# Patient Record
Sex: FEMALE | Race: WHITE | Employment: OTHER | URBAN - METROPOLITAN AREA
[De-identification: names, ages, dates, MRNs, and addresses within clinical notes are randomized per-mention and may not be internally consistent; named-entity substitution may affect disease eponyms.]

---

## 2017-04-11 ENCOUNTER — OFFICE VISIT (OUTPATIENT)
Dept: CARDIOLOGY CLINIC | Age: 62
End: 2017-04-11

## 2017-04-11 VITALS
SYSTOLIC BLOOD PRESSURE: 120 MMHG | BODY MASS INDEX: 32.93 KG/M2 | WEIGHT: 174.4 LBS | HEART RATE: 98 BPM | DIASTOLIC BLOOD PRESSURE: 70 MMHG | HEIGHT: 61 IN

## 2017-04-11 DIAGNOSIS — I48.0 PAROXYSMAL ATRIAL FIBRILLATION (HCC): Primary | ICD-10-CM

## 2017-04-11 PROCEDURE — G8427 DOCREV CUR MEDS BY ELIG CLIN: HCPCS | Performed by: INTERNAL MEDICINE

## 2017-04-11 PROCEDURE — G8419 CALC BMI OUT NRM PARAM NOF/U: HCPCS | Performed by: INTERNAL MEDICINE

## 2017-04-11 PROCEDURE — 3014F SCREEN MAMMO DOC REV: CPT | Performed by: INTERNAL MEDICINE

## 2017-04-11 PROCEDURE — 3017F COLORECTAL CA SCREEN DOC REV: CPT | Performed by: INTERNAL MEDICINE

## 2017-04-11 PROCEDURE — 99214 OFFICE O/P EST MOD 30 MIN: CPT | Performed by: INTERNAL MEDICINE

## 2017-04-11 PROCEDURE — G8598 ASA/ANTIPLAT THER USED: HCPCS | Performed by: INTERNAL MEDICINE

## 2017-04-11 PROCEDURE — 1036F TOBACCO NON-USER: CPT | Performed by: INTERNAL MEDICINE

## 2017-04-11 RX ORDER — ESCITALOPRAM OXALATE 20 MG/1
20 TABLET ORAL DAILY
COMMUNITY
End: 2018-08-08

## 2017-04-11 RX ORDER — HYDROXYZINE PAMOATE 25 MG/1
25 CAPSULE ORAL 2 TIMES DAILY
COMMUNITY
End: 2017-08-04

## 2017-05-04 ENCOUNTER — TELEPHONE (OUTPATIENT)
Dept: CARDIOLOGY CLINIC | Age: 62
End: 2017-05-04

## 2017-06-05 ENCOUNTER — TELEPHONE (OUTPATIENT)
Dept: CARDIOLOGY CLINIC | Age: 62
End: 2017-06-05

## 2017-08-08 ENCOUNTER — TELEPHONE (OUTPATIENT)
Dept: CARDIOLOGY CLINIC | Age: 62
End: 2017-08-08

## 2017-08-09 ENCOUNTER — TELEPHONE (OUTPATIENT)
Dept: CARDIOLOGY CLINIC | Age: 62
End: 2017-08-09

## 2017-08-11 PROBLEM — I51.3 ATRIAL THROMBUS WITHOUT ANTECEDENT MYOCARDIAL INFARCTION: Status: ACTIVE | Noted: 2017-08-11

## 2017-08-18 ENCOUNTER — OFFICE VISIT (OUTPATIENT)
Dept: CARDIOLOGY CLINIC | Age: 62
End: 2017-08-18

## 2017-08-18 VITALS
RESPIRATION RATE: 16 BRPM | WEIGHT: 181 LBS | BODY MASS INDEX: 34.17 KG/M2 | HEIGHT: 61 IN | SYSTOLIC BLOOD PRESSURE: 114 MMHG | HEART RATE: 81 BPM | DIASTOLIC BLOOD PRESSURE: 78 MMHG

## 2017-08-18 DIAGNOSIS — I48.19 PERSISTENT ATRIAL FIBRILLATION (HCC): Primary | ICD-10-CM

## 2017-08-18 PROCEDURE — 3017F COLORECTAL CA SCREEN DOC REV: CPT | Performed by: INTERNAL MEDICINE

## 2017-08-18 PROCEDURE — G8427 DOCREV CUR MEDS BY ELIG CLIN: HCPCS | Performed by: INTERNAL MEDICINE

## 2017-08-18 PROCEDURE — 99215 OFFICE O/P EST HI 40 MIN: CPT | Performed by: INTERNAL MEDICINE

## 2017-08-18 PROCEDURE — G8417 CALC BMI ABV UP PARAM F/U: HCPCS | Performed by: INTERNAL MEDICINE

## 2017-08-18 PROCEDURE — 3014F SCREEN MAMMO DOC REV: CPT | Performed by: INTERNAL MEDICINE

## 2017-08-18 PROCEDURE — G8598 ASA/ANTIPLAT THER USED: HCPCS | Performed by: INTERNAL MEDICINE

## 2017-08-18 PROCEDURE — 1111F DSCHRG MED/CURRENT MED MERGE: CPT | Performed by: INTERNAL MEDICINE

## 2017-08-18 PROCEDURE — 1036F TOBACCO NON-USER: CPT | Performed by: INTERNAL MEDICINE

## 2017-08-18 RX ORDER — CHOLECALCIFEROL (VITAMIN D3) 125 MCG
15 CAPSULE ORAL NIGHTLY
Status: ON HOLD | COMMUNITY
End: 2018-08-08

## 2017-08-25 ENCOUNTER — OFFICE VISIT (OUTPATIENT)
Dept: CARDIOLOGY CLINIC | Age: 62
End: 2017-08-25

## 2017-08-25 DIAGNOSIS — I48.0 PAROXYSMAL ATRIAL FIBRILLATION (HCC): Primary | ICD-10-CM

## 2017-08-25 PROCEDURE — 99999 PR OFFICE/OUTPT VISIT,PROCEDURE ONLY: CPT | Performed by: INTERNAL MEDICINE

## 2017-08-27 ASSESSMENT — ENCOUNTER SYMPTOMS
COUGH: 0
VOMITING: 0
CHEST TIGHTNESS: 0
CONSTIPATION: 0
BACK PAIN: 0
NAUSEA: 0
BLOOD IN STOOL: 0
SHORTNESS OF BREATH: 1
ABDOMINAL PAIN: 0
COLOR CHANGE: 0
DIARRHEA: 0
WHEEZING: 0
EYE PAIN: 0
PHOTOPHOBIA: 0

## 2017-09-01 ENCOUNTER — HOSPITAL ENCOUNTER (OUTPATIENT)
Dept: LAB | Age: 62
Discharge: HOME | End: 2017-09-01
Attending: INTERNAL MEDICINE | Admitting: INTERNAL MEDICINE

## 2017-09-01 DIAGNOSIS — Z01.811 PRE-OP CHEST EXAM: ICD-10-CM

## 2017-09-01 LAB
ANION GAP SERPL CALCULATED.3IONS-SCNC: 11 MMOL/L (ref 4–16)
APTT: 27.4 SECONDS (ref 21.2–33)
BUN BLDV-MCNC: 22 MG/DL (ref 6–23)
CALCIUM SERPL-MCNC: 9.3 MG/DL (ref 8.3–10.6)
CHLORIDE BLD-SCNC: 102 MMOL/L (ref 99–110)
CO2: 29 MMOL/L (ref 21–32)
CREAT SERPL-MCNC: 1.4 MG/DL (ref 0.6–1.1)
GFR AFRICAN AMERICAN: 46 ML/MIN/1.73M2
GFR NON-AFRICAN AMERICAN: 38 ML/MIN/1.73M2
GLUCOSE BLD-MCNC: 71 MG/DL (ref 70–140)
HCT VFR BLD CALC: 37.8 % (ref 37–47)
HEMOGLOBIN: 12.8 GM/DL (ref 12.5–16)
INR BLD: 1.2 INDEX
MAGNESIUM: 2.2 MG/DL (ref 1.8–2.4)
MCH RBC QN AUTO: 30.7 PG (ref 27–31)
MCHC RBC AUTO-ENTMCNC: 33.9 % (ref 32–36)
MCV RBC AUTO: 90.6 FL (ref 78–100)
PDW BLD-RTO: 13.1 % (ref 11.7–14.9)
PHOSPHORUS: 4.1 MG/DL (ref 2.5–4.9)
PLATELET # BLD: 259 K/CU MM (ref 140–440)
PMV BLD AUTO: 10.1 FL (ref 7.5–11.1)
POTASSIUM SERPL-SCNC: 4.7 MMOL/L (ref 3.5–5.1)
PROTHROMBIN TIME: 13.7 SECONDS (ref 9.12–12.5)
RBC # BLD: 4.17 M/CU MM (ref 4.2–5.4)
SODIUM BLD-SCNC: 142 MMOL/L (ref 135–145)
WBC # BLD: 9.1 K/CU MM (ref 4–10.5)

## 2017-09-06 ENCOUNTER — TELEPHONE (OUTPATIENT)
Dept: CARDIOLOGY CLINIC | Age: 62
End: 2017-09-06

## 2017-09-29 ENCOUNTER — OFFICE VISIT (OUTPATIENT)
Dept: CARDIOLOGY CLINIC | Age: 62
End: 2017-09-29

## 2017-09-29 VITALS
BODY MASS INDEX: 34.89 KG/M2 | HEART RATE: 96 BPM | DIASTOLIC BLOOD PRESSURE: 60 MMHG | WEIGHT: 184.8 LBS | OXYGEN SATURATION: 98 % | HEIGHT: 61 IN | SYSTOLIC BLOOD PRESSURE: 102 MMHG

## 2017-09-29 DIAGNOSIS — Z98.890 S/P ABLATION OF ATRIAL FIBRILLATION: Primary | ICD-10-CM

## 2017-09-29 DIAGNOSIS — Z86.79 S/P ABLATION OF ATRIAL FIBRILLATION: Primary | ICD-10-CM

## 2017-09-29 PROCEDURE — 3014F SCREEN MAMMO DOC REV: CPT | Performed by: INTERNAL MEDICINE

## 2017-09-29 PROCEDURE — G8427 DOCREV CUR MEDS BY ELIG CLIN: HCPCS | Performed by: INTERNAL MEDICINE

## 2017-09-29 PROCEDURE — 1036F TOBACCO NON-USER: CPT | Performed by: INTERNAL MEDICINE

## 2017-09-29 PROCEDURE — 3017F COLORECTAL CA SCREEN DOC REV: CPT | Performed by: INTERNAL MEDICINE

## 2017-09-29 PROCEDURE — G8417 CALC BMI ABV UP PARAM F/U: HCPCS | Performed by: INTERNAL MEDICINE

## 2017-09-29 PROCEDURE — 93000 ELECTROCARDIOGRAM COMPLETE: CPT | Performed by: INTERNAL MEDICINE

## 2017-09-29 PROCEDURE — 99213 OFFICE O/P EST LOW 20 MIN: CPT | Performed by: INTERNAL MEDICINE

## 2017-09-29 PROCEDURE — G8598 ASA/ANTIPLAT THER USED: HCPCS | Performed by: INTERNAL MEDICINE

## 2017-09-29 RX ORDER — METOPROLOL SUCCINATE 50 MG/1
TABLET, EXTENDED RELEASE ORAL
Qty: 45 TABLET | Refills: 3 | Status: SHIPPED | OUTPATIENT
Start: 2017-09-29 | End: 2020-07-27 | Stop reason: SDUPTHER

## 2017-09-29 RX ORDER — CLONIDINE HYDROCHLORIDE 0.3 MG/1
0.3 TABLET ORAL NIGHTLY
Status: ON HOLD | COMMUNITY
End: 2019-05-25 | Stop reason: HOSPADM

## 2017-09-29 RX ORDER — LISINOPRIL 10 MG/1
10 TABLET ORAL DAILY
Qty: 90 TABLET | Refills: 1
Start: 2017-09-29 | End: 2018-08-08 | Stop reason: DRUGHIGH

## 2017-09-29 NOTE — MR AVS SNAPSHOT
estimate of body fat, calculated from your height and weight. The higher your BMI, the greater your risk of heart disease, high blood pressure, type 2 diabetes, stroke, gallstones, arthritis, sleep apnea, and certain cancers. BMI is not perfect. It may overestimate body fat in athletes and people who are more muscular. Even a small weight loss (between 5 and 10 percent of your current weight) by decreasing your calorie intake and becoming more physically active will help lower your risk of developing or worsening diseases associated with obesity. Learn more at: [employer]             Today's Medication Changes          These changes are accurate as of: 9/29/17  2:02 PM.  If you have any questions, ask your nurse or doctor. CHANGE how you take these medications           lisinopril 10 MG tablet   Commonly known as:  PRINIVIL;ZESTRIL   Instructions: Take 1 tablet by mouth daily   Quantity:  90 tablet   Refills:  1   What changed:    - medication strength  - how much to take   Changed by:  Latoya Arzola MD       metoprolol succinate 50 MG extended release tablet   Commonly known as:  TOPROL XL   Instructions: Take one tablet in the morning and 1/2 tablet at night   Quantity:  45 tablet   Refills:  3   What changed:    - how much to take  - how to take this  - when to take this  - additional instructions   Changed by:  Latoya Arzola MD            Where to Get Your Medications      These medications were sent to [address] [place] [address], [address] [place] [address] [phone] - f [phone]  [address]     Phone:  [phone]     metoprolol succinate 50 MG extended release tablet         Information about where to get these medications is not yet available     !  Ask your nurse or doctor about these medications     lisinopril 10 MG tablet               Your Current Medications Are cloNIDine (CATAPRES) 0.3 MG tablet Take 0.3 mg by mouth nightly    lisinopril (PRINIVIL;ZESTRIL) 10 MG tablet Take 1 tablet by mouth daily    metoprolol succinate (TOPROL XL) 50 MG extended release tablet Take one tablet in the morning and 1/2 tablet at night    melatonin 5 MG TABS tablet Take 15 mg by mouth nightly    nitroGLYCERIN (NITROSTAT) 0.4 MG SL tablet Place 0.4 mg under the tongue every 5 minutes as needed for Chest pain up to max of 3 total doses. If no relief after 1 dose, call 911.    aspirin 81 MG EC tablet Take 81 mg by mouth daily    escitalopram (LEXAPRO) 20 MG tablet Take 20 mg by mouth daily    spironolactone (ALDACTONE) 50 MG tablet Take 50 mg by mouth daily    atorvastatin (LIPITOR) 40 MG tablet Take 40 mg by mouth daily    traZODone (DESYREL) 100 MG tablet Take 100 mg by mouth nightly. insulin lispro (HUMALOG) 100 UNIT/ML injection Inject into the skin See Admin Instructions Sliding scale twice daily before meals  149 or below zero units   150 to 200  Twenty units  201 to 250  Twenty two units  251 to 300  Twenty four units  301 to 350  Twenty six units  351 to 400  Twenty eight units  401 or greater  Thirty units and call MD    furosemide (LASIX) 20 MG tablet Take 20 mg by mouth daily.       apixaban (ELIQUIS) 5 MG TABS tablet Take 1 tablet by mouth 2 times daily    insulin glargine (LANTUS) 100 UNIT/ML injection vial Inject 75 Units into the skin nightly Only took 1/2 the dose last night      Allergies              Amiodarone Other (See Comments)    dizziness    Iv Dye [Iodides] Nausea And Vomiting    Vicodin [Hydrocodone-acetaminophen] Nausea And Vomiting      We Ordered/Performed the following           EKG 12 lead          Result Summary for EKG 12 lead      Result Information     Status          Final result (Resulted: 9/29/2017)           9/29/2017  1:48 PM      Scans on Order [id number]            ECG on 9/29/2017  1:48 PM : ECG Report                     Additional Information Basic Information     Date Of Birth Sex Race Ethnicity Preferred Language    1955 Female White Non-/Non  English      Problem List as of 9/29/2017  Date Reviewed: 8/5/2014                S/P ablation of atrial fibrillation    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    Atrial thrombus without antecedent myocardial infarction    Family history of coronary artery disease    H/O cardiovascular stress test    CHF (congestive heart failure) ([facility]    Light headed    History of MI (myocardial infarction)    Diabetes mellitus ([facility]    Hypertension    TIA (transient ischemic attack)    Hyperlipidemia    SOB (shortness of breath)    CAD (coronary artery disease)    Persistent atrial fibrillation ([facility]      Immunizations as of 9/29/2017     Name Date    Influenza Vaccine, unspecified formulation 4/20/2016    Influenza Virus Vaccine 12/10/2014    Pneumococcal Polysaccharide ([id number]) 10/20/2016      Preventive Care        Date Due    Diabetic Foot Exam 10/28/1966    Eye Exam By An Eye Doctor 10/28/1966    HIV screening is recommended for all people regardless of risk factors  aged 15-65 years at least once (lifetime) who have never been HIV tested. 10/28/1971    Urine Check For Kidney Problems 10/28/1974    Tetanus Combination Vaccine (1 - Tdap) 10/28/1975    Pap Smear 10/28/1977    Colonoscopy 10/28/2006    Cholesterol Screening 1/30/2013    Zoster Vaccine 10/28/2016    Yearly Flu Vaccine (1) 9/1/2017    Mammograms are recommended every 2 years for low/average risk patients aged 48 - 69, and every year for high risk patients per updated national guidelines. However these guidelines can be individualized by your provider. 4/22/2018    Hemoglobin A1C (Test For Long-Term Glucose Control) 9/5/2018            [employer] Signup           "[employer]hart allows you to send messages to your doctor, view your test results, renew your prescriptions, schedule appointments, view visit notes, and more. How Do I Sign Up?

## 2017-10-03 ENCOUNTER — HOSPITAL ENCOUNTER (OUTPATIENT)
Dept: LAB | Age: 62
Discharge: HOME | End: 2017-10-03
Attending: INTERNAL MEDICINE | Admitting: INTERNAL MEDICINE

## 2017-10-03 ENCOUNTER — TELEPHONE (OUTPATIENT)
Dept: CARDIOLOGY CLINIC | Age: 62
End: 2017-10-03

## 2017-10-03 LAB
ANION GAP SERPL CALCULATED.3IONS-SCNC: 12 MMOL/L (ref 4–16)
BUN BLDV-MCNC: 28 MG/DL (ref 6–23)
CALCIUM SERPL-MCNC: 9.5 MG/DL (ref 8.3–10.6)
CHLORIDE BLD-SCNC: 100 MMOL/L (ref 99–110)
CO2: 27 MMOL/L (ref 21–32)
CREAT SERPL-MCNC: 1.6 MG/DL (ref 0.6–1.1)
GFR AFRICAN AMERICAN: 40 ML/MIN/1.73M2
GFR NON-AFRICAN AMERICAN: 33 ML/MIN/1.73M2
GLUCOSE FASTING: 215 MG/DL (ref 70–99)
MAGNESIUM: 2 MG/DL (ref 1.8–2.4)
POTASSIUM SERPL-SCNC: 5.2 MMOL/L (ref 3.5–5.1)
SODIUM BLD-SCNC: 139 MMOL/L (ref 135–145)

## 2017-10-03 NOTE — TELEPHONE ENCOUNTER
Called patient advised her that per Dr Deo Kendall we received lab results and he wants her to STOP Spironolactone. Pt voiced understanding.

## 2017-10-16 ENCOUNTER — TELEPHONE (OUTPATIENT)
Dept: CARDIOLOGY CLINIC | Age: 62
End: 2017-10-16

## 2017-12-01 ENCOUNTER — TELEPHONE (OUTPATIENT)
Dept: CARDIOLOGY CLINIC | Age: 62
End: 2017-12-01

## 2017-12-01 NOTE — TELEPHONE ENCOUNTER
Pt called she is sick and unable to make todays appointment. Told her someone will call her to reschedule.

## 2017-12-04 ENCOUNTER — TELEPHONE (OUTPATIENT)
Dept: CARDIOLOGY CLINIC | Age: 62
End: 2017-12-04

## 2017-12-22 ENCOUNTER — OFFICE VISIT (OUTPATIENT)
Dept: CARDIOLOGY CLINIC | Age: 62
End: 2017-12-22

## 2017-12-22 VITALS
HEART RATE: 60 BPM | HEIGHT: 61 IN | BODY MASS INDEX: 36.4 KG/M2 | DIASTOLIC BLOOD PRESSURE: 78 MMHG | SYSTOLIC BLOOD PRESSURE: 116 MMHG | WEIGHT: 192.8 LBS

## 2017-12-22 DIAGNOSIS — Z86.79 S/P ABLATION OF ATRIAL FIBRILLATION: Primary | ICD-10-CM

## 2017-12-22 DIAGNOSIS — Z98.890 S/P ABLATION OF ATRIAL FIBRILLATION: Primary | ICD-10-CM

## 2017-12-22 PROCEDURE — G8417 CALC BMI ABV UP PARAM F/U: HCPCS | Performed by: INTERNAL MEDICINE

## 2017-12-22 PROCEDURE — 3017F COLORECTAL CA SCREEN DOC REV: CPT | Performed by: INTERNAL MEDICINE

## 2017-12-22 PROCEDURE — 1036F TOBACCO NON-USER: CPT | Performed by: INTERNAL MEDICINE

## 2017-12-22 PROCEDURE — 99213 OFFICE O/P EST LOW 20 MIN: CPT | Performed by: INTERNAL MEDICINE

## 2017-12-22 PROCEDURE — G8484 FLU IMMUNIZE NO ADMIN: HCPCS | Performed by: INTERNAL MEDICINE

## 2017-12-22 PROCEDURE — G8427 DOCREV CUR MEDS BY ELIG CLIN: HCPCS | Performed by: INTERNAL MEDICINE

## 2017-12-22 PROCEDURE — G8598 ASA/ANTIPLAT THER USED: HCPCS | Performed by: INTERNAL MEDICINE

## 2017-12-22 PROCEDURE — 93000 ELECTROCARDIOGRAM COMPLETE: CPT | Performed by: INTERNAL MEDICINE

## 2017-12-22 PROCEDURE — 3014F SCREEN MAMMO DOC REV: CPT | Performed by: INTERNAL MEDICINE

## 2017-12-22 NOTE — PATIENT INSTRUCTIONS
Please remember to bring all medication bottles or a medication list with you to your appointment. If you have any questions, please call our office at [phone].

## 2018-08-08 PROBLEM — K52.9 GASTROENTERITIS: Status: ACTIVE | Noted: 2018-08-08

## 2018-08-10 PROBLEM — N30.00 ACUTE CYSTITIS: Status: ACTIVE | Noted: 2018-08-10

## 2018-09-11 ENCOUNTER — TELEPHONE (OUTPATIENT)
Dept: CARDIOLOGY CLINIC | Age: 63
End: 2018-09-11

## 2018-09-11 NOTE — TELEPHONE ENCOUNTER
Faxed records release request to [facility] on  9/11/18 for  Disability Determ. Faxed to [phone]. Call [phone]  Option 2 to check on progress.

## 2019-04-07 ENCOUNTER — APPOINTMENT (OUTPATIENT)
Dept: CT IMAGING | Age: 64
End: 2019-04-07
Payer: COMMERCIAL

## 2019-04-07 ENCOUNTER — HOSPITAL ENCOUNTER (EMERGENCY)
Age: 64
Discharge: HOME | End: 2019-04-07
Attending: EMERGENCY MEDICINE
Payer: COMMERCIAL

## 2019-04-07 VITALS
BODY MASS INDEX: 33.99 KG/M2 | HEART RATE: 90 BPM | RESPIRATION RATE: 16 BRPM | TEMPERATURE: 98.4 F | DIASTOLIC BLOOD PRESSURE: 127 MMHG | SYSTOLIC BLOOD PRESSURE: 154 MMHG | OXYGEN SATURATION: 95 % | WEIGHT: 180 LBS | HEIGHT: 61 IN

## 2019-04-07 DIAGNOSIS — R73.9 HYPERGLYCEMIA: ICD-10-CM

## 2019-04-07 DIAGNOSIS — R19.7 NAUSEA VOMITING AND DIARRHEA: ICD-10-CM

## 2019-04-07 DIAGNOSIS — I10 ESSENTIAL HYPERTENSION: ICD-10-CM

## 2019-04-07 DIAGNOSIS — E86.0 DEHYDRATION: ICD-10-CM

## 2019-04-07 DIAGNOSIS — R10.84 GENERALIZED ABDOMINAL PAIN: Primary | ICD-10-CM

## 2019-04-07 DIAGNOSIS — R11.2 NAUSEA VOMITING AND DIARRHEA: ICD-10-CM

## 2019-04-07 LAB
ALBUMIN SERPL-MCNC: 3.8 GM/DL (ref 3.4–5)
ALP BLD-CCNC: 218 IU/L (ref 40–129)
ALT SERPL-CCNC: 30 U/L (ref 10–40)
ANION GAP SERPL CALCULATED.3IONS-SCNC: 11 MMOL/L (ref 4–16)
AST SERPL-CCNC: 21 IU/L (ref 15–37)
BACTERIA: ABNORMAL /HPF
BASOPHILS ABSOLUTE: 0.1 K/CU MM
BASOPHILS RELATIVE PERCENT: 0.4 % (ref 0–1)
BILIRUB SERPL-MCNC: 0.3 MG/DL (ref 0–1)
BILIRUBIN URINE: NEGATIVE MG/DL
BLOOD, URINE: NEGATIVE
BUN BLDV-MCNC: 14 MG/DL (ref 6–23)
CALCIUM SERPL-MCNC: 8.6 MG/DL (ref 8.3–10.6)
CHLORIDE BLD-SCNC: 97 MMOL/L (ref 99–110)
CHP ED QC CHECK: YES
CLARITY: CLEAR
CO2: 26 MMOL/L (ref 21–32)
COLOR: YELLOW
CREAT SERPL-MCNC: 1.3 MG/DL (ref 0.6–1.1)
DIFFERENTIAL TYPE: ABNORMAL
EOSINOPHILS ABSOLUTE: 0.2 K/CU MM
EOSINOPHILS RELATIVE PERCENT: 1.5 % (ref 0–3)
GFR AFRICAN AMERICAN: 50 ML/MIN/1.73M2
GFR NON-AFRICAN AMERICAN: 42 ML/MIN/1.73M2
GLUCOSE BLD-MCNC: 447 MG/DL (ref 70–99)
GLUCOSE BLD-MCNC: 79 MG/DL
GLUCOSE BLD-MCNC: 79 MG/DL (ref 70–99)
GLUCOSE, URINE: >500 MG/DL
HCT VFR BLD CALC: 41.3 % (ref 37–47)
HEMOGLOBIN: 13.4 GM/DL (ref 12.5–16)
HYALINE CASTS: 5 /LPF
IMMATURE NEUTROPHIL %: 0.4 % (ref 0–0.43)
KETONES, URINE: NEGATIVE MG/DL
LACTATE: 1.3 MMOL/L (ref 0.4–2)
LACTATE: ABNORMAL MMOL/L (ref 0.4–2)
LEUKOCYTE ESTERASE, URINE: ABNORMAL
LIPASE: 16 IU/L (ref 13–60)
LYMPHOCYTES ABSOLUTE: 2.1 K/CU MM
LYMPHOCYTES RELATIVE PERCENT: 15.4 % (ref 24–44)
MCH RBC QN AUTO: 31 PG (ref 27–31)
MCHC RBC AUTO-ENTMCNC: 32.4 % (ref 32–36)
MCV RBC AUTO: 95.6 FL (ref 78–100)
MONOCYTES ABSOLUTE: 0.5 K/CU MM
MONOCYTES RELATIVE PERCENT: 3.8 % (ref 0–4)
MUCUS: ABNORMAL HPF
NITRITE URINE, QUANTITATIVE: NEGATIVE
NUCLEATED RBC %: 0 %
PDW BLD-RTO: 12.4 % (ref 11.7–14.9)
PH, URINE: 5 (ref 5–8)
PLATELET # BLD: 340 K/CU MM (ref 140–440)
PMV BLD AUTO: 10.2 FL (ref 7.5–11.1)
POTASSIUM SERPL-SCNC: 3.6 MMOL/L (ref 3.5–5.1)
PROTEIN UA: 100 MG/DL
RBC # BLD: 4.32 M/CU MM (ref 4.2–5.4)
RBC URINE: 1 /HPF (ref 0–6)
SEGMENTED NEUTROPHILS ABSOLUTE COUNT: 10.4 K/CU MM
SEGMENTED NEUTROPHILS RELATIVE PERCENT: 78.5 % (ref 36–66)
SODIUM BLD-SCNC: 134 MMOL/L (ref 135–145)
SPECIFIC GRAVITY UA: 1.02 (ref 1–1.03)
SQUAMOUS EPITHELIAL: 1 /HPF
TOTAL IMMATURE NEUTOROPHIL: 0.05 K/CU MM
TOTAL NUCLEATED RBC: 0 K/CU MM
TOTAL PROTEIN: 7.1 GM/DL (ref 6.4–8.2)
TRICHOMONAS: ABNORMAL /HPF
UROBILINOGEN, URINE: NORMAL MG/DL (ref 0.2–1)
WBC # BLD: 13.3 K/CU MM (ref 4–10.5)
WBC UA: 3 /HPF (ref 0–5)

## 2019-04-07 PROCEDURE — 81001 URINALYSIS AUTO W/SCOPE: CPT

## 2019-04-07 PROCEDURE — 96361 HYDRATE IV INFUSION ADD-ON: CPT

## 2019-04-07 PROCEDURE — 96372 THER/PROPH/DIAG INJ SC/IM: CPT

## 2019-04-07 PROCEDURE — 36415 COLL VENOUS BLD VENIPUNCTURE: CPT

## 2019-04-07 PROCEDURE — 80053 COMPREHEN METABOLIC PANEL: CPT

## 2019-04-07 PROCEDURE — 6360000002 HC RX W HCPCS: Performed by: PHYSICIAN ASSISTANT

## 2019-04-07 PROCEDURE — 99284 EMERGENCY DEPT VISIT MOD MDM: CPT

## 2019-04-07 PROCEDURE — 83605 ASSAY OF LACTIC ACID: CPT

## 2019-04-07 PROCEDURE — 74176 CT ABD & PELVIS W/O CONTRAST: CPT

## 2019-04-07 PROCEDURE — 85025 COMPLETE CBC W/AUTO DIFF WBC: CPT

## 2019-04-07 PROCEDURE — 96374 THER/PROPH/DIAG INJ IV PUSH: CPT

## 2019-04-07 PROCEDURE — 83690 ASSAY OF LIPASE: CPT

## 2019-04-07 PROCEDURE — 2580000003 HC RX 258: Performed by: PHYSICIAN ASSISTANT

## 2019-04-07 PROCEDURE — 82962 GLUCOSE BLOOD TEST: CPT

## 2019-04-07 RX ORDER — 0.9 % SODIUM CHLORIDE 0.9 %
1000 INTRAVENOUS SOLUTION INTRAVENOUS ONCE
Status: COMPLETED | OUTPATIENT
Start: 2019-04-07 | End: 2019-04-07

## 2019-04-07 RX ORDER — DICYCLOMINE HYDROCHLORIDE 10 MG/ML
20 INJECTION INTRAMUSCULAR ONCE
Status: COMPLETED | OUTPATIENT
Start: 2019-04-07 | End: 2019-04-07

## 2019-04-07 RX ORDER — SODIUM CHLORIDE 9 MG/ML
INJECTION, SOLUTION INTRAVENOUS CONTINUOUS
Status: DISCONTINUED | OUTPATIENT
Start: 2019-04-07 | End: 2019-04-07 | Stop reason: HOSPADM

## 2019-04-07 RX ORDER — ONDANSETRON 2 MG/ML
4 INJECTION INTRAMUSCULAR; INTRAVENOUS EVERY 30 MIN PRN
Status: DISCONTINUED | OUTPATIENT
Start: 2019-04-07 | End: 2019-04-07 | Stop reason: HOSPADM

## 2019-04-07 RX ORDER — ONDANSETRON 4 MG/1
4 TABLET, ORALLY DISINTEGRATING ORAL EVERY 8 HOURS PRN
Qty: 15 TABLET | Refills: 0 | Status: ON HOLD | OUTPATIENT
Start: 2019-04-07 | End: 2020-01-01 | Stop reason: HOSPADM

## 2019-04-07 RX ORDER — DICYCLOMINE HYDROCHLORIDE 10 MG/1
10 CAPSULE ORAL
Qty: 20 CAPSULE | Refills: 0 | Status: ON HOLD | OUTPATIENT
Start: 2019-04-07 | End: 2020-12-01 | Stop reason: HOSPADM

## 2019-04-07 RX ADMIN — SODIUM CHLORIDE 1000 ML: 9 INJECTION, SOLUTION INTRAVENOUS at 15:33

## 2019-04-07 RX ADMIN — SODIUM CHLORIDE: 9 INJECTION, SOLUTION INTRAVENOUS at 17:21

## 2019-04-07 RX ADMIN — ONDANSETRON 4 MG: 2 INJECTION INTRAMUSCULAR; INTRAVENOUS at 15:33

## 2019-04-07 RX ADMIN — DICYCLOMINE HYDROCHLORIDE 20 MG: 10 INJECTION INTRAMUSCULAR at 15:37

## 2019-04-07 ASSESSMENT — PAIN DESCRIPTION - LOCATION: LOCATION: ABDOMEN

## 2019-04-07 ASSESSMENT — PAIN DESCRIPTION - PAIN TYPE: TYPE: ACUTE PAIN

## 2019-04-07 NOTE — ED PROVIDER NOTES
eMERGENCY dEPARTMENT eNCOUnter         [address] [facility]     PCP: Dayanna Quevedo PA-C    CHIEF COMPLAINT    Chief Complaint   Patient presents with    Abdominal Pain    Emesis    Diarrhea       HPI    Mac Greer is a 59 y.o. female who presents with abdominal pain, vomiting diarrhea. Onset prior to arrival, since last Monday. Context is patient denies any recent travel, sick contact, new foods/medications including antibiotics. Reports she began developing generalized fatigue with decreased appetite. Has had multiple episodes of nonbilious/nonbloody vomiting as well as loose watery stools. States that she will have cramping generalized abdominal pain during vomiting episodes only. No noted fever or chills. She is a type II diabetic, states that she is compliant with her insulin medications. No chest pain or shortness of breath. No dysuria hematuria. She states that last time she had similar abdominal pain, she had elevated liver enzymes but denies history of alcohol abuse. She has had previous cholecystectomy, other abdominal surgical history. No known history of GI disorders. REVIEW OF SYSTEMS    Constitutional:  Denies fever, chills, weight loss or weakness   HENT:  Denies sore throat or ear pain   Cardiovascular:  Denies chest pain, palpitations or swelling   Respiratory:  Denies cough or shortness of breath   GI:  See HPI above  : No hematuria or dysuria. No vaginal symptoms. Musculoskeletal:  Denies back pain or groin pain or masses. No pain or swelling of extremities.   Skin:  Denies rash  Neurologic:  Denies headache, focal weakness or sensory changes   Endocrine:  Denies polyuria or polydypsia   Lymphatic:  Denies swollen glands     All other review of systems are negative  See HPI and nursing notes for additional information     PAST MEDICAL & SURGICAL HISTORY    Past Medical History:   Diagnosis Date    Abnormal PFTs (pulmonary function tests) 4/14 4/14    Atrial fibrillation (HCC)     CAD (coronary artery disease)     CHF (congestive heart failure) (HCC)     Diabetes mellitus (HCC)     Dizziness - light-headed     Family history of coronary artery disease     H/O 24 hour EKG monitoring 02/09/2012,12/10/2010    02/09/2012 predominant rhythm is sinus with short runs of SVT no episode of atrial fib. pt has left bundle branch morphology, max heart is 132, min is 48 with an average of 74 ,infrequent PVCs are seen    H/O cardiovascular stress test 4/1/2013, 1/31/2012 4/1/2013-    H/O cardiovascular stress test 4/16/14,02/09/2012 4/16/14 EF 52% No ischemia, normal study. 02/09/2012 EF 58% tehnique-technetium LVEF is normal globally    H/O echocardiogram 03/27/13 4/14 EF 50-55% mild MR, TR 3/13EF 35-45% decreasedr L ventrical systolic function.  H/O echocardiogram 4/14 4/14-EF50-55% Mild MR/TR. 10/11 EF > 55% LVS function is normal, calcified mitral apparatus, impaired LV relaxation, no PE    Heart imaging 02/09/2012 02/09/2012 MUGA rest EF 58%LVEF is normal globally    Heartburn     History of cardiac cath 05/08/2009, 06/14/2006 05/08/2009,moderate stenosis  small diagonal    History of cardiac monitoring 2/9/15    Event - sinus rhythm    History of cardioversion 3/14/14    History of MI (myocardial infarction)     History of PTCA 10/06/2010    10/06/2010 stent 3.0x24 taxus stent left main 90% stenosis mid seg reduced to 0% second area of [id number] to 40% not flow limiting EF 40%    Hx of cardiovascular stress test 8/21/2015    lexiscan-normal,EF70%    Hx of transesophageal echocardiography (LUKE) for monitoring 08/07/2017    Noted to have MARY GRACE Blood Clot.  Hyperlipidemia     Hypertension     Insomnia     SOB (shortness of breath)     Tachycardia 1/30/2012    report in epic notes-Dr Nunn hosp. consult    TIA (transient ischemic attack)      Past Surgical History:   Procedure Laterality Date    ABLATION OF DYSRHYTHMIC FOCUS       SECTION      CHOLECYSTECTOMY      DIAGNOSTIC CARDIAC CATH LAB PROCEDURE  2006, 2009 moderate stenosis small diagonal    OTHER SURGICAL HISTORY  2017    afib ablation LUKE    PTCA  10/06/2010    10/06/2010 stent 3.0x24 taxus stent 90% stenosis mid segment reduced to 0% second area of stenosis 30-40% not flow limiting EF 40%    TUBAL LIGATION         CURRENT MEDICATIONS    Current Outpatient Rx   Medication Sig Dispense Refill    dicyclomine (BENTYL) 10 MG capsule Take 1 capsule by mouth 4 times daily (before meals and nightly) 20 capsule 0    ondansetron (ZOFRAN ODT) 4 MG disintegrating tablet Take 1 tablet by mouth every 8 hours as needed for Nausea 15 tablet 0    amLODIPine (NORVASC) 5 MG tablet Take 1 tablet by mouth daily 30 tablet 3    hydrOXYzine (VISTARIL) 50 MG capsule Take 50 mg by mouth daily      levothyroxine (SYNTHROID) 50 MCG tablet Take 50 mcg by mouth Daily      cloNIDine (CATAPRES) 0.3 MG tablet Take 0.3 mg by mouth nightly      metoprolol succinate (TOPROL XL) 50 MG extended release tablet Take one tablet in the morning and 1/2 tablet at night 45 tablet 3    apixaban (ELIQUIS) 5 MG TABS tablet Take 1 tablet by mouth 2 times daily 60 tablet 2    nitroGLYCERIN (NITROSTAT) 0.4 MG SL tablet Place 0.4 mg under the tongue every 5 minutes as needed for Chest pain up to max of 3 total doses. If no relief after 1 dose, call 911.  aspirin 81 MG EC tablet Take 81 mg by mouth daily      atorvastatin (LIPITOR) 40 MG tablet Take 40 mg by mouth daily      insulin glargine (LANTUS) 100 UNIT/ML injection vial Inject 75 Units into the skin nightly       traZODone (DESYREL) 100 MG tablet Take 100 mg by mouth nightly.       insulin lispro (HUMALOG) 100 UNIT/ML injection Inject into the skin See Admin Instructions Sliding scale twice daily before meals  149 or below zero units   150 to 200  Twenty units 201 to 250  Twenty two units  251 to 300  Twenty four units  301 to 350  Twenty six units  351 to 400  Twenty eight units  401 or greater  Thirty units and call MD         ALLERGIES    Allergies   Allergen Reactions    Amiodarone Other (See Comments)     dizziness    Iv Dye [Iodides] Nausea And Vomiting    Vicodin [Hydrocodone-Acetaminophen] Nausea And Vomiting       SOCIAL AND FAMILY HISTORY    Social History     Socioeconomic History    Marital status:      Spouse name: None    Number of children: None    Years of education: None    Highest education level: None   Occupational History    None   Social Needs    Financial resource strain: None    Food insecurity:     Worry: None     Inability: None    Transportation needs:     Medical: None     Non-medical: None   Tobacco Use    Smoking status: Former Smoker     Packs/day: 3.00     Years: 20.00     Pack years: 60.00     Types: Cigarettes     Last attempt to quit: 2007     Years since quittin.1    Smokeless tobacco: Never Used    Tobacco comment: reviewed 8/12/15   Substance and Sexual Activity    Alcohol use: No     Comment: rylee amos    Drug use: No    Sexual activity: None   Lifestyle    Physical activity:     Days per week: None     Minutes per session: None    Stress: None   Relationships    Social connections:     Talks on phone: None     Gets together: None     Attends [employer] service: None     Active member of club or organization: None     Attends meetings of clubs or organizations: None     Relationship status: None    Intimate partner violence:     Fear of current or ex partner: None     Emotionally abused: None     Physically abused: None     Forced sexual activity: None   Other Topics Concern    None   Social History Narrative    None     Family History   Problem Relation Age of Onset    Diabetes Father     Heart Disease Father        PHYSICAL EXAM    VITAL SIGNS: BP (!) 154/127   Pulse 90   Temp 98.4 °F (36.9 °C) (Oral)   Resp 16   Ht 5' 1\" (1.549 m)   Wt 180 lb (81.6 kg)   SpO2 95%   BMI 34.01 kg/m²   General:  Well developed, obese female, nontoxic but ill appearing Eyes:  Sclera nonicteric, Conjunctiva moist, No discharge  Head:  Normocephalic, Atramautic  Neck/Lymphatics: Supple, no JVD, no swollen nodes  Respiratory:  Clear to ausculation bilaterally, No retractions, Non labored breathing  Cardiovascular:  Tachycardic rate 116 bpm, regular rhythm. Normal S1/S2. GI:   No gross discoloration. Bowel sounds present in all quadrants, No audible bruits. Soft,  Nondistended. Generalized periumbilical abdominal tenderness without rebound tenderness or guarding, No palpable pulsatile masses or obvious hernias. No focal increased and is in the right lower quadrant over McBurney point. Negative Rovsing's. No pain with heel strike. Back:  No CVA tenderness to percussion. Musculoskeletal:  No edema, No deformity  Peripheral Vascular: Distal pulses 2+ equal bilaterally  Integument: No rash, Normal turgor.  Tacky mucus membranes  Neurologic:  Alert & oriented, Normal speech  Psychiatric: Cooperative, pleasant affect       I have reviewed and interpreted all of the currently available lab results from this visit (if applicable):  Results for orders placed or performed during the hospital encounter of 04/07/19   CBC auto diff   Result Value Ref Range    WBC 13.3 (H) 4.0 - 10.5 K/CU MM    RBC 4.32 4.2 - 5.4 M/CU MM    Hemoglobin 13.4 12.5 - 16.0 GM/DL    Hematocrit 41.3 37 - 47 %    MCV 95.6 78 - 100 FL    MCH 31.0 27 - 31 PG    MCHC 32.4 32.0 - 36.0 %    RDW 12.4 11.7 - 14.9 %    Platelets 138 406 - 963 K/CU MM    MPV 10.2 7.5 - 11.1 FL    Differential Type AUTOMATED DIFFERENTIAL     Segs Relative 78.5 (H) 36 - 66 %    Lymphocytes % 15.4 (L) 24 - 44 %    Monocytes % 3.8 0 - 4 %    Eosinophils % 1.5 0 - 3 %    Basophils % 0.4 0 - 1 %    Segs Absolute 10.4 K/CU MM    Lymphocytes # 2.1 K/CU MM    Monocytes # 0.5 K/CU MM    Eosinophils # 0.2 K/CU MM    Basophils # 0.1 K/CU MM    Nucleated RBC % 0.0 %    Total Nucleated RBC 0.0 K/CU MM    Total Immature Neutrophil 0.05 K/CU MM    Immature Neutrophil % 0.4 0 - 0.43 %   CMP   Result Value Ref Range    Sodium 134 (L) 135 - 145 MMOL/L    Potassium 3.6 3.5 - 5.1 MMOL/L    Chloride 97 (L) 99 - 110 mMol/L    CO2 26 21 - 32 MMOL/L    BUN 14 6 - 23 MG/DL    CREATININE 1.3 (H) 0.6 - 1.1 MG/DL    Glucose 447 (HH) 70 - 99 MG/DL    Calcium 8.6 8.3 - 10.6 MG/DL    Alb 3.8 3.4 - 5.0 GM/DL    Total Protein 7.1 6.4 - 8.2 GM/DL    Total Bilirubin 0.3 0.0 - 1.0 MG/DL    ALT 30 10 - 40 U/L    AST 21 15 - 37 IU/L    Alkaline Phosphatase 218 (H) 40 - 129 IU/L    GFR Non- 42 (L) >60 mL/min/1.73m2    GFR  50 (L) >60 mL/min/1.73m2    Anion Gap 11 4 - 16   Lipase   Result Value Ref Range    Lipase 16 13 - 60 IU/L   Urinalysis   Result Value Ref Range    Color, UA YELLOW YELLOW    Clarity, UA CLEAR CLEAR    Glucose, Urine >500 (A) NEGATIVE MG/DL    Bilirubin Urine NEGATIVE NEGATIVE MG/DL    Ketones, Urine NEGATIVE NEGATIVE MG/DL    Specific Gravity, UA 1.022 1.001 - 1.035    Blood, Urine NEGATIVE NEGATIVE    pH, Urine 5.0 5.0 - 8.0    Protein,  (A) NEGATIVE MG/DL    Urobilinogen, Urine NORMAL 0.2 - 1.0 MG/DL    Nitrite Urine, Quantitative NEGATIVE NEGATIVE    Leukocyte Esterase, Urine TRACE (A) NEGATIVE    RBC, UA 1 0 - 6 /HPF    WBC, UA 3 0 - 5 /HPF    Bacteria, UA RARE (A) NEGATIVE /HPF    Squam Epithel, UA 1 /HPF    Mucus, UA RARE (A) NEGATIVE HPF    Trichomonas, UA NONE SEEN NONE SEEN /HPF    Hyaline Casts, UA 5 /LPF   Lactic Acid, Plasma   Result Value Ref Range    Lactate (HH) 0.4 - 2.0 mMOL/L     3.4  LACT CALLED TO YASH CHEN [id number] BY PTTOSHIALE MT   RESULTS READ BACK     Lactic Acid, Plasma   Result Value Ref Range    Lactate 1.3 0.4 - 2.0 mMOL/L   POCT glucose   Result Value Ref Range    Glucose 79 mg/dL    QC OK?  yes    POCT Glucose   Result Value Ref Range    POC Glucose 79 70 - 99 MG/DL        RADIOLOGY/PROCEDURES       CT ABDOMEN PELVIS WO CONTRAST (Final result)   Result time 04/07/19 17:18:52   Final result by Mark Leger MD (04/07/19 17:18:52)                Impression:    1. No acute abnormality in the abdomen or pelvis. 2. Status post cholecystectomy. 3. 4.1 cm right adrenal mass with a small focus of internal macroscopic fat  compatible with a benign myelolipoma.  This is unchanged from 08/08/2018. Narrative:    EXAMINATION:  CT OF THE ABDOMEN AND PELVIS WITHOUT CONTRAST 4/7/2019 4:57 pm    TECHNIQUE:  CT of the abdomen and pelvis was performed without the administration of  intravenous contrast. Multiplanar reformatted images are provided for review. Dose modulation, iterative reconstruction, and/or weight based adjustment of  the mA/kV was utilized to reduce the radiation dose to as low as reasonably  achievable. COMPARISON:  CT abdomen and pelvis 08/08/2018. HISTORY:  ORDERING SYSTEM PROVIDED HISTORY: generalized abdominal pain NVD  TECHNOLOGIST PROVIDED HISTORY:  Ordering Physician Provided Reason for Exam: generalized abdominal pain NVD  Acuity: Acute  Type of Exam: Initial  Additional signs and symptoms: fatigue, decreased appetitie  Relevant Medical/Surgical History: Hx Megan, Tubal    FINDINGS:  Lower Chest: Coronary artery atherosclerotic and mitral annular  calcifications.  Limited images through the lung bases demonstrate no acute  process. Organs: Lack of intravenous contrast limits evaluation of the solid organs,  vascular structures, and bowel.  The liver is unremarkable.  Status post  cholecystectomy.  No biliary ductal dilatation.  The pancreas, spleen, and  left adrenal gland are unremarkable.  Small internal calcifications and focus  of macroscopic fat.  The bilateral kidneys and ureters are unremarkable.     GI/Bowel: Normal appendix.  The colon is unremarkable.  No bowel obstruction  or abnormal bowel wall thickening. Pelvis: The urinary bladder is normal in appearance.  The uterus and  bilateral adnexae are unremarkable.  No free fluid in the pelvis.  No pelvic  or inguinal lymphadenopathy. Peritoneum/Retroperitoneum: The abdominal aorta is normal in caliber with  severe calcific plaquing.  No retroperitoneal or mesenteric lymphadenopathy  is identified.  No free air or fluid is seen in the abdomen. Bones/Soft Tissues: No acute osseous abnormality.                        ED COURSE & MEDICAL DECISION MAKING      Vital signs and nursing notes reviewed during ED course. I have independently evaluated this patient . Supervising MD - Dr Ma Officer - present in the Emergency Department, available for consultation, throughout entirety of  patient care. All pertinent Lab data and radiographic results reviewed with patient at bedside. The patient and / or the family were informed of the results of any tests, a time was given to answer questions, a plan was proposed and they agreed with plan. Differential diagnosis: Abdominal Aortic Aneurysm, Ischemic Bowel, Bowel Obstruction, Acute Cholecystitis, Acute Appendicitis, other    Clinical  IMPRESSION    1. Generalized abdominal pain    2. Nausea vomiting and diarrhea    3. Hyperglycemia    4. Dehydration    5. Essential hypertension        Patient presents with 6 day history of generalized abdominal pain, decreased appetite with nausea vomiting diarrhea. On exam, ill-appearing but nontoxic 61-year-old female, noted tachycardic, other vitals are normal limits. She is afebrile. Tacky mucous membranes. Abdomen is nondistended with normoactive bowel sounds, generalized mild tenderness in the periumbilical region without increased focal tenderness in the right lower quadrant over McBurney's point. No other peritoneal signs. No CVA tenderness to percussion. Patient was started on IV fluids, Zofran, Bentyl.   Is a leukocytosis of 13.3, normal hemoglobin. The MP without significant derangement, previously elevated LFTs have corrected and are normal today. Alk phos is slightly persistently elevated 218, normal bilirubin. Glucose is elevated at 447 but with normal anion gap and CO2. Lactic acid is initially elevated at 3.4 but after one liter fluids, repeat lactic acid is normalized. Lipase is within normal limits. UA is unremarkable other than rare bacteria, 3 white blood cells but no nitrites or significant leukocytes. Patient is denying any urinary complaints. CT abdomen and pelvis foreign without contrast given her reported anaphylactic reaction to contrast IV dye.  CT shows no acute abdominal pelvic abnormality. There is a 4.1 cm right adrenal mass which is unchanged from previous imaging. On reassessment, patient is feeling improved. She is tolerating by mouth. This time, no evidence of acute surgical abdomen or other infectious process requiring antibiotics. Discussed likely viral etiology for gastritis versus gastroenteritis and educated on symptomatic management and close follow-up with PCP for serial abdominal exams as cannot completely rule out other etiology early next process. I do suspect there is a component of dehydration given her elevated glucose and lactic acid. She does not appear to be in DKA and repeat glucose after IV fluids only is normal.  As noted to become hypertensive but denied any chest pain or shortness of breath and states that she is due to take her evening antihypertensives medication. I estimate there is low risk for acute appendicitis, bowel obstruction, cholecystitis, ruptured diverticulitis, incarcerated hernia, hemorrhagic pancreatitis, or perforated bowel/ulcer, ectopic pregnancy, ovarian torsion or tubo-ovarian ovarian abscess thus I consider the discharge disposition reasonable. Is discharged stable condition with Bentyl and Zofran.   Encouraged to push clear fluids and rest.  Educated on a bland/BRAT diet with gradual advancement maternal mother as tolerated. Encouraged to have serial abdominal exam with PCP in the next 12-24 hours, sooner with worsening symptoms. I discussed the unclear etiology of patient's symptoms today and the need for return to emergency department in 8-12 hours for repeat evaluation, sooner if symptoms worsen or any new symptoms develop. Patient agrees to return emergency department if symptoms worsen or any new symptoms develop. I did discuss this patient's history, ED presentation/course with my attending physician - Dr. Chelsey Hood - who has also seen and evaluated this patient. He/she does agree that discharge is reasonable at this time with symptomatic management and outpatient PCP follow-up. Please see his/her note for additional details of their evaluation. Comment: Please note this report has been produced using speech recognition software and may contain errors related to that system including errors in grammar, punctuation, and spelling, as well as words and phrases that may be inappropriate. If there are any questions or concerns please feel free to contact the dictating provider for clarification.          Gunjan Griffin PA-C  04/07/19 2008

## 2019-04-07 NOTE — ED PROVIDER NOTES
Given return precautions and follow-up information. All diagnostic, treatment, and disposition decisions were made by myself in conjunction with the Advanced Practice Provider. For all further details of the patient's emergency department visit, please see the Advanced Practice Provider's documentation.           [employer], DO  04/07/19 Sofia Frankel

## 2019-05-23 ENCOUNTER — HOSPITAL ENCOUNTER (INPATIENT)
Age: 64
LOS: 2 days | Discharge: HOME | DRG: 861 | End: 2019-05-25
Attending: EMERGENCY MEDICINE | Admitting: GENERAL PRACTICE
Payer: COMMERCIAL

## 2019-05-23 ENCOUNTER — APPOINTMENT (OUTPATIENT)
Dept: ULTRASOUND IMAGING | Age: 64
DRG: 861 | End: 2019-05-23
Payer: COMMERCIAL

## 2019-05-23 ENCOUNTER — APPOINTMENT (OUTPATIENT)
Dept: CT IMAGING | Age: 64
DRG: 861 | End: 2019-05-23
Payer: COMMERCIAL

## 2019-05-23 DIAGNOSIS — D35.01 ADENOMA OF RIGHT ADRENAL GLAND: ICD-10-CM

## 2019-05-23 DIAGNOSIS — R10.10 PAIN OF UPPER ABDOMEN: Primary | ICD-10-CM

## 2019-05-23 DIAGNOSIS — R74.8 ELEVATED LIVER ENZYMES: ICD-10-CM

## 2019-05-23 DIAGNOSIS — M54.50 BILATERAL LOW BACK PAIN WITHOUT SCIATICA, UNSPECIFIED CHRONICITY: ICD-10-CM

## 2019-05-23 LAB
ALBUMIN SERPL-MCNC: 3.5 GM/DL (ref 3.4–5)
ALP BLD-CCNC: 667 IU/L (ref 40–129)
ALT SERPL-CCNC: 149 U/L (ref 10–40)
ANION GAP SERPL CALCULATED.3IONS-SCNC: 10 MMOL/L (ref 4–16)
AST SERPL-CCNC: 127 IU/L (ref 15–37)
BACTERIA: ABNORMAL /HPF
BASOPHILS ABSOLUTE: 0.1 K/CU MM
BASOPHILS RELATIVE PERCENT: 0.4 % (ref 0–1)
BILIRUB SERPL-MCNC: 4 MG/DL (ref 0–1)
BILIRUBIN URINE: ABNORMAL MG/DL
BLOOD, URINE: NEGATIVE
BUN BLDV-MCNC: 13 MG/DL (ref 6–23)
CALCIUM OXALATE CRYSTALS: ABNORMAL /HPF
CALCIUM SERPL-MCNC: 9 MG/DL (ref 8.3–10.6)
CELLULAR CASTS: 1 /LPF
CHLORIDE BLD-SCNC: 96 MMOL/L (ref 99–110)
CLARITY: CLEAR
CO2: 27 MMOL/L (ref 21–32)
COLOR: ABNORMAL
CREAT SERPL-MCNC: 1.5 MG/DL (ref 0.6–1.1)
DIFFERENTIAL TYPE: ABNORMAL
EOSINOPHILS ABSOLUTE: 0.3 K/CU MM
EOSINOPHILS RELATIVE PERCENT: 2.4 % (ref 0–3)
GFR AFRICAN AMERICAN: 43 ML/MIN/1.73M2
GFR NON-AFRICAN AMERICAN: 35 ML/MIN/1.73M2
GLUCOSE BLD-MCNC: 184 MG/DL (ref 70–99)
GLUCOSE, URINE: NEGATIVE MG/DL
HAV IGM SER IA-ACNC: NON REACTIVE
HCT VFR BLD CALC: 36 % (ref 37–47)
HEMOGLOBIN: 11.8 GM/DL (ref 12.5–16)
HEPATITIS B CORE IGM ANTIBODY: NON REACTIVE
HEPATITIS B SURFACE ANTIGEN: NON REACTIVE
HEPATITIS C ANTIBODY: NON REACTIVE
HYALINE CASTS: 10 /LPF
ICTOTEST: POSITIVE
IMMATURE NEUTROPHIL %: 0.8 % (ref 0–0.43)
KETONES, URINE: NEGATIVE MG/DL
LEUKOCYTE ESTERASE, URINE: ABNORMAL
LIPASE: 36 IU/L (ref 13–60)
LYMPHOCYTES ABSOLUTE: 2.4 K/CU MM
LYMPHOCYTES RELATIVE PERCENT: 18.1 % (ref 24–44)
MCH RBC QN AUTO: 30.6 PG (ref 27–31)
MCHC RBC AUTO-ENTMCNC: 32.8 % (ref 32–36)
MCV RBC AUTO: 93.3 FL (ref 78–100)
MONOCYTES ABSOLUTE: 0.7 K/CU MM
MONOCYTES RELATIVE PERCENT: 5.5 % (ref 0–4)
MUCUS: ABNORMAL HPF
NITRITE URINE, QUANTITATIVE: NEGATIVE
NUCLEATED RBC %: 0 %
PDW BLD-RTO: 13 % (ref 11.7–14.9)
PH, URINE: 5 (ref 5–8)
PLATELET # BLD: 347 K/CU MM (ref 140–440)
PMV BLD AUTO: 10 FL (ref 7.5–11.1)
POTASSIUM SERPL-SCNC: 3.4 MMOL/L (ref 3.5–5.1)
PROTEIN UA: 30 MG/DL
RBC # BLD: 3.86 M/CU MM (ref 4.2–5.4)
RBC URINE: ABNORMAL /HPF (ref 0–6)
SEGMENTED NEUTROPHILS ABSOLUTE COUNT: 9.7 K/CU MM
SEGMENTED NEUTROPHILS RELATIVE PERCENT: 72.8 % (ref 36–66)
SODIUM BLD-SCNC: 133 MMOL/L (ref 135–145)
SPECIFIC GRAVITY UA: 1.02 (ref 1–1.03)
SQUAMOUS EPITHELIAL: 3 /HPF
TOTAL IMMATURE NEUTOROPHIL: 0.11 K/CU MM
TOTAL NUCLEATED RBC: 0 K/CU MM
TOTAL PROTEIN: 7.4 GM/DL (ref 6.4–8.2)
TRANSITIONAL EPITHELIAL: 1 /HPF
TRICHOMONAS: ABNORMAL /HPF
UROBILINOGEN, URINE: 2 MG/DL (ref 0.2–1)
WBC # BLD: 13.4 K/CU MM (ref 4–10.5)
WBC UA: 93 /HPF (ref 0–5)

## 2019-05-23 PROCEDURE — 96361 HYDRATE IV INFUSION ADD-ON: CPT

## 2019-05-23 PROCEDURE — 87186 SC STD MICRODIL/AGAR DIL: CPT

## 2019-05-23 PROCEDURE — 81001 URINALYSIS AUTO W/SCOPE: CPT

## 2019-05-23 PROCEDURE — 80053 COMPREHEN METABOLIC PANEL: CPT

## 2019-05-23 PROCEDURE — 80074 ACUTE HEPATITIS PANEL: CPT

## 2019-05-23 PROCEDURE — 99285 EMERGENCY DEPT VISIT HI MDM: CPT

## 2019-05-23 PROCEDURE — 1200000000 HC SEMI PRIVATE

## 2019-05-23 PROCEDURE — 87077 CULTURE AEROBIC IDENTIFY: CPT

## 2019-05-23 PROCEDURE — 76705 ECHO EXAM OF ABDOMEN: CPT

## 2019-05-23 PROCEDURE — 93005 ELECTROCARDIOGRAM TRACING: CPT | Performed by: PHYSICIAN ASSISTANT

## 2019-05-23 PROCEDURE — 87086 URINE CULTURE/COLONY COUNT: CPT

## 2019-05-23 PROCEDURE — 6360000002 HC RX W HCPCS: Performed by: PHYSICIAN ASSISTANT

## 2019-05-23 PROCEDURE — 74176 CT ABD & PELVIS W/O CONTRAST: CPT

## 2019-05-23 PROCEDURE — 2580000003 HC RX 258: Performed by: PHYSICIAN ASSISTANT

## 2019-05-23 PROCEDURE — 83690 ASSAY OF LIPASE: CPT

## 2019-05-23 PROCEDURE — C9113 INJ PANTOPRAZOLE SODIUM, VIA: HCPCS | Performed by: PHYSICIAN ASSISTANT

## 2019-05-23 PROCEDURE — 85025 COMPLETE CBC W/AUTO DIFF WBC: CPT

## 2019-05-23 PROCEDURE — 96374 THER/PROPH/DIAG INJ IV PUSH: CPT

## 2019-05-23 PROCEDURE — 96375 TX/PRO/DX INJ NEW DRUG ADDON: CPT

## 2019-05-23 RX ORDER — PANTOPRAZOLE SODIUM 40 MG/10ML
40 INJECTION, POWDER, LYOPHILIZED, FOR SOLUTION INTRAVENOUS ONCE
Status: COMPLETED | OUTPATIENT
Start: 2019-05-23 | End: 2019-05-23

## 2019-05-23 RX ORDER — 0.9 % SODIUM CHLORIDE 0.9 %
1000 INTRAVENOUS SOLUTION INTRAVENOUS ONCE
Status: COMPLETED | OUTPATIENT
Start: 2019-05-23 | End: 2019-05-23

## 2019-05-23 RX ORDER — ONDANSETRON 2 MG/ML
4 INJECTION INTRAMUSCULAR; INTRAVENOUS ONCE
Status: COMPLETED | OUTPATIENT
Start: 2019-05-23 | End: 2019-05-23

## 2019-05-23 RX ADMIN — SODIUM CHLORIDE 1000 ML: 9 INJECTION, SOLUTION INTRAVENOUS at 20:49

## 2019-05-23 RX ADMIN — ONDANSETRON 4 MG: 2 INJECTION INTRAMUSCULAR; INTRAVENOUS at 20:49

## 2019-05-23 RX ADMIN — PANTOPRAZOLE SODIUM 40 MG: 40 INJECTION, POWDER, FOR SOLUTION INTRAVENOUS at 20:49

## 2019-05-23 ASSESSMENT — PAIN DESCRIPTION - LOCATION: LOCATION: ABDOMEN;BACK

## 2019-05-23 ASSESSMENT — PAIN SCALES - GENERAL: PAINLEVEL_OUTOF10: 9

## 2019-05-23 ASSESSMENT — PAIN DESCRIPTION - PAIN TYPE: TYPE: ACUTE PAIN

## 2019-05-24 ENCOUNTER — APPOINTMENT (OUTPATIENT)
Dept: MRI IMAGING | Age: 64
DRG: 861 | End: 2019-05-24
Payer: COMMERCIAL

## 2019-05-24 LAB
ALBUMIN SERPL-MCNC: 3.2 GM/DL (ref 3.4–5)
ALP BLD-CCNC: 613 IU/L (ref 40–128)
ALT SERPL-CCNC: 136 U/L (ref 10–40)
AMMONIA: 58 UMOL/L (ref 11–51)
ANION GAP SERPL CALCULATED.3IONS-SCNC: 14 MMOL/L (ref 4–16)
AST SERPL-CCNC: 117 IU/L (ref 15–37)
BASOPHILS ABSOLUTE: 0.1 K/CU MM
BASOPHILS RELATIVE PERCENT: 0.6 % (ref 0–1)
BILIRUB SERPL-MCNC: 3.6 MG/DL (ref 0–1)
BUN BLDV-MCNC: 12 MG/DL (ref 6–23)
CALCIUM SERPL-MCNC: 8.4 MG/DL (ref 8.3–10.6)
CEA: 3.5 NG/ML
CHLORIDE BLD-SCNC: 103 MMOL/L (ref 99–110)
CO2: 20 MMOL/L (ref 21–32)
CREAT SERPL-MCNC: 1.4 MG/DL (ref 0.6–1.1)
DIFFERENTIAL TYPE: ABNORMAL
EOSINOPHILS ABSOLUTE: 0.4 K/CU MM
EOSINOPHILS RELATIVE PERCENT: 3 % (ref 0–3)
ESTIMATED AVERAGE GLUCOSE: 169 MG/DL
GFR AFRICAN AMERICAN: 46 ML/MIN/1.73M2
GFR NON-AFRICAN AMERICAN: 38 ML/MIN/1.73M2
GLUCOSE BLD-MCNC: 139 MG/DL (ref 70–99)
GLUCOSE BLD-MCNC: 178 MG/DL (ref 70–99)
GLUCOSE BLD-MCNC: 230 MG/DL (ref 70–99)
GLUCOSE BLD-MCNC: 242 MG/DL (ref 70–99)
GLUCOSE BLD-MCNC: 247 MG/DL (ref 70–99)
GLUCOSE BLD-MCNC: 262 MG/DL (ref 70–99)
HBA1C MFR BLD: 7.5 % (ref 4.2–6.3)
HCT VFR BLD CALC: 35.4 % (ref 37–47)
HEMOGLOBIN: 11.1 GM/DL (ref 12.5–16)
IMMATURE NEUTROPHIL %: 0.8 % (ref 0–0.43)
IRON: 72 UG/DL (ref 37–145)
LYMPHOCYTES ABSOLUTE: 3.2 K/CU MM
LYMPHOCYTES RELATIVE PERCENT: 25.5 % (ref 24–44)
MCH RBC QN AUTO: 30.1 PG (ref 27–31)
MCHC RBC AUTO-ENTMCNC: 31.4 % (ref 32–36)
MCV RBC AUTO: 95.9 FL (ref 78–100)
MONOCYTES ABSOLUTE: 0.7 K/CU MM
MONOCYTES RELATIVE PERCENT: 5.7 % (ref 0–4)
NUCLEATED RBC %: 0 %
PCT TRANSFERRIN: 31 % (ref 10–44)
PDW BLD-RTO: 13.2 % (ref 11.7–14.9)
PLATELET # BLD: 318 K/CU MM (ref 140–440)
PMV BLD AUTO: 10.2 FL (ref 7.5–11.1)
POTASSIUM SERPL-SCNC: 3.7 MMOL/L (ref 3.5–5.1)
RBC # BLD: 3.69 M/CU MM (ref 4.2–5.4)
SEGMENTED NEUTROPHILS ABSOLUTE COUNT: 8 K/CU MM
SEGMENTED NEUTROPHILS RELATIVE PERCENT: 64.4 % (ref 36–66)
SODIUM BLD-SCNC: 137 MMOL/L (ref 135–145)
TOTAL IMMATURE NEUTOROPHIL: 0.1 K/CU MM
TOTAL IRON BINDING CAPACITY: 232 UG/DL (ref 250–450)
TOTAL NUCLEATED RBC: 0 K/CU MM
TOTAL PROTEIN: 5.6 GM/DL (ref 6.4–8.2)
TRANSFERRIN: 201.7 MG/DL (ref 200–360)
UNSATURATED IRON BINDING CAPACITY: 160 UG/DL (ref 110–370)
WBC # BLD: 12.4 K/CU MM (ref 4–10.5)

## 2019-05-24 PROCEDURE — 6370000000 HC RX 637 (ALT 250 FOR IP): Performed by: GENERAL PRACTICE

## 2019-05-24 PROCEDURE — 6360000002 HC RX W HCPCS: Performed by: GENERAL PRACTICE

## 2019-05-24 PROCEDURE — 83036 HEMOGLOBIN GLYCOSYLATED A1C: CPT

## 2019-05-24 PROCEDURE — 82140 ASSAY OF AMMONIA: CPT

## 2019-05-24 PROCEDURE — 84466 ASSAY OF TRANSFERRIN: CPT

## 2019-05-24 PROCEDURE — 2580000003 HC RX 258: Performed by: GENERAL PRACTICE

## 2019-05-24 PROCEDURE — 82962 GLUCOSE BLOOD TEST: CPT

## 2019-05-24 PROCEDURE — 1200000000 HC SEMI PRIVATE

## 2019-05-24 PROCEDURE — 83516 IMMUNOASSAY NONANTIBODY: CPT

## 2019-05-24 PROCEDURE — 82378 CARCINOEMBRYONIC ANTIGEN: CPT

## 2019-05-24 PROCEDURE — 93010 ELECTROCARDIOGRAM REPORT: CPT | Performed by: INTERNAL MEDICINE

## 2019-05-24 PROCEDURE — 82105 ALPHA-FETOPROTEIN SERUM: CPT

## 2019-05-24 PROCEDURE — 80053 COMPREHEN METABOLIC PANEL: CPT

## 2019-05-24 PROCEDURE — 85025 COMPLETE CBC W/AUTO DIFF WBC: CPT

## 2019-05-24 PROCEDURE — 86301 IMMUNOASSAY TUMOR CA 19-9: CPT

## 2019-05-24 PROCEDURE — 83540 ASSAY OF IRON: CPT

## 2019-05-24 PROCEDURE — 74181 MRI ABDOMEN W/O CONTRAST: CPT

## 2019-05-24 PROCEDURE — 36415 COLL VENOUS BLD VENIPUNCTURE: CPT

## 2019-05-24 PROCEDURE — 86038 ANTINUCLEAR ANTIBODIES: CPT

## 2019-05-24 PROCEDURE — 86256 FLUORESCENT ANTIBODY TITER: CPT

## 2019-05-24 PROCEDURE — 94761 N-INVAS EAR/PLS OXIMETRY MLT: CPT

## 2019-05-24 PROCEDURE — 6370000000 HC RX 637 (ALT 250 FOR IP): Performed by: INTERNAL MEDICINE

## 2019-05-24 RX ORDER — TRAMADOL HYDROCHLORIDE 50 MG/1
50 TABLET ORAL EVERY 6 HOURS PRN
Status: DISCONTINUED | OUTPATIENT
Start: 2019-05-24 | End: 2019-05-25 | Stop reason: HOSPADM

## 2019-05-24 RX ORDER — ONDANSETRON 4 MG/1
4 TABLET, ORALLY DISINTEGRATING ORAL EVERY 8 HOURS PRN
Status: DISCONTINUED | OUTPATIENT
Start: 2019-05-24 | End: 2019-05-25 | Stop reason: HOSPADM

## 2019-05-24 RX ORDER — NICOTINE POLACRILEX 4 MG
15 LOZENGE BUCCAL PRN
Status: DISCONTINUED | OUTPATIENT
Start: 2019-05-24 | End: 2019-05-25 | Stop reason: HOSPADM

## 2019-05-24 RX ORDER — DEXTROSE MONOHYDRATE 50 MG/ML
100 INJECTION, SOLUTION INTRAVENOUS PRN
Status: DISCONTINUED | OUTPATIENT
Start: 2019-05-24 | End: 2019-05-25 | Stop reason: HOSPADM

## 2019-05-24 RX ORDER — AMLODIPINE BESYLATE 5 MG/1
5 TABLET ORAL DAILY
Status: DISCONTINUED | OUTPATIENT
Start: 2019-05-24 | End: 2019-05-25 | Stop reason: HOSPADM

## 2019-05-24 RX ORDER — DICYCLOMINE HYDROCHLORIDE 10 MG/1
10 CAPSULE ORAL
Status: DISCONTINUED | OUTPATIENT
Start: 2019-05-24 | End: 2019-05-25 | Stop reason: HOSPADM

## 2019-05-24 RX ORDER — TRAZODONE HYDROCHLORIDE 50 MG/1
100 TABLET ORAL NIGHTLY
Status: DISCONTINUED | OUTPATIENT
Start: 2019-05-24 | End: 2019-05-25 | Stop reason: HOSPADM

## 2019-05-24 RX ORDER — NITROGLYCERIN 0.4 MG/1
0.4 TABLET SUBLINGUAL EVERY 5 MIN PRN
Status: DISCONTINUED | OUTPATIENT
Start: 2019-05-24 | End: 2019-05-25 | Stop reason: HOSPADM

## 2019-05-24 RX ORDER — ZOLPIDEM TARTRATE 5 MG/1
5 TABLET ORAL NIGHTLY PRN
Status: DISCONTINUED | OUTPATIENT
Start: 2019-05-24 | End: 2019-05-25 | Stop reason: HOSPADM

## 2019-05-24 RX ORDER — INSULIN GLARGINE 100 [IU]/ML
50 INJECTION, SOLUTION SUBCUTANEOUS NIGHTLY
Status: DISCONTINUED | OUTPATIENT
Start: 2019-05-24 | End: 2019-05-25 | Stop reason: HOSPADM

## 2019-05-24 RX ORDER — METOPROLOL SUCCINATE 50 MG/1
50 TABLET, EXTENDED RELEASE ORAL DAILY
Status: DISCONTINUED | OUTPATIENT
Start: 2019-05-24 | End: 2019-05-25 | Stop reason: HOSPADM

## 2019-05-24 RX ORDER — HYDROXYZINE PAMOATE 25 MG/1
50 CAPSULE ORAL NIGHTLY
Status: DISCONTINUED | OUTPATIENT
Start: 2019-05-24 | End: 2019-05-25 | Stop reason: HOSPADM

## 2019-05-24 RX ORDER — SODIUM CHLORIDE 0.9 % (FLUSH) 0.9 %
10 SYRINGE (ML) INJECTION PRN
Status: DISCONTINUED | OUTPATIENT
Start: 2019-05-24 | End: 2019-05-25 | Stop reason: HOSPADM

## 2019-05-24 RX ORDER — SODIUM CHLORIDE 0.9 % (FLUSH) 0.9 %
10 SYRINGE (ML) INJECTION EVERY 12 HOURS SCHEDULED
Status: DISCONTINUED | OUTPATIENT
Start: 2019-05-24 | End: 2019-05-25 | Stop reason: HOSPADM

## 2019-05-24 RX ORDER — DEXTROSE MONOHYDRATE 25 G/50ML
12.5 INJECTION, SOLUTION INTRAVENOUS PRN
Status: DISCONTINUED | OUTPATIENT
Start: 2019-05-24 | End: 2019-05-25 | Stop reason: HOSPADM

## 2019-05-24 RX ORDER — ATORVASTATIN CALCIUM 40 MG/1
40 TABLET, FILM COATED ORAL DAILY
Status: DISCONTINUED | OUTPATIENT
Start: 2019-05-24 | End: 2019-05-25 | Stop reason: HOSPADM

## 2019-05-24 RX ORDER — ONDANSETRON 2 MG/ML
4 INJECTION INTRAMUSCULAR; INTRAVENOUS EVERY 6 HOURS PRN
Status: DISCONTINUED | OUTPATIENT
Start: 2019-05-24 | End: 2019-05-25 | Stop reason: HOSPADM

## 2019-05-24 RX ORDER — LEVOTHYROXINE SODIUM 0.05 MG/1
50 TABLET ORAL DAILY
Status: DISCONTINUED | OUTPATIENT
Start: 2019-05-24 | End: 2019-05-25 | Stop reason: HOSPADM

## 2019-05-24 RX ADMIN — INSULIN LISPRO 6 UNITS: 100 INJECTION, SOLUTION INTRAVENOUS; SUBCUTANEOUS at 14:55

## 2019-05-24 RX ADMIN — CLONIDINE HYDROCHLORIDE 0.3 MG: 0.2 TABLET ORAL at 01:18

## 2019-05-24 RX ADMIN — DICYCLOMINE HYDROCHLORIDE 10 MG: 10 CAPSULE ORAL at 18:28

## 2019-05-24 RX ADMIN — HYDROXYZINE PAMOATE 50 MG: 25 CAPSULE ORAL at 20:37

## 2019-05-24 RX ADMIN — ENOXAPARIN SODIUM 40 MG: 40 INJECTION SUBCUTANEOUS at 11:04

## 2019-05-24 RX ADMIN — SODIUM CHLORIDE, PRESERVATIVE FREE 10 ML: 5 INJECTION INTRAVENOUS at 20:38

## 2019-05-24 RX ADMIN — CLONIDINE HYDROCHLORIDE 0.3 MG: 0.2 TABLET ORAL at 20:37

## 2019-05-24 RX ADMIN — INSULIN GLARGINE 50 UNITS: 100 INJECTION, SOLUTION SUBCUTANEOUS at 20:38

## 2019-05-24 RX ADMIN — SODIUM CHLORIDE, PRESERVATIVE FREE 10 ML: 5 INJECTION INTRAVENOUS at 11:04

## 2019-05-24 RX ADMIN — DICYCLOMINE HYDROCHLORIDE 10 MG: 10 CAPSULE ORAL at 05:34

## 2019-05-24 RX ADMIN — AMLODIPINE BESYLATE 5 MG: 5 TABLET ORAL at 11:03

## 2019-05-24 RX ADMIN — DICYCLOMINE HYDROCHLORIDE 10 MG: 10 CAPSULE ORAL at 20:37

## 2019-05-24 RX ADMIN — INSULIN LISPRO 2 UNITS: 100 INJECTION, SOLUTION INTRAVENOUS; SUBCUTANEOUS at 20:38

## 2019-05-24 RX ADMIN — DICYCLOMINE HYDROCHLORIDE 10 MG: 10 CAPSULE ORAL at 01:18

## 2019-05-24 RX ADMIN — METOPROLOL SUCCINATE 50 MG: 50 TABLET, EXTENDED RELEASE ORAL at 11:04

## 2019-05-24 RX ADMIN — TRAMADOL HYDROCHLORIDE 50 MG: 50 TABLET, FILM COATED ORAL at 15:03

## 2019-05-24 RX ADMIN — INSULIN LISPRO 4 UNITS: 100 INJECTION, SOLUTION INTRAVENOUS; SUBCUTANEOUS at 18:28

## 2019-05-24 RX ADMIN — DICYCLOMINE HYDROCHLORIDE 10 MG: 10 CAPSULE ORAL at 11:07

## 2019-05-24 RX ADMIN — TRAZODONE HYDROCHLORIDE 100 MG: 50 TABLET ORAL at 01:18

## 2019-05-24 RX ADMIN — LEVOTHYROXINE SODIUM 50 MCG: 50 TABLET ORAL at 05:34

## 2019-05-24 RX ADMIN — ZOLPIDEM TARTRATE 5 MG: 5 TABLET ORAL at 21:49

## 2019-05-24 RX ADMIN — HYDROXYZINE PAMOATE 50 MG: 25 CAPSULE ORAL at 01:21

## 2019-05-24 ASSESSMENT — PAIN DESCRIPTION - DESCRIPTORS
DESCRIPTORS: ACHING
DESCRIPTORS: ACHING

## 2019-05-24 ASSESSMENT — PAIN DESCRIPTION - LOCATION
LOCATION: BACK
LOCATION: BACK

## 2019-05-24 ASSESSMENT — PAIN DESCRIPTION - PROGRESSION: CLINICAL_PROGRESSION: GRADUALLY WORSENING

## 2019-05-24 ASSESSMENT — PAIN SCALES - GENERAL
PAINLEVEL_OUTOF10: 7
PAINLEVEL_OUTOF10: 8

## 2019-05-24 ASSESSMENT — PAIN DESCRIPTION - ONSET: ONSET: ON-GOING

## 2019-05-24 ASSESSMENT — PAIN DESCRIPTION - FREQUENCY: FREQUENCY: CONTINUOUS

## 2019-05-24 ASSESSMENT — PAIN DESCRIPTION - PAIN TYPE
TYPE: ACUTE PAIN
TYPE: ACUTE PAIN

## 2019-05-24 NOTE — PROGRESS NOTES
Pt admitted to  1125, oriented to staff and call system, assessment per flow sheet, no skin issues noted except scarring  On bi-lat LE.

## 2019-05-24 NOTE — CONSULTS
[address] [place] [address], [facility]                                  CONSULTATION    PATIENT NAME: Genia Morales                 :        1955  MED REC NO:   [id number]                          ROOM:       [id number]  ACCOUNT NO:   [de-identified]                           ADMIT DATE: 2019  PROVIDER:     Beatris Garcia MD    CONSULT DATE:  2019    PRIMARY CARE PROVIDER:  Naveen Goodson PA-C    CHIEF COMPLAINT:  Abdominal pain with abnormal LFTs, etiology to be  determinant. HISTORY OF PRESENT ILLNESS:  The patient is a 61-year-old white female  with past medical history significant for hypertension, coronary artery  disease, status post PTCA with coronary stents in place, atrial  fibrillation, status post electrophysiological studies with ablation,  hyperlipidemia, TIAs, and diabetes mellitus, who presented to the  emergency room last night with cramping mid abdominal pain radiating to  the back along with nausea and vomiting. The patient's CBC upon  admission showed a WBC count of 13.4, hemoglobin 11.8, and platelet  count of 673,100. LFTs were abnormal with an alkaline phosphatase 667,  ALT of 149, AST of 127, of total bilirubin of 4. Today, the patient's  LFTs have improved slightly. Of note, the patient's LFTs were normal on  2017, but they were abnormal on 2018. They have been  fluctuating since. The patient did have a CAT scan done of the abdomen  and pelvis x3 since 2018 and the last CAT scan on 2019 was  unremarkable with no focal lesions noted in the liver and also there was  no evidence of biliary duct dilatation or common bile duct stone. The  pancreas was unremarkable as well. The patient had an ultrasound done  of the right upper quadrant last night also and there was no sonographic  evidence noted in the common bile duct. The common bile duct was within  normal limits at 4 mm. female who is of average build and  nutritional status, who is lying comfortably flat in bed, in no acute  distress. She is awake, alert, and oriented, and pleasant to talk with. VITAL SIGNS:  Stable. HEENT:  Shows skull to be atraumatic. NECK:  Supple. CHEST:  Clear. HEART:  S1 and S2 are normal.  ABDOMEN:  Soft, nondistended, nontender. Liver and spleen are not  palpable. Bowel sounds are present. RECTAL:  Deferred. CNS:  Show the patient to be awake, alert, and oriented. There are no  focal sensorimotor signs. MUSCULOSKELETAL:  Unremarkable. LABORATORY DATA:  As above mentioned. IMPRESSION:  A 31-year-old white female who presents with intermittent  abdominal pain along with nausea, vomiting, and abnormal LFTs. Etiology  to be determined; however ultrasound, CAT scan, and MRI are negative for  extrahepatic obstruction/CBD stone. Rule out intrahepatic etiology for  the patient's abnormal LFTs. RECOMMENDATIONS:  1. Agree with present management. 2.  We will also check antinuclear antibody, anti-smooth muscle  antibody, and antimitochondrial antibody. 3.  We will check iron studies. 4.  Monitor the patient's CBC, chem profile, amylase, lipase level,  PT/PTT, INR in a.m.  5.  We will also check blood ammonia level. 6.  We will check tumor markers, CEA, , and alpha fetoprotein  level. 7.  If the patient's LFTs do not improve, the patient will need a liver  biopsy as a part of further workup of her abnormal LFTs since there is  no evidence of extrahepatic obstruction on imaging studies. 8.  The patient also has been instructed to have an outpatient screening  colonoscopy. 9.  The case and plan have been discussed in detail with the patient and  her daughter.         Harriet Guerrero MD    D: 05/24/2019 12:04:35       T: 05/24/2019 14:08:56     [id number]  Job#: [id number]     Doc#: [id number]    CC:  Abbie Middleton

## 2019-05-24 NOTE — ED NOTES
Report called to nurse [facility] questions answered.      Kostas Robin RN  05/24/19 [id number]

## 2019-05-24 NOTE — ED PROVIDER NOTES
eMERGENCY dEPARTMENT eNCOUnter      PCP: Neal Dyson PA-C    CHIEF COMPLAINT    Chief Complaint   Patient presents with    Abdominal Pain    Emesis       HPI    Lamonte Otto is a 59 y.o. female who presents with abdominal pain and back pain. Symptoms ongoing greater than 3 months. Patient seen in the ED 4/7/2019 for the same. She had normal CT at that time. Patient reports that she's had continued ongoing symptoms. She is followed up with her family doctor and been on a couple rounds of antibiotics for UTI but she denies any urinary symptoms. She reports that she has constant pain across both sides of her low back that at times exacerbates and sharp pain shoots into her abdomen. Across both sides of her abdomen. She also has had some constant epigastric abdominal discomfort. Has had intermittent nausea and vomiting for several months. No stool changes. His history of cholecystectomy and C-sections but denies any other abdominal surgeries. No history of gastritis or reflux. No history of EGD or colonoscopy. NO cp, fevers or shortness of breath. REVIEW OF SYSTEMS    Constitutional:  Denies fever, chills, weight loss or weakness   HENT:  Denies sore throat or ear pain   Cardiovascular:  Denies chest pain, palpitations or swelling   Respiratory:  Denies cough or shortness of breath   GI:  See HPI above  : No hematuria or dysuria. No vaginal symptoms. Musculoskeletal:  Denies groin pain or masses. No pain or swelling of extremities.   Skin:  Denies rash  Neurologic:  Denies headache, focal weakness or sensory changes   Endocrine:  Denies polyuria or polydypsia   Lymphatic:  Denies swollen glands     All other review of systems are negative  See HPI and nursing notes for additional information     PAST MEDICAL & SURGICAL HISTORY    Past Medical History:   Diagnosis Date    Abnormal PFTs (pulmonary function tests) 4/14 4/14    Atrial fibrillation ([facility] [address]     CAD (coronary artery disease)     CHF (congestive heart failure) (HCC)     Diabetes mellitus (HCC)     Dizziness - light-headed     Family history of coronary artery disease     H/O 24 hour EKG monitoring 2012,12/10/2010    2012 predominant rhythm is sinus with short runs of SVT no episode of atrial fib. pt has left bundle branch morphology, max heart is 132, min is 48 with an average of 74 ,infrequent PVCs are seen    H/O cardiovascular stress test 2013, 2012-    H/O cardiovascular stress test 14,2012 EF 52% No ischemia, normal study. 2012 EF 58% tehnique-[employer]et[employer] LVEF is normal globally    H/O echocardiogram 13 EF 50-55% mild MR, TR 3/13EF 35-45% decreasedr L ventrical systolic function.  H/O echocardiogram -EF50-55% Mild MR/TR. 10/11 EF > 55% LVS function is normal, calcified mitral apparatus, impaired LV relaxation, no PE    Heart imaging 2012 MUGA rest EF 58%LVEF is normal globally    Heartburn     History of cardiac cath 2009, 2006,moderate stenosis  small diagonal    History of cardiac monitoring 2/9/15    Event - sinus rhythm    History of cardioversion 3/14/14    History of MI (myocardial infarction)     History of PTCA 10/06/2010    10/06/2010 stent 3.0x24 taxus stent left main 90% stenosis mid seg reduced to 0% second area of iuorwipl00 to 40% not flow limiting EF 40%    Hx of cardiovascular stress test 2015    lexiscan-normal,EF70%    Hx of transesophageal echocardiography ([facility]) for monitoring 2017    Noted to have MARY GRACE Blood Clot.  Hyperlipidemia     Hypertension     Insomnia     SOB (shortness of breath)     Tachycardia 2012    [facility]Dr Nunn hosp. consult    TIA (transient ischemic attack)      Past Surgical History:   Procedure Laterality Date    ABLATION OF DYSRHYTHMIC FOCUS       SECTION      CHOLECYSTECTOMY      DIAGNOSTIC CARDIAC CATH LAB PROCEDURE  06/14/2006, 05/08/2009 05/08/2009 moderate stenosis small diagonal    OTHER SURGICAL HISTORY  09/05/2017    afib ablation [facility]    PTCA  10/06/2010    10/06/2010 stent 3.0x24 taxus stent 90% stenosis mid segment reduced to 0% second area of stenosis 30-40% not flow limiting EF 40%    TUBAL LIGATION         CURRENT MEDICATIONS    Current Outpatient Rx   Medication Sig Dispense Refill    dicyclomine (BENTYL) 10 MG capsule Take 1 capsule by mouth 4 times daily (before meals and nightly) 20 capsule 0    ondansetron (ZOFRAN ODT) 4 MG disintegrating tablet Take 1 tablet by mouth every 8 hours as needed for Nausea 15 tablet 0    amLODIPine (NORVASC) 5 MG tablet Take 1 tablet by mouth daily 30 tablet 3    hydrOXYzine (VISTARIL) 50 MG capsule Take 50 mg by mouth daily      levothyroxine (SYNTHROID) 50 MCG tablet Take 50 mcg by mouth Daily      cloNIDine (CATAPRES) 0.3 MG tablet Take 0.3 mg by mouth nightly      metoprolol succinate (TOPROL XL) 50 MG extended release tablet Take one tablet in the morning and 1/2 tablet at night 45 tablet 3    apixaban (ELIQUIS) 5 MG TABS tablet Take 1 tablet by mouth 2 times daily 60 tablet 2    nitroGLYCERIN (NITROSTAT) 0.4 MG SL tablet Place 0.4 mg under the tongue every 5 minutes as needed for Chest pain up to max of 3 total doses. If no relief after 1 dose, call 911.  aspirin 81 MG EC tablet Take 81 mg by mouth daily      atorvastatin (LIPITOR) 40 MG tablet Take 40 mg by mouth daily      insulin glargine (LANTUS) 100 UNIT/ML injection vial Inject 75 Units into the skin nightly       traZODone (DESYREL) 100 MG tablet Take 100 mg by mouth nightly.       insulin lispro (HUMALOG) 100 UNIT/ML injection Inject into the skin See Admin Instructions Sliding scale twice daily before meals  149 or below zero units   150 to 200  Twenty units  201 to 250  Twenty two units  251 to 300  Twenty four units  301 to 350  Twenty six units  351 to 400  Twenty eight units  401 or greater  Thirty units and call MD         ALLERGIES    Allergies   Allergen Reactions    Amiodarone Other (See Comments)     dizziness    Iv Dye [Iodides] Nausea And Vomiting    Vicodin [Hydrocodone-Acetaminophen] Nausea And Vomiting       SOCIAL AND FAMILY HISTORY    Social History     Socioeconomic History    Marital status:      Spouse name: None    Number of children: None    Years of education: None    Highest education level: None   Occupational History    None   Social Needs    Financial resource strain: None    Food insecurity:     Worry: None     Inability: None    Transportation needs:     Medical: None     Non-medical: None   Tobacco Use    Smoking status: Former Smoker     Packs/day: 3.00     Years: 20.00     Pack years: 60.00     Types: Cigarettes     Last attempt to quit: 2007     Years since quittin.2    Smokeless tobacco: Never Used    Tobacco comment: reviewed 8/12/15   Substance and Sexual Activity    Alcohol use: No     Comment: rylee amos    Drug use: No    Sexual activity: None   Lifestyle    Physical activity:     Days per week: None     Minutes per session: None    Stress: None   Relationships    Social connections:     Talks on phone: None     Gets together: None     Attends Druze service: None     Active member of club or organization: None     Attends meetings of clubs or organizations: None     Relationship status: None    Intimate partner violence:     Fear of current or ex partner: None     Emotionally abused: None     Physically abused: None     Forced sexual activity: None   Other Topics Concern    None   Social History Narrative    None     Family History   Problem Relation Age of Onset    Diabetes Father     Heart Disease Father        PHYSICAL EXAM    VITAL SIGNS: /76   Pulse 99   Temp 98.2 °F (36.8 °C) (Oral)   Resp 16   Ht 5' 1\" (1.549 m)   Wt 180 lb (81.6 kg)   SpO2 97%   BMI 34.01 kg/m²   Constitutional:  Well developed, well nourished. No distress  Eyes:  Sclera nonicteric, conjunctiva moist  HENT:  Atraumatic. PERRL. EOMI.  moist mucus membranes. Neck/Lymphatics: supple, no JVD, no swollen nodes  Respiratory:  No retractions, no accessory muscle use, normal breath sounds   Cardiovascular:  normal rate, normal rhythm, no murmurs    GI:     No gross discoloration.       -no Cal's sign (periumbilical ecchymosis)       -no Grey-Moran's sign (flank ecchymosis)  (necrosis/hemmorrhage may cause subcutaneous blood leakage)    Bowel sounds present, no audible bruits. Soft,  No distention, no guarding, no rigidity,   + mild epigastric abdominal tenderness, no rebound, no palpable pulsatile masses,   No McBurney's point tenderness   Negative Rovsing sign    Negative Bush's sign. No peritoneal findings  Back:   - No gross deformity, swelling, or discoloration.    -  + generalized lumbar and Paralumbar tenderness without focus. No masses, fluctuance, warmth, or skin changes.  - No localized midline bony tenderness.   - No change in pain with forward flexion  - SLR test negative     No CVA tenderness to percussion      Neurologic:    No obvious neurological deficits. Patient moves without obvious difficulty or weakness.      HIGH sensitivity Neuro Exam for Lumbar spine  -(L1-L2)  inner thigh sensation intact  -(S3,4,5) Groin sensation intact     -Lower extremity ROM intact including:       -(L2) cross legs (adduct thighs)        -(L3) extend knees & flex knees (S1)       -(L4) dorsiflex ankles       -(L5) point great toes upward & plantar flex toes (S2)        -(L2,3,4) Knee reflexes intact bilaterally      Vascular:    Femoral & Distal pulses, & capillary refill intact bilateral lower extremities  Musculoskeletal:  No edema, no deformity  Vascular: DP pulses 2+ equal bilaterally  Integument: No rash, dry skin  Neurologic:  Alert & oriented, normal speech  Psychiatric: Cooperative, pleasant affect       LABS:  Results for orders placed or performed during the hospital encounter of 05/23/19   CBC auto diff   Result Value Ref Range    WBC 13.4 (H) 4.0 - 10.5 K/CU MM    RBC 3.86 (L) 4.2 - 5.4 M/CU MM    Hemoglobin 11.8 (L) 12.5 - 16.0 GM/DL    Hematocrit 36.0 (L) 37 - 47 %    MCV 93.3 78 - 100 FL    MCH 30.6 27 - 31 PG    MCHC 32.8 32.0 - 36.0 %    RDW 13.0 11.7 - 14.9 %    Platelets 233 880 - 795 K/CU MM    MPV 10.0 7.5 - 11.1 FL    Differential Type AUTOMATED DIFFERENTIAL     Segs Relative 72.8 (H) 36 - 66 %    Lymphocytes % 18.1 (L) 24 - 44 %    Monocytes % 5.5 (H) 0 - 4 %    Eosinophils % 2.4 0 - 3 %    Basophils % 0.4 0 - 1 %    Segs Absolute 9.7 K/CU MM    Lymphocytes # 2.4 K/CU MM    Monocytes # 0.7 K/CU MM    Eosinophils # 0.3 K/CU MM    Basophils # 0.1 K/CU MM    Nucleated RBC % 0.0 %    Total Nucleated RBC 0.0 K/CU MM    Total Immature Neutrophil 0.11 K/CU MM    Immature Neutrophil % 0.8 (H) 0 - 0.43 %   CMP   Result Value Ref Range    Sodium 133 (L) 135 - 145 MMOL/L    Potassium 3.4 (L) 3.5 - 5.1 MMOL/L    Chloride 96 (L) 99 - 110 mMol/L    CO2 27 21 - 32 MMOL/L    BUN 13 6 - 23 MG/DL    CREATININE 1.5 (H) 0.6 - 1.1 MG/DL    Glucose 184 (H) 70 - 99 MG/DL    Calcium 9.0 8.3 - 10.6 MG/DL    Alb 3.5 3.4 - 5.0 GM/DL    Total Protein 7.4 6.4 - 8.2 GM/DL    Total Bilirubin 4.0 (H) 0.0 - 1.0 MG/DL     (H) 10 - 40 U/L     (H) 15 - 37 IU/L    Alkaline Phosphatase 667 (H) 40 - 129 IU/L    GFR Non- 35 (L) >60 mL/min/1.73m2    GFR  43 (L) >60 mL/min/1.73m2    Anion Gap 10 4 - 16   Lipase   Result Value Ref Range    Lipase 36 13 - 60 IU/L   Urinalysis   Result Value Ref Range    Color, UA CASTILLO (A) YELLOW    Clarity, UA CLEAR CLEAR    Glucose, Urine NEGATIVE NEGATIVE MG/DL    Bilirubin Urine MODERATE (A) NEGATIVE MG/DL    Ketones, Urine NEGATIVE NEGATIVE MG/DL    Specific Gravity, UA 1.019 1.001 - 1.035    Blood, Urine NEGATIVE NEGATIVE    pH, Urine 5.0 5.0 - 8.0    Protein, UA 30 (A) NEGATIVE MG/DL    Urobilinogen, Urine 2.0 (H) 0.2 - 1.0 MG/DL    Nitrite Urine, Quantitative NEGATIVE NEGATIVE    Leukocyte Esterase, Urine MODERATE (A) NEGATIVE    RBC, UA NONE SEEN 0 - 6 /HPF    WBC, UA 93 (H) 0 - 5 /HPF    Bacteria, UA OCCASIONAL (A) NEGATIVE /HPF    Squam Epithel, UA 3 /HPF    Trans Epithel, UA 1 /HPF    Mucus, UA RARE (A) NEGATIVE HPF    Trichomonas, UA NONE SEEN NONE SEEN /HPF    Hyaline Casts, UA 10 /LPF    Cellular Cast, UA 1 /LPF    Ca Oxalate Becky, UA RARE /HPF   Hepatitis Panel, Acute   Result Value Ref Range    Hepatitis B Surface Ag NON REACTIVE NON REACTIVE    Hep A IgM NON REACTIVE NON REACTIVE    Hep B Core Ab, IgM NON REACTIVE NON REACTIVE    Hepatitis C Ab NON REACTIVE NON REACTIVE   ICTOTEST, URINE   Result Value Ref Range    Ictotest POSITIVE    CBC auto differential   Result Value Ref Range    WBC 12.4 (H) 4.0 - 10.5 K/CU MM    RBC 3.69 (L) 4.2 - 5.4 M/CU MM    Hemoglobin 11.1 (L) 12.5 - 16.0 GM/DL    Hematocrit 35.4 (L) 37 - 47 %    MCV 95.9 78 - 100 FL    MCH 30.1 27 - 31 PG    MCHC 31.4 (L) 32.0 - 36.0 %    RDW 13.2 11.7 - 14.9 %    Platelets 436 656 - 595 K/CU MM    MPV 10.2 7.5 - 11.1 FL    Differential Type AUTOMATED DIFFERENTIAL     Segs Relative 64.4 36 - 66 %    Lymphocytes % 25.5 24 - 44 %    Monocytes % 5.7 (H) 0 - 4 %    Eosinophils % 3.0 0 - 3 %    Basophils % 0.6 0 - 1 %    Segs Absolute 8.0 K/CU MM    Lymphocytes # 3.2 K/CU MM    Monocytes # 0.7 K/CU MM    Eosinophils # 0.4 K/CU MM    Basophils # 0.1 K/CU MM    Nucleated RBC % 0.0 %    Total Nucleated RBC 0.0 K/CU MM    Total Immature Neutrophil 0.10 K/CU MM    Immature Neutrophil % 0.8 (H) 0 - 0.43 %   POCT Glucose   Result Value Ref Range    POC Glucose 139 (H) 70 - 99 MG/DL   EKG 12 Lead   Result Value Ref Range    Ventricular Rate 87 BPM    Atrial Rate 87 BPM    P-R Interval 222 ms    QRS Duration 138 ms    Q-T Interval 442 ms    QTc Calculation (Bazett) 531 ms    P Axis 43 degrees R Axis -14 degrees    T Axis 123 degrees    Diagnosis       Sinus rhythm with 1st degree AV block with premature atrial complexes with aberrant conduction  Left bundle branch block  Abnormal ECG  When compared with ECG of 09-AUG-2018 07:07,  AK interval has increased             RADIOLOGY/PROCEDURES         CT ABDOMEN PELVIS WO CONTRAST Additional Contrast? None (Preliminary result)   Result time 05/23/19 22:48:23   Preliminary result by Moiz Pop MD (05/23/19 22:48:23)                Impression:    No acute abnormality in the abdomen or pelvis. Stable right adrenal adenoma measuring 4.3 x 3.5 cm. Status post cholecystectomy. No urinary tract stones or hydronephrosis. Narrative:    EXAMINATION:  CT OF THE ABDOMEN AND PELVIS WITHOUT CONTRAST 5/23/2019 10:04 pm    TECHNIQUE:  CT of the abdomen and pelvis was performed without the administration of  intravenous contrast. Multiplanar reformatted images are provided for review. Dose modulation, iterative reconstruction, and/or weight based adjustment of  the mA/kV was utilized to reduce the radiation dose to as low as reasonably  achievable. COMPARISON:  08/08/2018    HISTORY:  ORDERING SYSTEM PROVIDED HISTORY: abd pain  TECHNOLOGIST PROVIDED HISTORY:  Additional Contrast?->None  Ordering Physician Provided Reason for Exam: abd pain  Acuity: Acute  Type of Exam: Initial  Additional signs and symptoms: nausea pain that radiates to back for 3 months  Relevant Medical/Surgical History: prior usha    Initial encounter.  Acute abdominal pain. FINDINGS:  Lower Chest:  Visualized portion of the lower chest demonstrates no acute  abnormality. Organs:  Lack of contrast limits evaluation of the solid organs and bowel. The visualized liver, spleen, pancreas and left adrenal gland are  unremarkable. Oumou Huffman is a right adrenal mass measuring approximately 4.3 x  3.5 cm in size.  Stable small focus of macroscopic fat and calcification.   This demonstrates Hounsfield unit attenuation of 0 suggesting an adrenal  adenoma.  This remains stable compared to prior CT examination.  Status post  cholecystectomy. No evidence of stones in the kidneys, ureters or bladder.  No evidence of  hydronephrosis.  No perinephric or periureteral fat stranding. GI/Bowel: Stomach and duodenal sweep demonstrate no acute abnormality.  No  evidence of a small bowel obstruction. The appendix is visualized and is  unremarkable.  No evidence of acute appendicitis.  No evidence of abnormal  bowel wall thickening or distension. Pelvis: Bladder is unremarkable.  Uterus and adnexa unremarkable. Peritoneum/Retroperitoneum: No ascites or free air.  No lymphadenopathy. There are moderate to severe atherosclerotic calcifications of the aorta and  branch vessels.  No evidence of aneurysm. Bones/Soft Tissues:  No acute abnormality of the visualized osseous  structures.                Preliminary result by Viviane Corea MD (05/23/19 22:37:53)                Impression:    No acute abnormality in the abdomen or pelvis. Stable right adrenal adenoma measuring 4.3 x 3.5 cm. Status post cholecystectomy. No urinary tract stones or hydronephrosis.           US ABDOMEN LIMITED (Preliminary result)   Result time 05/23/19 22:22:52   Preliminary result by Kelsea Kaplan MD (05/23/19 22:22:52)                Impression:    1. Limited evaluation.  No sonographic evidence of acute right upper quadrant  abnormality. 2. Right renal atrophy.             Narrative:    EXAMINATION:  RIGHT UPPER QUADRANT ULTRASOUND    5/23/2019 9:33 pm    COMPARISON:  CT of the abdomen and pelvis, 04/07/2019    HISTORY:  ORDERING SYSTEM PROVIDED HISTORY: Right upper quadrant ultrasound  TECHNOLOGIST PROVIDED HISTORY:  Reason for exam:->Right upper quadrant ultrasound  Ordering Physician Provided Reason for Exam: ruq pain, epigastric pain  Additional signs and symptoms: cholecystectomy    FINDINGS:  LIVER:  Suboptimal evaluation of the hepatic parenchyma secondary to rib  shadowing and overlying bowel gas.  No gross evidence of intrahepatic biliary  dilatation. BILIARY SYSTEM:  The gallbladder is surgically absent. Common bile duct is  within normal limits measuring 4 mm. RIGHT KIDNEY: No evidence of hydronephrosis.  The right kidney is atrophic  measuring 8.4 cm in length.  The cortex measures 9 mm. PANCREAS:  The pancreas is not well seen. OTHER: No evidence of right upper quadrant ascites.                Preliminary result by Larry Davidson MD (05/23/19 22:14:13)                Impression:    1. Limited evaluation.  No sonographic evidence of acute right upper quadrant  abnormality. 2. Right renal atrophy. ED COURSE & MEDICAL DECISION MAKING       Vital signs and nursing notes reviewed during ED course. Patient care and presentation staffed with supervising MD.   Patient seen by supervising MD today- see his/her note for details of the encounter. All pertinent Lab data and radiographic results reviewed with patient at bedside. The patient and/or the family were informed of the results of any tests/labs/imaging, the treatment plan, and time was allotted to answer questions. Differential diagnosis: Abdominal Aortic Aneurysm, Ischemic Bowel, Bowel Obstruction, Acute Cholecystitis, Acute Appendicitis, other    Clinical  IMPRESSION    1. Pain of upper abdomen    2. Bilateral low back pain without sciatica, unspecified chronicity    3. Elevated liver enzymes    4. Adenoma of right adrenal gland      Patient presents as above. Has been having ongoing issues for several months. She has pain to her upper abdomen as well as pain in her low back. Do suspect that these are separate entities. Back pain appears musculoskeletal on exam.  Is reproducible across the lumbar and paralumbar regions. Worse with movements.   She has intact neurologic exam distally and no red flags. Workup was as above labs and imaging for the abdominal pain. sHe did have an ultrasound after seeing elevated liver enzymes. Patient is currently on an antibiotic for her urine and has been on 2 other antibiotics therefore I will send this to culture. After all results and to discuss patient's case with on-call GI, Dr. Guzman Sat, we did discuss patient's elevation in liver enzymes from April to today's visit. He does believe that she should be admitted for repeat labs in the morning and MRCP. This was discussed with patient and she was in agreement. I did speak to hospitalist service who graciously agreed to admit. Comment: Please note this report has been produced using speech recognition software and may contain errors related to that system including errors in grammar, punctuation, and spelling, as well as words and phrases that may be inappropriate. If there are any questions or concerns please feel free to contact the dictating provider for clarification.               Feliciano Rodriguez PA-C  05/24/19 [id number]

## 2019-05-24 NOTE — ED NOTES
Pt in CT via tanja Robles, RN  05/23/19 [id number] [facility],[address], RN  05/23/19 [id number]

## 2019-05-24 NOTE — ED PROVIDER NOTES
Did not see patient, interpreting EKG only.     The Ekg interpreted by me shows  normal sinus rhythm with a rate of 87 with first degree AV block with PACs  Axis is   Left axis deviation  QTc is  normal  LBBB  ST Segments: no acute change  No significant change from prior EKG dated 8-8-2018            Emily Pride MD  05/23/19 [id number]

## 2019-05-24 NOTE — ED PROVIDER NOTES
I independently examined and evaluated Denise Nunez. In brief their history revealed a 59 y.o. female who presents with abdominal pain and back pain. Symptoms ongoing greater than 3 months. Patient seen in the ED 4/7/2019 for the same. She had normal CT at that time. Patient reports that she's had continued ongoing symptoms. She is followed up with her family doctor and been on a couple rounds of antibiotics for UTI but she denies any urinary symptoms. She reports that she has constant pain across both sides of her low back that at times exacerbates and sharp pain shoots into her abdomen. Across both sides of her abdomen. She also has had some constant epigastric abdominal discomfort. Has had intermittent nausea and vomiting for several months. No stool changes. His history of cholecystectomy and C-sections but denies any other abdominal surgeries. No history of gastritis or reflux. No history of EGD or colonoscopy. NO cp, fevers or shortness of breath. Their focused exam revealed alert oriented female resting in bed in no distress normocephalic atraumatic sclera clear, correction slightly jaundice throughout, icterus pupils equal reactive cranial nerves intact airway normal lungs clear heart regular rate rhythm 2+ pulses throughout abdomen soft obese tender palpation of her abdomen no rebound or guarding or rigidity. No Bush sign. Bowel sounds present normal.  5 out of 5 strength throughout skin has no swelling    ED course: Patient seen with PA please see his note. Patient without pain for months, had elevated bilirubin elevated LFTs hepatitis panel negative CT scan negative did talk to GI recommended admission consult to medicine otherwise patient stable. All diagnostic, treatment, and disposition decisions were made by myself in conjunction with the Advanced Practice Provider.     For all further details of the patient's emergency department visit, please see the Advanced Practice Provider's documentation.         Frankie Clemens,   05/24/19 0003

## 2019-05-25 VITALS
TEMPERATURE: 98.3 F | HEIGHT: 61 IN | BODY MASS INDEX: 35.91 KG/M2 | OXYGEN SATURATION: 96 % | SYSTOLIC BLOOD PRESSURE: 166 MMHG | RESPIRATION RATE: 16 BRPM | WEIGHT: 190.2 LBS | DIASTOLIC BLOOD PRESSURE: 74 MMHG | HEART RATE: 68 BPM

## 2019-05-25 LAB
ALBUMIN SERPL-MCNC: 3.3 GM/DL (ref 3.4–5)
ALP BLD-CCNC: 726 IU/L (ref 40–128)
ALT SERPL-CCNC: 135 U/L (ref 10–40)
AMYLASE: 27 U/L (ref 25–115)
ANION GAP SERPL CALCULATED.3IONS-SCNC: 11 MMOL/L (ref 4–16)
APTT: 27.2 SECONDS (ref 21.2–33)
AST SERPL-CCNC: 124 IU/L (ref 15–37)
BASOPHILS ABSOLUTE: 0.1 K/CU MM
BASOPHILS RELATIVE PERCENT: 0.7 % (ref 0–1)
BILIRUB SERPL-MCNC: 3 MG/DL (ref 0–1)
BUN BLDV-MCNC: 14 MG/DL (ref 6–23)
CALCIUM SERPL-MCNC: 8.7 MG/DL (ref 8.3–10.6)
CHLORIDE BLD-SCNC: 102 MMOL/L (ref 99–110)
CO2: 26 MMOL/L (ref 21–32)
CREAT SERPL-MCNC: 1.3 MG/DL (ref 0.6–1.1)
DIFFERENTIAL TYPE: ABNORMAL
EOSINOPHILS ABSOLUTE: 0.6 K/CU MM
EOSINOPHILS RELATIVE PERCENT: 4.9 % (ref 0–3)
GFR AFRICAN AMERICAN: 50 ML/MIN/1.73M2
GFR NON-AFRICAN AMERICAN: 42 ML/MIN/1.73M2
GLUCOSE BLD-MCNC: 297 MG/DL (ref 70–99)
GLUCOSE BLD-MCNC: 337 MG/DL (ref 70–99)
HCT VFR BLD CALC: 36.3 % (ref 37–47)
HEMOGLOBIN: 11.5 GM/DL (ref 12.5–16)
IMMATURE NEUTROPHIL %: 1.4 % (ref 0–0.43)
INR BLD: 0.96 INDEX
LIPASE: 131 IU/L (ref 13–60)
LYMPHOCYTES ABSOLUTE: 2.6 K/CU MM
LYMPHOCYTES RELATIVE PERCENT: 21.9 % (ref 24–44)
MCH RBC QN AUTO: 30.7 PG (ref 27–31)
MCHC RBC AUTO-ENTMCNC: 31.7 % (ref 32–36)
MCV RBC AUTO: 96.8 FL (ref 78–100)
MONOCYTES ABSOLUTE: 0.8 K/CU MM
MONOCYTES RELATIVE PERCENT: 6.7 % (ref 0–4)
NUCLEATED RBC %: 0 %
PDW BLD-RTO: 13.2 % (ref 11.7–14.9)
PLATELET # BLD: 318 K/CU MM (ref 140–440)
PMV BLD AUTO: 10.2 FL (ref 7.5–11.1)
POTASSIUM SERPL-SCNC: 4.3 MMOL/L (ref 3.5–5.1)
PROTHROMBIN TIME: 11.1 SECONDS (ref 9.12–12.5)
RBC # BLD: 3.75 M/CU MM (ref 4.2–5.4)
SEGMENTED NEUTROPHILS ABSOLUTE COUNT: 7.6 K/CU MM
SEGMENTED NEUTROPHILS RELATIVE PERCENT: 64.4 % (ref 36–66)
SODIUM BLD-SCNC: 139 MMOL/L (ref 135–145)
TOTAL IMMATURE NEUTOROPHIL: 0.16 K/CU MM
TOTAL NUCLEATED RBC: 0 K/CU MM
TOTAL PROTEIN: 5.7 GM/DL (ref 6.4–8.2)
WBC # BLD: 11.7 K/CU MM (ref 4–10.5)

## 2019-05-25 PROCEDURE — 6360000002 HC RX W HCPCS: Performed by: GENERAL PRACTICE

## 2019-05-25 PROCEDURE — 85610 PROTHROMBIN TIME: CPT

## 2019-05-25 PROCEDURE — 6370000000 HC RX 637 (ALT 250 FOR IP): Performed by: GENERAL PRACTICE

## 2019-05-25 PROCEDURE — 82150 ASSAY OF AMYLASE: CPT

## 2019-05-25 PROCEDURE — 85730 THROMBOPLASTIN TIME PARTIAL: CPT

## 2019-05-25 PROCEDURE — 83690 ASSAY OF LIPASE: CPT

## 2019-05-25 PROCEDURE — 82962 GLUCOSE BLOOD TEST: CPT

## 2019-05-25 PROCEDURE — 80053 COMPREHEN METABOLIC PANEL: CPT

## 2019-05-25 PROCEDURE — 85025 COMPLETE CBC W/AUTO DIFF WBC: CPT

## 2019-05-25 RX ORDER — ZOLPIDEM TARTRATE 10 MG/1
10 TABLET ORAL NIGHTLY PRN
COMMUNITY
End: 2020-01-01

## 2019-05-25 RX ADMIN — DICYCLOMINE HYDROCHLORIDE 10 MG: 10 CAPSULE ORAL at 05:20

## 2019-05-25 RX ADMIN — ATORVASTATIN CALCIUM 40 MG: 40 TABLET, FILM COATED ORAL at 09:04

## 2019-05-25 RX ADMIN — METOPROLOL SUCCINATE 50 MG: 50 TABLET, EXTENDED RELEASE ORAL at 09:04

## 2019-05-25 RX ADMIN — AMLODIPINE BESYLATE 5 MG: 5 TABLET ORAL at 09:04

## 2019-05-25 RX ADMIN — INSULIN LISPRO 6 UNITS: 100 INJECTION, SOLUTION INTRAVENOUS; SUBCUTANEOUS at 09:04

## 2019-05-25 RX ADMIN — ENOXAPARIN SODIUM 40 MG: 40 INJECTION SUBCUTANEOUS at 09:04

## 2019-05-25 RX ADMIN — LEVOTHYROXINE SODIUM 50 MCG: 50 TABLET ORAL at 05:20

## 2019-05-25 NOTE — DISCHARGE SUMMARY
José Manuel Perdomo  Discharge Summary     Patient ID  Denise Nunez   1955  [id number]          Admit date: 5/23/2019   Discharge date: 5/25/2019      Admitting Physician: Josie Adkins MD   Discharge Physician: Ray Petersen MD    Discharge Diagnoses:     1. Abnormal LFT. Initial work up so far is normal.   CA 19-9 is elevated to 120. Pt to follow up with Dr Perry Timmons. 2. HTN.    3  DM. 4. Hyperlipidemia. 5. Hypothyroidism. 6. Obesity. Discharged Condition: good    Hospital Course: The patient is a 59-year-old white female  with past medical history significant for hypertension, coronary artery  disease, status post PTCA with coronary stents in place, atrial  fibrillation, status post electrophysiological studies with ablation,  hyperlipidemia, TIAs, and diabetes mellitus, who presented to the  emergency room last night with cramping mid abdominal pain radiating to  the back along with nausea and vomiting. The patient's CBC upon  admission showed a WBC count of 13.4, hemoglobin 11.8, and platelet  count of 452,503. LFTs were abnormal with an alkaline phosphatase 667,  ALT of 149, AST of 127, of total bilirubin of 4. Today, the patient's  LFTs have improved slightly. Of note, the patient's LFTs were normal on  08/05/2017, but they were abnormal on 08/08/2018. They have been  fluctuating since. The patient did have a CAT scan done of the abdomen  and pelvis x3 since 08/08/2018 and the last CAT scan on 05/23/2019 was  unremarkable with no focal lesions noted in the liver and also there was  no evidence of biliary duct dilatation or common bile duct stone. The  pancreas was unremarkable as well. The patient had an ultrasound done  of the right upper quadrant last night also and there was no sonographic  evidence noted in the common bile duct. The common bile duct was within  normal limits at 4 mm.   The patient had an MRCP done today to rule out  common bile duct stone/obstruction, but PROVIDED HISTORY: Ordering Physician Provided Reason for Exam: common bile duct FINDINGS: Lower chest: Clear lungs. Organs: Liver, left adrenal and spleen are normal.  No hydronephrosis. 3.6 cm right adrenal adenoma. GI: No evidence of bowel obstruction. Retroperitoneum/peritoneum: No evidence of AAA or lymphadenopathy. Bones/soft tissues: No suspicious osseous lesions. MRCP: Gallbladder: Prior cholecystectomy. Bile Ducts: No intra or extrahepatic biliary ductal dilation. No evidence of choledocholithiasis. Pancreatic Duct: Normal caliber pancreatic duct and normal pancreatic parenchyma. No biliary ductal dilation or evidence of choledocholithiasis. 3.6 cm right adrenal adenoma. Us Abdomen Limited    Result Date: 5/24/2019  EXAMINATION: RIGHT UPPER QUADRANT ULTRASOUND 5/23/2019 9:33 pm COMPARISON: CT of the abdomen and pelvis, 04/07/2019 HISTORY: ORDERING SYSTEM PROVIDED HISTORY: Right upper quadrant ultrasound TECHNOLOGIST PROVIDED HISTORY: Reason for exam:->Right upper quadrant ultrasound Ordering Physician Provided Reason for Exam: ruq pain, epigastric pain Additional signs and symptoms: cholecystectomy FINDINGS: LIVER:  Suboptimal evaluation of the hepatic parenchyma secondary to rib shadowing and overlying bowel gas. No gross evidence of intrahepatic biliary dilatation. BILIARY SYSTEM:  The gallbladder is surgically absent. Common bile duct is within normal limits measuring 4 mm. RIGHT KIDNEY: No evidence of hydronephrosis. The right kidney is atrophic measuring 8.4 cm in length. The cortex measures 9 mm. PANCREAS:  The pancreas is not well seen. OTHER: No evidence of right upper quadrant ascites. 1. Limited evaluation. No sonographic evidence of acute right upper quadrant abnormality.  2. Right renal atrophy.   -    Recent Results (from the past 24 hour(s))   POCT Glucose    Collection Time: 05/24/19  9:13 AM   Result Value Ref Range    POC Glucose 178 (H) 70 - 99 MG/DL   POCT Glucose Collection Time: 05/24/19  1:33 PM   Result Value Ref Range    POC Glucose 262 (H) 70 - 99 MG/DL   Ammonia    Collection Time: 05/24/19  2:33 PM   Result Value Ref Range    Ammonia 58 (H) 11 - 51 UMOL/L   Iron and TIBC    Collection Time: 05/24/19  2:33 PM   Result Value Ref Range    Iron 72 37 - 145 ug/dL    UIBC 160 110 - 370 ug/dL    TIBC 232 (L) 250 - 450 ug/dL    Transferrin % 31 10 - 44 %   Transferrin    Collection Time: 05/24/19  2:33 PM   Result Value Ref Range    Transferrin 201.7 200 - 360 mg/dL   CEA    Collection Time: 05/24/19  2:33 PM   Result Value Ref Range    CEA 3.5 NG/ML   POCT Glucose    Collection Time: 05/24/19  6:13 PM   Result Value Ref Range    POC Glucose 242 (H) 70 - 99 MG/DL   POCT Glucose    Collection Time: 05/24/19  8:37 PM   Result Value Ref Range    POC Glucose 230 (H) 70 - 99 MG/DL   Amylase    Collection Time: 05/25/19  5:20 AM   Result Value Ref Range    Amylase 27 25 - 115 U/L   CBC Auto Differential    Collection Time: 05/25/19  5:20 AM   Result Value Ref Range    WBC 11.7 (H) 4.0 - 10.5 K/CU MM    RBC 3.75 (L) 4.2 - 5.4 M/CU MM    Hemoglobin 11.5 (L) 12.5 - 16.0 GM/DL    Hematocrit 36.3 (L) 37 - 47 %    MCV 96.8 78 - 100 FL    MCH 30.7 27 - 31 PG    MCHC 31.7 (L) 32.0 - 36.0 %    RDW 13.2 11.7 - 14.9 %    Platelets 650 814 - 811 K/CU MM    MPV 10.2 7.5 - 11.1 FL    Differential Type AUTOMATED DIFFERENTIAL     Segs Relative 64.4 36 - 66 %    Lymphocytes % 21.9 (L) 24 - 44 %    Monocytes % 6.7 (H) 0 - 4 %    Eosinophils % 4.9 (H) 0 - 3 %    Basophils % 0.7 0 - 1 %    Segs Absolute 7.6 K/CU MM    Lymphocytes # 2.6 K/CU MM    Monocytes # 0.8 K/CU MM    Eosinophils # 0.6 K/CU MM    Basophils # 0.1 K/CU MM    Nucleated RBC % 0.0 %    Total Nucleated RBC 0.0 K/CU MM    Total Immature Neutrophil 0.16 K/CU MM    Immature Neutrophil % 1.4 (H) 0 - 0.43 %   Comprehensive Metabolic Panel    Collection Time: 05/25/19  5:20 AM   Result Value Ref Range    Sodium 139 135 - 145 MMOL/L eight units  401 or greater  Thirty units and call MD             levothyroxine (SYNTHROID) 50 MCG tablet  Take 50 mcg by mouth Daily             metoprolol succinate (TOPROL XL) 50 MG extended release tablet  Take one tablet in the morning and 1/2 tablet at night             nitroGLYCERIN (NITROSTAT) 0.4 MG SL tablet  Place 0.4 mg under the tongue every 5 minutes as needed for Chest pain up to max of 3 total doses. If no relief after 1 dose, call 911. ondansetron (ZOFRAN ODT) 4 MG disintegrating tablet  Take 1 tablet by mouth every 8 hours as needed for Nausea             traZODone (DESYREL) 100 MG tablet  Take 100 mg by mouth nightly. zolpidem (AMBIEN) 5 MG tablet  Take 5 mg by mouth nightly as needed for Sleep. Diet: DIET LOW FAT;     Follow-up with Dr Wang Stovall and Dr Romayne Ip in 3 days    Signed: Reyna Riley    Time spent on discharge 35 minutes

## 2019-05-25 NOTE — PROGRESS NOTES
Dc instructions reviewed with pt. All questions answered. IV removed. Belongings collected. Pts  will transport home. Pt will make f/u apps on Tuesday. Unable to make apps for pt due to it being the wknd. Pt states understanding.

## 2019-05-25 NOTE — PLAN OF CARE
Problem: Falls - Risk of:  Goal: Will remain free from falls  Description  Will remain free from falls  5/24/2019 2301 by Lan Olivo RN  Outcome: Ongoing     Problem: Falls - Risk of:  Goal: Absence of physical injury  Description  Absence of physical injury  5/24/2019 2301 by Lan Olivo RN  Outcome: Ongoing     Problem: Pain:  Description  Pain management should include both nonpharmacologic and pharmacologic interventions. Goal: Pain level will decrease  Description  Pain level will decrease  5/24/2019 2301 by Lan Olivo RN  Outcome: Ongoing     Problem: Pain:  Description  Pain management should include both nonpharmacologic and pharmacologic interventions. Goal: Control of acute pain  Description  Control of acute pain  5/24/2019 2301 by Lan Olivo RN  Outcome: Ongoing     Problem: Pain:  Description  Pain management should include both nonpharmacologic and pharmacologic interventions.   Goal: Control of chronic pain  Description  Control of chronic pain  5/24/2019 2301 by Lan Olivo RN  Outcome: Ongoing

## 2019-05-25 NOTE — PROGRESS NOTES
INTERNAL MEDICINE PROGRESS NOTE        [facility]   1955   Primary Care Physician:  Ashley Moreira PA-C  Admit Date: 5/23/2019     Subjective:   Pt is doing better today. Denies chest pain, SOB, nausea, vomiting, abdominal pain. Remainder of ROS is unremarkable. Meds, labs and other notes reviewed. Objective:   BP (!) 156/71   Pulse 67   Temp 98.1 °F (36.7 °C) (Oral)   Resp 16   Ht 5' 1\" (1.549 m)   Wt 190 lb 3.2 oz (86.3 kg)   SpO2 98%   BMI 35.94 kg/m²    Recent Labs     05/24/19  0913 05/24/19  1333 05/24/19  1813 05/24/19 2037   POCGLU 178* 262* 242* 230*       I/O last 3 completed shifts: In: 56 [P.O.:480; I.V.:10]  Out: -   No intake/output data recorded.     Neck: no adenopathy and supple, symmetrical, trachea midline  Lungs: clear to auscultation bilaterally  Heart: regular rate and rhythm and S1, S2 normal  Abdomen: obese, soft, non-tender; bowel sounds normal; no masses,  no organomegaly  Extremities: extremities normal, atraumatic, no cyanosis or edema  Neurologic: Grossly normal    Data Review  CBC with Differential:    Recent Labs     05/23/19 2042 05/24/19  0318 05/25/19  0520   WBC 13.4* 12.4* 11.7*   RBC 3.86* 3.69* 3.75*   HGB 11.8* 11.1* 11.5*   HCT 36.0* 35.4* 36.3*    318 318   MCV 93.3 95.9 96.8   MCH 30.6 30.1 30.7   MCHC 32.8 31.4* 31.7*   RDW 13.0 13.2 13.2   SEGSPCT 72.8* 64.4 64.4   LYMPHOPCT 18.1* 25.5 21.9*   MONOPCT 5.5* 5.7* 6.7*   BASOPCT 0.4 0.6 0.7   MONOSABS 0.7 0.7 0.8   LYMPHSABS 2.4 3.2 2.6   EOSABS 0.3 0.4 0.6   BASOSABS 0.1 0.1 0.1   DIFFTYPE AUTOMATED DIFFERENTIAL AUTOMATED DIFFERENTIAL AUTOMATED DIFFERENTIAL     CMP:    Recent Labs     05/23/19 2042 05/24/19 0318 05/25/19  0520   * 137 139   K 3.4* 3.7 4.3   CL 96* 103 102   CO2 27 20* 26   BUN 13 12 14   CREATININE 1.5* 1.4* 1.3*   GFRAA 43* 46* 50*   LABGLOM 35* 38* 42*   GLUCOSE 184* 247* 337*   PROT 7.4 5.6* 5.7*   LABALBU 3.5 3.2* 3.3*   CALCIUM 9.0 8.4 8.7   BILITOT 4.0* 3.6* 3.0*   ALKPHOS 667* 613* 726*   * 117* 124*   * 136* 135*     PT/INR:    Recent Labs     05/25/19  0520   PROTIME 11.1   INR 0.96     Meds:    amLODIPine  5 mg Oral Daily    atorvastatin  40 mg Oral Daily    cloNIDine  0.3 mg Oral Nightly    dicyclomine  10 mg Oral 4x Daily AC & HS    hydrOXYzine  50 mg Oral Nightly    insulin glargine  50 Units Subcutaneous Nightly    levothyroxine  50 mcg Oral Daily    metoprolol succinate  50 mg Oral Daily    traZODone  100 mg Oral Nightly    sodium chloride flush  10 mL Intravenous 2 times per day    enoxaparin  40 mg Subcutaneous Daily    insulin lispro  0-12 Units Subcutaneous TID WC    insulin lispro  0-6 Units Subcutaneous Nightly     PRN Meds: nitroGLYCERIN, ondansetron, sodium chloride flush, magnesium hydroxide, ondansetron, glucose, dextrose, glucagon (rDNA), dextrose, HYDROmorphone, traMADol, zolpidem    Assessment/Plan:   1. Abnormal LFT. Work up so far is normal.  Pending serology testing (antinuclear antibody, anti-smooth muscle antibody, and antimitochondrial antibody. Await further rec from Dr Michael Higgins. 2. HTN. BP stable. 3  DM. CPM.  4. Hyperlipidemia. 5. Hypothyroidism. 6. Obesity.           Ernie Ward MD  5/25/2019 7:33 AM

## 2019-05-26 LAB
CA 19-9: 120 U/ML (ref 0–37)
CA 19-9: ABNORMAL U/ML (ref 0–37)
CULTURE: ABNORMAL
CULTURE: ABNORMAL
Lab: ABNORMAL
MS ALPHA-FETOPROTEIN: 3 NG/ML (ref 0–9)
MS ALPHA-FETOPROTEIN: NORMAL NG/ML (ref 0–9)
SPECIMEN: ABNORMAL
TOTAL COLONY COUNT: ABNORMAL

## 2019-05-27 LAB
ANTI-MITOCHON TITER: 2.4 UNITS (ref 0–20)
ANTI-MITOCHON TITER: NORMAL UNITS (ref 0–20)
ANTI-NUCLEAR ANTIBODY (ANA): NORMAL
ANTI-NUCLEAR ANTIBODY (ANA): NORMAL
F-ACTIN AB, IGG: 11 UNITS (ref 0–19)
F-ACTIN AB, IGG: NORMAL UNITS (ref 0–19)

## 2019-05-29 NOTE — H&P
[address], [address] [place] [address]                              HISTORY AND PHYSICAL    PATIENT NAME: Pierre Osorio                 :        1955  MED REC NO:   [id number]                          ROOM:       2923  ACCOUNT NO:   [de-identified]                           ADMIT DATE: 2019  PROVIDER:     José Miguel Jennings    REASON FOR ADMISSION:  Abdominal pain and elevated liver functions. HISTORY OF PRESENT ILLNESS:  The patient is a 42-year-old female who has  a history of hypertension, coronary artery disease, status post PTCA,  also has atrial fibrillation, hyperlipidemia, also TIAs, and diabetes,  came in the hospital with complaints of abdominal pain, mainly in upper  abdomen, more on right side, mostly after eating. The patient's  symptoms initially started about three months or so ago; however, the  patient said that for the last few days, it has gotten significantly  worse. The patient was in the emergency room about a month or so ago. At that time, the patient had a CT scan of the abdomen that was normal.   The patient's alkaline phosphatase at that time was 212; however, when  the patient got admitted, the patient's alkaline phosphatase was more  than 600. At this time, the patient says that pain has gotten a little  better. She does not have any history of vomiting or diarrhea. Denies  any history of fever or chills. No sore throat. No headache. Denies  any urinary complaints. No swelling in the leg. REVIEW OF SYSTEMS:  CONSTITUTIONAL:  Negative for fever, chills, or weakness. The patient  has lost about 8 pounds of weight. HEENT:  Denies any history of sore throat or earache. CARDIOVASCULAR:  No chest pain or palpitation. No swelling in the leg. RS:  Denies any history of cough, wheezing, or shortness of breath. GI:  As per HPI. :  Denies any blood in the urine. No urinary complaints.   No vaginal  symptoms. MUSCULOSKELETAL:  Denies any joint issues at this time. The patient  does have some radiation of pain in the back. SKIN:  Negative for any rash. NEUROLOGIC:  Denies any history of headache, focal weakness, or sensory  changes. ENDOCRINE:  No polyuria or polydipsia. The patient has diabetes. Blood  sugars were not in control before, but they are getting better. LYMPHATIC:  Negative. PAST MEDICAL HISTORY:  Significant for coronary artery disease, status  post coronary artery stent placement, also has a history of congestive  heart failure, diabetes, history of MI, hypertension, hyperlipidemia,  insomnia, tachycardia, and TIA. PAST SURGICAL HISTORY:  Significant for ablation for AFib,   section, cholecystectomy, diagnostic cardiac cath, also PTCA in ,  and tubal ligation. HOME MEDICATIONS:  Include Bentyl 10 mg four times a day as needed, also  Zofran one tablet every eight hours as needed, amlodipine 5 mg daily,  hydroxyzine 50 mg daily, Synthroid 50 mcg daily, clonidine 0.3 mg by  mouth at night, metoprolol 50 mg extended tablet daily, Eliquis one  tablet two times a day, nitro p.r.n., aspirin 81 mg daily, Lipitor 40 mg  daily, Lantus 100 units/mL, 75 units at night, trazodone 100 mg at  night, and Humalog per sliding scale. ALLERGIES:  The patient is allergic to AMIODARONE, IV DYE, and VICODIN. SOCIAL HISTORY:  The patient is . She is a former smoker, quit  smoking in 2017. She does not have any history of alcohol or drug use. FAMILY HISTORY:  Significant for diabetes and heart disease. PHYSICAL EXAMINATION:  GENERAL:  Shows that she is not in distress at this time. VITAL SIGNS:  Show temperature 97.9, pulse 73, respiratory rate is 16,  blood pressure at this time is 197/83, when she came in was 129/76. HEENT:  The patient has normocephalic head. No sinus tenderness. NECK:  Without any stiffness or thyromegaly.   RS:  The patient has a fair air entry.  No wheezing or rhonchi. CARDIOVASCULAR SYSTEM:  S1 and S2 present. Rate and rhythm are regular. ABDOMEN:  Soft. The patient does have a suprapubic tenderness, but no  guarding or rigidity. Bowel sounds are present and they are normal.  EXTREMITIES:  Without any clubbing, cyanosis, or edema. CNS:  Nonfocal.    INVESTIGATIONS:  The patient's liver enzymes at the time of admission  showed sodium of 133, potassium 3.4, chloride is 96, CO2 is 27, BUN is  13, creatinine 1.5, glucose was 184, calcium was 9.0, albumin is 3.5,  total protein 7.4, total bilirubin was 4.0, ALT was 149, AST was 127,  alkaline phosphatase 667. The patient's lipase was 36. The patient's  urine was positive for 93 wbc's, occasional bacteria, and moderate  leukocytes. The patient had a rare mucus. The patient's hepatitis A  antigen IgM, hepatitis C antibody, they all are nonreactive. The  patient's bilirubin has come down this morning at 3.6 and alkaline  phosphatase is 613. A1c this morning is 7.5. The patient's ammonia  level is 58. Iron level is within normal limits. Transferrin is 201. ASSESSMENT:  1. Abdominal pain with elevated liver enzymes and bilirubin. 2.  Uncontrolled hypertension. 3.  Diabetes. 4.  Atrial fibrillation. 5.  Mild renal insufficiency. PLAN:  At this time, the patient is admitted to hospital.  The patient  already had MRCP of the abdomen, which did not show any obstruction of  the bile duct by stone. The patient's home medications are restarted. The patient is placed on a sliding scale insulin. Insulin dose was  adjusted. The patient is also started on IV pain medications. The  patient's condition will be monitored closely. Also, I have asked GI  physician, Dr. Libra Noel, to see the patient, who has already seen the  patient and evaluated.     KAMERON ARIAS    D: 05/24/2019 21:06:38       T: 05/25/2019 1:16:19     [email or web address]  Job#: [id number]     Doc#: [id number]    CC:

## 2019-05-30 LAB
EKG ATRIAL RATE: 87 BPM
EKG DIAGNOSIS: NORMAL
EKG P AXIS: 43 DEGREES
EKG P-R INTERVAL: 222 MS
EKG Q-T INTERVAL: 442 MS
EKG QRS DURATION: 138 MS
EKG QTC CALCULATION (BAZETT): 531 MS
EKG R AXIS: -14 DEGREES
EKG T AXIS: 123 DEGREES
EKG VENTRICULAR RATE: 87 BPM

## 2019-07-30 ENCOUNTER — HOSPITAL ENCOUNTER (OUTPATIENT)
Dept: GENERAL RADIOLOGY | Age: 64
Discharge: HOME | End: 2019-07-30
Payer: COMMERCIAL

## 2019-07-30 DIAGNOSIS — R10.12 ABDOMINAL PAIN, LEFT UPPER QUADRANT: ICD-10-CM

## 2019-07-30 DIAGNOSIS — R10.10 INTERMITTENT UPPER ABDOMINAL PAIN: ICD-10-CM

## 2019-07-30 PROCEDURE — 74250 X-RAY XM SM INT 1CNTRST STD: CPT

## 2019-09-12 ENCOUNTER — APPOINTMENT (OUTPATIENT)
Dept: GENERAL RADIOLOGY | Age: 64
End: 2019-09-12
Payer: COMMERCIAL

## 2019-09-12 ENCOUNTER — APPOINTMENT (OUTPATIENT)
Dept: CT IMAGING | Age: 64
End: 2019-09-12
Payer: COMMERCIAL

## 2019-09-12 ENCOUNTER — HOSPITAL ENCOUNTER (EMERGENCY)
Age: 64
Discharge: HOME | End: 2019-09-12
Attending: EMERGENCY MEDICINE
Payer: COMMERCIAL

## 2019-09-12 VITALS
SYSTOLIC BLOOD PRESSURE: 146 MMHG | RESPIRATION RATE: 20 BRPM | HEIGHT: 61 IN | TEMPERATURE: 98.1 F | WEIGHT: 190.2 LBS | BODY MASS INDEX: 35.91 KG/M2 | OXYGEN SATURATION: 94 % | DIASTOLIC BLOOD PRESSURE: 110 MMHG | HEART RATE: 69 BPM

## 2019-09-12 DIAGNOSIS — R20.2 PARESTHESIAS: Primary | ICD-10-CM

## 2019-09-12 LAB
ALBUMIN SERPL-MCNC: 4.2 GM/DL (ref 3.4–5)
ALP BLD-CCNC: 190 IU/L (ref 40–129)
ALT SERPL-CCNC: 21 U/L (ref 10–40)
ANION GAP SERPL CALCULATED.3IONS-SCNC: 13 MMOL/L (ref 4–16)
AST SERPL-CCNC: 24 IU/L (ref 15–37)
BACTERIA: NEGATIVE /HPF
BASOPHILS ABSOLUTE: 0.1 K/CU MM
BASOPHILS RELATIVE PERCENT: 0.6 % (ref 0–1)
BILIRUB SERPL-MCNC: 0.5 MG/DL (ref 0–1)
BILIRUBIN URINE: NEGATIVE MG/DL
BLOOD, URINE: NEGATIVE
BUN BLDV-MCNC: 17 MG/DL (ref 6–23)
CALCIUM SERPL-MCNC: 9.5 MG/DL (ref 8.3–10.6)
CHLORIDE BLD-SCNC: 98 MMOL/L (ref 99–110)
CHP ED QC CHECK: YES
CLARITY: CLEAR
CO2: 28 MMOL/L (ref 21–32)
COLOR: YELLOW
CREAT SERPL-MCNC: 1.3 MG/DL (ref 0.6–1.1)
DIFFERENTIAL TYPE: ABNORMAL
EOSINOPHILS ABSOLUTE: 0.3 K/CU MM
EOSINOPHILS RELATIVE PERCENT: 2.4 % (ref 0–3)
GFR AFRICAN AMERICAN: 50 ML/MIN/1.73M2
GFR NON-AFRICAN AMERICAN: 42 ML/MIN/1.73M2
GLUCOSE BLD-MCNC: 127 MG/DL (ref 70–99)
GLUCOSE BLD-MCNC: 133 MG/DL
GLUCOSE BLD-MCNC: 133 MG/DL (ref 70–99)
GLUCOSE, URINE: NEGATIVE MG/DL
HCT VFR BLD CALC: 40 % (ref 37–47)
HEMOGLOBIN: 13.3 GM/DL (ref 12.5–16)
IMMATURE NEUTROPHIL %: 0.4 % (ref 0–0.43)
INR BLD: 0.98 INDEX
KETONES, URINE: NEGATIVE MG/DL
LEUKOCYTE ESTERASE, URINE: ABNORMAL
LYMPHOCYTES ABSOLUTE: 2.8 K/CU MM
LYMPHOCYTES RELATIVE PERCENT: 20.9 % (ref 24–44)
MCH RBC QN AUTO: 32 PG (ref 27–31)
MCHC RBC AUTO-ENTMCNC: 33.3 % (ref 32–36)
MCV RBC AUTO: 96.4 FL (ref 78–100)
MONOCYTES ABSOLUTE: 0.7 K/CU MM
MONOCYTES RELATIVE PERCENT: 5.4 % (ref 0–4)
MUCUS: ABNORMAL HPF
NITRITE URINE, QUANTITATIVE: NEGATIVE
NUCLEATED RBC %: 0 %
PDW BLD-RTO: 12.9 % (ref 11.7–14.9)
PH, URINE: 7 (ref 5–8)
PLATELET # BLD: 333 K/CU MM (ref 140–440)
PMV BLD AUTO: 10.3 FL (ref 7.5–11.1)
POTASSIUM SERPL-SCNC: 4.3 MMOL/L (ref 3.5–5.1)
PROTEIN UA: >500 MG/DL
PROTHROMBIN TIME: 11.4 SECONDS (ref 9.12–12.5)
RBC # BLD: 4.15 M/CU MM (ref 4.2–5.4)
RBC URINE: ABNORMAL /HPF (ref 0–6)
SEGMENTED NEUTROPHILS ABSOLUTE COUNT: 9.4 K/CU MM
SEGMENTED NEUTROPHILS RELATIVE PERCENT: 70.3 % (ref 36–66)
SODIUM BLD-SCNC: 139 MMOL/L (ref 135–145)
SPECIFIC GRAVITY UA: 1.02 (ref 1–1.03)
SQUAMOUS EPITHELIAL: 3 /HPF
TOTAL IMMATURE NEUTOROPHIL: 0.05 K/CU MM
TOTAL NUCLEATED RBC: 0 K/CU MM
TOTAL PROTEIN: 7 GM/DL (ref 6.4–8.2)
TRANSITIONAL EPITHELIAL: <1 /HPF
TRICHOMONAS: ABNORMAL /HPF
TROPONIN T: <0.01 NG/ML
UROBILINOGEN, URINE: 2 MG/DL (ref 0.2–1)
WBC # BLD: 13.3 K/CU MM (ref 4–10.5)
WBC UA: 4 /HPF (ref 0–5)

## 2019-09-12 PROCEDURE — 85610 PROTHROMBIN TIME: CPT

## 2019-09-12 PROCEDURE — 70450 CT HEAD/BRAIN W/O DYE: CPT

## 2019-09-12 PROCEDURE — 93010 ELECTROCARDIOGRAM REPORT: CPT | Performed by: INTERNAL MEDICINE

## 2019-09-12 PROCEDURE — 93005 ELECTROCARDIOGRAM TRACING: CPT | Performed by: EMERGENCY MEDICINE

## 2019-09-12 PROCEDURE — 80053 COMPREHEN METABOLIC PANEL: CPT

## 2019-09-12 PROCEDURE — 6360000002 HC RX W HCPCS: Performed by: EMERGENCY MEDICINE

## 2019-09-12 PROCEDURE — 99284 EMERGENCY DEPT VISIT MOD MDM: CPT

## 2019-09-12 PROCEDURE — 82962 GLUCOSE BLOOD TEST: CPT

## 2019-09-12 PROCEDURE — 71045 X-RAY EXAM CHEST 1 VIEW: CPT

## 2019-09-12 PROCEDURE — 96375 TX/PRO/DX INJ NEW DRUG ADDON: CPT

## 2019-09-12 PROCEDURE — 85025 COMPLETE CBC W/AUTO DIFF WBC: CPT

## 2019-09-12 PROCEDURE — 84484 ASSAY OF TROPONIN QUANT: CPT

## 2019-09-12 PROCEDURE — 36415 COLL VENOUS BLD VENIPUNCTURE: CPT

## 2019-09-12 PROCEDURE — 96374 THER/PROPH/DIAG INJ IV PUSH: CPT

## 2019-09-12 PROCEDURE — 81001 URINALYSIS AUTO W/SCOPE: CPT

## 2019-09-12 RX ORDER — ESCITALOPRAM OXALATE 20 MG/1
20 TABLET ORAL DAILY
COMMUNITY
Start: 2019-08-14 | End: 2020-01-01

## 2019-09-12 RX ORDER — INSULIN GLARGINE 100 [IU]/ML
65-70 INJECTION, SOLUTION SUBCUTANEOUS NIGHTLY
Status: ON HOLD | COMMUNITY
Start: 2019-09-03 | End: 2020-01-01 | Stop reason: HOSPADM

## 2019-09-12 RX ORDER — PANTOPRAZOLE SODIUM 40 MG/1
40 TABLET, DELAYED RELEASE ORAL DAILY
COMMUNITY
Start: 2019-09-06

## 2019-09-12 RX ORDER — PAROXETINE HYDROCHLORIDE 20 MG/1
20 TABLET, FILM COATED ORAL DAILY
COMMUNITY
Start: 2019-09-05 | End: 2020-06-11

## 2019-09-12 RX ORDER — METOCLOPRAMIDE HYDROCHLORIDE 5 MG/ML
10 INJECTION INTRAMUSCULAR; INTRAVENOUS ONCE
Status: COMPLETED | OUTPATIENT
Start: 2019-09-12 | End: 2019-09-12

## 2019-09-12 RX ORDER — DIPHENHYDRAMINE HYDROCHLORIDE 50 MG/ML
12.5 INJECTION INTRAMUSCULAR; INTRAVENOUS ONCE
Status: COMPLETED | OUTPATIENT
Start: 2019-09-12 | End: 2019-09-12

## 2019-09-12 RX ORDER — FUROSEMIDE 20 MG/1
20 TABLET ORAL DAILY
Status: ON HOLD | COMMUNITY
Start: 2019-07-27 | End: 2020-06-13 | Stop reason: HOSPADM

## 2019-09-12 RX ORDER — ONDANSETRON 2 MG/ML
4 INJECTION INTRAMUSCULAR; INTRAVENOUS EVERY 30 MIN PRN
Status: DISCONTINUED | OUTPATIENT
Start: 2019-09-12 | End: 2019-09-12 | Stop reason: HOSPADM

## 2019-09-12 RX ORDER — INSULIN LISPRO 100 U/ML
INJECTION, SOLUTION SUBCUTANEOUS SEE ADMIN INSTRUCTIONS
Status: ON HOLD | COMMUNITY
Start: 2019-07-02 | End: 2021-01-01

## 2019-09-12 RX ORDER — ATORVASTATIN CALCIUM 40 MG/1
40 TABLET, FILM COATED ORAL NIGHTLY
COMMUNITY
Start: 2019-08-27

## 2019-09-12 RX ADMIN — DIPHENHYDRAMINE HYDROCHLORIDE 12.5 MG: 50 INJECTION, SOLUTION INTRAMUSCULAR; INTRAVENOUS at 17:18

## 2019-09-12 RX ADMIN — ONDANSETRON 4 MG: 2 INJECTION INTRAMUSCULAR; INTRAVENOUS at 14:24

## 2019-09-12 RX ADMIN — METOCLOPRAMIDE 10 MG: 5 INJECTION, SOLUTION INTRAMUSCULAR; INTRAVENOUS at 17:19

## 2019-09-12 NOTE — ED PROVIDER NOTES
Disease Father      Social History     Socioeconomic History    Marital status:      Spouse name: Not on file    Number of children: Not on file    Years of education: Not on file    Highest education level: Not on file   Occupational History    Not on file   Social Needs    Financial resource strain: Not on file    Food insecurity:     Worry: Not on file     Inability: Not on file    Transportation needs:     Medical: Not on file     Non-medical: Not on file   Tobacco Use    Smoking status: Former Smoker     Packs/day: 3.00     Years: 20.00     Pack years: 60.00     Types: Cigarettes     Last attempt to quit: 2007     Years since quittin.5    Smokeless tobacco: Never Used    Tobacco comment: reviewed 8/12/15   Substance and Sexual Activity    Alcohol use: No     Comment: rylee amos    Drug use: No    Sexual activity: Not on file   Lifestyle    Physical activity:     Days per week: Not on file     Minutes per session: Not on file    Stress: Not on file   Relationships    Social connections:     Talks on phone: Not on file     Gets together: Not on file     Attends Hinduism service: Not on file     Active member of club or organization: Not on file     Attends meetings of clubs or organizations: Not on file     Relationship status: Not on file    Intimate partner violence:     Fear of current or ex partner: Not on file     Emotionally abused: Not on file     Physically abused: Not on file     Forced sexual activity: Not on file   Other Topics Concern    Not on file   Social History Narrative    Not on file     Current Facility-Administered Medications   Medication Dose Route Frequency Provider Last Rate Last Dose    ondansetron (ZOFRAN) injection 4 mg  4 mg Intravenous Q30 Min PRN Jailny Cr MD   4 mg at 19 1424     Current Outpatient Medications   Medication Sig Dispense Refill    BASAGLAR KWIKPEN 100 UNIT/ML injection pen Inject 80 Units into the skin nightly (36.7 °C)      Temp Source Oral      SpO2 93 %      Weight 190 lb 3.2 oz (86.3 kg)      Height 5' 1\" (1.549 m)      Head Circumference       Peak Flow       Pain Score       Pain Loc       Pain Edu? Excl. in [address]? My pulse ox interpretation is - normal    General appearance:  No acute distress. Skin:  Warm. Dry. Eye:  Extraocular movements intact. Ears, nose, mouth and throat:  Oral mucosa moist   Neck:  Trachea midline. Extremity:  No swelling. Normal ROM     Heart:  Regular rate and rhythm, normal S1 & S2, no extra heart sounds. Perfusion:  intact  Respiratory:  Lungs clear to auscultation bilaterally. Respirations nonlabored. Abdominal:  Normal bowel sounds. Soft. Nontender. Non distended. Back:  No CVA tenderness to palpation     Mental Status Exam:   Alert and oriented times three, follows commands, speech and language intact, gait normal    Cranial Apzoxm-YE-[id number] Intact.     Cranial nerve II  Visual acuity: normal  Cranial nerve III  Pupils: equal, round, reactive to light  Cranial nerves III, IV, VI  Extraocular Movements: intact  Cranial nerve V  Facial sensation: intact  Cranial nerve VII  Facial strength: intact  Cranial nerve VIII  Hearing: intact  Cranial nerve IX  Palate: intact  Cranial nerve XI  Shoulder shrug: intact  Cranial nerve XII  Tongue movement: normal    Motor:   Drift: absent  Motor exam is symmetrical 5 out of 5 all extremities bilaterally  Tone: normal  Abnormal Movements: Absent    Sensory:  Light Touch  Right Upper Extremity: normal  Left Upper Extremity: normal  Right Lower Extremity: normal  Left Lower Extremity: normal        I have reviewed and interpreted all of the currently available lab results from this visit (if applicable):  Results for orders placed or performed during the hospital encounter of 09/12/19   CBC Auto Differential   Result Value Ref Range    WBC 13.3 (H) 4.0 - 10.5 K/CU MM    RBC 4.15 (L) 4.2 - 5.4 M/CU MM    Hemoglobin 13.3 12.5

## 2019-09-18 LAB
EKG ATRIAL RATE: 108 BPM
EKG DIAGNOSIS: NORMAL
EKG P AXIS: 61 DEGREES
EKG P-R INTERVAL: 176 MS
EKG Q-T INTERVAL: 388 MS
EKG QRS DURATION: 128 MS
EKG QTC CALCULATION (BAZETT): 519 MS
EKG R AXIS: -13 DEGREES
EKG T AXIS: 107 DEGREES
EKG VENTRICULAR RATE: 108 BPM

## 2020-01-01 ENCOUNTER — APPOINTMENT (OUTPATIENT)
Dept: CT IMAGING | Age: 65
DRG: 201 | End: 2020-01-01
Payer: COMMERCIAL

## 2020-01-01 ENCOUNTER — APPOINTMENT (OUTPATIENT)
Dept: GENERAL RADIOLOGY | Age: 65
DRG: 201 | End: 2020-01-01
Payer: COMMERCIAL

## 2020-01-01 ENCOUNTER — HOSPITAL ENCOUNTER (INPATIENT)
Age: 65
LOS: 2 days | Discharge: HOME | DRG: 201 | End: 2020-12-30
Attending: EMERGENCY MEDICINE | Admitting: GENERAL PRACTICE
Payer: COMMERCIAL

## 2020-01-01 ENCOUNTER — HOSPITAL ENCOUNTER (INPATIENT)
Age: 65
LOS: 2 days | Discharge: HOME | DRG: 201 | End: 2020-12-13
Attending: EMERGENCY MEDICINE | Admitting: GENERAL PRACTICE
Payer: COMMERCIAL

## 2020-01-01 VITALS
BODY MASS INDEX: 38.95 KG/M2 | SYSTOLIC BLOOD PRESSURE: 186 MMHG | OXYGEN SATURATION: 97 % | HEART RATE: 82 BPM | DIASTOLIC BLOOD PRESSURE: 86 MMHG | HEIGHT: 61 IN | RESPIRATION RATE: 20 BRPM | TEMPERATURE: 98.6 F | WEIGHT: 206.3 LBS

## 2020-01-01 VITALS
DIASTOLIC BLOOD PRESSURE: 69 MMHG | SYSTOLIC BLOOD PRESSURE: 170 MMHG | HEIGHT: 61 IN | BODY MASS INDEX: 35.91 KG/M2 | HEART RATE: 82 BPM | RESPIRATION RATE: 19 BRPM | TEMPERATURE: 98.3 F | OXYGEN SATURATION: 91 % | WEIGHT: 190.2 LBS

## 2020-01-01 LAB
ALBUMIN SERPL-MCNC: 3 GM/DL (ref 3.4–5)
ALBUMIN SERPL-MCNC: 3.3 GM/DL (ref 3.4–5)
ALBUMIN SERPL-MCNC: 3.4 GM/DL (ref 3.4–5)
ALBUMIN SERPL-MCNC: 3.6 GM/DL (ref 3.4–5)
ALP BLD-CCNC: 124 IU/L (ref 40–129)
ALP BLD-CCNC: 196 IU/L (ref 40–129)
ALP BLD-CCNC: 198 IU/L (ref 40–128)
ALP BLD-CCNC: 201 IU/L (ref 40–129)
ALT SERPL-CCNC: 10 U/L (ref 10–40)
ALT SERPL-CCNC: 12 U/L (ref 10–40)
ALT SERPL-CCNC: 19 U/L (ref 10–40)
ALT SERPL-CCNC: 21 U/L (ref 10–40)
ANION GAP SERPL CALCULATED.3IONS-SCNC: 10 MMOL/L (ref 4–16)
ANION GAP SERPL CALCULATED.3IONS-SCNC: 10 MMOL/L (ref 4–16)
ANION GAP SERPL CALCULATED.3IONS-SCNC: 11 MMOL/L (ref 4–16)
ANION GAP SERPL CALCULATED.3IONS-SCNC: 12 MMOL/L (ref 4–16)
ANION GAP SERPL CALCULATED.3IONS-SCNC: 15 MMOL/L (ref 4–16)
ANION GAP SERPL CALCULATED.3IONS-SCNC: 15 MMOL/L (ref 4–16)
ANION GAP SERPL CALCULATED.3IONS-SCNC: 9 MMOL/L (ref 4–16)
APTT: 26.3 SECONDS (ref 25.1–37.1)
AST SERPL-CCNC: 14 IU/L (ref 15–37)
AST SERPL-CCNC: 22 IU/L (ref 15–37)
AST SERPL-CCNC: 22 IU/L (ref 15–37)
AST SERPL-CCNC: 41 IU/L (ref 15–37)
BACTERIA: NEGATIVE /HPF
BACTERIA: NEGATIVE /HPF
BASOPHILS ABSOLUTE: 0 K/CU MM
BASOPHILS ABSOLUTE: 0.1 K/CU MM
BASOPHILS RELATIVE PERCENT: 0.3 % (ref 0–1)
BASOPHILS RELATIVE PERCENT: 0.6 % (ref 0–1)
BASOPHILS RELATIVE PERCENT: 0.6 % (ref 0–1)
BASOPHILS RELATIVE PERCENT: 0.7 % (ref 0–1)
BILIRUB SERPL-MCNC: 0.3 MG/DL (ref 0–1)
BILIRUB SERPL-MCNC: 0.4 MG/DL (ref 0–1)
BILIRUB SERPL-MCNC: 0.5 MG/DL (ref 0–1)
BILIRUB SERPL-MCNC: 1.1 MG/DL (ref 0–1)
BILIRUBIN URINE: NEGATIVE MG/DL
BILIRUBIN URINE: NEGATIVE MG/DL
BLOOD, URINE: ABNORMAL
BLOOD, URINE: NEGATIVE
BUN BLDV-MCNC: 11 MG/DL (ref 6–23)
BUN BLDV-MCNC: 13 MG/DL (ref 6–23)
BUN BLDV-MCNC: 15 MG/DL (ref 6–23)
BUN BLDV-MCNC: 15 MG/DL (ref 6–23)
BUN BLDV-MCNC: 18 MG/DL (ref 6–23)
BUN BLDV-MCNC: 19 MG/DL (ref 6–23)
BUN BLDV-MCNC: 21 MG/DL (ref 6–23)
CALCIUM SERPL-MCNC: 8 MG/DL (ref 8.3–10.6)
CALCIUM SERPL-MCNC: 8.1 MG/DL (ref 8.3–10.6)
CALCIUM SERPL-MCNC: 8.1 MG/DL (ref 8.3–10.6)
CALCIUM SERPL-MCNC: 8.2 MG/DL (ref 8.3–10.6)
CALCIUM SERPL-MCNC: 8.4 MG/DL (ref 8.3–10.6)
CALCIUM SERPL-MCNC: 8.4 MG/DL (ref 8.3–10.6)
CALCIUM SERPL-MCNC: 9.8 MG/DL (ref 8.3–10.6)
CHLORIDE BLD-SCNC: 103 MMOL/L (ref 99–110)
CHLORIDE BLD-SCNC: 104 MMOL/L (ref 99–110)
CHLORIDE BLD-SCNC: 107 MMOL/L (ref 99–110)
CHLORIDE BLD-SCNC: 109 MMOL/L (ref 99–110)
CHLORIDE BLD-SCNC: 94 MMOL/L (ref 99–110)
CHLORIDE BLD-SCNC: 99 MMOL/L (ref 99–110)
CHLORIDE BLD-SCNC: 99 MMOL/L (ref 99–110)
CLARITY: CLEAR
CLARITY: CLEAR
CO2: 18 MMOL/L (ref 21–32)
CO2: 20 MMOL/L (ref 21–32)
CO2: 24 MMOL/L (ref 21–32)
CO2: 24 MMOL/L (ref 21–32)
CO2: 25 MMOL/L (ref 21–32)
COLOR: YELLOW
COLOR: YELLOW
CREAT SERPL-MCNC: 0.9 MG/DL (ref 0.6–1.1)
CREAT SERPL-MCNC: 1.1 MG/DL (ref 0.6–1.1)
CREAT SERPL-MCNC: 1.2 MG/DL (ref 0.6–1.1)
CREAT SERPL-MCNC: 1.3 MG/DL (ref 0.6–1.1)
CREAT SERPL-MCNC: 1.4 MG/DL (ref 0.6–1.1)
DIFFERENTIAL TYPE: ABNORMAL
EKG ATRIAL RATE: 73 BPM
EKG ATRIAL RATE: 78 BPM
EKG DIAGNOSIS: NORMAL
EKG DIAGNOSIS: NORMAL
EKG P AXIS: 34 DEGREES
EKG P AXIS: 40 DEGREES
EKG P-R INTERVAL: 182 MS
EKG P-R INTERVAL: 194 MS
EKG Q-T INTERVAL: 426 MS
EKG Q-T INTERVAL: 440 MS
EKG QRS DURATION: 138 MS
EKG QRS DURATION: 140 MS
EKG QTC CALCULATION (BAZETT): 484 MS
EKG QTC CALCULATION (BAZETT): 485 MS
EKG R AXIS: -11 DEGREES
EKG R AXIS: -12 DEGREES
EKG T AXIS: 137 DEGREES
EKG T AXIS: 138 DEGREES
EKG VENTRICULAR RATE: 73 BPM
EKG VENTRICULAR RATE: 78 BPM
EOSINOPHILS ABSOLUTE: 0 K/CU MM
EOSINOPHILS ABSOLUTE: 0.1 K/CU MM
EOSINOPHILS ABSOLUTE: 0.2 K/CU MM
EOSINOPHILS ABSOLUTE: 0.2 K/CU MM
EOSINOPHILS ABSOLUTE: 0.3 K/CU MM
EOSINOPHILS ABSOLUTE: 0.8 K/CU MM
EOSINOPHILS RELATIVE PERCENT: 0.1 % (ref 0–3)
EOSINOPHILS RELATIVE PERCENT: 0.7 % (ref 0–3)
EOSINOPHILS RELATIVE PERCENT: 1.1 % (ref 0–3)
EOSINOPHILS RELATIVE PERCENT: 1.8 % (ref 0–3)
EOSINOPHILS RELATIVE PERCENT: 2.8 % (ref 0–3)
EOSINOPHILS RELATIVE PERCENT: 7.7 % (ref 0–3)
ESTIMATED AVERAGE GLUCOSE: 151 MG/DL
ESTIMATED AVERAGE GLUCOSE: 166 MG/DL
GFR AFRICAN AMERICAN: 46 ML/MIN/1.73M2
GFR AFRICAN AMERICAN: 50 ML/MIN/1.73M2
GFR AFRICAN AMERICAN: 55 ML/MIN/1.73M2
GFR AFRICAN AMERICAN: >60 ML/MIN/1.73M2
GFR NON-AFRICAN AMERICAN: 38 ML/MIN/1.73M2
GFR NON-AFRICAN AMERICAN: 41 ML/MIN/1.73M2
GFR NON-AFRICAN AMERICAN: 45 ML/MIN/1.73M2
GFR NON-AFRICAN AMERICAN: 50 ML/MIN/1.73M2
GFR NON-AFRICAN AMERICAN: >60 ML/MIN/1.73M2
GLUCOSE BLD-MCNC: 108 MG/DL (ref 70–99)
GLUCOSE BLD-MCNC: 112 MG/DL (ref 70–99)
GLUCOSE BLD-MCNC: 113 MG/DL (ref 70–99)
GLUCOSE BLD-MCNC: 115 MG/DL (ref 70–99)
GLUCOSE BLD-MCNC: 118 MG/DL (ref 70–99)
GLUCOSE BLD-MCNC: 120 MG/DL (ref 70–99)
GLUCOSE BLD-MCNC: 121 MG/DL (ref 70–99)
GLUCOSE BLD-MCNC: 125 MG/DL (ref 70–99)
GLUCOSE BLD-MCNC: 129 MG/DL (ref 70–99)
GLUCOSE BLD-MCNC: 138 MG/DL (ref 70–99)
GLUCOSE BLD-MCNC: 140 MG/DL (ref 70–99)
GLUCOSE BLD-MCNC: 142 MG/DL (ref 70–99)
GLUCOSE BLD-MCNC: 143 MG/DL (ref 70–99)
GLUCOSE BLD-MCNC: 175 MG/DL (ref 70–99)
GLUCOSE BLD-MCNC: 186 MG/DL (ref 70–99)
GLUCOSE BLD-MCNC: 196 MG/DL (ref 70–99)
GLUCOSE BLD-MCNC: 221 MG/DL (ref 70–99)
GLUCOSE BLD-MCNC: 229 MG/DL (ref 70–99)
GLUCOSE BLD-MCNC: 279 MG/DL (ref 70–99)
GLUCOSE BLD-MCNC: 297 MG/DL (ref 70–99)
GLUCOSE BLD-MCNC: 33 MG/DL (ref 70–99)
GLUCOSE BLD-MCNC: 39 MG/DL
GLUCOSE BLD-MCNC: 39 MG/DL (ref 70–99)
GLUCOSE BLD-MCNC: 45 MG/DL (ref 70–99)
GLUCOSE BLD-MCNC: 50 MG/DL (ref 70–99)
GLUCOSE BLD-MCNC: 50 MG/DL (ref 70–99)
GLUCOSE BLD-MCNC: 58 MG/DL (ref 70–99)
GLUCOSE BLD-MCNC: 62 MG/DL (ref 70–99)
GLUCOSE BLD-MCNC: 73 MG/DL (ref 70–99)
GLUCOSE BLD-MCNC: 76 MG/DL (ref 70–99)
GLUCOSE BLD-MCNC: 84 MG/DL (ref 70–99)
GLUCOSE BLD-MCNC: 86 MG/DL (ref 70–99)
GLUCOSE BLD-MCNC: 96 MG/DL (ref 70–99)
GLUCOSE, URINE: NEGATIVE MG/DL
GLUCOSE, URINE: NEGATIVE MG/DL
GRANULAR CASTS: 1 /LPF
HBA1C MFR BLD: 6.9 % (ref 4.2–6.3)
HBA1C MFR BLD: 7.4 % (ref 4.2–6.3)
HCT VFR BLD CALC: 30.4 % (ref 37–47)
HCT VFR BLD CALC: 31.6 % (ref 37–47)
HCT VFR BLD CALC: 31.7 % (ref 37–47)
HCT VFR BLD CALC: 32.3 % (ref 37–47)
HCT VFR BLD CALC: 32.9 % (ref 37–47)
HCT VFR BLD CALC: 42.1 % (ref 37–47)
HEMOGLOBIN: 10 GM/DL (ref 12.5–16)
HEMOGLOBIN: 10.1 GM/DL (ref 12.5–16)
HEMOGLOBIN: 10.2 GM/DL (ref 12.5–16)
HEMOGLOBIN: 10.3 GM/DL (ref 12.5–16)
HEMOGLOBIN: 12.4 GM/DL (ref 12.5–16)
HEMOGLOBIN: 9.8 GM/DL (ref 12.5–16)
IMMATURE NEUTROPHIL %: 0.2 % (ref 0–0.43)
IMMATURE NEUTROPHIL %: 0.4 % (ref 0–0.43)
IMMATURE NEUTROPHIL %: 0.4 % (ref 0–0.43)
IMMATURE NEUTROPHIL %: 0.5 % (ref 0–0.43)
IMMATURE NEUTROPHIL %: 0.8 % (ref 0–0.43)
IMMATURE NEUTROPHIL %: 0.9 % (ref 0–0.43)
INR BLD: 0.89 INDEX
INR BLD: 1.2 INDEX
KETONES, URINE: NEGATIVE MG/DL
KETONES, URINE: NEGATIVE MG/DL
LACTATE: 1.1 MMOL/L (ref 0.4–2)
LACTATE: 1.2 MMOL/L (ref 0.4–2)
LACTATE: 4.7 MMOL/L (ref 0.4–2)
LEUKOCYTE ESTERASE, URINE: ABNORMAL
LEUKOCYTE ESTERASE, URINE: NEGATIVE
LIPASE: 10 IU/L (ref 13–60)
LYMPHOCYTES ABSOLUTE: 1.4 K/CU MM
LYMPHOCYTES ABSOLUTE: 1.5 K/CU MM
LYMPHOCYTES ABSOLUTE: 1.8 K/CU MM
LYMPHOCYTES ABSOLUTE: 1.9 K/CU MM
LYMPHOCYTES ABSOLUTE: 2.1 K/CU MM
LYMPHOCYTES ABSOLUTE: 2.2 K/CU MM
LYMPHOCYTES RELATIVE PERCENT: 13.4 % (ref 24–44)
LYMPHOCYTES RELATIVE PERCENT: 14.5 % (ref 24–44)
LYMPHOCYTES RELATIVE PERCENT: 19 % (ref 24–44)
LYMPHOCYTES RELATIVE PERCENT: 24.5 % (ref 24–44)
LYMPHOCYTES RELATIVE PERCENT: 9 % (ref 24–44)
LYMPHOCYTES RELATIVE PERCENT: 9.2 % (ref 24–44)
MAGNESIUM: 1.6 MG/DL (ref 1.8–2.4)
MAGNESIUM: 2.1 MG/DL (ref 1.8–2.4)
MCH RBC QN AUTO: 30.7 PG (ref 27–31)
MCH RBC QN AUTO: 31 PG (ref 27–31)
MCH RBC QN AUTO: 31.1 PG (ref 27–31)
MCH RBC QN AUTO: 31.2 PG (ref 27–31)
MCH RBC QN AUTO: 31.2 PG (ref 27–31)
MCH RBC QN AUTO: 31.4 PG (ref 27–31)
MCHC RBC AUTO-ENTMCNC: 29.5 % (ref 32–36)
MCHC RBC AUTO-ENTMCNC: 31.3 % (ref 32–36)
MCHC RBC AUTO-ENTMCNC: 31.5 % (ref 32–36)
MCHC RBC AUTO-ENTMCNC: 31.6 % (ref 32–36)
MCHC RBC AUTO-ENTMCNC: 32 % (ref 32–36)
MCHC RBC AUTO-ENTMCNC: 32.2 % (ref 32–36)
MCV RBC AUTO: 105.3 FL (ref 78–100)
MCV RBC AUTO: 96.8 FL (ref 78–100)
MCV RBC AUTO: 97.9 FL (ref 78–100)
MCV RBC AUTO: 98.1 FL (ref 78–100)
MCV RBC AUTO: 98.5 FL (ref 78–100)
MCV RBC AUTO: 98.8 FL (ref 78–100)
MONOCYTES ABSOLUTE: 0.6 K/CU MM
MONOCYTES ABSOLUTE: 0.6 K/CU MM
MONOCYTES ABSOLUTE: 0.7 K/CU MM
MONOCYTES ABSOLUTE: 1 K/CU MM
MONOCYTES RELATIVE PERCENT: 5.3 % (ref 0–4)
MONOCYTES RELATIVE PERCENT: 6.1 % (ref 0–4)
MONOCYTES RELATIVE PERCENT: 6.2 % (ref 0–4)
MONOCYTES RELATIVE PERCENT: 6.2 % (ref 0–4)
MONOCYTES RELATIVE PERCENT: 6.6 % (ref 0–4)
MONOCYTES RELATIVE PERCENT: 6.7 % (ref 0–4)
MUCUS: ABNORMAL HPF
MUCUS: ABNORMAL HPF
NITRITE URINE, QUANTITATIVE: NEGATIVE
NITRITE URINE, QUANTITATIVE: NEGATIVE
NUCLEATED RBC %: 0 %
PDW BLD-RTO: 12.7 % (ref 11.7–14.9)
PDW BLD-RTO: 13 % (ref 11.7–14.9)
PDW BLD-RTO: 13 % (ref 11.7–14.9)
PDW BLD-RTO: 14.8 % (ref 11.7–14.9)
PDW BLD-RTO: 15.3 % (ref 11.7–14.9)
PDW BLD-RTO: 15.4 % (ref 11.7–14.9)
PH, URINE: 5 (ref 5–8)
PH, URINE: 5 (ref 5–8)
PLATELET # BLD: 269 K/CU MM (ref 140–440)
PLATELET # BLD: 309 K/CU MM (ref 140–440)
PLATELET # BLD: 330 K/CU MM (ref 140–440)
PLATELET # BLD: 356 K/CU MM (ref 140–440)
PLATELET # BLD: 358 K/CU MM (ref 140–440)
PLATELET # BLD: 516 K/CU MM (ref 140–440)
PMV BLD AUTO: 9 FL (ref 7.5–11.1)
PMV BLD AUTO: 9.2 FL (ref 7.5–11.1)
PMV BLD AUTO: 9.4 FL (ref 7.5–11.1)
PMV BLD AUTO: 9.4 FL (ref 7.5–11.1)
PMV BLD AUTO: 9.7 FL (ref 7.5–11.1)
PMV BLD AUTO: 9.8 FL (ref 7.5–11.1)
POTASSIUM SERPL-SCNC: 3.9 MMOL/L (ref 3.5–5.1)
POTASSIUM SERPL-SCNC: 4 MMOL/L (ref 3.5–5.1)
POTASSIUM SERPL-SCNC: 4 MMOL/L (ref 3.5–5.1)
POTASSIUM SERPL-SCNC: 4.1 MMOL/L (ref 3.5–5.1)
POTASSIUM SERPL-SCNC: 4.2 MMOL/L (ref 3.5–5.1)
POTASSIUM SERPL-SCNC: 4.7 MMOL/L (ref 3.5–5.1)
POTASSIUM SERPL-SCNC: 5.4 MMOL/L (ref 3.5–5.1)
PRO-BNP: 163.8 PG/ML
PRO-BNP: 2655 PG/ML
PROCALCITONIN: 0.08
PROTEIN UA: 100 MG/DL
PROTEIN UA: 100 MG/DL
PROTHROMBIN TIME: 10.7 SECONDS (ref 11.7–14.5)
PROTHROMBIN TIME: 14.5 SECONDS (ref 11.7–14.5)
RBC # BLD: 3.14 M/CU MM (ref 4.2–5.4)
RBC # BLD: 3.21 M/CU MM (ref 4.2–5.4)
RBC # BLD: 3.22 M/CU MM (ref 4.2–5.4)
RBC # BLD: 3.28 M/CU MM (ref 4.2–5.4)
RBC # BLD: 3.36 M/CU MM (ref 4.2–5.4)
RBC # BLD: 4 M/CU MM (ref 4.2–5.4)
RBC URINE: 2 /HPF (ref 0–6)
RBC URINE: 2 /HPF (ref 0–6)
SARS-COV-2, NAAT: NOT DETECTED
SEGMENTED NEUTROPHILS ABSOLUTE COUNT: 12.1 K/CU MM
SEGMENTED NEUTROPHILS ABSOLUTE COUNT: 13 K/CU MM
SEGMENTED NEUTROPHILS ABSOLUTE COUNT: 13.7 K/CU MM
SEGMENTED NEUTROPHILS ABSOLUTE COUNT: 5.6 K/CU MM
SEGMENTED NEUTROPHILS ABSOLUTE COUNT: 6.4 K/CU MM
SEGMENTED NEUTROPHILS ABSOLUTE COUNT: 9.6 K/CU MM
SEGMENTED NEUTROPHILS RELATIVE PERCENT: 65.2 % (ref 36–66)
SEGMENTED NEUTROPHILS RELATIVE PERCENT: 66 % (ref 36–66)
SEGMENTED NEUTROPHILS RELATIVE PERCENT: 77.9 % (ref 36–66)
SEGMENTED NEUTROPHILS RELATIVE PERCENT: 78.5 % (ref 36–66)
SEGMENTED NEUTROPHILS RELATIVE PERCENT: 82 % (ref 36–66)
SEGMENTED NEUTROPHILS RELATIVE PERCENT: 83.5 % (ref 36–66)
SODIUM BLD-SCNC: 130 MMOL/L (ref 135–145)
SODIUM BLD-SCNC: 134 MMOL/L (ref 135–145)
SODIUM BLD-SCNC: 134 MMOL/L (ref 135–145)
SODIUM BLD-SCNC: 137 MMOL/L (ref 135–145)
SODIUM BLD-SCNC: 138 MMOL/L (ref 135–145)
SODIUM BLD-SCNC: 142 MMOL/L (ref 135–145)
SODIUM BLD-SCNC: 143 MMOL/L (ref 135–145)
SOURCE: NORMAL
SPECIFIC GRAVITY UA: 1.02 (ref 1–1.03)
SPECIFIC GRAVITY UA: 1.05 (ref 1–1.03)
SQUAMOUS EPITHELIAL: 1 /HPF
TOTAL CK: 25 IU/L (ref 26–140)
TOTAL IMMATURE NEUTOROPHIL: 0.02 K/CU MM
TOTAL IMMATURE NEUTOROPHIL: 0.04 K/CU MM
TOTAL IMMATURE NEUTOROPHIL: 0.05 K/CU MM
TOTAL IMMATURE NEUTOROPHIL: 0.08 K/CU MM
TOTAL IMMATURE NEUTOROPHIL: 0.12 K/CU MM
TOTAL IMMATURE NEUTOROPHIL: 0.14 K/CU MM
TOTAL NUCLEATED RBC: 0 K/CU MM
TOTAL PROTEIN: 5.5 GM/DL (ref 6.4–8.2)
TOTAL PROTEIN: 5.6 GM/DL (ref 6.4–8.2)
TOTAL PROTEIN: 5.8 GM/DL (ref 6.4–8.2)
TOTAL PROTEIN: 7.8 GM/DL (ref 6.4–8.2)
TRANSITIONAL EPITHELIAL: <1 /HPF
TRICHOMONAS: ABNORMAL /HPF
TRICHOMONAS: ABNORMAL /HPF
TROPONIN T: <0.01 NG/ML
TROPONIN T: <0.01 NG/ML
TSH HIGH SENSITIVITY: 1.31 UIU/ML (ref 0.27–4.2)
UROBILINOGEN, URINE: NORMAL MG/DL (ref 0.2–1)
UROBILINOGEN, URINE: NORMAL MG/DL (ref 0.2–1)
VITAMIN D 25-HYDROXY: 7.69 NG/ML
WBC # BLD: 12.2 K/CU MM (ref 4–10.5)
WBC # BLD: 14.7 K/CU MM (ref 4–10.5)
WBC # BLD: 16.4 K/CU MM (ref 4–10.5)
WBC # BLD: 16.7 K/CU MM (ref 4–10.5)
WBC # BLD: 8.5 K/CU MM (ref 4–10.5)
WBC # BLD: 9.7 K/CU MM (ref 4–10.5)
WBC UA: 4 /HPF (ref 0–5)
WBC UA: ABNORMAL /HPF (ref 0–5)

## 2020-01-01 PROCEDURE — 80053 COMPREHEN METABOLIC PANEL: CPT

## 2020-01-01 PROCEDURE — 82306 VITAMIN D 25 HYDROXY: CPT

## 2020-01-01 PROCEDURE — 6370000000 HC RX 637 (ALT 250 FOR IP): Performed by: PHYSICIAN ASSISTANT

## 2020-01-01 PROCEDURE — 2580000003 HC RX 258: Performed by: GENERAL PRACTICE

## 2020-01-01 PROCEDURE — 73501 X-RAY EXAM HIP UNI 1 VIEW: CPT

## 2020-01-01 PROCEDURE — 2580000003 HC RX 258: Performed by: EMERGENCY MEDICINE

## 2020-01-01 PROCEDURE — 99285 EMERGENCY DEPT VISIT HI MDM: CPT

## 2020-01-01 PROCEDURE — 93312 ECHO TRANSESOPHAGEAL: CPT

## 2020-01-01 PROCEDURE — 2580000003 HC RX 258: Performed by: PHYSICIAN ASSISTANT

## 2020-01-01 PROCEDURE — 84484 ASSAY OF TROPONIN QUANT: CPT

## 2020-01-01 PROCEDURE — 82550 ASSAY OF CK (CPK): CPT

## 2020-01-01 PROCEDURE — 84443 ASSAY THYROID STIM HORMONE: CPT

## 2020-01-01 PROCEDURE — 93312 ECHO TRANSESOPHAGEAL: CPT | Performed by: INTERNAL MEDICINE

## 2020-01-01 PROCEDURE — 2580000003 HC RX 258: Performed by: INTERNAL MEDICINE

## 2020-01-01 PROCEDURE — 93010 ELECTROCARDIOGRAM REPORT: CPT | Performed by: INTERNAL MEDICINE

## 2020-01-01 PROCEDURE — 82962 GLUCOSE BLOOD TEST: CPT

## 2020-01-01 PROCEDURE — 99232 SBSQ HOSP IP/OBS MODERATE 35: CPT | Performed by: INTERNAL MEDICINE

## 2020-01-01 PROCEDURE — 96375 TX/PRO/DX INJ NEW DRUG ADDON: CPT

## 2020-01-01 PROCEDURE — 83036 HEMOGLOBIN GLYCOSYLATED A1C: CPT

## 2020-01-01 PROCEDURE — 93005 ELECTROCARDIOGRAM TRACING: CPT | Performed by: EMERGENCY MEDICINE

## 2020-01-01 PROCEDURE — U0002 COVID-19 LAB TEST NON-CDC: HCPCS

## 2020-01-01 PROCEDURE — 84145 PROCALCITONIN (PCT): CPT

## 2020-01-01 PROCEDURE — 36415 COLL VENOUS BLD VENIPUNCTURE: CPT

## 2020-01-01 PROCEDURE — 81001 URINALYSIS AUTO W/SCOPE: CPT

## 2020-01-01 PROCEDURE — 70450 CT HEAD/BRAIN W/O DYE: CPT

## 2020-01-01 PROCEDURE — 2500000003 HC RX 250 WO HCPCS: Performed by: EMERGENCY MEDICINE

## 2020-01-01 PROCEDURE — 96374 THER/PROPH/DIAG INJ IV PUSH: CPT

## 2020-01-01 PROCEDURE — 73030 X-RAY EXAM OF SHOULDER: CPT

## 2020-01-01 PROCEDURE — 85025 COMPLETE CBC W/AUTO DIFF WBC: CPT

## 2020-01-01 PROCEDURE — 1200000000 HC SEMI PRIVATE

## 2020-01-01 PROCEDURE — 73564 X-RAY EXAM KNEE 4 OR MORE: CPT

## 2020-01-01 PROCEDURE — 6360000002 HC RX W HCPCS: Performed by: INTERNAL MEDICINE

## 2020-01-01 PROCEDURE — 99254 IP/OBS CNSLTJ NEW/EST MOD 60: CPT | Performed by: INTERNAL MEDICINE

## 2020-01-01 PROCEDURE — 6370000000 HC RX 637 (ALT 250 FOR IP): Performed by: GENERAL PRACTICE

## 2020-01-01 PROCEDURE — 83735 ASSAY OF MAGNESIUM: CPT

## 2020-01-01 PROCEDURE — 85610 PROTHROMBIN TIME: CPT

## 2020-01-01 PROCEDURE — 93325 DOPPLER ECHO COLOR FLOW MAPG: CPT | Performed by: INTERNAL MEDICINE

## 2020-01-01 PROCEDURE — 73060 X-RAY EXAM OF HUMERUS: CPT

## 2020-01-01 PROCEDURE — 96372 THER/PROPH/DIAG INJ SC/IM: CPT

## 2020-01-01 PROCEDURE — 93005 ELECTROCARDIOGRAM TRACING: CPT | Performed by: PHYSICIAN ASSISTANT

## 2020-01-01 PROCEDURE — 6360000002 HC RX W HCPCS: Performed by: PHYSICIAN ASSISTANT

## 2020-01-01 PROCEDURE — 93320 DOPPLER ECHO COMPLETE: CPT | Performed by: INTERNAL MEDICINE

## 2020-01-01 PROCEDURE — 6360000004 HC RX CONTRAST MEDICATION: Performed by: EMERGENCY MEDICINE

## 2020-01-01 PROCEDURE — APPNB60 APP NON BILLABLE TIME 46-60 MINS: Performed by: NURSE PRACTITIONER

## 2020-01-01 PROCEDURE — 73090 X-RAY EXAM OF FOREARM: CPT

## 2020-01-01 PROCEDURE — 71045 X-RAY EXAM CHEST 1 VIEW: CPT

## 2020-01-01 PROCEDURE — 6370000000 HC RX 637 (ALT 250 FOR IP): Performed by: EMERGENCY MEDICINE

## 2020-01-01 PROCEDURE — APPSS60 APP SPLIT SHARED TIME 46-60 MINUTES: Performed by: NURSE PRACTITIONER

## 2020-01-01 PROCEDURE — 85730 THROMBOPLASTIN TIME PARTIAL: CPT

## 2020-01-01 PROCEDURE — 94761 N-INVAS EAR/PLS OXIMETRY MLT: CPT

## 2020-01-01 PROCEDURE — 83605 ASSAY OF LACTIC ACID: CPT

## 2020-01-01 PROCEDURE — 80048 BASIC METABOLIC PNL TOTAL CA: CPT

## 2020-01-01 PROCEDURE — 72125 CT NECK SPINE W/O DYE: CPT

## 2020-01-01 PROCEDURE — 7100000001 HC PACU RECOVERY - ADDTL 15 MIN

## 2020-01-01 PROCEDURE — B24BZZ4 ULTRASONOGRAPHY OF HEART WITH AORTA, TRANSESOPHAGEAL: ICD-10-PCS | Performed by: INTERNAL MEDICINE

## 2020-01-01 PROCEDURE — 6360000002 HC RX W HCPCS: Performed by: EMERGENCY MEDICINE

## 2020-01-01 PROCEDURE — 83880 ASSAY OF NATRIURETIC PEPTIDE: CPT

## 2020-01-01 PROCEDURE — 96361 HYDRATE IV INFUSION ADD-ON: CPT

## 2020-01-01 PROCEDURE — 70498 CT ANGIOGRAPHY NECK: CPT

## 2020-01-01 PROCEDURE — 73080 X-RAY EXAM OF ELBOW: CPT

## 2020-01-01 PROCEDURE — 96365 THER/PROPH/DIAG IV INF INIT: CPT

## 2020-01-01 PROCEDURE — 29125 APPL SHORT ARM SPLINT STATIC: CPT

## 2020-01-01 PROCEDURE — 83690 ASSAY OF LIPASE: CPT

## 2020-01-01 PROCEDURE — APPSS15 APP SPLIT SHARED TIME 0-15 MINUTES: Performed by: NURSE PRACTITIONER

## 2020-01-01 PROCEDURE — 7100000000 HC PACU RECOVERY - FIRST 15 MIN

## 2020-01-01 RX ORDER — IPRATROPIUM BROMIDE AND ALBUTEROL SULFATE 2.5; .5 MG/3ML; MG/3ML
1 SOLUTION RESPIRATORY (INHALATION) EVERY 4 HOURS PRN
Status: DISCONTINUED | OUTPATIENT
Start: 2020-01-01 | End: 2020-01-01 | Stop reason: HOSPADM

## 2020-01-01 RX ORDER — DEXTROSE MONOHYDRATE 25 G/50ML
12.5 INJECTION, SOLUTION INTRAVENOUS PRN
Status: DISCONTINUED | OUTPATIENT
Start: 2020-01-01 | End: 2020-01-01 | Stop reason: HOSPADM

## 2020-01-01 RX ORDER — ONDANSETRON 2 MG/ML
4 INJECTION INTRAMUSCULAR; INTRAVENOUS ONCE
Status: COMPLETED | OUTPATIENT
Start: 2020-01-01 | End: 2020-01-01

## 2020-01-01 RX ORDER — DOXYCYCLINE HYCLATE 100 MG
100 TABLET ORAL 2 TIMES DAILY
Qty: 28 TABLET | Refills: 0 | Status: SHIPPED | OUTPATIENT
Start: 2020-01-01 | End: 2020-01-01

## 2020-01-01 RX ORDER — TRAMADOL HYDROCHLORIDE 50 MG/1
50 TABLET ORAL EVERY 6 HOURS PRN
Qty: 12 TABLET | Refills: 0 | Status: SHIPPED | OUTPATIENT
Start: 2020-01-01 | End: 2021-01-01

## 2020-01-01 RX ORDER — SODIUM CHLORIDE 0.9 % (FLUSH) 0.9 %
10 SYRINGE (ML) INJECTION EVERY 12 HOURS SCHEDULED
Status: DISCONTINUED | OUTPATIENT
Start: 2020-01-01 | End: 2020-01-01 | Stop reason: HOSPADM

## 2020-01-01 RX ORDER — CEFDINIR 300 MG/1
300 CAPSULE ORAL 2 TIMES DAILY
Qty: 10 CAPSULE | Refills: 0 | Status: SHIPPED | OUTPATIENT
Start: 2020-01-01 | End: 2020-01-01

## 2020-01-01 RX ORDER — NICOTINE POLACRILEX 4 MG
15 LOZENGE BUCCAL PRN
Status: DISCONTINUED | OUTPATIENT
Start: 2020-01-01 | End: 2020-01-01 | Stop reason: HOSPADM

## 2020-01-01 RX ORDER — METOPROLOL SUCCINATE 50 MG/1
100 TABLET, EXTENDED RELEASE ORAL DAILY
Status: DISCONTINUED | OUTPATIENT
Start: 2020-01-01 | End: 2020-01-01 | Stop reason: HOSPADM

## 2020-01-01 RX ORDER — LISINOPRIL 10 MG/1
5 TABLET ORAL DAILY
Status: DISCONTINUED | OUTPATIENT
Start: 2020-01-01 | End: 2020-01-01 | Stop reason: HOSPADM

## 2020-01-01 RX ORDER — POLYETHYLENE GLYCOL 3350 17 G/17G
17 POWDER, FOR SOLUTION ORAL DAILY PRN
Status: DISCONTINUED | OUTPATIENT
Start: 2020-01-01 | End: 2020-01-01 | Stop reason: HOSPADM

## 2020-01-01 RX ORDER — DILTIAZEM HYDROCHLORIDE 5 MG/ML
10 INJECTION INTRAVENOUS ONCE
Status: COMPLETED | OUTPATIENT
Start: 2020-01-01 | End: 2020-01-01

## 2020-01-01 RX ORDER — DEXTROSE MONOHYDRATE 50 MG/ML
100 INJECTION, SOLUTION INTRAVENOUS PRN
Status: DISCONTINUED | OUTPATIENT
Start: 2020-01-01 | End: 2020-01-01 | Stop reason: HOSPADM

## 2020-01-01 RX ORDER — DEXTROSE MONOHYDRATE 25 G/50ML
25 INJECTION, SOLUTION INTRAVENOUS ONCE
Status: COMPLETED | OUTPATIENT
Start: 2020-01-01 | End: 2020-01-01

## 2020-01-01 RX ORDER — CLONIDINE HYDROCHLORIDE 0.1 MG/1
0.3 TABLET ORAL NIGHTLY
Status: DISCONTINUED | OUTPATIENT
Start: 2020-01-01 | End: 2020-01-01 | Stop reason: HOSPADM

## 2020-01-01 RX ORDER — LISINOPRIL 5 MG/1
5 TABLET ORAL DAILY
Status: DISCONTINUED | OUTPATIENT
Start: 2020-01-01 | End: 2020-01-01

## 2020-01-01 RX ORDER — AMIODARONE HYDROCHLORIDE 200 MG/1
200 TABLET ORAL 2 TIMES DAILY
Status: ON HOLD | COMMUNITY
End: 2020-01-01 | Stop reason: HOSPADM

## 2020-01-01 RX ORDER — HYDROCODONE BITARTRATE AND ACETAMINOPHEN 5; 325 MG/1; MG/1
1 TABLET ORAL ONCE
Status: COMPLETED | OUTPATIENT
Start: 2020-01-01 | End: 2020-01-01

## 2020-01-01 RX ORDER — ACETAMINOPHEN 500 MG
1000 TABLET ORAL ONCE
Status: COMPLETED | OUTPATIENT
Start: 2020-01-01 | End: 2020-01-01

## 2020-01-01 RX ORDER — ONDANSETRON 2 MG/ML
4 INJECTION INTRAMUSCULAR; INTRAVENOUS EVERY 30 MIN PRN
Status: DISCONTINUED | OUTPATIENT
Start: 2020-01-01 | End: 2020-01-01

## 2020-01-01 RX ORDER — MORPHINE SULFATE 4 MG/ML
4 INJECTION, SOLUTION INTRAMUSCULAR; INTRAVENOUS ONCE
Status: COMPLETED | OUTPATIENT
Start: 2020-01-01 | End: 2020-01-01

## 2020-01-01 RX ORDER — SODIUM CHLORIDE 0.9 % (FLUSH) 0.9 %
10 SYRINGE (ML) INJECTION PRN
Status: DISCONTINUED | OUTPATIENT
Start: 2020-01-01 | End: 2020-01-01 | Stop reason: HOSPADM

## 2020-01-01 RX ORDER — ACETAMINOPHEN 650 MG/1
650 SUPPOSITORY RECTAL EVERY 6 HOURS PRN
Status: DISCONTINUED | OUTPATIENT
Start: 2020-01-01 | End: 2020-01-01 | Stop reason: HOSPADM

## 2020-01-01 RX ORDER — MAGNESIUM SULFATE 1 G/100ML
1 INJECTION INTRAVENOUS ONCE
Status: COMPLETED | OUTPATIENT
Start: 2020-01-01 | End: 2020-01-01

## 2020-01-01 RX ORDER — FUROSEMIDE 40 MG/1
40 TABLET ORAL 2 TIMES DAILY
Status: DISCONTINUED | OUTPATIENT
Start: 2020-01-01 | End: 2020-01-01 | Stop reason: HOSPADM

## 2020-01-01 RX ORDER — DEXTROSE MONOHYDRATE 100 MG/ML
INJECTION, SOLUTION INTRAVENOUS CONTINUOUS
Status: DISCONTINUED | OUTPATIENT
Start: 2020-01-01 | End: 2020-01-01 | Stop reason: HOSPADM

## 2020-01-01 RX ORDER — KETOROLAC TROMETHAMINE 30 MG/ML
15 INJECTION, SOLUTION INTRAMUSCULAR; INTRAVENOUS ONCE
Status: COMPLETED | OUTPATIENT
Start: 2020-01-01 | End: 2020-01-01

## 2020-01-01 RX ORDER — AMIODARONE HYDROCHLORIDE 200 MG/1
200 TABLET ORAL 2 TIMES DAILY
Status: DISCONTINUED | OUTPATIENT
Start: 2020-01-01 | End: 2020-01-01 | Stop reason: HOSPADM

## 2020-01-01 RX ORDER — PANTOPRAZOLE SODIUM 40 MG/1
40 TABLET, DELAYED RELEASE ORAL DAILY
Status: DISCONTINUED | OUTPATIENT
Start: 2020-01-01 | End: 2020-01-01 | Stop reason: HOSPADM

## 2020-01-01 RX ORDER — ATORVASTATIN CALCIUM 40 MG/1
40 TABLET, FILM COATED ORAL NIGHTLY
Status: DISCONTINUED | OUTPATIENT
Start: 2020-01-01 | End: 2020-01-01 | Stop reason: HOSPADM

## 2020-01-01 RX ORDER — HYDROCODONE BITARTRATE AND ACETAMINOPHEN 5; 325 MG/1; MG/1
1 TABLET ORAL EVERY 4 HOURS PRN
Qty: 10 TABLET | Refills: 0 | Status: SHIPPED | OUTPATIENT
Start: 2020-01-01 | End: 2020-01-01

## 2020-01-01 RX ORDER — TRAMADOL HYDROCHLORIDE 50 MG/1
50 TABLET ORAL EVERY 6 HOURS PRN
Status: DISCONTINUED | OUTPATIENT
Start: 2020-01-01 | End: 2020-01-01 | Stop reason: HOSPADM

## 2020-01-01 RX ORDER — PROMETHAZINE HYDROCHLORIDE 12.5 MG/1
12.5 TABLET ORAL EVERY 6 HOURS PRN
Status: DISCONTINUED | OUTPATIENT
Start: 2020-01-01 | End: 2020-01-01 | Stop reason: HOSPADM

## 2020-01-01 RX ORDER — LEVOTHYROXINE SODIUM 0.05 MG/1
50 TABLET ORAL DAILY
Status: DISCONTINUED | OUTPATIENT
Start: 2020-01-01 | End: 2020-01-01 | Stop reason: HOSPADM

## 2020-01-01 RX ORDER — ONDANSETRON 2 MG/ML
4 INJECTION INTRAMUSCULAR; INTRAVENOUS EVERY 6 HOURS PRN
Status: DISCONTINUED | OUTPATIENT
Start: 2020-01-01 | End: 2020-01-01 | Stop reason: HOSPADM

## 2020-01-01 RX ORDER — AMIODARONE HYDROCHLORIDE 100 MG/1
100 TABLET ORAL 2 TIMES DAILY
Qty: 67 TABLET | Refills: 0 | Status: ON HOLD | OUTPATIENT
Start: 2020-01-01 | End: 2021-01-01 | Stop reason: HOSPADM

## 2020-01-01 RX ORDER — LISINOPRIL 10 MG/1
10 TABLET ORAL DAILY
Status: DISCONTINUED | OUTPATIENT
Start: 2020-01-01 | End: 2020-01-01 | Stop reason: HOSPADM

## 2020-01-01 RX ORDER — ONDANSETRON 2 MG/ML
4 INJECTION INTRAMUSCULAR; INTRAVENOUS EVERY 6 HOURS PRN
Status: DISCONTINUED | OUTPATIENT
Start: 2020-01-01 | End: 2020-01-01

## 2020-01-01 RX ORDER — ASPIRIN 81 MG/1
81 TABLET ORAL DAILY
Status: DISCONTINUED | OUTPATIENT
Start: 2020-01-01 | End: 2020-01-01 | Stop reason: HOSPADM

## 2020-01-01 RX ORDER — ACETAMINOPHEN 325 MG/1
650 TABLET ORAL EVERY 6 HOURS PRN
Status: DISCONTINUED | OUTPATIENT
Start: 2020-01-01 | End: 2020-01-01 | Stop reason: HOSPADM

## 2020-01-01 RX ORDER — CLONIDINE HYDROCHLORIDE 0.1 MG/1
0.1 TABLET ORAL 3 TIMES DAILY
Status: DISCONTINUED | OUTPATIENT
Start: 2020-01-01 | End: 2020-01-01 | Stop reason: HOSPADM

## 2020-01-01 RX ORDER — LORAZEPAM 2 MG/ML
1 INJECTION INTRAMUSCULAR ONCE
Status: COMPLETED | OUTPATIENT
Start: 2020-01-01 | End: 2020-01-01

## 2020-01-01 RX ORDER — 0.9 % SODIUM CHLORIDE 0.9 %
1000 INTRAVENOUS SOLUTION INTRAVENOUS ONCE
Status: COMPLETED | OUTPATIENT
Start: 2020-01-01 | End: 2020-01-01

## 2020-01-01 RX ORDER — INSULIN GLARGINE 100 [IU]/ML
70 INJECTION, SOLUTION SUBCUTANEOUS NIGHTLY
Status: DISCONTINUED | OUTPATIENT
Start: 2020-01-01 | End: 2020-01-01

## 2020-01-01 RX ORDER — PROMETHAZINE HYDROCHLORIDE 25 MG/1
12.5 TABLET ORAL EVERY 6 HOURS PRN
Status: DISCONTINUED | OUTPATIENT
Start: 2020-01-01 | End: 2020-01-01 | Stop reason: HOSPADM

## 2020-01-01 RX ORDER — METOPROLOL SUCCINATE 100 MG/1
100 TABLET, EXTENDED RELEASE ORAL DAILY
Status: DISCONTINUED | OUTPATIENT
Start: 2020-01-01 | End: 2020-01-01 | Stop reason: HOSPADM

## 2020-01-01 RX ORDER — HYDROCODONE BITARTRATE AND ACETAMINOPHEN 5; 325 MG/1; MG/1
1 TABLET ORAL EVERY 4 HOURS PRN
Status: DISCONTINUED | OUTPATIENT
Start: 2020-01-01 | End: 2020-01-01 | Stop reason: HOSPADM

## 2020-01-01 RX ADMIN — LISINOPRIL 5 MG: 5 TABLET ORAL at 09:24

## 2020-01-01 RX ADMIN — LEVOTHYROXINE SODIUM 50 MCG: 50 TABLET ORAL at 09:24

## 2020-01-01 RX ADMIN — TRAMADOL HYDROCHLORIDE 50 MG: 50 TABLET, FILM COATED ORAL at 09:12

## 2020-01-01 RX ADMIN — LISINOPRIL 5 MG: 10 TABLET ORAL at 07:58

## 2020-01-01 RX ADMIN — LEVOTHYROXINE SODIUM 50 MCG: 50 TABLET ORAL at 09:07

## 2020-01-01 RX ADMIN — ATORVASTATIN CALCIUM 40 MG: 40 TABLET, FILM COATED ORAL at 20:44

## 2020-01-01 RX ADMIN — INSULIN LISPRO 2 UNITS: 100 INJECTION, SOLUTION INTRAVENOUS; SUBCUTANEOUS at 17:28

## 2020-01-01 RX ADMIN — PANTOPRAZOLE SODIUM 40 MG: 40 TABLET, DELAYED RELEASE ORAL at 09:24

## 2020-01-01 RX ADMIN — HYDROCODONE BITARTRATE AND ACETAMINOPHEN 1 TABLET: 5; 325 TABLET ORAL at 04:00

## 2020-01-01 RX ADMIN — APIXABAN 5 MG: 5 TABLET, FILM COATED ORAL at 07:57

## 2020-01-01 RX ADMIN — MAGNESIUM SULFATE HEPTAHYDRATE 1 G: 1 INJECTION, SOLUTION INTRAVENOUS at 12:37

## 2020-01-01 RX ADMIN — HYDROCODONE BITARTRATE AND ACETAMINOPHEN 1 TABLET: 5; 325 TABLET ORAL at 21:42

## 2020-01-01 RX ADMIN — PANTOPRAZOLE SODIUM 40 MG: 40 TABLET, DELAYED RELEASE ORAL at 06:58

## 2020-01-01 RX ADMIN — POLYETHYLENE GLYCOL (3350) 17 G: 17 POWDER, FOR SOLUTION ORAL at 21:54

## 2020-01-01 RX ADMIN — APIXABAN 5 MG: 5 TABLET, FILM COATED ORAL at 09:12

## 2020-01-01 RX ADMIN — FUROSEMIDE 40 MG: 40 TABLET ORAL at 09:24

## 2020-01-01 RX ADMIN — LISINOPRIL 5 MG: 5 TABLET ORAL at 21:18

## 2020-01-01 RX ADMIN — SODIUM CHLORIDE 1000 ML: 9 INJECTION, SOLUTION INTRAVENOUS at 08:44

## 2020-01-01 RX ADMIN — ACETAMINOPHEN 650 MG: 325 TABLET ORAL at 07:57

## 2020-01-01 RX ADMIN — SODIUM CHLORIDE, PRESERVATIVE FREE 10 ML: 5 INJECTION INTRAVENOUS at 08:06

## 2020-01-01 RX ADMIN — APIXABAN 5 MG: 5 TABLET, FILM COATED ORAL at 09:24

## 2020-01-01 RX ADMIN — FUROSEMIDE 40 MG: 40 TABLET ORAL at 07:57

## 2020-01-01 RX ADMIN — METOPROLOL SUCCINATE 100 MG: 50 TABLET, EXTENDED RELEASE ORAL at 09:07

## 2020-01-01 RX ADMIN — ATORVASTATIN CALCIUM 40 MG: 40 TABLET, FILM COATED ORAL at 20:40

## 2020-01-01 RX ADMIN — MORPHINE SULFATE 4 MG: 4 INJECTION, SOLUTION INTRAMUSCULAR; INTRAVENOUS at 14:38

## 2020-01-01 RX ADMIN — TRAMADOL HYDROCHLORIDE 50 MG: 50 TABLET, FILM COATED ORAL at 17:17

## 2020-01-01 RX ADMIN — ONDANSETRON 4 MG: 2 INJECTION INTRAMUSCULAR; INTRAVENOUS at 09:28

## 2020-01-01 RX ADMIN — SODIUM CHLORIDE, PRESERVATIVE FREE 10 ML: 5 INJECTION INTRAVENOUS at 09:25

## 2020-01-01 RX ADMIN — METOPROLOL SUCCINATE 100 MG: 100 TABLET, EXTENDED RELEASE ORAL at 07:58

## 2020-01-01 RX ADMIN — APIXABAN 5 MG: 5 TABLET, FILM COATED ORAL at 09:07

## 2020-01-01 RX ADMIN — CEFTRIAXONE 1 G: 1 INJECTION, POWDER, FOR SOLUTION INTRAMUSCULAR; INTRAVENOUS at 12:40

## 2020-01-01 RX ADMIN — CEFTRIAXONE 1 G: 1 INJECTION, POWDER, FOR SOLUTION INTRAMUSCULAR; INTRAVENOUS at 14:19

## 2020-01-01 RX ADMIN — APIXABAN 5 MG: 5 TABLET, FILM COATED ORAL at 21:16

## 2020-01-01 RX ADMIN — INSULIN LISPRO 6 UNITS: 100 INJECTION, SOLUTION INTRAVENOUS; SUBCUTANEOUS at 12:40

## 2020-01-01 RX ADMIN — APIXABAN 5 MG: 5 TABLET, FILM COATED ORAL at 21:43

## 2020-01-01 RX ADMIN — HYDROCODONE BITARTRATE AND ACETAMINOPHEN 1 TABLET: 5; 325 TABLET ORAL at 12:08

## 2020-01-01 RX ADMIN — HYDROCODONE BITARTRATE AND ACETAMINOPHEN 1 TABLET: 5; 325 TABLET ORAL at 15:32

## 2020-01-01 RX ADMIN — AMIODARONE HYDROCHLORIDE 0.5 MG/MIN: 50 INJECTION, SOLUTION INTRAVENOUS at 23:21

## 2020-01-01 RX ADMIN — ASPIRIN 81 MG: 81 TABLET, COATED ORAL at 09:07

## 2020-01-01 RX ADMIN — ACETAMINOPHEN 1000 MG: 500 TABLET ORAL at 13:40

## 2020-01-01 RX ADMIN — LISINOPRIL 5 MG: 5 TABLET ORAL at 09:08

## 2020-01-01 RX ADMIN — CLONIDINE HYDROCHLORIDE 0.3 MG: 0.1 TABLET ORAL at 20:39

## 2020-01-01 RX ADMIN — METOPROLOL SUCCINATE 100 MG: 50 TABLET, EXTENDED RELEASE ORAL at 21:19

## 2020-01-01 RX ADMIN — AMIODARONE HYDROCHLORIDE 0.5 MG/MIN: 50 INJECTION, SOLUTION INTRAVENOUS at 18:12

## 2020-01-01 RX ADMIN — CLONIDINE HYDROCHLORIDE 0.1 MG: 0.1 TABLET ORAL at 14:15

## 2020-01-01 RX ADMIN — VANCOMYCIN HYDROCHLORIDE 1500 MG: 5 INJECTION, POWDER, LYOPHILIZED, FOR SOLUTION INTRAVENOUS at 14:44

## 2020-01-01 RX ADMIN — HYDROCODONE BITARTRATE AND ACETAMINOPHEN 1 TABLET: 5; 325 TABLET ORAL at 21:16

## 2020-01-01 RX ADMIN — DILTIAZEM HYDROCHLORIDE 10 MG: 5 INJECTION INTRAVENOUS at 08:35

## 2020-01-01 RX ADMIN — INSULIN LISPRO 2 UNITS: 100 INJECTION, SOLUTION INTRAVENOUS; SUBCUTANEOUS at 14:12

## 2020-01-01 RX ADMIN — KETOROLAC TROMETHAMINE 15 MG: 30 INJECTION, SOLUTION INTRAMUSCULAR; INTRAVENOUS at 12:22

## 2020-01-01 RX ADMIN — PROMETHAZINE HYDROCHLORIDE 12.5 MG: 25 TABLET ORAL at 20:39

## 2020-01-01 RX ADMIN — ONDANSETRON 4 MG: 2 INJECTION INTRAMUSCULAR; INTRAVENOUS at 14:37

## 2020-01-01 RX ADMIN — HYDROCODONE BITARTRATE AND ACETAMINOPHEN 1 TABLET: 5; 325 TABLET ORAL at 15:25

## 2020-01-01 RX ADMIN — DEXTROSE MONOHYDRATE 12.5 G: 25 INJECTION, SOLUTION INTRAVENOUS at 05:38

## 2020-01-01 RX ADMIN — APIXABAN 5 MG: 5 TABLET, FILM COATED ORAL at 20:40

## 2020-01-01 RX ADMIN — DEXTROSE MONOHYDRATE 150 MG: 50 INJECTION, SOLUTION INTRAVENOUS at 12:20

## 2020-01-01 RX ADMIN — IOPAMIDOL 80 ML: 755 INJECTION, SOLUTION INTRAVENOUS at 10:56

## 2020-01-01 RX ADMIN — HYDROCODONE BITARTRATE AND ACETAMINOPHEN 1 TABLET: 5; 325 TABLET ORAL at 09:08

## 2020-01-01 RX ADMIN — LISINOPRIL 5 MG: 10 TABLET ORAL at 09:12

## 2020-01-01 RX ADMIN — ATORVASTATIN CALCIUM 40 MG: 40 TABLET, FILM COATED ORAL at 21:16

## 2020-01-01 RX ADMIN — PANTOPRAZOLE SODIUM 40 MG: 40 TABLET, DELAYED RELEASE ORAL at 05:38

## 2020-01-01 RX ADMIN — TRAMADOL HYDROCHLORIDE 50 MG: 50 TABLET, FILM COATED ORAL at 20:40

## 2020-01-01 RX ADMIN — PANTOPRAZOLE SODIUM 40 MG: 40 TABLET, DELAYED RELEASE ORAL at 09:07

## 2020-01-01 RX ADMIN — ASPIRIN 81 MG: 81 TABLET, COATED ORAL at 07:57

## 2020-01-01 RX ADMIN — DEXTROSE MONOHYDRATE: 100 INJECTION, SOLUTION INTRAVENOUS at 07:45

## 2020-01-01 RX ADMIN — ATORVASTATIN CALCIUM 40 MG: 40 TABLET, FILM COATED ORAL at 21:43

## 2020-01-01 RX ADMIN — FUROSEMIDE 40 MG: 40 TABLET ORAL at 09:13

## 2020-01-01 RX ADMIN — CLONIDINE HYDROCHLORIDE 0.1 MG: 0.1 TABLET ORAL at 09:07

## 2020-01-01 RX ADMIN — DEXTROSE MONOHYDRATE 5 MG/HR: 50 INJECTION, SOLUTION INTRAVENOUS at 08:44

## 2020-01-01 RX ADMIN — CLONIDINE HYDROCHLORIDE 0.3 MG: 0.1 TABLET ORAL at 20:43

## 2020-01-01 RX ADMIN — ASPIRIN 81 MG: 81 TABLET, COATED ORAL at 09:24

## 2020-01-01 RX ADMIN — CLONIDINE HYDROCHLORIDE 0.1 MG: 0.1 TABLET ORAL at 12:39

## 2020-01-01 RX ADMIN — ASPIRIN 81 MG: 81 TABLET, COATED ORAL at 09:12

## 2020-01-01 RX ADMIN — LEVOTHYROXINE SODIUM 50 MCG: 50 TABLET ORAL at 05:38

## 2020-01-01 RX ADMIN — INSULIN LISPRO 6 UNITS: 100 INJECTION, SOLUTION INTRAVENOUS; SUBCUTANEOUS at 17:17

## 2020-01-01 RX ADMIN — METOPROLOL SUCCINATE 100 MG: 50 TABLET, EXTENDED RELEASE ORAL at 09:23

## 2020-01-01 RX ADMIN — FUROSEMIDE 40 MG: 40 TABLET ORAL at 17:28

## 2020-01-01 RX ADMIN — FUROSEMIDE 40 MG: 40 TABLET ORAL at 17:17

## 2020-01-01 RX ADMIN — METOPROLOL SUCCINATE 100 MG: 100 TABLET, EXTENDED RELEASE ORAL at 09:12

## 2020-01-01 RX ADMIN — ASPIRIN 81 MG: 81 TABLET, COATED ORAL at 20:39

## 2020-01-01 RX ADMIN — AMIODARONE HYDROCHLORIDE 1 MG/MIN: 50 INJECTION, SOLUTION INTRAVENOUS at 12:35

## 2020-01-01 RX ADMIN — LORAZEPAM 1 MG: 2 INJECTION INTRAMUSCULAR; INTRAVENOUS at 13:40

## 2020-01-01 RX ADMIN — SODIUM CHLORIDE, PRESERVATIVE FREE 10 ML: 5 INJECTION INTRAVENOUS at 09:08

## 2020-01-01 RX ADMIN — HYDROCODONE BITARTRATE AND ACETAMINOPHEN 1 TABLET: 5; 325 TABLET ORAL at 03:56

## 2020-01-01 RX ADMIN — FUROSEMIDE 40 MG: 40 TABLET ORAL at 09:07

## 2020-01-01 RX ADMIN — INSULIN LISPRO 4 UNITS: 100 INJECTION, SOLUTION INTRAVENOUS; SUBCUTANEOUS at 09:10

## 2020-01-01 RX ADMIN — DEXTROSE MONOHYDRATE 25 G: 25 INJECTION, SOLUTION INTRAVENOUS at 17:32

## 2020-01-01 RX ADMIN — APIXABAN 5 MG: 5 TABLET, FILM COATED ORAL at 20:44

## 2020-01-01 RX ADMIN — LEVOTHYROXINE SODIUM 50 MCG: 50 TABLET ORAL at 06:58

## 2020-01-01 RX ADMIN — CLONIDINE HYDROCHLORIDE 0.1 MG: 0.1 TABLET ORAL at 21:43

## 2020-01-01 RX ADMIN — ACETAMINOPHEN 650 MG: 325 TABLET ORAL at 15:33

## 2020-01-01 ASSESSMENT — PAIN SCALES - GENERAL
PAINLEVEL_OUTOF10: 8
PAINLEVEL_OUTOF10: 7
PAINLEVEL_OUTOF10: 3
PAINLEVEL_OUTOF10: 5
PAINLEVEL_OUTOF10: 8
PAINLEVEL_OUTOF10: 9
PAINLEVEL_OUTOF10: 7
PAINLEVEL_OUTOF10: 7
PAINLEVEL_OUTOF10: 8
PAINLEVEL_OUTOF10: 5
PAINLEVEL_OUTOF10: 8

## 2020-01-01 ASSESSMENT — ENCOUNTER SYMPTOMS
EYES NEGATIVE: 1
GASTROINTESTINAL NEGATIVE: 1
RESPIRATORY NEGATIVE: 1
ALLERGIC/IMMUNOLOGIC NEGATIVE: 1

## 2020-01-01 ASSESSMENT — PAIN - FUNCTIONAL ASSESSMENT
PAIN_FUNCTIONAL_ASSESSMENT: PREVENTS OR INTERFERES SOME ACTIVE ACTIVITIES AND ADLS
PAIN_FUNCTIONAL_ASSESSMENT: PREVENTS OR INTERFERES SOME ACTIVE ACTIVITIES AND ADLS

## 2020-01-01 ASSESSMENT — PAIN DESCRIPTION - PROGRESSION
CLINICAL_PROGRESSION: GRADUALLY IMPROVING
CLINICAL_PROGRESSION: NOT CHANGED
CLINICAL_PROGRESSION: GRADUALLY IMPROVING

## 2020-01-01 ASSESSMENT — PAIN DESCRIPTION - FREQUENCY
FREQUENCY: INTERMITTENT
FREQUENCY: CONTINUOUS
FREQUENCY: INTERMITTENT

## 2020-01-01 ASSESSMENT — PAIN DESCRIPTION - LOCATION
LOCATION: SHOULDER
LOCATION: ARM
LOCATION: ARM;ELBOW

## 2020-01-01 ASSESSMENT — PAIN DESCRIPTION - ORIENTATION
ORIENTATION: RIGHT

## 2020-01-01 ASSESSMENT — PAIN DESCRIPTION - ONSET
ONSET: ON-GOING
ONSET: ON-GOING
ONSET: GRADUAL

## 2020-01-01 ASSESSMENT — PAIN DESCRIPTION - PAIN TYPE
TYPE: ACUTE PAIN

## 2020-01-01 ASSESSMENT — PAIN DESCRIPTION - DESCRIPTORS
DESCRIPTORS: ACHING
DESCRIPTORS: ACHING

## 2020-05-14 ENCOUNTER — HOSPITAL ENCOUNTER (OUTPATIENT)
Dept: GENERAL RADIOLOGY | Age: 65
Discharge: HOME | End: 2020-05-14
Payer: COMMERCIAL

## 2020-05-14 ENCOUNTER — HOSPITAL ENCOUNTER (OUTPATIENT)
Age: 65
Discharge: HOME | End: 2020-05-14
Payer: COMMERCIAL

## 2020-05-14 PROCEDURE — 71046 X-RAY EXAM CHEST 2 VIEWS: CPT

## 2020-05-29 ENCOUNTER — HOSPITAL ENCOUNTER (OUTPATIENT)
Dept: GENERAL RADIOLOGY | Age: 65
Discharge: HOME | End: 2020-05-29
Payer: COMMERCIAL

## 2020-05-29 ENCOUNTER — HOSPITAL ENCOUNTER (OUTPATIENT)
Age: 65
Discharge: HOME | End: 2020-05-29
Payer: COMMERCIAL

## 2020-05-29 PROCEDURE — 71046 X-RAY EXAM CHEST 2 VIEWS: CPT

## 2020-06-11 ENCOUNTER — HOSPITAL ENCOUNTER (INPATIENT)
Age: 65
LOS: 2 days | Discharge: HOME | DRG: 194 | End: 2020-06-13
Attending: GENERAL PRACTICE | Admitting: GENERAL PRACTICE
Payer: COMMERCIAL

## 2020-06-11 ENCOUNTER — APPOINTMENT (OUTPATIENT)
Dept: GENERAL RADIOLOGY | Age: 65
DRG: 194 | End: 2020-06-11
Payer: COMMERCIAL

## 2020-06-11 PROBLEM — I50.43 CHF (CONGESTIVE HEART FAILURE), NYHA CLASS I, ACUTE ON CHRONIC, COMBINED (HCC): Status: ACTIVE | Noted: 2020-06-11

## 2020-06-11 LAB
ALBUMIN SERPL-MCNC: 3.7 GM/DL (ref 3.4–5)
ALP BLD-CCNC: 184 IU/L (ref 40–128)
ALT SERPL-CCNC: 26 U/L (ref 10–40)
ANION GAP SERPL CALCULATED.3IONS-SCNC: 8 MMOL/L (ref 4–16)
AST SERPL-CCNC: 26 IU/L (ref 15–37)
BASE EXCESS MIXED: 7.5 (ref 0–2.3)
BASOPHILS ABSOLUTE: 0 K/CU MM
BASOPHILS RELATIVE PERCENT: 0.3 % (ref 0–1)
BILIRUB SERPL-MCNC: 0.6 MG/DL (ref 0–1)
BUN BLDV-MCNC: 13 MG/DL (ref 6–23)
CALCIUM SERPL-MCNC: 9.1 MG/DL (ref 8.3–10.6)
CHLORIDE BLD-SCNC: 103 MMOL/L (ref 99–110)
CO2: 28 MMOL/L (ref 21–32)
COMMENT: ABNORMAL
CREAT SERPL-MCNC: 1 MG/DL (ref 0.6–1.1)
DIFFERENTIAL TYPE: ABNORMAL
EOSINOPHILS ABSOLUTE: 0.2 K/CU MM
EOSINOPHILS RELATIVE PERCENT: 1.7 % (ref 0–3)
GFR AFRICAN AMERICAN: >60 ML/MIN/1.73M2
GFR NON-AFRICAN AMERICAN: 56 ML/MIN/1.73M2
GLUCOSE BLD-MCNC: 141 MG/DL (ref 70–99)
GLUCOSE BLD-MCNC: 152 MG/DL (ref 70–99)
GLUCOSE BLD-MCNC: 249 MG/DL (ref 70–99)
GLUCOSE BLD-MCNC: 46 MG/DL (ref 70–99)
GLUCOSE BLD-MCNC: 54 MG/DL (ref 70–99)
GLUCOSE BLD-MCNC: 57 MG/DL (ref 70–99)
GLUCOSE BLD-MCNC: 74 MG/DL (ref 70–99)
HCO3 VENOUS: 32.7 MMOL/L (ref 19–25)
HCT VFR BLD CALC: 35.9 % (ref 37–47)
HEMOGLOBIN: 11.8 GM/DL (ref 12.5–16)
IMMATURE NEUTROPHIL %: 0.5 % (ref 0–0.43)
LACTATE: 1 MMOL/L (ref 0.4–2)
LYMPHOCYTES ABSOLUTE: 1.9 K/CU MM
LYMPHOCYTES RELATIVE PERCENT: 13.2 % (ref 24–44)
MCH RBC QN AUTO: 31.5 PG (ref 27–31)
MCHC RBC AUTO-ENTMCNC: 32.9 % (ref 32–36)
MCV RBC AUTO: 95.7 FL (ref 78–100)
MONOCYTES ABSOLUTE: 0.9 K/CU MM
MONOCYTES RELATIVE PERCENT: 6.4 % (ref 0–4)
NUCLEATED RBC %: 0 %
O2 SAT, VEN: 92.1 % (ref 50–70)
PCO2, VEN: 47 MMHG (ref 38–52)
PDW BLD-RTO: 13.2 % (ref 11.7–14.9)
PH VENOUS: 7.45 (ref 7.32–7.42)
PLATELET # BLD: 291 K/CU MM (ref 140–440)
PMV BLD AUTO: 11.1 FL (ref 7.5–11.1)
PO2, VEN: 78 MMHG (ref 28–48)
POTASSIUM SERPL-SCNC: 3.2 MMOL/L (ref 3.5–5.1)
PRO-BNP: 4995 PG/ML
RBC # BLD: 3.75 M/CU MM (ref 4.2–5.4)
SEGMENTED NEUTROPHILS ABSOLUTE COUNT: 10.9 K/CU MM
SEGMENTED NEUTROPHILS RELATIVE PERCENT: 77.9 % (ref 36–66)
SODIUM BLD-SCNC: 139 MMOL/L (ref 135–145)
TOTAL IMMATURE NEUTOROPHIL: 0.07 K/CU MM
TOTAL NUCLEATED RBC: 0 K/CU MM
TOTAL PROTEIN: 6.8 GM/DL (ref 6.4–8.2)
TROPONIN T: <0.01 NG/ML
WBC # BLD: 14 K/CU MM (ref 4–10.5)

## 2020-06-11 PROCEDURE — 6360000002 HC RX W HCPCS: Performed by: PHYSICIAN ASSISTANT

## 2020-06-11 PROCEDURE — 94640 AIRWAY INHALATION TREATMENT: CPT

## 2020-06-11 PROCEDURE — 93005 ELECTROCARDIOGRAM TRACING: CPT | Performed by: PHYSICIAN ASSISTANT

## 2020-06-11 PROCEDURE — 71045 X-RAY EXAM CHEST 1 VIEW: CPT

## 2020-06-11 PROCEDURE — 82805 BLOOD GASES W/O2 SATURATION: CPT

## 2020-06-11 PROCEDURE — 6370000000 HC RX 637 (ALT 250 FOR IP): Performed by: PHYSICIAN ASSISTANT

## 2020-06-11 PROCEDURE — G0378 HOSPITAL OBSERVATION PER HR: HCPCS

## 2020-06-11 PROCEDURE — 87040 BLOOD CULTURE FOR BACTERIA: CPT

## 2020-06-11 PROCEDURE — 96375 TX/PRO/DX INJ NEW DRUG ADDON: CPT

## 2020-06-11 PROCEDURE — 6360000002 HC RX W HCPCS: Performed by: GENERAL PRACTICE

## 2020-06-11 PROCEDURE — 96365 THER/PROPH/DIAG IV INF INIT: CPT

## 2020-06-11 PROCEDURE — 83036 HEMOGLOBIN GLYCOSYLATED A1C: CPT

## 2020-06-11 PROCEDURE — 2580000003 HC RX 258: Performed by: PHYSICIAN ASSISTANT

## 2020-06-11 PROCEDURE — 6370000000 HC RX 637 (ALT 250 FOR IP): Performed by: GENERAL PRACTICE

## 2020-06-11 PROCEDURE — 99285 EMERGENCY DEPT VISIT HI MDM: CPT

## 2020-06-11 PROCEDURE — 82962 GLUCOSE BLOOD TEST: CPT

## 2020-06-11 PROCEDURE — 2580000003 HC RX 258: Performed by: GENERAL PRACTICE

## 2020-06-11 PROCEDURE — 80053 COMPREHEN METABOLIC PANEL: CPT

## 2020-06-11 PROCEDURE — 83605 ASSAY OF LACTIC ACID: CPT

## 2020-06-11 PROCEDURE — 84484 ASSAY OF TROPONIN QUANT: CPT

## 2020-06-11 PROCEDURE — 1200000000 HC SEMI PRIVATE

## 2020-06-11 PROCEDURE — 99254 IP/OBS CNSLTJ NEW/EST MOD 60: CPT | Performed by: INTERNAL MEDICINE

## 2020-06-11 PROCEDURE — 94761 N-INVAS EAR/PLS OXIMETRY MLT: CPT

## 2020-06-11 PROCEDURE — 83880 ASSAY OF NATRIURETIC PEPTIDE: CPT

## 2020-06-11 PROCEDURE — 96361 HYDRATE IV INFUSION ADD-ON: CPT

## 2020-06-11 PROCEDURE — 85025 COMPLETE CBC W/AUTO DIFF WBC: CPT

## 2020-06-11 RX ORDER — DICYCLOMINE HYDROCHLORIDE 10 MG/1
10 CAPSULE ORAL
Status: DISCONTINUED | OUTPATIENT
Start: 2020-06-11 | End: 2020-06-13 | Stop reason: HOSPADM

## 2020-06-11 RX ORDER — 0.9 % SODIUM CHLORIDE 0.9 %
500 INTRAVENOUS SOLUTION INTRAVENOUS ONCE
Status: COMPLETED | OUTPATIENT
Start: 2020-06-11 | End: 2020-06-11

## 2020-06-11 RX ORDER — ONDANSETRON 2 MG/ML
4 INJECTION INTRAMUSCULAR; INTRAVENOUS EVERY 6 HOURS PRN
Status: DISCONTINUED | OUTPATIENT
Start: 2020-06-11 | End: 2020-06-13 | Stop reason: HOSPADM

## 2020-06-11 RX ORDER — INSULIN GLARGINE 100 [IU]/ML
50 INJECTION, SOLUTION SUBCUTANEOUS NIGHTLY
Status: DISCONTINUED | OUTPATIENT
Start: 2020-06-11 | End: 2020-06-13 | Stop reason: HOSPADM

## 2020-06-11 RX ORDER — AMLODIPINE BESYLATE 5 MG/1
5 TABLET ORAL DAILY
Status: DISCONTINUED | OUTPATIENT
Start: 2020-06-11 | End: 2020-06-13 | Stop reason: HOSPADM

## 2020-06-11 RX ORDER — ACETAMINOPHEN 325 MG/1
650 TABLET ORAL EVERY 6 HOURS PRN
Status: DISCONTINUED | OUTPATIENT
Start: 2020-06-11 | End: 2020-06-13 | Stop reason: HOSPADM

## 2020-06-11 RX ORDER — MAGNESIUM SULFATE IN WATER 40 MG/ML
2 INJECTION, SOLUTION INTRAVENOUS ONCE
Status: COMPLETED | OUTPATIENT
Start: 2020-06-11 | End: 2020-06-11

## 2020-06-11 RX ORDER — ZOLPIDEM TARTRATE 5 MG/1
10 TABLET ORAL NIGHTLY PRN
Status: DISCONTINUED | OUTPATIENT
Start: 2020-06-11 | End: 2020-06-13 | Stop reason: HOSPADM

## 2020-06-11 RX ORDER — DEXTROSE MONOHYDRATE 25 G/50ML
50 INJECTION, SOLUTION INTRAVENOUS ONCE
Status: DISCONTINUED | OUTPATIENT
Start: 2020-06-11 | End: 2020-06-13 | Stop reason: HOSPADM

## 2020-06-11 RX ORDER — PANTOPRAZOLE SODIUM 40 MG/1
40 TABLET, DELAYED RELEASE ORAL DAILY
Status: DISCONTINUED | OUTPATIENT
Start: 2020-06-11 | End: 2020-06-13 | Stop reason: HOSPADM

## 2020-06-11 RX ORDER — ATORVASTATIN CALCIUM 40 MG/1
40 TABLET, FILM COATED ORAL NIGHTLY
Status: DISCONTINUED | OUTPATIENT
Start: 2020-06-11 | End: 2020-06-13 | Stop reason: HOSPADM

## 2020-06-11 RX ORDER — POTASSIUM CHLORIDE 20 MEQ/1
40 TABLET, EXTENDED RELEASE ORAL ONCE
Status: COMPLETED | OUTPATIENT
Start: 2020-06-11 | End: 2020-06-11

## 2020-06-11 RX ORDER — METOPROLOL SUCCINATE 25 MG/1
25 TABLET, EXTENDED RELEASE ORAL DAILY
Status: DISCONTINUED | OUTPATIENT
Start: 2020-06-11 | End: 2020-06-13 | Stop reason: HOSPADM

## 2020-06-11 RX ORDER — ESCITALOPRAM OXALATE 10 MG/1
20 TABLET ORAL DAILY
Status: DISCONTINUED | OUTPATIENT
Start: 2020-06-12 | End: 2020-06-13 | Stop reason: HOSPADM

## 2020-06-11 RX ORDER — METHYLPREDNISOLONE SODIUM SUCCINATE 125 MG/2ML
60 INJECTION, POWDER, LYOPHILIZED, FOR SOLUTION INTRAMUSCULAR; INTRAVENOUS ONCE
Status: COMPLETED | OUTPATIENT
Start: 2020-06-11 | End: 2020-06-11

## 2020-06-11 RX ORDER — TRAZODONE HYDROCHLORIDE 50 MG/1
100 TABLET ORAL NIGHTLY
Status: DISCONTINUED | OUTPATIENT
Start: 2020-06-11 | End: 2020-06-13 | Stop reason: HOSPADM

## 2020-06-11 RX ORDER — ASPIRIN 81 MG/1
81 TABLET ORAL DAILY
Status: DISCONTINUED | OUTPATIENT
Start: 2020-06-11 | End: 2020-06-13 | Stop reason: HOSPADM

## 2020-06-11 RX ORDER — NITROGLYCERIN 0.4 MG/1
0.4 TABLET SUBLINGUAL EVERY 5 MIN PRN
Status: DISCONTINUED | OUTPATIENT
Start: 2020-06-11 | End: 2020-06-13 | Stop reason: HOSPADM

## 2020-06-11 RX ORDER — LEVOTHYROXINE SODIUM 0.05 MG/1
50 TABLET ORAL DAILY
Status: DISCONTINUED | OUTPATIENT
Start: 2020-06-12 | End: 2020-06-13 | Stop reason: HOSPADM

## 2020-06-11 RX ORDER — SODIUM CHLORIDE 0.9 % (FLUSH) 0.9 %
10 SYRINGE (ML) INJECTION EVERY 12 HOURS SCHEDULED
Status: DISCONTINUED | OUTPATIENT
Start: 2020-06-11 | End: 2020-06-13 | Stop reason: HOSPADM

## 2020-06-11 RX ORDER — ACETAMINOPHEN 650 MG/1
650 SUPPOSITORY RECTAL EVERY 6 HOURS PRN
Status: DISCONTINUED | OUTPATIENT
Start: 2020-06-11 | End: 2020-06-13 | Stop reason: HOSPADM

## 2020-06-11 RX ORDER — DEXTROSE MONOHYDRATE 25 G/50ML
12.5 INJECTION, SOLUTION INTRAVENOUS PRN
Status: DISCONTINUED | OUTPATIENT
Start: 2020-06-11 | End: 2020-06-13 | Stop reason: HOSPADM

## 2020-06-11 RX ORDER — PROMETHAZINE HYDROCHLORIDE 25 MG/1
12.5 TABLET ORAL EVERY 6 HOURS PRN
Status: DISCONTINUED | OUTPATIENT
Start: 2020-06-11 | End: 2020-06-13 | Stop reason: HOSPADM

## 2020-06-11 RX ORDER — FUROSEMIDE 10 MG/ML
40 INJECTION INTRAMUSCULAR; INTRAVENOUS 2 TIMES DAILY
Status: DISCONTINUED | OUTPATIENT
Start: 2020-06-11 | End: 2020-06-13

## 2020-06-11 RX ORDER — CLONIDINE HYDROCHLORIDE 0.3 MG/1
0.3 TABLET ORAL NIGHTLY
Status: ON HOLD | COMMUNITY
End: 2021-01-01 | Stop reason: HOSPADM

## 2020-06-11 RX ORDER — DEXTROSE MONOHYDRATE 50 MG/ML
100 INJECTION, SOLUTION INTRAVENOUS PRN
Status: DISCONTINUED | OUTPATIENT
Start: 2020-06-11 | End: 2020-06-13 | Stop reason: HOSPADM

## 2020-06-11 RX ORDER — HYDROXYZINE PAMOATE 25 MG/1
50 CAPSULE ORAL DAILY
Status: DISCONTINUED | OUTPATIENT
Start: 2020-06-12 | End: 2020-06-13 | Stop reason: HOSPADM

## 2020-06-11 RX ORDER — NICOTINE POLACRILEX 4 MG
15 LOZENGE BUCCAL PRN
Status: DISCONTINUED | OUTPATIENT
Start: 2020-06-11 | End: 2020-06-13 | Stop reason: HOSPADM

## 2020-06-11 RX ORDER — ALBUTEROL SULFATE 90 UG/1
2 AEROSOL, METERED RESPIRATORY (INHALATION) ONCE
Status: COMPLETED | OUTPATIENT
Start: 2020-06-11 | End: 2020-06-11

## 2020-06-11 RX ORDER — SODIUM CHLORIDE 0.9 % (FLUSH) 0.9 %
10 SYRINGE (ML) INJECTION PRN
Status: DISCONTINUED | OUTPATIENT
Start: 2020-06-11 | End: 2020-06-13 | Stop reason: HOSPADM

## 2020-06-11 RX ORDER — POLYETHYLENE GLYCOL 3350 17 G/17G
17 POWDER, FOR SOLUTION ORAL DAILY PRN
Status: DISCONTINUED | OUTPATIENT
Start: 2020-06-11 | End: 2020-06-13 | Stop reason: HOSPADM

## 2020-06-11 RX ADMIN — TRAZODONE HYDROCHLORIDE 100 MG: 50 TABLET ORAL at 20:55

## 2020-06-11 RX ADMIN — AMLODIPINE BESYLATE 5 MG: 5 TABLET ORAL at 20:56

## 2020-06-11 RX ADMIN — SODIUM CHLORIDE, PRESERVATIVE FREE 10 ML: 5 INJECTION INTRAVENOUS at 21:01

## 2020-06-11 RX ADMIN — MAGNESIUM SULFATE IN WATER 2 G: 40 INJECTION, SOLUTION INTRAVENOUS at 15:20

## 2020-06-11 RX ADMIN — POTASSIUM CHLORIDE 40 MEQ: 1500 TABLET, EXTENDED RELEASE ORAL at 17:35

## 2020-06-11 RX ADMIN — ASPIRIN 81 MG: 81 TABLET, COATED ORAL at 20:55

## 2020-06-11 RX ADMIN — FUROSEMIDE 40 MG: 10 INJECTION, SOLUTION INTRAMUSCULAR; INTRAVENOUS at 20:55

## 2020-06-11 RX ADMIN — ALBUTEROL SULFATE 2 PUFF: 90 AEROSOL, METERED RESPIRATORY (INHALATION) at 15:43

## 2020-06-11 RX ADMIN — SODIUM CHLORIDE 500 ML: 9 INJECTION, SOLUTION INTRAVENOUS at 15:08

## 2020-06-11 RX ADMIN — DICYCLOMINE HYDROCHLORIDE 10 MG: 10 CAPSULE ORAL at 20:56

## 2020-06-11 RX ADMIN — CLONIDINE HYDROCHLORIDE 0.3 MG: 0.1 TABLET ORAL at 21:25

## 2020-06-11 RX ADMIN — APIXABAN 5 MG: 5 TABLET, FILM COATED ORAL at 20:56

## 2020-06-11 RX ADMIN — INSULIN GLARGINE 50 UNITS: 100 INJECTION, SOLUTION SUBCUTANEOUS at 20:56

## 2020-06-11 RX ADMIN — ATORVASTATIN CALCIUM 40 MG: 40 TABLET, FILM COATED ORAL at 20:55

## 2020-06-11 RX ADMIN — METHYLPREDNISOLONE SODIUM SUCCINATE 60 MG: 125 INJECTION, POWDER, FOR SOLUTION INTRAMUSCULAR; INTRAVENOUS at 15:36

## 2020-06-11 RX ADMIN — METOPROLOL SUCCINATE 25 MG: 25 TABLET, EXTENDED RELEASE ORAL at 20:55

## 2020-06-11 RX ADMIN — ZOLPIDEM TARTRATE 10 MG: 5 TABLET ORAL at 21:25

## 2020-06-11 ASSESSMENT — PAIN SCALES - GENERAL: PAINLEVEL_OUTOF10: 0

## 2020-06-11 NOTE — ED PROVIDER NOTES
EMERGENCY DEPARTMENT ENCOUNTER      PCP: Xin Osman PA-C    CHIEF COMPLAINT    Chief Complaint   Patient presents with    Respiratory Distress      For two weeks     Of note this patient was not evaluated by supervising physician. HPI    Nilda Choudhary is a 62 y.o. female who presents to the emergency department today with dyspnea on exertion. States is been intermittent but worsening over the last several weeks. Patient seen on the outpatient, had x-rays and was diagnosed with bronchitis, was placed on antibiotics, steroid, her symptoms have not improved. She states that her shortness of breath seems to worsen especially doing minimal exertion such as walking up the stairs. Patient has a history of A. fib, CHF. States that she was supposed to increase her diuretic on the outpatient but was unable. She is denying fevers, chills, sore throat, upper respiratory-like symptoms. No active cough. No recent exposures. Patient eating drinking, using the restroom adequately no change in bowel or bowel habits. REVIEW OF SYSTEMS    Constitutional:  Denies fever, chills, weight loss or weakness   HENT:  Denies sore throat or ear pain   Cardiovascular:  No chest pain. No palpitations, No syncope  Respiratory:  See HPI. GI:  Denies abdominal pain, nausea, vomiting, or diarrhea  :  Denies any urinary symptoms or vaginal symptoms.    Musculoskeletal:  Denies back pain,   Skin:  Denies rash  Neurologic:  Denies headache, focal weakness or sensory changes   Endocrine:  Denies polyuria or polydypsia   Lymphatic:  Denies swollen glands   All other review of systems are negative  See HPI and nursing notes for additional information     PAST MEDICAL & SURGICAL HISTORY    Past Medical History:   Diagnosis Date    Abnormal PFTs (pulmonary function tests) 4/14 4/14    [facility]     CAD (coronary artery disease)     CHF (congestive heart failure) ([facility]     Diabetes mellitus ([facility]     Dizziness - light-headed     Family history of coronary artery disease     H/O 24 hour EKG monitoring 2012,12/10/2010    2012 predominant rhythm is sinus with short runs of SVT no episode of atrial fib. pt has left bundle branch morphology, max heart is 132, min is 48 with an average of 74 ,infrequent PVCs are seen    H/O cardiovascular stress test 2013, 2012-    H/O cardiovascular stress test 14,2012 EF 52% No ischemia, normal study. 2012 EF 58% tehnique-[employer]et[employer] LVEF is normal globally    H/O echocardiogram 13 EF 50-55% mild MR, TR 3/13EF 35-45% decreasedr L ventrical systolic function.  H/O echocardiogram -EF50-55% Mild MR/TR. 10/11 EF > 55% LVS function is normal, calcified mitral apparatus, impaired LV relaxation, no PE    Heart imaging 2012 MUGA rest EF 58%LVEF is normal globally    Heartburn     History of cardiac cath 2009, 2006,moderate stenosis  small diagonal    History of cardiac monitoring 2/9/15    Event - sinus rhythm    History of cardioversion 3/14/14    History of MI (myocardial infarction)     History of PTCA 10/06/2010    10/06/2010 stent 3.0x24 taxus stent left main 90% stenosis mid seg reduced to 0% second area of [id number] to 40% not flow limiting EF 40%    Hx of cardiovascular stress test 2015    lexiscan-normal,EF70%    Hx of transesophageal echocardiography (LUKE) for monitoring 2017    Noted to have MARY GRACE Blood Clot.  Hyperlipidemia     Hypertension     Insomnia     SOB (shortness of breath)     Tachycardia 2012    report in [facility] consult    TIA (transient ischemic attack)      Past Surgical History:   Procedure Laterality Date    ABLATION OF DYSRHYTHMIC FOCUS       SECTION      CHOLECYSTECTOMY      DIAGNOSTIC CARDIAC CATH LAB PROCEDURE  2006, 2009 moderate stenosis small diagonal    OTHER SURGICAL HISTORY  09/05/2017    afib ablation LUKE    PTCA  10/06/2010    10/06/2010 stent 3.0x24 taxus stent 90% stenosis mid segment reduced to 0% second area of stenosis 30-40% not flow limiting EF 40%    TUBAL LIGATION         CURRENT MEDICATIONS    Current Outpatient Rx   Medication Sig Dispense Refill    BASAGLAR KWIKPEN 100 UNIT/ML injection pen Inject 80 Units into the skin nightly      ADMELOG SOLOSTAR 100 UNIT/ML pen Inject into the skin See Admin Instructions      atorvastatin (LIPITOR) 40 MG tablet Take 40 mg by mouth nightly      escitalopram (LEXAPRO) 20 MG tablet Take 20 mg by mouth daily      furosemide (LASIX) 20 MG tablet Take 20 mg by mouth daily      metFORMIN (GLUCOPHAGE) 500 MG tablet Take 500 mg by mouth 2 times daily      pantoprazole (PROTONIX) 40 MG tablet Take 40 mg by mouth daily      zolpidem (AMBIEN) 10 MG tablet Take 10 mg by mouth nightly as needed for Sleep.  dicyclomine (BENTYL) 10 MG capsule Take 1 capsule by mouth 4 times daily (before meals and nightly) 20 capsule 0    ondansetron (ZOFRAN ODT) 4 MG disintegrating tablet Take 1 tablet by mouth every 8 hours as needed for Nausea 15 tablet 0    amLODIPine (NORVASC) 5 MG tablet Take 1 tablet by mouth daily 30 tablet 3    hydrOXYzine (VISTARIL) 50 MG capsule Take 50 mg by mouth daily      levothyroxine (SYNTHROID) 50 MCG tablet Take 50 mcg by mouth Daily      metoprolol succinate (TOPROL XL) 50 MG extended release tablet Take one tablet in the morning and 1/2 tablet at night 45 tablet 3    apixaban (ELIQUIS) 5 MG TABS tablet Take 1 tablet by mouth 2 times daily 60 tablet 2    nitroGLYCERIN (NITROSTAT) 0.4 MG SL tablet Place 0.4 mg under the tongue every 5 minutes as needed for Chest pain up to max of 3 total doses. If no relief after 1 dose, call 911.  aspirin 81 MG EC tablet Take 81 mg by mouth daily      traZODone (DESYREL) 100 MG tablet Take 100 mg by mouth nightly. ALLERGIES    Allergies   Allergen Reactions    Amiodarone Other (See Comments)     dizziness    Iv Dye [Iodides] Nausea And Vomiting    Vicodin [Hydrocodone-Acetaminophen] Nausea And Vomiting       SOCIAL & FAMILY HISTORY    Social History     Socioeconomic History    Marital status:      Spouse name: Not on file    Number of children: Not on file    Years of education: Not on file    Highest education level: Not on file   Occupational History    Not on file   Social Needs    Financial resource strain: Not on file    Food insecurity     Worry: Not on file     Inability: Not on file    Transportation needs     Medical: Not on file     Non-medical: Not on file   Tobacco Use    Smoking status: Former Smoker     Packs/day: 3.00     Years: 20.00     Pack years: 60.00     Types: Cigarettes     Last attempt to quit: 2007     Years since quittin.3    Smokeless tobacco: Never Used    Tobacco comment: reviewed 8/12/15   Substance and Sexual Activity    Alcohol use: No     Comment: rylee amos    Drug use: No    Sexual activity: Not Currently   Lifestyle    Physical activity     Days per week: Not on file     Minutes per session: Not on file    Stress: Not on file   Relationships    Social connections     Talks on phone: Not on file     Gets together: Not on file     Attends Mandaeism service: Not on file     Active member of club or organization: Not on file     Attends meetings of clubs or organizations: Not on file     Relationship status: Not on file    Intimate partner violence     Fear of current or ex partner: Not on file     Emotionally abused: Not on file     Physically abused: Not on file     Forced sexual activity: Not on file   Other Topics Concern    Not on file   Social History Narrative    Not on file     Family History   Problem Relation Age of Onset    Diabetes Father     Heart Disease Father        PHYSICAL EXAM    VITAL SIGNS: BP (!) 164/101   Pulse 103 Result Value Ref Range    POC Glucose 152 (H) 70 - 99 MG/DL   EKG 12 Lead   Result Value Ref Range    Ventricular Rate 115 BPM    Atrial Rate 115 BPM    P-R Interval 208 ms    QRS Duration 138 ms    Q-T Interval 392 ms    QTc Calculation (Bazett) 542 ms    R Axis -10 degrees    T Axis 138 degrees    Diagnosis       Sinus tachycardia with premature supraventricular complexes  Nonspecific intraventricular block  T wave abnormality, consider lateral ischemia  Abnormal ECG  When compared with ECG of 12-SEP-2019 13:05,  premature ventricular complexes are no longer present  premature supraventricular complexes are now present         RADIOLOGY/PROCEDURES    Xr Chest Standard (2 Vw)    Result Date: 5/29/2020  EXAMINATION: TWO XRAY VIEWS OF THE CHEST 5/29/2020 11:18 am COMPARISON: 05/14/2020 and September 1, 2017 HISTORY: ORDERING SYSTEM PROVIDED HISTORY: Acute bronchitis, unspecified organism TECHNOLOGIST PROVIDED HISTORY: Reason for Exam: sob, bronchitis Acuity: Chronic Type of Exam: Ongoing Additional signs and symptoms: sob, bronchitis Relevant Medical/Surgical History: sob, bronchitis FINDINGS: Bilateral diffuse peribronchial thickening appears present as well as reticular opacity of both lungs. No focal consolidation. No pleural effusion. Normal lung volumes. No pneumothorax. Bilateral diffuse pulmonary disease favored to be due to bronchitis. Mild interstitial edema could give a similar appearance. No consolidation. Xr Chest Standard (2 Vw)    Result Date: 5/14/2020  EXAMINATION: TWO XRAY VIEWS OF THE CHEST 5/14/2020 2:33 pm COMPARISON: 09/12/2019 HISTORY: ORDERING SYSTEM PROVIDED HISTORY: Acute bronchitis, unspecified organism TECHNOLOGIST PROVIDED HISTORY: Reason for Exam: patient reoprts sob since sunday FINDINGS: Frontal and lateral views of the chest are submitted for review. The cardiac silhouette is normal in size. Atherosclerotic calcifications of the aortic arch.   Lung parenchyma is clear baseline fluids, breathing treatments, magnesium, steroid with minimal improvement. After getting some of her results we discontinued her fluids. We did do an ambulating pulse ox on her and she became hypoxic, tachycardic. At this point we will look to admit. I believe her symptoms are most likely secondary to heart failure and fluid retention, low suspicion for infectious cause. Will admit to primary care, Dr. Jazz Schmidt. She remained stable while in the ED. In consideration of current COVID19 pandemic, with effort to minimize unnecessary provider exposure, this patient was seen at bedside by me independently. However, in compliance with current hospital ALEXANDRA/ED protocol, prior to admission I did discuss this patient case with emergency department physician, Dr. Kylie Rosales  Of note, this Pt was NOT admitted to the ICU. - I discussed Pt case with Dr. Jazz Schmidt, who agrees to admit Pt. Vital signs and nursing notes reviewed during ED course. All pertinent Lab data and radiographic results reviewed with patient at bedside. The patient and/or the family were informed of the results of any tests/labs/imaging, the treatment plan, and time was allotted to answer questions. Clinical  IMPRESSION    1. Dyspnea and respiratory abnormalities    2. Congestive heart failure, unspecified HF chronicity, unspecified heart failure type ([facility]      Comment: Please note this report has been produced using speech recognition software and may contain errors related to that system including errors in grammar, punctuation, and spelling, as well as words and phrases that may be inappropriate. If there are any questions or concerns please feel free to contact the dictating provider for clarification.                         Estrada Harper [address], PA  06/11/20 8042

## 2020-06-11 NOTE — ED NOTES
Patient blood sugar up to 154 mg/dl after a sandwich and juice. Patient is alert and oriented.       Isaias Gonzalez RN  06/11/20 [id number]

## 2020-06-11 NOTE — ACP (ADVANCE CARE PLANNING)
Advance Care Planning     Advance Care Planning Activator (Inpatient)  Conversation Note      Date of ACP Conversation: 6/11/2020    Conversation Conducted with: Patient with Decision Making Capacity    ACP Activator: Sarah Monahan    *When Decision Maker makes decisions on behalf of the incapacitated patient: Decision Maker is asked to consider and make decisions based on patient values, known preferences, or best interests. Health Care Decision Maker:     Current Designated Health Care Decision Maker:   Primary Decision Maker: Andrea Yoon Spouse - [phone]  (If there is a valid Health Care Decision Maker named in the [facility] Makers\" box in the ACP activity, but it is not visible above, be sure to open that field and then select the health care decision maker relationship (ie \"primary\") in the blank space to the right of the name.) Validate  this information as still accurate & up-to-date; edit [address] field as needed.)    Note: Assess and validate information in current ACP documents, as indicated. If no Decision Maker listed above or available through scanned documents, then:    If no Authorized Decision Maker has previously been identified, then patient chooses [address]:  \"Who would you like to name as your primary health care decision-maker? \"               Name:  Dee Buckleyycraft  Relationship: spouse      Phone number: [phone]  Aryamartha La this person be reached easily? \" Yes      Note: If the relationship of these Decision-Makers to the patient does NOT follow your state's Next of Kin hierarchy, recommend that patient complete ACP document that meets state-specific requirements to allow them to act on the patient's behalf when appropriate. Care Preferences    Ventilation:   \"If you were in your present state of health and suddenly became very ill and were unable to breathe on your own, what would your preference be about the use conversation to continue planning  [x] Referred individual to Provider for additional questions/concerns   [x] Advised patient/agent/surrogate to review completed ACP document and update if needed with changes in condition, patient preferences or care setting    [x] This note routed to one or more involved healthcare providers    Patient reports she does not currently have any advanced directives in place. Patient requests DNR-CCA order. Patient provided with DNR documentation to complete with admitting physician. Patient also provided with POA and living will documentation. Patient states she would like to discuss with her son and daughter prior to completing.

## 2020-06-12 ENCOUNTER — APPOINTMENT (OUTPATIENT)
Dept: NUCLEAR MEDICINE | Age: 65
DRG: 194 | End: 2020-06-12
Payer: COMMERCIAL

## 2020-06-12 LAB
ALBUMIN SERPL-MCNC: 3.5 GM/DL (ref 3.4–5)
ALP BLD-CCNC: 192 IU/L (ref 40–129)
ALT SERPL-CCNC: 26 U/L (ref 10–40)
ANION GAP SERPL CALCULATED.3IONS-SCNC: 8 MMOL/L (ref 4–16)
AST SERPL-CCNC: 21 IU/L (ref 15–37)
BASOPHILS ABSOLUTE: 0 K/CU MM
BASOPHILS RELATIVE PERCENT: 0.1 % (ref 0–1)
BILIRUB SERPL-MCNC: 0.7 MG/DL (ref 0–1)
BUN BLDV-MCNC: 15 MG/DL (ref 6–23)
CALCIUM SERPL-MCNC: 8.3 MG/DL (ref 8.3–10.6)
CHLORIDE BLD-SCNC: 99 MMOL/L (ref 99–110)
CO2: 28 MMOL/L (ref 21–32)
CREAT SERPL-MCNC: 1.1 MG/DL (ref 0.6–1.1)
DIFFERENTIAL TYPE: ABNORMAL
EOSINOPHILS ABSOLUTE: 0 K/CU MM
EOSINOPHILS RELATIVE PERCENT: 0 % (ref 0–3)
ESTIMATED AVERAGE GLUCOSE: 200 MG/DL
GFR AFRICAN AMERICAN: >60 ML/MIN/1.73M2
GFR NON-AFRICAN AMERICAN: 50 ML/MIN/1.73M2
GLUCOSE BLD-MCNC: 173 MG/DL (ref 70–99)
GLUCOSE BLD-MCNC: 224 MG/DL (ref 70–99)
GLUCOSE BLD-MCNC: 233 MG/DL (ref 70–99)
GLUCOSE BLD-MCNC: 250 MG/DL (ref 70–99)
GLUCOSE BLD-MCNC: 255 MG/DL (ref 70–99)
HBA1C MFR BLD: 8.6 % (ref 4.2–6.3)
HCT VFR BLD CALC: 34.4 % (ref 37–47)
HEMOGLOBIN: 11.1 GM/DL (ref 12.5–16)
IMMATURE NEUTROPHIL %: 0.8 % (ref 0–0.43)
LV EF: 30 %
LV EF: 38 %
LVEF MODALITY: NORMAL
LVEF MODALITY: NORMAL
LYMPHOCYTES ABSOLUTE: 0.5 K/CU MM
LYMPHOCYTES RELATIVE PERCENT: 4 % (ref 24–44)
MCH RBC QN AUTO: 31 PG (ref 27–31)
MCHC RBC AUTO-ENTMCNC: 32.3 % (ref 32–36)
MCV RBC AUTO: 96.1 FL (ref 78–100)
MONOCYTES ABSOLUTE: 0.2 K/CU MM
MONOCYTES RELATIVE PERCENT: 1.1 % (ref 0–4)
NUCLEATED RBC %: 0 %
PDW BLD-RTO: 13.1 % (ref 11.7–14.9)
PLATELET # BLD: 282 K/CU MM (ref 140–440)
PMV BLD AUTO: 9.9 FL (ref 7.5–11.1)
POTASSIUM SERPL-SCNC: 4.4 MMOL/L (ref 3.5–5.1)
PRO-BNP: 7329 PG/ML
PROCALCITONIN: 0.08
RBC # BLD: 3.58 M/CU MM (ref 4.2–5.4)
SEGMENTED NEUTROPHILS ABSOLUTE COUNT: 12.5 K/CU MM
SEGMENTED NEUTROPHILS RELATIVE PERCENT: 94 % (ref 36–66)
SODIUM BLD-SCNC: 135 MMOL/L (ref 135–145)
TOTAL IMMATURE NEUTOROPHIL: 0.11 K/CU MM
TOTAL NUCLEATED RBC: 0 K/CU MM
TOTAL PROTEIN: 5.6 GM/DL (ref 6.4–8.2)
WBC # BLD: 13.3 K/CU MM (ref 4–10.5)

## 2020-06-12 PROCEDURE — 6370000000 HC RX 637 (ALT 250 FOR IP): Performed by: GENERAL PRACTICE

## 2020-06-12 PROCEDURE — 1200000000 HC SEMI PRIVATE

## 2020-06-12 PROCEDURE — 93306 TTE W/DOPPLER COMPLETE: CPT

## 2020-06-12 PROCEDURE — 6360000002 HC RX W HCPCS: Performed by: INTERNAL MEDICINE

## 2020-06-12 PROCEDURE — 94761 N-INVAS EAR/PLS OXIMETRY MLT: CPT

## 2020-06-12 PROCEDURE — 78452 HT MUSCLE IMAGE SPECT MULT: CPT

## 2020-06-12 PROCEDURE — 82962 GLUCOSE BLOOD TEST: CPT

## 2020-06-12 PROCEDURE — 36415 COLL VENOUS BLD VENIPUNCTURE: CPT

## 2020-06-12 PROCEDURE — 3430000000 HC RX DIAGNOSTIC RADIOPHARMACEUTICAL: Performed by: INTERNAL MEDICINE

## 2020-06-12 PROCEDURE — 6370000000 HC RX 637 (ALT 250 FOR IP): Performed by: INTERNAL MEDICINE

## 2020-06-12 PROCEDURE — 96376 TX/PRO/DX INJ SAME DRUG ADON: CPT

## 2020-06-12 PROCEDURE — 80053 COMPREHEN METABOLIC PANEL: CPT

## 2020-06-12 PROCEDURE — 93017 CV STRESS TEST TRACING ONLY: CPT

## 2020-06-12 PROCEDURE — 93010 ELECTROCARDIOGRAM REPORT: CPT | Performed by: INTERNAL MEDICINE

## 2020-06-12 PROCEDURE — G0378 HOSPITAL OBSERVATION PER HR: HCPCS

## 2020-06-12 PROCEDURE — A9500 TC99M SESTAMIBI: HCPCS | Performed by: INTERNAL MEDICINE

## 2020-06-12 PROCEDURE — 84145 PROCALCITONIN (PCT): CPT

## 2020-06-12 PROCEDURE — 85025 COMPLETE CBC W/AUTO DIFF WBC: CPT

## 2020-06-12 PROCEDURE — 6360000002 HC RX W HCPCS: Performed by: GENERAL PRACTICE

## 2020-06-12 PROCEDURE — 83880 ASSAY OF NATRIURETIC PEPTIDE: CPT

## 2020-06-12 PROCEDURE — 99232 SBSQ HOSP IP/OBS MODERATE 35: CPT | Performed by: INTERNAL MEDICINE

## 2020-06-12 PROCEDURE — 2580000003 HC RX 258: Performed by: GENERAL PRACTICE

## 2020-06-12 RX ORDER — POTASSIUM CHLORIDE 20 MEQ/1
10 TABLET, EXTENDED RELEASE ORAL 2 TIMES DAILY WITH MEALS
Status: DISCONTINUED | OUTPATIENT
Start: 2020-06-12 | End: 2020-06-13 | Stop reason: HOSPADM

## 2020-06-12 RX ORDER — FUROSEMIDE 40 MG/1
40 TABLET ORAL 2 TIMES DAILY
Status: DISCONTINUED | OUTPATIENT
Start: 2020-06-12 | End: 2020-06-13 | Stop reason: HOSPADM

## 2020-06-12 RX ADMIN — ESCITALOPRAM OXALATE 20 MG: 10 TABLET ORAL at 11:45

## 2020-06-12 RX ADMIN — SODIUM CHLORIDE, PRESERVATIVE FREE 10 ML: 5 INJECTION INTRAVENOUS at 20:34

## 2020-06-12 RX ADMIN — DICYCLOMINE HYDROCHLORIDE 10 MG: 10 CAPSULE ORAL at 17:25

## 2020-06-12 RX ADMIN — DICYCLOMINE HYDROCHLORIDE 10 MG: 10 CAPSULE ORAL at 05:10

## 2020-06-12 RX ADMIN — TRAZODONE HYDROCHLORIDE 100 MG: 50 TABLET ORAL at 20:34

## 2020-06-12 RX ADMIN — LEVOTHYROXINE SODIUM 50 MCG: 50 TABLET ORAL at 05:10

## 2020-06-12 RX ADMIN — INSULIN LISPRO 6 UNITS: 100 INJECTION, SOLUTION INTRAVENOUS; SUBCUTANEOUS at 11:47

## 2020-06-12 RX ADMIN — DICYCLOMINE HYDROCHLORIDE 10 MG: 10 CAPSULE ORAL at 20:34

## 2020-06-12 RX ADMIN — HYDROXYZINE PAMOATE 50 MG: 25 CAPSULE ORAL at 11:46

## 2020-06-12 RX ADMIN — METOPROLOL SUCCINATE 25 MG: 25 TABLET, EXTENDED RELEASE ORAL at 11:45

## 2020-06-12 RX ADMIN — Medication 30 MILLICURIE: at 10:09

## 2020-06-12 RX ADMIN — FUROSEMIDE 40 MG: 10 INJECTION, SOLUTION INTRAMUSCULAR; INTRAVENOUS at 18:35

## 2020-06-12 RX ADMIN — Medication 10 MILLICURIE: at 10:09

## 2020-06-12 RX ADMIN — APIXABAN 5 MG: 5 TABLET, FILM COATED ORAL at 11:45

## 2020-06-12 RX ADMIN — SODIUM CHLORIDE, PRESERVATIVE FREE 10 ML: 5 INJECTION INTRAVENOUS at 08:16

## 2020-06-12 RX ADMIN — CLONIDINE HYDROCHLORIDE 0.3 MG: 0.1 TABLET ORAL at 20:34

## 2020-06-12 RX ADMIN — FUROSEMIDE 40 MG: 40 TABLET ORAL at 17:25

## 2020-06-12 RX ADMIN — DICYCLOMINE HYDROCHLORIDE 10 MG: 10 CAPSULE ORAL at 11:46

## 2020-06-12 RX ADMIN — APIXABAN 5 MG: 5 TABLET, FILM COATED ORAL at 20:34

## 2020-06-12 RX ADMIN — FUROSEMIDE 40 MG: 10 INJECTION, SOLUTION INTRAMUSCULAR; INTRAVENOUS at 11:44

## 2020-06-12 RX ADMIN — REGADENOSON 0.4 MG: 0.08 INJECTION, SOLUTION INTRAVENOUS at 08:53

## 2020-06-12 RX ADMIN — PANTOPRAZOLE SODIUM 40 MG: 40 TABLET, DELAYED RELEASE ORAL at 05:11

## 2020-06-12 RX ADMIN — ASPIRIN 81 MG: 81 TABLET, COATED ORAL at 11:46

## 2020-06-12 RX ADMIN — INSULIN LISPRO 2 UNITS: 100 INJECTION, SOLUTION INTRAVENOUS; SUBCUTANEOUS at 17:27

## 2020-06-12 RX ADMIN — POTASSIUM CHLORIDE 10 MEQ: 1500 TABLET, EXTENDED RELEASE ORAL at 17:26

## 2020-06-12 RX ADMIN — AMLODIPINE BESYLATE 5 MG: 5 TABLET ORAL at 11:45

## 2020-06-12 RX ADMIN — ATORVASTATIN CALCIUM 40 MG: 40 TABLET, FILM COATED ORAL at 20:34

## 2020-06-12 RX ADMIN — INSULIN GLARGINE 50 UNITS: 100 INJECTION, SOLUTION SUBCUTANEOUS at 20:35

## 2020-06-12 ASSESSMENT — PAIN SCALES - GENERAL
PAINLEVEL_OUTOF10: 0

## 2020-06-12 NOTE — H&P
EF 50-55% mild MR, TR 3/13EF 35-45% decreasedr L ventrical systolic function.  H/O echocardiogram -EF50-55% Mild MR/TR. 10/11 EF > 55% LVS function is normal, calcified mitral apparatus, impaired LV relaxation, no PE    Heart imaging 2012 MUGA rest EF 58%LVEF is normal globally    Heartburn     History of cardiac cath 2009, 2006,moderate stenosis  small diagonal    History of cardiac monitoring 2/9/15    Event - sinus rhythm    History of cardioversion 3/14/14    History of MI (myocardial infarction)     History of PTCA 10/06/2010    10/06/2010 stent 3.0x24 taxus stent left main 90% stenosis mid seg reduced to 0% second area of [id number] to 40% not flow limiting EF 40%    Hx of cardiovascular stress test 2015    lexiscan-normal,EF70%    Hx of transesophageal echocardiography ([facility]) for monitoring 2017    Noted to have MARY GRACE Blood Clot.  Hyperlipidemia     Hypertension     Insomnia     SOB (shortness of breath)     Tachycardia 2012    report in [facility]unn hosp[facility] consult    TIA (transient ischemic attack)        Past Surgical History:        Procedure Laterality Date    ABLATION OF DYSRHYTHMIC FOCUS       SECTION      CHOLECYSTECTOMY      DIAGNOSTIC CARDIAC CATH LAB PROCEDURE  2006, 2009 moderate stenosis small diagonal    OTHER SURGICAL HISTORY  2017    afib ablation LUKE    PTCA  10/06/2010    10/06/2010 stent 3.0x24 taxus stent 90% stenosis mid segment reduced to 0% second area of stenosis 30-40% not flow limiting EF 40%    TUBAL LIGATION         Medications Prior to Admission:    Medications Prior to Admission: zolpidem (AMBIEN) 10 MG tablet, Take 10 mg by mouth nightly as needed for Sleep.   apixaban (ELIQUIS) 5 MG TABS tablet, Take 1 tablet by mouth 2 times daily  aspirin 81 MG EC tablet, Take 81 mg by mouth daily  cloNIDine (CATAPRES) 0.3 MG tablet, Take 0.3 mg

## 2020-06-12 NOTE — PLAN OF CARE
Problem: Falls - Risk of:  Goal: Will remain free from falls  Description: Will remain free from falls  6/12/2020 0825 by Sylwia Wagner RN  Outcome: Ongoing  6/12/2020 0244 by Pravin Garza RN  Outcome: Ongoing  Goal: Absence of physical injury  Description: Absence of physical injury  6/12/2020 0825 by Sylwia Wagner RN  Outcome: Ongoing  6/12/2020 0244 by Pravin Garza RN  Outcome: Ongoing     Problem: Fluid Volume:  Goal: Hemodynamic stability will improve  Description: Hemodynamic stability will improve  6/12/2020 0825 by Sylwia Wagner RN  Outcome: Ongoing  6/12/2020 0244 by Pravin Garza RN  Outcome: Ongoing  Goal: Ability to maintain a balanced intake and output will improve  Description: Ability to maintain a balanced intake and output will improve  6/12/2020 0825 by Sylwia Wagner RN  Outcome: Ongoing  6/12/2020 0244 by Pravin Garza RN  Outcome: Ongoing     Problem: Health Behavior:  Goal: Identification of resources available to assist in meeting health care needs will improve  Description: Identification of resources available to assist in meeting health care needs will improve  6/12/2020 0825 by Sylwia Wagner RN  Outcome: Ongoing  6/12/2020 0244 by Pravin Garza RN  Outcome: Ongoing     Problem: Respiratory:  Goal: Respiratory status will improve  Description: Respiratory status will improve  6/12/2020 0825 by Sylwia Wagner RN  Outcome: Ongoing  6/12/2020 0244 by Pravin Garza RN  Outcome: Ongoing

## 2020-06-12 NOTE — PROGRESS NOTES
NO ISCHEMIA ON CARDIOLITE  LOW EF ( CHRONIC)  MED THERAPY  WILL FU AS OP                                                                 CARDIOLOGY  NOTE        Name:  Emi Davidson /Age/Sex: 1955  (62 y.o. female)   MRN & CSN:  [id number] & [id number] Admission Date/Time: 2020  1:05 PM   [facility]  [facility] PCP: Aure Cobian, [address] Day: 2        PLAN FROM CARDIOLOGY FOR TODAY:   cardiolite      - cardiology consult is for:  CHF    -  Interval history:  Less SOB    · ASSESSMENT/ PLAN:  1.     HFrEF: diurese  2. CAD med therapy  3. Risk factor modification  4. PAF on anticoag          Subjective: Todays complain: Patient  Less SOB    HPI:  Marlo Giles is a 62 y. o.year old who and presents with had concerns including Respiratory Distress ( For two weeks). Chief Complaint   Patient presents with    Respiratory Distress      For two weeks           Objective: Temperature:  Current - Temp: 97.7 °F (36.5 °C);  Max - Temp  Av.9 °F (36.6 °C)  Min: 97.7 °F (36.5 °C)  Max: 98 °F (36.7 °C)    Respiratory Rate : Resp  Av.1  Min: 12  Max: 30    Pulse Range: Pulse  Av.4  Min: 77  Max: 108    Blood Presuure Range:  Systolic (30QHJ), KLW:664 , Min:141 , JOSUE:325   ; Diastolic (98VIX), HCW:41, Min:78, Max:108      Pulse ox Range: SpO2  Av.1 %  Min: 92 %  Max: 99 %    24hr I & O:      Intake/Output Summary (Last 24 hours) at 2020 1503  Last data filed at 2020 0238  Gross per 24 hour   Intake 550 ml   Output 800 ml   Net -250 ml         BP (!) 141/78   Pulse 77   Temp 97.7 °F (36.5 °C) (Oral)   Resp 18   Ht 5' 1\" (1.549 m)   Wt 195 lb 6 oz (88.6 kg)   SpO2 93%   BMI 36.92 kg/m²           Review of Systems:  All 14 systems reviewed, all negative       TELEMETRY: Sinus    has a past medical history of Abnormal PFTs (pulmonary function tests), Atrial fibrillation (HCC), CAD (coronary artery disease), [facility]est[facility] Diabetes mellitus ([facility]

## 2020-06-13 VITALS
BODY MASS INDEX: 36.89 KG/M2 | DIASTOLIC BLOOD PRESSURE: 80 MMHG | OXYGEN SATURATION: 98 % | TEMPERATURE: 97.2 F | HEIGHT: 61 IN | WEIGHT: 195.38 LBS | RESPIRATION RATE: 16 BRPM | SYSTOLIC BLOOD PRESSURE: 152 MMHG | HEART RATE: 75 BPM

## 2020-06-13 LAB
ANION GAP SERPL CALCULATED.3IONS-SCNC: 12 MMOL/L (ref 4–16)
BUN BLDV-MCNC: 23 MG/DL (ref 6–23)
CALCIUM SERPL-MCNC: 8.4 MG/DL (ref 8.3–10.6)
CHLORIDE BLD-SCNC: 104 MMOL/L (ref 99–110)
CO2: 27 MMOL/L (ref 21–32)
CREAT SERPL-MCNC: 1.3 MG/DL (ref 0.6–1.1)
GFR AFRICAN AMERICAN: 50 ML/MIN/1.73M2
GFR NON-AFRICAN AMERICAN: 41 ML/MIN/1.73M2
GLUCOSE BLD-MCNC: 150 MG/DL (ref 70–99)
GLUCOSE BLD-MCNC: 25 MG/DL (ref 70–99)
GLUCOSE BLD-MCNC: 38 MG/DL (ref 70–99)
GLUCOSE BLD-MCNC: 64 MG/DL (ref 70–99)
GLUCOSE BLD-MCNC: 72 MG/DL (ref 70–99)
HCT VFR BLD CALC: 34.1 % (ref 37–47)
HEMOGLOBIN: 10.9 GM/DL (ref 12.5–16)
MAGNESIUM: 2.2 MG/DL (ref 1.8–2.4)
MCH RBC QN AUTO: 31.3 PG (ref 27–31)
MCHC RBC AUTO-ENTMCNC: 32 % (ref 32–36)
MCV RBC AUTO: 98 FL (ref 78–100)
PDW BLD-RTO: 13.3 % (ref 11.7–14.9)
PLATELET # BLD: 366 K/CU MM (ref 140–440)
PMV BLD AUTO: 10 FL (ref 7.5–11.1)
POTASSIUM SERPL-SCNC: 3.4 MMOL/L (ref 3.5–5.1)
PRO-BNP: 4366 PG/ML
RBC # BLD: 3.48 M/CU MM (ref 4.2–5.4)
SODIUM BLD-SCNC: 143 MMOL/L (ref 135–145)
WBC # BLD: 13.6 K/CU MM (ref 4–10.5)

## 2020-06-13 PROCEDURE — G0378 HOSPITAL OBSERVATION PER HR: HCPCS

## 2020-06-13 PROCEDURE — 6360000002 HC RX W HCPCS: Performed by: GENERAL PRACTICE

## 2020-06-13 PROCEDURE — 6370000000 HC RX 637 (ALT 250 FOR IP): Performed by: GENERAL PRACTICE

## 2020-06-13 PROCEDURE — 85027 COMPLETE CBC AUTOMATED: CPT

## 2020-06-13 PROCEDURE — 83735 ASSAY OF MAGNESIUM: CPT

## 2020-06-13 PROCEDURE — 2580000003 HC RX 258: Performed by: GENERAL PRACTICE

## 2020-06-13 PROCEDURE — 96376 TX/PRO/DX INJ SAME DRUG ADON: CPT

## 2020-06-13 PROCEDURE — 6370000000 HC RX 637 (ALT 250 FOR IP): Performed by: INTERNAL MEDICINE

## 2020-06-13 PROCEDURE — 83880 ASSAY OF NATRIURETIC PEPTIDE: CPT

## 2020-06-13 PROCEDURE — 36415 COLL VENOUS BLD VENIPUNCTURE: CPT

## 2020-06-13 PROCEDURE — 80048 BASIC METABOLIC PNL TOTAL CA: CPT

## 2020-06-13 PROCEDURE — 82962 GLUCOSE BLOOD TEST: CPT

## 2020-06-13 PROCEDURE — 94761 N-INVAS EAR/PLS OXIMETRY MLT: CPT

## 2020-06-13 RX ORDER — ALBUTEROL SULFATE 90 UG/1
2 AEROSOL, METERED RESPIRATORY (INHALATION) 4 TIMES DAILY PRN
Qty: 1 INHALER | Refills: 5 | Status: ON HOLD | OUTPATIENT
Start: 2020-06-13 | End: 2021-01-01 | Stop reason: HOSPADM

## 2020-06-13 RX ORDER — POTASSIUM CHLORIDE 750 MG/1
10 TABLET, EXTENDED RELEASE ORAL 2 TIMES DAILY WITH MEALS
Qty: 60 TABLET | Refills: 3 | Status: ON HOLD | OUTPATIENT
Start: 2020-06-13 | End: 2020-12-01 | Stop reason: HOSPADM

## 2020-06-13 RX ORDER — FUROSEMIDE 40 MG/1
40 TABLET ORAL 2 TIMES DAILY
Qty: 60 TABLET | Refills: 3 | Status: ON HOLD | OUTPATIENT
Start: 2020-06-13 | End: 2021-01-01 | Stop reason: HOSPADM

## 2020-06-13 RX ADMIN — POTASSIUM CHLORIDE 10 MEQ: 1500 TABLET, EXTENDED RELEASE ORAL at 08:50

## 2020-06-13 RX ADMIN — SODIUM CHLORIDE, PRESERVATIVE FREE 10 ML: 5 INJECTION INTRAVENOUS at 08:50

## 2020-06-13 RX ADMIN — PANTOPRAZOLE SODIUM 40 MG: 40 TABLET, DELAYED RELEASE ORAL at 06:33

## 2020-06-13 RX ADMIN — ESCITALOPRAM OXALATE 20 MG: 10 TABLET ORAL at 08:49

## 2020-06-13 RX ADMIN — METOPROLOL SUCCINATE 25 MG: 25 TABLET, EXTENDED RELEASE ORAL at 08:50

## 2020-06-13 RX ADMIN — HYDROXYZINE PAMOATE 50 MG: 25 CAPSULE ORAL at 08:49

## 2020-06-13 RX ADMIN — DEXTROSE 15 G: 15 GEL ORAL at 06:56

## 2020-06-13 RX ADMIN — DICYCLOMINE HYDROCHLORIDE 10 MG: 10 CAPSULE ORAL at 11:58

## 2020-06-13 RX ADMIN — LEVOTHYROXINE SODIUM 50 MCG: 50 TABLET ORAL at 06:33

## 2020-06-13 RX ADMIN — APIXABAN 5 MG: 5 TABLET, FILM COATED ORAL at 08:49

## 2020-06-13 RX ADMIN — DEXTROSE 15 G: 15 GEL ORAL at 06:57

## 2020-06-13 RX ADMIN — ASPIRIN 81 MG: 81 TABLET, COATED ORAL at 08:50

## 2020-06-13 RX ADMIN — INSULIN LISPRO 2 UNITS: 100 INJECTION, SOLUTION INTRAVENOUS; SUBCUTANEOUS at 11:58

## 2020-06-13 RX ADMIN — AMLODIPINE BESYLATE 5 MG: 5 TABLET ORAL at 08:50

## 2020-06-13 RX ADMIN — DICYCLOMINE HYDROCHLORIDE 10 MG: 10 CAPSULE ORAL at 06:33

## 2020-06-13 RX ADMIN — FUROSEMIDE 40 MG: 10 INJECTION, SOLUTION INTRAMUSCULAR; INTRAVENOUS at 08:49

## 2020-06-13 RX ADMIN — FUROSEMIDE 40 MG: 40 TABLET ORAL at 08:49

## 2020-06-13 ASSESSMENT — PAIN SCALES - GENERAL: PAINLEVEL_OUTOF10: 0

## 2020-06-13 NOTE — DISCHARGE SUMMARY
Instructions             albuterol sulfate HFA (VENTOLIN HFA) 108 (90 Base) MCG/ACT inhaler  Inhale 2 puffs into the lungs 4 times daily as needed for Wheezing             amLODIPine (NORVASC) 5 MG tablet  Take 1 tablet by mouth daily             apixaban (ELIQUIS) 5 MG TABS tablet  Take 1 tablet by mouth 2 times daily             aspirin 81 MG EC tablet  Take 81 mg by mouth daily             atorvastatin (LIPITOR) 40 MG tablet  Take 40 mg by mouth nightly             BASAGLAR KWIKPEN 100 UNIT/ML injection pen  Inject 80 Units into the skin nightly             cloNIDine (CATAPRES) 0.3 MG tablet  Take 0.3 mg by mouth nightly             dicyclomine (BENTYL) 10 MG capsule  Take 1 capsule by mouth 4 times daily (before meals and nightly)             escitalopram (LEXAPRO) 20 MG tablet  Take 20 mg by mouth daily             furosemide (LASIX) 40 MG tablet  Take 1 tablet by mouth 2 times daily             hydrOXYzine (VISTARIL) 50 MG capsule  Take 50 mg by mouth daily             levothyroxine (SYNTHROID) 50 MCG tablet  Take 50 mcg by mouth Daily             metFORMIN (GLUCOPHAGE) 500 MG tablet  Take 500 mg by mouth 2 times daily             metoprolol succinate (TOPROL XL) 50 MG extended release tablet  Take one tablet in the morning and 1/2 tablet at night             nitroGLYCERIN (NITROSTAT) 0.4 MG SL tablet  Place 0.4 mg under the tongue every 5 minutes as needed for Chest pain up to max of 3 total doses. If no relief after 1 dose, call 911. ondansetron (ZOFRAN ODT) 4 MG disintegrating tablet  Take 1 tablet by mouth every 8 hours as needed for Nausea             pantoprazole (PROTONIX) 40 MG tablet  Take 40 mg by mouth daily             potassium chloride (KLOR-CON M) 10 MEQ extended release tablet  Take 1 tablet by mouth 2 times daily (with meals)             traZODone (DESYREL) 100 MG tablet  Take 100 mg by mouth nightly.              zolpidem (AMBIEN) 10 MG tablet  Take 10 mg by mouth nightly as needed

## 2020-06-15 ENCOUNTER — CARE COORDINATION (OUTPATIENT)
Dept: CASE MANAGEMENT | Age: 65
End: 2020-06-15

## 2020-06-15 NOTE — CARE COORDINATION
abdomen  *low salt diet, fluid restriction  *notify md of weight gain/loss 3# or more in 1-7days  *notify of increased sob, edema, feeling of uneasiness, chest pain, increased cough, confusion, wheezing or chest tightness    Patient has scale, needs to replace batteries; agreeable to daily weights/log. Informed of hosp weigh 196# on 6/12/2020. Confirmed Patient obtained and taking new rx as directed. Discussed importance of taking Lasix and routine meds as directed Advised obtaining a 90-day supply of all daily and as-needed medications. Denies resource needs including hhc, dme. Patient to schedule 7 day hosp f/u appt, has transportation. Education provided regarding infection prevention, and signs and symptoms of COVID-19 and when to seek medical attention with Patient who verbalized understanding. Patient denies any COVID19 sx. Discussed exposure protocols and quarantine from [address] you at higher risk for severe illness 2019 and given an opportunity for questions and concerns. Patient agrees to contact the COVID-19 hotline [phone] or PCP office for questions related to their healthcare. CTN/ACM provided contact information for future reference. From CDC: Are you at higher risk for severe illness?  Wash your hands often.  Avoid close contact (6 feet, which is about two arm lengths) with people who are sick.  Put distance between yourself and other people if COVID-19 is spreading in your community.  Clean and disinfect frequently touched surfaces.  Avoid all cruise travel and non-essential air travel.  Call your healthcare professional if you have concerns about COVID-19 and your underlying condition or if you are sick. For more information on steps you can take to protect yourself, see CDC's How to Protect Yourself    No email access for [facility]. Plan for follow up call in 5-7 days based on sx and risk factors. Follow Up  No future appointments.     Sybil TREVIÑO

## 2020-06-16 LAB
CULTURE: NORMAL
CULTURE: NORMAL
Lab: NORMAL
Lab: NORMAL
SPECIMEN: NORMAL
SPECIMEN: NORMAL

## 2020-06-17 LAB
EKG ATRIAL RATE: 115 BPM
EKG DIAGNOSIS: NORMAL
EKG P-R INTERVAL: 208 MS
EKG Q-T INTERVAL: 392 MS
EKG QRS DURATION: 138 MS
EKG QTC CALCULATION (BAZETT): 542 MS
EKG R AXIS: -10 DEGREES
EKG T AXIS: 138 DEGREES
EKG VENTRICULAR RATE: 115 BPM

## 2020-06-22 ENCOUNTER — CARE COORDINATION (OUTPATIENT)
Dept: CASE MANAGEMENT | Age: 65
End: 2020-06-22

## 2020-06-23 ENCOUNTER — OFFICE VISIT (OUTPATIENT)
Dept: CARDIOLOGY CLINIC | Age: 65
End: 2020-06-23
Payer: COMMERCIAL

## 2020-06-23 VITALS
BODY MASS INDEX: 35.91 KG/M2 | WEIGHT: 190.2 LBS | DIASTOLIC BLOOD PRESSURE: 90 MMHG | HEART RATE: 84 BPM | SYSTOLIC BLOOD PRESSURE: 154 MMHG | HEIGHT: 61 IN

## 2020-06-23 PROCEDURE — G8427 DOCREV CUR MEDS BY ELIG CLIN: HCPCS | Performed by: INTERNAL MEDICINE

## 2020-06-23 PROCEDURE — G8419 CALC BMI OUT NRM PARAM NOF/U: HCPCS | Performed by: INTERNAL MEDICINE

## 2020-06-23 PROCEDURE — 1036F TOBACCO NON-USER: CPT | Performed by: INTERNAL MEDICINE

## 2020-06-23 PROCEDURE — 99214 OFFICE O/P EST MOD 30 MIN: CPT | Performed by: INTERNAL MEDICINE

## 2020-06-23 PROCEDURE — 3017F COLORECTAL CA SCREEN DOC REV: CPT | Performed by: INTERNAL MEDICINE

## 2020-06-23 PROCEDURE — 1111F DSCHRG MED/CURRENT MED MERGE: CPT | Performed by: INTERNAL MEDICINE

## 2020-06-23 NOTE — PROGRESS NOTES
 Morbid obesity ([facility] 01/30/2012     Priority: Low    DM (diabetes mellitus), type 2, uncontrolled ([facility] 01/30/2012     Priority: Low    CHF (congestive heart failure), NYHA class I, acute on chronic, combined ([facility] 06/11/2020    Elevated liver enzymes 05/23/2019    Acute cystitis 08/10/2018     Current Outpatient Medications   Medication Sig Dispense Refill    furosemide (LASIX) 40 MG tablet Take 1 tablet by mouth 2 times daily 60 tablet 3    potassium chloride (KLOR-CON M) 10 MEQ extended release tablet Take 1 tablet by mouth 2 times daily (with meals) 60 tablet 3    albuterol sulfate HFA (VENTOLIN HFA) 108 (90 Base) MCG/ACT inhaler Inhale 2 puffs into the lungs 4 times daily as needed for Wheezing 1 Inhaler 5    cloNIDine (CATAPRES) 0.3 MG tablet Take 0.3 mg by mouth nightly      BASAGLAR KWIKPEN 100 UNIT/ML injection pen Inject 80 Units into the skin nightly      ADMELOG SOLOSTAR 100 UNIT/ML pen Inject into the skin See Admin Instructions      atorvastatin (LIPITOR) 40 MG tablet Take 40 mg by mouth nightly      escitalopram (LEXAPRO) 20 MG tablet Take 20 mg by mouth daily      metFORMIN (GLUCOPHAGE) 500 MG tablet Take 500 mg by mouth 2 times daily      pantoprazole (PROTONIX) 40 MG tablet Take 40 mg by mouth daily      zolpidem (AMBIEN) 10 MG tablet Take 10 mg by mouth nightly as needed for Sleep.        dicyclomine (BENTYL) 10 MG capsule Take 1 capsule by mouth 4 times daily (before meals and nightly) 20 capsule 0    ondansetron (ZOFRAN ODT) 4 MG disintegrating tablet Take 1 tablet by mouth every 8 hours as needed for Nausea 15 tablet 0    amLODIPine (NORVASC) 5 MG tablet Take 1 tablet by mouth daily 30 tablet 3    hydrOXYzine (VISTARIL) 50 MG capsule Take 50 mg by mouth daily      levothyroxine (SYNTHROID) 50 MCG tablet Take 50 mcg by mouth Daily      metoprolol succinate (TOPROL XL) 50 MG extended release tablet Take one tablet in the morning and 1/2 tablet at night 45 tablet 3    heartbeat, murmur, palpitations or shortness of breath  Gastrointestinal: Negative for abdominal pain or heartburn  Genito-Urinary: Negative for urinary frequency/urgency  Musculoskeletal: Negative for muscle pain, muscular weakness, negative for pain in arm and leg or swelling in foot and leg  Neurological: Negative for dizziness, headaches, memory loss, numbness/tingling, visual changes, syncope  Dermatological: Negative for rash    Objective:    Vitals:    06/23/20 1350 06/23/20 1355   BP: (!) 152/90 (!) 154/90   Pulse: 84 84   Weight: 190 lb 3.2 oz (86.3 kg)    Height: 5' 1\" (1.549 m)      BP (!) 154/90   Pulse 84   Ht 5' 1\" (1.549 m)   Wt 190 lb 3.2 oz (86.3 kg)   BMI 35.94 kg/m²     No flowsheet data found. Wt Readings from Last 3 Encounters:   06/23/20 190 lb 3.2 oz (86.3 kg)   06/12/20 195 lb 6 oz (88.6 kg)   09/12/19 190 lb 3.2 oz (86.3 kg)     Body mass index is 35.94 kg/m². GENERAL - Alert, oriented, pleasant, in no apparent distress. EYES: No jaundice, no conjunctival pallor. SKIN: It is warm & dry. No rashes. No Echhymosis    HEENT - No clinically significant abnormalities seen. Neck - Supple. No jugular venous distention noted. No carotid bruits. Cardiovascular - Normal S1 and S2 without obvious murmur or gallop. Extremities - No cyanosis, clubbing, or significant edema. Pulmonary - No respiratory distress. No wheezes or rales. Abdomen - No masses, tenderness, or organomegaly. Musculoskeletal - No significant edema. No joint deformities. No muscle wasting. Neurologic - Cranial nerves II through XII are grossly intact. There were no gross focal neurologic abnormalities.     Lab Review   Lab Results   Component Value Date    CKTOTAL 40 05/07/2012    TROPONINT <0.010 06/11/2020     BNP:    Lab Results   Component Value Date     04/17/2013     PT/INR:    Lab Results   Component Value Date    INR 0.98 09/12/2019     Lab Results   Component Value Date    LABA1C 8.6 (H)

## 2020-06-24 RX ORDER — NITROGLYCERIN 0.4 MG/1
0.4 TABLET SUBLINGUAL EVERY 5 MIN PRN
Qty: 25 TABLET | Status: CANCELLED | OUTPATIENT
Start: 2020-06-24

## 2020-07-14 ENCOUNTER — PROCEDURE VISIT (OUTPATIENT)
Dept: CARDIOLOGY CLINIC | Age: 65
End: 2020-07-14
Payer: COMMERCIAL

## 2020-07-14 LAB
LV EF: 41 %
LVEF MODALITY: NORMAL

## 2020-07-14 PROCEDURE — 78472 GATED HEART PLANAR SINGLE: CPT | Performed by: INTERNAL MEDICINE

## 2020-07-14 PROCEDURE — A9560 TC99M LABELED RBC: HCPCS | Performed by: INTERNAL MEDICINE

## 2020-07-15 ENCOUNTER — TELEPHONE (OUTPATIENT)
Dept: CARDIOLOGY CLINIC | Age: 65
End: 2020-07-15

## 2020-07-15 NOTE — TELEPHONE ENCOUNTER
Notified pt of results and she will f/u 7-27-20.       Summary     Left ventricular ejection fraction of 41 %.     Broderick Chestnut Dr. Claude Dural .          Recommendation     MILDLY REDUCED LVEF     MEDICAL THERAPY

## 2020-07-27 ENCOUNTER — OFFICE VISIT (OUTPATIENT)
Dept: CARDIOLOGY CLINIC | Age: 65
End: 2020-07-27
Payer: COMMERCIAL

## 2020-07-27 VITALS
BODY MASS INDEX: 36.32 KG/M2 | DIASTOLIC BLOOD PRESSURE: 80 MMHG | HEART RATE: 80 BPM | WEIGHT: 192.4 LBS | HEIGHT: 61 IN | SYSTOLIC BLOOD PRESSURE: 130 MMHG

## 2020-07-27 PROCEDURE — G8417 CALC BMI ABV UP PARAM F/U: HCPCS | Performed by: NURSE PRACTITIONER

## 2020-07-27 PROCEDURE — 1036F TOBACCO NON-USER: CPT | Performed by: NURSE PRACTITIONER

## 2020-07-27 PROCEDURE — 3017F COLORECTAL CA SCREEN DOC REV: CPT | Performed by: NURSE PRACTITIONER

## 2020-07-27 PROCEDURE — 99213 OFFICE O/P EST LOW 20 MIN: CPT | Performed by: NURSE PRACTITIONER

## 2020-07-27 PROCEDURE — G8427 DOCREV CUR MEDS BY ELIG CLIN: HCPCS | Performed by: NURSE PRACTITIONER

## 2020-07-27 RX ORDER — METOPROLOL SUCCINATE 100 MG/1
TABLET, EXTENDED RELEASE ORAL
Qty: 30 TABLET | Refills: 2 | Status: SHIPPED | OUTPATIENT
Start: 2020-07-27 | End: 2020-11-04

## 2020-07-27 ASSESSMENT — ENCOUNTER SYMPTOMS
DIARRHEA: 0
COUGH: 0
BLOOD IN STOOL: 0
ABDOMINAL DISTENTION: 0
SORE THROAT: 0
CONSTIPATION: 0
COLOR CHANGE: 0
BACK PAIN: 0
EYE REDNESS: 0
SINUS PRESSURE: 0
NAUSEA: 0
VOMITING: 0
CHEST TIGHTNESS: 0
EYE ITCHING: 0
SINUS PAIN: 0
SHORTNESS OF BREATH: 0
ABDOMINAL PAIN: 0

## 2020-07-27 NOTE — PROGRESS NOTES
CARDIOLOGY  NOTE      7/27/2020    RE: Ethel Tang  (1955)                               TO:  Dr. Myrtis Schilder, PA-C  The primary cardiologist is Dr Nikhil Pierson    CC:  Here for follow-up on MUGA scan    HPI: Thank you for involving me in taking care of your patient Ethel Tang, who is a  62y.o. year old female with a history of CHF heart failure reduced EF, CAD, hypertension, hyperlipidemia, and atrial fibrillation. She is seen today for a follow up on CHF heart failure reduced EF, atrial fibrillation, hypertension. She during this visit  denies chest pain, palpitations, shortness of breath, edema, dizziness or syncope. She had a MUGA scan recently with EF of 41%. Patient denies recent weight gain of greater than 5 pounds in 1 week. She denies orthopnea. She denies lower leg swelling. She is taking Lasix 40 mg twice daily and states that she urinates frequently.       Vitals:    07/27/20 1420   BP: 130/80   Pulse: 80       Current Outpatient Medications   Medication Sig Dispense Refill    metoprolol succinate (TOPROL XL) 100 MG extended release tablet Take 1 tablet by mouth daily 30 tablet 2    furosemide (LASIX) 40 MG tablet Take 1 tablet by mouth 2 times daily 60 tablet 3    potassium chloride (KLOR-CON M) 10 MEQ extended release tablet Take 1 tablet by mouth 2 times daily (with meals) 60 tablet 3    albuterol sulfate HFA (VENTOLIN HFA) 108 (90 Base) MCG/ACT inhaler Inhale 2 puffs into the lungs 4 times daily as needed for Wheezing 1 Inhaler 5    cloNIDine (CATAPRES) 0.3 MG tablet Take 0.3 mg by mouth nightly      BASAGLAR KWIKPEN 100 UNIT/ML injection pen Inject 80 Units into the skin nightly      ADMELOG SOLOSTAR 100 UNIT/ML pen Inject into the skin See Admin Instructions      atorvastatin (LIPITOR) 40 MG tablet Take 40 mg by mouth nightly      escitalopram (LEXAPRO) 20 MG tablet Take 20 mg by mouth daily      metFORMIN (GLUCOPHAGE) 500 MG tablet Take 500 mg by mouth 2 times daily      pantoprazole (PROTONIX) 40 MG tablet Take 40 mg by mouth daily      zolpidem (AMBIEN) 10 MG tablet Take 10 mg by mouth nightly as needed for Sleep.  dicyclomine (BENTYL) 10 MG capsule Take 1 capsule by mouth 4 times daily (before meals and nightly) 20 capsule 0    ondansetron (ZOFRAN ODT) 4 MG disintegrating tablet Take 1 tablet by mouth every 8 hours as needed for Nausea 15 tablet 0    amLODIPine (NORVASC) 5 MG tablet Take 1 tablet by mouth daily 30 tablet 3    hydrOXYzine (VISTARIL) 50 MG capsule Take 50 mg by mouth daily      levothyroxine (SYNTHROID) 50 MCG tablet Take 50 mcg by mouth Daily      apixaban (ELIQUIS) 5 MG TABS tablet Take 1 tablet by mouth 2 times daily 60 tablet 2    nitroGLYCERIN (NITROSTAT) 0.4 MG SL tablet Place 0.4 mg under the tongue every 5 minutes as needed for Chest pain up to max of 3 total doses. If no relief after 1 dose, call 911.  aspirin 81 MG EC tablet Take 81 mg by mouth daily      traZODone (DESYREL) 100 MG tablet Take 100 mg by mouth nightly. No current facility-administered medications for this visit. Allergies: Amiodarone;  Iv dye [iodides]; and Vicodin [hydrocodone-acetaminophen]  Past Medical History:   Diagnosis Date    Abnormal PFTs (pulmonary function tests) 4/14 4/14    Atrial fibrillation (HCC)     CAD (coronary artery disease)     CHF (congestive heart failure) (HCC)     Diabetes mellitus (HCC)     Dizziness - light-headed     Family history of coronary artery disease     H/O 24 hour EKG monitoring 02/09/2012,12/10/2010    02/09/2012 predominant rhythm is sinus with short runs of SVT no episode of atrial fib. pt has left bundle branch morphology, max heart is 132, min is 48 with an average of 74 ,infrequent PVCs are seen    H/O cardiovascular stress test 4/1/2013, 1/31/2012 4/1/2013-    H/O cardiovascular stress test 4/16/14,02/09/2012 4/16/14 EF 52% No ischemia, normal study. 2012 EF 58% tehnique-[employer]et[employer] LVEF is normal globally    H/O echocardiogram 13 EF 50-55% mild MR, TR 3/13EF 35-45% decreasedr L ventrical systolic function.  H/O echocardiogram -EF50-55% Mild MR/TR. 10/11 EF > 55% LVS function is normal, calcified mitral apparatus, impaired LV relaxation, no PE    Heart imaging 2012 MUGA rest EF 58%LVEF is normal globally    Heartburn     History of cardiac cath 2009, 2006,moderate stenosis  small diagonal    History of cardiac monitoring 2/9/15    Event - sinus rhythm    History of cardioversion 3/14/14    History of MI (myocardial infarction)     History of nuclear Muga stress test 2020    EF 41%    History of PTCA 10/06/2010    10/06/2010 stent 3.0x24 taxus stent left main 90% stenosis mid seg reduced to 0% second area of [id number] to 40% not flow limiting EF 40%    Hx of cardiovascular stress test 2015    lexiscan-normal,EF70%    Hx of transesophageal echocardiography ([facility]) for monitoring 2017    Noted to have MARY GRACE Blood Clot.  Hyperlipidemia     Hypertension     Insomnia     SOB (shortness of breath)     Tachycardia 2012    report in [facility] notes-[facility] consult    TIA (transient ischemic attack)      Past Surgical History:   Procedure Laterality Date    ABLATION OF DYSRHYTHMIC FOCUS       SECTION      CHOLECYSTECTOMY      DIAGNOSTIC CARDIAC CATH LAB PROCEDURE  2006, 2009 moderate stenosis small diagonal    OTHER SURGICAL HISTORY  2017    afib ablation [facility]    PTCA  10/06/2010    10/06/2010 stent 3.0x24 taxus stent 90% stenosis mid segment reduced to 0% second area of stenosis 30-40% not flow limiting EF 40%    TUBAL LIGATION       Family History   Problem Relation Age of Onset    Diabetes Father     Heart Disease Father      Social History     Tobacco Use    Smoking status: Former Smoker     Packs/day: 3.00     Years: 20.00     Pack years: 60.00     Types: Cigarettes     Last attempt to quit: 2007     Years since quittin.4    Smokeless tobacco: Never Used    Tobacco comment: reviewed 8/12/15   Substance Use Topics    Alcohol use: No     Comment: occas. mtn. dew        Review of Systems   Constitutional: Negative for activity change, appetite change and fatigue. HENT: Negative for congestion, sinus pressure, sinus pain and sore throat. Eyes: Negative for redness and itching. Respiratory: Negative for cough, chest tightness and shortness of breath. Cardiovascular: Negative for chest pain, palpitations and leg swelling. Gastrointestinal: Negative for abdominal distention, abdominal pain, blood in stool, constipation, diarrhea, nausea and vomiting. Genitourinary: Negative for difficulty urinating and dysuria. Musculoskeletal: Negative for arthralgias, back pain and gait problem. Skin: Negative for color change, pallor, rash and wound. Neurological: Negative for dizziness, syncope and light-headedness. Psychiatric/Behavioral: Negative for confusion. The patient is not nervous/anxious. Objective:      Physical Exam:  /80   Pulse 80   Ht 5' 1\" (1.549 m)   Wt 192 lb 6.4 oz (87.3 kg)   BMI 36.35 kg/m²   Wt Readings from Last 3 Encounters:   20 192 lb 6.4 oz (87.3 kg)   20 190 lb 3.2 oz (86.3 kg)   20 195 lb 6 oz (88.6 kg)     Body mass index is 36.35 kg/m². Physical Exam  Constitutional:       Appearance: Normal appearance. She is not ill-appearing or diaphoretic. HENT:      Head: Normocephalic and atraumatic. Left Ear: External ear normal.      Nose: Nose normal. No congestion or rhinorrhea. Mouth/Throat:      Mouth: Mucous membranes are moist.      Pharynx: Oropharynx is clear. No oropharyngeal exudate or posterior oropharyngeal erythema. Eyes:      General:         Right eye: No discharge.          Left eye: No discharge. Conjunctiva/sclera: Conjunctivae normal.      Pupils: Pupils are equal, round, and reactive to light. Neck:      Musculoskeletal: Neck supple. No muscular tenderness. Vascular: No carotid bruit. Cardiovascular:      Rate and Rhythm: Normal rate and regular rhythm. Pulses: Normal pulses. Heart sounds: Normal heart sounds. No murmur. No friction rub. No gallop. Pulmonary:      Effort: Pulmonary effort is normal.      Breath sounds: Normal breath sounds. No wheezing or rhonchi. Abdominal:      General: Abdomen is flat. Bowel sounds are normal. There is no distension. Tenderness: There is no abdominal tenderness. Musculoskeletal:      Right lower leg: No edema. Left lower leg: No edema. Skin:     General: Skin is dry. Neurological:      Mental Status: She is alert and oriented to person, place, and time. Psychiatric:         Mood and Affect: Mood normal.         Behavior: Behavior normal.         Thought Content: Thought content normal.         Judgment: Judgment normal.         DATA:  Lab Results   Component Value Date    CKTOTAL 40 05/07/2012    TROPONINI 0.029 03/14/2014    TROPONINI 0.028 05/07/2012     BNP:    Lab Results   Component Value Date     04/17/2013     PT/INR:  No results found for: PTINR  Lab Results   Component Value Date    LABA1C 8.6 (H) 06/11/2020    LABA1C 7.5 (H) 05/24/2019     Lab Results   Component Value Date    CHOL 153 01/30/2012    TRIG 135 01/30/2012    HDL 59 (L) 01/30/2012    LDLDIRECT 88 01/30/2012     Lab Results   Component Value Date    ALT 26 06/12/2020    AST 21 06/12/2020     TSH:  No results found for: TSH      Assessment/ Plan:    Patient seen, interviewed and examined. Testing was reviewed.     Heart failure with reduced EF  Hypertension  Atrial fibrillation      On echo EF 30%  MUGA reveals EF of 41%   Vitals:    07/27/20 1420   BP: 130/80   Pulse: 80   Will plan for better blood pressure control    Will uptitrate Toprol- mg daily  Per guidelines would like systolic blood pressure to be less than 110 without symptoms  Heart rate today in the 80s, guidelines would prefer heart rate to be 70s or lower  Patient to return in 2 weeks for blood pressure check    Continue Lasix 40 mg daily and norvasc 5 mg daily    Regular rate and rhythm noted  Continue Eliquis          Patient is encouraged to exercise even a brisk walk for 30 minutes at least 3 to 4 times a week. Lifestyle and risk factor modificatons discussed. Various goals are discussed and questions answered. Continue current medications. Appropriate prescriptions are addressed. Questions answered and patient verbalizes understanding. Call for any problems, questions, or concerns.

## 2020-07-27 NOTE — LETTER
Diana Silverman M [address] [facility]  1955  [id number]    Have you had any Chest Pain - No      Have you had any Shortness of Breath - Yes  If Yes - When at rest and on exertion    Have you had any dizziness - No      Have you had any palpitations - Yes  If Yes DO EKG - Do you feel your heart fluttering  How long does it last - seconds     Is the patient on any of the following medications - NONE  If Yes DO EKG    Do you have any edema - NO    Do you have a surgery or procedure scheduled in the near future - No      Ask patient if [facility] if [facility] are not already [facility]    Check to see if we have an E-MAIL on file for the patient    Check medication list thoroughly!!!  BE SURE TO ASK PATIENT IF THEY NEED MEDICATION REFILLS

## 2020-09-01 ENCOUNTER — APPOINTMENT (OUTPATIENT)
Dept: GENERAL RADIOLOGY | Age: 65
End: 2020-09-01
Payer: COMMERCIAL

## 2020-09-01 ENCOUNTER — HOSPITAL ENCOUNTER (EMERGENCY)
Age: 65
Discharge: HOME | End: 2020-09-01
Attending: EMERGENCY MEDICINE
Payer: COMMERCIAL

## 2020-09-01 ENCOUNTER — APPOINTMENT (OUTPATIENT)
Dept: CT IMAGING | Age: 65
End: 2020-09-01
Payer: COMMERCIAL

## 2020-09-01 VITALS
HEART RATE: 80 BPM | DIASTOLIC BLOOD PRESSURE: 80 MMHG | WEIGHT: 187 LBS | BODY MASS INDEX: 35.3 KG/M2 | RESPIRATION RATE: 17 BRPM | TEMPERATURE: 98.1 F | OXYGEN SATURATION: 94 % | HEIGHT: 61 IN | SYSTOLIC BLOOD PRESSURE: 168 MMHG

## 2020-09-01 LAB
ALBUMIN SERPL-MCNC: 4.2 GM/DL (ref 3.4–5)
ALP BLD-CCNC: 178 IU/L (ref 40–129)
ALT SERPL-CCNC: 16 U/L (ref 10–40)
ANION GAP SERPL CALCULATED.3IONS-SCNC: 12 MMOL/L (ref 4–16)
AST SERPL-CCNC: 23 IU/L (ref 15–37)
BACTERIA: ABNORMAL /HPF
BASOPHILS ABSOLUTE: 0.1 K/CU MM
BASOPHILS RELATIVE PERCENT: 0.5 % (ref 0–1)
BILIRUB SERPL-MCNC: 0.6 MG/DL (ref 0–1)
BILIRUBIN URINE: NEGATIVE MG/DL
BLOOD, URINE: NEGATIVE
BUN BLDV-MCNC: 11 MG/DL (ref 6–23)
CALCIUM SERPL-MCNC: 9.2 MG/DL (ref 8.3–10.6)
CHLORIDE BLD-SCNC: 103 MMOL/L (ref 99–110)
CHP ED QC CHECK: YES
CLARITY: CLEAR
CO2: 26 MMOL/L (ref 21–32)
COLOR: YELLOW
CREAT SERPL-MCNC: 1.1 MG/DL (ref 0.6–1.1)
DIFFERENTIAL TYPE: ABNORMAL
EOSINOPHILS ABSOLUTE: 0 K/CU MM
EOSINOPHILS RELATIVE PERCENT: 0.2 % (ref 0–3)
GFR AFRICAN AMERICAN: >60 ML/MIN/1.73M2
GFR NON-AFRICAN AMERICAN: 50 ML/MIN/1.73M2
GLUCOSE BLD-MCNC: 55 MG/DL (ref 70–99)
GLUCOSE BLD-MCNC: 74 MG/DL (ref 70–99)
GLUCOSE BLD-MCNC: 79 MG/DL
GLUCOSE, URINE: NEGATIVE MG/DL
HCT VFR BLD CALC: 43 % (ref 37–47)
HEMOGLOBIN: 14.2 GM/DL (ref 12.5–16)
IMMATURE NEUTROPHIL %: 0.4 % (ref 0–0.43)
KETONES, URINE: NEGATIVE MG/DL
LEUKOCYTE ESTERASE, URINE: NEGATIVE
LYMPHOCYTES ABSOLUTE: 1 K/CU MM
LYMPHOCYTES RELATIVE PERCENT: 9.7 % (ref 24–44)
MCH RBC QN AUTO: 30.7 PG (ref 27–31)
MCHC RBC AUTO-ENTMCNC: 33 % (ref 32–36)
MCV RBC AUTO: 93.1 FL (ref 78–100)
MONOCYTES ABSOLUTE: 0.4 K/CU MM
MONOCYTES RELATIVE PERCENT: 4.2 % (ref 0–4)
NITRITE URINE, QUANTITATIVE: NEGATIVE
NUCLEATED RBC %: 0 %
PDW BLD-RTO: 13.2 % (ref 11.7–14.9)
PH, URINE: 7 (ref 5–8)
PLATELET # BLD: 321 K/CU MM (ref 140–440)
PMV BLD AUTO: 9.8 FL (ref 7.5–11.1)
POTASSIUM SERPL-SCNC: 3.9 MMOL/L (ref 3.5–5.1)
PRO-BNP: 2354 PG/ML
PROTEIN UA: 100 MG/DL
RBC # BLD: 4.62 M/CU MM (ref 4.2–5.4)
RBC URINE: 1 /HPF (ref 0–6)
SEGMENTED NEUTROPHILS ABSOLUTE COUNT: 9 K/CU MM
SEGMENTED NEUTROPHILS RELATIVE PERCENT: 85 % (ref 36–66)
SODIUM BLD-SCNC: 141 MMOL/L (ref 135–145)
SPECIFIC GRAVITY UA: 1 (ref 1–1.03)
SQUAMOUS EPITHELIAL: 1 /HPF
TOTAL IMMATURE NEUTOROPHIL: 0.04 K/CU MM
TOTAL NUCLEATED RBC: 0 K/CU MM
TOTAL PROTEIN: 6.6 GM/DL (ref 6.4–8.2)
TROPONIN T: <0.01 NG/ML
UROBILINOGEN, URINE: <2 MG/DL (ref 0.2–1)
WBC # BLD: 10.6 K/CU MM (ref 4–10.5)
WBC UA: <1 /HPF (ref 0–5)
YEAST: ABNORMAL /HPF

## 2020-09-01 PROCEDURE — 71046 X-RAY EXAM CHEST 2 VIEWS: CPT

## 2020-09-01 PROCEDURE — 82962 GLUCOSE BLOOD TEST: CPT

## 2020-09-01 PROCEDURE — 70450 CT HEAD/BRAIN W/O DYE: CPT

## 2020-09-01 PROCEDURE — 99285 EMERGENCY DEPT VISIT HI MDM: CPT

## 2020-09-01 PROCEDURE — 94761 N-INVAS EAR/PLS OXIMETRY MLT: CPT

## 2020-09-01 PROCEDURE — 36415 COLL VENOUS BLD VENIPUNCTURE: CPT

## 2020-09-01 PROCEDURE — 85025 COMPLETE CBC W/AUTO DIFF WBC: CPT

## 2020-09-01 PROCEDURE — 81001 URINALYSIS AUTO W/SCOPE: CPT

## 2020-09-01 PROCEDURE — 93005 ELECTROCARDIOGRAM TRACING: CPT | Performed by: PHYSICIAN ASSISTANT

## 2020-09-01 PROCEDURE — 72125 CT NECK SPINE W/O DYE: CPT

## 2020-09-01 PROCEDURE — 83880 ASSAY OF NATRIURETIC PEPTIDE: CPT

## 2020-09-01 PROCEDURE — 80053 COMPREHEN METABOLIC PANEL: CPT

## 2020-09-01 PROCEDURE — 93010 ELECTROCARDIOGRAM REPORT: CPT | Performed by: INTERNAL MEDICINE

## 2020-09-01 PROCEDURE — 84484 ASSAY OF TROPONIN QUANT: CPT

## 2020-09-01 PROCEDURE — 72170 X-RAY EXAM OF PELVIS: CPT

## 2020-09-01 PROCEDURE — 6370000000 HC RX 637 (ALT 250 FOR IP): Performed by: PHYSICIAN ASSISTANT

## 2020-09-01 RX ORDER — AMLODIPINE BESYLATE 5 MG/1
5 TABLET ORAL ONCE
Status: COMPLETED | OUTPATIENT
Start: 2020-09-01 | End: 2020-09-01

## 2020-09-01 RX ORDER — METOPROLOL SUCCINATE 100 MG/1
100 TABLET, EXTENDED RELEASE ORAL DAILY
Status: DISCONTINUED | OUTPATIENT
Start: 2020-09-01 | End: 2020-09-01 | Stop reason: HOSPADM

## 2020-09-01 RX ORDER — CLONIDINE HYDROCHLORIDE 0.1 MG/1
0.3 TABLET ORAL ONCE
Status: COMPLETED | OUTPATIENT
Start: 2020-09-01 | End: 2020-09-01

## 2020-09-01 RX ADMIN — AMLODIPINE BESYLATE 5 MG: 5 TABLET ORAL at 14:14

## 2020-09-01 RX ADMIN — CLONIDINE HYDROCHLORIDE 0.3 MG: 0.1 TABLET ORAL at 14:14

## 2020-09-01 RX ADMIN — METOPROLOL SUCCINATE 100 MG: 100 TABLET, EXTENDED RELEASE ORAL at 14:36

## 2020-09-01 ASSESSMENT — PAIN SCALES - GENERAL: PAINLEVEL_OUTOF10: 6

## 2020-09-01 NOTE — ED NOTES
Pt presents to the ED via EMS with c/o a fall and head injury. Pt currently c/o a headache. Per EMS pt's BG was 59 upon arrival. Pt stated she fell out of her hospital bed at home because the railings were not up. Pt reports taking ASA and Eliquis.       Janet Masters RN  09/01/20 [address] [place]  09/01/20 1228

## 2020-09-01 NOTE — ED PROVIDER NOTES
ATTENDING NOTE:    I discussed this patient's history and physical findings and reviewed the PA's findings with them, as well as performed an independent assessment and coordinated care with them. My additional findings are:      EMERGENCY DEPARTMENT ENCOUNTER    Patient: Raffaele Chavis  MRN: [id number]  : 1955  Date of Evaluation: 2020  ED Provider:  Mark Whitney    CHIEF COMPLAINT  Chief Complaint   Patient presents with    Fall    Head Injury    Headache       HPI  Raffaele Chavis is a 62 y.o. female who presents for evaluation after a head injury which occurred after falling out of bed this morning. After that she states she had difficulty walking which she attributes to generalized weakness. She denies any focal weakness. Denies dizziness. Denies any disequilibrium or loss of coordination. Denies loss of sensation. Denies visual changes. Has some pain at the top of her head where she hit her head earlier but denies pain anywhere else. Denies loss consciousness. Denies any other associated symptoms or complaints or concerns.       REVIEW OF SYSTEMS    Constitutional: negative for fever, chills  Neurological: negative for focal weakness, loss of sensation  Ophthalmic: negative for vision change  ENT: negative for congestion, rhinorrhea  Cardiovascular: negative for chest pain  Respiratory: negative for SOB, cough  GI: negative for abdominal pain, nausea, vomiting, diarrhea, constipation  : negative for dysuria, hematuria  Musculoskeletal: negative for myalgias, decreased ROM, joint swelling  Dermatological: negative for rash, wounds  Heme: Negative for bleeding, bruising      PAST MEDICAL HISTORY  Past Medical History:   Diagnosis Date    Abnormal PFTs (pulmonary function tests)     Atrial fibrillation ([address]     CAD (coronary artery disease)     CHF (congestive heart failure) ([address]     Diabetes mellitus (HCC)     Dizziness - light-headed     Family history of coronary artery disease     H/O 24 hour EKG monitoring 02/09/2012,12/10/2010    02/09/2012 predominant rhythm is sinus with short runs of SVT no episode of atrial fib. pt has left bundle branch morphology, max heart is 132, min is 48 with an average of 74 ,infrequent PVCs are seen    H/O cardiovascular stress test 4/1/2013, 1/31/2012 4/1/2013-    H/O cardiovascular stress test 4/16/14,02/09/2012 4/16/14 EF 52% No ischemia, normal study. 02/09/2012 EF 58% tehnique-technetium LVEF is normal globally    H/O echocardiogram 03/27/13 4/14 EF 50-55% mild MR, TR 3/13EF 35-45% decreasedr L ventrical systolic function.  H/O echocardiogram 4/14 4/14-EF50-55% Mild MR/TR. 10/11 EF > 55% LVS function is normal, calcified mitral apparatus, impaired LV relaxation, no PE    Heart imaging 02/09/2012 02/09/2012 MUGA rest EF 58%LVEF is normal globally    Heartburn     History of cardiac cath 05/08/2009, 06/14/2006 05/08/2009,moderate stenosis  small diagonal    History of cardiac monitoring 2/9/15    Event - sinus rhythm    History of cardioversion 3/14/14    History of MI (myocardial infarction)     History of nuclear Muga stress test 07/14/2020    EF 41%    History of PTCA 10/06/2010    10/06/2010 stent 3.0x24 taxus stent left main 90% stenosis mid seg reduced to 0% second area of [id number] to 40% not flow limiting EF 40%    Hx of cardiovascular stress test 8/21/2015    lexiscan-normal,EF70%    Hx of transesophageal echocardiography ([facility]) for monitoring 08/07/2017    Noted to have MARY GRACE Blood Clot.  Hyperlipidemia     Hypertension     Insomnia     SOB (shortness of breath)     Tachycardia 1/30/2012    report in epic notes-[facility] consult    TIA (transient ischemic attack)        CURRENT MEDICATIONS  [unfilled]    ALLERGIES  Allergies   Allergen Reactions    Amiodarone Other (See Comments)     dizziness    Iv Dye [Iodides] Nausea And Vomiting    Vicodin [Hydrocodone-Acetaminophen] Nausea And Vomiting       SURGICAL HISTORY  Past Surgical History:   Procedure Laterality Date    ABLATION OF DYSRHYTHMIC FOCUS       SECTION      CHOLECYSTECTOMY      DIAGNOSTIC CARDIAC CATH LAB PROCEDURE  2006, 2009 moderate stenosis small diagonal    OTHER SURGICAL HISTORY  2017    afib ablation LUKE    PTCA  10/06/2010    10/06/2010 stent 3.0x24 taxus stent 90% stenosis mid segment reduced to 0% second area of stenosis 30-40% not flow limiting EF 40%    TUBAL LIGATION         FAMILY HISTORY  Family History   Problem Relation Age of Onset    Diabetes Father     Heart Disease Father        SOCIAL HISTORY  Social History     Socioeconomic History    Marital status:      Spouse name: Not on file    Number of children: Not on file    Years of education: Not on file    Highest education level: Not on file   Occupational History    Not on file   Social Needs    Financial resource strain: Not on file    Food insecurity     Worry: Not on file     Inability: Not on file    Transportation needs     Medical: Not on file     Non-medical: Not on file   Tobacco Use    Smoking status: Former Smoker     Packs/day: 3.00     Years: 20.00     Pack years: 60.00     Types: Cigarettes     Last attempt to quit: 2007     Years since quittin.5    Smokeless tobacco: Never Used    Tobacco comment: reviewed 8/12/15   Substance and Sexual Activity    Alcohol use: No     Comment: occas. jeannette amos    Drug use: No    Sexual activity: Not Currently   Lifestyle    Physical activity     Days per week: Not on file     Minutes per session: Not on file    Stress: Not on file   Relationships    Social connections     Talks on phone: Not on file     Gets together: Not on file     Attends Synagogue service: Not on file     Active member of club or organization: Not on file     Attends meetings of clubs or organizations: Not on file Relationship status: Not on file    Intimate partner violence     Fear of current or ex partner: Not on file     Emotionally abused: Not on file     Physically abused: Not on file     Forced sexual activity: Not on file   Other Topics Concern    Not on file   Social History Narrative    Not on file         **Past medical, family and social histories, and nursing notes reviewed and verified by me**      PHYSICAL EXAM  VITAL SIGNS:   ED Triage Vitals   Enc Vitals Group      BP 09/01/20 1219 (!) 191/111      Pulse 09/01/20 1219 101      Resp 09/01/20 1219 17      Temp --       Temp src --       SpO2 09/01/20 1219 94 %      Weight 09/01/20 1219 187 lb (84.8 kg)      Height 09/01/20 1219 5' 1\" (1.549 m)      Head Circumference --       Peak Flow --       Pain Score --       Pain Loc --       Pain Edu? --       Excl. in [address] during ED course were reviewed and are as charted. Constitutional: Minimal distress, Non-toxic appearance  Eyes: Conjunctiva normal, No discharge, PERRLA, EOMI  HENT: Normocephalic, Atraumatic, bilateral external ears normal, bilateral TMs appear normal and without evidence of hemotympanum, posterior oropharynx is nonerythematous and without exudate, uvula is midline, oropharynx moist  Neck: Supple, no midline cervical spinal tenderness or palpable deformity, no stridor, no grossly visible or palpable masses  Cardiovascular: Regular rate and rhythm, No murmurs, No rubs, No gallops, no JVD, no carotid bruits  Pulmonary/Chest: Normal breath sounds, No respiratory distress or accessory muscle use, No wheezing, crackles or rhonchi. Abdomen: Soft, nondistended and nonrigid, No tenderness or peritoneal signs, No masses, normal bowel sounds  Back: No midline point tenderness, No paraspinous muscle tenderness.  No CVA tenderness  Extremities: No gross deformities, no edema, no tenderness  Skin: Warm, Dry, No erythema, No rash, No cyanosis, No mottling  Lymphatic: No lymphadenopathy in the following location(s): cervical  Psychiatric: Alert and oriented x3, Affect normal    Neurologic:   Mental Status Exam:   Alert and oriented times three, follows commands, speech and language intact, gait normal    Cranial Vxosio-LC-ICZ Intact as tested. Cranial nerve II  Visual acuity: normal  Cranial nerve III  Pupils: equal, round, reactive to light  Cranial nerves III, IV, VI  Extraocular Movements: intact  Cranial nerve V  Facial sensation: intact  Cranial nerve VII  Facial strength: intact  Cranial nerve VIII  Hearing: intact  Cranial nerve IX  Palate: intact  Cranial nerve XI  Shoulder shrug: intact  Cranial nerve XII  Tongue movement: normal    Motor:   Drift: absent  Motor exam is symmetrical 5 out of 5 all extremities bilaterally  Tone: normal  Abnormal Movements: Absent    DTRs- intact bilaterally and symmetrical.  Toes-downgoing bilaterally  Sensory:  Light Touch and Pinprick  Right Upper Extremity: normal  Left Upper Extremity: normal  Right Lower Extremity: normal  Left Lower Extremity: normal    Normal finger-to-nose, normal heel-to-shin, normal rapid alternating hand movements, no pronator drift. The HINTS exam was performed with the following findings noted:   Nystagmus: no nystagmus was noted.  Skew deviation: grossly Absent . HINTS Exam Findings consistent with peripheral vertigo Findings consistent with central vertigo. ..    Head Impulse Test present corrective saccade  absent corrective saccade   Nystagmus unidirectional, horizontal bidirectional or vertical   Test of Skew no skew deviation vertical skew         NIH Stroke Score:  1A: Level of Consciousness  Alert; keenly responsive 0  Arouses to minor stimulation +1  Requires repeated stimulation to arouse +2  Movements to Pain +2  Postures or Unresponsive +3     1B: Ask Month and Age  Both Questions Right 0  1 Question Right +1  0 Questions Right +2  Dysarthric/Intubated/ Trauma/Language Barrier +1  Aphasic +2     1C: 'Blink Eyes' & 'Squeeze Hands'(Pantomime Commands if Communication Barrier)  Performs Both Tasks0  Performs 1 Task+1  Performs 0 Tasks+2     2: Test Horizontal Extraocular Movements  Normal 0  Partial Gaze Palsy: Can Be Overcome +1  Partial Gaze Palsy: Corrects with Oculocephalic Reflex +1  Forced Gaze Palsy: Cannot Be Overcome +2     3: Test Visual Fields  No Visual Loss 0  Partial Hemianopia +1  Complete Hemianopia +2  Patient is Bilaterally Blind +3  Bilateral Hemianopia +3     4: Test Facial Palsy(Use Grimace if Obtunded)  Normal symmetry 0  Minor paralysis (flat nasolabial fold, smile asymmetry) +1  Partial paralysis (lower face) +2  Unilateral Complete paralysis (upper/lower face) +3  Bilateral Complete paralysis (upper/lower face) +3     5A: Test Left Arm Motor Drift  Amputation/Joint Fusion 0  No Drift for 10 Seconds 0  Drift, but doesn't hit bed +1  Drift, hits bed +2  Some Effort Against Gravity +2  No Effort Against Gravity +3  No Movement +4     5B:  Test Right Arm Motor Drift  Amputation/Joint Fusion 0  No Drift for 10 Seconds 0  Drift, but doesn't hit bed +1  Drift, hits bed +2  Some Effort Against Gravity +2  No Effort Against Gravity +3  No Movement +4     6A: Test Left Leg Motor Drift  Amputation/Joint Fusion 0  No Drift for 5 Seconds 0  Drift, but doesn't hit bed +1  Drift, hits bed +2  Some Effort Against Gravity +2  No Effort Against Gravity +3  No Movement +4     6B: Test Right Leg Motor Drift  Amputation/Joint Fusion 0  No Drift for 5 Seconds 0  Drift, but doesn't hit bed +1  Drift, hits bed +2  Some Effort Against Gravity +2  No Effort Against Gravity +3  No Movement +4     7: Test Limb Ataxia(FTN/Heel-Shin)  Amputation/Joint Fusion 0  Does Not Understand 0  Paralyzed 0  No Ataxia 0  Ataxia in 1 Limb +1  Ataxia in 2 Limbs +2     8: Test Sensation  Normal; No sensory loss 0  Mild-Moderate Loss: Less Sharp/More Dull +1  Mild-Moderate Loss: Can Sense Being Touched +1  Complete Loss: Cannot Sense Being Touched At All +2  No Response and Quadriplegic +2  Coma/Unresponsive +2     9: Test Language/Aphasia (Describe the scene; name the words; read the sentences)  Normal; No aphasia 0  Mild-Moderate Aphasia: Some Obvious Changes, Without Significant Limitation +1  Severe Aphasia: Fragmentary Expression, Inference Needed, Cannot Identify  Materials +2  Mute/Global Aphasia: No Usable Speech/Auditory Comprehension +3  Coma/Unresponsive +3     10: Test Dysarthria (Read the words)  Intubated/Unable to Test 0  Normal 0  Mild-Moderate Dysarthria: Slurring but can be understood +1  Severe Dysarthria: Unintelligble Slurring or Out of Proportion to Dysphasia +2  Mute/Anarthric +2     11: Test Extinction/Inattention  No abnormality 0  Visual/tactile/auditory/spatial/personal inattention +1  Extinction to bilateral simultaneous stimulation +1  Profound juan-inattention (ex: does not recognize own hand) +2  Extinction to >1 modality +2     NIH score is 0        EKG Interpretation    Interpreted by emergency department physician    Rhythm: normal sinus   Rate: normal  Axis: left  Ectopy: premature ventricular contractions (frequent)  Conduction: left bundle branch block (complete)  ST Segments: normal  T Waves: no acute change  Q Waves: none    Clinical Impression: Normal sinus rhythm with left bundle branch block and frequent PVCs        RADIOLOGY/PROCEDURES/LABS/MEDICATIONS ADMINISTERED:    I have reviewed and interpreted all of the currently available lab results from this visit (if applicable):  Results for orders placed or performed during the hospital encounter of 09/01/20   CBC Auto Differential   Result Value Ref Range    WBC 10.6 (H) 4.0 - 10.5 K/CU MM    RBC 4.62 4.2 - 5.4 M/CU MM    Hemoglobin 14.2 12.5 - 16.0 GM/DL    Hematocrit 43.0 37 - 47 %    MCV 93.1 78 - 100 FL    MCH 30.7 27 - 31 PG    MCHC 33.0 32.0 - 36.0 %    RDW 13.2 11.7 - 14.9 %    Platelets 019 500 - 211 K/CU MM    MPV 9.8 7.5 - 11.1 FL    Differential Type

## 2020-09-01 NOTE — ED PROVIDER NOTES
2012,12/10/2010    2012 predominant rhythm is sinus with short runs of SVT no episode of atrial fib. pt has left bundle branch morphology, max heart is 132, min is 48 with an average of 74 ,infrequent PVCs are seen    H/O cardiovascular stress test 2013, 2012-    H/O cardiovascular stress test 14,2012 EF 52% No ischemia, normal study. 2012 EF 58% tehnique-technetium LVEF is normal globally    H/O echocardiogram 13 EF 50-55% mild MR, TR 3/13EF 35-45% decreasedr L ventrical systolic function.  H/O echocardiogram -EF50-55% Mild MR/TR. 10/11 EF > 55% LVS function is normal, calcified mitral apparatus, impaired LV relaxation, no PE    Heart imaging 2012 MUGA rest EF 58%LVEF is normal globally    Heartburn     History of cardiac cath 2009, 2006,moderate stenosis  small diagonal    History of cardiac monitoring 2/9/15    Event - sinus rhythm    History of cardioversion 3/14/14    History of MI (myocardial infarction)     History of nuclear Muga stress test 2020    EF 41%    History of PTCA 10/06/2010    10/06/2010 stent 3.0x24 taxus stent left main 90% stenosis mid seg reduced to 0% second area of [id number] to 40% not flow limiting EF 40%    Hx of cardiovascular stress test 2015    lexiscan-normal,EF70%    Hx of transesophageal echocardiography ([facility]) for monitoring 2017    Noted to have MARY GRACE Blood Clot.  Hyperlipidemia     Hypertension     Insomnia     SOB (shortness of breath)     Tachycardia 2012    report in epic notes-[facility] consult    TIA (transient ischemic attack)      Past Surgical History:   Procedure Laterality Date    ABLATION OF DYSRHYTHMIC FOCUS       SECTION      CHOLECYSTECTOMY      DIAGNOSTIC CARDIAC CATH LAB PROCEDURE  2006, 2009 moderate stenosis small diagonal    OTHER SURGICAL HISTORY  09/05/2017    afib ablation LUKE    PTCA  10/06/2010    10/06/2010 stent 3.0x24 taxus stent 90% stenosis mid segment reduced to 0% second area of stenosis 30-40% not flow limiting EF 40%    TUBAL LIGATION         CURRENT MEDICATIONS    Current Outpatient Rx   Medication Sig Dispense Refill    metoprolol succinate (TOPROL XL) 100 MG extended release tablet Take 1 tablet by mouth daily 30 tablet 2    furosemide (LASIX) 40 MG tablet Take 1 tablet by mouth 2 times daily 60 tablet 3    potassium chloride (KLOR-CON M) 10 MEQ extended release tablet Take 1 tablet by mouth 2 times daily (with meals) 60 tablet 3    albuterol sulfate HFA (VENTOLIN HFA) 108 (90 Base) MCG/ACT inhaler Inhale 2 puffs into the lungs 4 times daily as needed for Wheezing 1 Inhaler 5    cloNIDine (CATAPRES) 0.3 MG tablet Take 0.3 mg by mouth nightly      BASAGLAR KWIKPEN 100 UNIT/ML injection pen Inject 80 Units into the skin nightly      ADMELOG SOLOSTAR 100 UNIT/ML pen Inject into the skin See Admin Instructions      atorvastatin (LIPITOR) 40 MG tablet Take 40 mg by mouth nightly      escitalopram (LEXAPRO) 20 MG tablet Take 20 mg by mouth daily      metFORMIN (GLUCOPHAGE) 500 MG tablet Take 500 mg by mouth 2 times daily      pantoprazole (PROTONIX) 40 MG tablet Take 40 mg by mouth daily      zolpidem (AMBIEN) 10 MG tablet Take 10 mg by mouth nightly as needed for Sleep.        dicyclomine (BENTYL) 10 MG capsule Take 1 capsule by mouth 4 times daily (before meals and nightly) 20 capsule 0    ondansetron (ZOFRAN ODT) 4 MG disintegrating tablet Take 1 tablet by mouth every 8 hours as needed for Nausea 15 tablet 0    amLODIPine (NORVASC) 5 MG tablet Take 1 tablet by mouth daily 30 tablet 3    hydrOXYzine (VISTARIL) 50 MG capsule Take 50 mg by mouth daily      levothyroxine (SYNTHROID) 50 MCG tablet Take 50 mcg by mouth Daily      apixaban (ELIQUIS) 5 MG TABS tablet Take 1 tablet by mouth 2 times daily 60 tablet 2    nitroGLYCERIN (NITROSTAT) 0.4 MG SL tablet Place 0.4 mg under the tongue every 5 minutes as needed for Chest pain up to max of 3 total doses. If no relief after 1 dose, call 911.  aspirin 81 MG EC tablet Take 81 mg by mouth daily      traZODone (DESYREL) 100 MG tablet Take 100 mg by mouth nightly. ALLERGIES    Allergies   Allergen Reactions    Amiodarone Other (See Comments)     dizziness    Iv Dye [Iodides] Nausea And Vomiting    Vicodin [Hydrocodone-Acetaminophen] Nausea And Vomiting       SOCIAL & FAMILY HISTORY    Social History     Socioeconomic History    Marital status:      Spouse name: Not on file    Number of children: Not on file    Years of education: Not on file    Highest education level: Not on file   Occupational History    Not on file   Social Needs    Financial resource strain: Not on file    Food insecurity     Worry: Not on file     Inability: Not on file    Transportation needs     Medical: Not on file     Non-medical: Not on file   Tobacco Use    Smoking status: Former Smoker     Packs/day: 3.00     Years: 20.00     Pack years: 60.00     Types: Cigarettes     Last attempt to quit: 2007     Years since quittin.5    Smokeless tobacco: Never Used    Tobacco comment: reviewed 8/12/15   Substance and Sexual Activity    Alcohol use: No     Comment: occas. mtn.  bette    Drug use: No    Sexual activity: Not Currently   Lifestyle    Physical activity     Days per week: Not on file     Minutes per session: Not on file    Stress: Not on file   Relationships    Social connections     Talks on phone: Not on file     Gets together: Not on file     Attends Restoration service: Not on file     Active member of club or organization: Not on file     Attends meetings of clubs or organizations: Not on file     Relationship status: Not on file    Intimate partner violence     Fear of current or ex partner: Not on file     Emotionally abused: Not on file     Physically Result Value Ref Range    WBC 10.6 (H) 4.0 - 10.5 K/CU MM    RBC 4.62 4.2 - 5.4 M/CU MM    Hemoglobin 14.2 12.5 - 16.0 GM/DL    Hematocrit 43.0 37 - 47 %    MCV 93.1 78 - 100 FL    MCH 30.7 27 - 31 PG    MCHC 33.0 32.0 - 36.0 %    RDW 13.2 11.7 - 14.9 %    Platelets 101 724 - 911 K/CU MM    MPV 9.8 7.5 - 11.1 FL    Differential Type AUTOMATED DIFFERENTIAL     Segs Relative 85.0 (H) 36 - 66 %    Lymphocytes % 9.7 (L) 24 - 44 %    Monocytes % 4.2 (H) 0 - 4 %    Eosinophils % 0.2 0 - 3 %    Basophils % 0.5 0 - 1 %    Segs Absolute 9.0 K/CU MM    Lymphocytes Absolute 1.0 K/CU MM    Monocytes Absolute 0.4 K/CU MM    Eosinophils Absolute 0.0 K/CU MM    Basophils Absolute 0.1 K/CU MM    Nucleated RBC % 0.0 %    Total Nucleated RBC 0.0 K/CU MM    Total Immature Neutrophil 0.04 K/CU MM    Immature Neutrophil % 0.4 0 - 0.43 %   Comprehensive Metabolic Panel   Result Value Ref Range    Sodium 141 135 - 145 MMOL/L    Potassium 3.9 3.5 - 5.1 MMOL/L    Chloride 103 99 - 110 mMol/L    CO2 26 21 - 32 MMOL/L    BUN 11 6 - 23 MG/DL    CREATININE 1.1 0.6 - 1.1 MG/DL    Glucose 55 (L) 70 - 99 MG/DL    Calcium 9.2 8.3 - 10.6 MG/DL    Alb 4.2 3.4 - 5.0 GM/DL    Total Protein 6.6 6.4 - 8.2 GM/DL    Total Bilirubin 0.6 0.0 - 1.0 MG/DL    ALT 16 10 - 40 U/L    AST 23 15 - 37 IU/L    Alkaline Phosphatase 178 (H) 40 - 129 IU/L    GFR Non- 50 (L) >60 mL/min/1.73m2    GFR African American >60 >60 mL/min/1.73m2    Anion Gap 12 4 - 16   Troponin   Result Value Ref Range    Troponin T <0.010 <0.01 NG/ML   Brain Natriuretic Peptide   Result Value Ref Range    Pro-BNP 2,354 (H) <300 PG/ML   Urinalysis   Result Value Ref Range    Color, UA YELLOW YELLOW    Clarity, UA CLEAR CLEAR    Glucose, Urine NEGATIVE NEGATIVE MG/DL    Bilirubin Urine NEGATIVE NEGATIVE MG/DL    Ketones, Urine NEGATIVE NEGATIVE MG/DL    Specific Gravity, UA 1.005 1.001 - 1.035    Blood, Urine NEGATIVE NEGATIVE    pH, Urine 7.0 5.0 - 8.0    Protein,  (A) NEGATIVE MG/DL    Urobilinogen, Urine <2.0 (H) 0.2 - 1.0 MG/DL    Nitrite Urine, Quantitative NEGATIVE NEGATIVE    Leukocyte Esterase, Urine NEGATIVE NEGATIVE    RBC, UA 1 0 - 6 /HPF    WBC, UA <1 0 - 5 /HPF    Bacteria, UA RARE (A) NEGATIVE /HPF    Yeast, UA RARE /HPF    Squam Epithel, UA 1 /HPF   POCT Glucose   Result Value Ref Range    Glucose 79 mg/dL    QC OK? yes    POCT Glucose   Result Value Ref Range    POC Glucose 74 70 - 99 MG/DL   EKG 12 Lead   Result Value Ref Range    Ventricular Rate 99 BPM    Atrial Rate 99 BPM    P-R Interval 176 ms    QRS Duration 130 ms    Q-T Interval 398 ms    QTc Calculation (Bazett) 510 ms    P Axis 61 degrees    R Axis -16 degrees    T Axis 113 degrees    Diagnosis       Sinus rhythm with frequent premature ventricular complexes  Left bundle branch block  Abnormal ECG  When compared with ECG of 11-JUN-2020 15:20,  premature ventricular complexes are now present    Confirmed by [facility] Yomaira TORRES ([id number]) on 9/1/2020 6:09:44 PM             EKG Interpretation  Please see ED physician's note for EKG interpretation         RADIOLOGY   XR CHEST (2 VW)   Final Result   No acute abnormality. XR PELVIS (1-2 VIEWS)   Final Result   No acute osseous fracture is identified. CT CERVICAL SPINE WO CONTRAST   Final Result   No acute abnormality of the cervical spine. CT HEAD WO CONTRAST   Final Result   No acute intracranial abnormality. Mild soft tissue swelling to the high right paramidline frontal scalp. No   acute bony abnormality. ED COURSE & MEDICAL DECISION MAKING        Patient presents as above with what appears to be mechanical fall out of bed. Possibly rolled out of bed. Patient states she struck her top of head on the side of the bed and thus CT head, cervical spine were ordered today. No acute abnormalities on these imaging modalities. Patient remains clinically neurologically intact throughout ED course.   Given uncertain cause

## 2020-09-09 LAB
EKG ATRIAL RATE: 99 BPM
EKG DIAGNOSIS: NORMAL
EKG P AXIS: 61 DEGREES
EKG P-R INTERVAL: 176 MS
EKG Q-T INTERVAL: 398 MS
EKG QRS DURATION: 130 MS
EKG QTC CALCULATION (BAZETT): 510 MS
EKG R AXIS: -16 DEGREES
EKG T AXIS: 113 DEGREES
EKG VENTRICULAR RATE: 99 BPM

## 2020-09-21 ENCOUNTER — HOSPITAL ENCOUNTER (OUTPATIENT)
Age: 65
Discharge: HOME | End: 2020-09-21
Payer: COMMERCIAL

## 2020-09-21 ENCOUNTER — HOSPITAL ENCOUNTER (OUTPATIENT)
Dept: GENERAL RADIOLOGY | Age: 65
Discharge: HOME | End: 2020-09-21
Payer: COMMERCIAL

## 2020-09-21 PROCEDURE — 73630 X-RAY EXAM OF FOOT: CPT

## 2020-09-26 ENCOUNTER — APPOINTMENT (OUTPATIENT)
Dept: GENERAL RADIOLOGY | Age: 65
End: 2020-09-26
Payer: COMMERCIAL

## 2020-09-26 ENCOUNTER — HOSPITAL ENCOUNTER (EMERGENCY)
Age: 65
Discharge: HOME | End: 2020-09-26
Attending: EMERGENCY MEDICINE
Payer: COMMERCIAL

## 2020-09-26 VITALS
SYSTOLIC BLOOD PRESSURE: 117 MMHG | TEMPERATURE: 97.9 F | DIASTOLIC BLOOD PRESSURE: 85 MMHG | HEART RATE: 89 BPM | OXYGEN SATURATION: 100 % | RESPIRATION RATE: 19 BRPM

## 2020-09-26 LAB
ALBUMIN SERPL-MCNC: 3.8 GM/DL (ref 3.4–5)
ALP BLD-CCNC: 146 IU/L (ref 40–129)
ALT SERPL-CCNC: 22 U/L (ref 10–40)
ANION GAP SERPL CALCULATED.3IONS-SCNC: 14 MMOL/L (ref 4–16)
AST SERPL-CCNC: 29 IU/L (ref 15–37)
BASOPHILS ABSOLUTE: 0.1 K/CU MM
BASOPHILS RELATIVE PERCENT: 0.6 % (ref 0–1)
BILIRUB SERPL-MCNC: 0.7 MG/DL (ref 0–1)
BUN BLDV-MCNC: 14 MG/DL (ref 6–23)
CALCIUM SERPL-MCNC: 9.1 MG/DL (ref 8.3–10.6)
CHLORIDE BLD-SCNC: 102 MMOL/L (ref 99–110)
CO2: 24 MMOL/L (ref 21–32)
CREAT SERPL-MCNC: 1.2 MG/DL (ref 0.6–1.1)
DIFFERENTIAL TYPE: ABNORMAL
EOSINOPHILS ABSOLUTE: 0.2 K/CU MM
EOSINOPHILS RELATIVE PERCENT: 1.4 % (ref 0–3)
GFR AFRICAN AMERICAN: 55 ML/MIN/1.73M2
GFR NON-AFRICAN AMERICAN: 45 ML/MIN/1.73M2
GLUCOSE BLD-MCNC: 172 MG/DL (ref 70–99)
GLUCOSE BLD-MCNC: 195 MG/DL (ref 70–99)
HCT VFR BLD CALC: 41 % (ref 37–47)
HEMOGLOBIN: 14.6 GM/DL (ref 12.5–16)
IMMATURE NEUTROPHIL %: 0.3 % (ref 0–0.43)
INR BLD: 0.96 INDEX
LYMPHOCYTES ABSOLUTE: 2.4 K/CU MM
LYMPHOCYTES RELATIVE PERCENT: 20.3 % (ref 24–44)
MCH RBC QN AUTO: 31.8 PG (ref 27–31)
MCHC RBC AUTO-ENTMCNC: 35.6 % (ref 32–36)
MCV RBC AUTO: 89.3 FL (ref 78–100)
MONOCYTES ABSOLUTE: 0.8 K/CU MM
MONOCYTES RELATIVE PERCENT: 6.9 % (ref 0–4)
NUCLEATED RBC %: 0 %
PDW BLD-RTO: 12.8 % (ref 11.7–14.9)
PLATELET # BLD: 296 K/CU MM (ref 140–440)
PMV BLD AUTO: 11.5 FL (ref 7.5–11.1)
POTASSIUM SERPL-SCNC: 3.3 MMOL/L (ref 3.5–5.1)
PRO-BNP: ABNORMAL PG/ML
PROTHROMBIN TIME: 11.6 SECONDS (ref 11.7–14.5)
RBC # BLD: 4.59 M/CU MM (ref 4.2–5.4)
REASON FOR REJECTION: NORMAL
REJECTED TEST: NORMAL
SEGMENTED NEUTROPHILS ABSOLUTE COUNT: 8.3 K/CU MM
SEGMENTED NEUTROPHILS RELATIVE PERCENT: 70.5 % (ref 36–66)
SODIUM BLD-SCNC: 140 MMOL/L (ref 135–145)
TOTAL IMMATURE NEUTOROPHIL: 0.04 K/CU MM
TOTAL NUCLEATED RBC: 0 K/CU MM
TOTAL PROTEIN: 6.6 GM/DL (ref 6.4–8.2)
TROPONIN T: 0.02 NG/ML
WBC # BLD: 11.8 K/CU MM (ref 4–10.5)

## 2020-09-26 PROCEDURE — 93005 ELECTROCARDIOGRAM TRACING: CPT | Performed by: EMERGENCY MEDICINE

## 2020-09-26 PROCEDURE — 82962 GLUCOSE BLOOD TEST: CPT

## 2020-09-26 PROCEDURE — 36415 COLL VENOUS BLD VENIPUNCTURE: CPT

## 2020-09-26 PROCEDURE — 2500000003 HC RX 250 WO HCPCS: Performed by: EMERGENCY MEDICINE

## 2020-09-26 PROCEDURE — 93010 ELECTROCARDIOGRAM REPORT: CPT | Performed by: INTERNAL MEDICINE

## 2020-09-26 PROCEDURE — 94761 N-INVAS EAR/PLS OXIMETRY MLT: CPT

## 2020-09-26 PROCEDURE — 85610 PROTHROMBIN TIME: CPT

## 2020-09-26 PROCEDURE — 85025 COMPLETE CBC W/AUTO DIFF WBC: CPT

## 2020-09-26 PROCEDURE — 83880 ASSAY OF NATRIURETIC PEPTIDE: CPT

## 2020-09-26 PROCEDURE — 99284 EMERGENCY DEPT VISIT MOD MDM: CPT

## 2020-09-26 PROCEDURE — 96376 TX/PRO/DX INJ SAME DRUG ADON: CPT

## 2020-09-26 PROCEDURE — 80053 COMPREHEN METABOLIC PANEL: CPT

## 2020-09-26 PROCEDURE — 71045 X-RAY EXAM CHEST 1 VIEW: CPT

## 2020-09-26 PROCEDURE — 96366 THER/PROPH/DIAG IV INF ADDON: CPT

## 2020-09-26 PROCEDURE — 84484 ASSAY OF TROPONIN QUANT: CPT

## 2020-09-26 PROCEDURE — 96365 THER/PROPH/DIAG IV INF INIT: CPT

## 2020-09-26 RX ORDER — DILTIAZEM HYDROCHLORIDE 5 MG/ML
10 INJECTION INTRAVENOUS ONCE
Status: COMPLETED | OUTPATIENT
Start: 2020-09-26 | End: 2020-09-26

## 2020-09-26 RX ADMIN — DEXTROSE MONOHYDRATE 5 MG/HR: 50 INJECTION, SOLUTION INTRAVENOUS at 07:56

## 2020-09-26 RX ADMIN — DILTIAZEM HYDROCHLORIDE 10 MG: 5 INJECTION INTRAVENOUS at 07:50

## 2020-09-26 NOTE — ED NOTES
Pt signed the AMA paper, pt is aware of risks of leaving AMA and even death. AMA Paper in chart and dc paper work provided with verbal instructions. Pt verbalized understanding and denies further questions. Pt called  for ride home.      Erich Coronado RN  09/26/20 [id number]

## 2020-09-26 NOTE — ED NOTES
Pt aware of need for urine sample, call light within reach. Will cont to monitor.      Rhonda Romeo RN  09/26/20 [id number]

## 2020-09-26 NOTE — ED PROVIDER NOTES
Triage Chief Complaint:   Generalized Body Aches and Shortness of Breath (afib on the monitor)    [place]:  Sabrina Colby is a 62 y.o. female that presents with generalized weakness. Reports symptoms present for 3 weeks. States feeling generally weak, fatigued. Reports some shortness of breath. Denies chest pain but states she has noticed palpitations. Reports that those have been going on for 3 weeks as well. Denies nausea, vomiting, diarrhea, abdominal pain, new cough, fever or chills. She denies any increase in lower extremity edema. She states she does not have history of A. fib that she knows of. Does appear per records that she does. She states she is on anticoagulant, cannot tell me what that is. ROS:  General:  No fevers, no chills, + weakness  Eyes:  no vision change. ENT:  No sore throat, no nasal congestion  Cardiovascular:  No chest pain, + palpitations  Respiratory:  + shortness of breath, no cough  Gastrointestinal:  No pain, no nausea, no vomiting, no diarrhea  Musculoskeletal:  No muscle pain, no joint pain  Skin:  No rash, no pruritis, no easy bruising  Neurologic:  No speech problems, no headache, no weakness, numbness or tingling.   Psychiatric:  No anxiety  Extremities:  no edema, no muscle pain    Past Medical History:   Diagnosis Date    Abnormal PFTs (pulmonary function tests) 4/14 4/14    Atrial fibrillation (HCC)     CAD (coronary artery disease)     CHF (congestive heart failure) (HCC)     Diabetes mellitus (HCC)     Dizziness - light-headed     Family history of coronary artery disease     H/O 24 hour EKG monitoring 02/09/2012,12/10/2010    02/09/2012 predominant rhythm is sinus with short runs of SVT no episode of atrial fib. pt has left bundle branch morphology, max heart is 132, min is 48 with an average of 74 ,infrequent PVCs are seen    H/O cardiovascular stress test 4/1/2013, 1/31/2012 4/1/2013-    H/O cardiovascular stress test 4/16/14,02/09/2012 14 EF 52% No ischemia, normal study. 2012 EF 58% tehnique-technetium LVEF is normal globally    H/O echocardiogram 13 EF 50-55% mild MR, TR 3/13EF 35-45% decreasedr L ventrical systolic function.  H/O echocardiogram -EF50-55% Mild MR/TR. 10/11 EF > 55% LVS function is normal, calcified mitral apparatus, impaired LV relaxation, no PE    Heart imaging 2012 MUGA rest EF 58%LVEF is normal globally    Heartburn     History of cardiac cath 2009, 2006,moderate stenosis  small diagonal    History of cardiac monitoring 2/9/15    Event - sinus rhythm    History of cardioversion 3/14/14    History of MI (myocardial infarction)     History of nuclear Muga stress test 2020    EF 41%    History of PTCA 10/06/2010    10/06/2010 stent 3.0x24 taxus stent left main 90% stenosis mid seg reduced to 0% second area of [id number] to 40% not flow limiting EF 40%    Hx of cardiovascular stress test 2015    lexiscan-normal,EF70%    Hx of transesophageal echocardiography ([facility]) for monitoring 2017    Noted to have MARY GRACE Blood Clot.  Hyperlipidemia     Hypertension     Insomnia     SOB (shortness of breath)     Tachycardia 2012    report in epic notes-Dr [facility] hosp. consult    TIA (transient ischemic attack)      Past Surgical History:   Procedure Laterality Date    ABLATION OF DYSRHYTHMIC FOCUS       SECTION      CHOLECYSTECTOMY      DIAGNOSTIC CARDIAC CATH LAB PROCEDURE  2006, 2009 moderate stenosis small diagonal    OTHER SURGICAL HISTORY  2017    afib ablation [facility]    PTCA  10/06/2010    10/06/2010 stent 3.0x24 taxus stent 90% stenosis mid segment reduced to 0% second area of stenosis 30-40% not flow limiting EF 40%    TUBAL LIGATION       Family History   Problem Relation Age of Onset    Diabetes Father     Heart Disease Father      Social History     Socioeconomic History    Marital status:      Spouse name: Not on file    Number of children: Not on file    Years of education: Not on file    Highest education level: Not on file   Occupational History    Not on file   Social Needs    Financial resource strain: Not on file    Food insecurity     Worry: Not on file     Inability: Not on file    Transportation needs     Medical: Not on file     Non-medical: Not on file   Tobacco Use    Smoking status: Former Smoker     Packs/day: 3.00     Years: 20.00     Pack years: 60.00     Types: Cigarettes     Last attempt to quit: 2007     Years since quittin.6    Smokeless tobacco: Never Used    Tobacco comment: reviewed 8/12/15   Substance and Sexual Activity    Alcohol use: No     Comment: rylee amos    Drug use: No    Sexual activity: Not Currently   Lifestyle    Physical activity     Days per week: Not on file     Minutes per session: Not on file    Stress: Not on file   Relationships    Social connections     Talks on phone: Not on file     Gets together: Not on file     Attends Baptism service: Not on file     Active member of club or organization: Not on file     Attends meetings of clubs or organizations: Not on file     Relationship status: Not on file    Intimate partner violence     Fear of current or ex partner: Not on file     Emotionally abused: Not on file     Physically abused: Not on file     Forced sexual activity: Not on file   Other Topics Concern    Not on file   Social History Narrative    Not on file     No current facility-administered medications for this encounter.       Current Outpatient Medications   Medication Sig Dispense Refill    metoprolol succinate (TOPROL XL) 100 MG extended release tablet Take 1 tablet by mouth daily 30 tablet 2    furosemide (LASIX) 40 MG tablet Take 1 tablet by mouth 2 times daily 60 tablet 3    potassium chloride (KLOR-CON M) 10 MEQ extended release tablet Take 1 tablet by mouth 2 times daily (with meals) 60 tablet 3    albuterol sulfate HFA (VENTOLIN HFA) 108 (90 Base) MCG/ACT inhaler Inhale 2 puffs into the lungs 4 times daily as needed for Wheezing 1 Inhaler 5    cloNIDine (CATAPRES) 0.3 MG tablet Take 0.3 mg by mouth nightly      BASAGLAR KWIKPEN 100 UNIT/ML injection pen Inject 80 Units into the skin nightly      ADMELOG SOLOSTAR 100 UNIT/ML pen Inject into the skin See Admin Instructions      atorvastatin (LIPITOR) 40 MG tablet Take 40 mg by mouth nightly      escitalopram (LEXAPRO) 20 MG tablet Take 20 mg by mouth daily      metFORMIN (GLUCOPHAGE) 500 MG tablet Take 500 mg by mouth 2 times daily      pantoprazole (PROTONIX) 40 MG tablet Take 40 mg by mouth daily      zolpidem (AMBIEN) 10 MG tablet Take 10 mg by mouth nightly as needed for Sleep.  dicyclomine (BENTYL) 10 MG capsule Take 1 capsule by mouth 4 times daily (before meals and nightly) 20 capsule 0    ondansetron (ZOFRAN ODT) 4 MG disintegrating tablet Take 1 tablet by mouth every 8 hours as needed for Nausea 15 tablet 0    amLODIPine (NORVASC) 5 MG tablet Take 1 tablet by mouth daily 30 tablet 3    hydrOXYzine (VISTARIL) 50 MG capsule Take 50 mg by mouth daily      levothyroxine (SYNTHROID) 50 MCG tablet Take 50 mcg by mouth Daily      apixaban (ELIQUIS) 5 MG TABS tablet Take 1 tablet by mouth 2 times daily 60 tablet 2    nitroGLYCERIN (NITROSTAT) 0.4 MG SL tablet Place 0.4 mg under the tongue every 5 minutes as needed for Chest pain up to max of 3 total doses. If no relief after 1 dose, call 911.  aspirin 81 MG EC tablet Take 81 mg by mouth daily      traZODone (DESYREL) 100 MG tablet Take 100 mg by mouth nightly.        Allergies   Allergen Reactions    Amiodarone Other (See Comments)     dizziness    Iv Dye [Iodides] Nausea And Vomiting    Vicodin [Hydrocodone-Acetaminophen] Nausea And Vomiting       Nursing Notes Reviewed    Physical Exam:  ED Triage Vitals   Enc Vitals Group      BP 09/26/20 0721 GFR  55 (*)     All other components within normal limits   BRAIN NATRIURETIC PEPTIDE - Abnormal; Notable for the following components:    Pro-BNP 12,673 (*)     All other components within normal limits   SPECIMEN REJECTION   URINALYSIS       EKG (if obtained): (All EKG's are interpreted by myself in the absence of a cardiologist) This EKG was interpreted by me. Rate is 147, rhythm is A. fib with RVR. QT is 336, QTc 525. There is no ST segment or T wave changes. Left bundle branch block present. This EKG was compared to previous EKG from date 9/1/2020. Chart review shows recent radiographs:  Xr Chest (2 Vw)    Result Date: 9/1/2020  EXAMINATION: TWO XRAY VIEWS OF THE CHEST 9/1/2020 12:30 pm COMPARISON: 06/11/2020. HISTORY: ORDERING SYSTEM PROVIDED HISTORY: fall TECHNOLOGIST PROVIDED HISTORY: Reason for exam:->fall Reason for Exam: fall FINDINGS: Overlying items external to the patient somewhat limit evaluation. Lungs are clear. Cardiac and mediastinal silhouettes are within normal limits. No pneumothoraces. Bony structures appear intact. No acute abnormality. Xr Pelvis (1-2 Views)    Result Date: 9/1/2020  EXAMINATION: ONE XRAY VIEW OF THE PELVIS 9/1/2020 12:30 pm COMPARISON: None. HISTORY: ORDERING SYSTEM PROVIDED HISTORY: fall TECHNOLOGIST PROVIDED HISTORY: Reason for exam:->fall Reason for Exam: fall FINDINGS: Proximal femurs appear well seated within the acetabula. Vascular calcifications are identified. No pelvic fracture is seen. No acute osseous abnormality. Surgical clips are seen overlying the right abdomen. No acute osseous fracture is identified.      Xr Foot Right (min 3 Views)    Result Date: 9/21/2020  EXAMINATION: THREE XRAY VIEWS OF THE RIGHT FOOT 9/21/2020 12:01 pm COMPARISON: 01/30/2012 HISTORY: ORDERING SYSTEM PROVIDED HISTORY: Bruised sole of foot, right, initial encounter TECHNOLOGIST PROVIDED HISTORY: Reason for Exam: fell 1 wk ago-lat pain not getting better Acuity: Acute Type of Exam: Initial FINDINGS: There is an oblique closed minimally displaced fracture of the distal shaft right 5th metatarsal.  No intra-articular extension is seen. Bones are osteopenic. Atherosclerotic vascular calcifications are noted. Plantar calcaneal spur is seen. Closed oblique minimally displaced fracture distal shaft right 5th metatarsal.     Ct Head Wo Contrast    Result Date: 9/1/2020  EXAMINATION: CT OF THE HEAD WITHOUT CONTRAST  9/1/2020 12:40 pm TECHNIQUE: CT of the head was performed without the administration of intravenous contrast. Dose modulation, iterative reconstruction, and/or weight based adjustment of the mA/kV was utilized to reduce the radiation dose to as low as reasonably achievable. COMPARISON: 09/12/2019. HISTORY: ORDERING SYSTEM PROVIDED HISTORY: fall TECHNOLOGIST PROVIDED HISTORY: Has a \"code stroke\" or \"stroke alert\" been called? ->No Reason for exam:->fall Reason for Exam: FALL TODY Acuity: Acute Type of Exam: Initial FINDINGS: BRAIN/VENTRICLES: There is no acute intracranial hemorrhage, mass effect or midline shift. No abnormal extra-axial fluid collection. The gray-white differentiation is maintained without evidence of an acute infarct. There is prominence of the ventricles and sulci due to global parenchymal volume loss. There are nonspecific areas of hypoattenuation within the periventricular and subcortical white matter, which likely represent chronic microvascular ischemic change. ORBITS: The visualized portion of the orbits demonstrate no acute abnormality. SINUSES: The visualized paranasal sinuses and mastoid air cells demonstrate no acute abnormality. SOFT TISSUES/SKULL: Mild soft tissue swelling high right paramidline frontal scalp. No acute bony abnormality. No acute intracranial abnormality. Mild soft tissue swelling to the high right paramidline frontal scalp. No acute bony abnormality.      Ct Cervical Spine Wo Contrast    Result Date: 9/1/2020  EXAMINATION: CT OF THE CERVICAL SPINE WITHOUT CONTRAST 9/1/2020 12:40 pm TECHNIQUE: CT of the cervical spine was performed without the administration of intravenous contrast. Multiplanar reformatted images are provided for review. Dose modulation, iterative reconstruction, and/or weight based adjustment of the mA/kV was utilized to reduce the radiation dose to as low as reasonably achievable. COMPARISON: 09/14/2014. HISTORY: ORDERING SYSTEM PROVIDED HISTORY: fall TECHNOLOGIST PROVIDED HISTORY: Reason for exam:->fall Reason for Exam: PT. FELL TODAY Acuity: Acute Type of Exam: Initial FINDINGS: BONES/ALIGNMENT: There is no acute fracture or traumatic malalignment. DEGENERATIVE CHANGES: Disc spaces appear to be maintained. There is multilevel facet osteoarthritis. SOFT TISSUES: There is no prevertebral soft tissue swelling. No acute abnormality of the cervical spine. MDM:  Patient presented with generalized weakness, shortness of breath. Was found to be in A. fib with RVR with rates in the 130s. Was given a bolus of Cardizem, followed by a drip. Heart rate came down to the low 100s. Blood pressure has been stable in the 343F systolic. Troponin is slightly detectable. BNP is elevated at 12,000. Given the A. fib with RVR, requiring Cardizem drip will plan for admission to the hospital for further evaluation care. Due to the immediate potential for life-threatening deterioration due to a fib with rvr, I spent 40 minutes providing critical care. This time is excluding time spent performing procedures. Clinical Impression:  A. fib with RVR      (Please note that portions of this note may have been completed with a voice recognition program. Efforts were made to edit the dictations but occasionally words are mis-transcribed.)      Jackquelyn Ahumada, DO  09/26/20 1007      Was waiting for callback for admission when the patient stated that she wanted to leave.   I went back in,

## 2020-09-26 NOTE — ED NOTES
Pt assisted to the restroom with wheelchair dt unsteady balance. Pt missed the hat for the urine sample. Call light within reach.       Kendal Carr RN  09/26/20 0973

## 2020-10-05 ENCOUNTER — HOSPITAL ENCOUNTER (EMERGENCY)
Age: 65
Discharge: HOME | End: 2020-10-05
Payer: COMMERCIAL

## 2020-10-05 ENCOUNTER — APPOINTMENT (OUTPATIENT)
Dept: GENERAL RADIOLOGY | Age: 65
End: 2020-10-05
Payer: COMMERCIAL

## 2020-10-05 VITALS
WEIGHT: 189 LBS | DIASTOLIC BLOOD PRESSURE: 92 MMHG | OXYGEN SATURATION: 93 % | RESPIRATION RATE: 16 BRPM | BODY MASS INDEX: 35.68 KG/M2 | SYSTOLIC BLOOD PRESSURE: 162 MMHG | HEIGHT: 61 IN | TEMPERATURE: 98.6 F | HEART RATE: 92 BPM

## 2020-10-05 PROCEDURE — 73110 X-RAY EXAM OF WRIST: CPT

## 2020-10-05 PROCEDURE — 73630 X-RAY EXAM OF FOOT: CPT

## 2020-10-05 PROCEDURE — 6370000000 HC RX 637 (ALT 250 FOR IP): Performed by: PHYSICIAN ASSISTANT

## 2020-10-05 PROCEDURE — 99283 EMERGENCY DEPT VISIT LOW MDM: CPT

## 2020-10-05 RX ORDER — ACETAMINOPHEN 500 MG
1000 TABLET ORAL ONCE
Status: COMPLETED | OUTPATIENT
Start: 2020-10-05 | End: 2020-10-05

## 2020-10-05 RX ORDER — DIAPER,BRIEF,INFANT-TODD,DISP
EACH MISCELLANEOUS ONCE
Status: COMPLETED | OUTPATIENT
Start: 2020-10-05 | End: 2020-10-05

## 2020-10-05 RX ORDER — TETANUS AND DIPHTHERIA TOXOIDS ADSORBED 2; 2 [LF]/.5ML; [LF]/.5ML
0.5 INJECTION INTRAMUSCULAR ONCE
Status: DISCONTINUED | OUTPATIENT
Start: 2020-10-05 | End: 2020-10-05

## 2020-10-05 RX ADMIN — ACETAMINOPHEN 1000 MG: 500 TABLET ORAL at 13:01

## 2020-10-05 RX ADMIN — BACITRACIN ZINC 1 G: 500 OINTMENT TOPICAL at 13:01

## 2020-10-05 ASSESSMENT — PAIN SCALES - GENERAL: PAINLEVEL_OUTOF10: 10

## 2020-10-05 NOTE — ED NOTES
Pt reports a fall today when getting some water. Denies hitting her head and denies LOC. Pt has a skin tear to left forearm. Pt states her right foot hurts. Pt is A&O.       Yessi Gauthier RN  10/05/20 8623

## 2020-10-05 NOTE — ED PROVIDER NOTES
EMERGENCY DEPARTMENT ENCOUNTER      PCP: Anders Fields PA-C    CHIEF COMPLAINT    Chief Complaint   Patient presents with    Fall       This patient was not evaluated by the attending physician. I have independently evaluated this patient. HPI    Imer Naidu is a 62 y.o. female who presents with bilateral wrist pain and right foot pain. Onset this morning. Patient states she tripped while trying to walk to the fridge to get a bottle of water. Patient denies head injury or loss consciousness. Patient has associated skin tear to left wrist.  Patient states she recently broke her right foot which she reinjured today. Patient does take blood thinners, denies headache or syncope. Patient denies chest pain, shortness breath, abdominal pain. No distal numbness, tingling, weakness, or functional deficit. No other pain. REVIEW OF SYSTEMS    General: Denies fever or chills  Cardiac: Denies chest pain  Pulmonary: Denies shortness of breath  GI: Denies abdominal pain, vomiting, or diarrhea  : No dysuria or hematuria. Musculoskeletal:  Denies any other musculoskeletal pain or injuries, including neck, chest wall and back. Lymphatics:  No swollen nodes or streaks.     See HPI and nursing notes for additional information     PAST MEDICAL & SURGICAL HISTORY    Past Medical History:   Diagnosis Date    Abnormal PFTs (pulmonary function tests) 4/14 4/14    Atrial fibrillation ([facility]     CAD (coronary artery disease)     CHF (congestive heart failure) (HCC)     Diabetes mellitus (HCC)     Dizziness - light-headed     Family history of coronary artery disease     H/O 24 hour EKG monitoring 02/09/2012,12/10/2010    02/09/2012 predominant rhythm is sinus with short runs of SVT no episode of atrial fib. pt has left bundle branch morphology, max heart is 132, min is 48 with an average of 74 ,infrequent PVCs are seen    H/O cardiovascular stress test 4/1/2013, 1/31/2012 4/1/2013-    H/O cardiovascular stress test 14,2012 EF 52% No ischemia, normal study. 2012 EF 58% [employer] LVEF is normal globally    H/O echocardiogram 13 EF 50-55% mild MR, TR 3/13EF 35-45% decreasedr L ventrical systolic function.  H/O echocardiogram -EF50-55% Mild MR/TR. 10/11 EF > 55% LVS function is normal, calcified mitral apparatus, impaired LV relaxation, no PE    Heart imaging 2012 MUGA rest EF 58%LVEF is normal globally    Heartburn     History of cardiac cath 2009, 2006,moderate stenosis  small diagonal    History of cardiac monitoring 2/9/15    Event - sinus rhythm    History of cardioversion 3/14/14    History of MI (myocardial infarction)     History of nuclear Muga stress test 2020    EF 41%    History of PTCA 10/06/2010    10/06/2010 stent 3.0x24 taxus stent left main 90% stenosis mid seg reduced to 0% second area of [id number] to 40% not flow limiting EF 40%    Hx of cardiovascular stress test 2015    lexiscan-normal,EF70%    Hx of transesophageal echocardiography ([facility]) for monitoring 2017    Noted to have MARY GRACE Blood Clot.  Hyperlipidemia     Hypertension     Insomnia     SOB (shortness of breath)     Tachycardia 2012    report in [facility] notes-[facility] consult    TIA (transient ischemic attack)      Past Surgical History:   Procedure Laterality Date    ABLATION OF DYSRHYTHMIC FOCUS       SECTION      CHOLECYSTECTOMY      DIAGNOSTIC CARDIAC CATH LAB PROCEDURE  2006, 2009 moderate stenosis small diagonal    OTHER SURGICAL HISTORY  2017    afib ablation [facility]    PTCA  10/06/2010    10/06/2010 stent 3.0x24 taxus stent 90% stenosis mid segment reduced to 0% second area of stenosis 30-40% not flow limiting EF 40%    TUBAL LIGATION         CURRENT MEDICATIONS    Current Outpatient Rx   Medication Sig Dispense Refill    metoprolol succinate (TOPROL XL) 100 MG extended release tablet Take 1 tablet by mouth daily 30 tablet 2    furosemide (LASIX) 40 MG tablet Take 1 tablet by mouth 2 times daily 60 tablet 3    potassium chloride (KLOR-CON M) 10 MEQ extended release tablet Take 1 tablet by mouth 2 times daily (with meals) 60 tablet 3    albuterol sulfate HFA (VENTOLIN HFA) 108 (90 Base) MCG/ACT inhaler Inhale 2 puffs into the lungs 4 times daily as needed for Wheezing 1 Inhaler 5    cloNIDine (CATAPRES) 0.3 MG tablet Take 0.3 mg by mouth nightly      BASAGLAR KWIKPEN 100 UNIT/ML injection pen Inject 80 Units into the skin nightly      ADMELOG SOLOSTAR 100 UNIT/ML pen Inject into the skin See Admin Instructions      atorvastatin (LIPITOR) 40 MG tablet Take 40 mg by mouth nightly      escitalopram (LEXAPRO) 20 MG tablet Take 20 mg by mouth daily      metFORMIN (GLUCOPHAGE) 500 MG tablet Take 500 mg by mouth 2 times daily      pantoprazole (PROTONIX) 40 MG tablet Take 40 mg by mouth daily      zolpidem (AMBIEN) 10 MG tablet Take 10 mg by mouth nightly as needed for Sleep.  dicyclomine (BENTYL) 10 MG capsule Take 1 capsule by mouth 4 times daily (before meals and nightly) 20 capsule 0    ondansetron (ZOFRAN ODT) 4 MG disintegrating tablet Take 1 tablet by mouth every 8 hours as needed for Nausea 15 tablet 0    amLODIPine (NORVASC) 5 MG tablet Take 1 tablet by mouth daily 30 tablet 3    hydrOXYzine (VISTARIL) 50 MG capsule Take 50 mg by mouth daily      levothyroxine (SYNTHROID) 50 MCG tablet Take 50 mcg by mouth Daily      apixaban (ELIQUIS) 5 MG TABS tablet Take 1 tablet by mouth 2 times daily 60 tablet 2    nitroGLYCERIN (NITROSTAT) 0.4 MG SL tablet Place 0.4 mg under the tongue every 5 minutes as needed for Chest pain up to max of 3 total doses. If no relief after 1 dose, call 911.  aspirin 81 MG EC tablet Take 81 mg by mouth daily      traZODone (DESYREL) 100 MG tablet Take 100 mg by mouth nightly. ALLERGIES    Allergies   Allergen Reactions    Amiodarone Other (See Comments)     dizziness    Iv Dye [Iodides] Nausea And Vomiting    Vicodin [Hydrocodone-Acetaminophen] Nausea And Vomiting       SOCIAL & FAMILY HISTORY    Social History     Socioeconomic History    Marital status:      Spouse name: None    Number of children: None    Years of education: None    Highest education level: None   Occupational History    None   Social Needs    Financial resource strain: None    Food insecurity     Worry: None     Inability: None    Transportation needs     Medical: None     Non-medical: None   Tobacco Use    Smoking status: Former Smoker     Packs/day: 3.00     Years: 20.00     Pack years: 60.00     Types: Cigarettes     Last attempt to quit: 2007     Years since quittin.6    Smokeless tobacco: Never Used    Tobacco comment: reviewed 8/12/15   Substance and Sexual Activity    Alcohol use: No     Comment: rylee amos    Drug use: No    Sexual activity: Not Currently   Lifestyle    Physical activity     Days per week: None     Minutes per session: None    Stress: None   Relationships    Social connections     Talks on phone: None     Gets together: None     Attends Voodoo service: None     Active member of club or organization: None     Attends meetings of clubs or organizations: None     Relationship status: None    Intimate partner violence     Fear of current or ex partner: None     Emotionally abused: None     Physically abused: None     Forced sexual activity: None   Other Topics Concern    None   Social History Narrative    None     Family History   Problem Relation Age of Onset    Diabetes Father     Heart Disease Father            PHYSICAL EXAM    VITAL SIGNS: BP (!) 162/92   Pulse 92   Temp 98.6 °F (37 °C) (Oral)   Resp 16   Ht 5' 1\" (1.549 m)   Wt 189 lb (85.7 kg)   SpO2 93%   BMI 35.71 kg/m²   Constitutional:  Well developed, well nourished, no acute distress, non-toxic appearance   HENT:  Atraumatic  Musculoskeletal:   Left wrist with skin tear measuring approximately 3 cm x 2 cm to dorsal distal ulnar aspect. This region is tender to palpation. Mild swelling and ecchymosis. Right wrist with no overlying erythema or ecchymosis. Mild distal wrist swelling and tenderness to palpation. No elbow or hand tenderness bilaterally. No snuffbox tenderness bilaterally. Range of motion limited due to pain. Distal sensation, capillary refill and pulses intact. Right foot with moderate tenderness over dorsum of foot. Mild swelling. Skin intact. No ankle tenderness. Distal sensation, pulses and capillary refill intact. Abdomen: Soft nontender. Integument:  Left wrist with skin tear measuring approximately 3 cm x 2 cm to dorsal distal ulnar aspect. Well hydrated, no rash. Vascular: affected extremity distally neurovascularly intact - pulses, sensation, and capillary refill intact. Neurologic:  Awake alert, normal flow of speech. Cranial nerves II through XII grossly intact. Psychiatric: Cooperative, pleasant affect    RADIOLOGY/PROCEDURES    XR FOOT RIGHT (MIN 3 VIEWS)   Final Result   1. Acute on acute/subacute distal 5th metatarsal fracture, with evidence of   some early/mild healing. 2. Acute, nondisplaced 4th metatarsal fracture. XR WRIST LEFT (MIN 3 VIEWS)   Final Result   No acute fracture. Soft tissue swelling. XR WRIST RIGHT (MIN 3 VIEWS)   Preliminary Result   Osteopenia. No evident fracture. Mild 1st carpometacarpal osteoarthritis. ED COURSE & MEDICAL DECISION MAKING      Patient presents as above. Patient is up-to-date on tetanus. Wound cleaned, bacitracin ointment applied and bandaged to left wrist.  No repairable laceration and this appears superficial.  Bilateral wrist x-ray shows no acute fracture. Right foot x-ray shows acute on subacute fracture to the 5th metatarsal with evidence of healing. There is a new acute nondisplaced 4th metacarpal fracture. Patient denies head injury, loss of consciousness or any headache. Patient is on blood thinners, discussed possibility of brain bleed even without hitting her head. Declines CT imaging of head at this time. Patient declines splint or post-op shoe in ED and states she will wear her boot provided by her orthopedist for her known 5th metatarsal fracture. Patient provided ace wrap and crutches. I recommend RICE. Patient states she has tramadol at home to take as needed for pain. I discussed signs of infection and to return immediately if they develop. I recommend OTC triple antibiotic. Wound care instructions provided. I recommend follow up with her orthopedist by calling today to schedule appointment. Clinical  IMPRESSION    1. Closed fracture of right foot, initial encounter    2. Skin tear of left forearm without complication, initial encounter    3. Pain in both wrists          Diagnosis and plan discussed in detail with patient who understands and agrees. Patient agrees to return emergency department if symptoms worsen or any new symptoms develop. Comment: Please note this report has been produced using speech recognition software and may contain errors related to that system including errors in grammar, punctuation, and spelling, as well as words and phrases that may be inappropriate. If there are any questions or concerns please feel free to contact the dictating provider for clarification.      Yassine Anaya PA-C  10/05/20 [id number]

## 2020-10-05 NOTE — ED PROVIDER NOTES
As tele physician-in-triage, I performed a medical screening history on this patient with the use of a live telehealth program. Nursing staff may have been used as a surrogate for any part of the physical exam which may be unable to be performed by myself. HISTORY OF PRESENT ILLNESS  Imer Naidu is a 62 y.o. female right foot pain, left wrist injury after a fall this morning. States she had already had a foot injury and then reinjured it today. Denies hitting head or LOC. PHYSICAL EXAM  BP (!) 162/92   Pulse 92   Temp 98.6 °F (37 °C) (Oral)   Resp 16   Ht 5' 1\" (1.549 m)   Wt 189 lb (85.7 kg)   SpO2 93%   BMI 35.71 kg/m²     On exam, the patient appears well-hydrated, well-nourished, and in no acute distress. Speech is clear. Breathing is unlabored. Mental status is normal. The patient moves all extremities, and is without facial droop. Comment: Please note this report has been produced using speech recognition software and may contain errors related to that system including errors in grammar, punctuation, and spelling, as well as words and phrases that may be inappropriate. If there are any questions or concerns please feel free to contact the dictating provider for clarification.       Harry Kapoor DO  10/05/20 [id number]

## 2020-10-15 ENCOUNTER — HOSPITAL ENCOUNTER (EMERGENCY)
Age: 65
Discharge: HOME | End: 2020-10-15
Payer: COMMERCIAL

## 2020-10-15 ENCOUNTER — APPOINTMENT (OUTPATIENT)
Dept: CT IMAGING | Age: 65
End: 2020-10-15
Payer: COMMERCIAL

## 2020-10-15 VITALS
RESPIRATION RATE: 18 BRPM | SYSTOLIC BLOOD PRESSURE: 177 MMHG | TEMPERATURE: 98.6 F | HEART RATE: 98 BPM | OXYGEN SATURATION: 95 % | DIASTOLIC BLOOD PRESSURE: 121 MMHG

## 2020-10-15 PROCEDURE — 72125 CT NECK SPINE W/O DYE: CPT

## 2020-10-15 PROCEDURE — 99284 EMERGENCY DEPT VISIT MOD MDM: CPT

## 2020-10-15 PROCEDURE — 70450 CT HEAD/BRAIN W/O DYE: CPT

## 2020-10-15 PROCEDURE — 70486 CT MAXILLOFACIAL W/O DYE: CPT

## 2020-10-15 ASSESSMENT — PAIN DESCRIPTION - LOCATION: LOCATION: JAW

## 2020-10-15 ASSESSMENT — PAIN SCALES - GENERAL: PAINLEVEL_OUTOF10: 8

## 2020-10-15 ASSESSMENT — PAIN DESCRIPTION - ORIENTATION: ORIENTATION: LEFT

## 2020-10-15 ASSESSMENT — PAIN DESCRIPTION - PAIN TYPE: TYPE: ACUTE PAIN

## 2020-10-15 ASSESSMENT — PAIN DESCRIPTION - DESCRIPTORS: DESCRIPTORS: SORE

## 2020-10-15 NOTE — ED PROVIDER NOTES
EMERGENCY DEPARTMENT ENCOUNTER      PCP: Anders Fields PA-C    CHIEF COMPLAINT    Chief Complaint   Patient presents with    Fall     from standing position, hit jaw on bedside stand, NO LOC, on blood thinners    Jaw Pain         This patient was not evaluated by the attending physician. I have independently evaluated this patient. HPI    Imer Naidu is a 62 y.o. female who presents with fall. Onset was 25 hours. Context is patient tripped over carpet at her home causing her to fall striking her left jaw on the bedside stand. She denies loss of consciousness. Denies any focal head injury other than striking her left jaw. Denies confusion, vomiting. Pain is achy, worse with direct palpation to left mandible. The fall was mechanical in nature without preceding symptoms. REVIEW OF SYSTEMS    General: No Fever  ENT: See HPI. No visual changes. No headache. Cardiac: No Chest Pain, No syncope  Respiratory: No cough or difficulty breathing  GI: No vomiting. No Bloody Stool or Diarrhea  : No Dysuria or Hematuria  MSKTL:  See HPI. No neck or back pain.   Skin:  Denies rash  Neurologic:  Denies headache, focal weakness or sensory changes   Endocrine:  Denies polyuria or polydypsia   Lymphatic:  Denies swollen glands   See HPI and nursing notes for additional information       PAST MEDICAL & SURGICAL HISTORY    Past Medical History:   Diagnosis Date    Abnormal PFTs (pulmonary function tests) 4/14 4/14    Atrial fibrillation ([address]     CAD (coronary artery disease)     CHF (congestive heart failure) (HCC)     Diabetes mellitus (HCC)     Dizziness - light-headed     Family history of coronary artery disease     H/O 24 hour EKG monitoring 02/09/2012,12/10/2010    02/09/2012 predominant rhythm is sinus with short runs of SVT no episode of atrial fib. pt has left bundle branch morphology, max heart is 132, min is 48 with an average of 74 ,infrequent PVCs are seen    H/O cardiovascular stress test 2013, 2012-    H/O cardiovascular stress test 14,2012 EF 52% No ischemia, normal study. 2012 EF 58% [employer] LVEF is normal globally    H/O echocardiogram 13 EF 50-55% mild MR, TR 3/13EF 35-45% decreasedr L ventrical systolic function.  H/O echocardiogram -EF50-55% Mild MR/TR. 10/11 EF > 55% LVS function is normal, calcified mitral apparatus, impaired LV relaxation, no PE    Heart imaging 2012 MUGA rest EF 58%LVEF is normal globally    Heartburn     History of cardiac cath 2009, 2006,moderate stenosis  small diagonal    History of cardiac monitoring 2/9/15    Event - sinus rhythm    History of cardioversion 3/14/14    History of MI (myocardial infarction)     History of nuclear [facility]uga stress test 2020    EF 41%    History of PTCA 10/06/2010    10/06/2010 stent 3.0x24 taxus stent left main 90% stenosis mid seg reduced to 0% second area of [id number] to 40% not flow limiting EF 40%    Hx of cardiovascular stress test 2015    lexiscan-normal,EF70%    Hx of transesophageal echocardiography ([facility]) for monitoring 2017    Noted to have MARY GRACE Blood Clot.  Hyperlipidemia     Hypertension     Insomnia     SOB (shortness of breath)     Tachycardia 2012    report [facility] Nunn hosp[facility] consult    TIA (transient ischemic attack)      Past Surgical History:   Procedure Laterality Date    ABLATION OF DYSRHYTHMIC FOCUS       SECTION      CHOLECYSTECTOMY      DIAGNOSTIC CARDIAC CATH LAB PROCEDURE  2006, 2009 moderate stenosis small diagonal    OTHER SURGICAL HISTORY  2017    afib ablation [facility]    PTCA  10/06/2010    10/06/2010 stent 3.0x24 taxus stent 90% stenosis mid segment reduced to 0% second area of stenosis 30-40% not flow limiting EF 40%    TUBAL LIGATION         CURRENT MEDICATIONS    Current Outpatient Rx   Medication Sig Dispense Refill    metoprolol succinate (TOPROL XL) 100 MG extended release tablet Take 1 tablet by mouth daily 30 tablet 2    furosemide (LASIX) 40 MG tablet Take 1 tablet by mouth 2 times daily 60 tablet 3    potassium chloride (KLOR-CON M) 10 MEQ extended release tablet Take 1 tablet by mouth 2 times daily (with meals) 60 tablet 3    albuterol sulfate HFA (VENTOLIN HFA) 108 (90 Base) MCG/ACT inhaler Inhale 2 puffs into the lungs 4 times daily as needed for Wheezing 1 Inhaler 5    cloNIDine (CATAPRES) 0.3 MG tablet Take 0.3 mg by mouth nightly      BASAGLAR KWIKPEN 100 UNIT/ML injection pen Inject 80 Units into the skin nightly      ADMELOG SOLOSTAR 100 UNIT/ML pen Inject into the skin See Admin Instructions      atorvastatin (LIPITOR) 40 MG tablet Take 40 mg by mouth nightly      escitalopram (LEXAPRO) 20 MG tablet Take 20 mg by mouth daily      metFORMIN (GLUCOPHAGE) 500 MG tablet Take 500 mg by mouth 2 times daily      pantoprazole (PROTONIX) 40 MG tablet Take 40 mg by mouth daily      zolpidem (AMBIEN) 10 MG tablet Take 10 mg by mouth nightly as needed for Sleep.  dicyclomine (BENTYL) 10 MG capsule Take 1 capsule by mouth 4 times daily (before meals and nightly) 20 capsule 0    ondansetron (ZOFRAN ODT) 4 MG disintegrating tablet Take 1 tablet by mouth every 8 hours as needed for Nausea 15 tablet 0    amLODIPine (NORVASC) 5 MG tablet Take 1 tablet by mouth daily 30 tablet 3    hydrOXYzine (VISTARIL) 50 MG capsule Take 50 mg by mouth daily      levothyroxine (SYNTHROID) 50 MCG tablet Take 50 mcg by mouth Daily      apixaban (ELIQUIS) 5 MG TABS tablet Take 1 tablet by mouth 2 times daily 60 tablet 2    nitroGLYCERIN (NITROSTAT) 0.4 MG SL tablet Place 0.4 mg under the tongue every 5 minutes as needed for Chest pain up to max of 3 total doses. If no relief after 1 dose, call 911.       aspirin 81 MG EC tablet Take 81 mg by mouth daily      traZODone (DESYREL) 100 MG tablet Take 100 mg by mouth nightly. ALLERGIES    Allergies   Allergen Reactions    Amiodarone Other (See Comments)     dizziness    Iv Dye [Iodides] Nausea And Vomiting    Vicodin [Hydrocodone-Acetaminophen] Nausea And Vomiting       SOCIAL & FAMILY HISTORY    Social History     Socioeconomic History    Marital status:      Spouse name: None    Number of children: None    Years of education: None    Highest education level: None   Occupational History    None   Social Needs    Financial resource strain: None    Food insecurity     Worry: None     Inability: None    Transportation needs     Medical: None     Non-medical: None   Tobacco Use    Smoking status: Former Smoker     Packs/day: 3.00     Years: 20.00     Pack years: 60.00     Types: Cigarettes     Last attempt to quit: 2007     Years since quittin.6    Smokeless tobacco: Never Used    Tobacco comment: reviewed 8/12/15   Substance and Sexual Activity    Alcohol use: No     Comment: rylee amos    Drug use: No    Sexual activity: Not Currently   Lifestyle    Physical activity     Days per week: None     Minutes per session: None    Stress: None   Relationships    Social connections     Talks on phone: None     Gets together: None     Attends Voodoo service: None     Active member of club or organization: None     Attends meetings of clubs or organizations: None     Relationship status: None    Intimate partner violence     Fear of current or ex partner: None     Emotionally abused: None     Physically abused: None     Forced sexual activity: None   Other Topics Concern    None   Social History Narrative    None     Family History   Problem Relation Age of Onset    Diabetes Father     Heart Disease Father        PHYSICAL EXAM    VITAL SIGNS: BP (!) 177/121   Pulse 98   Temp 98.6 °F (37 °C) (Oral)   Resp 18   SpO2 95%    Constitutional:  Well developed, well nourished, no acute distress   Eyes: EOMI.  PERRL, sclera nonicteric. Anterior chambers clear. Funduscopic exam without any gross abnormality or hemorrhages. HENT: There is ecchymosis and soft tissue swelling over the left mandible. This area is tender to palpation without redness or warmth. No palpable defect or focal tenderness to palpation of mandible, but rather tenderness is of soft tissue. No swelling, tenderness or pain at angle of jaw or mental, submental region. No tenderness to the anterior neck, carotid regions. No trismus. Ears canals and TMs free of blood or clear fluid. Nasal passages and oropharynx free of blood or clear fluid. No circumferential periorbital ecchymosis or mastoid ecchymosis noted. Neck/Lymphatics: supple, no JVD, no swollen nodes. No posterior neck tenderness. Range of motion without obvious pain or deficit. Respiratory:  Lungs Clear, no retractions   Cardiovascular:   normal rate, no murmurs  GI:  Soft, nontender, normal bowel sounds  Musculoskeletal:    Head to toe exam reveals no external evidence of trauma of upper or lower extremities. Full range of motion, strength intact bilateral upper and lower extremities. Distal sensation, capillary refill intact. Integument:  Well hydrated, no petechiae     Neurologic:    - Alert & oriented person, place, time, and situation, no speech difficulties or slurring.  - No obvious gross motor deficits  - Cranial nerves 2-12 grossly intact  - Negative meningeal signs.  - Sensation intact to light touch  - Strength 5/5 in upper and lower extremities bilaterally  - Normal finger to nose test bilaterally  - Rapid alternating movements intact  - Normal heel-shin bilaterally  - No pronator drift. - Light touch sensation intact throughout. - Upper and lower extremity DTRs 2+ bilaterally.   - Gait steady with my assistance     Psych: Pleasant affect, no hallucinations            RADIOLOGY   CT CERVICAL SPINE WO CONTRAST   Preliminary Result   No radiographic findings to suggest presence of acute fracture of the   cervical spine. CT FACIAL BONES WO CONTRAST   Final Result   No acute traumatic injury of the facial bones. CT HEAD WO CONTRAST   Preliminary Result   1. No evidence of acute intracranial abnormality. 2. Possible 7 mm calcified aneurysm in the region of distal left internal   carotid artery. Follow-up nonemergent CT a of the head may be helpful for   more complete evaluation. ED COURSE & MEDICAL DECISION MAKING        Patient presents as above with mechanical fall and subsequent left jaw pain. CT imaging of face, head, cervical spine today reveals no acute osseous abnormalities. There is incidental finding of possible calcified aneurysm of left carotid artery-I reviewed this in detail with patient at bedside. I recommend follow with cardiologist, Dr. Josephine Cast, to review this finding. Otherwise, patient is clinically neurologically intact, afebrile and I feel safe for discharge home at this time. Of note, patient's blood pressure elevated. Patient states has history of hypertension. She agrees to closely monitor and if remains elevated, discuss with PCP and her cardiologist.  I do not feel further evaluation is warranted at this time as patient's fall was mechanical patient is neurologically intact without any symptoms except for pain localized to the affected region of ecchymosis. Given that patient is on Eliquis, she would likely have localized hematoma due to fall today. Because of this, she agrees to close follow-up within the next several days with cardiologist and/or PCP. Return to emergency Department precautions were discussed in detail with patient and include worsening pain, new symptoms. Clinical  IMPRESSION    1. Contusion of face, initial encounter    2. Fall, initial encounter    3.  Abnormal CT scan, cervical spine              Comment: Please note this report has been produced using speech recognition software and may contain errors related to that system including errors in grammar, punctuation, and spelling, as well as words and phrases that may be inappropriate. If there are any questions or concerns please feel free to contact the dictating provider for clarification.        Nancy Garcia  10/15/20 [id number]

## 2020-10-15 NOTE — ED TRIAGE NOTES
Pt presents to ED for a fall that occurred this morning. Pt states she tripped over her bad foot and hit the coffee table. Pt states she hit her chin. Denies LOC. Pt is on a blood thinner (eliquis). Hematoma to the chin and jaw.  Pt c/o neck pain

## 2020-10-15 NOTE — ED PROVIDER NOTES
As physician-in-triage, I performed a medical screening history and physical exam on this patient. HISTORY OF PRESENT ILLNESS  Senia Rice is a 62 y.o. female presents to the emergency department stating she fell this morning when she got up and hit her jaw. Has pain in her jaw. Does not recall if she passed out. States some mild neck pain while waiting in the lobby. She is on Eliquis but did not take it yet this morning. States aching throbbing pain, 8 out of 10 in her jaw. No lower back or any other pain. She is awake, alert, oriented x4. Speaks in full sentences. .      PHYSICAL EXAM  BP (!) 177/121   Pulse 98   Temp 98.6 °F (37 °C) (Oral)   Resp 18   SpO2 95%     On exam, the patient appears in no acute distress. Speech is clear. Breathing is unlabored. Moves all extremities    Comment: Please note this report has been produced using speech recognition software and may contain errors related to that system including errors in grammar, punctuation, and spelling, as well as words and phrases that may be inappropriate. If there are any questions or concerns please feel free to contact the dictating provider for clarification.        Ray Pacheco MD  10/15/20 [id number]

## 2020-10-19 ENCOUNTER — NURSE ONLY (OUTPATIENT)
Dept: CARDIOLOGY CLINIC | Age: 65
End: 2020-10-19
Payer: COMMERCIAL

## 2020-10-19 ENCOUNTER — OFFICE VISIT (OUTPATIENT)
Dept: CARDIOLOGY CLINIC | Age: 65
End: 2020-10-19
Payer: COMMERCIAL

## 2020-10-19 VITALS
WEIGHT: 186 LBS | BODY MASS INDEX: 35.12 KG/M2 | HEIGHT: 61 IN | HEART RATE: 84 BPM | SYSTOLIC BLOOD PRESSURE: 220 MMHG | DIASTOLIC BLOOD PRESSURE: 122 MMHG

## 2020-10-19 LAB
EKG ATRIAL RATE: 138 BPM
EKG DIAGNOSIS: NORMAL
EKG Q-T INTERVAL: 336 MS
EKG QRS DURATION: 130 MS
EKG QTC CALCULATION (BAZETT): 525 MS
EKG R AXIS: -28 DEGREES
EKG T AXIS: 146 DEGREES
EKG VENTRICULAR RATE: 147 BPM

## 2020-10-19 PROCEDURE — G8428 CUR MEDS NOT DOCUMENT: HCPCS | Performed by: INTERNAL MEDICINE

## 2020-10-19 PROCEDURE — G8484 FLU IMMUNIZE NO ADMIN: HCPCS | Performed by: INTERNAL MEDICINE

## 2020-10-19 PROCEDURE — 93228 REMOTE 30 DAY ECG REV/REPORT: CPT | Performed by: INTERNAL MEDICINE

## 2020-10-19 PROCEDURE — G8417 CALC BMI ABV UP PARAM F/U: HCPCS | Performed by: INTERNAL MEDICINE

## 2020-10-19 PROCEDURE — 3017F COLORECTAL CA SCREEN DOC REV: CPT | Performed by: INTERNAL MEDICINE

## 2020-10-19 PROCEDURE — 99214 OFFICE O/P EST MOD 30 MIN: CPT | Performed by: INTERNAL MEDICINE

## 2020-10-19 PROCEDURE — 1036F TOBACCO NON-USER: CPT | Performed by: INTERNAL MEDICINE

## 2020-10-19 PROCEDURE — 1111F DSCHRG MED/CURRENT MED MERGE: CPT | Performed by: INTERNAL MEDICINE

## 2020-10-19 NOTE — PROGRESS NOTES
CARDIOLOGY NOTE      10/19/2020    RE: Reginaldo Correa  (1955)                               TO:  Dr. Pelon Mueller PA-C            Valeen Prader is a 62 y.o. female who was seen today for management of  cad             Was in ed with fall  has abn cta carotid                      HPI:                   The patient does not have cardiac complaints  Patient also seen  for    - Hypertension,is not well controlled, pt is not compliant with medicines  - Coronary artery disease, does not have chest pain. Patient is  compliant with prescribed medicines. - hfref on meds  - Atrial fibrillation, pt is  compliant with meds.  Patient does not have symptoms from atrial fibrillation    Reginaldo Correa has the following history recorded in care path:  Patient Active Problem List    Diagnosis Date Noted    YESIKA (acute kidney injury) ([facility]      Priority: High    S/P ablation of atrial fibrillation      Priority: High    Gastroenteritis 08/08/2018     Priority: Low    Atrial thrombus without antecedent myocardial infarction 08/11/2017     Priority: Low    Chest pain      Priority: Low    Atrial fibrillation with RVR (HCC)      Priority: Low    Family history of coronary artery disease      Priority: Low    H/O cardiovascular stress test      Priority: Low    CHF (congestive heart failure) (HCC)      Priority: Low    Light headed      Priority: Low    History of MI (myocardial infarction)      Priority: Low    Diabetes mellitus ([facility]      Priority: Low    Hypertension      Priority: Low    TIA (transient ischemic attack)      Priority: Low    Hyperlipidemia      Priority: Low    SOB (shortness of breath)      Priority: Low    CAD (coronary artery disease) 03/28/2014     Priority: Low    Persistent atrial fibrillation ([facility] 03/15/2014     Priority: Low    History of CVA (cerebrovascular accident) without residual deficits 01/30/2012     Priority: Low    Morbid obesity ([facility] 01/30/2012     Priority: Low    DM (diabetes mellitus), type 2, uncontrolled ([facility] 01/30/2012     Priority: Low    CHF (congestive heart failure), NYHA class I, acute on chronic, combined ([facility] 06/11/2020    Elevated liver enzymes 05/23/2019    Acute cystitis 08/10/2018     Current Outpatient Medications   Medication Sig Dispense Refill    metoprolol succinate (TOPROL XL) 100 MG extended release tablet Take 1 tablet by mouth daily 30 tablet 2    furosemide (LASIX) 40 MG tablet Take 1 tablet by mouth 2 times daily 60 tablet 3    potassium chloride (KLOR-CON M) 10 MEQ extended release tablet Take 1 tablet by mouth 2 times daily (with meals) 60 tablet 3    albuterol sulfate HFA (VENTOLIN HFA) 108 (90 Base) MCG/ACT inhaler Inhale 2 puffs into the lungs 4 times daily as needed for Wheezing 1 Inhaler 5    cloNIDine (CATAPRES) 0.3 MG tablet Take 0.3 mg by mouth nightly      BASAGLAR KWIKPEN 100 UNIT/ML injection pen Inject 80 Units into the skin nightly      ADMELOG SOLOSTAR 100 UNIT/ML pen Inject into the skin See Admin Instructions      atorvastatin (LIPITOR) 40 MG tablet Take 40 mg by mouth nightly      escitalopram (LEXAPRO) 20 MG tablet Take 20 mg by mouth daily      metFORMIN (GLUCOPHAGE) 500 MG tablet Take 500 mg by mouth 2 times daily      pantoprazole (PROTONIX) 40 MG tablet Take 40 mg by mouth daily      zolpidem (AMBIEN) 10 MG tablet Take 10 mg by mouth nightly as needed for Sleep.        dicyclomine (BENTYL) 10 MG capsule Take 1 capsule by mouth 4 times daily (before meals and nightly) 20 capsule 0    ondansetron (ZOFRAN ODT) 4 MG disintegrating tablet Take 1 tablet by mouth every 8 hours as needed for Nausea 15 tablet 0    amLODIPine (NORVASC) 5 MG tablet Take 1 tablet by mouth daily 30 tablet 3    hydrOXYzine (VISTARIL) 50 MG capsule Take 50 mg by mouth daily      levothyroxine (SYNTHROID) 50 MCG tablet Take 50 mcg by mouth Daily      apixaban (ELIQUIS) 5 MG TABS tablet Take 1 tablet by mouth 2 times daily 60 tablet 2    nitroGLYCERIN (NITROSTAT) 0.4 MG SL tablet Place 0.4 mg under the tongue every 5 minutes as needed for Chest pain up to max of 3 total doses. If no relief after 1 dose, call 911.  aspirin 81 MG EC tablet Take 81 mg by mouth daily      traZODone (DESYREL) 100 MG tablet Take 100 mg by mouth nightly. No current facility-administered medications for this visit. Allergies: Amiodarone; Iv dye [iodides]; and Vicodin [hydrocodone-acetaminophen]  Past Medical History:   Diagnosis Date    Abnormal PFTs (pulmonary function tests) 4/14 4/14    Atrial fibrillation (HCC)     CAD (coronary artery disease)     CHF (congestive heart failure) (HCC)     Diabetes mellitus (HCC)     Dizziness - light-headed     Family history of coronary artery disease     H/O 24 hour EKG monitoring 02/09/2012,12/10/2010    02/09/2012 predominant rhythm is sinus with short runs of SVT no episode of atrial fib. pt has left bundle branch morphology, max heart is 132, min is 48 with an average of 74 ,infrequent PVCs are seen    H/O cardiovascular stress test 4/1/2013, 1/31/2012 4/1/2013-    H/O cardiovascular stress test 4/16/14,02/09/2012 4/16/14 EF 52% No ischemia, normal study. 02/09/2012 EF 58% tehnique-[employer]et[employer] LVEF is normal globally    H/O echocardiogram 03/27/13 4/14 EF 50-55% mild MR, TR 3/13EF 35-45% decreasedr L ventrical systolic function.     H/O echocardiogram 4/14 4/14-EF50-55% Mild MR/TR. 10/11 EF > 55% LVS function is normal, calcified mitral apparatus, impaired LV relaxation, no PE    Heart imaging 02/09/2012 02/09/2012 MUGA rest EF 58%LVEF is normal globally    Heartburn     History of cardiac cath 05/08/2009, 06/14/2006 05/08/2009,moderate stenosis  small diagonal    History of cardiac monitoring 2/9/15    Event - sinus rhythm    History of cardioversion 3/14/14    History of MI (myocardial infarction)     History of nuclear Muga stress test 2020    EF 41%    History of PTCA 10/06/2010    10/06/2010 stent 3.0x24 taxus stent left main 90% stenosis mid seg reduced to 0% second area of [id number] to 40% not flow limiting EF 40%    Hx of cardiovascular stress test 2015    lexiscan-normal,EF70%    Hx of transesophageal echocardiography (LUKE) for monitoring 2017    Noted to have MARY GRACE Blood Clot.  Hyperlipidemia     Hypertension     Insomnia     SOB (shortness of breath)     Tachycardia 2012    report in epic notes-Dr Nunn hosp. consult    TIA (transient ischemic attack)      Past Surgical History:   Procedure Laterality Date    ABLATION OF DYSRHYTHMIC FOCUS       SECTION      CHOLECYSTECTOMY      DIAGNOSTIC CARDIAC CATH LAB PROCEDURE  2006, 2009 moderate stenosis small diagonal    OTHER SURGICAL HISTORY  2017    afib ablation LUKE    PTCA  10/06/2010    10/06/2010 stent 3.0x24 taxus stent 90% stenosis mid segment reduced to 0% second area of stenosis 30-40% not flow limiting EF 40%    TUBAL LIGATION        As reviewed   Family History   Problem Relation Age of Onset    Diabetes Father     Heart Disease Father      Social History     Tobacco Use    Smoking status: Former Smoker     Packs/day: 3.00     Years: 20.00     Pack years: 60.00     Types: Cigarettes     Last attempt to quit: 2007     Years since quittin.6    Smokeless tobacco: Never Used    Tobacco comment: reviewed 8/12/15   Substance Use Topics    Alcohol use: No     Comment: occas. caffeine 3 mtn. dew a day      Review of Systems:    Constitutional: Negative for diaphoresis and fatigue  Psychological:Negative for anxiety or depression  HENT: Negative for headaches, nasal congestion, sinus pain or vertigo  Eyes: Negative for visual disturbance.    Endocrine: Negative for polydipsia/polyuria  Respiratory: Negative for shortness of breath  Cardiovascular: Negative for chest pain, dyspnea on exertion, claudication, edema, irregular heartbeat, murmur, palpitations or shortness of breath  Gastrointestinal: Negative for abdominal pain or heartburn  Genito-Urinary: Negative for urinary frequency/urgency  Musculoskeletal: Negative for muscle pain, muscular weakness, negative for pain in arm and leg or swelling in foot and leg  Neurological: Negative for dizziness, headaches, memory loss, numbness/tingling, visual changes, syncope  Dermatological: Negative for rash    Objective:    Vitals:    10/19/20 1007 10/19/20 1014   BP: (!) 240/122 (!) 220/122   Pulse: 84    Weight: 186 lb (84.4 kg)    Height: 5' 1\" (1.549 m)      BP (!) 220/122   Pulse 84   Ht 5' 1\" (1.549 m)   Wt 186 lb (84.4 kg)   BMI 35.14 kg/m²     No flowsheet data found. Wt Readings from Last 3 Encounters:   10/19/20 186 lb (84.4 kg)   10/05/20 189 lb (85.7 kg)   09/01/20 187 lb (84.8 kg)     Body mass index is 35.14 kg/m². GENERAL - Alert, oriented, pleasant, in no apparent distress. EYES: No jaundice, no conjunctival pallor. SKIN: It is warm & dry. No rashes. No Echhymosis    HEENT - No clinically significant abnormalities seen. Neck - Supple. No jugular venous distention noted. No carotid bruits. Cardiovascular - Normal S1 and S2 without obvious murmur or gallop. Extremities - No cyanosis, clubbing, or significant edema. Pulmonary - No respiratory distress. No wheezes or rales. Abdomen - No masses, tenderness, or organomegaly. Musculoskeletal - No significant edema. No joint deformities. No muscle wasting. Neurologic - Cranial nerves II through XII are grossly intact. There were no gross focal neurologic abnormalities.     Lab Review   Lab Results   Component Value Date    CKTOTAL 40 05/07/2012    TROPONINT 0.018 09/26/2020     BNP:    Lab Results   Component Value Date     04/17/2013     PT/INR:    Lab Results   Component Value Date    INR 0.96 09/26/2020     Lab Results   Component Value Date    LABA1C 8.6 (H) 06/11/2020    LABA1C 7.5 (H) 05/24/2019     Lab Results   Component Value Date    WBC 11.8 (H) 09/26/2020    HCT 41.0 09/26/2020    MCV 89.3 09/26/2020     09/26/2020     Lab Results   Component Value Date    CHOL 153 01/30/2012    TRIG 135 01/30/2012    HDL 59 (L) 01/30/2012    LDLDIRECT 88 01/30/2012     Lab Results   Component Value Date    ALT 22 09/26/2020    AST 29 09/26/2020     BMP:    Lab Results   Component Value Date     09/26/2020    K 3.3 09/26/2020     09/26/2020    CO2 24 09/26/2020    BUN 14 09/26/2020    CREATININE 1.2 09/26/2020     CMP:   Lab Results   Component Value Date     09/26/2020    K 3.3 09/26/2020     09/26/2020    CO2 24 09/26/2020    BUN 14 09/26/2020    PROT 6.6 09/26/2020    PROT 7.1 05/07/2012     TSH:    Lab Results   Component Value Date    TSHHS 0.963 08/04/2017           Impression:    1.  TIA (transient ischemic attack)       Patient Active Problem List   Diagnosis Code    History of CVA (cerebrovascular accident) without residual deficits Z86.73    Morbid obesity ([facility] E66.01    DM (diabetes mellitus), type 2, uncontrolled ([facility] E11.65    Persistent atrial fibrillation (HCC) I48.19    CAD (coronary artery disease) I25.10    CHF (congestive heart failure) (HCC) I50.9    Light headed R44    History of MI (myocardial infarction) I25.2    Diabetes mellitus ([facility] E11.9    Hypertension I10    TIA (transient ischemic attack) G45.9    Hyperlipidemia E78.5    SOB (shortness of breath) R06.02    H/O cardiovascular stress test Z92.89    Family history of coronary artery disease Z80.55    Atrial fibrillation with RVR (HCC) I48.91    Chest pain R07.9    Atrial thrombus without antecedent myocardial infarction I51.3    S/P ablation of atrial fibrillation Z98.890, Z86.79    Gastroenteritis K52.9    YESIKA (acute [facility]) [facility] N17.9    Acute cystitis N30.00    Elevated liver enzymes R74.8    CHF (congestive heart failure), NYHA class I, acute on chronic, combined [facility] I50.43       Assessment & Plan:    -  Hypertension: Patients blood pressure is abnormal. Patient is advised about low sodium diet. Present medical regimen will not be changed. has not taken meds today      On cta  There is atherosclerotic calcification of distal internal    carotid arteries.  7 mm rounded area of calcification in the region of the    distal left internal carotid artery raises the possibility of a calcified    aneurysm (axial image 22 and sagittal image 38). Follow-up nonemergent CTA of the head may be helpful for    more complete evaluation     DW radiology advised CTA head    Extracranial carotid aneurysms (true or false) have been classified according to affected anatomic segment, which is important for determining a treatment approach (figure 2) [9]. ?Type I - Isolated aneurysms of the internal carotid artery  ? Type II - Aneurysms of the complete internal carotid artery with involvement of the bifurcation  ? Type III - Aneurysms of the carotid bifurcation  ? Type IV - Combined aneurysm of the internal and common carotid artery  ? Type V - Isolated aneurysm of the common carotid artery           -     CORONARY ARTERY DISEASE:  asymptomatic     All available  tests in chart reviewed. Management discussed . Testing ordered  no                                 - Atrial fibrillation, pt is  compliant with meds.  Patient does not have symptoms from atrial fibrillation    - hfref  On meds CPM      - had # of rt foot    - freq falls   Monitor       Liset Wilcox MD    [facility] [place]

## 2020-11-04 ENCOUNTER — TELEPHONE (OUTPATIENT)
Dept: CARDIOLOGY CLINIC | Age: 65
End: 2020-11-04

## 2020-11-04 RX ORDER — METOPROLOL SUCCINATE 100 MG/1
100 TABLET, EXTENDED RELEASE ORAL DAILY
Qty: 30 TABLET | Refills: 3 | Status: ON HOLD
Start: 2020-11-04 | End: 2021-01-01 | Stop reason: HOSPADM

## 2020-11-04 NOTE — TELEPHONE ENCOUNTER
Called patient to let her know CTA Head scheduled for 11/9/20 at 930 am with arrival of 9 am at [facility]. Patient to check in at registration inside main entrance. Patient will need to be NPO 4 hours prior and will need lab order for BUN/Creatinine stat on arrival. Patient also confirmed she has DYE ALLERGY and will need premedicated.

## 2020-11-05 RX ORDER — METHYLPREDNISOLONE 32 MG/1
TABLET ORAL
Qty: 2 TABLET | Refills: 0 | Status: ON HOLD
Start: 2020-11-05 | End: 2020-12-01 | Stop reason: HOSPADM

## 2020-11-05 NOTE — TELEPHONE ENCOUNTER
Called patient let her know medication was sent to pharmacy for her dye allergy with the directions. Also to remind her again NPO 4 hours prior along with date and time of the CTA. Voiced understanding.

## 2020-11-09 ENCOUNTER — TELEPHONE (OUTPATIENT)
Dept: CARDIOLOGY CLINIC | Age: 65
End: 2020-11-09

## 2020-11-09 ENCOUNTER — HOSPITAL ENCOUNTER (OUTPATIENT)
Dept: CT IMAGING | Age: 65
Discharge: HOME | End: 2020-11-09
Payer: COMMERCIAL

## 2020-11-09 PROCEDURE — 70496 CT ANGIOGRAPHY HEAD: CPT

## 2020-11-09 PROCEDURE — 6360000004 HC RX CONTRAST MEDICATION: Performed by: INTERNAL MEDICINE

## 2020-11-09 RX ADMIN — IOPAMIDOL 80 ML: 755 INJECTION, SOLUTION INTRAVENOUS at 09:49

## 2020-11-16 ENCOUNTER — APPOINTMENT (OUTPATIENT)
Dept: GENERAL RADIOLOGY | Age: 65
End: 2020-11-16
Payer: COMMERCIAL

## 2020-11-16 ENCOUNTER — HOSPITAL ENCOUNTER (EMERGENCY)
Age: 65
Discharge: HOME | End: 2020-11-16
Payer: COMMERCIAL

## 2020-11-16 VITALS
BODY MASS INDEX: 35.12 KG/M2 | DIASTOLIC BLOOD PRESSURE: 113 MMHG | HEIGHT: 61 IN | TEMPERATURE: 98 F | WEIGHT: 186 LBS | RESPIRATION RATE: 18 BRPM | SYSTOLIC BLOOD PRESSURE: 185 MMHG | OXYGEN SATURATION: 95 % | HEART RATE: 114 BPM

## 2020-11-16 PROCEDURE — 73090 X-RAY EXAM OF FOREARM: CPT

## 2020-11-16 PROCEDURE — 99283 EMERGENCY DEPT VISIT LOW MDM: CPT

## 2020-11-16 ASSESSMENT — PAIN DESCRIPTION - LOCATION: LOCATION: WRIST

## 2020-11-16 ASSESSMENT — PAIN DESCRIPTION - FREQUENCY: FREQUENCY: CONTINUOUS

## 2020-11-16 ASSESSMENT — PAIN DESCRIPTION - DESCRIPTORS: DESCRIPTORS: SORE

## 2020-11-16 ASSESSMENT — PAIN DESCRIPTION - PAIN TYPE: TYPE: ACUTE PAIN

## 2020-11-16 ASSESSMENT — PAIN SCALES - GENERAL: PAINLEVEL_OUTOF10: 8

## 2020-11-16 ASSESSMENT — PAIN DESCRIPTION - ORIENTATION: ORIENTATION: LEFT

## 2020-11-16 NOTE — ED PROVIDER NOTES
Patient Identification  Katie Schaefer is a 60 y.o. female    Chief Complaint  Wrist Injury (left wist injury7 days ago)      HPI  (History provided by patient)  This is a 60 y.o. female who was brought in by self for chief complaint of left wrist injury. Onset was 7 days ago, tripped and fell. Pain in left wrist into the left mid forearm, 8/10, described as sore, constant since onset. No other injuries. Was taking tramadol at home for relief but ran out. Tylenol not helping much. REVIEW OF SYSTEMS    Musculoskeletal:  + wrist pain  Neurologic:  Denies focal weakness, or sensory changes     See HPI and nursing notes for additional information     I have reviewed the following nursing documentation:  Allergies:    Allergies   Allergen Reactions    Amiodarone Other (See Comments)     dizziness    Iv Dye [Iodides] Nausea And Vomiting    Vicodin [Hydrocodone-Acetaminophen] Nausea And Vomiting       Past medical history:  has a past medical history of Abnormal PFTs (pulmonary function tests) (4/14), Atrial fibrillation ([facility] [address] CAD (coronary artery disease), CHF (congestive heart failure) ([facility] [address] Diabetes mellitus ([facility] [address] Dizziness - light-headed, Family history of coronary artery disease, H/O 24 hour EKG monitoring (02/09/2012,12/10/2010), H/O cardiovascular stress test (4/1/2013, 1/31/2012), H/O cardiovascular stress test (4/16/14,02/09/2012), H/O echocardiogram (03/27/13), H/O echocardiogram (4/14), Heart imaging (02/09/2012), Heartburn, History of cardiac cath (05/08/2009, 06/14/2006), History of cardiac monitoring (2/9/15), History of cardioversion (3/14/14), History of MI (myocardial infarction), History of nuclear Muga stress test (07/14/2020), History of PTCA (10/06/2010), cardiovascular stress test (8/21/2015), transesophageal echocardiography ([facility]) for monitoring (08/07/2017), Hyperlipidemia, Hypertension, Insomnia, SOB (shortness of breath), Tachycardia (1/30/2012), and TIA (transient ischemic attack). Past surgical history:  has a past surgical history that includes  section; Tubal ligation; Cholecystectomy; Diagnostic Cardiac Cath Lab Procedure (2006, 2009); Percutaneous Transluminal Coronary Angio (10/06/2010); other surgical history (2017); and ablation of dysrhythmic focus. Home medications:   Prior to Admission medications    Medication Sig Start Date End Date Taking?  Authorizing Provider   methylPREDNISolone (MEDROL) 32 MG tablet Take 1 tablet 12 hours and 1 tablet 2 hours before procedure 20   Anita De Los Santos MD   metoprolol succinate (TOPROL XL) 100 MG extended release tablet Take 1 tablet by mouth daily TAKE ONE TABLET BY MOUTH DAILY 20   Felicity Middleton MD   furosemide (LASIX) 40 MG tablet Take 1 tablet by mouth 2 times daily 20   Nader Lopez PA-C   potassium chloride (KLOR-CON M) 10 MEQ extended release tablet Take 1 tablet by mouth 2 times daily (with meals) 20   Nader Lopez PA-C   albuterol sulfate HFA (VENTOLIN HFA) 108 (90 Base) MCG/ACT inhaler Inhale 2 puffs into the lungs 4 times daily as needed for Wheezing 20   Nader Lopez PA-C   cloNIDine (CATAPRES) 0.3 MG tablet Take 0.3 mg by mouth nightly    Historical Provider, MD Laura MACDONALD 100 UNIT/ML injection pen Inject 80 Units into the skin nightly 9/3/19   Historical Provider, MD   ADMELOG SOLOSTAR 100 UNIT/ML pen Inject into the skin See Admin Instructions 19   Historical Provider, MD   atorvastatin (LIPITOR) 40 MG tablet Take 40 mg by mouth nightly 19   Historical Provider, MD   escitalopram (LEXAPRO) 20 MG tablet Take 20 mg by mouth daily 19   Historical Provider, MD   metFORMIN (GLUCOPHAGE) 500 MG tablet Take 500 mg by mouth 2 times daily 19   Historical Provider, MD   pantoprazole (PROTONIX) 40 MG tablet Take 40 mg by mouth daily 19   Historical Provider, MD   zolpidem (AMBIEN) 10 MG tablet Take 10 mg by mouth nightly as needed for Sleep. Historical Provider, MD   dicyclomine (BENTYL) 10 MG capsule Take 1 capsule by mouth 4 times daily (before meals and nightly) 4/7/19   Korinne Lusterio, PA-C   ondansetron (ZOFRAN ODT) 4 MG disintegrating tablet Take 1 tablet by mouth every 8 hours as needed for Nausea 4/7/19   Alverto Handstawana Mncair PA-C   amLODIPine (NORVASC) 5 MG tablet Take 1 tablet by mouth daily 8/12/18   Dania Gallardo MD   hydrOXYzine (VISTARIL) 50 MG capsule Take 50 mg by mouth daily    Historical Provider, MD   levothyroxine (SYNTHROID) 50 MCG tablet Take 50 mcg by mouth Daily    Historical Provider, MD   apixaban (ELIQUIS) 5 MG TABS tablet Take 1 tablet by mouth 2 times daily 8/8/17   Vipul Lynnette Dakin, MD   nitroGLYCERIN (NITROSTAT) 0.4 MG SL tablet Place 0.4 mg under the tongue every 5 minutes as needed for Chest pain up to max of 3 total doses. If no relief after 1 dose, call 911. Historical Provider, MD   aspirin 81 MG EC tablet Take 81 mg by mouth daily    Historical Provider, MD   traZODone (DESYREL) 100 MG tablet Take 100 mg by mouth nightly. Historical Provider, MD       Social history:  reports that she quit smoking about 13 years ago. Her smoking use included cigarettes. She has a 60.00 pack-year smoking history. She has never used smokeless tobacco. She reports that she does not drink alcohol or use drugs. Family history:    Family History   Problem Relation Age of Onset    Diabetes Father     Heart Disease Father          Exam  BP (!) 185/114   Pulse 118   Temp 98 °F (36.7 °C)   Resp 18   Ht 5' 1\" (1.549 m)   Wt 186 lb (84.4 kg)   SpO2 95%   BMI 35.14 kg/m²   Nursing note and vitals reviewed. Constitutional: Well developed, well nourished. No acute distress. HENT:      Head: Normocephalic and atraumatic. Cardiovascular: Radial pulses 2+ on left. Pulmonary/Chest: Effort normal. No respiratory distress. Musculoskeletal: Moves all extremities.  No gross weakness, fever, new rash, discoloration, new or worsening swelling) that necessitate immediate return. I have independently evaluated this patient. Final Impression  1. Left wrist pain        Blood pressure (!) 185/114, pulse 118, temperature 98 °F (36.7 °C), resp. rate 18, height 5' 1\" (1.549 m), weight 186 lb (84.4 kg), SpO2 95 %, not currently breastfeeding. Disposition:  Discharge to home in stable condition. Patient was given scripts for the following medications. I counseled patient how to take these medications. New Prescriptions    No medications on file       [unfilled]    This chart was generated using the [address] [place] [address] dictation system. I created this record but it may contain dictation errors given the limitations of this technology.          Chacho Patino PA-C  11/16/20 [id number]

## 2020-11-19 ENCOUNTER — OFFICE VISIT (OUTPATIENT)
Dept: CARDIOLOGY CLINIC | Age: 65
End: 2020-11-19
Payer: COMMERCIAL

## 2020-11-19 VITALS
BODY MASS INDEX: 34.55 KG/M2 | HEART RATE: 84 BPM | WEIGHT: 183 LBS | HEIGHT: 61 IN | DIASTOLIC BLOOD PRESSURE: 84 MMHG | SYSTOLIC BLOOD PRESSURE: 130 MMHG

## 2020-11-19 PROCEDURE — 99214 OFFICE O/P EST MOD 30 MIN: CPT | Performed by: NURSE PRACTITIONER

## 2020-11-19 PROCEDURE — G8427 DOCREV CUR MEDS BY ELIG CLIN: HCPCS | Performed by: NURSE PRACTITIONER

## 2020-11-19 PROCEDURE — G8417 CALC BMI ABV UP PARAM F/U: HCPCS | Performed by: NURSE PRACTITIONER

## 2020-11-19 PROCEDURE — 3017F COLORECTAL CA SCREEN DOC REV: CPT | Performed by: NURSE PRACTITIONER

## 2020-11-19 PROCEDURE — G8484 FLU IMMUNIZE NO ADMIN: HCPCS | Performed by: NURSE PRACTITIONER

## 2020-11-19 PROCEDURE — 1036F TOBACCO NON-USER: CPT | Performed by: NURSE PRACTITIONER

## 2020-11-19 RX ORDER — TRAMADOL HYDROCHLORIDE 50 MG/1
TABLET ORAL
Status: ON HOLD | COMMUNITY
End: 2020-01-01 | Stop reason: HOSPADM

## 2020-11-19 ASSESSMENT — ENCOUNTER SYMPTOMS
COUGH: 0
VOMITING: 0
SINUS PAIN: 0
BLOOD IN STOOL: 0
SHORTNESS OF BREATH: 0
DIARRHEA: 0
COLOR CHANGE: 0
PHOTOPHOBIA: 0
ABDOMINAL PAIN: 0
NAUSEA: 0
CONSTIPATION: 0

## 2020-11-19 NOTE — LETTER
[facility]    Dr. Sales Sample [address] [facility]  1955  [id number]    Have you had any Chest Pain that is not new? - No    Have you had any Shortness of Breath - Yes this is not new    Have you had any dizziness - Yes this is not new she gets this if she stands up too fast.    Have you had any palpitations that are not new? - Yes     If Yes DO EKG - Do you feel your heart racing not new      Is the patient on any of the following medications -    Do you have any edema - swelling in No      Do you have a surgery or procedure scheduled in the near future - No      ? Ask patient if they want to sign up for [email or web address] if they are not already signed up    ? Check to see if we have an E-MAIL on file for the patient    ? Check medication list thoroughly!!! AND RECONCILE OUTSIDE MEDICATIONS  If dose has changed change the entire order not just the MG  BE SURE TO ASK PATIENT IF THEY NEED MEDICATION REFILLS    ?  At check out add to every patient's \"wrap up\" the following dot phrase AFTERHOURSEDUCATION and ensure we explain this to our patients

## 2020-11-19 NOTE — PROGRESS NOTES
[id number] Score for Atrial Fibrillation Stroke Risk   Risk   Factors  Component Value   C CHF Yes 1   H HTN Yes 1   A2 Age >= 75 No,  (63 y.o.) 0   D DM Yes 1   S2 Prior Stroke/TIA Yes 2   V Vascular Disease No 0   A Age 74-69 No,  (63 y.o.) 0   Sc Sex female 1    [id number]  Score  6   Score last updated 11/19/20 5:89 PM EST    Click here for a link to the UpToDate guideline \"Atrial Fibrillation: Anticoagulation therapy to prevent embolization    Disclaimer: Risk Score calculation is dependent on accuracy of patient problem list and past encounter diagnosis.

## 2020-11-20 NOTE — PROGRESS NOTES
[address], [address] [facility],Third Floor  Phone: [phone]    Fax [phone]                  Davina Gao MD, Juvenal Simon MD, [address] [facility], MD, MD Constance Yanez MD Marla Slipper, MD Loreda Lacer, MD Merideth Morin, APRN      Dagoberto Marcial, RENE Goyal, APRN     Nat Torres, APRN    CARDIOLOGY  NOTE      11/19/2020    RE: Elena Kinney  (1955)                               TO:  DAVID SeeC  The primary cardiologist is Dr. Karl Hannon    CC:   1. Carotid aneurysm, left ([facility]    2. HFrEF (heart failure with reduced ejection fraction) ([address]    3. PAF (paroxysmal atrial fibrillation) ([facility] [address]    4. Coronary artery disease involving native coronary artery of native heart without angina pectoris    5. Essential hypertension    6. Mixed hyperlipidemia      Patient denies all of the following:  Chest Pain  Palpitations  Shortness of Breath  Edema  Dizziness  Syncope      HPI: Thank you for involving me in taking care of your patient Elena Kinney, who is a  60y.o. year old female with a history as listed above. 2  Patient is  active and does not exercise regularly. Patient is  compliant with her medications. Patient denies any cardiac complaints or needs. Patient feels most of her stroke symptoms have resolved. She has been contacted by [facility] neurosurgery but has not been seen yet.      Vitals:    11/19/20 1606   BP: 130/84   Pulse: 84       Current Outpatient Medications   Medication Sig Dispense Refill    traMADol (ULTRAM) 50 MG tablet tramadol 50 mg tablet      methylPREDNISolone (MEDROL) 32 MG tablet Take 1 tablet 12 hours and 1 tablet 2 hours before procedure 2 tablet 0    metoprolol succinate (TOPROL XL) 100 MG extended release tablet Take 1 tablet by mouth daily TAKE ONE TABLET BY MOUTH DAILY 30 tablet 3    furosemide (LASIX) 40 MG tablet Take 1 tablet by mouth 2 times daily 60 tablet 3  potassium chloride (KLOR-CON M) 10 MEQ extended release tablet Take 1 tablet by mouth 2 times daily (with meals) 60 tablet 3    albuterol sulfate HFA (VENTOLIN HFA) 108 (90 Base) MCG/ACT inhaler Inhale 2 puffs into the lungs 4 times daily as needed for Wheezing 1 Inhaler 5    cloNIDine (CATAPRES) 0.3 MG tablet Take 0.3 mg by mouth nightly      BASAGLAR KWIKPEN 100 UNIT/ML injection pen Inject 80 Units into the skin nightly      ADMELOG SOLOSTAR 100 UNIT/ML pen Inject into the skin See Admin Instructions      atorvastatin (LIPITOR) 40 MG tablet Take 40 mg by mouth nightly      escitalopram (LEXAPRO) 20 MG tablet Take 20 mg by mouth daily      metFORMIN (GLUCOPHAGE) 500 MG tablet Take 500 mg by mouth 2 times daily      pantoprazole (PROTONIX) 40 MG tablet Take 40 mg by mouth daily      zolpidem (AMBIEN) 10 MG tablet Take 10 mg by mouth nightly as needed for Sleep.  dicyclomine (BENTYL) 10 MG capsule Take 1 capsule by mouth 4 times daily (before meals and nightly) 20 capsule 0    ondansetron (ZOFRAN ODT) 4 MG disintegrating tablet Take 1 tablet by mouth every 8 hours as needed for Nausea 15 tablet 0    amLODIPine (NORVASC) 5 MG tablet Take 1 tablet by mouth daily 30 tablet 3    hydrOXYzine (VISTARIL) 50 MG capsule Take 50 mg by mouth daily      levothyroxine (SYNTHROID) 50 MCG tablet Take 50 mcg by mouth Daily      apixaban (ELIQUIS) 5 MG TABS tablet Take 1 tablet by mouth 2 times daily 60 tablet 2    nitroGLYCERIN (NITROSTAT) 0.4 MG SL tablet Place 0.4 mg under the tongue every 5 minutes as needed for Chest pain up to max of 3 total doses. If no relief after 1 dose, call 911.  aspirin 81 MG EC tablet Take 81 mg by mouth daily      traZODone (DESYREL) 100 MG tablet Take 100 mg by mouth nightly. No current facility-administered medications for this visit. Allergies: Amiodarone;  Iv dye [iodides]; and Vicodin [hydrocodone-acetaminophen]  Past Medical History:   Diagnosis Date    Abnormal PFTs (pulmonary function tests) 4/14 4/14    Atrial fibrillation (HCC)     CAD (coronary artery disease)     CHF (congestive heart failure) (HCC)     Diabetes mellitus (HCC)     Dizziness - light-headed     Family history of coronary artery disease     H/O 24 hour EKG monitoring 02/09/2012,12/10/2010    02/09/2012 predominant rhythm is sinus with short runs of SVT no episode of atrial fib. pt has left bundle branch morphology, max heart is 132, min is 48 with an average of 74 ,infrequent PVCs are seen    H/O cardiovascular stress test 4/1/2013, 1/31/2012 4/1/2013-    H/O cardiovascular stress test 4/16/14,02/09/2012 4/16/14 EF 52% No ischemia, normal study. 02/09/2012 EF 58% tehnique-[employer]et[employer] LVEF is normal globally    H/O echocardiogram 03/27/13 4/14 EF 50-55% mild MR, TR 3/13EF 35-45% decreasedr L ventrical systolic function.  H/O echocardiogram 4/14 4/14-EF50-55% Mild MR/TR. 10/11 EF > 55% LVS function is normal, calcified mitral apparatus, impaired LV relaxation, no PE    Heart imaging 02/09/2012 02/09/2012 MUGA rest EF 58%LVEF is normal globally    Heartburn     History of cardiac cath 05/08/2009, 06/14/2006 05/08/2009,moderate stenosis  small diagonal    History of cardiac monitoring 2/9/15    Event - sinus rhythm    History of cardioversion 3/14/14    History of MI (myocardial infarction)     History of nuclear Muga stress test 07/14/2020    EF 41%    History of PTCA 10/06/2010    10/06/2010 stent 3.0x24 taxus stent left main 90% stenosis mid seg reduced to 0% second area of [id number] to 40% not flow limiting EF 40%    Hx of cardiovascular stress test 8/21/2015    lexiscan-normal,EF70%    Hx of transesophageal echocardiography ([facility]KE) for monitoring 08/07/2017    Noted to have MARY GRACE Blood Clot.  Hyperlipidemia     Hypertension     Insomnia     SOB (shortness of breath)     Tachycardia 1/30/2012    report in epic notes-Dr Nunn hosp. consult    TIA (transient ischemic attack)      Past Surgical History:   Procedure Laterality Date    ABLATION OF DYSRHYTHMIC FOCUS       SECTION      CHOLECYSTECTOMY      DIAGNOSTIC CARDIAC CATH LAB PROCEDURE  2006, 2009 moderate stenosis small diagonal    OTHER SURGICAL HISTORY  2017    afib ablation LUKE    PTCA  10/06/2010    10/06/2010 stent 3.0x24 taxus stent 90% stenosis mid segment reduced to 0% second area of stenosis 30-40% not flow limiting EF 40%    TUBAL LIGATION       Family History   Problem Relation Age of Onset    Diabetes Father     Heart Disease Father      Social History     Tobacco Use    Smoking status: Former Smoker     Packs/day: 3.00     Years: 20.00     Pack years: 60.00     Types: Cigarettes     Last attempt to quit: 2007     Years since quittin.7    Smokeless tobacco: Never Used    Tobacco comment: reviewed 8/12/15   Substance Use Topics    Alcohol use: No     Comment: occas. caffeine 3 mtn. dew a day        Review of Systems   Constitutional: Negative for fatigue and fever. HENT: Negative for ear pain and sinus pain. Eyes: Negative for photophobia and visual disturbance. Respiratory: Negative for cough and shortness of breath. Cardiovascular: Negative for chest pain, palpitations and leg swelling. Gastrointestinal: Negative for abdominal pain, blood in stool, constipation, diarrhea, nausea and vomiting. Endocrine: Negative for polyphagia and polyuria. Genitourinary: Negative for decreased urine volume and difficulty urinating. Musculoskeletal: Negative for arthralgias and gait problem. Skin: Negative for color change and wound. Neurological: Negative for dizziness, syncope, weakness, light-headedness and headaches. Psychiatric/Behavioral: Negative for agitation. The patient is not hyperactive.         Objective:      Physical Exam:  /84   Pulse 84   Ht 5' 1\" (1.549 m)   Wt 183 lb (83 kg)   BMI 34.58 kg/m²   Wt [facility])  Patient is waiting to hear back from [facility] neurosurgery about Carotid aneurysm. Reviewed with patient and her daughter to take CD from hospital the CT images to consultation. 2. HFrEF (heart failure with reduced ejection fraction) ([address]  Will recheck muga scan to determine if ICD is needed. Continue 2 gm sodium restriction and 2 L fluid restriction. - [place] CARDIAC MUGA SCAN; Future    3. Coronary artery disease involving native coronary artery of native heart without angina pectoris  Patient denies any chest pain. Continue Toprol, Lipitor, and ASA. 4. Essential hypertension  Controlled today. Continue current medications. Maintain low salt diet and increase exercise as able    5. Mixed hyperlipidemia  No lipid panel on file. Patient daughter states was drawn recently at PCP. Will attempt to get copy for our records. Continue statin    6. Atrial fibrillation   Rate controlled yes.  Chadsvasc score is 7   She is  on anticoagulation.  Continue toprol and cardizem    [id number] Score for Atrial Fibrillation Stroke Risk   Risk   Factors  Component Value   C CHF Yes 1   H HTN Yes 1   A2 Age >= 76 No,  (63 y.o.) 0   D DM Yes 1   S2 Prior Stroke/TIA Yes 2   V Vascular Disease Yes 1   A Age 74-69 No,  (63 y.o.) 0   Sc Sex female 1    [id number]  Score  7   Score last updated 11/19/20 0:62 PM EST    Click here for a link to the UpToDate guideline \"Atrial Fibrillation: Anticoagulation therapy to prevent embolization    Disclaimer: Risk Score calculation is dependent on accuracy of patient problem list and past encounter diagnosis. Patient seen, interviewed and examined. Testing was reviewed. Patient is encouraged to exercise even a brisk walk for 30 minutes at least 3 to 4 times a week. Lifestyle and risk factor modificatons discussed. Various goals are discussed and questions answered. Continue current medications. Appropriate prescriptions are addressed.   Questions answered and patient verbalizes understanding. Call for any problems, questions, or concerns. Pt is to follow up in 3 months for Cardiac management    I have spent 25 minutes of face to face time with the patient with more than 50% spent counseling and coordinating care for CAD< CARotid aneurysm, HTN, HLD, CHF, Atrial fibrillation, and follow up plan of care.     Electronically signed by RENE Gutierrez CNP on 11/19/2020 at 9:31 PM

## 2020-11-23 ENCOUNTER — TELEPHONE (OUTPATIENT)
Dept: CARDIOLOGY CLINIC | Age: 65
End: 2020-11-23

## 2020-11-23 NOTE — TELEPHONE ENCOUNTER
Called the pt and she stated that her daughter takes care of scheduling her appointments . I called the patient's daughter and she didn't answer the phone so I left a message on the daughters voicemail .    The pt GISELA needs  To be on the [place] Schedule and booked at either at 8:00 am or 12:00pm

## 2020-11-25 ENCOUNTER — TELEPHONE (OUTPATIENT)
Dept: CARDIOLOGY CLINIC | Age: 65
End: 2020-11-25

## 2020-11-27 ENCOUNTER — APPOINTMENT (OUTPATIENT)
Dept: GENERAL RADIOLOGY | Age: 65
DRG: 139 | End: 2020-11-27
Payer: COMMERCIAL

## 2020-11-27 ENCOUNTER — APPOINTMENT (OUTPATIENT)
Dept: CT IMAGING | Age: 65
DRG: 139 | End: 2020-11-27
Payer: COMMERCIAL

## 2020-11-27 ENCOUNTER — HOSPITAL ENCOUNTER (INPATIENT)
Age: 65
LOS: 3 days | Discharge: HOME | DRG: 139 | End: 2020-12-01
Attending: INTERNAL MEDICINE | Admitting: GENERAL PRACTICE
Payer: COMMERCIAL

## 2020-11-27 LAB
ALBUMIN SERPL-MCNC: 2.9 GM/DL (ref 3.4–5)
ALP BLD-CCNC: 378 IU/L (ref 40–129)
ALT SERPL-CCNC: 16 U/L (ref 10–40)
ANION GAP SERPL CALCULATED.3IONS-SCNC: 12 MMOL/L (ref 4–16)
AST SERPL-CCNC: 18 IU/L (ref 15–37)
BASOPHILS ABSOLUTE: 0 K/CU MM
BASOPHILS RELATIVE PERCENT: 0.2 % (ref 0–1)
BILIRUB SERPL-MCNC: 0.7 MG/DL (ref 0–1)
BUN BLDV-MCNC: 8 MG/DL (ref 6–23)
CALCIUM SERPL-MCNC: 8.3 MG/DL (ref 8.3–10.6)
CHLORIDE BLD-SCNC: 98 MMOL/L (ref 99–110)
CO2: 23 MMOL/L (ref 21–32)
CREAT SERPL-MCNC: 1.1 MG/DL (ref 0.6–1.1)
DIFFERENTIAL TYPE: ABNORMAL
EOSINOPHILS ABSOLUTE: 0.1 K/CU MM
EOSINOPHILS RELATIVE PERCENT: 0.9 % (ref 0–3)
GFR AFRICAN AMERICAN: >60 ML/MIN/1.73M2
GFR NON-AFRICAN AMERICAN: 50 ML/MIN/1.73M2
GLUCOSE BLD-MCNC: 200 MG/DL (ref 70–99)
HCT VFR BLD CALC: 30.6 % (ref 37–47)
HEMOGLOBIN: 10.6 GM/DL (ref 12.5–16)
IMMATURE NEUTROPHIL %: 0.5 % (ref 0–0.43)
LIPASE: 8 IU/L (ref 13–60)
LYMPHOCYTES ABSOLUTE: 0.7 K/CU MM
LYMPHOCYTES RELATIVE PERCENT: 4.8 % (ref 24–44)
MCH RBC QN AUTO: 31.4 PG (ref 27–31)
MCHC RBC AUTO-ENTMCNC: 34.6 % (ref 32–36)
MCV RBC AUTO: 90.5 FL (ref 78–100)
MONOCYTES ABSOLUTE: 1 K/CU MM
MONOCYTES RELATIVE PERCENT: 6.8 % (ref 0–4)
NUCLEATED RBC %: 0 %
PDW BLD-RTO: 11.8 % (ref 11.7–14.9)
PLATELET # BLD: 461 K/CU MM (ref 140–440)
PMV BLD AUTO: 9.1 FL (ref 7.5–11.1)
POTASSIUM SERPL-SCNC: 3.6 MMOL/L (ref 3.5–5.1)
PRO-BNP: 8090 PG/ML
RBC # BLD: 3.38 M/CU MM (ref 4.2–5.4)
SARS-COV-2, NAAT: NOT DETECTED
SEGMENTED NEUTROPHILS ABSOLUTE COUNT: 12.5 K/CU MM
SEGMENTED NEUTROPHILS RELATIVE PERCENT: 86.8 % (ref 36–66)
SODIUM BLD-SCNC: 133 MMOL/L (ref 135–145)
SOURCE: NORMAL
TOTAL IMMATURE NEUTOROPHIL: 0.07 K/CU MM
TOTAL NUCLEATED RBC: 0 K/CU MM
TOTAL PROTEIN: 6.5 GM/DL (ref 6.4–8.2)
TROPONIN T: <0.01 NG/ML
WBC # BLD: 14.4 K/CU MM (ref 4–10.5)

## 2020-11-27 PROCEDURE — 36415 COLL VENOUS BLD VENIPUNCTURE: CPT

## 2020-11-27 PROCEDURE — 83690 ASSAY OF LIPASE: CPT

## 2020-11-27 PROCEDURE — 87040 BLOOD CULTURE FOR BACTERIA: CPT

## 2020-11-27 PROCEDURE — 80053 COMPREHEN METABOLIC PANEL: CPT

## 2020-11-27 PROCEDURE — 71045 X-RAY EXAM CHEST 1 VIEW: CPT

## 2020-11-27 PROCEDURE — U0002 COVID-19 LAB TEST NON-CDC: HCPCS

## 2020-11-27 PROCEDURE — 6370000000 HC RX 637 (ALT 250 FOR IP): Performed by: PHYSICIAN ASSISTANT

## 2020-11-27 PROCEDURE — 85025 COMPLETE CBC W/AUTO DIFF WBC: CPT

## 2020-11-27 PROCEDURE — 71250 CT THORAX DX C-: CPT

## 2020-11-27 PROCEDURE — 83605 ASSAY OF LACTIC ACID: CPT

## 2020-11-27 PROCEDURE — 83880 ASSAY OF NATRIURETIC PEPTIDE: CPT

## 2020-11-27 PROCEDURE — 93005 ELECTROCARDIOGRAM TRACING: CPT | Performed by: PHYSICIAN ASSISTANT

## 2020-11-27 PROCEDURE — 84484 ASSAY OF TROPONIN QUANT: CPT

## 2020-11-27 PROCEDURE — 2580000003 HC RX 258: Performed by: PHYSICIAN ASSISTANT

## 2020-11-27 RX ORDER — ACETAMINOPHEN 500 MG
1000 TABLET ORAL ONCE
Status: COMPLETED | OUTPATIENT
Start: 2020-11-27 | End: 2020-11-27

## 2020-11-27 RX ORDER — PREDNISONE 20 MG/1
60 TABLET ORAL ONCE
Status: COMPLETED | OUTPATIENT
Start: 2020-11-27 | End: 2020-11-27

## 2020-11-27 RX ORDER — SODIUM CHLORIDE 9 MG/ML
INJECTION, SOLUTION INTRAVENOUS CONTINUOUS
Status: DISCONTINUED | OUTPATIENT
Start: 2020-11-27 | End: 2020-11-28

## 2020-11-27 RX ADMIN — PREDNISONE 60 MG: 20 TABLET ORAL at 21:19

## 2020-11-27 RX ADMIN — SODIUM CHLORIDE: 9 INJECTION, SOLUTION INTRAVENOUS at 21:44

## 2020-11-27 RX ADMIN — ACETAMINOPHEN 1000 MG: 500 TABLET ORAL at 21:19

## 2020-11-27 ASSESSMENT — PAIN DESCRIPTION - LOCATION: LOCATION: CHEST

## 2020-11-27 ASSESSMENT — PAIN SCALES - GENERAL
PAINLEVEL_OUTOF10: 7
PAINLEVEL_OUTOF10: 7

## 2020-11-27 ASSESSMENT — PAIN DESCRIPTION - PAIN TYPE: TYPE: ACUTE PAIN

## 2020-11-28 PROBLEM — J18.9 MULTIFOCAL PNEUMONIA: Status: ACTIVE | Noted: 2020-11-28

## 2020-11-28 PROBLEM — J18.9 PNEUMONIA: Status: ACTIVE | Noted: 2020-11-28

## 2020-11-28 LAB
GLUCOSE BLD-MCNC: 136 MG/DL (ref 70–99)
GLUCOSE BLD-MCNC: 280 MG/DL (ref 70–99)
GLUCOSE BLD-MCNC: 371 MG/DL (ref 70–99)
GLUCOSE BLD-MCNC: 372 MG/DL (ref 70–99)
GLUCOSE BLD-MCNC: 395 MG/DL (ref 70–99)
GLUCOSE BLD-MCNC: 481 MG/DL (ref 70–99)
LACTATE: 0.8 MMOL/L (ref 0.4–2)

## 2020-11-28 PROCEDURE — 6370000000 HC RX 637 (ALT 250 FOR IP): Performed by: INTERNAL MEDICINE

## 2020-11-28 PROCEDURE — 94761 N-INVAS EAR/PLS OXIMETRY MLT: CPT

## 2020-11-28 PROCEDURE — 2700000000 HC OXYGEN THERAPY PER DAY

## 2020-11-28 PROCEDURE — 99285 EMERGENCY DEPT VISIT HI MDM: CPT

## 2020-11-28 PROCEDURE — 96365 THER/PROPH/DIAG IV INF INIT: CPT

## 2020-11-28 PROCEDURE — 1200000000 HC SEMI PRIVATE

## 2020-11-28 PROCEDURE — 96367 TX/PROPH/DG ADDL SEQ IV INF: CPT

## 2020-11-28 PROCEDURE — 2580000003 HC RX 258: Performed by: PHYSICIAN ASSISTANT

## 2020-11-28 PROCEDURE — 2580000003 HC RX 258: Performed by: INTERNAL MEDICINE

## 2020-11-28 PROCEDURE — 82962 GLUCOSE BLOOD TEST: CPT

## 2020-11-28 PROCEDURE — 99254 IP/OBS CNSLTJ NEW/EST MOD 60: CPT | Performed by: INTERNAL MEDICINE

## 2020-11-28 PROCEDURE — 6360000002 HC RX W HCPCS: Performed by: PHYSICIAN ASSISTANT

## 2020-11-28 PROCEDURE — 6360000002 HC RX W HCPCS: Performed by: INTERNAL MEDICINE

## 2020-11-28 RX ORDER — ACETAMINOPHEN 650 MG/1
650 SUPPOSITORY RECTAL EVERY 6 HOURS PRN
Status: DISCONTINUED | OUTPATIENT
Start: 2020-11-28 | End: 2020-11-30

## 2020-11-28 RX ORDER — POLYETHYLENE GLYCOL 3350 17 G/17G
17 POWDER, FOR SOLUTION ORAL DAILY PRN
Status: DISCONTINUED | OUTPATIENT
Start: 2020-11-28 | End: 2020-12-01 | Stop reason: HOSPADM

## 2020-11-28 RX ORDER — NICOTINE POLACRILEX 4 MG
15 LOZENGE BUCCAL PRN
Status: DISCONTINUED | OUTPATIENT
Start: 2020-11-28 | End: 2020-12-01 | Stop reason: HOSPADM

## 2020-11-28 RX ORDER — DEXTROSE MONOHYDRATE 25 G/50ML
12.5 INJECTION, SOLUTION INTRAVENOUS PRN
Status: DISCONTINUED | OUTPATIENT
Start: 2020-11-28 | End: 2020-12-01 | Stop reason: HOSPADM

## 2020-11-28 RX ORDER — ASPIRIN 81 MG/1
81 TABLET ORAL DAILY
Status: DISCONTINUED | OUTPATIENT
Start: 2020-11-28 | End: 2020-12-01 | Stop reason: HOSPADM

## 2020-11-28 RX ORDER — ATORVASTATIN CALCIUM 40 MG/1
40 TABLET, FILM COATED ORAL NIGHTLY
Status: DISCONTINUED | OUTPATIENT
Start: 2020-11-28 | End: 2020-12-01 | Stop reason: HOSPADM

## 2020-11-28 RX ORDER — DEXTROSE MONOHYDRATE 50 MG/ML
100 INJECTION, SOLUTION INTRAVENOUS PRN
Status: DISCONTINUED | OUTPATIENT
Start: 2020-11-28 | End: 2020-12-01 | Stop reason: HOSPADM

## 2020-11-28 RX ORDER — ZOLPIDEM TARTRATE 5 MG/1
10 TABLET ORAL NIGHTLY PRN
Status: DISCONTINUED | OUTPATIENT
Start: 2020-11-28 | End: 2020-12-01 | Stop reason: HOSPADM

## 2020-11-28 RX ORDER — ACETAMINOPHEN 325 MG/1
650 TABLET ORAL EVERY 4 HOURS PRN
Status: DISCONTINUED | OUTPATIENT
Start: 2020-11-28 | End: 2020-11-28

## 2020-11-28 RX ORDER — SODIUM CHLORIDE 0.9 % (FLUSH) 0.9 %
10 SYRINGE (ML) INJECTION PRN
Status: DISCONTINUED | OUTPATIENT
Start: 2020-11-28 | End: 2020-11-30

## 2020-11-28 RX ORDER — PREDNISONE 1 MG/1
5 TABLET ORAL DAILY
Status: DISCONTINUED | OUTPATIENT
Start: 2020-12-07 | End: 2020-11-28

## 2020-11-28 RX ORDER — INSULIN GLARGINE 100 [IU]/ML
80 INJECTION, SOLUTION SUBCUTANEOUS NIGHTLY
Status: DISCONTINUED | OUTPATIENT
Start: 2020-11-28 | End: 2020-11-28

## 2020-11-28 RX ORDER — SODIUM CHLORIDE 0.9 % (FLUSH) 0.9 %
10 SYRINGE (ML) INJECTION EVERY 12 HOURS SCHEDULED
Status: DISCONTINUED | OUTPATIENT
Start: 2020-11-28 | End: 2020-11-30

## 2020-11-28 RX ORDER — NICOTINE POLACRILEX 4 MG
15 LOZENGE BUCCAL PRN
Status: DISCONTINUED | OUTPATIENT
Start: 2020-11-28 | End: 2020-11-28

## 2020-11-28 RX ORDER — POTASSIUM CHLORIDE 20 MEQ/1
10 TABLET, EXTENDED RELEASE ORAL 2 TIMES DAILY WITH MEALS
Status: DISCONTINUED | OUTPATIENT
Start: 2020-11-28 | End: 2020-12-01 | Stop reason: HOSPADM

## 2020-11-28 RX ORDER — ONDANSETRON 4 MG/1
4 TABLET, ORALLY DISINTEGRATING ORAL EVERY 8 HOURS PRN
Status: DISCONTINUED | OUTPATIENT
Start: 2020-11-28 | End: 2020-11-28 | Stop reason: SDUPTHER

## 2020-11-28 RX ORDER — INSULIN GLARGINE 100 [IU]/ML
70 INJECTION, SOLUTION SUBCUTANEOUS NIGHTLY
Status: DISCONTINUED | OUTPATIENT
Start: 2020-11-28 | End: 2020-12-01 | Stop reason: HOSPADM

## 2020-11-28 RX ORDER — DEXTROSE MONOHYDRATE 50 MG/ML
100 INJECTION, SOLUTION INTRAVENOUS PRN
Status: DISCONTINUED | OUTPATIENT
Start: 2020-11-28 | End: 2020-11-28 | Stop reason: SDUPTHER

## 2020-11-28 RX ORDER — ONDANSETRON 4 MG/1
4 TABLET, ORALLY DISINTEGRATING ORAL EVERY 8 HOURS PRN
Status: DISCONTINUED | OUTPATIENT
Start: 2020-11-28 | End: 2020-11-28

## 2020-11-28 RX ORDER — DEXTROSE MONOHYDRATE 25 G/50ML
12.5 INJECTION, SOLUTION INTRAVENOUS PRN
Status: DISCONTINUED | OUTPATIENT
Start: 2020-11-28 | End: 2020-11-28

## 2020-11-28 RX ORDER — IBUPROFEN 600 MG/1
600 TABLET ORAL ONCE
Status: DISCONTINUED | OUTPATIENT
Start: 2020-11-28 | End: 2020-11-28

## 2020-11-28 RX ORDER — PREDNISONE 20 MG/1
20 TABLET ORAL DAILY
Status: DISCONTINUED | OUTPATIENT
Start: 2020-11-28 | End: 2020-11-28

## 2020-11-28 RX ORDER — ONDANSETRON 2 MG/ML
4 INJECTION INTRAMUSCULAR; INTRAVENOUS EVERY 6 HOURS PRN
Status: DISCONTINUED | OUTPATIENT
Start: 2020-11-28 | End: 2020-12-01 | Stop reason: HOSPADM

## 2020-11-28 RX ORDER — PANTOPRAZOLE SODIUM 40 MG/1
40 TABLET, DELAYED RELEASE ORAL DAILY
Status: DISCONTINUED | OUTPATIENT
Start: 2020-11-28 | End: 2020-12-01 | Stop reason: HOSPADM

## 2020-11-28 RX ORDER — GUAIFENESIN 600 MG/1
600 TABLET, EXTENDED RELEASE ORAL 2 TIMES DAILY
Status: DISCONTINUED | OUTPATIENT
Start: 2020-11-28 | End: 2020-12-01 | Stop reason: HOSPADM

## 2020-11-28 RX ORDER — PROMETHAZINE HYDROCHLORIDE 25 MG/1
12.5 TABLET ORAL EVERY 6 HOURS PRN
Status: DISCONTINUED | OUTPATIENT
Start: 2020-11-28 | End: 2020-12-01 | Stop reason: HOSPADM

## 2020-11-28 RX ORDER — INSULIN GLARGINE 100 [IU]/ML
20 INJECTION, SOLUTION SUBCUTANEOUS ONCE
Status: COMPLETED | OUTPATIENT
Start: 2020-11-28 | End: 2020-11-28

## 2020-11-28 RX ORDER — ESCITALOPRAM OXALATE 10 MG/1
20 TABLET ORAL DAILY
Status: DISCONTINUED | OUTPATIENT
Start: 2020-11-28 | End: 2020-11-28

## 2020-11-28 RX ORDER — PREDNISONE 10 MG/1
10 TABLET ORAL DAILY
Status: DISCONTINUED | OUTPATIENT
Start: 2020-12-04 | End: 2020-11-28

## 2020-11-28 RX ORDER — METOPROLOL SUCCINATE 100 MG/1
100 TABLET, EXTENDED RELEASE ORAL DAILY
Status: DISCONTINUED | OUTPATIENT
Start: 2020-11-28 | End: 2020-12-01 | Stop reason: HOSPADM

## 2020-11-28 RX ORDER — LEVOTHYROXINE SODIUM 0.05 MG/1
50 TABLET ORAL DAILY
Status: DISCONTINUED | OUTPATIENT
Start: 2020-11-28 | End: 2020-12-01 | Stop reason: HOSPADM

## 2020-11-28 RX ORDER — ACETAMINOPHEN 325 MG/1
650 TABLET ORAL EVERY 6 HOURS PRN
Status: DISCONTINUED | OUTPATIENT
Start: 2020-11-28 | End: 2020-11-30

## 2020-11-28 RX ORDER — NITROGLYCERIN 0.4 MG/1
0.4 TABLET SUBLINGUAL EVERY 5 MIN PRN
Status: DISCONTINUED | OUTPATIENT
Start: 2020-11-28 | End: 2020-12-01 | Stop reason: HOSPADM

## 2020-11-28 RX ORDER — IPRATROPIUM BROMIDE AND ALBUTEROL SULFATE 2.5; .5 MG/3ML; MG/3ML
1 SOLUTION RESPIRATORY (INHALATION) EVERY 4 HOURS PRN
Status: DISCONTINUED | OUTPATIENT
Start: 2020-11-28 | End: 2020-12-01 | Stop reason: HOSPADM

## 2020-11-28 RX ORDER — SODIUM CHLORIDE 9 MG/ML
INJECTION, SOLUTION INTRAVENOUS CONTINUOUS
Status: DISCONTINUED | OUTPATIENT
Start: 2020-11-28 | End: 2020-11-29

## 2020-11-28 RX ORDER — TRAZODONE HYDROCHLORIDE 50 MG/1
100 TABLET ORAL NIGHTLY
Status: DISCONTINUED | OUTPATIENT
Start: 2020-11-28 | End: 2020-12-01 | Stop reason: HOSPADM

## 2020-11-28 RX ADMIN — PREDNISONE 20 MG: 20 TABLET ORAL at 09:08

## 2020-11-28 RX ADMIN — INSULIN LISPRO 6 UNITS: 100 INJECTION, SOLUTION INTRAVENOUS; SUBCUTANEOUS at 17:17

## 2020-11-28 RX ADMIN — ATORVASTATIN CALCIUM 40 MG: 40 TABLET, FILM COATED ORAL at 20:31

## 2020-11-28 RX ADMIN — APIXABAN 5 MG: 5 TABLET, FILM COATED ORAL at 20:32

## 2020-11-28 RX ADMIN — AZITHROMYCIN MONOHYDRATE 500 MG: 500 INJECTION, POWDER, LYOPHILIZED, FOR SOLUTION INTRAVENOUS at 23:40

## 2020-11-28 RX ADMIN — CEFTRIAXONE SODIUM 1 G: 1 INJECTION, POWDER, FOR SOLUTION INTRAMUSCULAR; INTRAVENOUS at 00:45

## 2020-11-28 RX ADMIN — APIXABAN 5 MG: 5 TABLET, FILM COATED ORAL at 09:08

## 2020-11-28 RX ADMIN — CEFTRIAXONE 1 G: 1 INJECTION, POWDER, FOR SOLUTION INTRAMUSCULAR; INTRAVENOUS at 20:32

## 2020-11-28 RX ADMIN — TRAZODONE HYDROCHLORIDE 100 MG: 50 TABLET ORAL at 20:31

## 2020-11-28 RX ADMIN — SODIUM CHLORIDE, PRESERVATIVE FREE 10 ML: 5 INJECTION INTRAVENOUS at 20:36

## 2020-11-28 RX ADMIN — ACETAMINOPHEN 650 MG: 325 TABLET ORAL at 20:31

## 2020-11-28 RX ADMIN — INSULIN LISPRO 10 UNITS: 100 INJECTION, SOLUTION INTRAVENOUS; SUBCUTANEOUS at 09:09

## 2020-11-28 RX ADMIN — ONDANSETRON 4 MG: 2 INJECTION INTRAMUSCULAR; INTRAVENOUS at 23:40

## 2020-11-28 RX ADMIN — GUAIFENESIN 600 MG: 600 TABLET, EXTENDED RELEASE ORAL at 09:08

## 2020-11-28 RX ADMIN — SODIUM CHLORIDE: 9 INJECTION, SOLUTION INTRAVENOUS at 08:32

## 2020-11-28 RX ADMIN — ESCITALOPRAM OXALATE 20 MG: 10 TABLET ORAL at 09:08

## 2020-11-28 RX ADMIN — POTASSIUM CHLORIDE 10 MEQ: 1500 TABLET, EXTENDED RELEASE ORAL at 09:15

## 2020-11-28 RX ADMIN — INSULIN GLARGINE 20 UNITS: 100 INJECTION, SOLUTION SUBCUTANEOUS at 13:51

## 2020-11-28 RX ADMIN — METOPROLOL SUCCINATE 100 MG: 100 TABLET, EXTENDED RELEASE ORAL at 09:08

## 2020-11-28 RX ADMIN — CLONIDINE HYDROCHLORIDE 0.3 MG: 0.2 TABLET ORAL at 20:31

## 2020-11-28 RX ADMIN — PANTOPRAZOLE SODIUM 40 MG: 40 TABLET, DELAYED RELEASE ORAL at 09:08

## 2020-11-28 RX ADMIN — GUAIFENESIN 600 MG: 600 TABLET, EXTENDED RELEASE ORAL at 20:31

## 2020-11-28 RX ADMIN — ASPIRIN 81 MG: 81 TABLET, COATED ORAL at 09:08

## 2020-11-28 RX ADMIN — INSULIN GLARGINE 70 UNITS: 100 INJECTION, SOLUTION SUBCUTANEOUS at 20:32

## 2020-11-28 RX ADMIN — AZITHROMYCIN MONOHYDRATE 500 MG: 500 INJECTION, POWDER, LYOPHILIZED, FOR SOLUTION INTRAVENOUS at 01:38

## 2020-11-28 RX ADMIN — POTASSIUM CHLORIDE 10 MEQ: 1500 TABLET, EXTENDED RELEASE ORAL at 17:16

## 2020-11-28 ASSESSMENT — PAIN SCALES - GENERAL
PAINLEVEL_OUTOF10: 0
PAINLEVEL_OUTOF10: 0
PAINLEVEL_OUTOF10: 4
PAINLEVEL_OUTOF10: 0

## 2020-11-28 NOTE — PROGRESS NOTES
11/28/2020    Pt admitted early this morning with shortness of breath, CT shows multifocal pneumonia. She feels OK when I interview her, and states that she usually feels SOB in hte evenings. She denies any fever or chills. She thought she was more tired because of taking care of her  who has alzheimer's disease. Pt was given rocephin and azithromycin in hte ER last night. This regimen will be continued.   See dictated history and physical.  Maddy Milton MD

## 2020-11-28 NOTE — ED TRIAGE NOTES
Patient presents to ED for complaints of sob. Symptoms have been ongoing throughout the week. Patient states some relief with albuterol inhaler.

## 2020-11-28 NOTE — CONSULTS
glycol (GLYCOLAX) packet 17 g, Daily PRN  promethazine (PHENERGAN) tablet 12.5 mg, Q6H PRN    Or  ondansetron (ZOFRAN) injection 4 mg, Q6H PRN  azithromycin (ZITHROMAX) 500 mg in dextrose 5 % 250 mL IVPB, Q24H    And  cefTRIAXone (ROCEPHIN) 1 g IVPB in 50 mL D5W minibag, Q24H  guaiFENesin (MUCINEX) extended release tablet 600 mg, BID  glucose (GLUTOSE) 40 % oral gel 15 g, PRN  dextrose 50 % IV solution, PRN  glucagon (rDNA) injection 1 mg, PRN  dextrose 5 % solution, PRN  insulin lispro (HUMALOG) injection vial 0-12 Units, TID WC  insulin lispro (HUMALOG) injection vial 0-6 Units, Nightly  insulin lispro (HUMALOG) injection vial 5 Units, TID WC  insulin glargine (LANTUS) injection vial 70 Units, Nightly  dilTIAZem (CARDIZEM) tablet 30 mg, 3 times per day      Current Facility-Administered Medications   Medication Dose Route Frequency Provider Last Rate Last Dose    zolpidem (AMBIEN) tablet 10 mg  10 mg Oral Nightly PRN Abhishek Jean MD        ipratropium-albuterol (DUONEB) nebulizer solution 1 ampule  1 ampule Inhalation Q4H PRN Abhishek Jean MD        apixaban Callie Eisenberg) tablet 5 mg  5 mg Oral BID Abhishek Jena MD   5 mg at 11/28/20 0908    aspirin EC tablet 81 mg  81 mg Oral Daily Abhishek Jean MD   81 mg at 11/28/20 0908    atorvastatin (LIPITOR) tablet 40 mg  40 mg Oral Nightly Abhishek Jean MD        cloNIDine (CATAPRES) tablet 0.3 mg  0.3 mg Oral Nightly Abhishek Jean MD        escitalopram (LEXAPRO) tablet 20 mg  20 mg Oral Daily Abhishek Jean MD   20 mg at 11/28/20 0908    levothyroxine (SYNTHROID) tablet 50 mcg  50 mcg Oral Daily Abhishek Jean MD        metoprolol succinate (TOPROL XL) extended release tablet 100 mg  100 mg Oral Daily Abhishek Jean MD   100 mg at 11/28/20 0908    nitroGLYCERIN (NITROSTAT) SL tablet 0.4 mg  0.4 mg Sublingual Q5 Min PRN Abhishek Jean MD        pantoprazole (PROTONIX) tablet 40 mg  40 mg Oral Daily Brian Sanford MD   40 mg at 11/28/20 0908    potassium chloride (KLOR-CON M) extended release tablet 10 mEq  10 mEq Oral BID  Brian Sanford MD   10 mEq at 11/28/20 0915    traZODone (DESYREL) tablet 100 mg  100 mg Oral Nightly Brian Sanford MD        0.9 % sodium chloride infusion   Intravenous Continuous Brian Sanford MD 60 mL/hr at 11/28/20 0908      sodium chloride flush 0.9 % injection 10 mL  10 mL Intravenous 2 times per day Brian Sanford MD        sodium chloride flush 0.9 % injection 10 mL  10 mL Intravenous PRN Brian Sanford MD        acetaminophen (TYLENOL) tablet 650 mg  650 mg Oral Q6H PRN Brian Sanford MD        Or    acetaminophen (TYLENOL) suppository 650 mg  650 mg Rectal Q6H PRN Brian Sanford MD        polyethylene glycol [facility]) packet 17 g  17 g Oral Daily PRN Brian Sanford MD        promethazine (PHENERGAN) tablet 12.5 mg  12.5 mg Oral Q6H PRN Brian Sanford MD        Or    ondansetron [facility]) injection 4 mg  4 mg Intravenous Q6H PRN Brian Sanford MD        azithromycin (ZITHROMAX) 500 mg in dextrose 5 % 250 mL IVPB  500 mg Intravenous Q24H Brian Sanford MD        And    cefTRIAXone (ROCEPHIN) 1 g IVPB in 50 mL D5W minibag  1 g Intravenous Q24H Brian Sanford MD        guaiFENesin [facility]) extended release tablet 600 mg  600 mg Oral BID Brian Sanford MD   600 mg at 11/28/20 0908    glucose (GLUTOSE) 40 % oral gel 15 g  15 g Oral PRN Brian Sanford MD        dextrose 50 % IV solution  12.5 g Intravenous PRN Brian Sanford MD        glucagon (rDNA) injection 1 mg  1 mg Intramuscular PRN Brian Sanford MD        dextrose 5 % solution  100 mL/hr Intravenous PRN Brian Sanford MD        insulin lispro (HUMALOG) injection vial 0-12 Units  0-12 Units Subcutaneous TID  Brian Sanford MD   10 Units at 11/28/20 0909    insulin lispro (HUMALOG) injection vial 0-6 Units  0-6 Units Subcutaneous Nightly Farhad Pickens MD        insulin lispro (HUMALOG) injection vial 5 Units  5 Units Subcutaneous TID WC Farhad Pickens MD   5 Units at 11/28/20 0915    insulin glargine (LANTUS) injection vial 70 Units  70 Units Subcutaneous Nightly Farhad Pickens MD        dilTIAZem (CARDIZEM) tablet 30 mg  30 mg Oral 3 times per day Farhad Pickens MD             Review of Systems:     · Constitutional: No Fever or Weight Loss   · Eyes: No Decreased Vision  · ENT: No Headaches, Hearing Loss or Vertigo  · Cardiovascular: No chest pain, + dyspnea on exertion, palpitations or loss of consciousness  · Respiratory: No cough or wheezing    · Gastrointestinal: No abdominal pain, appetite loss, blood in stools, constipation, diarrhea or heartburn  · Genitourinary: No dysuria, trouble voiding, or hematuria  · Musculoskeletal:  No gait disturbance, weakness or joint complaints  · Integumentary: No rash or pruritis  · Neurological: No TIA or stroke symptoms  · Psychiatric: No anxiety or depression  · Endocrine: No malaise, fatigue or temperature intolerance  · Hematologic/Lymphatic: No bleeding problems, blood clots or swollen lymph nodes  · Allergic/Immunologic: No nasal congestion or hives    All other systems were reviewed and were negative otherwise. Physical Examination:      Vitals:    11/28/20 0908   BP: (!) 169/101   Pulse: 92   Resp:    Temp:    SpO2:       Wt Readings from Last 3 Encounters:   11/28/20 182 lb 3.2 oz (82.6 kg)   11/19/20 183 lb (83 kg)   10/19/20 186 lb (84.4 kg)     Body mass index is 34.43 kg/m². General Appearance:  No distress, conversant  Constitutional:  Well developed, Well nourished  HEENT:  Normocephalic, Atraumatic, Oropharynx moist, No oral exudates,   Nose normal. Neck Supple Carotid: no carotid bruit  Eyes:  Conjunctiva normal, No discharge.    Respiratory:    Course breath sounds, No respiratory distress, No wheezing, no use of accessory muscles, diaphragm movement is normal  No chest Tenderness  Cardiovascular: S1-S2 No murmurs auscultated. No rubs, thrills or gallops. Normal  rhythm. Pedal pulses are normal. No pedal edema  GI:  Soft Non tender, non distended. :  No CVA tenderness. Musculoskeletal:   No tenderness, No cyanosis, No clubbing. Integument:  Warm, Dry, No erythema, No rash. Lymphatic:  No lymphadenopathy noted. Neurologic:  Alert & oriented x 3  No focal deficits noted. Psychiatric:  Affect normal, Judgment normal, Mood normal.       Lab Review     Recent Labs     11/27/20 2135   WBC 14.4*   HGB 10.6*   HCT 30.6*   *      Recent Labs     11/27/20 2135   *   K 3.6   CL 98*   CO2 23   BUN 8   CREATININE 1.1     Recent Labs     11/27/20 2135   AST 18   ALT 16   BILITOT 0.7   ALKPHOS 378*     No results for input(s): TROPONINI in the last 72 hours. Lab Results   Component Value Date     (H) 04/17/2013    BNP 61 05/07/2012    BNP 86 01/30/2012     Lab Results   Component Value Date    INR 0.96 09/26/2020    PROTIME 11.6 (L) 09/26/2020         All labs, images, EKGs were personally reviewed      Assessment: 60 y. o.year old with PMH of  has a past medical history of Abnormal PFTs (pulmonary function tests), Atrial fibrillation ([address] CAD (coronary artery disease), CHF (congestive heart failure) ([address] Diabetes mellitus ([address] Dizziness - light-headed, Family history of coronary artery disease, H/O 24 hour EKG monitoring, H/O cardiovascular stress test, H/O cardiovascular stress test, H/O echocardiogram, H/O echocardiogram, Heart imaging, Heartburn, History of cardiac cath, History of cardiac monitoring, History of cardioversion, History of MI (myocardial infarction), History of nuclear Muga stress test, History of PTCA, Hx of cardiovascular stress test, Hx of transesophageal echocardiography ([facility]) for monitoring, Hyperlipidemia, Hypertension, Insomnia, SOB (shortness of breath), Tachycardia, and TIA (transient ischemic attack). CT Chest:     1. Left upper lung lobe mild multifocal ill-defined consolidation suggesting    pneumonia. 2. Mild bilateral layering posterior pleural effusions, as discussed above. 3. Mild cardiomegaly. 4. Bilateral coronary artery scattered atherosclerotic calcification. 5. Hepatic and splenic chronic granulomatous disease. 6. Redemonstration of right adrenal adenoma. Recommendations:      1. Shortness of breath with multifocal pneumonia. Current treatment with IV antibiotics  2. History of paroxysmal atrial fibrillation s/p pulmonary vein isolation. Last episode of A. fib RVR in September 2020. Continue with metoprolol and Eliquis at this point. 3. Recently diagnosed new onset cardiomyopathy. LVEF is around 41% on MUGA scan. Previously normal EF in 2018. Recent stress MPI was negative for ischemia. With patient history of CAD and LAD stent, new onset cardiomyopathy and frequent PVCs as noted on telemetry today. I will favor left heart cardiac catheterization to further assess for any ischemia. We will stop diltiazem. Cont asa/bb/statins  4. CAD: Left heart cardiac catheterization next week, Wednesday or Thursday, once pneumonia improves. Will need to hold Eliquis 2 days prior to procedure. Continue with aspirin  5. Frequent PVCs: Rule out ischemia. Likely secondary to cardiomyopathy. Continue with beta-blocker. We will stop Lexapro, consider non-QT prolonging antidepressants. 6. Essential hypertension: Continue with clonidine and metoprolol. Stop short acting diltiazem secondary to cardiomyopathy.   7. Hyperlipidemia: Continue with statin therapy        Thank you for the consult    Dr. Raza Cage  11/28/2020 3:49 PM

## 2020-11-28 NOTE — ED NOTES
Ambulating pulse Ox performed, pt O2 sat dropped to 87%, Isaías CHEN notified     Rene Weiss, JONI  11/27/20 5002

## 2020-11-28 NOTE — ED NOTES
Bed: ED-35  Expected date:   Expected time:   Means of arrival:   Comments:  EMS       Dolly Goins RN  11/27/20 2037

## 2020-11-28 NOTE — ED PROVIDER NOTES
Triage Chief Complaint:   Shortness of Breath (history of COPD, contiuous all week, no relief with inhaler)    [place]:  Today in the ED I had the pleasure of caring for Carmen Coon who is a 60 y.o. female that presents today to the ED complaining of sob. Dariela does have hx of CAD COPD, CHF. She states she has been having trouble breathing x 1 week. She states that she has had bad anxiety all day today. She typically is not oxygen dependent. She States that her copd is flaring. ROS:  REVIEW OF SYSTEMS    At least 10 systems reviewed      All other review of systems are negative  See HPI and nursing notes for additional information       Past Medical History:   Diagnosis Date    Abnormal PFTs (pulmonary function tests) 4/14 4/14    Atrial fibrillation (HCC)     CAD (coronary artery disease)     [facility]F [facility]congestive heart failure) ([facility] [address]     Diabetes mellitus ([facility] [address]     Dizziness - light-headed     Family history of coronary artery disease     H/O 24 hour EKG monitoring 02/09/2012,12/10/2010    02/09/2012 predominant rhythm is sinus with short runs of SVT no episode of atrial fib. pt has left bundle branch morphology, max heart is 132, min is 48 with an average of 74 ,infrequent PVCs are seen    H/O cardiovascular stress test 4/1/2013, 1/31/2012 4/1/2013-    H/O cardiovascular stress test 4/16/14,02/09/2012 4/16/14 EF 52% No ischemia, normal study. 02/09/2012 EF 58% tehnique-technetium LVEF is normal globally    H/O echocardiogram 03/27/13 4/14 EF 50-55% mild MR, TR 3/13EF 35-45% decreasedr L ventrical systolic function.     H/O echocardiogram 4/14 4/14-EF50-55% Mild MR/TR. 10/11 EF > 55% LVS function is normal, calcified mitral apparatus, impaired LV relaxation, no PE    Heart imaging 02/09/2012 02/09/2012 MUGA rest EF 58%LVEF is normal globally    Heartburn     History of cardiac cath 05/08/2009, 06/14/2006 05/08/2009,moderate stenosis  small diagonal    History of cardiac monitoring 2/9/15    Event - sinus rhythm    History of cardioversion 3/14/14    History of MI (myocardial infarction)     History of nuclear Muga stress test 2020    EF 41%    History of PTCA 10/06/2010    10/06/2010 stent 3.0x24 taxus stent left main 90% stenosis mid seg reduced to 0% second area of [id number] to 40% not flow limiting EF 40%    Hx of cardiovascular stress test 2015    lexiscan-normal,EF70%    Hx of transesophageal echocardiography ([facility]) for monitoring 2017    Noted to have MARY GRACE Blood Clot.  Hyperlipidemia     Hypertension     Insomnia     SOB (shortness of breath)     Tachycardia 2012    report in [facility] consult    TIA (transient ischemic attack)      Past Surgical History:   Procedure Laterality Date    ABLATION OF DYSRHYTHMIC FOCUS       SECTION      CHOLECYSTECTOMY      DIAGNOSTIC CARDIAC CATH LAB PROCEDURE  2006, 2009 moderate stenosis small diagonal    OTHER SURGICAL HISTORY  2017    afib ablation [facility]    PTCA  10/06/2010    10/06/2010 stent 3.0x24 taxus stent 90% stenosis mid segment reduced to 0% second area of stenosis 30-40% not flow limiting EF 40%    TUBAL LIGATION       Family History   Problem Relation Age of Onset    Diabetes Father     Heart Disease Father      Social History     Socioeconomic History    Marital status:      Spouse name: Not on file    Number of children: Not on file    Years of education: Not on file    Highest education level: Not on file   Occupational History    Not on file   Social Needs    Financial resource strain: Not on file    Food insecurity     Worry: Not on file     Inability: Not on file    Transportation needs     Medical: Not on file     Non-medical: Not on file   Tobacco Use    Smoking status: Former Smoker     Packs/day: 3.00     Years: 20.00     Pack years: 60.00     Types: Cigarettes     Last attempt to quit: 2007     Years since quittin.7    Smokeless tobacco: Never Used    Tobacco comment: reviewed 8/12/15   Substance and Sexual Activity    Alcohol use: No     Comment: occas. caffeine 3 mtn.  dew a day    Drug use: No    Sexual activity: Not Currently   Lifestyle    Physical activity     Days per week: Not on file     Minutes per session: Not on file    Stress: Not on file   Relationships    Social connections     Talks on phone: Not on file     Gets together: Not on file     Attends [employer] service: Not on file     Active member of club or organization: Not on file     Attends meetings of clubs or organizations: Not on file     Relationship status: Not on file    Intimate partner violence     Fear of current or ex partner: Not on file     Emotionally abused: Not on file     Physically abused: Not on file     Forced sexual activity: Not on file   Other Topics Concern    Not on file   Social History Narrative    Not on file     Current Facility-Administered Medications   Medication Dose Route Frequency Provider Last Rate Last Dose    0.9 % sodium chloride infusion   Intravenous Continuous Larraine Tuckerman, PA-C 100 mL/hr at 204      azithromycin (ZITHROMAX) 500 mg in dextrose 5 % 250 mL IVPB  500 mg Intravenous Once Larraine Tuckerman, PA-C         Current Outpatient Medications   Medication Sig Dispense Refill    traMADol (ULTRAM) 50 MG tablet tramadol 50 mg tablet      methylPREDNISolone (MEDROL) 32 MG tablet Take 1 tablet 12 hours and 1 tablet 2 hours before procedure 2 tablet 0    metoprolol succinate (TOPROL XL) 100 MG extended release tablet Take 1 tablet by mouth daily TAKE ONE TABLET BY MOUTH DAILY 30 tablet 3    furosemide (LASIX) 40 MG tablet Take 1 tablet by mouth 2 times daily 60 tablet 3    potassium chloride (KLOR-CON M) 10 MEQ extended release tablet Take 1 tablet by mouth 2 times daily (with meals) 60 tablet 3    albuterol sulfate HFA (VENTOLIN HFA) 108 (90 Base) MCG/ACT inhaler Inhale 2 puffs into the lungs 4 times daily as needed for Wheezing 1 Inhaler 5    cloNIDine (CATAPRES) 0.3 MG tablet Take 0.3 mg by mouth nightly      BASAGLAR KWIKPEN 100 UNIT/ML injection pen Inject 80 Units into the skin nightly      ADMELOG SOLOSTAR 100 UNIT/ML pen Inject into the skin See Admin Instructions      atorvastatin (LIPITOR) 40 MG tablet Take 40 mg by mouth nightly      escitalopram (LEXAPRO) 20 MG tablet Take 20 mg by mouth daily      metFORMIN (GLUCOPHAGE) 500 MG tablet Take 500 mg by mouth 2 times daily      pantoprazole (PROTONIX) 40 MG tablet Take 40 mg by mouth daily      zolpidem (AMBIEN) 10 MG tablet Take 10 mg by mouth nightly as needed for Sleep.  dicyclomine (BENTYL) 10 MG capsule Take 1 capsule by mouth 4 times daily (before meals and nightly) 20 capsule 0    ondansetron (ZOFRAN ODT) 4 MG disintegrating tablet Take 1 tablet by mouth every 8 hours as needed for Nausea 15 tablet 0    amLODIPine (NORVASC) 5 MG tablet Take 1 tablet by mouth daily 30 tablet 3    hydrOXYzine (VISTARIL) 50 MG capsule Take 50 mg by mouth daily      levothyroxine (SYNTHROID) 50 MCG tablet Take 50 mcg by mouth Daily      apixaban (ELIQUIS) 5 MG TABS tablet Take 1 tablet by mouth 2 times daily 60 tablet 2    nitroGLYCERIN (NITROSTAT) 0.4 MG SL tablet Place 0.4 mg under the tongue every 5 minutes as needed for Chest pain up to max of 3 total doses. If no relief after 1 dose, call 911.  aspirin 81 MG EC tablet Take 81 mg by mouth daily      traZODone (DESYREL) 100 MG tablet Take 100 mg by mouth nightly.        Allergies   Allergen Reactions    Amiodarone Other (See Comments)     dizziness    Iv Dye [Iodides] Nausea And Vomiting    Vicodin [Hydrocodone-Acetaminophen] Nausea And Vomiting       Nursing Notes Reviewed    Physical Exam:  ED Triage Vitals [11/27/20 2049]   Enc Vitals Group      BP (!) 164/76      Pulse 90      Resp 24      Temp 100.6 °F (38.1 °C)      Temp Source Oral      SpO2 97 %      Weight 185 lb (83.9 kg)      Height 5' 1\" (1.549 m)      Head Circumference       Peak Flow       Pain Score       Pain Loc       Pain Edu? Excl. in [address]? General :Patient is awake alert oriented person place and time no acute distress nontoxic appearing  HEENT: Pupils are equally round and reactive to light extraocular motors are intact conjunctivae clear sclerae white there is no injection no icterus. Nose without any rhinorrhea or epistaxis. Oral mucosa is moist no exudate buccal mucosa shows no ulcerations. Uvula is midline    Neck: Neck is supple full range of motion trachea midline thyroid nonpalpable  Cardiac: Heart regular rate rhythm no murmurs rubs clicks or gallops  Lungs: Lungs are clear to auscultation there is no wheezing rhonchi or rales. There is no use of accessory muscles no nasal flaring identified. Chest wall: There is no tenderness to palpation over the chest wall or over ribs  Abdomen: Abdomen is soft nontender nondistended. There is no firm or pulsatile masses no rebound rigidity or guarding negative Bush's negative McBurney, no peritoneal signs  Suprapubic:  there is no tenderness to palpation over the external bladder   Musculoskeletal: 5 out of 5 strength in all 4 extremities full flexion extension abduction and adduction supination pronation of all extremities and all digits. No obvious muscle atrophy is noted. No focal muscle deficits are appreciated  Dermatology: Skin is warm and dry there is no obvious abscesses lacerations or lesions noted  Psych: Mentation is grossly normal cognition is grossly normal. Affect is appropriate  Neuro: Motor intact sensory intact cranial nerves II through XII are intact level of consciousness is normal cerebellar function is normal reflexes are grossly normal. No evidence of incontinence or loss of bowel or bladder no saddle anesthesia noted Lymphatic: There is no submandibular or cervical adenopathy appreciated.         I have reviewed and interpreted all of the currently available lab results from this visit (if applicable):  Results for orders placed or performed during the hospital encounter of 11/27/20   CBC Auto Differential   Result Value Ref Range    WBC 14.4 (H) 4.0 - 10.5 K/CU MM    RBC 3.38 (L) 4.2 - 5.4 M/CU MM    Hemoglobin 10.6 (L) 12.5 - 16.0 GM/DL    Hematocrit 30.6 (L) 37 - 47 %    MCV 90.5 78 - 100 FL    MCH 31.4 (H) 27 - 31 PG    MCHC 34.6 32.0 - 36.0 %    RDW 11.8 11.7 - 14.9 %    Platelets 503 (H) 338 - 440 K/CU MM    MPV 9.1 7.5 - 11.1 FL    Differential Type AUTOMATED DIFFERENTIAL     Segs Relative 86.8 (H) 36 - 66 %    Lymphocytes % 4.8 (L) 24 - 44 %    Monocytes % 6.8 (H) 0 - 4 %    Eosinophils % 0.9 0 - 3 %    Basophils % 0.2 0 - 1 %    Segs Absolute 12.5 K/CU MM    Lymphocytes Absolute 0.7 K/CU MM    Monocytes Absolute 1.0 K/CU MM    Eosinophils Absolute 0.1 K/CU MM    Basophils Absolute 0.0 K/CU MM    Nucleated RBC % 0.0 %    Total Nucleated RBC 0.0 K/CU MM    Total Immature Neutrophil 0.07 K/CU MM    Immature Neutrophil % 0.5 (H) 0 - 0.43 %   Comprehensive Metabolic Panel   Result Value Ref Range    Sodium 133 (L) 135 - 145 MMOL/L    Potassium 3.6 3.5 - 5.1 MMOL/L    Chloride 98 (L) 99 - 110 mMol/L    CO2 23 21 - 32 MMOL/L    BUN 8 6 - 23 MG/DL    CREATININE 1.1 0.6 - 1.1 MG/DL    Glucose 200 (H) 70 - 99 MG/DL    Calcium 8.3 8.3 - 10.6 MG/DL    Alb 2.9 (L) 3.4 - 5.0 GM/DL    Total Protein 6.5 6.4 - 8.2 GM/DL    Total Bilirubin 0.7 0.0 - 1.0 MG/DL    ALT 16 10 - 40 U/L    AST 18 15 - 37 IU/L    Alkaline Phosphatase 378 (H) 40 - 129 IU/L    GFR Non- 50 (L) >60 mL/min/1.73m2    GFR African American >60 >60 mL/min/1.73m2    Anion Gap 12 4 - 16   Troponin   Result Value Ref Range    Troponin T <0.010 <0.01 NG/ML   Lipase   Result Value Ref Range    Lipase 8 (L) 13 - 60 IU/L   Brain Natriuretic Peptide   Result Value Ref Range    Pro-BNP 8,090 (H) <300 PG/ML   COVID-19    Specimen: Nasopharynx/Oropharynx   Result Value Ref Range    Source THROAT     SARS-CoV-2, NAAT NOT DETECTED    Lactic Acid, Plasma   Result Value Ref Range    Lactate 0.8 0.4 - 2.0 mMOL/L   EKG 12 Lead   Result Value Ref Range    Ventricular Rate 89 BPM    Atrial Rate 89 BPM    P-R Interval 178 ms    QRS Duration 140 ms    Q-T Interval 404 ms    QTc Calculation (Bazett) 491 ms    P Axis 67 degrees    R Axis -17 degrees    T Axis 123 degrees    Diagnosis       Sinus rhythm with premature supraventricular complexes  Left bundle branch block  Abnormal ECG  When compared with ECG of 26-SEP-2020 07:16,  Sinus rhythm has replaced Atrial fibrillation  Vent. rate has decreased BY  58 BPM        Radiographs (if obtained):  [] The following radiograph was interpreted by myself in the absence of a radiologist:   [] Radiologist's Report Reviewed:  CT CHEST WO CONTRAST   Final Result   1. Left upper lung lobe mild multifocal ill-defined consolidation suggesting   pneumonia. 2. Mild bilateral layering posterior pleural effusions, as discussed above. 3. Mild cardiomegaly. 4. Bilateral coronary artery scattered atherosclerotic calcification. 5. Hepatic and splenic chronic granulomatous disease. 6. Redemonstration of right adrenal adenoma. XR CHEST PORTABLE   Final Result   No acute cardiopulmonary process             EKG (if obtained):   Please See Note of attending physician for EKG interpretation. Chart review shows recent radiograph(s):  Cta Head W Wo Contrast    Result Date: 11/9/2020  EXAMINATION: CTA OF THE HEAD WITHOUT AND WITH CONTRAST 11/9/2020 9:48 am TECHNIQUE: CTA of the head/brain was performed without and with the administration of intravenous contrast. Multiplanar reformatted images are provided for review. MIP images are provided for review. Dose modulation, iterative reconstruction, and/or weight based adjustment of the mA/kV was utilized to reduce the radiation dose to as low as reasonably achievable. COMPARISON: Head CT 10/15/2020. HISTORY: ORDERING SYSTEM PROVIDED HISTORY: TIA (transient ischemic attack) TECHNOLOGIST PROVIDED HISTORY: Reason for exam:->TIA Reason for Exam: TIA Acuity: Acute Type of Exam: Initial Relevant Medical/Surgical History: hx YESIKA, CVA FINDINGS: CT HEAD: BRAIN/VENTRICLES:  No acute intracranial hemorrhage or extraaxial fluid collection. Grey-white differentiation is maintained. No mass, mass effect or midline shift. No hydrocephalus. Diffuse parenchymal volume loss with enlargement of the ventricles and cerebral sulci. Periventricular and subcortical white matter low attenuation. Old lacunar infarctions in the basal ganglia and thalami bilaterally. Intracranial calcified atherosclerotic disease. ORBITS: The visualized portion of the orbits demonstrate no acute abnormality. SINUSES:  Small amount of fluid in the left inferior mastoid tip. Remaining tympanomastoid cavities as well as paranasal sinuses are clear. SOFT TISSUES/SKULL: No acute abnormality of the visualized skull or soft tissues. CTA HEAD: ANTERIOR CIRCULATION: Calcified atherosclerotic plaque in the cavernous segments of the internal carotid arteries bilaterally with mild stenosis. Superior projecting partially calcified left supraclinoid ICA aneurysm measures 9 x 7 x 7 mm. Right MCA bifurcation measures 4.5 x 4.5 x 4.5 mm and projects inferiorly. No significant stenosis in the anterior middle cerebral arteries bilaterally. Anterior communicating artery is present. POSTERIOR CIRCULATION: No significant stenosis of the vertebral, basilar, or posterior cerebral arteries. No aneurysm. OTHER: No dural venous sinus thrombosis on this non-dedicated study. 1. No acute intracranial abnormality. Parenchymal volume loss and sequela of chronic microvascular ischemic changes as well as old lacunar infarctions. 2. Rim calcified superiorly projecting left supraclinoid ICA aneurysm measures 9 x 7 x 7 mm.  3. Inferiorly projecting right MCA bifurcation aneurysm measures 4.5 x 4.5 x 4.5 mm. 4. Mild stenosis in the cavernous segments of the internal carotid arteries. No additional hemodynamically significant stenosis or branch occlusion in the intracranial arterial circulation. The findings were sent to the Radiology Results [address] at 10:46 am on 11/9/2020to be communicated to a licensed caregiver. RECOMMENDATIONS: Neurosurgical consultation recommended. Xr Radius Ulna Left (2 Views)    Result Date: 11/16/2020  EXAMINATION: 3 XRAY VIEWS OF THE LEFT FOREARM 11/16/2020 3:50 pm COMPARISON: None. HISTORY: ORDERING SYSTEM PROVIDED HISTORY: trauma TECHNOLOGIST PROVIDED HISTORY: Reason for exam:->trauma Reason for Exam: fall FINDINGS: Anatomic alignment. Calcification seen adjacent to the lateral epicondyle of the distal humerus. No fracture line. 1. Calcification adjacent to the lateral epicondyle of the distal humerus, elbow radiographs suggested 2. Otherwise, no acute bony or joint abnormality       MDM:    hemodynamically stable febrile patient presents today to the emergency department. Complaining of shortness of breath. She is hypoxic here in the ED. CT scan does reveal multifocal pneumonia she also has a leukocytosis. With a fever. Sepsis positive IV antibiotics are provided for presumed pneumonia. Prior to imaging. Then imaging does show pneumonia. Lactic acidosis is not present. Blood cultures pending. Patient remains comfortable but persists has no oxygen demand have low suspicion of pulmonary embolism as patient not have any associated chest pain or tachycardia. Total critical care time today provided was at least 35 minutes. This excludes seperately billable procedure. Critical care time provided for sEPSIS AND Pneumonia that required close evaluation and/or intervention with concern for patient decompensation. On-call physician  Was consulted regarding patient.   After thorough discussion regarding patient's history, physical exam.  laboratory values, radiographic evidence (if applicable  theymyself as well as my attending physician agreed Given the patient's presenting concerns, medical history and clinical findings, the patient will be admitted at this time to undergo further evaluation and disposition. . During patient's entire stay in the ED patient remained stable and comfortable. Analgesia is well-controlled. Patient will be admitted for all information regarding ongoing management and care of patient please see note of Admitting physician. All EKG interpretations are performed by Attending physician           I independently managed patient today in the ED        BP (!) 152/70   Pulse 73   Temp 100.6 °F (38.1 °C) (Oral)   Resp 10   Ht 5' 1\" (1.549 m)   Wt 185 lb (83.9 kg)   SpO2 98%   BMI 34.96 kg/m²       Clinical Impression:  1. Multifocal pneumonia    2. Hypoxia    3. Septicemia ([facility] [address]        Disposition referral (if applicable):  No follow-up provider specified. Disposition medications (if applicable):  New Prescriptions    No medications on file         Comment: Please note this report has been produced using speech recognition software and may contain errors related to that system including errors in grammar, punctuation, and spelling, as well as words and phrases that may be inappropriate. If there are any questions or concerns please feel free to contact the dictating provider for clarification.       Kong Roberto, [address] [place] [address]  11/28/20 9460

## 2020-11-29 ENCOUNTER — APPOINTMENT (OUTPATIENT)
Dept: GENERAL RADIOLOGY | Age: 65
DRG: 139 | End: 2020-11-29
Payer: COMMERCIAL

## 2020-11-29 LAB
ANION GAP SERPL CALCULATED.3IONS-SCNC: 9 MMOL/L (ref 4–16)
BASOPHILS ABSOLUTE: 0 K/CU MM
BASOPHILS RELATIVE PERCENT: 0.1 % (ref 0–1)
BUN BLDV-MCNC: 14 MG/DL (ref 6–23)
CALCIUM SERPL-MCNC: 8.5 MG/DL (ref 8.3–10.6)
CHLORIDE BLD-SCNC: 103 MMOL/L (ref 99–110)
CO2: 24 MMOL/L (ref 21–32)
CREAT SERPL-MCNC: 1 MG/DL (ref 0.6–1.1)
DIFFERENTIAL TYPE: ABNORMAL
EOSINOPHILS ABSOLUTE: 0 K/CU MM
EOSINOPHILS RELATIVE PERCENT: 0.1 % (ref 0–3)
GFR AFRICAN AMERICAN: >60 ML/MIN/1.73M2
GFR NON-AFRICAN AMERICAN: 56 ML/MIN/1.73M2
GLUCOSE BLD-MCNC: 105 MG/DL (ref 70–99)
GLUCOSE BLD-MCNC: 106 MG/DL (ref 70–99)
GLUCOSE BLD-MCNC: 114 MG/DL (ref 70–99)
GLUCOSE BLD-MCNC: 124 MG/DL (ref 70–99)
GLUCOSE BLD-MCNC: 79 MG/DL (ref 70–99)
HCT VFR BLD CALC: 30.9 % (ref 37–47)
HEMOGLOBIN: 10.4 GM/DL (ref 12.5–16)
IMMATURE NEUTROPHIL %: 0.9 % (ref 0–0.43)
LYMPHOCYTES ABSOLUTE: 1.6 K/CU MM
LYMPHOCYTES RELATIVE PERCENT: 9.2 % (ref 24–44)
MCH RBC QN AUTO: 31.6 PG (ref 27–31)
MCHC RBC AUTO-ENTMCNC: 33.7 % (ref 32–36)
MCV RBC AUTO: 93.9 FL (ref 78–100)
MONOCYTES ABSOLUTE: 0.8 K/CU MM
MONOCYTES RELATIVE PERCENT: 4.7 % (ref 0–4)
NUCLEATED RBC %: 0 %
PDW BLD-RTO: 12 % (ref 11.7–14.9)
PLATELET # BLD: 566 K/CU MM (ref 140–440)
PMV BLD AUTO: 9.1 FL (ref 7.5–11.1)
POTASSIUM SERPL-SCNC: 4.3 MMOL/L (ref 3.5–5.1)
RBC # BLD: 3.29 M/CU MM (ref 4.2–5.4)
SEGMENTED NEUTROPHILS ABSOLUTE COUNT: 14.9 K/CU MM
SEGMENTED NEUTROPHILS RELATIVE PERCENT: 85 % (ref 36–66)
SODIUM BLD-SCNC: 136 MMOL/L (ref 135–145)
TOTAL IMMATURE NEUTOROPHIL: 0.16 K/CU MM
TOTAL NUCLEATED RBC: 0 K/CU MM
WBC # BLD: 17.5 K/CU MM (ref 4–10.5)

## 2020-11-29 PROCEDURE — 97166 OT EVAL MOD COMPLEX 45 MIN: CPT

## 2020-11-29 PROCEDURE — 36415 COLL VENOUS BLD VENIPUNCTURE: CPT

## 2020-11-29 PROCEDURE — 2580000003 HC RX 258: Performed by: INTERNAL MEDICINE

## 2020-11-29 PROCEDURE — 82962 GLUCOSE BLOOD TEST: CPT

## 2020-11-29 PROCEDURE — 97530 THERAPEUTIC ACTIVITIES: CPT

## 2020-11-29 PROCEDURE — 6370000000 HC RX 637 (ALT 250 FOR IP): Performed by: INTERNAL MEDICINE

## 2020-11-29 PROCEDURE — 6360000002 HC RX W HCPCS: Performed by: INTERNAL MEDICINE

## 2020-11-29 PROCEDURE — 99233 SBSQ HOSP IP/OBS HIGH 50: CPT | Performed by: INTERNAL MEDICINE

## 2020-11-29 PROCEDURE — 97162 PT EVAL MOD COMPLEX 30 MIN: CPT

## 2020-11-29 PROCEDURE — APPSS60 APP SPLIT SHARED TIME 46-60 MINUTES: Performed by: NURSE PRACTITIONER

## 2020-11-29 PROCEDURE — 71046 X-RAY EXAM CHEST 2 VIEWS: CPT

## 2020-11-29 PROCEDURE — 1200000000 HC SEMI PRIVATE

## 2020-11-29 PROCEDURE — 80048 BASIC METABOLIC PNL TOTAL CA: CPT

## 2020-11-29 PROCEDURE — 94618 PULMONARY STRESS TESTING: CPT

## 2020-11-29 PROCEDURE — 94761 N-INVAS EAR/PLS OXIMETRY MLT: CPT

## 2020-11-29 PROCEDURE — 85025 COMPLETE CBC W/AUTO DIFF WBC: CPT

## 2020-11-29 RX ORDER — METHYLPREDNISOLONE SODIUM SUCCINATE 125 MG/2ML
125 INJECTION, POWDER, LYOPHILIZED, FOR SOLUTION INTRAMUSCULAR; INTRAVENOUS ONCE
Status: COMPLETED | OUTPATIENT
Start: 2020-11-30 | End: 2020-11-30

## 2020-11-29 RX ORDER — ONDANSETRON 2 MG/ML
4 INJECTION INTRAMUSCULAR; INTRAVENOUS ONCE
Status: COMPLETED | OUTPATIENT
Start: 2020-11-30 | End: 2020-11-30

## 2020-11-29 RX ORDER — DIPHENHYDRAMINE HYDROCHLORIDE 50 MG/ML
50 INJECTION INTRAMUSCULAR; INTRAVENOUS ONCE
Status: COMPLETED | OUTPATIENT
Start: 2020-11-30 | End: 2020-11-30

## 2020-11-29 RX ORDER — HYDRALAZINE HYDROCHLORIDE 20 MG/ML
5 INJECTION INTRAMUSCULAR; INTRAVENOUS EVERY 6 HOURS PRN
Status: DISCONTINUED | OUTPATIENT
Start: 2020-11-29 | End: 2020-12-01 | Stop reason: HOSPADM

## 2020-11-29 RX ADMIN — GUAIFENESIN 600 MG: 600 TABLET, EXTENDED RELEASE ORAL at 10:21

## 2020-11-29 RX ADMIN — CLONIDINE HYDROCHLORIDE 0.3 MG: 0.2 TABLET ORAL at 22:15

## 2020-11-29 RX ADMIN — TRAZODONE HYDROCHLORIDE 100 MG: 50 TABLET ORAL at 22:13

## 2020-11-29 RX ADMIN — SODIUM CHLORIDE, PRESERVATIVE FREE 10 ML: 5 INJECTION INTRAVENOUS at 22:14

## 2020-11-29 RX ADMIN — ACETAMINOPHEN 650 MG: 325 TABLET ORAL at 22:13

## 2020-11-29 RX ADMIN — POTASSIUM CHLORIDE 10 MEQ: 1500 TABLET, EXTENDED RELEASE ORAL at 17:25

## 2020-11-29 RX ADMIN — AZITHROMYCIN MONOHYDRATE 500 MG: 500 INJECTION, POWDER, LYOPHILIZED, FOR SOLUTION INTRAVENOUS at 23:59

## 2020-11-29 RX ADMIN — ATORVASTATIN CALCIUM 40 MG: 40 TABLET, FILM COATED ORAL at 22:13

## 2020-11-29 RX ADMIN — PANTOPRAZOLE SODIUM 40 MG: 40 TABLET, DELAYED RELEASE ORAL at 10:21

## 2020-11-29 RX ADMIN — SODIUM CHLORIDE: 9 INJECTION, SOLUTION INTRAVENOUS at 03:01

## 2020-11-29 RX ADMIN — CEFTRIAXONE 1 G: 1 INJECTION, POWDER, FOR SOLUTION INTRAMUSCULAR; INTRAVENOUS at 22:14

## 2020-11-29 RX ADMIN — METOPROLOL SUCCINATE 100 MG: 100 TABLET, EXTENDED RELEASE ORAL at 10:17

## 2020-11-29 RX ADMIN — GUAIFENESIN 600 MG: 600 TABLET, EXTENDED RELEASE ORAL at 22:13

## 2020-11-29 RX ADMIN — LEVOTHYROXINE SODIUM 50 MCG: 50 TABLET ORAL at 10:21

## 2020-11-29 RX ADMIN — ASPIRIN 81 MG: 81 TABLET, COATED ORAL at 10:17

## 2020-11-29 RX ADMIN — APIXABAN 5 MG: 5 TABLET, FILM COATED ORAL at 10:21

## 2020-11-29 RX ADMIN — POTASSIUM CHLORIDE 10 MEQ: 1500 TABLET, EXTENDED RELEASE ORAL at 10:17

## 2020-11-29 ASSESSMENT — PAIN DESCRIPTION - LOCATION: LOCATION: HAND

## 2020-11-29 ASSESSMENT — PAIN SCALES - GENERAL
PAINLEVEL_OUTOF10: 8
PAINLEVEL_OUTOF10: 4
PAINLEVEL_OUTOF10: 3

## 2020-11-29 NOTE — PROGRESS NOTES
Cardiology Progress Note     Today's Plan: Hold Eliquis    Admit Date:  11/27/2020    Consult reason/ Seen today for: Trigeminy PVCs    Subjective and  Overnight Events:  Patient reports improved shortness of breath. Telemetry-sinus rhythm with frequent PVCs  Oxygen-2 L    Assessment / Plan / Recommendation:        1. Shortness of breath with multifocal pneumonia. Current treatment with IV antibiotics  2. History of paroxysmal atrial fibrillation S/P ablation of atrial fibrillation. Last episode of A. fib RVR in September 2020. Continue with metoprolol and Eliquis at this point. 3. Recently diagnosed new onset cardiomyopathy. LVEF is around 41% on MUGA scan. Previously normal EF in 2018. Recent stress MPI was negative for ischemia. With patient history of CAD and LAD stent, new onset cardiomyopathy and frequent PVCs as noted on telemetry today. I will favor left heart cardiac catheterization to further assess for any ischemia. We will stop diltiazem. Continue asa/bb/statins  4. CAD: Left heart cardiac catheterization on Monday, if pneumonia improves. Will need to hold Eliquis 2 days prior to procedure. Patient reports nausea with IV dye will give Zofran, solumedrol, and Benadryl prior to cath. Continue with aspirin. 5. Frequent PVCs: Rule out ischemia. Likely secondary to cardiomyopathy. Continue with beta-blocker. Lexapro D/C yesterday, consider non-QT prolonging antidepressants. 6. Essential hypertension: Continue with clonidine and metoprolol. Clonidine increased by PCP. Cardizem D/C due to cardiomyopathy. 7. Hyperlipidemia: Continue with statin therapy  8. DVT prophylaxis if not contraindicated. History of Presenting Illness:    Chief complain on admission : 60 y. o.year old who is admitted for  Chief Complaint   Patient presents with    Shortness of Breath     history of COPD, contiuous all week, no relief with inhaler        Past medical history:    has a past medical history of Abnormal PFTs (pulmonary function tests), Atrial fibrillation ([facility] [address] CAD (coronary artery disease), CHF (congestive heart failure) ([facility] [address] Diabetes mellitus ([facility] [address] Dizziness - light-headed, Family history of coronary artery disease, H/O 24 hour EKG monitoring, H/O cardiovascular stress test, H/O cardiovascular stress test, H/O echocardiogram, H/O echocardiogram, Heart imaging, Heartburn, History of cardiac cath, History of cardiac monitoring, History of cardioversion, History of MI (myocardial infarction), History of nuclear Muga stress test, History of PTCA, Hx of cardiovascular stress test, Hx of transesophageal echocardiography (LUKE) for monitoring, Hyperlipidemia, Hypertension, Insomnia, SOB (shortness of breath), Tachycardia, and TIA (transient ischemic attack). Past surgical history:   has a past surgical history that includes  section; Tubal ligation; Cholecystectomy; Diagnostic Cardiac Cath Lab Procedure (2006, 2009); Percutaneous Transluminal Coronary Angio (10/06/2010); other surgical history (2017); and ablation of dysrhythmic focus. Social History:   reports that she quit smoking about 13 years ago. Her smoking use included cigarettes. She has a 60.00 pack-year smoking history. She has never used smokeless tobacco. She reports that she does not drink alcohol or use drugs. Family history:  family history includes Diabetes in her father; Heart Disease in her father. Allergies   Allergen Reactions    Amiodarone Other (See Comments)     dizziness    Iv Dye [Iodides] Nausea And Vomiting    Vicodin [Hydrocodone-Acetaminophen] Nausea And Vomiting       Review of Systems:  Review of Systems   Neurological: Positive for weakness. All other systems reviewed and are negative.        BP (!) 156/95   Pulse 65   Temp 98.2 °F (36.8 °C) (Oral)   Resp 16   Ht 5' 1\" (1.549 m)   Wt 182 lb 3.2 oz (82.6 kg)   SpO2 94%   BMI 34.43 kg/m²       Intake/Output Summary (Last 24 hours) at 11/29/2020 1148  Last data filed at 11/29/2020 0834  Gross per 24 hour   Intake 240 ml   Output --   Net 240 ml       Physical Exam:  Constitutional:  Well developed, Well nourished, No acute distress  HENT:  Normocephalic, Atraumatic, Bilateral external ears normal,  Nose normal.   Neck- trachea is midline  Eyes:  PERRL, Conjunctiva normal  Respiratory:  Normal breath sounds, No respiratory distress, No wheezing, No chest tenderness. Cardiovascular:  Normal heart rate, Normal rhythm, no murmurs appreciated, No rubs appreciated, No gallops appreciated, JVP not elevated  Abdomen/GI:  Soft, No tenderness  Musculoskeletal:  Intact distal pulses, no edema, No tenderness, No cyanosis, No clubbing. Integument:  Warm, Dry  Lymphatic:  No lymphadenopathy noted.    Neurologic:  Alert & oriented  Psychiatric:  Affect and Mood :pleasant     Medications:    apixaban  5 mg Oral BID    aspirin  81 mg Oral Daily    atorvastatin  40 mg Oral Nightly    cloNIDine  0.3 mg Oral Nightly    levothyroxine  50 mcg Oral Daily    metoprolol succinate  100 mg Oral Daily    pantoprazole  40 mg Oral Daily    potassium chloride  10 mEq Oral BID WC    traZODone  100 mg Oral Nightly    sodium chloride flush  10 mL Intravenous 2 times per day    azithromycin  500 mg Intravenous Q24H    And    cefTRIAXone (ROCEPHIN) IV  1 g Intravenous Q24H    guaiFENesin  600 mg Oral BID    insulin lispro  0-12 Units Subcutaneous TID WC    insulin lispro  0-6 Units Subcutaneous Nightly    insulin lispro  5 Units Subcutaneous TID WC    insulin glargine  70 Units Subcutaneous Nightly      sodium chloride 60 mL/hr at 11/29/20 0301    dextrose       zolpidem, ipratropium-albuterol, nitroGLYCERIN, sodium chloride flush, acetaminophen **OR** acetaminophen, polyethylene glycol, promethazine **OR** ondansetron, glucose, dextrose, glucagon (rDNA), dextrose    Lab Data:  CBC:   Recent Labs     11/27/20 2135 11/29/20 0349   WBC 14.4* 17.5*   HGB 10.6* 10.4*   HCT 30.6* 30.9*   MCV 90.5 93.9   * 566*     BMP:   Recent Labs     11/27/20 2135 11/29/20 0349   * 136   K 3.6 4.3   CL 98* 103   CO2 23 24   BUN 8 14   CREATININE 1.1 1.0     PT/INR: No results for input(s): PROTIME, INR in the last 72 hours. BNP:    Recent Labs     11/27/20 2135   PROBNP 8,090*     TROPONIN:   Recent Labs     11/27/20 2135   TROPONINT <0.010        ECHO : 6/12/2020   Left ventricular systolic function is abnormal.   Ejection fraction is visually estimated at 30%. Anteroseptal wall segment appears akinetic with global hypokinesis   (decreased from previous echo). Mild left ventricular hypertrophy. Grade III diastolic dysfunction. Mitral annular calcification is present; mild to moderate (posteriorly   directed) mitral regurgitation. No evidence of any pericardial effusion. Impression:  Principal Problem:    Multifocal pneumonia  Active Problems:    DM (diabetes mellitus), type 2, uncontrolled ([facility] [address]    History of MI (myocardial infarction)    PAF (paroxysmal atrial fibrillation) ([facility])    CHF (congestive heart failure), NYHA class I, acute on chronic, combined ([facility] [address]    Pneumonia  Resolved Problems:    * No resolved hospital problems. *       All labs, medications and tests reviewed by myself, continue all other medications of all above medical condition listed as is except for changes mentioned above. Thank you   Please call with questions. Electronically signed by RENE Sanchez CNP on 11/29/2020 at 11:48 AM         CARDIOLOGY ATTENDING ADDENDUM    I have seen, spoken to and examined this patient personally, independently of the nurse practitioner. I have reviewed the hospital care given to date and reviewed all pertinent labs and imaging. The plan was developed mutually at the time of the visit with the patient,  NP   and myself.  I have spoken with patient, nursing staff and provided written and verbal instructions . The above note has been reviewed and I agree with the assessment, diagnosis, and treatment plan with changes made by me as follows       HPI:  I have reviewed the above HPI  And agree with above   Please review addendum/changes made to note above     Interval history:            Physical Exam:  General:  Awake, alert, NAD  Head:normal  Eye:normal  Neck:  No JVD   Chest:  Clear to auscultation, respiration easy  Cardiovascular:  s1s2 IRIR  Abdomen:   nontender  Extremities:  trace edema  Pulses; palpable  Neuro: grossly normal      MEDICAL DECISION MAKING;    I agree with the above plan, which was planned by myself and discussed with NP.     Hold off 4 [facility]  For [facility] in morning     Dr. Fanta Drake MD

## 2020-11-29 NOTE — PROGRESS NOTES
11/29/2020 1:43 PM  Patient Room #: 6751/5254-R  Patient Name: Mekhi Spann    (Step 1 Done by RN if possible otherwise call Pulmonary Diagnostics)  1. Place patient on room air at rest for at least 30 minutes. If patient falls below 88% before 30 minutes then you can record the level and stop. Record room air saturation level _93_ %. If patient is at 88% or below, they will qualify for home oxygen and you can stop. If level does not fall below 88%, fill in level above. If indicated continue to Step 2. Signature:_Beena_RCP__ Date: __11/29/20_  (Step 2&3 Done by [facility])  2. Ambulate patient on room air until saturation falls below 89%. Record level of room air saturation with ambulation__82 %. Next, place patient back on __3_lpm oxygen and ambulate, record level _92_%. (Note:  this level must show improvement from room air level done with ambulation.)  If patients saturation on room air with ambulation is 88% or below AND patient shows improvement with oxygen during ambulation, they will qualify for home oxygen and you can stop. If patient does not drop below 89%, then patient should have an overnight oximetry trending on room air to see if level falls below 88%. Complete level in Step 3 below. 3. Room air overnight oximetry level 88 % for___  cumulative minutes. If patients room air oxygen level is < 89% for at least 5 cumulative minutes, patient will qualify for home oxygen and you can stop. (Attach Night Trending Report)    Complete order below: Diagnosis:_CHF  Home oxygen at:  Length of Need: X Lifetime ?  3 Months     _3__lpm or __%   via  [x] nasal cannula  []mask  [] other:___         [x]continuous [x]  with activity  [x]  Nocturnal   [x] Portable Tanks [x]  Concentrator        Therapist Signature:__DBack __RCP___     Date:  11\29\20__  Physician Signature:  __Electronically Signed in EMR_    Date:___  Physician Printed Name:Ordering User: Azalea Reeves PA-C

## 2020-11-29 NOTE — PROGRESS NOTES
11/29/2020    Pt is without complaints, no chest pain. Heart regular with occ extra beat, was in trigeminy yesterday. She has a known reduced EF. Afebrile, VSS with some mild elevation of BP. Cardio reports she was taken off cardizem because of her cardiomyopathy. Sodium  136  MMOL/L     Potassium  4.3  MMOL/L     Chloride  103  mMol/L     CO2  24  MMOL/L     Anion Gap  9     BUN  14  MG/DL     CREATININE  1.0  MG/DL     Glucose  124High    MG/DL     Calcium  8.5  MG/DL     GFR Non-African American  56Low    mL/min/1.73m2      WBC  17. 5High    K/CU MM     RBC  3.29Low    M/CU MM     Hemoglobin  10.4Low    GM/DL     Hematocrit  30.9Low    %     MCV  93.9  FL     MCH  31.6High    PG     MCHC  33.7  %     RDW  12.0  %     Platelets  119AGBY    K/CU MM     MPV  9.1  FL     Differential Type  AUTOMATED DIFFERENTIAL     Segs Relative  85. 0High    %      Her glucose shows much better control today, with the steroids having been stopped. Cardio plans for a cth tomorrow if she is stable, to investigate her arrhythmia. Her eliquis has been held by cardiology. CXR has been completed, but report is pending. Her pneumonia had been seen on hte CT scan. Continue antibiotics. Eval for home oxygen, as she had some hypoxemia at admission.   Abhishek Jean MD

## 2020-11-29 NOTE — PROGRESS NOTES
Physical Therapy    Facility/Department: [facility] 3E  Initial Assessment    NAME: Jose Betancur  : 1955  MRN: [id number]    Date of Service: 2020    Discharge Recommendations:  IP Rehab        Assessment   Assessment: Pt is 61 yo female lives in 2story home (lives on main level), with ramped entry. 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 She shows deficits with balance static and dynamic. She had LOB in static standing and with amb without assitive device that did require assistance to recover. She fatigues quickly with limited standing/walking tolerances. Treatment Diagnosis: debility, balance deficits. REQUIRES PT FOLLOW UP: Yes  Activity Tolerance  Activity Tolerance: Patient limited by endurance; Patient limited by fatigue       Patient Diagnosis(es): The primary encounter diagnosis was Multifocal pneumonia. Diagnoses of Hypoxia and Septicemia ([facility] [address] were also pertinent to this visit. has a past medical history of Abnormal PFTs (pulmonary function tests), Atrial fibrillation ([address] CAD (coronary artery disease), CHF (congestive heart failure) ([address] Diabetes mellitus ([address] Dizziness - light-headed, Family history of coronary artery disease, H/O 24 hour EKG monitoring, H/O cardiovascular stress test, H/O cardiovascular stress test, H/O echocardiogram, H/O echocardiogram, Heart imaging, Heartburn, History of cardiac cath, History of cardiac monitoring, History of cardioversion, History of MI (myocardial infarction), History of nuclear Muga stress test, History of PTCA, Hx of cardiovascular stress test, Hx of transesophageal echocardiography (LUKE) for monitoring, Hyperlipidemia, Hypertension, Insomnia, SOB (shortness of breath), Tachycardia, and TIA (transient ischemic attack). has a past surgical history that includes  section; Tubal ligation; Cholecystectomy; Diagnostic Cardiac Cath Lab Procedure (2006, 2009);  Percutaneous Transluminal Coronary Angio (10/06/2010); other surgical history (09/05/2017); and ablation of dysrhythmic focus. Restrictions  Restrictions/Precautions  Restrictions/Precautions: General Precautions, Fall Risk  Vision/Hearing  Vision: Impaired  Vision Exceptions: Wears glasses for reading  Hearing: Within functional limits     Subjective  General  Patient assessed for rehabilitation services?: Yes  Pain Screening  Patient Currently in Pain: (L UE from IV)          Orientation  Orientation  Overall Orientation Status: Within Functional Limits  Social/Functional History  Social/Functional History  Lives With: Spouse(Per chart spouse has alzheimers)  Type of Home: House  Home Layout: Two level, Able to Live on Main level with bedroom/bathroom  Home Access: Ramped entrance  Bathroom Shower/Tub: Tub/Shower unit, Shower chair without back  Bathroom Toilet: Standard  Bathroom Equipment: Grab bars in shower, Shower chair  Home Equipment: Standard walker, [address] bed  Receives Help From: (Stated a daughter does grocery shopping but her and her  both take care of cooking, cleaning, laundry)  ADL Assistance: Independent  Homemaking Assistance: Independent  Driving: Total  Shopping: Total  Homemaking Responsibilities: Yes(minimal)  Ambulation Assistance: Independent  Transfer Assistance: Independent  Active : No  Occupation: Retired  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   IV L UE, Tele  Edema: L hand    AROM RLE (degrees)  RLE AROM: WFL  AROM LLE (degrees)  LLE AROM : WFL  Strength RLE  Strength RLE: WFL  Comment: myotomes intact  Strength LLE  Strength LLE: WFL  Comment: myotomes intact  mild DF weakness relative to R  Tone RLE  RLE Tone: Normotonic  Tone LLE  LLE Tone: Normotonic     Bed mobility  Rolling to Left: Independent  Rolling to Right: Independent  Supine to Sit: Independent  Sit to Supine: Modified

## 2020-11-29 NOTE — PROGRESS NOTES
Occupational Therapy   Occupational Therapy Initial Assessment  Date: 2020   Patient Name: Lula Arcos  MRN: [id number]     : 1955    Date of Service: 2020    Discharge Recommendations:  Home with Home health OT, Patient would benefit from continued therapy after discharge, Continue to assess pending progress, Home with assist PRN  OT Equipment Recommendations  Equipment Needed: Yes  Mobility Devices: Liv January: Rolling    Assessment   Performance deficits / Impairments: Decreased functional mobility ; Decreased safe awareness;Decreased balance;Decreased ADL status; Decreased endurance;Decreased high-level IADLs  Prognosis: Good  Assistance / Modification: Pt will need rolling walker upon discharge  OT Education: OT Role;Transfer Training;Plan of Care;IADL Safety; ADL Adaptive Strategies  Barriers to Learning: Impulsive  REQUIRES OT FOLLOW UP: Yes  Activity Tolerance  Activity Tolerance: Patient Tolerated treatment well;Patient limited by fatigue  Safety Devices  Safety Devices in place: Yes  Type of devices: All fall risk precautions in place; Bed alarm in place;Call light within reach; Left in bed;Gait belt           Patient Diagnosis(es): The primary encounter diagnosis was Multifocal pneumonia. Diagnoses of Hypoxia and Septicemia ([facility] [address] were also pertinent to this visit.      has a past medical history of Abnormal PFTs (pulmonary function tests), Atrial fibrillation ([address] CAD (coronary artery disease), CHF (congestive heart failure) ([address] Diabetes mellitus ([facility] [address] Dizziness - light-headed, Family history of coronary artery disease, H/O 24 hour EKG monitoring, H/O cardiovascular stress test, H/O cardiovascular stress test, H/O echocardiogram, H/O echocardiogram, Heart imaging, Heartburn, History of cardiac cath, History of cardiac monitoring, History of cardioversion, History of MI (myocardial infarction), History of nuclear Muga stress test, History of PTCA, Hx of cardiovascular stress test, Hx of transesophageal echocardiography (LUKE) for monitoring, Hyperlipidemia, Hypertension, Insomnia, SOB (shortness of breath), Tachycardia, and TIA (transient ischemic attack). has a past surgical history that includes  section; Tubal ligation; Cholecystectomy; Diagnostic Cardiac Cath Lab Procedure (2006, 2009); Percutaneous Transluminal Coronary Angio (10/06/2010); other surgical history (2017); and ablation of dysrhythmic focus. Restrictions  Restrictions/Precautions  Restrictions/Precautions: General Precautions, Fall Risk    Subjective   General  Chart Reviewed: Yes, Orders, Progress Notes, History and Physical  Patient assessed for rehabilitation services?: Yes  Family / Caregiver Present: No  Referring Practitioner: Bailee Farah  Diagnosis: Admitted [facility] SOB. CAD, COPD, CHF  Subjective  Subjective: \"I want to go home like today\"  General Comment  Comments: Cooperative and pleasant  Patient Currently in Pain: (L UE from IV)  Pain Assessment  Pain Level: 4  Pain Location: Hand  Pre Treatment Pain Screening  Intervention List: Patient able to continue with treatment  Comments / Details: Left hand swollen from IV  Vital Signs  Patient Currently in Pain: (L UE from IV)  Social/Functional History  Social/Functional History  Lives With: Spouse(Per chart spouse has alzheimers)  Type of Home: House  Home Layout: Two level, Able to Live on Main level with bedroom/bathroom  Home Access: Ramped entrance  Bathroom Shower/Tub: Tub/Shower unit, Shower chair without back  Bathroom Toilet: Standard  Bathroom Equipment: Grab bars in shower, Shower chair  Home Equipment: Standard walker, [address]  Receives Help From: (Stated a daughter does grocery shopping but her and her  both take care of cooking, cleaning, laundry)  ADL Assistance: Independent  Homemaking Assistance: Independent  Driving: Total  Shopping:  Total  Homemaking Responsibilities: Yes(minimal)  Ambulation Assistance: Independent  Transfer Assistance: Independent  Active : No  Occupation: Retired  Leisure & Hobbies: caretaker for  with alzeihmer's, pet cat       Objective   Vision: Within Functional Limits(glasses)  Hearing: Within functional limits    Orientation  Overall Orientation Status: Within Normal Limits  Observation/Palpation  Posture: Good  Observation: pt L sidelying in bed. IV L UE, Tele  Edema: L hand  Balance  Sitting Balance: Stand by assistance  Standing Balance: Minimal assistance(Several LOB when static standing. One LOB required assitance of therapsit to regain)  Functional Mobility  Functional - Mobility Device: Rolling Walker(Ibhuaai2ps to ambulate without assistive device per PLOF but pt was rather unsteady thus RWKR used.)  Assist Level: Minimal assistance  ADL  Feeding: Independent  Grooming: Contact guard assistance(unsteady when standing at sink to groom)  UE Bathing: Stand by assistance  LE Bathing: Stand by assistance(if sponge bathe while seated safely at sink)  LE Dressing: Minimal assistance        Bed mobility  Bridging: Independent  Rolling to Left: Independent  Rolling to Right: Independent  Supine to Sit: Stand by assistance  Sit to Supine: Stand by assistance  Scooting: Stand by assistance  Transfers  Sit to stand: Minimal assistance  Stand to sit: Minimal assistance  Vision - Basic Assessment  Prior Vision: Wears glasses only for reading  Cognition  Overall Cognitive Status: [facility]  Cognition Comment: Somewhat impulsive with movement. Unsafe walker use.         Sensation  Overall Sensation Status: WNL        LUE PROM (degrees)  LUE PROM: WNL  RUE PROM (degrees)  RUE PROM: WNL  LUE Strength  Gross LUE Strength: WFL  RUE Strength  Gross RUE Strength: WFL                   Plan   Plan  Times per week: 3x/week  Current Treatment Recommendations: Endurance Training, Patient/Caregiver Education & Training, Self-Care / ADL, Balance Training, Functional Mobility Training, Safety Education & Training    G-Code     OutComes Score                                                  AM-PAC Score             Goals  Short term goals  Time Frame for Short term goals: 1 week or discharge  Short term goal 1: Pt will demonstrate safe walker use during functional tasks with minimal verbal cues  Short term goal 2: Pt will dress UB and LB with supervision including accessing closet  Short term goal 3: Pt will bathe UB and LB with supervision  Short term goal 4: Pt will stand at sink at least 5 minutes with no LOB/supervision while performing grooming tasks  Short term goal 5: Pt will be independent with toilet transfer/ hygiene and clothing management  Patient Goals   Patient goals :  \"To go home today\"       Therapy Time   Individual Concurrent Group Co-treatment   Time In 0820         Time Out 4101         Minutes 15[address] [facility], OTR/L

## 2020-11-30 LAB
ANION GAP SERPL CALCULATED.3IONS-SCNC: 9 MMOL/L (ref 4–16)
BUN BLDV-MCNC: 13 MG/DL (ref 6–23)
CALCIUM SERPL-MCNC: 7.9 MG/DL (ref 8.3–10.6)
CHLORIDE BLD-SCNC: 104 MMOL/L (ref 99–110)
CO2: 24 MMOL/L (ref 21–32)
CREAT SERPL-MCNC: 1.1 MG/DL (ref 0.6–1.1)
GFR AFRICAN AMERICAN: >60 ML/MIN/1.73M2
GFR NON-AFRICAN AMERICAN: 50 ML/MIN/1.73M2
GLUCOSE BLD-MCNC: 121 MG/DL (ref 70–99)
GLUCOSE BLD-MCNC: 123 MG/DL (ref 70–99)
GLUCOSE BLD-MCNC: 134 MG/DL (ref 70–99)
GLUCOSE BLD-MCNC: 173 MG/DL (ref 70–99)
GLUCOSE BLD-MCNC: 179 MG/DL (ref 70–99)
LV EF: 30 %
LVEF MODALITY: NORMAL
POTASSIUM SERPL-SCNC: 4.4 MMOL/L (ref 3.5–5.1)
PROCALCITONIN: 0.08
SODIUM BLD-SCNC: 137 MMOL/L (ref 135–145)

## 2020-11-30 PROCEDURE — 97530 THERAPEUTIC ACTIVITIES: CPT

## 2020-11-30 PROCEDURE — APPNB45 APP NON BILLABLE 31-45 MINUTES: Performed by: NURSE PRACTITIONER

## 2020-11-30 PROCEDURE — 2709999900 HC NON-CHARGEABLE SUPPLY

## 2020-11-30 PROCEDURE — 6360000002 HC RX W HCPCS: Performed by: INTERNAL MEDICINE

## 2020-11-30 PROCEDURE — 6360000002 HC RX W HCPCS: Performed by: NURSE PRACTITIONER

## 2020-11-30 PROCEDURE — 1200000000 HC SEMI PRIVATE

## 2020-11-30 PROCEDURE — 6370000000 HC RX 637 (ALT 250 FOR IP): Performed by: INTERNAL MEDICINE

## 2020-11-30 PROCEDURE — 2580000003 HC RX 258: Performed by: INTERNAL MEDICINE

## 2020-11-30 PROCEDURE — 94761 N-INVAS EAR/PLS OXIMETRY MLT: CPT

## 2020-11-30 PROCEDURE — 93458 L HRT ARTERY/VENTRICLE ANGIO: CPT | Performed by: INTERNAL MEDICINE

## 2020-11-30 PROCEDURE — 93010 ELECTROCARDIOGRAM REPORT: CPT | Performed by: INTERNAL MEDICINE

## 2020-11-30 PROCEDURE — B2151ZZ FLUOROSCOPY OF LEFT HEART USING LOW OSMOLAR CONTRAST: ICD-10-PCS | Performed by: INTERNAL MEDICINE

## 2020-11-30 PROCEDURE — 6360000002 HC RX W HCPCS

## 2020-11-30 PROCEDURE — 97110 THERAPEUTIC EXERCISES: CPT

## 2020-11-30 PROCEDURE — 36415 COLL VENOUS BLD VENIPUNCTURE: CPT

## 2020-11-30 PROCEDURE — 80048 BASIC METABOLIC PNL TOTAL CA: CPT

## 2020-11-30 PROCEDURE — 2500000003 HC RX 250 WO HCPCS

## 2020-11-30 PROCEDURE — 82962 GLUCOSE BLOOD TEST: CPT

## 2020-11-30 PROCEDURE — 85025 COMPLETE CBC W/AUTO DIFF WBC: CPT

## 2020-11-30 PROCEDURE — 93458 L HRT ARTERY/VENTRICLE ANGIO: CPT

## 2020-11-30 PROCEDURE — 4A023N7 MEASUREMENT OF CARDIAC SAMPLING AND PRESSURE, LEFT HEART, PERCUTANEOUS APPROACH: ICD-10-PCS | Performed by: INTERNAL MEDICINE

## 2020-11-30 PROCEDURE — 2700000000 HC OXYGEN THERAPY PER DAY

## 2020-11-30 PROCEDURE — C1894 INTRO/SHEATH, NON-LASER: HCPCS

## 2020-11-30 PROCEDURE — B2111ZZ FLUOROSCOPY OF MULTIPLE CORONARY ARTERIES USING LOW OSMOLAR CONTRAST: ICD-10-PCS | Performed by: INTERNAL MEDICINE

## 2020-11-30 PROCEDURE — 84145 PROCALCITONIN (PCT): CPT

## 2020-11-30 PROCEDURE — 6360000004 HC RX CONTRAST MEDICATION

## 2020-11-30 RX ORDER — SODIUM CHLORIDE 0.9 % (FLUSH) 0.9 %
10 SYRINGE (ML) INJECTION PRN
Status: DISCONTINUED | OUTPATIENT
Start: 2020-11-30 | End: 2020-12-01 | Stop reason: HOSPADM

## 2020-11-30 RX ORDER — SODIUM CHLORIDE 9 MG/ML
INJECTION, SOLUTION INTRAVENOUS CONTINUOUS
Status: DISCONTINUED | OUTPATIENT
Start: 2020-11-30 | End: 2020-12-01

## 2020-11-30 RX ORDER — FUROSEMIDE 10 MG/ML
40 INJECTION INTRAMUSCULAR; INTRAVENOUS 2 TIMES DAILY
Status: DISCONTINUED | OUTPATIENT
Start: 2020-11-30 | End: 2020-11-30

## 2020-11-30 RX ORDER — SODIUM CHLORIDE 0.9 % (FLUSH) 0.9 %
10 SYRINGE (ML) INJECTION EVERY 12 HOURS SCHEDULED
Status: DISCONTINUED | OUTPATIENT
Start: 2020-11-30 | End: 2020-12-01 | Stop reason: HOSPADM

## 2020-11-30 RX ORDER — ACETAMINOPHEN 325 MG/1
650 TABLET ORAL EVERY 4 HOURS PRN
Status: DISCONTINUED | OUTPATIENT
Start: 2020-11-30 | End: 2020-12-01 | Stop reason: HOSPADM

## 2020-11-30 RX ADMIN — INSULIN GLARGINE 70 UNITS: 100 INJECTION, SOLUTION SUBCUTANEOUS at 21:30

## 2020-11-30 RX ADMIN — ATORVASTATIN CALCIUM 40 MG: 40 TABLET, FILM COATED ORAL at 20:49

## 2020-11-30 RX ADMIN — CLONIDINE HYDROCHLORIDE 0.3 MG: 0.2 TABLET ORAL at 20:48

## 2020-11-30 RX ADMIN — ONDANSETRON 4 MG: 2 INJECTION INTRAMUSCULAR; INTRAVENOUS at 11:12

## 2020-11-30 RX ADMIN — INSULIN LISPRO 1 UNITS: 100 INJECTION, SOLUTION INTRAVENOUS; SUBCUTANEOUS at 17:17

## 2020-11-30 RX ADMIN — DIPHENHYDRAMINE HYDROCHLORIDE 50 MG: 50 INJECTION, SOLUTION INTRAMUSCULAR; INTRAVENOUS at 13:58

## 2020-11-30 RX ADMIN — CEFTRIAXONE 1 G: 1 INJECTION, POWDER, FOR SOLUTION INTRAMUSCULAR; INTRAVENOUS at 20:43

## 2020-11-30 RX ADMIN — ONDANSETRON 4 MG: 2 INJECTION INTRAMUSCULAR; INTRAVENOUS at 13:58

## 2020-11-30 RX ADMIN — SODIUM CHLORIDE, PRESERVATIVE FREE 10 ML: 5 INJECTION INTRAVENOUS at 09:00

## 2020-11-30 RX ADMIN — AZITHROMYCIN MONOHYDRATE 500 MG: 500 INJECTION, POWDER, LYOPHILIZED, FOR SOLUTION INTRAVENOUS at 22:56

## 2020-11-30 RX ADMIN — TRAZODONE HYDROCHLORIDE 100 MG: 50 TABLET ORAL at 20:48

## 2020-11-30 RX ADMIN — POTASSIUM CHLORIDE 10 MEQ: 1500 TABLET, EXTENDED RELEASE ORAL at 17:19

## 2020-11-30 RX ADMIN — METHYLPREDNISOLONE SODIUM SUCCINATE 125 MG: 125 INJECTION, POWDER, FOR SOLUTION INTRAMUSCULAR; INTRAVENOUS at 13:58

## 2020-11-30 RX ADMIN — GUAIFENESIN 600 MG: 600 TABLET, EXTENDED RELEASE ORAL at 20:48

## 2020-11-30 RX ADMIN — SODIUM CHLORIDE: 9 INJECTION, SOLUTION INTRAVENOUS at 17:19

## 2020-11-30 RX ADMIN — METOPROLOL SUCCINATE 100 MG: 100 TABLET, EXTENDED RELEASE ORAL at 12:23

## 2020-11-30 ASSESSMENT — PAIN SCALES - GENERAL: PAINLEVEL_OUTOF10: 0

## 2020-11-30 NOTE — PROGRESS NOTES
Pt qualifies for home oxygen. Waiting for physician to COPY AND PASTE Progress Note and co-sign Home Oxygen Evaluation.

## 2020-11-30 NOTE — PROGRESS NOTES
INTERNAL MEDICINE PROGRESS NOTE        Solange American Craycraft   1955   Primary Care Physician:  Pedro Pablo Angel PA-C  Admit Date: 11/27/2020     Subjective:   Patient awake and doing okay. She was doing well on room air last night, she used O2 throughout the night. O2 is stable right now. Denies chest pain, cough, fever, chills, GI or  issues. Objective:   BP (!) 140/76   Pulse 80   Temp 98 °F (36.7 °C) (Oral)   Resp 18   Ht 5' 1\" (1.549 m)   Wt 183 lb 4.8 oz (83.1 kg)   SpO2 98%   BMI 34.63 kg/m²    General appearance: alert, appears stated age and cooperative  Head: Normocephalic, without obvious abnormality, atraumatic  Neck: no adenopathy and supple, symmetrical, trachea midline  Lungs: course breath sounds bilaterally  Heart: S1, S2 normal  Abdomen: soft, non-tender; bowel sounds normal; no masses,  no organomegaly  Extremities: no clubbing, cyanosis with trace edema.   Neurologic: Grossly normal    Data Review  Lab Results   Component Value Date     11/29/2020    K 4.3 11/29/2020     11/29/2020    CO2 24 11/29/2020    CREATININE 1.0 11/29/2020    BUN 14 11/29/2020    CALCIUM 8.5 11/29/2020     Lab Results   Component Value Date    WBC 17.5 (H) 11/29/2020    HGB 10.4 (L) 11/29/2020    HCT 30.9 (L) 11/29/2020    MCV 93.9 11/29/2020     (H) 11/29/2020     INR/Prothrombin Time      Meds:    insulin lispro  0-6 Units Subcutaneous TID     insulin lispro  0-3 Units Subcutaneous Nightly    ondansetron  4 mg Intravenous Once    diphenhydrAMINE  50 mg Intravenous Once    methylPREDNISolone  125 mg Intravenous Once    [Held by provider] apixaban  5 mg Oral BID    aspirin  81 mg Oral Daily    atorvastatin  40 mg Oral Nightly    cloNIDine  0.3 mg Oral Nightly    levothyroxine  50 mcg Oral Daily    metoprolol succinate  100 mg Oral Daily    pantoprazole  40 mg Oral Daily    potassium chloride  10 mEq Oral BID WC    traZODone  100 mg Oral Nightly    sodium chloride flush  10 mL Intravenous 2 times per day    azithromycin  500 mg Intravenous Q24H    And    cefTRIAXone (ROCEPHIN) IV  1 g Intravenous Q24H    guaiFENesin  600 mg Oral BID    insulin glargine  70 Units Subcutaneous Nightly     PRN Meds: hydrALAZINE, zolpidem, ipratropium-albuterol, nitroGLYCERIN, sodium chloride flush, acetaminophen **OR** acetaminophen, polyethylene glycol, promethazine **OR** ondansetron, glucose, dextrose, glucagon (rDNA), dextrose    Assessment/Plan:   Patient Active Hospital Problem List:  Patient Active Problem List   Diagnosis    History of CVA (cerebrovascular accident) without residual deficits    Morbid obesity [facility]    DM (diabetes mellitus), type 2, uncontrolled [facility]    Persistent atrial fibrillation ([facility]    CAD (coronary artery disease)    CHF (congestive heart failure) ([facility]    Light headed    History of MI (myocardial infarction)    Diabetes mellitus ([facility]    Hypertension    TIA (transient ischemic attack)    Hyperlipidemia    SOB (shortness of breath)    H/O cardiovascular stress test    Family history of coronary artery disease    [facility]oxysmal [facility] fibrillation) ([facility]    Chest pain    Atrial thrombus without antecedent myocardial infarction    S/P ablation of atrial fibrillation    Gastroenteritis    YESIKA (acute kidney injury) ([facility]    Acute cystitis    Elevated liver enzymes    CHF (congestive heart failure), NYHA class I, acute on chronic, combined ([facility]    Pneumonia    Multifocal pneumonia     Assessment:  Pneumonia: Patient improving. CXR reviewed. On IV Zithromax and Rocephin. CXR reviewed. Leukocytosis: Elevated at 17.5. Monitor. Continue IV antibiotics. CHF: BNP elevated. EF 30% on prior ECHO. Cardiology following. Plan is for heart cath soon. CAD s/p stent: Troponin negative, EKG nonconcerning. On ASA, statin. heart cath soon. A. fib s/p ablation: Rate controlled. Eliquis held by cardiology.   DM2: SSI, POC glucose, hypoglycemia protocol. Hypothyroidism: On Synthroid. HTN/HLD: cpm    Plan:  Patient improving. Consultants notes reviewed. Cardiology for heart cath soon. Patient qualifies for home O2. Continue IV antibiotics. Monitor labs and patient condition. Electronically signed by Tara Gabriel PA-C on 11/30/2020 at 7:48 AM   I have independently evaluated and examined this patient today. I have reviewed radiologic and biochemical tests on this patient. Management Plan is developed mutually with APRIL Bustillo. I have reviewed above note and agree with assessment and plan. Discussed with patient in detail.

## 2020-11-30 NOTE — H&P
[address] [place] [address], [address] [place] [address]                              HISTORY AND PHYSICAL    PATIENT NAME: Wandy Wu                 :        1955  MED REC NO:   [id number]                          ROOM:       3639  ACCOUNT NO:   [de-identified]                           ADMIT DATE: 2020  PROVIDER:     Talia Cade MD    She is admitted to the service of Dr. Sylvester Sapp. Of note, she is a  patient of Dr. Orestes Laguna and being admitted in cross-coverage by Dr. Kenan Medina. CHIEF COMPLAINT:  Shortness of breath for a week. HISTORY OF PRESENT ILLNESS:  The patient is a 58-year-old female who  presented to the emergency room complaining of shortness of breath. She  has a history of previous COPD, coronary artery disease, and CHF. She  says she has had trouble breathing for the last week. She had bad  anxiety all day on the day of admission. She typically is not oxygen  dependent. She says that she feels her COPD is flaring. In the emergency room, she was found to have low oxygen level initially  and was put on oxygen with good improvement. Initial chest x-ray did  not show much of an acute cardiopulmonary process, but a CT was done  which showed left upper lobe mild multifocal ill-defined consolidation  suggesting pneumonia as well as mild bilateral layering posterior  pleural effusions and mild cardiomegaly. Therefore, she is admitted at  this time for further evaluation and therapy of her hypoxemia as well as  ammonia. She is given steroids in the emergency room for her COPD. REVIEW OF SYSTEMS:  Across 10 systems were reviewed and negative except  as in the history of present illness.     PAST MEDICAL HISTORY:  Previous history of abnormal pulmonary function  tests in 2014, atrial fibrillation, coronary artery disease,  congestive heart failure, diabetes mellitus, dizziness, lightheadedness,  family history of coronary disease. Numerous cardiac test results were  reviewed in the chart. Most recent ejection fraction I can see was 52%  in . The MUGA stress test in 2020 shows EF of 41%. History of  previous cardioversion, history of myocardial infarction, history of  heartburn, history of some mild mitral regurgitation, history of PTCA in  10/2010, left main disease, history of LUKE in 2017 showing an MARY GRACE  blood clot, hyperlipidemia, hypertension, insomnia, history of shortness  of breath, previous TIA. PAST SURGICAL HISTORY:  Includes the following:  Ablation of dysrhythmic  focus,  section, cholecystectomy, diagnostic cardiac  catheterization in 2006 and 2009, atrial fib ablation with LUKE on  2017, PTCA on 10/06/2010, previous tubal ligation. FAMILY HISTORY:  Diabetes and heart disease in the father. SOCIAL HISTORY:  She is . She is a former smoker of three packs  a day for 20 years for a total of 60-pack years. She says she quit in  . Denies any alcohol use but drinks three caffeine _____ a day. ALLERGIES:  Include the following:  AMIODARONE causes dizziness, IV DYES  cause nausea and vomiting, BIAXIN causes nausea and vomiting.     HOME MEDICATIONS:  Include tramadol 50 mg q.6 p.r.n., Solu-Medrol before  and after procedures if needed for allergy prevention, metoprolol   mg daily, Lasix 40 mg b.i.d., potassium chloride 10 mEq b.i.d.,  albuterol sulfate two puffs q.6 h. p.r.n. shortness of breath or cough,  clonidine 0.3 mg b.i.d., Basaglar insulin 80 units nightly, coverage  insulin at her meals for Admelog, atorvastatin 40 mg nightly,  escitalopram 20 mg daily, metformin 500 mg b.i.d., pantoprazole 40 mg  nightly, Ambien 10 mg p.o. at bedtime, Bentyl 20 mg at bedtime and  meals, Zofran 4 mg q.8 h. p.r.n. nausea, amlodipine 5 mg daily,  hydroxyzine 50 mg daily, levothyroxine 50 mcg daily, Eliquis 5 mg  b.i.d., nitroglycerin sublingual 0.4 mg p.r.n. chest pain, aspirin 81 mg  daily, trazodone 100 mg daily. PHYSICAL EXAMINATION:  VITAL SIGNS:  In the emergency room, blood pressure was 164/76, pulse  was 90, respirations were 20, temperature was 100.6 orally, O2 sat  initially was 97% and most recently _____ in the evening before oxygen  was administered. GENERAL:  Overall, she is awake, alert, and cooperative, appears in no  acute distress. She is non-toxic appearing. HEENT:  Head is normocephalic and atraumatic. PERRLA. EOMI. Oral  mucosa is moist without lesion. There is no scleral icterus. Nose  shows no rhinorrhea or epistaxis. NECK:  No carotid bruits are noted at this time. No TMG or LA. HEART:  Regular rate. She has a 5-6/9 systolic ejection murmur noted. Pulses 1 to 2+ in the upper extremities, 1 to 2+ in the lower  extremities. No bruits are heard in the major vascular trunks. LUNGS:  Show some decreased breath sounds at the left upper lobe,  minimal wheeze with forced expiration, overall fair air movement. No  use of accessory muscles of respiration. No chest wall tenderness. ABDOMEN:  Obese, active bowel sounds. No HSM or CVAT noted. No  significant leg edema is noted. There is no tenderness over the  external bladder. MUSCULOSKELETAL:  Moves all extremities to command. No acute  deformities. SKIN:  Warm and dry. No lesions. PSYCHIATRIC:  Appears appropriate. NEUROLOGIC:  Cranial nerves II through XII are grossly intact. She is  alert, oriented, and cooperative. Moves all extremities during the  examination. Rapid alternating motions are intact as is other  cerebellar testing. RADIOLOGIC DATA:  CT shows the ill-defined left upper lobe consolidation  suggesting pneumonia. There is mild bilateral layering posterior  pleural effusions as discussed. Mild cardiomegaly. Bilateral coronary  atherosclerotic calcification and right adrenal adenoma.     EKG shows sinus rhythm with premature ventricular complexes and left  bundle-branch block. LABORATORY DATA:  SARS test was negative. Troponin T is less than 0.010  and lipase was 8. Sodium 133, potassium 3.6, chloride 98, CO2 is 23,  BUN is 8, creatinine is 1.5, glucose is 200, calcium 8.3, albumin 2.9. White count was 14,400, H and H of 10 and 30.6, platelets are 062,984. Differential shows 86% segs, 4.8% lymphocytes, 6.8% monocytes. DISCUSSION:  The patient came to the emergency room complaining of  shortness of breath. She was initially hypoxic and did improve with  administration of oxygen. Laboratory results showed leukocytosis. She  also had a fever. CT scan showed multifocal pneumonia. The patient was  started on IV antibiotics in the emergency room with, I believe,  ceftriaxone and azithromycin. CT was done looking for embolism and this was negative but did show the  multifocal pneumonia. Lactic acidosis was not present. The patient is admitted now for treatment of pneumonia, hypoxemia,  history of diabetes.   Of note, she was also given a dose of steroids in  the emergency room for the CT scan, although it was done without  contrast.        Adelina Ospina MD    D: 11/30/2020 2:28:42       T: 11/30/2020 8:43:30     [email or web address]  Job#: [id number]     Doc#: [id number]    CC:  Salvatore Al

## 2020-11-30 NOTE — CARE COORDINATION
CM in to see Pt to discuss discharge planning. Pt from home with  and plans to return. Pt is independent with ADL's and has no DME. Pt has insurance and pcp, and can afford medications. Pt denies any needs at this time. CM available if needs arise.

## 2020-11-30 NOTE — PROGRESS NOTES
Patient was seen in hospital for CHF. I am prescribing oxygen because the diagnosis and testing requires the patient to have oxygen in the home. Conditions will improve or be benefited by oxygen use. The patient is able to perform good mobility and therefore requires the use of a portable oxygen system for ambulation.       Electronically signed by Boaz Newsome PA-C on 11/30/2020 at 7:56 AM

## 2020-11-30 NOTE — PROGRESS NOTES
Home Oxygen Evaluation is complete and faxed to [facility]. Please call the Pulmonary Lab when pt is being discharged for the delivery of an oxygen concentrator. Do NOT let the pt go home without an oxygen concentrator.

## 2020-11-30 NOTE — PROGRESS NOTES
This nurse answered pt bed alarm. Pt sitting up try to get out of bed. Explained to pt that she has to lay flat until 7:45 pm because she had a heart cath today. Pt states that she knows that and she has heart caths before. Explained to the patient that she has to lay flay and not lift up of the bed or she risk bleeding from the artery in her leg. She states that she doesn't care and she knows what do because shes done this before. Got pt to lay back down and checked cath site. Still soft, with no bleeding. Pt requested that she get to leave. Told pt I would let her physician know she wants to leave. Left the pt room with her laying flat.

## 2020-11-30 NOTE — PROGRESS NOTES
Occupational Therapy    Occupational Therapy Treatment Note  Name: Luis Pedro MRN: [id number] :   1955   Date:  2020   Admission Date: 2020 Room:  3021/3021-A   Restrictions/Precautions:  Restrictions/Precautions  Restrictions/Precautions: General Precautions, Fall Risk     Communication with other providers:   Cleared for treatment by Hollis Ash RN. Subjective:  Patient states:  \"I'm getting dizzy\" towards end of session and pt required to lay down and rest.  Pain:   Location, Type, Intensity (0/10 to 10/10): None noted. Objective:    Observation:  Pt alert and oriented. Pt BP taken after laying down when being dizzy. /84 with nursing notified. Treatment, including education:  Transfers  Supine to sit Sup  Sit to supine :Sup  Scooting :SUp  Rolling :Sup  Sit to stand :SUp  Stand to sit :SUP        Therapeutic Exercise:  Cues were given for technique, safety, recruitment, and rationale. Cues were verbal and/or tactile. For BUE strengthening for ADL & functional mobility Indep pt performed BUE strengthening HEP c 1# hand weights x 20 reps x 6 exercises with Minimal difficulty. Therapeutic Activity Training:   Therapeutic activity training was instructed today. Cues were given for safety, sequence, UE/LE placement, awareness, and balance. Activities performed today included bed mobility training, sup-sit, sit-stand, SPT. Pt stood x 2 min x 2 attempts with SBA with no AD to facilitate increased endurance/strength for ADL tasks and transfers. Safety Measures: Gait belt used, Left in bed, Refused Pull/Bed Alarm activated and call light left in reach          Assessment / Impression:        Patient's tolerance of treatment: Good   Adverse Reaction: None  Significant change in status and impact:  None  Barriers to improvement:  Dec strength and endurance. Plan for Next Session:    Continue with POC.     Time in:  1102  Time out:  1142  Timed treatment minutes:  40  Total treatment time:  40  Electronically signed by:    Tam Fuentes, [address]    11/30/2020, 11:48 AM    Previously filed values:  Patient Goals   Patient goals :  \"To go home today\"  Short term goals  Time Frame for Short term goals: 1 week or discharge  Short term goal 1: Pt will demonstrate safe walker use during functional tasks with minimal verbal cues  Short term goal 2: Pt will dress UB and LB with supervision including accessing closet  Short term goal 3: Pt will bathe UB and LB with supervision  Short term goal 4: Pt will stand at sink at least 5 minutes with no LOB/supervision while performing grooming tasks  Short term goal 5: Pt will be independent with toilet transfer/ hygiene and clothing management

## 2020-11-30 NOTE — PROGRESS NOTES
Doctor Please copy and paste below in your progress note per DME requirements. Doctor Please copy and paste below in your progress note per DME requirements. Patient was seen in hospital for CHF. I am prescribing oxygen because the diagnosis and testing requires the patient to have oxygen in the home. Conditions will improve or be benefited by oxygen use. The patient is able to perform good mobility and therefore requires the use of a portable oxygen system for ambulation.

## 2020-11-30 NOTE — PROGRESS NOTES
Cardiology Progress Note     Today's Plan: [facility] today. Admit Date:  11/27/2020    Consult reason/ Seen today for: Trigeminy PVCs    Subjective and  Overnight Events:Patient is resting in bed. Telemetry-sinus rhythm with frequent PVCs  Oxygen-2 L    Assessment / Plan / Recommendation:        1. Shortness of breath with multifocal pneumonia. Current treatment with IV antibiotics  2. History of paroxysmal atrial fibrillation S/P ablation of atrial fibrillation. Last episode of A. fib RVR in September 2020. Continue with metoprolol and Eliquis at this point. Eliquis is on hold for pending [facility] today. 3. Recently diagnosed new onset cardiomyopathy. LVEF is around 41% on MUGA scan. Previously normal EF in 2018. Recent stress MPI was negative for ischemia. With patient history of CAD and LAD stent, new onset cardiomyopathy and frequent PVCs as noted on telemetry today. I will favor left heart cardiac catheterization to further assess for any ischemia. We will stop diltiazem. Continue asa/bb/statins. [facility] today  4. CAD: Left heart cardiac catheterization on today. Will need to hold Eliquis 2 days prior to procedure. Patient reports nausea with IV dye will give Zofran, solumedrol, and Benadryl prior to cath. Continue with aspirin. 5. Frequent PVCs: Rule out ischemia. Likely secondary to cardiomyopathy. Continue with beta-blocker. Lexapro D/C yesterday, consider non-QT prolonging antidepressants. 6. Essential hypertension: Continue with clonidine and metoprolol. Clonidine increased by PCP. Cardizem D/C due to cardiomyopathy. 7. Hyperlipidemia: Continue with statin therapy  8. DVT prophylaxis if not contraindicated. History of Presenting Illness:    Chief complain on admission : 60 y. o.year old who is admitted for  Chief Complaint   Patient presents with    Shortness of Breath     history of COPD, contiuous all week, no relief with inhaler        Past medical history:    has a past medical history of Abnormal PFTs (pulmonary function tests), Atrial fibrillation ([facility] [address] CAD (coronary artery disease), CHF (congestive heart failure) ([facility] [address] Diabetes mellitus ([facility] [address] Dizziness - light-headed, Family history of coronary artery disease, H/O 24 hour EKG monitoring, H/O cardiovascular stress test, H/O cardiovascular stress test, H/O echocardiogram, H/O echocardiogram, Heart imaging, Heartburn, History of cardiac cath, History of cardiac monitoring, History of cardioversion, History of MI (myocardial infarction), History of nuclear Muga stress test, History of PTCA, Hx of cardiovascular stress test, Hx of transesophageal echocardiography (LUKE) for monitoring, Hyperlipidemia, Hypertension, Insomnia, SOB (shortness of breath), Tachycardia, and TIA (transient ischemic attack). Past surgical history:   has a past surgical history that includes  section; Tubal ligation; Cholecystectomy; Diagnostic Cardiac Cath Lab Procedure (2006, 2009); Percutaneous Transluminal Coronary Angio (10/06/2010); other surgical history (2017); and ablation of dysrhythmic focus. Social History:   reports that she quit smoking about 13 years ago. Her smoking use included cigarettes. She has a 60.00 pack-year smoking history. She has never used smokeless tobacco. She reports that she does not drink alcohol or use drugs. Family history:  family history includes Diabetes in her father; Heart Disease in her father. Allergies   Allergen Reactions    Amiodarone Other (See Comments)     dizziness    Iv Dye [Iodides] Nausea And Vomiting    Vicodin [Hydrocodone-Acetaminophen] Nausea And Vomiting       Review of Systems:  Review of Systems   Neurological: Positive for weakness. All other systems reviewed and are negative.        BP (!) 173/96   Pulse 81   Temp 97.9 °F (36.6 °C) (Oral)   Resp 15   Ht 5' 1\" (1.549 m)   Wt 183 lb 4.8 oz (83.1 kg)   SpO2 93%   BMI 34.63 kg/m²       Intake/Output Summary (Last 24 hours) at 11/30/2020 1034  Last data filed at 11/29/2020 1719  Gross per 24 hour   Intake 615 ml   Output --   Net 615 ml       Physical Exam:  Constitutional:  Well developed, Well nourished, No acute distress  HENT:  Normocephalic, Atraumatic, Bilateral external ears normal,  Nose normal.   Neck- trachea is midline  Eyes:  PERRL, Conjunctiva normal  Respiratory:  Normal breath sounds, No respiratory distress, No wheezing, No chest tenderness. Cardiovascular:  Normal heart rate, Normal rhythm, no murmurs appreciated, No rubs appreciated, No gallops appreciated, JVP not elevated  Abdomen/GI:  Soft, No tenderness  Musculoskeletal:  Intact distal pulses, no edema, No tenderness, No cyanosis, No clubbing. Integument:  Warm, Dry  Lymphatic:  No lymphadenopathy noted.    Neurologic:  Alert & oriented  Psychiatric:  Affect and Mood :pleasant     Medications:    insulin lispro  0-6 Units Subcutaneous TID WC    insulin lispro  0-3 Units Subcutaneous Nightly    ondansetron  4 mg Intravenous Once    diphenhydrAMINE  50 mg Intravenous Once    methylPREDNISolone  125 mg Intravenous Once    [Held by provider] apixaban  5 mg Oral BID    aspirin  81 mg Oral Daily    atorvastatin  40 mg Oral Nightly    cloNIDine  0.3 mg Oral Nightly    levothyroxine  50 mcg Oral Daily    metoprolol succinate  100 mg Oral Daily    pantoprazole  40 mg Oral Daily    potassium chloride  10 mEq Oral BID WC    traZODone  100 mg Oral Nightly    sodium chloride flush  10 mL Intravenous 2 times per day    azithromycin  500 mg Intravenous Q24H    And    cefTRIAXone (ROCEPHIN) IV  1 g Intravenous Q24H    guaiFENesin  600 mg Oral BID    insulin glargine  70 Units Subcutaneous Nightly      dextrose       hydrALAZINE, zolpidem, ipratropium-albuterol, nitroGLYCERIN, sodium chloride flush, acetaminophen **OR** acetaminophen, polyethylene glycol, promethazine **OR** ondansetron, glucose, dextrose, glucagon (rDNA), dextrose    Lab Data:  CBC:   Recent Labs     11/27/20 2135 11/29/20 0349   WBC 14.4* 17.5*   HGB 10.6* 10.4*   HCT 30.6* 30.9*   MCV 90.5 93.9   * 566*     BMP:   Recent Labs     11/27/20 2135 11/29/20 0349   * 136   K 3.6 4.3   CL 98* 103   CO2 23 24   BUN 8 14   CREATININE 1.1 1.0     PT/INR: No results for input(s): PROTIME, INR in the last 72 hours. BNP:    Recent Labs     11/27/20 2135   PROBNP 8,090*     TROPONIN:   Recent Labs     11/27/20 2135   TROPONINT <0.010        ECHO : 6/12/2020   Left ventricular systolic function is abnormal.   Ejection fraction is visually estimated at 30%. Anteroseptal wall segment appears akinetic with global hypokinesis   (decreased from previous echo). Mild left ventricular hypertrophy. Grade III diastolic dysfunction. Mitral annular calcification is present; mild to moderate (posteriorly   directed) mitral regurgitation. No evidence of any pericardial effusion. Impression:  Principal Problem:    Multifocal pneumonia  Active Problems:    DM (diabetes mellitus), type 2, uncontrolled ([facility] [address]    History of MI (myocardial infarction)    PAF (paroxysmal atrial fibrillation) ([facility])    CHF (congestive heart failure), NYHA class I, acute on chronic, combined ([facility] [address]    Pneumonia  Resolved Problems:    * No resolved hospital problems. *       All labs, medications and tests reviewed by myself, continue all other medications of all above medical condition listed as is except for changes mentioned above. Thank you   Please call with questions.     Electronically signed by RENE Rivera CNP on 11/30/2020 at 10:34 AM

## 2020-12-01 ENCOUNTER — APPOINTMENT (OUTPATIENT)
Dept: GENERAL RADIOLOGY | Age: 65
DRG: 139 | End: 2020-12-01
Payer: COMMERCIAL

## 2020-12-01 ENCOUNTER — TELEPHONE (OUTPATIENT)
Dept: CARDIOLOGY CLINIC | Age: 65
End: 2020-12-01

## 2020-12-01 VITALS
BODY MASS INDEX: 34.98 KG/M2 | OXYGEN SATURATION: 96 % | HEART RATE: 68 BPM | SYSTOLIC BLOOD PRESSURE: 131 MMHG | DIASTOLIC BLOOD PRESSURE: 60 MMHG | HEIGHT: 61 IN | WEIGHT: 185.3 LBS | RESPIRATION RATE: 16 BRPM | TEMPERATURE: 97.5 F

## 2020-12-01 LAB
ANION GAP SERPL CALCULATED.3IONS-SCNC: 8 MMOL/L (ref 4–16)
BASOPHILS ABSOLUTE: 0 K/CU MM
BASOPHILS RELATIVE PERCENT: 0.1 % (ref 0–1)
BUN BLDV-MCNC: 17 MG/DL (ref 6–23)
CALCIUM SERPL-MCNC: 7.5 MG/DL (ref 8.3–10.6)
CHLORIDE BLD-SCNC: 106 MMOL/L (ref 99–110)
CO2: 23 MMOL/L (ref 21–32)
CREAT SERPL-MCNC: 1.1 MG/DL (ref 0.6–1.1)
DIFFERENTIAL TYPE: ABNORMAL
EOSINOPHILS ABSOLUTE: 0 K/CU MM
EOSINOPHILS RELATIVE PERCENT: 0 % (ref 0–3)
GFR AFRICAN AMERICAN: >60 ML/MIN/1.73M2
GFR NON-AFRICAN AMERICAN: 50 ML/MIN/1.73M2
GLUCOSE BLD-MCNC: 295 MG/DL (ref 70–99)
GLUCOSE BLD-MCNC: 326 MG/DL (ref 70–99)
GLUCOSE BLD-MCNC: 371 MG/DL (ref 70–99)
GLUCOSE BLD-MCNC: 379 MG/DL (ref 70–99)
HCT VFR BLD CALC: 30.4 % (ref 37–47)
HEMOGLOBIN: 9.6 GM/DL (ref 12.5–16)
IMMATURE NEUTROPHIL %: 1.4 % (ref 0–0.43)
LYMPHOCYTES ABSOLUTE: 0.7 K/CU MM
LYMPHOCYTES RELATIVE PERCENT: 7.5 % (ref 24–44)
MCH RBC QN AUTO: 30.1 PG (ref 27–31)
MCHC RBC AUTO-ENTMCNC: 31.6 % (ref 32–36)
MCV RBC AUTO: 95.3 FL (ref 78–100)
MONOCYTES ABSOLUTE: 0.1 K/CU MM
MONOCYTES RELATIVE PERCENT: 0.6 % (ref 0–4)
NUCLEATED RBC %: 0 %
PDW BLD-RTO: 11.9 % (ref 11.7–14.9)
PLATELET # BLD: 501 K/CU MM (ref 140–440)
PMV BLD AUTO: 8.8 FL (ref 7.5–11.1)
POTASSIUM SERPL-SCNC: 5.2 MMOL/L (ref 3.5–5.1)
RBC # BLD: 3.19 M/CU MM (ref 4.2–5.4)
SEGMENTED NEUTROPHILS ABSOLUTE COUNT: 8.2 K/CU MM
SEGMENTED NEUTROPHILS RELATIVE PERCENT: 90.4 % (ref 36–66)
SODIUM BLD-SCNC: 137 MMOL/L (ref 135–145)
TOTAL IMMATURE NEUTOROPHIL: 0.13 K/CU MM
TOTAL NUCLEATED RBC: 0 K/CU MM
WBC # BLD: 9 K/CU MM (ref 4–10.5)

## 2020-12-01 PROCEDURE — 36415 COLL VENOUS BLD VENIPUNCTURE: CPT

## 2020-12-01 PROCEDURE — 2580000003 HC RX 258: Performed by: INTERNAL MEDICINE

## 2020-12-01 PROCEDURE — 6360000002 HC RX W HCPCS: Performed by: PHYSICIAN ASSISTANT

## 2020-12-01 PROCEDURE — 97116 GAIT TRAINING THERAPY: CPT

## 2020-12-01 PROCEDURE — 71045 X-RAY EXAM CHEST 1 VIEW: CPT

## 2020-12-01 PROCEDURE — 6370000000 HC RX 637 (ALT 250 FOR IP): Performed by: NURSE PRACTITIONER

## 2020-12-01 PROCEDURE — 6370000000 HC RX 637 (ALT 250 FOR IP): Performed by: INTERNAL MEDICINE

## 2020-12-01 PROCEDURE — 80048 BASIC METABOLIC PNL TOTAL CA: CPT

## 2020-12-01 PROCEDURE — 94618 PULMONARY STRESS TESTING: CPT

## 2020-12-01 PROCEDURE — APPSS30 APP SPLIT SHARED TIME 16-30 MINUTES: Performed by: NURSE PRACTITIONER

## 2020-12-01 PROCEDURE — 85025 COMPLETE CBC W/AUTO DIFF WBC: CPT

## 2020-12-01 PROCEDURE — 99233 SBSQ HOSP IP/OBS HIGH 50: CPT | Performed by: INTERNAL MEDICINE

## 2020-12-01 PROCEDURE — 82962 GLUCOSE BLOOD TEST: CPT

## 2020-12-01 RX ORDER — NITROGLYCERIN 0.4 MG/1
TABLET SUBLINGUAL
Qty: 25 TABLET | Refills: 3 | Status: SHIPPED | OUTPATIENT
Start: 2020-12-01

## 2020-12-01 RX ORDER — CEFDINIR 300 MG/1
300 CAPSULE ORAL 2 TIMES DAILY
Qty: 10 CAPSULE | Refills: 0 | Status: SHIPPED | OUTPATIENT
Start: 2020-12-01 | End: 2020-12-06

## 2020-12-01 RX ORDER — LISINOPRIL 5 MG/1
5 TABLET ORAL DAILY
Status: DISCONTINUED | OUTPATIENT
Start: 2020-12-01 | End: 2020-12-01 | Stop reason: HOSPADM

## 2020-12-01 RX ORDER — SODIUM POLYSTYRENE SULFONATE 15 G/60ML
15 SUSPENSION ORAL; RECTAL ONCE
Status: COMPLETED | OUTPATIENT
Start: 2020-12-01 | End: 2020-12-01

## 2020-12-01 RX ORDER — FUROSEMIDE 10 MG/ML
40 INJECTION INTRAMUSCULAR; INTRAVENOUS ONCE
Status: COMPLETED | OUTPATIENT
Start: 2020-12-01 | End: 2020-12-01

## 2020-12-01 RX ORDER — LISINOPRIL 5 MG/1
5 TABLET ORAL DAILY
Qty: 30 TABLET | Refills: 3 | Status: ON HOLD | OUTPATIENT
Start: 2020-12-01 | End: 2021-01-01 | Stop reason: HOSPADM

## 2020-12-01 RX ADMIN — ASPIRIN 81 MG: 81 TABLET, COATED ORAL at 08:54

## 2020-12-01 RX ADMIN — SODIUM CHLORIDE: 9 INJECTION, SOLUTION INTRAVENOUS at 05:46

## 2020-12-01 RX ADMIN — METOPROLOL SUCCINATE 100 MG: 100 TABLET, EXTENDED RELEASE ORAL at 08:44

## 2020-12-01 RX ADMIN — PANTOPRAZOLE SODIUM 40 MG: 40 TABLET, DELAYED RELEASE ORAL at 08:44

## 2020-12-01 RX ADMIN — GUAIFENESIN 600 MG: 600 TABLET, EXTENDED RELEASE ORAL at 08:44

## 2020-12-01 RX ADMIN — SODIUM POLYSTYRENE SULFONATE 15 G: 15 SUSPENSION ORAL; RECTAL at 14:27

## 2020-12-01 RX ADMIN — ACETAMINOPHEN 650 MG: 325 TABLET ORAL at 14:26

## 2020-12-01 RX ADMIN — INSULIN LISPRO 5 UNITS: 100 INJECTION, SOLUTION INTRAVENOUS; SUBCUTANEOUS at 13:50

## 2020-12-01 RX ADMIN — LEVOTHYROXINE SODIUM 50 MCG: 50 TABLET ORAL at 05:43

## 2020-12-01 RX ADMIN — FUROSEMIDE 40 MG: 10 INJECTION, SOLUTION INTRAMUSCULAR; INTRAVENOUS at 11:14

## 2020-12-01 RX ADMIN — SODIUM CHLORIDE, PRESERVATIVE FREE 10 ML: 5 INJECTION INTRAVENOUS at 08:44

## 2020-12-01 RX ADMIN — ACETAMINOPHEN 650 MG: 325 TABLET ORAL at 11:14

## 2020-12-01 RX ADMIN — LISINOPRIL 5 MG: 5 TABLET ORAL at 11:15

## 2020-12-01 ASSESSMENT — PAIN DESCRIPTION - ONSET: ONSET: ON-GOING

## 2020-12-01 ASSESSMENT — PAIN - FUNCTIONAL ASSESSMENT: PAIN_FUNCTIONAL_ASSESSMENT: ACTIVITIES ARE NOT PREVENTED

## 2020-12-01 ASSESSMENT — PAIN DESCRIPTION - LOCATION: LOCATION: BACK

## 2020-12-01 ASSESSMENT — PAIN DESCRIPTION - DESCRIPTORS: DESCRIPTORS: ACHING

## 2020-12-01 ASSESSMENT — PAIN DESCRIPTION - PAIN TYPE: TYPE: ACUTE PAIN

## 2020-12-01 ASSESSMENT — PAIN SCALES - GENERAL
PAINLEVEL_OUTOF10: 7
PAINLEVEL_OUTOF10: 8

## 2020-12-01 ASSESSMENT — PAIN DESCRIPTION - PROGRESSION: CLINICAL_PROGRESSION: GRADUALLY WORSENING

## 2020-12-01 ASSESSMENT — PAIN DESCRIPTION - FREQUENCY: FREQUENCY: CONTINUOUS

## 2020-12-01 ASSESSMENT — PAIN DESCRIPTION - ORIENTATION: ORIENTATION: MID

## 2020-12-01 NOTE — PROGRESS NOTES
Doctor Please copy and paste below in your progress note per DME requirements. Patient was seen in hospital for CAD, CHF. I am prescribing oxygen because the diagnosis and testing requires the patient to have oxygen in the home. Conditions will improve or be benefited by oxygen use. The patient is able to perform good mobility and therefore requires the use of a portable oxygen system for ambulation.

## 2020-12-01 NOTE — PROGRESS NOTES
Physical Therapy Treatment Note  Name: Lety Garcia MRN: [id number] :   1955   Date:  2020   Admission Date: 2020 Room:  [address] [place], [place] [address]     Restrictions/Precautions:  Restrictions/Precautions  Restrictions/Precautions: General Precautions, Fall Risk         Communication with other providers:  RN    Subjective:  Patient states:  \"I don't want to do exercises, but I'll walk a little\"  Pain:   Location, Type, Intensity (0/10 to 10/10): Denies pain    Objective:    Observation:  Pt sitting EOB with RN present and agreeable to therapy    Treatment, including education/measures:    Bed mobility: Pt performed sit to supine with mod I    Transfers: Pt performed STS to/from bed with supervision and good technique. Gait: Pt ambulated 61' with no AD CGA progressing to supervision. Pt with no LOB or dizziness throughout bout. Pt ambulated with decreased saige, slight narrowed DONNY, and SpO2 93-96% throughout bout. Cues for standing posture and pursed lip breathing provided throughout bout. Assessment / Impression:    Pt supine in bed at end of session with needs in reach and alarm on at end of session. Patient's tolerance of treatment:  well   Adverse Reaction: n/a  Significant change in status and impact:  n/a  Barriers to improvement:  Decreased endurance, impaired balance    Plan for Next Session:    Continue to address ambulation and balance in future sessions.      Time in:  1117  Time out:  1135  Timed treatment minutes:  18  Total treatment time:  18    Previously filed items:  Social/Functional History  Lives With: Spouse(Per chart spouse has alzheimers)  Type of Home: House  Home Layout: Two level, Able to Live on Main level with bedroom/bathroom  Home Access: Ramped entrance  Bathroom Shower/Tub: Tub/Shower unit, Shower chair without back  Bathroom Toilet: Standard  Bathroom Equipment: Grab bars in shower, Shower chair  Home Equipment: Standard walker, [address] bed  [facility] (Stated a daughter does grocery shopping but her and her  both take care of cooking, cleaning, laundry)  ADL Assistance: Independent  Homemaking Assistance: Independent  Driving: Total  Shopping:  Total  Homemaking Responsibilities: Yes(minimal)  Ambulation Assistance: Independent  Transfer Assistance: Independent  Active : No  Occupation: Retired  Leisure & Hobbies: caretaker for  with alzeihmer's, pet cat     Long term goals  Time Frame for Long term goals : by discharge  Long term goal 1: recal and perform 3 exercises  Long term goal 2: I in transfers  Long term goal 3: amb 50+ feet with appropriate assistive device, including L and R turns, I.    Electronically signed by:    Aryan Gallego, CAMI  12/1/2020, 12:32 PM

## 2020-12-01 NOTE — PROGRESS NOTES
Cardiology Progress Note     Today's Plan: will start lisinopril 5 mg daily. Give 1 dose of kayexalate, hold potassium repletion    Admit Date:  11/27/2020    Consult reason/ Seen today for: Trigeminy PVCs    Subjective and  Overnight Events: Patient resting in bed. Denies cardiac complaints. Assessment / Plan / Recommendation:     1. Shortness of breath with multifocal pneumonia. Current treatment with IV antibiotics  2. History of paroxysmal atrial fibrillation S/P ablation of atrial fibrillation. Last episode of A. fib RVR in September 2020. Continue with metoprolol and Eliquis at this point. 3. Recently diagnosed new onset cardiomyopathy. LVEF is around 41% on MUGA scan. Previously normal EF in 2018. Recent stress MPI was negative for ischemia. With patient history of CAD and LAD stent, new onset cardiomyopathy and frequent PVCs patient had LHC. Continue asa/bb/statins. Will start lisinopril per guidelines of ischemic cardiomyopathy. 4. CAD: Left heart cardiac catheterization on yesterday. See below. Continue with aspirin. Heavily calcified vessels. Mild to moderate main vessel CAD, significant small vessel disease of Diagonal 1 and OM1. Global hypokinesis with moderate to severely reduced LV function. LVEF 30%. Continue ASA. Toprol xl. add Lisinopril,   5. Frequent PVCs: LHC yesterday. will get Holter monitor as outpatient to assess for PVC burden. 6. Essential hypertension: Continue with clonidine and metoprolol. Clonidine increased by PCP. Will start lisinopril 5 mg daily  7. Hyperlipidemia: Continue with statin therapy  8. DVT prophylaxis if not contraindicated. 9. Hyperkalemia: will give kayexylate. Hold potassium. History of Presenting Illness:    Chief complain on admission : 60 y. o.year old who is admitted for  Chief Complaint   Patient presents with    Shortness of Breath     history of COPD, contiuous all week, no relief with inhaler        Past medical history:    has a past medical history of Abnormal PFTs (pulmonary function tests), Atrial fibrillation ([facility] [address] CAD (coronary artery disease), CHF (congestive heart failure) ([facility] [address] Diabetes mellitus ([facility] [address] Dizziness - light-headed, Family history of coronary artery disease, H/O 24 hour EKG monitoring, H/O cardiovascular stress test, H/O cardiovascular stress test, H/O echocardiogram, H/O echocardiogram, Heart imaging, Heartburn, History of cardiac cath, History of cardiac monitoring, History of cardioversion, History of MI (myocardial infarction), History of nuclear Muga stress test, History of PTCA, Hx of cardiovascular stress test, Hx of transesophageal echocardiography (LUKE) for monitoring, Hyperlipidemia, Hypertension, Insomnia, SOB (shortness of breath), Tachycardia, and TIA (transient ischemic attack). Past surgical history:   has a past surgical history that includes  section; Tubal ligation; Cholecystectomy; Diagnostic Cardiac Cath Lab Procedure (2006, 2009); Percutaneous Transluminal Coronary Angio (10/06/2010); other surgical history (2017); and ablation of dysrhythmic focus. Social History:   reports that she quit smoking about 13 years ago. Her smoking use included cigarettes. She has a 60.00 pack-year smoking history. She has never used smokeless tobacco. She reports that she does not drink alcohol or use drugs. Family history:  family history includes Diabetes in her father; Heart Disease in her father. Allergies   Allergen Reactions    Amiodarone Other (See Comments)     dizziness    Iv Dye [Iodides] Nausea And Vomiting    Vicodin [Hydrocodone-Acetaminophen] Nausea And Vomiting       Review of Systems:  Review of Systems   Neurological: Positive for weakness. All other systems reviewed and are negative.        /60   Pulse 68   Temp 97.5 °F (36.4 °C) (Oral)   Resp 16   Ht 5' 1\" (1.549 m)   Wt 185 lb 4.8 oz (84.1 kg)   SpO2 96%   BMI 35.01 kg/m²       Intake/Output Summary (Last 24 hours) at 12/1/2020 5953  Last data filed at 12/1/2020 4763  Gross per 24 hour   Intake 1560 ml   Output --   Net 1560 ml       Physical Exam:  Constitutional:  Well developed, Well nourished, No acute distress  HENT:  Normocephalic, Atraumatic, Bilateral external ears normal,  Nose normal.   Neck- trachea is midline  Eyes:  PERRL, Conjunctiva normal  Respiratory:  Normal breath sounds, No respiratory distress, No wheezing, No chest tenderness. Cardiovascular:  Normal heart rate, Normal rhythm, no murmurs appreciated, No rubs appreciated, No gallops appreciated, JVP not elevated  Abdomen/GI:  Soft, No tenderness  Musculoskeletal:  Intact distal pulses, no edema, No tenderness, No cyanosis, No clubbing. Integument:  Warm, Dry. Right femoral groin site soft, nontender. No hematoma. No redness or swelling.    Neurologic:  Alert & oriented  Psychiatric:  Affect and Mood :pleasant     Medications:    sodium chloride flush  10 mL Intravenous 2 times per day    insulin lispro  0-6 Units Subcutaneous TID WC    insulin lispro  0-3 Units Subcutaneous Nightly    [Held by provider] apixaban  5 mg Oral BID    aspirin  81 mg Oral Daily    atorvastatin  40 mg Oral Nightly    cloNIDine  0.3 mg Oral Nightly    levothyroxine  50 mcg Oral Daily    metoprolol succinate  100 mg Oral Daily    pantoprazole  40 mg Oral Daily    potassium chloride  10 mEq Oral BID WC    traZODone  100 mg Oral Nightly    azithromycin  500 mg Intravenous Q24H    And    cefTRIAXone (ROCEPHIN) IV  1 g Intravenous Q24H    guaiFENesin  600 mg Oral BID    insulin glargine  70 Units Subcutaneous Nightly      sodium chloride 75 mL/hr at 12/01/20 0546    dextrose       sodium chloride flush, acetaminophen, hydrALAZINE, zolpidem, ipratropium-albuterol, nitroGLYCERIN, polyethylene glycol, promethazine **OR** ondansetron, glucose, dextrose, glucagon (rDNA), dextrose    Lab Data:  CBC:   Recent Labs     11/29/20  0349 12/01/20  0219   WBC 17.5* 9.0   HGB 10.4* 9.6*   HCT 30.9* 30.4*   MCV 93.9 95.3   * 501*     BMP:   Recent Labs     11/29/20  0349 11/30/20  1000 12/01/20  0219    137 137   K 4.3 4.4 5.2*    104 106   CO2 24 24 23   BUN 14 13 17   CREATININE 1.0 1.1 1.1     PT/INR: No results for input(s): PROTIME, INR in the last 72 hours. BNP:    No results for input(s): PROBNP in the last 72 hours. TROPONIN:   No results for input(s): TROPONINT in the last 72 hours. ECHO : 6/12/2020   Left ventricular systolic function is abnormal.   Ejection fraction is visually estimated at 30%. Anteroseptal wall segment appears akinetic with global hypokinesis   (decreased from previous echo). Mild left ventricular hypertrophy. Grade III diastolic dysfunction. Mitral annular calcification is present; mild to moderate (posteriorly   directed) mitral regurgitation. No evidence of any pericardial effusion. Impression:  Principal Problem:    Multifocal pneumonia  Active Problems:    S/P ablation of atrial fibrillation    DM (diabetes mellitus), type 2, uncontrolled ([facility])    CAD (coronary artery disease)    CHF (congestive heart failure) ([facility])    History of MI (myocardial infarction)    Hypertension    Hyperlipidemia    PAF (paroxysmal atrial fibrillation) ([facility])    Chest pain    Atrial thrombus without antecedent myocardial infarction    CHF (congestive heart failure), NYHA class I, acute on chronic, combined ([facility] [address]    Pneumonia  Resolved Problems:    * No resolved hospital problems. *       All labs, medications and tests reviewed by myself, continue all other medications of all above medical condition listed as is except for changes mentioned above. Thank you   Please call with questions.     Electronically signed by RENE Bland CNP on 12/1/2020 at 9:39 AM         CARDIOLOGY ATTENDING ADDENDUM    I have seen, spoken to and examined this patient personally, independently of the nurse practitioner. I have reviewed the hospital care given to date and reviewed all pertinent labs and imaging. The plan was developed mutually at the time of the visit with the patient,  NP   and myself. I have spoken with patient, nursing staff and provided written and verbal instructions . The above note has been reviewed and I agree with the assessment, diagnosis, and treatment plan with changes made by me as follows       HPI:  I have reviewed the above HPI  And agree with above   Please review addendum/changes made to note above     Interval history:        Physical Exam:  General:  Awake, alert, NAD  Head:normal  Eye:normal  Neck:  No JVD   Chest:  Clear to auscultation, respiration easy  Cardiovascular:  s1s2  Abdomen:   nontender  Extremities:  no edema  Pulses; palpable  Neuro: grossly normal      MEDICAL DECISION MAKING;    I agree with the above plan, which was planned by myself and discussed with NP. Left heart cath catheterization yesterday. Mild to moderate CAD. No high-grade stenosis requiring PCI. LVEF around 30%. Continue to optimize guideline directed medical therapy. Continue with metoprolol. Add lisinopril  Continue statin therapy.   Obtain Holter monitor as outpatient to assess for PVC burden    Dr. Marcelina Klein MD

## 2020-12-01 NOTE — PROGRESS NOTES
Patient was seen in hospital for CAD, CHF. I am prescribing oxygen because the diagnosis and testing requires the patient to have oxygen in the home. Conditions will improve or be benefited by oxygen use. The patient is able to perform good mobility and therefore requires the use of a portable oxygen system for ambulation.

## 2020-12-01 NOTE — PROGRESS NOTES
INTERNAL MEDICINE PROGRESS NOTE        Tiffany Richardson [address] [facility]   1955   Primary Care Physician:  Vincent English PA-C  Admit Date: 11/27/2020     Subjective:   Patient awake and doing okay today. She is on O2 NC around 3-4L right now. Was 2L yesterday. She had LHC yesterday. Denies chest pain, cough, fever, chills, GI or  issues. Objective:   /60   Pulse 68   Temp 97.5 °F (36.4 °C) (Oral)   Resp 16   Ht 5' 1\" (1.549 m)   Wt 185 lb 4.8 oz (84.1 kg)   SpO2 96%   BMI 35.01 kg/m²    General appearance: alert, appears stated age and cooperative  Head: Normocephalic, without obvious abnormality, atraumatic  Neck: no adenopathy and supple, symmetrical, trachea midline  Lungs: course breath sounds bilaterally  Heart: S1, S2 normal  Abdomen: soft, non-tender; bowel sounds normal; no masses,  no organomegaly  Extremities: no clubbing, cyanosis with trace edema.   Neurologic: Grossly normal    Data Review  Lab Results   Component Value Date     12/01/2020    K 5.2 (H) 12/01/2020     12/01/2020    CO2 23 12/01/2020    CREATININE 1.1 12/01/2020    BUN 17 12/01/2020    CALCIUM 7.5 (L) 12/01/2020     Lab Results   Component Value Date    WBC 9.0 12/01/2020    HGB 9.6 (L) 12/01/2020    HCT 30.4 (L) 12/01/2020    MCV 95.3 12/01/2020     (H) 12/01/2020     INR/Prothrombin Time      Meds:    sodium polystyrene  15 g Oral Once    lisinopril  5 mg Oral Daily    furosemide  40 mg Intravenous Once    sodium chloride flush  10 mL Intravenous 2 times per day    insulin lispro  0-6 Units Subcutaneous TID WC    insulin lispro  0-3 Units Subcutaneous Nightly    apixaban  5 mg Oral BID    aspirin  81 mg Oral Daily    atorvastatin  40 mg Oral Nightly    cloNIDine  0.3 mg Oral Nightly    levothyroxine  50 mcg Oral Daily    metoprolol succinate  100 mg Oral Daily    pantoprazole  40 mg Oral Daily    [Held by provider] potassium chloride  10 mEq Oral BID WC    traZODone  100 mg Oral Nightly    azithromycin  500 mg Intravenous Q24H    And    cefTRIAXone (ROCEPHIN) IV  1 g Intravenous Q24H    guaiFENesin  600 mg Oral BID    insulin glargine  70 Units Subcutaneous Nightly     PRN Meds: sodium chloride flush, acetaminophen, hydrALAZINE, zolpidem, ipratropium-albuterol, nitroGLYCERIN, polyethylene glycol, promethazine **OR** ondansetron, glucose, dextrose, glucagon (rDNA), dextrose    Assessment/Plan:   Patient Active Hospital Problem List:  Patient Active Problem List   Diagnosis    History of CVA (cerebrovascular accident) without residual deficits    Morbid obesity ([facility]    DM (diabetes mellitus), type 2, uncontrolled ([facility]    Persistent atrial fibrillation ([facility]    CAD (coronary artery disease)    [facility]    Light headed    History of MI (myocardial infarction)    Diabetes mellitus ([facility]    Hypertension    TIA (transient ischemic attack)    Hyperlipidemia    SOB (shortness of breath)    H/O cardiovascular stress test    Family history of coronary artery disease    PA[facility]ox[facility]mal [facility] fibrillation) [facility]    Chest pain    Atrial thrombus without antecedent myocardial infarction    S/P ablation of atrial fibrillation    Gastroenteritis    YESIKA (acute kidney injury) ([facility]    Acute cystitis    Elevated liver enzymes    CHF (congestive heart failure), NYHA class I, acute on chronic, combined ([facility]    Pneumonia    Multifocal pneumonia     Assessment:  Acute hypoxic resp failure: now on 3-4L NC. Recheck CXR and give IV lasix once. Re qualifies for home O2. Hyperkalemia: hold replacement. Cardio giving kayexelate. Monitor. Pneumonia: repeat CXR. On IV Zithromax and Rocephin. Repeat CXR. Leukocytosis: WBC trend 17.5-9.0. Continue IV antibiotics. CHF: BNP elevated. EF 30%. Cardiology following. CAD s/p stent: Troponin negative, EKG nonconcerning. LHC showed \"heavily calcified vessels.  Mild to moderate main vessel CAD. Significant small vessel disease of [facility]. Global Hypokinesis with moderate to severely reduced LV function. LVEF is about 30 %. \" On ASA, statin, toprol, and added lisinopril. A. fib s/p ablation: Rate controlled. Eliquis restarted  DM2: SSI, POC glucose, hypoglycemia protocol. Hypothyroidism: On Synthroid. HTN/HLD: cpm    Plan:  Recheck CXR and give lasix IV once. Monitor O2 status. Patient qualifies for home O2. Consultants note reviewed. Monitor potassium. Continue IV antibiotics. Monitor labs and patient condition.      Electronically signed by Boaz Newsome PA-C on 12/1/2020 at 10:59 AM

## 2020-12-01 NOTE — TELEPHONE ENCOUNTER
Attempted to call patient for results of monitor no answer. Left message message advising her that we were calling for results of monitor also she needs to schedule follow up from [facility]. Asked to return call when available to schedule.

## 2020-12-01 NOTE — DISCHARGE SUMMARY
Paige Scales MD, Internal Medicine    [address] [facility]   [phone]   Patient ID  Sesar Thurston   1955  [id number]          Admit date: 11/27/2020   Discharge date: 12/1/2020      Admitting Physician: aMdisyn Jiang MD   Discharge Physician: Rosanna Rendon PA-C    Discharge Diagnoses:   Acute hypoxic resp failure: on 2L NC. Repeat CXR is negative for acute process now. Patient qualifies for home O2. Hyperkalemia: was 5.2, hold replacement for now. Given kayexelate once. Monitor. Pneumonia: repeat CXR negative. Given IV Zithromax and Rocephin. Home with PO cefdinir. Leukocytosis: WBC was 17.5, now 9.0. Given IV antibiotics. Doing better now. CHF: BNP elevated at 8K. EF 30%. Continue diuretics. CAD s/p stent: Troponin negative, EKG nonconcerning. LHC showed \"heavily calcified vessels. Mild to moderate main vessel CAD. Significant small vessel disease of Diagonal1 & OM1. Global Hypokinesis with moderate to severely reduced LV function. LVEF is about 30 %. \" On ASA, statin, toprol, and added lisinopril. NO stents placed this admission. A. fib s/p ablation: Rate controlled. Eliquis restarted  DM2: cpm.   Hypothyroidism: On Synthroid.    HTN/HLD: cpm     Patient Active Problem List   Diagnosis    History of CVA (cerebrovascular accident) without residual deficits    Morbid obesity ([address]    DM (diabetes mellitus), type 2, uncontrolled ([address]    Persistent atrial fibrillation ([address]    CAD (coronary artery disease)    CHF (congestive heart failure) ([address]    Light headed    History of MI (myocardial infarction)    Diabetes mellitus ([address]    Hypertension    TIA (transient ischemic attack)    Hyperlipidemia    SOB (shortness of breath)    H/O cardiovascular stress test    Family history of coronary artery disease    PAF (paroxysmal atrial fibrillation) (HCC)    Chest pain    Atrial thrombus without antecedent myocardial infarction    S/P ablation of atrial fibrillation    Gastroenteritis    YESIKA (acute kidney [facility]) ([facility]    Acute cystitis    Elevated liver enzymes    CHF (congestive heart failure), NYHA class I, acute on chronic, combined ([facility]    Pneumonia    Multifocal pneumonia       Discharged Condition: fair    Hospital Course: The patient is a 61 yo female that presented to ER with shortness of breath. She has history of CHF, CAD, atrial fibrillation, hypertension, hyperlipidemia, DM 2, hypothyroidism, anxiety. She was hypoxic in ER. CT scan showed multifocal PNA and she had leukocytosis with fever. The patient was given several days of IV azithromycin and ceftriaxone. Repeat CXR shows improvement in multifocal PNA. Also, proBNP was elevated around 8000. EF on prior ECHO is around 30%. Given lasix. Patient has CAD and was evaluated by cardiologist. She had [facility] on 11/30/20 which showed heavily calcified vessels (see report below). Cardio recommends medical mgmt for now. The patients fever and leukocytosis have improved but she still does qualify for home O2 at this time. She will go home with oxygen. She is to follow up with PCP and cardiology as outpatient. Relevant Investigations  CT CHEST WO CONT (11/27/20)  Final Result  1. Left upper lung lobe mild multifocal ill-defined consolidation suggesting pneumonia. 2. Mild bilateral layering posterior pleural effusions, as discussed above. 3. Mild cardiomegaly. 4. Bilateral coronary artery scattered atherosclerotic calcification. 5. Hepatic and splenic chronic granulomatous disease. 6. Redemonstration of right adrenal adenoma. CXR (12/01/20)  No acute process. HEART CATH (11/30/20)   1. Heavily calcified vessels. Mild to moderate main vessel CAD. significant small vessel disease of Diagonal1 & OM1   2. Global Hypokinesis with moderate to severely reduced LV   function. LVEF is about 30 %. Patient tolerated the procedure well. No immediate complications.    Blood loss: about 10 cc.      Recommendations   Medical management. ? device therapy. Disposition: home with home O2    Patient Instructions:    Antoinette Go   Home Medication Instructions ODE:[id number]    Printed on:12/01/20 2188   Medication Information                      ADMELOG SOLOSTAR 100 UNIT/ML pen  Inject into the skin See Admin Instructions             albuterol sulfate HFA (VENTOLIN HFA) 108 (90 Base) MCG/ACT inhaler  Inhale 2 puffs into the lungs 4 times daily as needed for Wheezing             amLODIPine (NORVASC) 5 MG tablet  Take 1 tablet by mouth daily             apixaban (ELIQUIS) 5 MG TABS tablet  Take 1 tablet by mouth 2 times daily             aspirin 81 MG EC tablet  Take 81 mg by mouth daily             atorvastatin (LIPITOR) 40 MG tablet  Take 40 mg by mouth nightly             BASAGLAR KWIKPEN 100 UNIT/ML injection pen  Inject 80 Units into the skin nightly             cefdinir (OMNICEF) 300 MG capsule  Take 1 capsule by mouth 2 times daily for 5 days             cloNIDine (CATAPRES) 0.3 MG tablet  Take 0.3 mg by mouth nightly             escitalopram (LEXAPRO) 20 MG tablet  Take 20 mg by mouth daily             furosemide (LASIX) 40 MG tablet  Take 1 tablet by mouth 2 times daily             levothyroxine (SYNTHROID) 50 MCG tablet  Take 50 mcg by mouth Daily             lisinopril (PRINIVIL;ZESTRIL) 5 MG tablet  Take 1 tablet by mouth daily             metFORMIN (GLUCOPHAGE) 500 MG tablet  Take 500 mg by mouth 2 times daily             metoprolol succinate (TOPROL XL) 100 MG extended release tablet  Take 1 tablet by mouth daily TAKE ONE TABLET BY MOUTH DAILY             nitroGLYCERIN (NITROSTAT) 0.4 MG SL tablet  up to max of 3 total doses. If no relief after 1 dose, call 911.              ondansetron (ZOFRAN ODT) 4 MG disintegrating tablet  Take 1 tablet by mouth every 8 hours as needed for Nausea             pantoprazole (PROTONIX) 40 MG tablet  Take 40 mg by mouth daily             traMADol (ULTRAM) 50 MG tablet  tramadol 50 mg tablet             traZODone (DESYREL) 100 MG tablet  Take 100 mg by mouth nightly. zolpidem (AMBIEN) 10 MG tablet  Take 10 mg by mouth nightly as needed for Sleep. Activity: activity as tolerated  Diet: cardiac diet    Follow-up with Tiffany Navarrete PA-C in 5 days    Signed: Electronically signed by Janene Harding PA-C on 12/1/2020 at 1:12 PM    I have independently evaluated and examined this patient today. I have reviewed radiologic and biochemical tests on this patient. Management Plan is developed mutually with APRIL Green. I have reviewed above note and agree with assessment and plan.   Time spent on discharge 35 minutes

## 2020-12-01 NOTE — PROGRESS NOTES
12/1/2020 10:27 AM  Patient Room #: 8744/4179-W  Patient Name: Priya Valdivia    (Step 1 Done by RN if possible otherwise call Pulmonary Diagnostics)  1. Place patient on room air at rest for at least 30 minutes. If patient falls below 88% before 30 minutes then you can record the level and stop. Record room air saturation level 87_ %. If patient is at 88% or below, they will qualify for home oxygen and you can stop. If level does not fall below 88%, fill in level above. If indicated continue to Step 2. Signature:_Felicity Donis RN__ Date: 12/01/2020__  (Step 2&3 Done by [facility])  2. Ambulate patient on room air until saturation falls below 89%. Record level of room air saturation with ambulation___ %. Next, place patient back on ___lpm oxygen and ambulate, record level __%. (Note:  this level must show improvement from room air level done with ambulation.)  If patients saturation on room air with ambulation is 88% or below AND patient shows improvement with oxygen during ambulation, they will qualify for home oxygen and you can stop. If patient does not drop below 89%, then patient should have an overnight oximetry trending on room air to see if level falls below 88%. Complete level in Step 3 below. 3. Room air overnight oximetry level 88 % for___  cumulative minutes. If patients room air oxygen level is < 89% for at least 5 cumulative minutes, patient will qualify for home oxygen and you can stop. (Attach Night Trending Report)    Complete order below: Diagnosis:_CAD, CHF___  Home oxygen at:  Length of Need: ?X Lifetime     ?  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

## 2020-12-01 NOTE — PROGRESS NOTES
Home Oxygen Evaluation is complete and has been faxed to [facility]. Please call the Pulmonary Lab or Respiratory Care when pt is being discharged for the delivery of an oxygen concentrator. Do NOT let the pt go home without and oxygen concentrator.

## 2020-12-01 NOTE — PROGRESS NOTES
Home O2 eval:  Spoke to [facility], home oxygen eval from 2020 has . Will need new order and testing. Spoke to RN she will start Home O2 eval,  Take pt off oxygen.

## 2020-12-01 NOTE — PROGRESS NOTES
Gave pt oxygen concentrator and explained how to use it. Pt will call Tinjim Bishop when she gets home.

## 2020-12-01 NOTE — PROGRESS NOTES
Tele called stating 1st degree block with PVC's on tele, Albin Martins NP aware and verified this is her baseline.

## 2020-12-02 LAB
EKG ATRIAL RATE: 89 BPM
EKG DIAGNOSIS: NORMAL
EKG P AXIS: 67 DEGREES
EKG P-R INTERVAL: 178 MS
EKG Q-T INTERVAL: 404 MS
EKG QRS DURATION: 140 MS
EKG QTC CALCULATION (BAZETT): 491 MS
EKG R AXIS: -17 DEGREES
EKG T AXIS: 123 DEGREES
EKG VENTRICULAR RATE: 89 BPM

## 2020-12-03 ENCOUNTER — PROCEDURE VISIT (OUTPATIENT)
Dept: CARDIOLOGY CLINIC | Age: 65
End: 2020-12-03
Payer: COMMERCIAL

## 2020-12-03 LAB
CULTURE: NORMAL
CULTURE: NORMAL
LV EF: 41 %
LVEF MODALITY: NORMAL
Lab: NORMAL
Lab: NORMAL
SPECIMEN: NORMAL
SPECIMEN: NORMAL

## 2020-12-03 PROCEDURE — 78472 GATED HEART PLANAR SINGLE: CPT | Performed by: INTERNAL MEDICINE

## 2020-12-03 PROCEDURE — A9560 TC99M LABELED RBC: HCPCS | Performed by: INTERNAL MEDICINE

## 2020-12-08 ENCOUNTER — TELEPHONE (OUTPATIENT)
Dept: CARDIOLOGY CLINIC | Age: 65
End: 2020-12-08

## 2020-12-08 NOTE — TELEPHONE ENCOUNTER
Advised patient of results. Patient voiced understanding. Supervising physician Dr. Berry Rodríguez . Left ventricular ejection fraction of 41 %.      Recommendation LVEF IS MILDLY REDUCED MEDICAL THERAPY

## 2020-12-11 NOTE — ED PROVIDER NOTES
ATTENDING NOTE:    I discussed this patient's history and physical findings and reviewed the PA's findings with them, as well as performed an independent assessment and coordinated care with them. My additional findings are:    HISTORY OF PRESENT ILLNESS:  Chief Complaint   Patient presents with    Fall     rt sided pain, didnt hit head   . Katie Schaefer is a 60 y.o. female who presents for evaluation after a fall which occurred today as the patient states she was feeling weak and unsteady on her feet. Does not believe she any loss consciousness however. She does not believe she hit her head. She is reporting some mild to moderate, constant aching and throbbing right shoulder and right knee pain after this fall as well as some neck pain. PHYSICAL EXAM:  VITAL SIGNS:   ED Triage Vitals [12/11/20 1159]   Enc Vitals Group      BP (!) 118/93      Pulse 82      Resp 20      Temp 98.4 °F (36.9 °C)      Temp Source Oral      SpO2 100 %      Weight 170 lb (77.1 kg)      Height 5' 1\" (1.549 m)      Head Circumference       Peak Flow       Pain Score       Pain Loc       Pain Edu? Excl. in [address]? Vitals during ED course were reviewed and are as charted. Key Physical Exam Findings:    Constitutional: Minimal distress, Non-toxic appearance    Eyes:  Conjunctiva normal, No discharge, PERRL, EOMI  HENT: Normocephalic, Atraumatic, oropharynx moist    Neck: Supple, no midline cervical spinal tenderness to palpation, no stridor, no grossly visible or palpable masses    Cardiovascular: Regular rate and rhythm, No murmurs, No rubs, No gallops    Pulmonary/Chest:  Normal breath sounds, No respiratory distress or accessory muscle use, No wheezing, crackles or rhonchi. Abdomen:  Soft, nondistended and nonrigid, No tenderness or peritoneal signs, No masses, normal bowel sounds    Back:  No midline point tenderness, No paraspinous muscle tenderness.   No CVA tenderness    Extremities:  No gross deformities, no edema, no tenderness    Neurologic:  Normal motor function, Normal sensory function, No focal deficits    Skin:  Warm, Dry, No erythema, No rash, No cyanosis, No mottling    Lymphatic:  No lymphadenopathy in the following location(s): cervical    Psychiatric:  Alert and oriented x3, Affect normal      EKG Interpretation    Interpreted by emergency department physician    Rhythm: normal sinus   Rate: normal  Axis: left  Ectopy: premature ventricular contractions (frequent)  Conduction: nonspecific interventricular conduction block  ST Segments: normal  T Waves: normal  Q Waves: none    Clinical Impression: Normal sinus rhythm with occasional PVCs and nonspecific intraventricular conduction delay        RADIOLOGY/PROCEDURES/LABS/MEDICATIONS ADMINISTERED:    I have reviewed and interpreted all of the currently available lab results from this visit (if applicable):  Results for orders placed or performed during the hospital encounter of 12/11/20   CBC Auto Differential   Result Value Ref Range    WBC 16.4 (H) 4.0 - 10.5 K/CU MM    RBC 3.28 (L) 4.2 - 5.4 M/CU MM    Hemoglobin 10.2 (L) 12.5 - 16.0 GM/DL    Hematocrit 32.3 (L) 37 - 47 %    MCV 98.5 78 - 100 FL    MCH 31.1 (H) 27 - 31 PG    MCHC 31.6 (L) 32.0 - 36.0 %    RDW 13.0 11.7 - 14.9 %    Platelets 751 148 - 837 K/CU MM    MPV 9.0 7.5 - 11.1 FL    Differential Type AUTOMATED DIFFERENTIAL     Segs Relative 83.5 (H) 36 - 66 %    Lymphocytes % 9.0 (L) 24 - 44 %    Monocytes % 6.2 (H) 0 - 4 %    Eosinophils % 0.1 0 - 3 %    Basophils % 0.3 0 - 1 %    Segs Absolute 13.7 K/CU MM    Lymphocytes Absolute 1.5 K/CU MM    Monocytes Absolute 1.0 K/CU MM    Eosinophils Absolute 0.0 K/CU MM    Basophils Absolute 0.1 K/CU MM    Nucleated RBC % 0.0 %    Total Nucleated RBC 0.0 K/CU MM    Total Immature Neutrophil 0.14 K/CU MM    Immature Neutrophil % 0.9 (H) 0 - 0.43 %   Comprehensive Metabolic Panel   Result Value Ref Range    Sodium 138 135 - 145 MMOL/L Potassium 4.7 3.5 - 5.1 MMOL/L    Chloride 103 99 - 110 mMol/L    CO2 25 21 - 32 MMOL/L    BUN 21 6 - 23 MG/DL    CREATININE 1.3 (H) 0.6 - 1.1 MG/DL    Glucose 50 (L) 70 - 99 MG/DL    Calcium 8.1 (L) 8.3 - 10.6 MG/DL    Alb 3.3 (L) 3.4 - 5.0 GM/DL    Total Protein 5.8 (L) 6.4 - 8.2 GM/DL    Total Bilirubin 0.3 0.0 - 1.0 MG/DL    ALT 19 10 - 40 U/L    AST 22 15 - 37 IU/L    Alkaline Phosphatase 201 (H) 40 - 129 IU/L    GFR Non- 41 (L) >60 mL/min/1.73m2    GFR  50 (L) >60 mL/min/1.73m2    Anion Gap 10 4 - 16   Troponin   Result Value Ref Range    Troponin T <0.010 <0.01 NG/ML   Brain Natriuretic Peptide   Result Value Ref Range    Pro-BNP 2,655 (H) <300 PG/ML   Urinalysis   Result Value Ref Range    Color, UA YELLOW YELLOW    Clarity, UA CLEAR CLEAR    Glucose, Urine NEGATIVE NEGATIVE MG/DL    Bilirubin Urine NEGATIVE NEGATIVE MG/DL    Ketones, Urine NEGATIVE NEGATIVE MG/DL    Specific Gravity, UA 1.021 1.001 - 1.035    Blood, Urine NEGATIVE NEGATIVE    pH, Urine 5.0 5.0 - 8.0    Protein,  (A) NEGATIVE MG/DL    Urobilinogen, Urine NORMAL 0.2 - 1.0 MG/DL    Nitrite Urine, Quantitative NEGATIVE NEGATIVE    Leukocyte Esterase, Urine TRACE (A) NEGATIVE    RBC, UA 2 0 - 6 /HPF    WBC, UA 4 0 - 5 /HPF    Bacteria, UA NEGATIVE NEGATIVE /HPF    Squam Epithel, UA 1 /HPF    Trans Epithel, UA <1 /HPF    Mucus, UA RARE (A) NEGATIVE HPF    Trichomonas, UA NONE SEEN NONE SEEN /HPF    Granular Casts, UA 1 /LPF   COVID-19    Specimen: Nasopharynx/Oropharynx   Result Value Ref Range    Source UNKNOWN     SARS-CoV-2, NAAT NOT DETECTED    Comprehensive Metabolic Panel w/ Reflex to MG   Result Value Ref Range    Sodium 137 135 - 145 MMOL/L    Potassium 5.4 (H) 3.5 - 5.1 MMOL/L    Chloride 104 99 - 110 mMol/L    CO2 18 (L) 21 - 32 MMOL/L    BUN 19 6 - 23 MG/DL    CREATININE 1.1 0.6 - 1.1 MG/DL    Glucose 45 (LL) 70 - 99 MG/DL    Calcium 8.0 (L) 8.3 - 10.6 MG/DL    Alb 3.0 (L) 3.4 - 5.0 GM/DL    Total Protein 5.6 (L) 6.4 - 8.2 GM/DL    Total Bilirubin 0.5 0.0 - 1.0 MG/DL    ALT 21 10 - 40 U/L    AST 41 (H) 15 - 37 IU/L    Alkaline Phosphatase 198 (H) 40 - 128 IU/L    GFR Non- 50 (L) >60 mL/min/1.73m2    GFR African American >60 >60 mL/min/1.73m2    Anion Gap 15 4 - 16   CBC auto differential   Result Value Ref Range    WBC 16.7 (H) 4.0 - 10.5 K/CU MM    RBC 3.21 (L) 4.2 - 5.4 M/CU MM    Hemoglobin 10.0 (L) 12.5 - 16.0 GM/DL    Hematocrit 31.7 (L) 37 - 47 %    MCV 98.8 78 - 100 FL    MCH 31.2 (H) 27 - 31 PG    MCHC 31.5 (L) 32.0 - 36.0 %    RDW 13.0 11.7 - 14.9 %    Platelets 134 749 - 696 K/CU MM    MPV 9.4 7.5 - 11.1 FL    Differential Type AUTOMATED DIFFERENTIAL     Segs Relative 77.9 (H) 36 - 66 %    Lymphocytes % 13.4 (L) 24 - 44 %    Monocytes % 6.1 (H) 0 - 4 %    Eosinophils % 1.8 0 - 3 %    Basophils % 0.3 0 - 1 %    Segs Absolute 13.0 K/CU MM    Lymphocytes Absolute 2.2 K/CU MM    Monocytes Absolute 1.0 K/CU MM    Eosinophils Absolute 0.3 K/CU MM    Basophils Absolute 0.1 K/CU MM    Nucleated RBC % 0.0 %    Total Nucleated RBC 0.0 K/CU MM    Total Immature Neutrophil 0.08 K/CU MM    Immature Neutrophil % 0.5 (H) 0 - 0.43 %   Procalcitonin   Result Value Ref Range    Procalcitonin 0.083    POCT Glucose   Result Value Ref Range    POC Glucose 143 (H) 70 - 99 MG/DL   POCT Glucose   Result Value Ref Range    POC Glucose 118 (H) 70 - 99 MG/DL   POCT Glucose   Result Value Ref Range    POC Glucose 58 (L) 70 - 99 MG/DL   POCT Glucose   Result Value Ref Range    POC Glucose 108 (H) 70 - 99 MG/DL   POCT Glucose   Result Value Ref Range    POC Glucose 113 (H) 70 - 99 MG/DL   EKG 12 Lead   Result Value Ref Range    Ventricular Rate 73 BPM    Atrial Rate 73 BPM    P-R Interval 182 ms    QRS Duration 140 ms    Q-T Interval 440 ms    QTc Calculation (Bazett) 484 ms    P Axis 40 degrees    R Axis -11 degrees    T Axis 137 degrees    Diagnosis       Sinus rhythm with occasional premature ventricular complexes  Nonspecific intraventricular block  Cannot rule out Septal infarct , age undetermined  T wave abnormality, consider lateral ischemia  Abnormal ECG  When compared with ECG of 27-NOV-2020 21:25,  premature ventricular complexes are now present  premature supraventricular complexes are no longer present  Confirmed by SANDEEP Omalley ([id number]) on 12/11/2020 5:40:00 PM            ABNORMAL LABS:  Labs Reviewed   CBC WITH AUTO DIFFERENTIAL - Abnormal; Notable for the following components:       Result Value    WBC 16.4 (*)     RBC 3.28 (*)     Hemoglobin 10.2 (*)     Hematocrit 32.3 (*)     MCH 31.1 (*)     MCHC 31.6 (*)     Segs Relative 83.5 (*)     Lymphocytes % 9.0 (*)     Monocytes % 6.2 (*)     Immature Neutrophil % 0.9 (*)     All other components within normal limits   COMPREHENSIVE METABOLIC PANEL - Abnormal; Notable for the following components:    CREATININE 1.3 (*)     Glucose 50 (*)     Calcium 8.1 (*)     Alb 3.3 (*)     Total Protein 5.8 (*)     Alkaline Phosphatase 201 (*)     GFR Non- 41 (*)     GFR  50 (*)     All other components within normal limits   BRAIN NATRIURETIC PEPTIDE - Abnormal; Notable for the following components:    Pro-BNP 2,655 (*)     All other components within normal limits   URINALYSIS - Abnormal; Notable for the following components:    Protein,  (*)     Leukocyte Esterase, Urine TRACE (*)     Mucus, UA RARE (*)     All other components within normal limits   COMPREHENSIVE METABOLIC PANEL W/ REFLEX TO MG FOR LOW K - Abnormal; Notable for the following components:    Potassium 5.4 (*)     CO2 18 (*)     Glucose 45 (*)     Calcium 8.0 (*)     Alb 3.0 (*)     Total Protein 5.6 (*)     AST 41 (*)     Alkaline Phosphatase 198 (*)     GFR Non- 50 (*)     All other components within normal limits   CBC WITH AUTO DIFFERENTIAL - Abnormal; Notable for the following components:    WBC 16.7 (*)     RBC 3.21 (*)     Hemoglobin 10.0 (*)     Hematocrit 31.7 (*)     MCH 31.2 (*)     MCHC 31.5 (*)     Segs Relative 77.9 (*)     Lymphocytes % 13.4 (*)     Monocytes % 6.1 (*)     Immature Neutrophil % 0.5 (*)     All other components within normal limits   POCT GLUCOSE - Abnormal; Notable for the following components:    POC Glucose 143 (*)     All other components within normal limits   POCT GLUCOSE - Abnormal; Notable for the following components:    POC Glucose 118 (*)     All other components within normal limits   POCT GLUCOSE - Abnormal; Notable for the following components:    POC Glucose 58 (*)     All other components within normal limits   POCT GLUCOSE - Abnormal; Notable for the following components:    POC Glucose 108 (*)     All other components within normal limits   POCT GLUCOSE - Abnormal; Notable for the following components:    POC Glucose 113 (*)     All other components within normal limits   TROPONIN   COVID-19   PROCALCITONIN   HEMOGLOBIN A1C   POCT GLUCOSE         IMAGING STUDIES ORDERED:  XR CHEST PORTABLE  XR SHOULDER RIGHT (MIN 2 VIEWS)  XR HUMERUS RIGHT (MIN 2 VIEWS)  XR ELBOW RIGHT (MIN 3 VIEWS)  XR RADIUS ULNA RIGHT (2 VIEWS)  XR HIP 1 VW W PELVIS RIGHT  CT HEAD WO CONTRAST  CT CERVICAL SPINE WO CONTRAST  XR KNEE RIGHT (MIN 4 VIEWS)  IP CONSULT TO CASE MANAGEMENT  IP CONSULT TO HOSPITALIST  IP CONSULT TO PRIMARY CARE PROVIDER  SUGAR TONG OCL SPLINT ARM  VITAL SIGNS  BEDREST WITH BATHROOM PRIVILEGES  TELEMETRY MONITORING  PATIENT STATUS (FROM ED OR OR/PROCEDURAL)  FULL CODE  DIET CARDIAC  REASON FOR NO MECHANICAL VTE PROPHYLAXIS  REASON FOR NO CHEMICAL VTE PROPHYLAXIS  PT EVAL AND TREAT  OT EVAL AND TREAT  HYPOGLYCEMIA TREATMENT: BG LESS THAN 50 MG/DL AND PATIENT ALERT  HYPOGLYCEMIA TREATMENT: BG LESS THAN 70 MG/DL AND PATIENT ALERT  HYPOGLYCEMIA TREATMENT: BG LESS THAN 70 MG/DL AND PATIENT NOT ALERT    I have personally viewed the imaging studies.  The radiologist interpretation is:   CT CERVICAL SPINE WO CONTRAST   Final Result   No acute intracranial abnormality. 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 No acute abnormality in the cervical spine. Mild pulmonary vascular congestion      Small airspace opacities at the left lung apex measuring up to 7 mm in size,   likely related to infection/inflammation. Follow-up is recommended to   exclude underlying lung nodule/lung mass. CT HEAD WO CONTRAST   Final Result   No acute intracranial abnormality. 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 No acute abnormality in the cervical spine. Mild pulmonary vascular congestion      Small airspace opacities at the left lung apex measuring up to 7 mm in size,   likely related to infection/inflammation. Follow-up is recommended to   exclude underlying lung nodule/lung mass. XR SHOULDER RIGHT (MIN 2 VIEWS)   Final Result   Nondisplaced fracture in the region of the greater tuberosity of the proximal   humerus         XR RADIUS ULNA RIGHT (2 VIEWS)   Final Result   No radiographic evidence of acute osseous abnormality of the right elbow and   forearm seen. XR KNEE RIGHT (MIN 4 VIEWS)   Preliminary Result   No acute osseous abnormality in the pelvis, right hip or right knee. Osteopenia. XR HUMERUS RIGHT (MIN 2 VIEWS)   Final Result   Nondisplaced fracture in the region of the greater tuberosity of the proximal   humerus         XR HIP 1 VW W PELVIS RIGHT   Preliminary Result   No acute osseous abnormality in the pelvis, right hip or right knee. Osteopenia. XR ELBOW RIGHT (MIN 3 VIEWS)   Final Result   No radiographic evidence of acute osseous abnormality of the right elbow and   forearm seen. XR CHEST PORTABLE   Final Result   No new acute cardiopulmonary findings.                MEDICATIONS ADMINISTERED:  Medications   piperacillin-tazobactam (ZOSYN) 4.5 g in dextrose 5 % 100 mL IVPB (mini-bag) (has no administration in time range)   atorvastatin (LIPITOR) tablet 40 mg (40 mg Oral Given 12/11/20 2116)   furosemide (LASIX) tablet 40 mg (40 mg Oral Given 12/12/20 0924)   levothyroxine (SYNTHROID) tablet 50 mcg (50 mcg Oral Given 12/12/20 0924)   lisinopril (PRINIVIL;ZESTRIL) tablet 5 mg (5 mg Oral Given 12/12/20 0924)   metoprolol succinate (TOPROL XL) extended release tablet 100 mg (100 mg Oral Given 12/12/20 0923)   ipratropium-albuterol (DUONEB) nebulizer solution 1 ampule (has no administration in time range)   pantoprazole (PROTONIX) tablet 40 mg (40 mg Oral Given 12/12/20 0924)   sodium chloride flush 0.9 % injection 10 mL (10 mLs Intravenous Given 12/12/20 0925)   sodium chloride flush 0.9 % injection 10 mL (has no administration in time range)   acetaminophen (TYLENOL) tablet 650 mg (has no administration in time range)     Or   acetaminophen (TYLENOL) suppository 650 mg (has no administration in time range)   polyethylene glycol (GLYCOLAX) packet 17 g (has no administration in time range)   promethazine (PHENERGAN) tablet 12.5 mg (has no administration in time range)     Or   ondansetron (ZOFRAN) injection 4 mg (has no administration in time range)   apixaban (ELIQUIS) tablet 5 mg (5 mg Oral Given 12/12/20 0924)   aspirin EC tablet 81 mg (81 mg Oral Given 12/12/20 0924)   glucose (GLUTOSE) 40 % oral gel 15 g (has no administration in time range)   dextrose 50 % IV solution (has no administration in time range)   glucagon (rDNA) injection 1 mg (has no administration in time range)   dextrose 5 % solution (has no administration in time range)   insulin lispro (HUMALOG) injection vial 0-12 Units (0 Units Subcutaneous Not Given 12/12/20 0925)   insulin lispro (HUMALOG) injection vial 0-6 Units (has no administration in time range)   HYDROcodone-acetaminophen (NORCO) 5-325 MG per tablet 1 tablet (1 tablet Oral Given 12/12/20 0400)   HYDROcodone-acetaminophen (NORCO) 5-325 MG per tablet 1 tablet (0 tablets Oral Held 12/11/20 1209)   ketorolac (TORADOL) injection 15 mg (15 mg Intramuscular Given 12/11/20 1222)   vancomycin (VANCOCIN) 1,500 mg in dextrose 5 % 500 mL IVPB (0 mg Intravenous Stopped 12/11/20 1714)   morphine sulfate (PF) injection 4 mg (4 mg Intravenous Given 12/11/20 1438)   ondansetron (ZOFRAN) injection 4 mg (4 mg Intravenous Given 12/11/20 1437)         PLAN/ED COURSE:  Last Vitals: BP (!) 204/88   Pulse 79   Temp 100.5 °F (38.1 °C) (Oral)   Resp 18   Ht 5' 1\" (1.549 m)   Wt 205 lb 12.8 oz (93.4 kg)   SpO2 (!) 88%   BMI 38.89 kg/m²     60year-old female generalized weakness and lightheadedness and unsteadiness likely secondary to pneumonia. Has been started on IV antibiotics. Is noted to be somewhat hypoxic and has been started on oxygen. Additional workup and treatment in the ED as documented above and in the physician assistants note. Pt will be admitted for further evaluation and treatment. Plan discussed with pt and/or family who expressed understanding and agreement with plan. Admitted in stable condition. Clinical Impression:  1. Pneumonia due to organism    2. Fall, initial encounter    3. Other closed nondisplaced fracture of proximal end of right humerus, initial encounter        Disposition referral (if applicable):  No follow-up provider specified.     Disposition medications (if applicable):  Current Discharge Medication List          ED Provider Disposition Time  DISPOSITION Admitted 12/11/2020 03:42:51 PM            Electronically signed by Nba Subramanian MD on 12/12/2020 at 9:36 AM         Nba Subramanian MD  12/12/20 [id number]

## 2020-12-11 NOTE — PROGRESS NOTES
Medication History  [facility]    Patient Name: Maia Smoker 1955     Medication history has been completed by: Antoinette Murillo CPhT    Source(s) of information: patient's daughter retail  pharmacy     Primary Care Physician: Jason Moore PA-C     Pharmacy: [facility]    Allergies as of 12/11/2020 - Review Complete 11/28/2020   Allergen Reaction Noted    Amiodarone Other (See Comments) 08/18/2017    Iv dye [iodides] Nausea And Vomiting 01/30/2012    Vicodin [hydrocodone-acetaminophen] Nausea And Vomiting 04/22/2016        Prior to Admission medications    Medication Sig Start Date End Date Taking?  Authorizing Provider   lisinopril (PRINIVIL;ZESTRIL) 5 MG tablet Take 1 tablet by mouth daily 12/1/20  Yes Laurita Cuello PA-C   metoprolol succinate (TOPROL XL) 100 MG extended release tablet Take 1 tablet by mouth daily TAKE ONE TABLET BY MOUTH DAILY 11/4/20  Yes Mariposa Murray MD   furosemide (LASIX) 40 MG tablet Take 1 tablet by mouth 2 times daily 6/13/20  Yes Laurita Cuello PA-C   cloNIDine (CATAPRES) 0.3 MG tablet Take 0.3 mg by mouth nightly   Yes Historical Provider, MD Pascual TILLMANIKPEN 100 UNIT/ML injection pen Inject 65-70 Units into the skin nightly  9/3/19  Yes Historical Provider, MD   ADMELOG SOLOSTAR 100 UNIT/ML pen Inject into the skin See Admin Instructions 7/2/19  Yes Historical Provider, MD   atorvastatin (LIPITOR) 40 MG tablet Take 40 mg by mouth nightly 8/27/19  Yes Historical Provider, MD   metFORMIN (GLUCOPHAGE) 500 MG tablet Take 500 mg by mouth 2 times daily 8/28/19  Yes Historical Provider, MD   pantoprazole (PROTONIX) 40 MG tablet Take 40 mg by mouth daily 9/6/19  Yes Historical Provider, MD   levothyroxine (SYNTHROID) 50 MCG tablet Take 50 mcg by mouth Daily   Yes Historical Provider, MD   apixaban (ELIQUIS) 5 MG TABS tablet Take 1 tablet by mouth 2 times daily 8/8/17  Yes Jhonny Almonte MD   nitroGLYCERIN (NITROSTAT) 0.4 MG SL tablet up

## 2020-12-11 NOTE — ED NOTES
Report called to charge nurse Bravo Houser, all questions answered      Bradford Muñoz RN  12/11/20 1924

## 2020-12-11 NOTE — ED PROVIDER NOTES
EMERGENCY DEPARTMENT ENCOUNTER      PCP: Itzel Desai PA-C    CHIEF COMPLAINT    Chief Complaint   Patient presents with    Fall     rt sided pain, didnt hit head       I have seen and evaluated this patient with Dr. Skyler Rico is a 60 y.o. female history of paroxysmal A. fib, cerebral aneurysm, multiple falls who presents with fall. Onset was prior to arrival. The reason why the patient fell (context) was she woke up and \"was not feeling right, she sat in bed for about 20 minutes before trying to go to the bathroom. She said she felt wobbly and unsteady on the way to the bathroom and fell before she got there. She fell onto her right side and has right shoulder and neck pain, right knee pain. Difficulty moving right arm. Patient denies dizziness or syncope. She is on Eliquis but did not hit her head or lose consciousness. She denies any confusion or memory loss, sensorimotor deficit of the extremities. Patient also denies chest pain, shortness of breath, nausea, vomiting or diarrhea. She recently had a heart catheterization and was seen by a neurologist [place] who diagnosed her with multiple cerebral aneurysms. REVIEW OF SYSTEMS    General: No Fever  ENT:  No visual changes. No headache. Cardiac: No Chest Pain, denies syncope  Respiratory: No cough or difficulty breathing  GI: No vomiting. No Bloody Stool or Diarrhea  : No Dysuria or Hematuria  MSKTL:  See HPI.   Denies back pain  Skin:  Denies rash  Neurologic: Feeling wobbly, denies headache, focal weakness or sensory changes   Endocrine:  Denies polyuria or polydypsia   Lymphatic:  Denies swollen glands   See HPI and nursing notes for additional information       PAST MEDICAL & SURGICAL HISTORY    Past Medical History:   Diagnosis Date    Abnormal PFTs (pulmonary function tests) 4/14 4/14    Atrial fibrillation ([facility] [address]     CAD (coronary artery disease)     CHF (congestive heart failure) ([facility] [address]       SECTION      CHOLECYSTECTOMY      DIAGNOSTIC CARDIAC CATH LAB PROCEDURE  2006, 2009 moderate stenosis small diagonal    OTHER SURGICAL HISTORY  2017    afib ablation [place]    PTCA  10/06/2010    10/06/2010 stent 3.0x24 taxus stent 90% stenosis mid segment reduced to 0% second area of stenosis 30-40% not flow limiting EF 40%    TUBAL LIGATION         CURRENT MEDICATIONS    Current Outpatient Rx   Medication Sig Dispense Refill    HYDROcodone-acetaminophen (NORCO) 5-325 MG per tablet Take 1 tablet by mouth every 4 hours as needed for Pain for up to 3 days. 10 tablet 0    cefdinir (OMNICEF) 300 MG capsule Take 1 capsule by mouth 2 times daily for 5 days 10 capsule 0    doxycycline hyclate (VIBRA-TABS) 100 MG tablet Take 1 tablet by mouth 2 times daily for 7 days 28 tablet 0    lisinopril (PRINIVIL;ZESTRIL) 5 MG tablet Take 1 tablet by mouth daily 30 tablet 3    metoprolol succinate (TOPROL XL) 100 MG extended release tablet Take 1 tablet by mouth daily TAKE ONE TABLET BY MOUTH DAILY 30 tablet 3    furosemide (LASIX) 40 MG tablet Take 1 tablet by mouth 2 times daily 60 tablet 3    cloNIDine (CATAPRES) 0.3 MG tablet Take 0.3 mg by mouth nightly      BASAGLAR KWIKPEN 100 UNIT/ML injection pen Inject 65-70 Units into the skin nightly       ADMELOG SOLOSTAR 100 UNIT/ML pen Inject into the skin See Admin Instructions      atorvastatin (LIPITOR) 40 MG tablet Take 40 mg by mouth nightly      metFORMIN (GLUCOPHAGE) 500 MG tablet Take 500 mg by mouth 2 times daily      pantoprazole (PROTONIX) 40 MG tablet Take 40 mg by mouth daily      levothyroxine (SYNTHROID) 50 MCG tablet Take 50 mcg by mouth Daily      apixaban (ELIQUIS) 5 MG TABS tablet Take 1 tablet by mouth 2 times daily 60 tablet 2    nitroGLYCERIN (NITROSTAT) 0.4 MG SL tablet up to max of 3 total doses.  If no relief after 1 dose, call 911. 25 tablet 3    albuterol sulfate HFA (VENTOLIN HFA) 108 (90 Base) MCG/ACT inhaler Inhale 2 puffs into the lungs 4 times daily as needed for Wheezing 1 Inhaler 5    aspirin 81 MG EC tablet Take 81 mg by mouth daily         ALLERGIES    Allergies   Allergen Reactions    Amiodarone Other (See Comments)     dizziness    Iv Dye [Iodides] Nausea And Vomiting    Vicodin [Hydrocodone-Acetaminophen] Nausea And Vomiting       SOCIAL & FAMILY HISTORY    Social History     Socioeconomic History    Marital status:      Spouse name: None    Number of children: None    Years of education: None    Highest education level: None   Occupational History    None   Social Needs    Financial resource strain: None    Food insecurity     Worry: None     Inability: None    Transportation needs     Medical: None     Non-medical: None   Tobacco Use    Smoking status: Former Smoker     Packs/day: 3.00     Years: 20.00     Pack years: 60.00     Types: Cigarettes     Quit date: 2007     Years since quittin.8    Smokeless tobacco: Never Used    Tobacco comment: reviewed 8/12/15   Substance and Sexual Activity    Alcohol use: No     Comment: occas. caffeine 3 mtn.  dew a day    Drug use: No    Sexual activity: Not Currently   Lifestyle    Physical activity     Days per week: None     Minutes per session: None    Stress: None   Relationships    Social connections     Talks on phone: None     Gets together: None     Attends [employer] service: None     Active member of club or organization: None     Attends meetings of clubs or organizations: None     Relationship status: None    Intimate partner violence     Fear of current or ex partner: None     Emotionally abused: None     Physically abused: None     Forced sexual activity: None   Other Topics Concern    None   Social History Narrative    None     Family History   Problem Relation Age of Onset    Diabetes Father     Heart Disease Father        PHYSICAL EXAM    VITAL SIGNS: BP (!) 186/86   Pulse 82   Temp 98.6 °F (37 °C) (Oral)   Resp 20   Ht 5' 1\" (1.549 m)   Wt 206 lb 4.8 oz (93.6 kg)   SpO2 97%   BMI 38.98 kg/m²    Constitutional:  Well developed, obese, no acute distress   Eyes: EOMI. PERRL, sclera nonicteric. Anterior chambers clear. Funduscopic exam without any gross abnormality or hemorrhages. HENT:  Atraumatic, no trismus. Ears canals and TMs free of blood or clear fluid. Nasal passages and oropharynx free of blood or clear fluid. No circumferential periorbital ecchymosis or mastoid ecchymosis noted. Neck/Lymphatics: supple, no JVD, no swollen nodes. No posterior neck tenderness. Range of motion with obvious pain no deficit. Respiratory:  Lungs Clear, no retractions   Cardiovascular:   normal rate, no murmurs  GI:  Soft, nontender, normal bowel sounds  Musculoskeletal: Right neck, trapezius area and right shoulder diffusely tender to palpation. Patient has very limited range of motion of the right shoulder. There is also limited range of motion of the elbow. Bandage on right wrist from recent catheterization. Right knee is tender to palpation with obvious abrasion above the patella. Right hip and pelvis nontender to palpation, chest wall nontender. Integument:  Well hydrated, no petechiae     Neurologic:    - Alert & oriented person, place, time, and situation, no speech difficulties or slurring.  - No obvious gross motor deficits  - Cranial nerves 2-12 grossly intact  - Negative meningeal signs.  - Sensation intact to light touch  - Strength 5/5 in left upper and lower extremities bilaterally  - Normal finger to nose test left side  - Rapid alternating movements intact left side  - No pronator drift on left  - Light touch sensation intact throughout.     Psych: Pleasant affect, no hallucinations      Results for orders placed or performed during the hospital encounter of 12/11/20   CBC Auto Differential   Result Value Ref Range    WBC 16.4 (H) 4.0 - 10.5 K/CU MM    RBC 3.28 (L) 4.2 - 5.4 M/CU MM Hemoglobin 10.2 (L) 12.5 - 16.0 GM/DL    Hematocrit 32.3 (L) 37 - 47 %    MCV 98.5 78 - 100 FL    MCH 31.1 (H) 27 - 31 PG    MCHC 31.6 (L) 32.0 - 36.0 %    RDW 13.0 11.7 - 14.9 %    Platelets 137 725 - 768 K/CU MM    MPV 9.0 7.5 - 11.1 FL    Differential Type AUTOMATED DIFFERENTIAL     Segs Relative 83.5 (H) 36 - 66 %    Lymphocytes % 9.0 (L) 24 - 44 %    Monocytes % 6.2 (H) 0 - 4 %    Eosinophils % 0.1 0 - 3 %    Basophils % 0.3 0 - 1 %    Segs Absolute 13.7 K/CU MM    Lymphocytes Absolute 1.5 K/CU MM    Monocytes Absolute 1.0 K/CU MM    Eosinophils Absolute 0.0 K/CU MM    Basophils Absolute 0.1 K/CU MM    Nucleated RBC % 0.0 %    Total Nucleated RBC 0.0 K/CU MM    Total Immature Neutrophil 0.14 K/CU MM    Immature Neutrophil % 0.9 (H) 0 - 0.43 %   Comprehensive Metabolic Panel   Result Value Ref Range    Sodium 138 135 - 145 MMOL/L    Potassium 4.7 3.5 - 5.1 MMOL/L    Chloride 103 99 - 110 mMol/L    CO2 25 21 - 32 MMOL/L    BUN 21 6 - 23 MG/DL    CREATININE 1.3 (H) 0.6 - 1.1 MG/DL    Glucose 50 (L) 70 - 99 MG/DL    Calcium 8.1 (L) 8.3 - 10.6 MG/DL    Alb 3.3 (L) 3.4 - 5.0 GM/DL    Total Protein 5.8 (L) 6.4 - 8.2 GM/DL    Total Bilirubin 0.3 0.0 - 1.0 MG/DL    ALT 19 10 - 40 U/L    AST 22 15 - 37 IU/L    Alkaline Phosphatase 201 (H) 40 - 129 IU/L    GFR Non- 41 (L) >60 mL/min/1.73m2    GFR  50 (L) >60 mL/min/1.73m2    Anion Gap 10 4 - 16   Troponin   Result Value Ref Range    Troponin T <0.010 <0.01 NG/ML   Brain Natriuretic Peptide   Result Value Ref Range    Pro-BNP 2,655 (H) <300 PG/ML   Urinalysis   Result Value Ref Range    Color, UA YELLOW YELLOW    Clarity, UA CLEAR CLEAR    Glucose, Urine NEGATIVE NEGATIVE MG/DL    Bilirubin Urine NEGATIVE NEGATIVE MG/DL    Ketones, Urine NEGATIVE NEGATIVE MG/DL    Specific Gravity, UA 1.021 1.001 - 1.035    Blood, Urine NEGATIVE NEGATIVE    pH, Urine 5.0 5.0 - 8.0    Protein,  (A) NEGATIVE MG/DL    Urobilinogen, Urine NORMAL 0.2 - 1.0 MG/DL    Nitrite Urine, Quantitative NEGATIVE NEGATIVE    Leukocyte Esterase, Urine TRACE (A) NEGATIVE    RBC, UA 2 0 - 6 /HPF    WBC, UA 4 0 - 5 /HPF    Bacteria, UA NEGATIVE NEGATIVE /HPF    Squam Epithel, UA 1 /HPF    Trans Epithel, UA <1 /HPF    Mucus, UA RARE (A) NEGATIVE HPF    Trichomonas, UA NONE SEEN NONE SEEN /HPF    Granular Casts, UA 1 /LPF   COVID-19    Specimen: Nasopharynx/Oropharynx   Result Value Ref Range    Source UNKNOWN     SARS-CoV-2, NAAT NOT DETECTED    Comprehensive Metabolic Panel w/ Reflex to MG   Result Value Ref Range    Sodium 137 135 - 145 MMOL/L    Potassium 5.4 (H) 3.5 - 5.1 MMOL/L    Chloride 104 99 - 110 mMol/L    CO2 18 (L) 21 - 32 MMOL/L    BUN 19 6 - 23 MG/DL    CREATININE 1.1 0.6 - 1.1 MG/DL    Glucose 45 (LL) 70 - 99 MG/DL    Calcium 8.0 (L) 8.3 - 10.6 MG/DL    Alb 3.0 (L) 3.4 - 5.0 GM/DL    Total Protein 5.6 (L) 6.4 - 8.2 GM/DL    Total Bilirubin 0.5 0.0 - 1.0 MG/DL    ALT 21 10 - 40 U/L    AST 41 (H) 15 - 37 IU/L    Alkaline Phosphatase 198 (H) 40 - 128 IU/L    GFR Non- 50 (L) >60 mL/min/1.73m2    GFR African American >60 >60 mL/min/1.73m2    Anion Gap 15 4 - 16   CBC auto differential   Result Value Ref Range    WBC 16.7 (H) 4.0 - 10.5 K/CU MM    RBC 3.21 (L) 4.2 - 5.4 M/CU MM    Hemoglobin 10.0 (L) 12.5 - 16.0 GM/DL    Hematocrit 31.7 (L) 37 - 47 %    MCV 98.8 78 - 100 FL    MCH 31.2 (H) 27 - 31 PG    MCHC 31.5 (L) 32.0 - 36.0 %    RDW 13.0 11.7 - 14.9 %    Platelets 222 332 - 614 K/CU MM    MPV 9.4 7.5 - 11.1 FL    Differential Type AUTOMATED DIFFERENTIAL     Segs Relative 77.9 (H) 36 - 66 %    Lymphocytes % 13.4 (L) 24 - 44 %    Monocytes % 6.1 (H) 0 - 4 %    Eosinophils % 1.8 0 - 3 %    Basophils % 0.3 0 - 1 %    Segs Absolute 13.0 K/CU MM    Lymphocytes Absolute 2.2 K/CU MM    Monocytes Absolute 1.0 K/CU MM    Eosinophils Absolute 0.3 K/CU MM    Basophils Absolute 0.1 K/CU MM    Nucleated RBC % 0.0 %    Total Nucleated RBC 0.0 K/CU MM    Total Immature Neutrophil 0.08 K/CU MM    Immature Neutrophil % 0.5 (H) 0 - 0.43 %   Procalcitonin   Result Value Ref Range    Procalcitonin 9.028    Basic Metabolic Panel w/ Reflex to MG   Result Value Ref Range    Sodium 130 (L) 135 - 145 MMOL/L    Potassium 4.1 3.5 - 5.1 MMOL/L    Chloride 94 (L) 99 - 110 mMol/L    CO2 24 21 - 32 MMOL/L    Anion Gap 12 4 - 16    BUN 15 6 - 23 MG/DL    CREATININE 1.1 0.6 - 1.1 MG/DL    Glucose 129 (H) 70 - 99 MG/DL    Calcium 8.4 8.3 - 10.6 MG/DL    GFR Non-African American 50 (L) >60 mL/min/1.73m2    GFR African American >60 >60 mL/min/1.73m2   Magnesium   Result Value Ref Range    Magnesium 2.1 1.8 - 2.4 mg/dl   Vitamin D 25 hydroxy   Result Value Ref Range    Vit D, 25-Hydroxy 7.69 (L) >20 NG/ML   Hemoglobin A1c   Result Value Ref Range    Hemoglobin A1C 7.4 (H) 4.2 - 6.3 %    eAG 166 mg/dL   CBC auto differential   Result Value Ref Range    WBC 14.7 (H) 4.0 - 10.5 K/CU MM    RBC 3.36 (L) 4.2 - 5.4 M/CU MM    Hemoglobin 10.3 (L) 12.5 - 16.0 GM/DL    Hematocrit 32.9 (L) 37 - 47 %    MCV 97.9 78 - 100 FL    MCH 30.7 27 - 31 PG    MCHC 31.3 (L) 32.0 - 36.0 %    RDW 12.7 11.7 - 14.9 %    Platelets 825 644 - 251 K/CU MM    MPV 9.7 7.5 - 11.1 FL    Differential Type AUTOMATED DIFFERENTIAL     Segs Relative 82.0 (H) 36 - 66 %    Lymphocytes % 9.2 (L) 24 - 44 %    Monocytes % 6.6 (H) 0 - 4 %    Eosinophils % 1.1 0 - 3 %    Basophils % 0.3 0 - 1 %    Segs Absolute 12.1 K/CU MM    Lymphocytes Absolute 1.4 K/CU MM    Monocytes Absolute 1.0 K/CU MM    Eosinophils Absolute 0.2 K/CU MM    Basophils Absolute 0.0 K/CU MM    Nucleated RBC % 0.0 %    Total Nucleated RBC 0.0 K/CU MM    Total Immature Neutrophil 0.12 K/CU MM    Immature Neutrophil % 0.8 (H) 0 - 0.43 %   Basic Metabolic Panel w/ Reflex to MG   Result Value Ref Range    Sodium 134 (L) 135 - 145 MMOL/L    Potassium 4.0 3.5 - 5.1 MMOL/L    Chloride 99 99 - 110 mMol/L    CO2 24 21 - 32 MMOL/L    Anion Gap 11 4 - 16    BUN 18 6 - 23 MG/DL CREATININE 1.2 (H) 0.6 - 1.1 MG/DL    Glucose 229 (H) 70 - 99 MG/DL    Calcium 8.2 (L) 8.3 - 10.6 MG/DL    GFR Non- 45 (L) >60 mL/min/1.73m2    GFR  55 (L) >60 mL/min/1.73m2   POCT Glucose   Result Value Ref Range    POC Glucose 143 (H) 70 - 99 MG/DL   POCT Glucose   Result Value Ref Range    POC Glucose 118 (H) 70 - 99 MG/DL   POCT Glucose   Result Value Ref Range    POC Glucose 58 (L) 70 - 99 MG/DL   POCT Glucose   Result Value Ref Range    POC Glucose 108 (H) 70 - 99 MG/DL   POCT Glucose   Result Value Ref Range    POC Glucose 113 (H) 70 - 99 MG/DL   POCT Glucose   Result Value Ref Range    POC Glucose 142 (H) 70 - 99 MG/DL   POCT Glucose   Result Value Ref Range    POC Glucose 186 (H) 70 - 99 MG/DL   POCT Glucose   Result Value Ref Range    POC Glucose 196 (H) 70 - 99 MG/DL   POCT Glucose   Result Value Ref Range    POC Glucose 221 (H) 70 - 99 MG/DL   POCT Glucose   Result Value Ref Range    POC Glucose 297 (H) 70 - 99 MG/DL   EKG 12 Lead   Result Value Ref Range    Ventricular Rate 73 BPM    Atrial Rate 73 BPM    P-R Interval 182 ms    QRS Duration 140 ms    Q-T Interval 440 ms    QTc Calculation (Bazett) 484 ms    P Axis 40 degrees    R Axis -11 degrees    T Axis 137 degrees    Diagnosis       Sinus rhythm with occasional premature ventricular complexes  Nonspecific intraventricular block  Cannot rule out Septal infarct , age undetermined  T wave abnormality, consider lateral ischemia  Abnormal ECG  When compared with ECG of 27-NOV-2020 21:25,  premature ventricular complexes are now present  premature supraventricular complexes are no longer present  Confirmed by SANDEEP Perez [id number]) on 12/11/2020 5:40:00 PM     EKG 12 Lead   Result Value Ref Range    Ventricular Rate 78 BPM    Atrial Rate 78 BPM    P-R Interval 194 ms    QRS Duration 138 ms    Q-T Interval 426 ms    QTc Calculation (Bazett) 485 ms    P Axis 34 degrees    R Axis -12 degrees    T Axis 138 degrees Diagnosis       Normal sinus rhythm  Left bundle branch block  Abnormal ECG  When compared with ECG of 11-DEC-2020 11:41,  premature ventricular complexes are no longer present  Confirmed by Ariane Alcazar MD, Tony Echeverria ([id number]) on 12/13/2020 2:08:29 PM         CT CERVICAL SPINE WO CONTRAST   Final Result   No acute intracranial abnormality. 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 No acute abnormality in the cervical spine. Mild pulmonary vascular congestion      Small airspace opacities at the left lung apex measuring up to 7 mm in size,   likely related to infection/inflammation. Follow-up is recommended to   exclude underlying lung nodule/lung mass. CT HEAD WO CONTRAST   Final Result   No acute intracranial abnormality. 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 No acute abnormality in the cervical spine. Mild pulmonary vascular congestion      Small airspace opacities at the left lung apex measuring up to 7 mm in size,   likely related to infection/inflammation. Follow-up is recommended to   exclude underlying lung nodule/lung mass. XR SHOULDER RIGHT (MIN 2 VIEWS)   Final Result   Nondisplaced fracture in the region of the greater tuberosity of the proximal   humerus         XR RADIUS ULNA RIGHT (2 VIEWS)   Final Result   No radiographic evidence of acute osseous abnormality of the right elbow and   forearm seen. XR KNEE RIGHT (MIN 4 VIEWS)   Final Result   No acute osseous abnormality in the pelvis, right hip or right knee. Osteopenia. XR HUMERUS RIGHT (MIN 2 VIEWS)   Final Result   Nondisplaced fracture in the region of the greater tuberosity of the proximal   humerus         XR HIP 1 VW W PELVIS RIGHT   Final Result   No acute osseous abnormality in the pelvis, right hip or right knee. Osteopenia.          XR ELBOW RIGHT (MIN 3 VIEWS)   Final Result   No radiographic evidence of acute osseous abnormality of the right elbow and forearm seen. XR CHEST PORTABLE   Final Result   No new acute cardiopulmonary findings. ED COURSE & MEDICAL DECISION MAKING        Patient presents as above following a fall at home. She says she has been having multiple falls recently. She is on Eliquis but denies hitting her head or losing consciousness. Recently was diagnosed with multiple cerebral aneurysms. No neurological complaints at this time is suffering from right shoulder pain and right knee pain. Routine lab work revealed white blood cell count of 16.4, hemoglobin 10.2. Creatinine elevated at 1.3, BUN normal at 21. Patient does have elevated alk phos and chronic kidney disease with GFR of 41. BNP is elevated at 2600 but troponin is negative, urine nonconcerning for infection. EKG does not show ischemia, see attending note for full interpretation     CT of the cervical spine shows no acute intracranial abnormality, old lacunar infarcts in the bilateral thalami, no acute abnormality cervical spine mild pulmonary vascular congestion. Small airspace opacity left lung apex measuring up to 7 mm in size likely related to infection inflammation. Nondisplaced fracture in the region of the greater tuberosity proximal humerus on x-ray of the right shoulder. No radiographic evidence of acute osseous abnormality right elbow and forearm. Normal x-ray of the hip and right pelvis. Patient placed in a coaptation splint. Case management consulted. Discussed admission with the patient due to falls and pneumonia. She is amenable to this plan discussed case and presentation with her primary care provider. Who agrees to admit the patient for IV antibiotics and further evaluation. All pertinent Lab data and radiographic results reviewed with patient at bedside. The patient and/or the family were informed of the results of any tests/labs/imaging, the treatment plan, and time was allotted to answer questions.        Clinical IMPRESSION    1. Pneumonia due to organism    2. Fall, initial encounter    3. Other closed nondisplaced fracture of proximal end of right humerus, initial encounter          Comment: Please note this report has been produced using speech recognition software and may contain errors related to that system including errors in grammar, punctuation, and spelling, as well as words and phrases that may be inappropriate. If there are any questions or concerns please feel free to contact the dictating provider for clarification.        [place], [place]  12/15/20 [id number]

## 2020-12-11 NOTE — H&P
History & Physical        Reason for admission: recent falls, leukocytosis    History Obtained From:  patient    HISTORY OF PRESENT ILLNESS:    The patient is a 60 y.o. female who presents with recent fall. She has history of CHF, CAD, atrial fibrillation, hypertension, hyperlipidemia, DM 2, hypothyroidism, anxiety. Patient follows with [facility] neurology and had recent CTA of head on 11/9/2020 revealing two cerebral aneurysms (see report below). She states she woke up this morning and felt numbness in her chin. She got up from bed and fell in her house on her right side of body. She is not sure why she fell. She denies any dizziness, lightheadedness, tunnel vision, LOC, chest pain, palpitations, acute changes in vision, neuropathy. She reports for the past several weeks she has had a few more falls. She has trouble moving her right arm. She is was hospitalized here recently for pneumonia, she still requires supplemental O2 as needed. In the ER, x-ray of right humerus shows nondisplaced fracture in the region of the greater tuberosity of the proximal humerus. X-ray of the right radius/ulna, right elbow, right hip/pelvis, right knee, right shoulder showed no acute abnormality. CXR is negative. CT of head and cervical spine without contrast is negative for acute issues. CBC shows leukocytosis of 16.4, hemoglobin 10.2, platelets 615. BNP is elevated but improved from recent hospital admission at 2655. Troponin is negative. EKG is nonconcerning. Covid is negative. BUN/creatinine 21/1.3. The patient will be admitted for further management of leukocytosis and recurrent falls.       Past Medical History:        Diagnosis Date    Abnormal PFTs (pulmonary function tests) 4/14 4/14    Atrial fibrillation (HCC)     CAD (coronary artery disease)     CHF (congestive heart failure) (HCC)     Diabetes mellitus (HCC)     Dizziness - light-headed     Family history of coronary artery disease     H/O 24 hour EKG monitoring 2012,12/10/2010    2012 predominant rhythm is sinus with short runs of SVT no episode of atrial fib. pt has left bundle branch morphology, max heart is 132, min is 48 with an average of 74 ,infrequent PVCs are seen    H/O cardiovascular stress test 2013, 2012-    H/O cardiovascular stress test 14,2012 EF 52% No ischemia, normal study. 2012 EF 58% tehnique-technetium LVEF is normal globally    H/O echocardiogram 13 EF 50-55% mild MR, TR 3/13EF 35-45% decreasedr L ventrical systolic function.  H/O echocardiogram -EF50-55% Mild MR/TR. 10/11 EF > 55% LVS function is normal, calcified mitral apparatus, impaired LV relaxation, no PE    Heart imaging 2012 MUGA rest EF 58%LVEF is normal globally    Heartburn     History of cardiac cath 2009, 2006,moderate stenosis  small diagonal    History of cardiac monitoring 2/9/15    Event - sinus rhythm    History of cardioversion 3/14/14    History of MI (myocardial infarction)     History of nuclear MUGA 2020    EF 41%    History of nuclear Muga stress test 2020    EF 41%    History of PTCA 10/06/2010    10/06/2010 stent 3.0x24 taxus stent left main 90% stenosis mid seg reduced to 0% second area of [id number] to 40% not flow limiting EF 40%    Hx of cardiovascular stress test 2015    lexiscan-normal,EF70%    Hx of transesophageal echocardiography ([facility]) for monitoring 2017    Noted to have MARY GRACE Blood Clot.  Hyperlipidemia     Hypertension     Insomnia     SOB (shortness of breath)     Tachycardia 2012    report in [facility] consult    TIA (transient ischemic attack)        Past Surgical History:        Procedure Laterality Date    ABLATION OF DYSRHYTHMIC FOCUS       SECTION      CHOLECYSTECTOMY      DIAGNOSTIC CARDIAC CATH LAB PROCEDURE  2006, 2009 auscultation bilaterally  Heart: S1, S2 normal  Abdomen:soft, non-tender; non-distended normal bowel sounds no masses, no organomegaly  Extremities:Pedal edema Trace   Skin: No rashes or lesions  Neurologic: Alert and oriented X 3. No facial drooping. No slurred speech. Left finger-nose intact. Mental status: Alert, oriented, thought content appropriate  Cranial nerves:II-XII Grossly intact  Sensory: normal  Motor:grossly normal    DATA:  EKG:  I have reviewed EKG with the following interpretation:\"  Sinus rhythm with occasional premature ventricular complexes   Nonspecific intraventricular block   Cannot rule out Septal infarct , age undetermined   T wave abnormality, consider lateral ischemia   Abnormal ECG   When compared with ECG of 27-NOV-2020 21:25,   premature ventricular complexes are now present   premature supraventricular complexes are no longer present    Imaging:    CTA Brain 11/9/20  1. No acute intracranial abnormality.  Parenchymal volume loss and sequela of  chronic microvascular ischemic changes as well as old lacunar infarctions. 2. Rim calcified superiorly projecting left supraclinoid ICA aneurysm measures 9 x 7 x 7 mm. 3. Inferiorly projecting right MCA bifurcation aneurysm measures 4.5 x 4.5 x 4.5 mm.  4. Mild stenosis in the cavernous segments of the internal carotid arteries. No additional hemodynamically significant stenosis or branch occlusion in the  intracranial arterial circulation. CT CERVICAL SPINE WO CONTRAST 12/11/2020  Final Result  No acute intracranial abnormality.     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     No acute abnormality in the cervical spine.     Mild pulmonary vascular congestion     Small airspace opacities at the left lung apex measuring up to 7 mm in size,  likely related to infection/inflammation.   Follow-up is recommended to  exclude underlying lung nodule/lung mass.        CT HEAD WO CONTRAST 12/11/2020  Final Result  No acute intracranial abnormality.     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     No acute abnormality in the cervical spine.     Mild pulmonary vascular congestion     Small airspace opacities at the left lung apex measuring up to 7 mm in size,  likely related to infection/inflammation. Follow-up is recommended to  exclude underlying lung nodule/lung mass.        XR SHOULDER RIGHT (MIN 2 VIEWS) 12/11/2020  Final Result  Nondisplaced fracture in the region of the greater tuberosity of the proximal  humerus        XR RADIUS ULNA RIGHT (2 VIEWS) 12/11/2020  Final Result  No radiographic evidence of acute osseous abnormality of the right elbow and  forearm seen.        XR KNEE RIGHT (MIN 4 VIEWS) 12/11/2020  Preliminary Result  No acute osseous abnormality in the pelvis, right hip or right knee. Osteopenia.        XR HUMERUS RIGHT (MIN 2 VIEWS) 12/11/2020  Final Result  Nondisplaced fracture in the region of the greater tuberosity of the proximal  humerus        XR HIP 1 VW W PELVIS RIGHT  12/11/2020  Preliminary Result  No acute osseous abnormality in the pelvis, right hip or right knee. Osteopenia.        XR ELBOW RIGHT (MIN 3 VIEWS) 12/11/2020  Final Result  No radiographic evidence of acute osseous abnormality of the right elbow and  forearm seen.        XR CHEST PORTABLE 12/11/2020  Final Result  No new acute cardiopulmonary findings.         CBC with Differential:    Lab Results   Component Value Date    WBC 16.4 12/11/2020    RBC 3.28 12/11/2020    HGB 10.2 12/11/2020    HCT 32.3 12/11/2020     12/11/2020    MCV 98.5 12/11/2020    SEGSPCT 83.5 12/11/2020    BANDSPCT 2 08/11/2018    LYMPHOPCT 9.0 12/11/2020    EOSPCT 1.3 05/07/2012    DIFFTYPE AUTOMATED DIFFERENTIAL 12/11/2020     BMP:    Lab Results   Component Value Date     12/11/2020    K 4.7 12/11/2020     12/11/2020    CO2 25 12/11/2020    BUN 21 12/11/2020    CREATININE 1.3 12/11/2020    CALCIUM 8.1 12/11/2020    GFRAA 50 12/11/2020    LABGLOM 41 12/11/2020    GLUCOSE 50 12/11/2020     PT/INR:  No results found for: PTINR    ASSESSMENT / PLAN:     Assessment:  Recent fall: unclear etiology. CT head negative. EKG sinus. Monitor closely. PT/OT consults. Nondisplaced right fracture greater tuberosity of the proximal humerus: Continue analgesics. Monitor closely. Acute hypoxic resp failure: on 2L NC AS needed since last admission. Repeat CXR negative for acute process. Leukocytosis: 16.4. Continue IV antibiotics. Monitor closely. Check procal and urine. CXR negative. Small left apex airspace opacity on CT. CHF: BNP improved since last admission. CPM.  CAD s/p stent: Troponin negative, EKG nonconcerning. [place] RETREAT last admission showed \"heavily calcified vessels. Mild to moderate main vessel CAD. Significant small vessel disease of Diagonal1 & OM1. Global Hypokinesis with moderate to severely reduced LV function. LVEF is about 30 %. \" On ASA, statin, toprol, and lisinopril. A. fib s/p ablation: Rate controlled.  Eliquis restarted  DM2: cpm.   Hypothyroidism: On Synthroid. HTN/HLD: cpm    Plan:   Continue IV antibiotics, check urine and pro Kenrick.  PT/OT evals eventually. Continue analgesics. Continue home meds. Diabetic protocol. Monitor labs and patient condition. Electronically signed by Boaz Newsome PA-C on 12/11/2020 at 4:35 PM   I have independently evaluated and examined this patient today. I have reviewed radiologic and biochemical tests on this patient. Management Plan is developed mutually with APRIL Rivera. I have reviewed above note and agree with assessment and plan. Patient is allergic to dye and b/o that she received steroid before her recent angiogram at [facility], because of that her wbc count has gone, up.  We will repeat numbers in the am.

## 2020-12-11 NOTE — CARE COORDINATION
Pt identified as potential readmission. Last admission 11/27 - 112/1  for Acute hypoxic resp failure. Patient qualified for home O2. Pt has hx of A-fib and is on Eliquis. Pt also recently had a heart catheterization and was seen by a neurologist [facility] who diagnosed her with multiple cerebral aneurysms. Pt here today for fall. Pt has fx R upper arm. LSW spoke to this pt and explained CM role. Pt reports she lives w/her  \"Aaron\" in a 2 story home. Pt states she does not ever go upstairs, everything she needs is on the main level. Pt does have insurance and can afford her medications. Pt has transportation via family (Dtr) and insurance. Pt does have a PCP. Pt has the following DME: 02 but she is not sure of agency. This just started on Tuesday and she is on 3L continuous. Pt has had 3-4 falls last 2 months. LSW talked w/pt about HHC and she declines. Pt states her  helps her w/all her needs and her Dtr lives one street over for assistance as well. Pt is diabetic and has been managing this for yrs. Pt states she is L handed and broke this arm before and can manage if her R arm is broken. Pt identifies no needs upon d/c. LSW completed ACP w/pt. LSW spoke to Jose Manuel about pt. She does have fx of R upper arm and Leukocytosis: 16.4. Will need IV antibiotics and requiring readmission and there were no alternatives to admission to explore at this time.

## 2020-12-11 NOTE — ED NOTES
Spoke with daughter Luisa Guerrier via phone at this time and updated on patient status and plan of care. Voiced understanding.  Call with updates at Tonio Swanson RN  12/11/20 [id number]

## 2020-12-12 NOTE — ACP (ADVANCE CARE PLANNING)
Advance Care Planning     Advance Care Planning Activator (Inpatient)  Conversation Note      Date of ACP Conversation: 12/11/2020    Conversation Conducted with: Patient with Decision Making Capacity    ACP Activator: Jackye Heimlich    *When Decision Maker makes decisions on behalf of the incapacitated patient: Decision Maker is asked to consider and make decisions based on patient values, known preferences, or best interests. Health Care Decision Maker:     Current Designated Health Care Decision Maker:   Primary Decision Maker: Ca Atkinson [facility] - [phone]  (If there is a valid Health Care Decision Maker named in the [address] [facility] Makers\" box in the ACP activity, but it is not visible above, be sure to open that field and then select the health care decision maker relationship (ie \"primary\") in the blank space to the right of the name.) Validate  this information as still accurate & up-to-date; edit [place] field as needed.)    Note: Assess and validate information in current ACP documents, as indicated. Care Preferences    Ventilation: \"If you were in your present state of health and suddenly became very ill and were unable to breathe on your own, what would your preference be about the use of a ventilator (breathing machine) if it were available to you? \"      Would the patient desire the use of ventilator (breathing machine)?: yes    \"If your health worsens and it becomes clear that your chance of recovery is unlikely, what would your preference be about the use of a ventilator (breathing machine) if it were available to you? \"     Would the patient desire the use of ventilator (breathing machine)?: No      Resuscitation  \"CPR works best to restart the heart when there is a sudden event, like a heart attack, in someone who is otherwise healthy.  Unfortunately, CPR does not typically restart the heart for people who have serious health conditions or who are very sick.\"    \"In the event your heart stopped as a result of an underlying serious health condition, would you want attempts to be made to restart your heart (answer \"yes\" for attempt to resuscitate) or would you prefer a natural death (answer \"no\" for do not attempt to resuscitate)? \" yes      NOTE: If the patient has a valid advance directive AND now provides care preference(s) that are inconsistent with that prior directive, advise the patient to consider either: creating a new advance directive that complies with state-specific requirements; or, if that is not possible, orally revoking that prior directive in accordance with state-specific requirements, which must be documented in the EHR. [] Yes   [] No   Educated Patient / Mikey Guzman regarding differences between Advance Directives and portable DNR orders.     Length of ACP Conversation in minutes:  13    Conversation Outcomes:  [x] ACP discussion completed  [] Existing advance directive reviewed with patient; no changes to patient's previously recorded wishes  [] New Advance Directive completed  [] Portable Do Not Rescitate prepared for Provider review and signature  [] POLST/POST/MOLST/MOST prepared for Provider review and signature      Follow-up plan:    [] Schedule follow-up conversation to continue planning  [] Referred individual to Provider for additional questions/concerns   [] Advised patient/agent/surrogate to review completed ACP document and update if needed with changes in condition, patient preferences or care setting    [x] This note routed to one or more involved healthcare providers

## 2020-12-12 NOTE — PROGRESS NOTES
Consult for nonsustained VT    Edvin Mraes. Anthonyagadevon 71-year-old female with a history of persistent atrial fibrillation status post ablation of atrial fibrillation, history of CVA, morbid obesity, diabetes type 2, coronary artery disease, hyperlipidemia. She presents with a fall. She tells me that yesterday \" I suddenly just did not feel right\" and states that she sat down for about 20 minutes. She states that she began to feel better so she stood up and she immediately fell and injured her right shoulder. She denies dizziness or lightheadedness prior to the fall. She just keeps saying that she did not feel right. During this admission she was noted to have 16 beats of nonsustained V. Tach. Patient was hospitalized earlier in the month and was noted to have PVCs at that time. Patient has a history of CAD with LAD stent and new onset cardiomyopathy and frequent PVCs. She then underwent left heart cath. Last cardiac cath 11/30/2020  Heavily calcified vessels  Mild to moderate main vessel CAD  Significant small vessel disease of diagonal 1 and OM1  Global hypokinesis with moderate to severely reviewed LV function. LVEF 30%    Her cardiomyopathy has been monitored through echo and MUGA    Last echo 6/11/2020 EF 30% with anterior septal wall segment appears akinetic with global hypokinesis  Mild left ventricular hypertrophy  Grade 3 diastolic dysfunction  Mitral annular calcification is present mild to moderate mitral regurgitation    12/3/2020 follow-up MUGA scan LVEF mildly reduced at 41%    At this time I would recommend changing Toprol-XL to Coreg as patient is hypertensive  Additionally EKG shows left bundle branch block  Would recommend EP consult to assess for ICD  Patient tells me she was to have an appointment yesterday however it was canceled because of her admission in the hospital.  However I am unable to locate canceled appointment.     Full note to follow

## 2020-12-12 NOTE — PROGRESS NOTES
INTERNAL MEDICINE PROGRESS NOTE        [facility]   1955   Primary Care Physician:  Rashmi Martínez PA-C  Admit Date: 12/11/2020     Subjective:   Patient awake, doing okay. Still complaining of right shoulder pain. BP is elevated today. Patient is afebrile. Denies chest pain, SOB, abdominal pain, GI or  issues. Objective:   BP (!) 204/88   Pulse 79   Temp 100.5 °F (38.1 °C) (Oral)   Resp 18   Ht 5' 1\" (1.549 m)   Wt 205 lb 12.8 oz (93.4 kg)   SpO2 (!) 88%   BMI 38.89 kg/m²    General appearance: alert, appears stated age and cooperative  Head: Normocephalic, without obvious abnormality, atraumatic  Neck: no adenopathy and supple, symmetrical, trachea midline  Lungs: clear to auscultation bilaterally  Heart: S1, S2 normal  Abdomen: soft, non-tender; bowel sounds normal; no masses,  no organomegaly  Extremities: no clubbing, cyanosis with trace edema. Right shoulder tender.   Neurologic: Grossly normal    Data Review  Lab Results   Component Value Date     12/12/2020    K 5.4 (H) 12/12/2020     12/12/2020    CO2 18 (L) 12/12/2020    CREATININE 1.1 12/12/2020    BUN 19 12/12/2020    CALCIUM 8.0 (L) 12/12/2020     Lab Results   Component Value Date    WBC 16.7 (H) 12/12/2020    HGB 10.0 (L) 12/12/2020    HCT 31.7 (L) 12/12/2020    MCV 98.8 12/12/2020     12/12/2020     INR/Prothrombin Time      Meds:    cefTRIAXone (ROCEPHIN) IV  1 g Intravenous Q24H    cloNIDine  0.1 mg Oral TID    atorvastatin  40 mg Oral Nightly    furosemide  40 mg Oral BID    levothyroxine  50 mcg Oral Daily    lisinopril  5 mg Oral Daily    metoprolol succinate  100 mg Oral Daily    pantoprazole  40 mg Oral Daily    sodium chloride flush  10 mL Intravenous 2 times per day    apixaban  5 mg Oral BID    aspirin  81 mg Oral Daily    insulin lispro  0-12 Units Subcutaneous TID WC    insulin lispro  0-6 Units Subcutaneous Nightly     PRN Meds: ipratropium-albuterol, sodium chloride flush, acetaminophen **OR** acetaminophen, polyethylene glycol, promethazine **OR** ondansetron, glucose, dextrose, glucagon (rDNA), dextrose, HYDROcodone-acetaminophen    Assessment/Plan:   Patient Active Hospital Problem List:  Patient Active Problem List   Diagnosis    History of CVA (cerebrovascular accident) without residual deficits    Morbid obesity ([facility]    DM (diabetes mellitus), type 2, uncontrolled ([facility]    Persistent atrial fibrillation ([facility]    CAD (coronary artery disease)    CHF (congestive heart failure) ([facility]    Light headed    History of MI (myocardial infarction)    Diabetes mellitus ([facility]    Hypertension    TIA (transient ischemic attack)    Hyperlipidemia    SOB (shortness of breath)    H/O cardiovascular stress test    Family history of coronary artery disease    [facility]oxysmal [facility] fibrillation) ([facility])    Chest pain    Atrial thrombus without antecedent myocardial infarction    S/P ablation of atrial fibrillation    Gastroenteritis    YESIKA (acute kidney injury) ([facility]    Acute cystitis    Elevated liver enzymes    CHF (congestive heart failure), NYHA class I, acute on chronic, combined ([facility]    Pneumonia    Multifocal pneumonia     Assessment:  NSVT: check EKG, assess QTC. Dont give zofran. Check Mag. Consult cardiology. Hyperkalemia: 5.4, recheck. Could be zosyn induced, d/c zosyn/vanco combo. If still high, will correct. Check mag. Recent fall: unclear etiology. CT head negative. EKG sinus. Monitor closely. PT/OT consults today. Nondisplaced right fracture greater tuberosity of the proximal humerus: Continue analgesics. Monitor closely. Acute hypoxic resp failure: on 2L NC PRN since last admission. Repeat CXR negative for acute process. Leukocytosis: 16.7, was 16. 4. procal and urine unremarkable. CXR negative. Likely secondary to recent IV steroids use at [facility]. Stop Zosyn/Vanco combo. Start ceftriaxone. CHF: BNP improved since last admission.  CPM.  CAD s/p stent: Troponin negative, EKG nonconcerning. [place] RETREAT last admission showed \"heavily calcified vessels. Mild to moderate main vessel CAD. Significant small vessel disease of Diagonal1 & OM1. Global Hypokinesis with moderate to severely reduced LV function. LVEF is about 30 %. \" On ASA, statin, toprol, and lisinopril. A. fib s/p ablation: Rate controlled.  Eliquis restarted  DM2: SSI, hypoglycemia protocol, POC glucose. Check A1c. Hypothyroidism: On Synthroid. HTN/HLD: cpm     Plan:   Consult cardio with NSVT this AM.  UA and pro-Kenrick unremarkable. Leukocytosis likely 2/2 steroid use. Stop Zosyn/Vanco combo. Start ceftriaxone. Recheck potassium, correct if still high. Add clonidine 0.1mg TID for blood pressure. PT/OT evals. Continue analgesics. Monitor labs and patient condition. Electronically signed by Teagan Ratliff PA-C on 12/12/2020 at 11:35 AM   I have independently evaluated and examined this patient today. I have reviewed radiologic and biochemical tests on this patient. Management Plan is developed mutually with APRIL Soto. I have reviewed above note and agree with assessment and plan. Discussed with patient about treatment of her aneurysm. discussed that she will benefit from physical therapy.  We will monitor condition cosely

## 2020-12-13 NOTE — PROGRESS NOTES
Cardiology Note    Admit Date:  12/11/2020     Today's Plan:     Admission diagnosis / Complaint : NSVT      Subjective:  Ms. Coby Zavala is resting in the chair. Reports that she wants to go home today. Assessment and Plan:    1. Nonsustained VT: New onset cardiomyopathy. LVEF is around 41% on MUGA scan. Previously normal EF in 2018. Continue BB. Patient will need outpatient follow up with Dr Higinio Hennessy. 2. CAD: last left heart cath 11/2020. Heavily calcified vessels. Mild to moderate main vessel CAD. Significant small vessel disease of diagonal1 and OM1. Global Hypokinesis with moderate to severely reduced LV. Continue lisinopril, BB, and ASA    3. Essential HTN: not controlled. Continue clonidine and metoprolol. Will uptitrate lisinopril to 10 mg daily. 4. Hyperlipidemia: continue on Statin Therapy    5. History of atrial fibrillation: rate and rhythm controlled. Continue on eliquis.    6. DVT prophylaxis if not contraindicated    Objective:   BP (!) 213/91 Comment: Notified Daniel Hauser RN  Pulse 72   Temp 98.4 °F (36.9 °C) (Oral)   Resp 22   Ht 5' 1\" (1.549 m)   Wt 206 lb 4.8 oz (93.6 kg)   SpO2 98%   BMI 38.98 kg/m²   No intake or output data in the 24 hours ending 12/13/20 1232    TELEMETRY: Sinus    has a past medical history of Abnormal PFTs (pulmonary function tests), Atrial fibrillation (HCC), CAD (coronary artery disease), CHF (congestive heart failure) ([address] Diabetes mellitus ([facility] [address] Dizziness - light-headed, Family history of coronary artery disease, H/O 24 hour EKG monitoring, H/O cardiovascular stress test, H/O cardiovascular stress test, H/O echocardiogram, H/O echocardiogram, Heart imaging, Heartburn, History of cardiac cath, History of cardiac monitoring, History of cardioversion, History of MI (myocardial infarction), History of nuclear MUGA, History of nuclear Muga stress test, History of PTCA, Hx of cardiovascular stress test, Hx of transesophageal echocardiography ([facility]) for monitoring,

## 2020-12-14 NOTE — DISCHARGE SUMMARY
Valerie Garcia MD, Internal Medicine    [address] [facility]   [phone]   Patient ID  Imer Evangelista   1955  [id number]          Admit date: 12/11/2020   Discharge date: 12/13/2020      Admitting Physician: Hannah Max MD   Discharge Physician: Jose Etienne PA-C    Discharge Diagnoses:   NSVT: evaluated by cardiology. EF 41%. Continue BB. Follow as outpatient with Dr. Darrell Floyd. Hyperkalemia: resolved. Recent fall: unclear etiology. CT head negative. EKG sinus. Monitor closely. Nondisplaced right fracture greater tuberosity of the proximal humerus: Continue analgesics. Monitor closely. Acute hypoxic resp failure: on 2L NC PRN since last admission. Repeat CXR negative for acute process.   Leukocytosis: 14.7, was 16. 4. procal and urine unremarkable.  CXR negative. Likely secondary to recent IV steroids use at [facility]. She was given IV antibiotics. CHF: BNP improved since last admission. CPM.  CAD s/p stent: Troponin negative, EKG nonconcerning. [facility] last admission showed \"heavily calcified vessels. Mild to moderate main vessel CAD. Significant small vessel disease of Diagonal1 & OM1. Global Hypokinesis with moderate to severely reduced LV function. LVEF is about 30 %. \" On ASA, statin, toprol, and lisinopril. A. fib s/p ablation: Rate controlled.  Eliquis restarted  DM2: cpm  Hypothyroidism: On Synthroid.     HTN/HLD: [facility]    Patient Active Problem List   Diagnosis    History of CVA (cerebrovascular accident) without residual deficits    Morbid obesity ([address]    DM (diabetes mellitus), type 2, uncontrolled ([address]    Persistent atrial fibrillation ([address]    CAD (coronary artery disease)    CHF (congestive heart failure) ([address]    Light headed    History of MI (myocardial infarction)    Diabetes mellitus ([address]    Hypertension    TIA (transient ischemic attack)    Hyperlipidemia    SOB (shortness of breath)    H/O cardiovascular stress test    Family history of coronary artery disease    PAF (paroxysmal atrial fibrillation) (HCC)    Chest pain    Atrial thrombus without antecedent myocardial infarction    S/P ablation of atrial fibrillation    Gastroenteritis    YESIKA (acute kidney injury) [facility]    Acute cystitis    Elevated liver enzymes    CHF (congestive heart failure), NYHA class I, acute on chronic, combined ([facility] [address]    Pneumonia    Multifocal pneumonia       Discharged Condition: good    Hospital Course: The patient is a 60 y.o. female who presents with recent fall. She has history of CHF, CAD, atrial fibrillation, hypertension, hyperlipidemia, DM 2, hypothyroidism, anxiety. Patient follows with [address] [place] [address] neurology and had recent CTA of head on 11/9/2020 revealing two cerebral aneurysms (see report below). She got up from bed and fell in her house on her right side of body. She reports for the past several weeks she has had a few more falls. She has trouble moving her right arm. She was hospitalized here recently for pneumonia, she still requires supplemental O2 as needed. In the ER, x-ray of right humerus shows nondisplaced fracture in the region of the greater tuberosity of the proximal humerus. X-ray of the right radius/ulna, right elbow, right hip/pelvis, right knee, right shoulder showed no acute abnormality. CXR was negative. CT of head and cervical spine without contrast is negative for acute issues. CBC shows leukocytosis of 16.4, which is due to recent IV steroids she received at [address] [place] [address] as she has contrast allergy. WBC has improved, she was still given IV antibiotics just to be safe. Shoulder pain is controlled on PO analgesics and operative mgmt is not necessary for minor nondisplaced fracture. Also. She did have short run of NSVT so cardiology was consulted. She has underlying afib, CAD. EF is around 41%. Patient will need outpatient follow up with Dr. Chapis Nicolas (). Patient is to follow up with PCP and cardiology as outpatient.     Relevant Investigations  See days    Signed: Electronically signed by Yuri Goins PA-C on 12/14/2020 at 4:44 PM  I have independently evaluated and examined this patient today. I have reviewed radiologic and biochemical tests on this patient. Management Plan is developed mutually with APRIL Velázquez. I have reviewed above note and agree with assessment and plan. Discussed with patient's daughter    Time spent in care is more than 40 minutes.

## 2020-12-28 PROBLEM — I48.91 ATRIAL FIBRILLATION WITH RVR (HCC): Status: ACTIVE | Noted: 2020-01-01

## 2020-12-28 NOTE — ED NOTES
Attempted report again to [address], unable to get ahold of nurse to take report.       Alfonso Mendieta, RN  12/28/20 2938

## 2020-12-28 NOTE — ED PROVIDER NOTES
[facility]      TRIAGE CHIEF COMPLAINT:   Aphasia (has cleared up since EMS arrival) and Numbness (entire face numbness)      [place]:  Camron Nam is a 60 y.o. female that presents with complaint of slurred speech. Patient has a history of A. fib she is on Eliquis went to bed around 11:00 last night woke up 730 this morning with slurred speech and numbness to her face. Stroke alert called prehospital by EMS. Blood sugar normal.  Patient on arrival denies any headache fever chest pain shortness of breath nausea vomiting diarrhea abdominal pain just facial paresthesias and slurred speech. No history of stroke. States she recently broke her right shoulder. REVIEW OF SYSTEMS:  At least 10 systems reviewed and otherwise acutely negative except as in the [address] Review of Systems   Constitutional: Negative. HENT: Negative. Eyes: Negative. Respiratory: Negative. Cardiovascular: Negative. Gastrointestinal: Negative. Endocrine: Negative. Genitourinary: Negative. Musculoskeletal: Positive for arthralgias. Skin: Negative. Allergic/Immunologic: Negative. Neurological: Positive for speech difficulty and numbness. Hematological: Negative. Psychiatric/Behavioral: Negative. All other systems reviewed and are negative.       Past Medical History:   Diagnosis Date    Abnormal PFTs (pulmonary function tests) 4/14 4/14    Atrial fibrillation (HCC)     CAD (coronary artery disease)     CHF (congestive heart failure) (HCC)     Diabetes mellitus (HCC)     Dizziness - light-headed     Family history of coronary artery disease     H/O 24 hour EKG monitoring 02/09/2012,12/10/2010    02/09/2012 predominant rhythm is sinus with short runs of SVT no episode of atrial fib. pt has left bundle branch morphology, max heart is 132, min is 48 with an average of 74 ,infrequent PVCs are seen    H/O cardiovascular stress test 4/1/2013, 1/31/2012 4/1/2013-   [facility] H/O cardiovascular stress test 14,2012 EF 52% No ischemia, normal study. 2012 EF 58% tehnique-technetium LVEF is normal globally    H/O echocardiogram 13 EF 50-55% mild MR, TR 3/13EF 35-45% decreasedr L ventrical systolic function.  H/O echocardiogram -EF50-55% Mild MR/TR. 10/11 EF > 55% LVS function is normal, calcified mitral apparatus, impaired LV relaxation, no PE    Heart imaging 2012 MUGA rest EF 58%LVEF is normal globally    Heartburn     History of cardiac cath 2009, 2006,moderate stenosis  small diagonal    History of cardiac monitoring 2/9/15    Event - sinus rhythm    History of cardioversion 3/14/14    History of MI (myocardial infarction)     History of nuclear MUGA 2020    EF 41%    History of nuclear Muga stress test 2020    EF 41%    History of PTCA 10/06/2010    10/06/2010 stent 3.0x24 taxus stent left main 90% stenosis mid seg reduced to 0% second area of dydsqiia99 to 40% not flow limiting EF 40%    Hx of cardiovascular stress test 2015    lexiscan-normal,EF70%    Hx of transesophageal echocardiography ([facility]) for monitoring 2017    Noted to have MARY GRACE Blood Clot.  Hyperlipidemia     Hypertension     Insomnia     SOB (shortness of breath)     Tachycardia 2012    report in [facility] notes-Dr Nunn hosp. consult    TIA (transient ischemic attack)      Past Surgical History:   Procedure Laterality Date    ABLATION OF DYSRHYTHMIC FOCUS       SECTION      CHOLECYSTECTOMY      DIAGNOSTIC CARDIAC CATH LAB PROCEDURE  2006, 2009 moderate stenosis small diagonal    OTHER SURGICAL HISTORY  2017    afib ablation [facility]    PTCA  10/06/2010    10/06/2010 stent 3.0x24 taxus stent 90% stenosis mid segment reduced to 0% second area of stenosis 30-40% not flow limiting EF 40%    TUBAL LIGATION       Family History   Problem Relation Age of Onset    Diabetes Father     Heart Disease Father      Social History     Socioeconomic History    Marital status:      Spouse name: Not on file    Number of children: Not on file    Years of education: Not on file    Highest education level: Not on file   Occupational History    Not on file   Social Needs    Financial resource strain: Not on file    Food insecurity     Worry: Not on file     Inability: Not on file    Transportation needs     Medical: Not on file     Non-medical: Not on file   Tobacco Use    Smoking status: Former Smoker     Packs/day: 3.00     Years: 20.00     Pack years: 60.00     Types: Cigarettes     Quit date: 2007     Years since quittin.8    Smokeless tobacco: Never Used    Tobacco comment: reviewed 8/12/15   Substance and Sexual Activity    Alcohol use: No     Comment: occas. caffeine 3 mtn.  dew a day    Drug use: No    Sexual activity: Not Currently   Lifestyle    Physical activity     Days per week: Not on file     Minutes per session: Not on file    Stress: Not on file   Relationships    Social connections     Talks on phone: Not on file     Gets together: Not on file     Attends Sabianist service: Not on file     Active member of club or organization: Not on file     Attends meetings of clubs or organizations: Not on file     Relationship status: Not on file    Intimate partner violence     Fear of current or ex partner: Not on file     Emotionally abused: Not on file     Physically abused: Not on file     Forced sexual activity: Not on file   Other Topics Concern    Not on file   Social History Narrative    Not on file     Current Facility-Administered Medications   Medication Dose Route Frequency Provider Last Rate Last Admin    dilTIAZem 100 mg in dextrose 5 % 100 mL infusion ([facility])  5 mg/hr Intravenous Continuous Jeralene Points, DO 5 mL/hr at 20 0844 5 mg/hr at 20 0844    ondansetron (ZOFRAN) injection 4 mg 4 mg Intravenous Q30 Min PRN Kandice Garcia, DO   4 mg at 12/28/20 6696    magnesium sulfate 1 g in dextrose 5% 100 mL IVPB  1 g Intravenous Once Milon Leaven, DO         Current Outpatient Medications   Medication Sig Dispense Refill    nitroGLYCERIN (NITROSTAT) 0.4 MG SL tablet up to max of 3 total doses.  If no relief after 1 dose, call 911. 25 tablet 3    lisinopril (PRINIVIL;ZESTRIL) 5 MG tablet Take 1 tablet by mouth daily 30 tablet 3    metoprolol succinate (TOPROL XL) 100 MG extended release tablet Take 1 tablet by mouth daily TAKE ONE TABLET BY MOUTH DAILY 30 tablet 3    furosemide (LASIX) 40 MG tablet Take 1 tablet by mouth 2 times daily 60 tablet 3    albuterol sulfate HFA (VENTOLIN HFA) 108 (90 Base) MCG/ACT inhaler Inhale 2 puffs into the lungs 4 times daily as needed for Wheezing 1 Inhaler 5    cloNIDine (CATAPRES) 0.3 MG tablet Take 0.3 mg by mouth nightly      BASAGLAR KWIKPEN 100 UNIT/ML injection pen Inject 65-70 Units into the skin nightly       ADMELOG SOLOSTAR 100 UNIT/ML pen Inject into the skin See Admin Instructions      atorvastatin (LIPITOR) 40 MG tablet Take 40 mg by mouth nightly      metFORMIN (GLUCOPHAGE) 500 MG tablet Take 500 mg by mouth 2 times daily      pantoprazole (PROTONIX) 40 MG tablet Take 40 mg by mouth daily      levothyroxine (SYNTHROID) 50 MCG tablet Take 50 mcg by mouth Daily      apixaban (ELIQUIS) 5 MG TABS tablet Take 1 tablet by mouth 2 times daily 60 tablet 2    aspirin 81 MG EC tablet Take 81 mg by mouth daily        Allergies   Allergen Reactions    Amiodarone Other (See Comments)     dizziness    Iv Dye [Iodides] Nausea And Vomiting    Vicodin [Hydrocodone-Acetaminophen] Nausea And Vomiting     Current Facility-Administered Medications   Medication Dose Route Frequency Provider Last Rate Last Admin    dilTIAZem 100 mg in dextrose 5 % 100 mL infusion ([facility])  5 mg/hr Intravenous Continuous Gonzalez Vincent DO 5 mL/hr at 12/28/20 0844 5 mg/hr at 12/28/20 0844    ondansetron (ZOFRAN) injection 4 mg  4 mg Intravenous Q30 Min PRN Trudy Elam DO   4 mg at 12/28/20 2201    magnesium sulfate 1 g in dextrose 5% 100 mL IVPB  1 g Intravenous Once Trudy Elam DO         Current Outpatient Medications   Medication Sig Dispense Refill    nitroGLYCERIN (NITROSTAT) 0.4 MG SL tablet up to max of 3 total doses. If no relief after 1 dose, call 911. 25 tablet 3    lisinopril (PRINIVIL;ZESTRIL) 5 MG tablet Take 1 tablet by mouth daily 30 tablet 3    metoprolol succinate (TOPROL XL) 100 MG extended release tablet Take 1 tablet by mouth daily TAKE ONE TABLET BY MOUTH DAILY 30 tablet 3    furosemide (LASIX) 40 MG tablet Take 1 tablet by mouth 2 times daily 60 tablet 3    albuterol sulfate HFA (VENTOLIN HFA) 108 (90 Base) MCG/ACT inhaler Inhale 2 puffs into the lungs 4 times daily as needed for Wheezing 1 Inhaler 5    cloNIDine (CATAPRES) 0.3 MG tablet Take 0.3 mg by mouth nightly      BASAGLAR KWIKPEN 100 UNIT/ML injection pen Inject 65-70 Units into the skin nightly       ADMELOG SOLOSTAR 100 UNIT/ML pen Inject into the skin See Admin Instructions      atorvastatin (LIPITOR) 40 MG tablet Take 40 mg by mouth nightly      metFORMIN (GLUCOPHAGE) 500 MG tablet Take 500 mg by mouth 2 times daily      pantoprazole (PROTONIX) 40 MG tablet Take 40 mg by mouth daily      levothyroxine (SYNTHROID) 50 MCG tablet Take 50 mcg by mouth Daily      apixaban (ELIQUIS) 5 MG TABS tablet Take 1 tablet by mouth 2 times daily 60 tablet 2    aspirin 81 MG EC tablet Take 81 mg by mouth daily         Nursing Notes Reviewed    VITAL SIGNS:  ED Triage Vitals   Enc Vitals Group      BP       Pulse       Resp       Temp       Temp src       SpO2       Weight       Height       Head Circumference       Peak Flow       Pain Score       Pain Loc       Pain Edu? Excl. in [address]? PHYSICAL EXAM:  Physical Exam  Vitals signs and nursing note reviewed.    Constitutional: General: She is not in acute distress. Appearance: Normal appearance. She is well-developed and well-groomed. She is obese. She is ill-appearing. She is not toxic-appearing or diaphoretic. HENT:      Head: Normocephalic and atraumatic. Right Ear: External ear normal.      Left Ear: External ear normal.   Eyes:      General: No scleral icterus. Right eye: No discharge. Left eye: No discharge. Conjunctiva/sclera: Conjunctivae normal.      Pupils: Pupils are equal, round, and reactive to light. Neck:      Musculoskeletal: Full passive range of motion without pain and normal range of motion. Normal range of motion. No edema, erythema, neck rigidity, crepitus, injury, pain with movement or torticollis. Vascular: No carotid bruit or JVD. Cardiovascular:      Rate and Rhythm: Tachycardia present. Rhythm irregular. Pulses: Normal pulses. Heart sounds: Normal heart sounds. No murmur. No friction rub. No gallop. Pulmonary:      Effort: Pulmonary effort is normal. No respiratory distress. Breath sounds: Normal breath sounds. No stridor. No wheezing, rhonchi or rales. Abdominal:      General: Bowel sounds are normal. There is no distension. Palpations: Abdomen is soft. There is no mass. Tenderness: There is no abdominal tenderness. There is no guarding or rebound. Negative signs include Bush's sign, Rovsing's sign and McBurney's sign. Hernia: No hernia is present. Musculoskeletal:         General: Tenderness present. No swelling, deformity or signs of injury. Right shoulder: She exhibits decreased range of motion and tenderness. Right lower leg: No edema. Left lower leg: No edema. Comments: Chronic pain right shoulder, recent fracture she states   Skin:     General: Skin is warm. Coloration: Skin is not jaundiced or pale. Findings: No bruising, erythema, lesion or rash.    Neurological:      General: No focal deficit present. Mental Status: She is alert and oriented to person, place, and time. GCS: GCS eye subscore is 4. GCS verbal subscore is 5. GCS motor subscore is 6. Cranial Nerves: Cranial nerve deficit, dysarthria and facial asymmetry present. Sensory: Sensory deficit present. Motor: Motor function is intact. No weakness, tremor, atrophy, abnormal muscle tone or seizure activity. Coordination: Coordination is intact. Coordination normal. Finger-Nose-Finger Test normal.      Comments: Slurred speech, R sided facial droop and R sided facial paresthesias   Psychiatric:         Mood and Affect: Mood normal.         Behavior: Behavior normal. Behavior is cooperative. Thought Content:  Thought content normal.         Judgment: Judgment normal.           I have reviewed andinterpreted all of the currently available lab results from this visit (if applicable):    Results for orders placed or performed during the hospital encounter of 12/28/20   CBC Auto Differential   Result Value Ref Range    WBC 12.2 (H) 4.0 - 10.5 K/CU MM    RBC 4.00 (L) 4.2 - 5.4 M/CU MM    Hemoglobin 12.4 (L) 12.5 - 16.0 GM/DL    Hematocrit 42.1 37 - 47 %    .3 (H) 78 - 100 FL    MCH 31.0 27 - 31 PG    MCHC 29.5 (L) 32.0 - 36.0 %    RDW 14.8 11.7 - 14.9 %    Platelets 991 (H) 212 - 440 K/CU MM    MPV 9.2 7.5 - 11.1 FL    Differential Type AUTOMATED DIFFERENTIAL     Segs Relative 78.5 (H) 36 - 66 %    Lymphocytes % 14.5 (L) 24 - 44 %    Monocytes % 5.3 (H) 0 - 4 %    Eosinophils % 0.7 0 - 3 %    Basophils % 0.6 0 - 1 %    Segs Absolute 9.6 K/CU MM    Lymphocytes Absolute 1.8 K/CU MM    Monocytes Absolute 0.7 K/CU MM    Eosinophils Absolute 0.1 K/CU MM    Basophils Absolute 0.1 K/CU MM    Nucleated RBC % 0.0 %    Total Nucleated RBC 0.0 K/CU MM    Total Immature Neutrophil 0.05 K/CU MM    Immature Neutrophil % 0.4 0 - 0.43 %   CMP   Result Value Ref Range    Sodium 134 (L) 135 - 145 MMOL/L    Potassium 3.9 3.5 - 5.1 Reviewed:    CT Brain, CXR    Xr Chest (2 Vw)    Result Date: 11/29/2020  EXAMINATION: TWO XRAY VIEWS OF THE CHEST 11/29/2020 11:21 am COMPARISON: 11/27/2020 CT and radiograph HISTORY: ORDERING SYSTEM PROVIDED HISTORY: pneumonia TECHNOLOGIST PROVIDED HISTORY: Reason for exam:->pneumonia Reason for Exam: pneumonia Acuity: Acute Type of Exam: Unknown Follow-up exam FINDINGS: Stable cardiomegaly with mild interstitial edema. No pleural effusion or pneumothorax. Osteopenia. The osseous structures are stable. No new focal consolidation. Stable cardiomegaly with mild interstitial edema. No pleural effusion is noted on this exam.     Xr Shoulder Right (min 2 Views)    Result Date: 12/11/2020  EXAMINATION: THREE XRAY VIEWS OF THE RIGHT SHOULDER; TWO XRAY VIEWS OF THE RIGHT HUMERUS 12/11/2020 11:08 am; 12/11/2020 11:07 am COMPARISON: None. HISTORY: ORDERING SYSTEM PROVIDED HISTORY: fall, pain TECHNOLOGIST PROVIDED HISTORY: Reason for exam:->fall, pain Reason for Exam: fall / right arm pain Acuity: Acute Type of Exam: Initial FINDINGS: Right shoulder: Anatomic alignment. Nondisplaced fracture in the region of the greater tuberosity. No other fracture. Right humerus: Nondisplaced fracture in the region of the greater tuberosity. The elbow and shoulder are in anatomic alignment. No other fracture identified. Nondisplaced fracture in the region of the greater tuberosity of the proximal humerus     Xr Humerus Right (min 2 Views)    Result Date: 12/11/2020  EXAMINATION: THREE XRAY VIEWS OF THE RIGHT SHOULDER; TWO XRAY VIEWS OF THE RIGHT HUMERUS 12/11/2020 11:08 am; 12/11/2020 11:07 am COMPARISON: None. HISTORY: ORDERING SYSTEM PROVIDED HISTORY: fall, pain TECHNOLOGIST PROVIDED HISTORY: Reason for exam:->fall, pain Reason for Exam: fall / right arm pain Acuity: Acute Type of Exam: Initial FINDINGS: Right shoulder: Anatomic alignment. Nondisplaced fracture in the region of the greater tuberosity. No other fracture. Right humerus: Nondisplaced fracture in the region of the greater tuberosity. The elbow and shoulder are in anatomic alignment. No other fracture identified. Nondisplaced fracture in the region of the greater tuberosity of the proximal humerus     Xr Elbow Right (min 3 Views)    Result Date: 12/11/2020  EXAMINATION: THREE XRAY VIEWS OF THE RIGHT ELBOW; TWO XRAY VIEWS OF THE RIGHT FOREARM 12/11/2020 11:07 am COMPARISON: None. HISTORY: ORDERING SYSTEM PROVIDED HISTORY: fall, pain TECHNOLOGIST PROVIDED HISTORY: Reason for exam:->fall, pain Reason for Exam: fall / right arm pain Acuity: Acute Type of Exam: Initial FINDINGS: Right elbow: The apparent lucency seen along the medial humeral epicondyle appears to be artifactual related to overlying soft tissue. No convincing radiographic evidence of acute fracture or dislocation is seen. No evidence of displacement of the fat pads is seen to suggest underlying joint effusion. Right forearm: The shafts of the radius and ulna appear intact. No radiographic evidence of acute osseous abnormality of the right elbow and forearm seen. Xr Radius Ulna Right (2 Views)    Result Date: 12/11/2020  EXAMINATION: THREE XRAY VIEWS OF THE RIGHT ELBOW; TWO XRAY VIEWS OF THE RIGHT FOREARM 12/11/2020 11:07 am COMPARISON: None. HISTORY: ORDERING SYSTEM PROVIDED HISTORY: fall, pain TECHNOLOGIST PROVIDED HISTORY: Reason for exam:->fall, pain Reason for Exam: fall / right arm pain Acuity: Acute Type of Exam: Initial FINDINGS: Right elbow: The apparent lucency seen along the medial humeral epicondyle appears to be artifactual related to overlying soft tissue. No convincing radiographic evidence of acute fracture or dislocation is seen. No evidence of displacement of the fat pads is seen to suggest underlying joint effusion. Right forearm: The shafts of the radius and ulna appear intact. No radiographic evidence of acute osseous abnormality of the right elbow and forearm seen. Xr Knee Right (min 4 Views)    Result Date: 12/13/2020  EXAMINATION: ONE XRAY VIEW OF THE PELVIS AND TWO XRAY VIEWS RIGHT HIP; FOUR XRAY VIEWS OF THE RIGHT KNEE 12/11/2020 11:07 am COMPARISON: 09/01/2020 HISTORY: ORDERING SYSTEM PROVIDED HISTORY: fall, pain TECHNOLOGIST PROVIDED HISTORY: Reason for exam:->fall, pain Reason for Exam: fall / right arm pain Acuity: Acute Type of Exam: Initial FINDINGS: Pelvis/right hip: Osteopenia. No acute fracture or dislocation. Joint spaces and alignment are maintained. Soft tissues are unremarkable. Right knee: Osteopenia. Mild soft tissue swelling. Vascular calcifications. No acute fracture or dislocation. Joint spaces and alignment are maintained. No significant joint effusion. No acute osseous abnormality in the pelvis, right hip or right knee. Osteopenia. Ct Head Wo Contrast    Result Date: 12/11/2020  EXAMINATION: CT OF THE CERVICAL SPINE WITHOUT CONTRAST; CT OF THE HEAD WITHOUT CONTRAST 12/11/2020 11:07 am TECHNIQUE: CT of the cervical spine was performed without the administration of intravenous contrast. Multiplanar reformatted images are provided for review. Dose modulation, iterative reconstruction, and/or weight based adjustment of the mA/kV was utilized to reduce the radiation dose to as low as reasonably achievable.; CT of the head was performed without the administration of intravenous contrast. Dose modulation, iterative reconstruction, and/or weight based adjustment of the mA/kV was utilized to reduce the radiation dose to as low as reasonably achievable.  COMPARISON: CT cervical spine October 15, 2020, CTA head November 9, 2020 HISTORY: ORDERING SYSTEM PROVIDED HISTORY: fall, neck pain TECHNOLOGIST PROVIDED HISTORY: Reason for exam:->fall, neck pain Reason for Exam: fall, neck pain Acuity: Acute Type of Exam: Initial; ORDERING SYSTEM PROVIDED HISTORY: fall, TECHNOLOGIST PROVIDED HISTORY: Reason for exam:->fall, Has a \"code stroke\" or \"stroke alert\" been called? ->No Reason for Exam: fall Acuity: Acute Type of Exam: Initial FINDINGS: CT brain: BRAIN/VENTRICLES: There are old lacunar infarcts in the bilateral thalami. No evidence of acute territorial infarction. There is mild parenchymal volume loss. There is periventricular white matter low attenuation, likely related to mild chronic microvascular disease. There is no acute intracranial hemorrhage, mass effect or midline shift. No abnormal extra-axial fluid collection. There is no evidence of hydrocephalus. ORBITS: The visualized portion of the orbits demonstrate no acute abnormality. SINUSES: The visualized paranasal sinuses and right mastoid air cells demonstrate no acute abnormality. There is mild left mastoid effusion. SOFT TISSUES/SKULL: No acute abnormality of the visualized skull or soft tissues. CT cervical spine: BONES/ALIGNMENT: There is straightening of normal cervical lordosis. There is normal alignment of the cervical spine. The vertebral body heights are maintained. No acute fracture or osseous destructive lesion is seen. DEGENERATIVE CHANGES: There is degenerative disc disease with mild endplate changes and small endplate osteophyte formation. The intervertebral disc heights are grossly maintained. SOFT TISSUES: No paraspinal mass is seen. There is septal thickening at the lung apices, likely related to mild pulmonary vascular congestion. There are small airspace opacities at the left lung apex measuring up to 7 mm in size, likely related to infection/inflammation. No acute intracranial abnormality. 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 No acute abnormality in the cervical spine. Mild pulmonary vascular congestion Small airspace opacities at the left lung apex measuring up to 7 mm in size, likely related to infection/inflammation. Follow-up is recommended to exclude underlying lung nodule/lung mass.      Ct Cervical Spine Wo Contrast    Result Date: 12/11/2020  EXAMINATION: CT OF THE CERVICAL SPINE WITHOUT CONTRAST; CT OF THE HEAD WITHOUT CONTRAST 12/11/2020 11:07 am TECHNIQUE: CT of the cervical spine was performed without the administration of intravenous contrast. Multiplanar reformatted images are provided for review. Dose modulation, iterative reconstruction, and/or weight based adjustment of the mA/kV was utilized to reduce the radiation dose to as low as reasonably achievable.; CT of the head was performed without the administration of intravenous contrast. Dose modulation, iterative reconstruction, and/or weight based adjustment of the mA/kV was utilized to reduce the radiation dose to as low as reasonably achievable. COMPARISON: CT cervical spine October 15, 2020, CTA head November 9, 2020 HISTORY: ORDERING SYSTEM PROVIDED HISTORY: fall, neck pain TECHNOLOGIST PROVIDED HISTORY: Reason for exam:->fall, neck pain Reason for Exam: fall, neck pain Acuity: Acute Type of Exam: Initial; ORDERING SYSTEM PROVIDED HISTORY: fall, TECHNOLOGIST PROVIDED HISTORY: Reason for exam:->fall, Has a \"code stroke\" or \"stroke alert\" been called? ->No Reason for Exam: fall Acuity: Acute Type of Exam: Initial FINDINGS: CT brain: BRAIN/VENTRICLES: There are old lacunar infarcts in the bilateral thalami. No evidence of acute territorial infarction. There is mild parenchymal volume loss. There is periventricular white matter low attenuation, likely related to mild chronic microvascular disease. There is no acute intracranial hemorrhage, mass effect or midline shift. No abnormal extra-axial fluid collection. There is no evidence of hydrocephalus. ORBITS: The visualized portion of the orbits demonstrate no acute abnormality. SINUSES: The visualized paranasal sinuses and right mastoid air cells demonstrate no acute abnormality. There is mild left mastoid effusion. SOFT TISSUES/SKULL: No acute abnormality of the visualized skull or soft tissues.  CT cervical spine: Written consent  was obtained. Conclusions   Summary  Supervising physician Dr. Ariane Alcazar . Left ventricular ejection fraction of 41 %. Recommendation  LVEF IS MILDLY REDUCED  MEDICAL THERAPY   Signatures   ------------------------------------------------------------------  Electronically signed by Yfn Beck MD  (Interpreting cardiologist) on 12/03/2020 at 14:35  ------------------------------------------------------------------  Procedure Procedure Type:   Equilibrium Radionuclide Angiography (MUGA)  Indications: CAD and CHF/SOB. Risk Factors   The patient risk factors include:prior PCI;former tobacco use, treated  hypercholesterolemia, treated hypertension, family history of premature CAD  and prior MI .   MUGA Imagining Protocols   Isotope: 99mTc Pertechnetate  Isotope dose:30.1 mCi  Administration route: I.V. Rt. arm  Date: 12/03/2020 12:00   Technique:          Planar                      Gated   Procedure Description   The patient''''s red blood cells were labelled with 30.01mCi of Technetium  -99m using the in vivo technique (1.5cc of cold PYP). Imaging was performed  at Rest by planar technique in multiple views. Medical History   Accession#:  [id number]  Admission Data Admission date: 12/03/2020 Admission Time: 11:59 Hospital Status: Outpatient. Xr Chest Portable    Result Date: 12/11/2020  EXAMINATION: ONE XRAY VIEW OF THE CHEST 12/11/2020 11:07 am COMPARISON: 12/01/2020 HISTORY: ORDERING SYSTEM PROVIDED HISTORY: fall TECHNOLOGIST PROVIDED HISTORY: Reason for exam:->fall Reason for Exam: fall / right arm pain Acuity: Acute Type of Exam: Initial FINDINGS: No focal consolidation, pleural effusion or pneumothorax. The cardiomediastinal silhouette is stable. No overt pulmonary edema. The osseous structures are stable. No new acute cardiopulmonary findings.      Xr Chest Portable    Result Date: 12/1/2020  EXAMINATION: ONE XRAY VIEW OF THE CHEST 12/1/2020 12:32 pm COMPARISON: 11/29/2020 HISTORY: ORDERING SYSTEM PROVIDED HISTORY: hypoxia TECHNOLOGIST PROVIDED HISTORY: Reason for exam:->hypoxia Reason for Exam: sob FINDINGS: The lungs are without acute focal process. There is no effusion or pneumothorax. The cardiomediastinal silhouette is without acute process. The osseous structures are without acute process. No acute process. Xr Hip 1 Vw W Pelvis Right    Result Date: 12/13/2020  EXAMINATION: ONE XRAY VIEW OF THE PELVIS AND TWO XRAY VIEWS RIGHT HIP; FOUR XRAY VIEWS OF THE RIGHT KNEE 12/11/2020 11:07 am COMPARISON: 09/01/2020 HISTORY: ORDERING SYSTEM PROVIDED HISTORY: fall, pain TECHNOLOGIST PROVIDED HISTORY: Reason for exam:->fall, pain Reason for Exam: fall / right arm pain Acuity: Acute Type of Exam: Initial FINDINGS: Pelvis/right hip: Osteopenia. No acute fracture or dislocation. Joint spaces and alignment are maintained. Soft tissues are unremarkable. Right knee: Osteopenia. Mild soft tissue swelling. Vascular calcifications. No acute fracture or dislocation. Joint spaces and alignment are maintained. No significant joint effusion. No acute osseous abnormality in the pelvis, right hip or right knee. Osteopenia. EKG (if obtained): (All EKG's are interpreted by myself in the absence of a cardiologist)    12 lead EKG per my interpretation:  Atrial Fibrillation 161  Axis is   Left axis deviation  QTc is  523  There is specific T wave changes appreciated. Inverted T wave aVL, lead I  There is no specific ST wave changes appreciated. Prior EKG to compare with was not available       Total critical care time today provided was at least 60 minutes. This excludes seperately billable procedure.  Critical care time provided for reviewing labs, images, giving fluids, cardizem, consulting stroke team, giving magnesium, consulting radiology, consulting medicine that required close evaluation and/or intervention with concern for patient decompensation. MDM:    Patient here with right-sided facial numbness slurred speech. Again last known well was last night 11:00 when she went to bed woke up with the symptoms already. EMS states blood sugar normal stroke alert called arrival patient has history of A. fib she is on Eliquis on arrival she is in atrial fibrillation with RVR otherwise vital signs are stable we will give her Cardizem she has no headache chest pain shortness of breath. She does have decreased sensation to the right side of her face slight right-sided facial droop does have slurred speech will call stroke alert. She cannot do with IV contrast so holding off on CTAs. Will get labs imaging will consult radiology stroke team medicine team.  Patient recheck doing well has slurred speech patient is on Cardizem for A. fib RVR work-up performed has leukocytosis elevated lactic acid likely from heart rate I do not see any signs of infection at this time CT the head neck do show chronic issues including aneurysms, stenosis but no large vessel occlusion. I did talk to [place] neurology outside window for TPA given last known well was last night also on Eliquis admit here. She did have some hypomagnesia him I did replace. Patient was stable I did talk to her family doctor will admit. Otherwise stable. CLINICAL IMPRESSION:  Final diagnoses:   Numbness   Slurred speech   Atrial fibrillation with RVR (HCC)   Leukocytosis, unspecified type   Hypomagnesemia   Abnormal computed tomography angiography (CTA)       (Please note that portions of this note may have been completed with a voice recognition program. Efforts were made to edit the dictations but occasionally words aremis-transcribed.)    DISPOSITION REFERRAL (if applicable):  No follow-up provider specified.     DISPOSITION MEDICATIONS (if applicable):  New Prescriptions    No medications on file          Venice Torres, [address] [facility], DO  12/28/20 0054

## 2020-12-28 NOTE — CARE COORDINATION
Pt identified as potential readmission. Last admission 12/11 - 12/13 for for evaluation after a fall which occurred today as the patient states she was feeling weak and unsteady on her feet. Pt here today for complaint of slurred speech. Abnormal Labs. Lactate 4.7    Consult to stroke team [facility]. Pt is requiring readmission and there were no alternatives to admission to explore at this time.

## 2020-12-28 NOTE — CARE COORDINATION
CM performed utilization review on patient documentation. Patient met criteria for inpatient admission through [facility] for A-fib, onset unknown. Per physician documentation \"Patient here with right-sided facial numbness slurred speech. Again last known well was last night 11:00 when she went to bed woke up with the symptoms already. EMS states blood sugar normal stroke alert called arrival patient has history of A. fib she is on Eliquis on arrival she is in atrial fibrillation with RVR otherwise vital signs are stable we will give her Cardizem she has no headache chest pain shortness of breath. She does have decreased sensation to the right side of her face slight right-sided facial droop does have slurred speech will call stroke alert. She cannot do with IV contrast so holding off on CTAs. Will get labs imaging will consult radiology stroke team medicine team.  Patient recheck doing well has slurred speech patient is on Cardizem for A. fib RVR work-up performed has leukocytosis elevated lactic acid likely from heart rate I do not see any signs of infection at this time CT the head neck do show chronic issues including aneurysms, stenosis but no large vessel occlusion. I did talk to [facility] neurology outside window for TPA given last known well was last night also on Eliquis admit here. She did have some hypomagnesia him I did replace. Patient was stable I did talk to her family doctor will admit. Otherwise stable. \" Patient given IV bolus Diltiazem and Diltiazem drip initiated.

## 2020-12-28 NOTE — ED NOTES
RN attempted report to Angelica Guillen nurse instructed this RN to call back in a little bit.       Du Zabala RN  12/28/20 8200

## 2020-12-28 NOTE — PROGRESS NOTES
9/6/19   Historical Provider, MD   levothyroxine (SYNTHROID) 50 MCG tablet Take 50 mcg by mouth Daily    Historical Provider, MD   apixaban (ELIQUIS) 5 MG TABS tablet Take 1 tablet by mouth 2 times daily 8/8/17   Salvatore Fletcher MD   aspirin 81 MG EC tablet Take 81 mg by mouth daily    Historical Provider, MD     Medications added or changed (ex. new medication, dosage change, interval change, formulation change):  See medication list as stated above    Comments:  Patient with recent admission 12/11/2020, with review of medication list per daughter and retail pharmacy. No changes noted per this review.     To my knowledge the above medication history is accurate as of 12/28/2020 12:42 PM.   Stacia Qureshi CP   12/28/2020 12:42 PM

## 2020-12-29 NOTE — CONSULTS
Full consult to follow        Parox AFIB .  Hx of ablation   TIA/Stroke   CAD  Non obstructive   Hx of NSVT  HTN    EKG AFIB RVR 12/28  Last EKG before that 12/12 - NSR     Plan:    Cont OAC   Start Amiodarone   Cont Cardizem   LUKE Cardioversion today

## 2020-12-29 NOTE — PROGRESS NOTES
(rDNA), dextrose, traMADol    Assessment/Plan:   Patient Active Hospital Problem List:  Patient Active Problem List   Diagnosis    History of CVA (cerebrovascular accident) without residual deficits    [facility]bid [facility]    DM (diabetes mellitus), type 2, uncontrolled ([facility]    Persistent atrial fibrillation ([address]    CAD (coronary artery disease)    CHF (congestive heart failure) ([facility]    Light headed    History of MI (myocardial infarction)    Diabetes mellitus ([facility]    Hypertension    TIA (transient ischemic attack)    Hyperlipidemia    SOB (shortness of breath)    H/O cardiovascular stress test    Family history of coronary artery disease    [facility]oxysmal [facility] fibrillation) ([facility])    Chest pain    Atrial thrombus without antecedent myocardial infarction    S/P ablation of atrial fibrillation    Gastroenteritis    YESIKA (acute kidney injury) ([facility]    Acute cystitis    Elevated liver enzymes    CHF (congestive heart failure), NYHA class I, acute on chronic, combined ([facility] [facility]    Pneumonia    Multifocal pneumonia    [facility]     Assessment  A. Fib RVR s/p ablation: Rate controlled on cardizem. On AC. Cardiology started amiodarone and planning cardioversion today. Monitor. Hypoglycemia/Slurred speech: Improved. CT head negative. CTA head/neck stable. She likely had hypoglycemic episode w/ BG 39 on admission. On D10. Monitor POC glucose. Hx NSVT  Recent Nondisplaced right fracture greater tuberosity of the proximal humerus: stable, monitor. CHF: doing ok now. CPM.  CAD s/p stent: Troponin negative. Prior [facility] calcified vessels. Mild to moderate main vessel CAD. Significant small vessel disease of Diagonal1 & OM1. Global Hypokinesis with moderate to severely reduced LV function. LVEF is about 30 %. \" On ASA, statin, toprol, and lisinopril. DM2: monitor POC glucose. SSI. Hold lantus for now. On D10 with hypoglycemia. A1c 6.9.   Hypothyroidism: On Synthroid. HTN/HLD: cpm    Plan:  Slurred speech improved today. On D10, monitor POC glucose. Hold Lantus for now. HR controlled on cardizem  Cardiology started amiodarone and plan LUKE/CV today. Monitor labs and patient condition. Electronically signed by Karen Oconnor PA-C on 12/29/2020 at 8:20 AM   I have independently evaluated and examined this patient today. I have reviewed radiologic and biochemical tests on this patient. Management Plan is developed mutually with APRIL Cárdenas. I have reviewed above note and agree with assessment and plan.

## 2020-12-29 NOTE — CARE COORDINATION
LSW spoke with pt about her discharge plans. Pt lives with her family in a 2 story home. Pt Home O2 from [address]. Pt denies any other DME. Pt denies any HC. Pt stated she is independent of all her ADLs. LSW offered HC to pt and she stated that her dght is working on it. LSW offered to call dght to offer assistance and pt said her dght will take care of it. Pt plans home and denies any needs at this time.

## 2020-12-29 NOTE — CONSULTS
INPATIENT CARDIOLOGY CONSULT NOTE       Reason for consultation: A. fib RVR/stroke  Referring physician:  Ken Rivas MD     Primary care physician: Orestes Laguna PA-C      Dear Ken Rivas MD Thank you for the consult    Chief Complaint   Patient presents with   Kyler Roberts     has cleared up since EMS arrival    Numbness     entire face numbness       History of present illness:Danae is a 60 y. o.year old who  presents with  Chief Complaint   Patient presents with   Kyler Roberts     has cleared up since EMS arrival    Numbness     entire face numbness       Patient is 62 [place] female with prior medical history significant for paroxysmal atrial fibrillation status post ablation, history of current TIA/stroke, history of nonobstructive CAD, NSVT, hypertension presents to the hospital with a chief complaint of dysarthria/aphasia. Patient was noted to have atrial fibrillation with rapid ventricular response. Last EKG before admission on 12/12/2020 showed normal sinus rhythm. EKG in the ER shows atrial fibrillation with rapid ventricular response. Patient was admitted to the hospital.  She has been maintained on oral anticoagulation as outpatient. In the hospital patient was started on IV Cardizem drip which slowed down her heart rate into the 70s this morning. However she remained in atrial fibrillation. Patient denies any other complaints currently.     Past medical history:    has a past medical history of Abnormal PFTs (pulmonary function tests), Atrial fibrillation ([address] CAD (coronary artery disease), CHF (congestive heart failure) ([facility] Diabetes mellitus ([facility] Dizziness - light-headed, Family history of coronary artery disease, H/O 24 hour EKG monitoring, H/O cardiovascular stress test, H/O cardiovascular stress test, H/O echocardiogram, H/O echocardiogram, Heart imaging, Heartburn, History of cardiac cath, History of cardiac monitoring, History of cardioversion, History of MI (myocardial infarction), History of nuclear MUGA, History of nuclear Muga stress test, History of PTCA, Hx of cardiovascular stress test, Hx of transesophageal echocardiography (LUKE) for monitoring, Hyperlipidemia, Hypertension, Insomnia, SOB (shortness of breath), Tachycardia, and TIA (transient ischemic attack). Past surgical history:   has a past surgical history that includes  section; Tubal ligation; Cholecystectomy; Diagnostic Cardiac Cath Lab Procedure (2006, 2009); Percutaneous Transluminal Coronary Angio (10/06/2010); other surgical history (2017); and ablation of dysrhythmic focus. Social History:   reports that she quit smoking about 13 years ago. Her smoking use included cigarettes. She has a 60.00 pack-year smoking history. She has never used smokeless tobacco. She reports that she does not drink alcohol or use drugs.   Family history:   no family history of CAD, STROKE of DM    Allergies   Allergen Reactions    Amiodarone Other (See Comments)     dizziness    Iv Dye [Iodides] Nausea And Vomiting    Vicodin [Hydrocodone-Acetaminophen] Nausea And Vomiting           dextrose 10 % infusion, Continuous      amiodarone (CORDARONE) 150 mg in dextrose 5 % 100 mL bolus, Once    Followed by      amiodarone (CORDARONE) 450 mg in dextrose 5 % 250 mL infusion, Continuous    Followed by    [facility]  amiodarone (CORDARONE) 450 mg in dextrose 5 % 250 mL infusion, Continuous      dilTIAZem 100 mg in dextrose 5 % 100 mL infusion ([facility]), Continuous      ipratropium-albuterol (DUONEB) nebulizer solution 1 ampule, Q4H PRN      apixaban (ELIQUIS) tablet 5 mg, BID      aspirin EC tablet 81 mg, Daily      atorvastatin (LIPITOR) tablet 40 mg, Nightly      cloNIDine (CATAPRES) tablet 0.3 mg, Nightly      furosemide (LASIX) tablet 40 mg, BID      levothyroxine (SYNTHROID) tablet 50 mcg, Daily      lisinopril (PRINIVIL;ZESTRIL) tablet 5 mg, Daily      metoprolol succinate (TOPROL XL) extended release tablet 100 mg, Daily      pantoprazole (PROTONIX) tablet 40 mg, Daily      sodium chloride flush 0.9 % injection 10 mL, 2 times per day      sodium chloride flush 0.9 % injection 10 mL, PRN      promethazine (PHENERGAN) tablet 12.5 mg, Q6H PRN    Or      ondansetron (ZOFRAN) injection 4 mg, Q6H PRN      polyethylene glycol (GLYCOLAX) packet 17 g, Daily PRN      acetaminophen (TYLENOL) tablet 650 mg, Q6H PRN    Or      acetaminophen (TYLENOL) suppository 650 mg, Q6H PRN      glucose (GLUTOSE) 40 % oral gel 15 g, PRN      dextrose 50 % IV solution, PRN      glucagon (rDNA) injection 1 mg, PRN      dextrose 5 % solution, PRN      insulin lispro (HUMALOG) injection vial 0-12 Units, TID WC      insulin lispro (HUMALOG) injection vial 0-6 Units, Nightly      traMADol (ULTRAM) tablet 50 mg, Q6H PRN      Current Facility-Administered Medications   Medication Dose Route Frequency Provider Last Rate Last Admin    dextrose 10 % infusion   Intravenous Continuous Salvatore Luis Corbett MD 75 mL/hr at 12/29/20 0745 New Bag at 12/29/20 0745    amiodarone (CORDARONE) 150 mg in dextrose 5 % 100 mL bolus  150 mg Intravenous Once Ester Rivera MD        Followed by   Elizabeth Robles amiodarone (CORDARONE) 450 mg in dextrose 5 % 250 mL infusion  1 mg/min Intravenous Continuous Ester Rivera MD        Followed by   Elizabeth Robles amiodarone (CORDARONE) 450 mg in dextrose 5 % 250 mL infusion  0.5 mg/min Intravenous Continuous Ester Rivera MD        dilTIAZem 100 mg in dextrose 5 % 100 mL infusion ([facility])  5 mg/hr Intravenous Continuous Ricardo Moulton PA-C   Stopped at 12/29/20 2834    ipratropium-albuterol (DUONEB) nebulizer solution 1 ampule  1 ampule Inhalation Q4H PRN Ricardo Moulton PA-C        apixaban (ELIQUIS) tablet 5 mg  5 mg Oral BID Mlramses Moulton PA-C   5 mg at 12/29/20 0759    aspirin EC tablet 81 mg  81 mg Oral Daily Mlramses Moulton PA-C   81 mg at 12/29/20 0755    atorvastatin (LIPITOR) tablet 40 mg  40 mg Oral Nightly Annia Bhavna, PA-C   40 mg at 12/28/20 2040    cloNIDine (CATAPRES) tablet 0.3 mg  0.3 mg Oral Nightly Annia Bhavna, PA-C   0.3 mg at 12/28/20 2039    furosemide (LASIX) tablet 40 mg  40 mg Oral BID Annia Bhavna, PA-C   40 mg at 12/29/20 0757    levothyroxine (SYNTHROID) tablet 50 mcg  50 mcg Oral Daily Annia Bhavna, PA-C   50 mcg at 12/29/20 5325    lisinopril (PRINIVIL;ZESTRIL) tablet 5 mg  5 mg Oral Daily Annia Bhavna, PA-C   5 mg at 12/29/20 8659    metoprolol succinate (TOPROL XL) extended release tablet 100 mg  100 mg Oral Daily Annia Bhavna, PA-C   100 mg at 12/29/20 0758    pantoprazole (PROTONIX) tablet 40 mg  40 mg Oral Daily Annia Bhavna, PA-C   40 mg at 12/29/20 7981    sodium chloride flush 0.9 % injection 10 mL  10 mL Intravenous 2 times per day Annia Bhavna, PA-C   10 mL at 12/29/20 5653    sodium chloride flush 0.9 % injection 10 mL  10 mL Intravenous PRN Annia Bhavna, PA-C        promethazine (PHENERGAN) tablet 12.5 mg  12.5 mg Oral Q6H PRN Annia Bhavna, PA-C   12.5 mg at 12/28/20 2039    Or    ondansetron (ZOFRAN) injection 4 mg  4 mg Intravenous Q6H PRN Annia Bhavna, PA-C        polyethylene glycol (GLYCOLAX) packet 17 g  17 g Oral Daily PRN Annia Bhavna, PA-C        acetaminophen (TYLENOL) tablet 650 mg  650 mg Oral Q6H PRN Annia Bhavna, PA-C   650 mg at 12/29/20 2030    Or    acetaminophen (TYLENOL) suppository 650 mg  650 mg Rectal Q6H PRN Annia Bhavna, PA-C        glucose (GLUTOSE) 40 % oral gel 15 g  15 g Oral PRN Annia Bhavna, PA-C        dextrose 50 % IV solution  12.5 g Intravenous PRN Annia Bhavna, PA-C   12.5 g at 12/29/20 0538    glucagon (rDNA) injection 1 mg  1 mg Intramuscular PRN Annia Huggins PA-C        dextrose 5 % solution  100 mL/hr Intravenous PRN Ana Arzate PA-C        insulin lispro (HUMALOG) injection vial 0-12 Units  0-12 Units Subcutaneous TID WC APRIL Martinez-ARINA        insulin lispro (HUMALOG) injection vial 0-6 Units  0-6 Units Subcutaneous Nightly Ana Arzate PA-C        traMADol Jennifer Ripper) tablet 50 mg  50 mg Oral Q6H PRN Salvatore Beckman MD   50 mg at 12/28/20 2040         Review of Systems:     · Constitutional: No Fever or Weight Loss   · Eyes: No Decreased Vision  · ENT: No Headaches, Hearing Loss or Vertigo  · Cardiovascular: No chest pain, dyspnea on exertion, palpitations or loss of consciousness  · Respiratory: No cough or wheezing    · Gastrointestinal: No abdominal pain, appetite loss, blood in stools, constipation, diarrhea or heartburn  · Genitourinary: No dysuria, trouble voiding, or hematuria  · Musculoskeletal:  No gait disturbance, weakness or joint complaints  · Integumentary: No rash or pruritis  · Neurological: + TIA or stroke symptoms  · Psychiatric: No anxiety or depression  · Endocrine: No malaise, fatigue or temperature intolerance  · Hematologic/Lymphatic: No bleeding problems, blood clots or swollen lymph nodes  · Allergic/Immunologic: No nasal congestion or hives    All other systems were reviewed and were negative otherwise. Physical Examination:      Vitals:    12/29/20 0743   BP: (!) 172/86   Pulse: 81   Resp: 20   Temp: 98.4 °F (36.9 °C)   SpO2:       Wt Readings from Last 3 Encounters:   12/28/20 170 lb (77.1 kg)   12/12/20 206 lb 4.8 oz (93.6 kg)   12/01/20 185 lb 4.8 oz (84.1 kg)     Body mass index is 32.12 kg/m². General Appearance:  No distress, conversant  Constitutional:  Well developed, Well nourished  HEENT:  Normocephalic, Atraumatic, Oropharynx moist, No oral exudates,   Nose normal. Neck Supple Carotid: no carotid bruit  Eyes:  Conjunctiva normal, No discharge.    Respiratory:    Normal breath sounds, No respiratory distress, No wheezing, no use of accessory muscles, diaphragm movement is normal  No chest Tenderness  Cardiovascular: S1-S2 No murmurs auscultated. No rubs, thrills or gallops. IRIR rhythm. Pedal pulses are normal. No pedal edema  GI:  Soft Non tender, non distended. :  No CVA tenderness. Musculoskeletal:   No tenderness, No cyanosis, No clubbing. Integument:  Warm, Dry, No erythema, No rash. Lymphatic:  No lymphadenopathy noted. Neurologic:  Alert & oriented x 3     Psychiatric:  Affect normal, Judgment normal, Mood normal.       Lab Review     Recent Labs     12/29/20 0249   WBC 8.5   HGB 9.8*   HCT 30.4*         Recent Labs     12/29/20 0249      K 4.0      CO2 25   BUN 11   CREATININE 1.1     Recent Labs     12/29/20 0249   AST 22   ALT 12   BILITOT 0.4   ALKPHOS 196*     No results for input(s): TROPONINI in the last 72 hours. Lab Results   Component Value Date     (H) 04/17/2013    BNP 61 05/07/2012    BNP 86 01/30/2012     Lab Results   Component Value Date    INR 1.20 12/29/2020    PROTIME 14.5 12/29/2020         All labs, images, EKGs were personally reviewed      Assessment: 60 y. o.year old with PMH of  has a past medical history of Abnormal PFTs (pulmonary function tests), Atrial fibrillation ([address] CAD (coronary artery disease), CHF (congestive heart failure) ([facility] Diabetes mellitus ([facility] [facility] Dizziness - light-headed, Family history of coronary artery disease, H/O 24 hour EKG monitoring, H/O cardiovascular stress test, H/O cardiovascular stress test, H/O echocardiogram, H/O echocardiogram, Heart imaging, Heartburn, History of cardiac cath, History of cardiac monitoring, History of cardioversion, History of MI (myocardial infarction), History of nuclear MUGA, History of nuclear Muga stress test, History of PTCA, Hx of cardiovascular stress test, Hx of transesophageal echocardiography (LUKE) for monitoring, Hyperlipidemia, Hypertension, Insomnia, SOB (shortness of breath), Tachycardia, and TIA (transient ischemic attack). Recommendations:      1. Atrial fibrillation with rapid ventricular response. Patient has a history of paroxysmal atrial fibrillation with prior ablation. We will start patient on IV amiodarone. Plan for LUKE cardioversion today. IV Cardizem can be stopped. With history of strokes hypertension, patient is high risk [id number] score. Continue with home dose of oral anticoagulation. Continue beta-blockers. 2. ? TIA/stroke. LUKE today to rule out PFO ASD or any other shunts. 3. Essential hypertension: Blood pressure controlled continue with lisinopril 5 mg daily, metoprolol  mg daily. 4. Nonobstructive CAD: Continue with medical management including aspirin beta blockers and statins.   5. Hyperlipidemia: Continue with Lipitor 40 mg daily      Thank you for the consult    Dr. Prosper Miguel  12/29/2020 9:35 AM

## 2020-12-29 NOTE — H&P
[address] [place] [address], [address] [place] [address]                              HISTORY AND PHYSICAL    PATIENT NAME: Cheryl Paiz                 :        1955  MED REC NO:   [id number]                          ROOM:       3616  ACCOUNT NO:   [de-identified]                           ADMIT DATE: 2020  PROVIDER:     Niall Jacobs    REASON FOR ADMISSION:  AFib with rapid ventricular rate. Also mental  status change and possible seizure-like activity. HISTORY OF PRESENT ILLNESS:  The patient is a 71-year-old female who has  a history of prior CVA, who was brought in by squad as the patient's   thought that the patient had a slurred speech. She has a  history of AFib for which the patient is on Eliquis and she takes those  medications on a regular basis. The patient does not have any history  of fever or chills. Does not have any history of sore throat. The  patient was recently diagnosed with pneumonia and hypoxia which have  gotten better. The patient also has a history of aneurysm of the brain  for which the patient is following at [place]. The patient had  multiple investigations in the emergency room including CTA which were  negative for any acute process. When I saw the patient, the patient's  speech was at baseline. The patient, however, said that she was feeling  hungry and little shaky because of the possibly low blood sugar. The  patient denied any headache. No blurred vision though. The patient  denies any history of new focal weakness normally. REVIEW OF SYSTEMS:  CONSTITUTIONAL:  No fever or chills. HENT:  Negative. EYES:  Negative. RS:  Negative. CVS:  Does not have any chest pain. The patient has some palpitations. No orthopnea or PND. GI:  Negative for nausea, vomiting, or diarrhea. ENDOCRINE:  Positive for diabetes. Negative for polyuria or polydipsia. GENITOURINARY:  Negative. MUSCULOSKELETAL:  Positive for arthralgia. SKIN:  Negative. ALLERGY/IMMUNOLOGY:  Negative. NEUROLOGIC:  As mentioned above. Had some difficulty with speech  earlier; however, the patient's speech is normal now. The patient did  have some numbness. HEMATOLOGIC:  Negative. PSYCHIATRIC:  Negative. PAST MEDICAL HISTORY:  Significant for atrial fibrillation, coronary  artery disease, congestive heart failure, diabetes. Also has a history  of hypertension, hyperlipidemia, insomnia, tachycardia. PAST SURGICAL HISTORY:  Significant for ablation of the dysrhythmic  focus,  section, cholecystectomy, PTCA. FAMILY HISTORY:  Significant for diabetes, heart disease. SOCIAL HISTORY:  The patient is , former smoker, quit smoking in  . Does not have any history of alcohol or drug use. ALLERGIES:  The patient is allergic to AMIODARONE, IV DYE, and VICODIN. HOME MEDICATIONS:  Include nitro p.r.n., lisinopril 5 mg daily,  metoprolol 100 mg one tablet by mouth daily, Lasix 40 mg daily,  albuterol HFA two puffs every 6 hours as needed, clonidine 0.3 mg at  night, Basaglar 65-70 units _____ at night, atorvastatin 40 mg daily,  metformin 500 mg two times a day, Protonix 40 mg daily, Synthroid is 50  mcg daily, Eliquis 5 mg twice a day, aspirin 81 mg daily. PHYSICAL EXAMINATION:  GENERAL:  Shows that she is not in distress. The patient has dry mouth  at this time. VITAL SIGNS:  Show temperature of 99.1, pulse is 111, respirations 20,  blood pressure is 134/80. When the patient came in, the patient's heart  rate was 167. HEENT:  The patient has a normocephalic head. No sinus tenderness. NECK:  Without any stiffness, thyromegaly. RS:  The patient has a fair air entry. No wheezing or rhonchi. CARDIOVASCULAR SYSTEM:  S1, S2 present. Rate and rhythm irregular. The  patient is tachycardic. ABDOMEN:  Soft, nontender. Bowel sounds are present. EXTREMITIES:  Without any clubbing, cyanosis, or edema. MUSCULOSKELETAL:  The patient has tenderness at the right upper  extremity at prior injury site. The patient has a bruise in that area. Non-inflamed other joints. NEUROLOGIC:  The patient's finger-to-nose test is normal.  Oriented x3. Cranial nerves are intact. The patient does have a mild facial droop  without any change in her baseline speech. LABORATORY DATA:  The patient's current blood sugar was 112. When I  asked the nurse to check in the emergency room, her blood sugar was 39. The patient's urine is negative for urinary tract infection. CT head is without any evidence of acute intracranial abnormality. The  patient's CT of the brain and neck is negative for any acute process. The patient does have multiple changes in the intracranial arteries but  they are not changed. The patient also has a small aneurysm which has  not changed. ASSESSMENT:  1.  AFib with RVR. 2.  Hypoglycemia, mostly secondary to not eating for last 20 hours. 3.  Slurred speech, currently back to normal and no evidence of acute  CVA. 4.  On anticoagulation. 5.  Diabetes. 6.  Hypertension. 7.  Hyperlipidemia. 8.  Coronary artery disease. 9.  Brain aneurysm. 10.  Recent injury and fracture of the right upper extremity. PLAN:  At this time, I will monitor the patient's condition closely. The patient is started on Cardizem drip. Cardiology consult will be  obtained. The patient's blood sugar will be monitored closely. The  patient's medications will be adjusted. The patient's overall condition  is fair at this time.         KAMERON ARIAS    D: 12/28/2020 23:53:35       T: 12/28/2020 23:58:06     [email or web address]  Job#: [id number]     Doc#: [id number]    CC:

## 2020-12-30 PROBLEM — M25.519 SHOULDER PAIN: Status: ACTIVE | Noted: 2020-01-01

## 2020-12-30 NOTE — PROGRESS NOTES
CARDIOLOGY PROGRESS NOTE                                                  Name:  Senia Rice /Age/Sex: 1955  (60 y.o. female)   MRN & CSN:  [id number] & [id number] Admission Date/Time: 2020  8:00 AM   Location:  3252/9286-I PCP: Vincent English PA-C         Admit Date:  2020  Hospital Day: 3      SUBJECTIVE:   Seen patient as follow up as consultation for Afib RVR, R/O stroke    Patient denies any chest pain, shortness of breath, dizziness, or syncope. TELEMETRY: Sinus       Intake/Output Summary (Last 24 hours) at 2020 1154  Last data filed at 2020 0915  Gross per 24 hour   Intake 540 ml   Output --   Net 540 ml       Assessment/Plan:     1. Atrial fibrillation with rapid ventricular response. Patient has a history of paroxysmal atrial fibrillation with prior ablation. Patient is currently in sinus rhythm, amiodarone 200 mg twice daily at discharge. With history of strokes hypertension, patient is high risk [id number] score. Continue with home dose of oral anticoagulation. Continue beta-blockers. 2. ? TIA/stroke. LUKE did not show PFO ASD or any other shunts. No significant valvular disease. No cardioversion as an patient converted on own. 3. Essential hypertension: slight elevation, continue with lisinopril 5 mg daily, metoprolol  mg daily. Outpatient titration of medications for better control. 4. Nonobstructive CAD: Continue with medical management including aspirin beta blockers and statins. 5. Hyperlipidemia: Continue with Lipitor 40 mg daily  6. Follow-up with cardiology as outpatient in 1 to 2 weeks.        Past medical history:    has a past medical history of Abnormal PFTs (pulmonary function tests), Atrial fibrillation ([facility] CAD (coronary artery disease), CHF (congestive heart failure) ([facility] Diabetes mellitus ([facility] Dizziness - light-headed, Family history of coronary artery disease, H/O 24 hour EKG monitoring, H/O cardiovascular stress test, H/O cardiovascular stress test, H/O echocardiogram, H/O echocardiogram, Heart imaging, Heartburn, History of cardiac cath, History of cardiac monitoring, History of cardioversion, History of MI (myocardial infarction), History of nuclear MUGA, History of nuclear Muga stress test, History of PTCA, Hx of cardiovascular stress test, Hx of transesophageal echocardiography (LUKE) for monitoring, Hyperlipidemia, Hypertension, Insomnia, SOB (shortness of breath), Tachycardia, and TIA (transient ischemic attack). Past surgical history:   has a past surgical history that includes  section; Tubal ligation; Cholecystectomy; Diagnostic Cardiac Cath Lab Procedure (2006, 2009); Percutaneous Transluminal Coronary Angio (10/06/2010); other surgical history (2017); and ablation of dysrhythmic focus. Social History:   reports that she quit smoking about 13 years ago. Her smoking use included cigarettes. She has a 60.00 pack-year smoking history. She has never used smokeless tobacco. She reports that she does not drink alcohol or use drugs. Family history:  family history includes Diabetes in her father; Heart Disease in her father. OBJECTIVE:     BP (!) 170/69   Pulse 82   Temp 98.3 °F (36.8 °C) (Oral)   Resp 19   Ht 5' 1\" (1.549 m)   Wt 190 lb 3.2 oz (86.3 kg)   SpO2 91%   BMI 35.94 kg/m²       Intake/Output Summary (Last 24 hours) at 2020 1154  Last data filed at 2020 0915  Gross per 24 hour   Intake 540 ml   Output --   Net 540 ml       Physical Exam:    Constitutional:  Well developed, Well nourished, No acute distress, Non-toxic appearance. HENT:  Normocephalic, Atraumatic, Bilateral external ears normal, Oropharynx moist, No oral exudates, Nose normal. Neck- Normal range of motion, No tenderness, Supple, No stridor. Eyes:  EOMI, Conjunctiva normal, No discharge. Respiratory:  Normal breath sounds, No respiratory distress, No wheezing, No chest tenderness. ,no use of accessory muscles, diaphragm movement is normal  Cardiovascular S1-S2 No Murmurs, added sounds. Normal rate rhythm. No rubs gallops. Carotid pulses and amplitude are normal no bruit noted. Pedal pulses normal femoral pulses normal.  No pedal edema  GI:  Bowel sounds normal, Soft, No tenderness  : No CVA tenderness. Musculoskeletal: No edema, No tenderness, No cyanosis, No clubbing. Back- No tenderness. Integument:  Warm, Dry, No erythema, No rash. Lymphatic:  No lymphadenopathy noted. Neurologic:  Alert & oriented x 3, No focal deficits noted.    Psychiatric:  Affect normal, Judgment normal, Mood normal.           MEDICATIONS:     amiodarone  200 mg Oral BID    apixaban  5 mg Oral BID    aspirin  81 mg Oral Daily    atorvastatin  40 mg Oral Nightly    cloNIDine  0.3 mg Oral Nightly    furosemide  40 mg Oral BID    levothyroxine  50 mcg Oral Daily    lisinopril  5 mg Oral Daily    metoprolol succinate  100 mg Oral Daily    pantoprazole  40 mg Oral Daily    sodium chloride flush  10 mL Intravenous 2 times per day    insulin lispro  0-12 Units Subcutaneous TID WC    insulin lispro  0-6 Units Subcutaneous Nightly      dextrose Stopped (12/29/20 1709)    dextrose       ipratropium-albuterol, sodium chloride flush, promethazine **OR** ondansetron, polyethylene glycol, acetaminophen **OR** acetaminophen, glucose, dextrose, glucagon (rDNA), dextrose, traMADol  Allergies   Allergen Reactions    Amiodarone Other (See Comments)     dizziness    Iv Dye [Iodides] Nausea And Vomiting    Vicodin [Hydrocodone-Acetaminophen] Nausea And Vomiting       Lab Data:  CBC:   Recent Labs     12/28/20  0812 12/29/20 0249 12/30/20 0317   WBC 12.2* 8.5 9.7   HGB 12.4* 9.8* 10.1*   HCT 42.1 30.4* 31.6*   .3* 96.8 98.1   * 356 358     BMP:   Recent Labs     12/28/20  0812 12/29/20 0249 12/30/20 0317   * 143 142   K 3.9 4.0 4.2   CL 99 109 107   CO2 20* 25 25   BUN 15 11 13   CREATININE 0.9 1.1 1.4*     LIVER PROFILE:   Recent Labs     12/28/20  0812 12/29/20  0249   AST 14* 22   ALT 10 12   LIPASE 10*  --    BILITOT 1.1* 0.4   ALKPHOS 124 196*     PT/INR:   Recent Labs     12/28/20  0812 12/29/20  0249   PROTIME 10.7* 14.5   INR 0.89 1.20     APTT:   Recent Labs     12/28/20 0812   APTT 26.3     BNP:  No results for input(s): BNP in the last 72 hours. Guy Riley MD 12/30/2020 11:54 AM           CARDIOLOGY ATTENDING ADDENDUM    I have seen, spoken to and examined this patient personally, independently of the nurse practitioner. I have reviewed the hospital care given to date and reviewed all pertinent labs and imaging. The plan was developed mutually at the time of the visit with the patient,  NP   and myself. I have spoken with patient, nursing staff and provided written and verbal instructions . The above note has been reviewed and I agree with the assessment, diagnosis, and treatment plan with changes made by me as follows       HPI:  I have reviewed the above HPI  And agree with above   Please review addendum/changes made to note above     Interval history:            Physical Exam:  General:  Awake, alert, NAD  Head:normal  Eye:normal  Neck:  No JVD   Chest:  Clear to auscultation, respiration easy  Cardiovascular:  s1s2  Abdomen:   nontender  Extremities:  no edema  Pulses; palpable  Neuro: grossly normal      MEDICAL DECISION MAKING;    I agree with the above plan, which was planned by myself and discussed with NP.     Paroxysmal atrial fibrillation  Amiodarone 200 mg twice daily on discharge  Outpatient follow-up    Dr. Amanda Kern MD

## 2020-12-30 NOTE — DISCHARGE SUMMARY
Garry Rudolph MD, Internal Medicine    [address] [facility]   [phone]   Patient ID  Shaneka Marcelo   1955  [id number]          Admit date: 12/28/2020   Discharge date: 12/30/2020      Admitting Physician: Kyree Coleman MD   Discharge Physician: Ana Arzate PA-C    Discharge Diagnoses:   A. Fib RVR s/p prior ablation: converted to sinus. On toprol and eliquis. Amiodarone started. Cardioversion not needed. LUKE non concerning. Hypoglycemia/Slurred speech: Improved given D10. Lantus held. CT head negative. CTA head/neck stable. She likely had hypoglycemic episode w/ BG 39 on admission. A1c is 6.9%. Hx NSVT  Recent Nondisplaced right fracture greater tuberosity of the proximal humerus: stable, monitor. Give pain med PRN. CHF: doing ok now. CPM.  CAD s/p stent: Troponin negative. Prior [facility] calcified vessels. Mild to moderate main vessel CAD. Significant small vessel disease of Diagonal1 & OM1. Global Hypokinesis with moderate to severely reduced LV function. LVEF is about 30 %. \" On ASA, statin, toprol, and lisinopril. DM2: A1c 6.9. Stop lantus. Continue SSI and other oral meds.   Hypothyroidism: On Synthroid.    HTN/HLD: [facility]      Patient Active Problem List   Diagnosis    History of CVA (cerebrovascular accident) without residual deficits    Morbid obesity ([address]    DM (diabetes mellitus), type 2, uncontrolled ([address]    Persistent atrial fibrillation ([address]    CAD (coronary artery disease)    CHF (congestive heart failure) ([address]    Light headed    History of MI (myocardial infarction)    Diabetes mellitus ([address]    Hypertension    TIA (transient ischemic attack)    Hyperlipidemia    SOB (shortness of breath)    H/O cardiovascular stress test    Family history of coronary artery disease    PAF (paroxysmal atrial fibrillation) (HCC)    Chest pain    Atrial thrombus without antecedent myocardial infarction    S/P ablation of atrial days, then take one tab by mouth twice daily             apixaban (ELIQUIS) 5 MG TABS tablet  Take 1 tablet by mouth 2 times daily             aspirin 81 MG EC tablet  Take 81 mg by mouth daily             atorvastatin (LIPITOR) 40 MG tablet  Take 40 mg by mouth nightly             cloNIDine (CATAPRES) 0.3 MG tablet  Take 0.3 mg by mouth nightly             furosemide (LASIX) 40 MG tablet  Take 1 tablet by mouth 2 times daily             levothyroxine (SYNTHROID) 50 MCG tablet  Take 50 mcg by mouth Daily             lisinopril (PRINIVIL;ZESTRIL) 5 MG tablet  Take 1 tablet by mouth daily             metFORMIN (GLUCOPHAGE) 500 MG tablet  Take 500 mg by mouth 2 times daily             metoprolol succinate (TOPROL XL) 100 MG extended release tablet  Take 1 tablet by mouth daily TAKE ONE TABLET BY MOUTH DAILY             nitroGLYCERIN (NITROSTAT) 0.4 MG SL tablet  up to max of 3 total doses. If no relief after 1 dose, call 911. pantoprazole (PROTONIX) 40 MG tablet  Take 40 mg by mouth daily             traMADol (ULTRAM) 50 MG tablet  Take 1 tablet by mouth every 6 hours as needed for Pain for up to 3 days. Activity: activity as tolerated  Diet: cardiac diet    Follow-up with Vincent nEglish PA-C in 5 days    Signed: Electronically signed by Moiz Araujo PA-C on 12/30/2020 at 12:46 PM  I have independently evaluated and examined this patient today. I have reviewed radiologic and biochemical tests on this patient. Management Plan is developed mutually with APRIL Chaudhary. I have reviewed above note and agree with assessment and plan.   Time spent on discharge 35 minutes

## 2021-01-01 ENCOUNTER — APPOINTMENT (OUTPATIENT)
Dept: GENERAL RADIOLOGY | Age: 66
DRG: 208 | End: 2021-01-01
Payer: MEDICARE

## 2021-01-01 ENCOUNTER — HOSPITAL ENCOUNTER (INPATIENT)
Age: 66
LOS: 28 days | Discharge: NURSING, REHAB OR LONG-TERM CARE | DRG: 720 | End: 2021-08-12
Attending: STUDENT IN AN ORGANIZED HEALTH CARE EDUCATION/TRAINING PROGRAM | Admitting: GENERAL PRACTICE
Payer: COMMERCIAL

## 2021-01-01 ENCOUNTER — APPOINTMENT (OUTPATIENT)
Dept: GENERAL RADIOLOGY | Age: 66
DRG: 720 | End: 2021-01-01
Payer: COMMERCIAL

## 2021-01-01 ENCOUNTER — APPOINTMENT (OUTPATIENT)
Dept: ULTRASOUND IMAGING | Age: 66
DRG: 637 | End: 2021-01-01
Payer: MEDICARE

## 2021-01-01 ENCOUNTER — HOSPITAL ENCOUNTER (INPATIENT)
Age: 66
LOS: 6 days | Discharge: NURSING, REHAB OR LONG-TERM CARE | DRG: 463 | End: 2021-09-09
Attending: INTERNAL MEDICINE | Admitting: INTERNAL MEDICINE
Payer: COMMERCIAL

## 2021-01-01 ENCOUNTER — APPOINTMENT (OUTPATIENT)
Dept: GENERAL RADIOLOGY | Age: 66
DRG: 637 | End: 2021-01-01
Payer: MEDICARE

## 2021-01-01 ENCOUNTER — APPOINTMENT (OUTPATIENT)
Dept: CT IMAGING | Age: 66
DRG: 637 | End: 2021-01-01
Payer: MEDICARE

## 2021-01-01 ENCOUNTER — HOSPITAL ENCOUNTER (OUTPATIENT)
Age: 66
Setting detail: SPECIMEN
Discharge: HOME | End: 2021-11-22

## 2021-01-01 ENCOUNTER — APPOINTMENT (OUTPATIENT)
Dept: MRI IMAGING | Age: 66
DRG: 720 | End: 2021-01-01
Payer: COMMERCIAL

## 2021-01-01 ENCOUNTER — HOSPITAL ENCOUNTER (OUTPATIENT)
Age: 66
Setting detail: SPECIMEN
Discharge: HOME | End: 2021-12-08

## 2021-01-01 ENCOUNTER — HOSPITAL ENCOUNTER (INPATIENT)
Age: 66
LOS: 1 days | DRG: 208 | End: 2021-12-11
Attending: EMERGENCY MEDICINE | Admitting: INTERNAL MEDICINE
Payer: MEDICARE

## 2021-01-01 ENCOUNTER — APPOINTMENT (OUTPATIENT)
Dept: CT IMAGING | Age: 66
DRG: 720 | End: 2021-01-01
Payer: COMMERCIAL

## 2021-01-01 ENCOUNTER — APPOINTMENT (OUTPATIENT)
Dept: INTERVENTIONAL RADIOLOGY/VASCULAR | Age: 66
DRG: 720 | End: 2021-01-01
Payer: COMMERCIAL

## 2021-01-01 ENCOUNTER — HOSPITAL ENCOUNTER (OUTPATIENT)
Age: 66
Setting detail: SPECIMEN
Discharge: HOME | End: 2021-08-16

## 2021-01-01 ENCOUNTER — TELEPHONE (OUTPATIENT)
Dept: CARDIOLOGY CLINIC | Age: 66
End: 2021-01-01

## 2021-01-01 ENCOUNTER — HOSPITAL ENCOUNTER (OUTPATIENT)
Age: 66
Setting detail: SPECIMEN
Discharge: HOME | End: 2021-12-02

## 2021-01-01 ENCOUNTER — HOSPITAL ENCOUNTER (OUTPATIENT)
Age: 66
Setting detail: SPECIMEN
Discharge: HOME | End: 2021-11-23

## 2021-01-01 ENCOUNTER — HOSPITAL ENCOUNTER (OUTPATIENT)
Age: 66
Setting detail: SPECIMEN
Discharge: HOME | End: 2021-11-15

## 2021-01-01 ENCOUNTER — APPOINTMENT (OUTPATIENT)
Dept: ULTRASOUND IMAGING | Age: 66
DRG: 720 | End: 2021-01-01
Payer: COMMERCIAL

## 2021-01-01 ENCOUNTER — HOSPITAL ENCOUNTER (OUTPATIENT)
Age: 66
Setting detail: SPECIMEN
Discharge: HOME | End: 2021-12-10

## 2021-01-01 ENCOUNTER — APPOINTMENT (OUTPATIENT)
Dept: GENERAL RADIOLOGY | Age: 66
DRG: 201 | End: 2021-01-01
Payer: COMMERCIAL

## 2021-01-01 ENCOUNTER — HOSPITAL ENCOUNTER (INPATIENT)
Age: 66
LOS: 5 days | Discharge: HOME HEALTH | DRG: 637 | End: 2021-10-11
Attending: EMERGENCY MEDICINE | Admitting: GENERAL PRACTICE
Payer: MEDICARE

## 2021-01-01 ENCOUNTER — APPOINTMENT (OUTPATIENT)
Dept: CT IMAGING | Age: 66
DRG: 201 | End: 2021-01-01
Payer: COMMERCIAL

## 2021-01-01 ENCOUNTER — HOSPITAL ENCOUNTER (OUTPATIENT)
Age: 66
Setting detail: SPECIMEN
Discharge: HOME | End: 2021-11-30

## 2021-01-01 ENCOUNTER — HOSPITAL ENCOUNTER (OUTPATIENT)
Age: 66
Setting detail: SPECIMEN
Discharge: HOME | End: 2021-11-06

## 2021-01-01 ENCOUNTER — HOSPITAL ENCOUNTER (OUTPATIENT)
Age: 66
Setting detail: SPECIMEN
Discharge: HOME | End: 2021-11-08

## 2021-01-01 ENCOUNTER — HOSPITAL ENCOUNTER (INPATIENT)
Age: 66
LOS: 4 days | Discharge: HOME HEALTH | DRG: 201 | End: 2021-06-15
Attending: EMERGENCY MEDICINE | Admitting: GENERAL PRACTICE
Payer: COMMERCIAL

## 2021-01-01 ENCOUNTER — APPOINTMENT (OUTPATIENT)
Dept: CT IMAGING | Age: 66
DRG: 463 | End: 2021-01-01
Payer: COMMERCIAL

## 2021-01-01 ENCOUNTER — HOSPITAL ENCOUNTER (OUTPATIENT)
Age: 66
Setting detail: SPECIMEN
Discharge: HOME | End: 2021-11-05
Payer: MEDICARE

## 2021-01-01 VITALS
OXYGEN SATURATION: 98 % | DIASTOLIC BLOOD PRESSURE: 116 MMHG | WEIGHT: 159.4 LBS | RESPIRATION RATE: 24 BRPM | SYSTOLIC BLOOD PRESSURE: 158 MMHG | HEART RATE: 92 BPM | TEMPERATURE: 98.2 F | HEIGHT: 61 IN | BODY MASS INDEX: 30.09 KG/M2

## 2021-01-01 VITALS
OXYGEN SATURATION: 98 % | TEMPERATURE: 97.9 F | HEART RATE: 72 BPM | RESPIRATION RATE: 15 BRPM | DIASTOLIC BLOOD PRESSURE: 79 MMHG | SYSTOLIC BLOOD PRESSURE: 143 MMHG | HEIGHT: 61 IN | BODY MASS INDEX: 33.76 KG/M2 | WEIGHT: 178.8 LBS

## 2021-01-01 VITALS
RESPIRATION RATE: 16 BRPM | SYSTOLIC BLOOD PRESSURE: 145 MMHG | OXYGEN SATURATION: 95 % | TEMPERATURE: 97.8 F | WEIGHT: 162 LBS | HEART RATE: 83 BPM | BODY MASS INDEX: 31.8 KG/M2 | HEIGHT: 60 IN | DIASTOLIC BLOOD PRESSURE: 56 MMHG

## 2021-01-01 VITALS
DIASTOLIC BLOOD PRESSURE: 82 MMHG | SYSTOLIC BLOOD PRESSURE: 149 MMHG | WEIGHT: 187.17 LBS | HEART RATE: 89 BPM | HEIGHT: 60 IN | TEMPERATURE: 98.1 F | BODY MASS INDEX: 36.75 KG/M2 | OXYGEN SATURATION: 94 % | RESPIRATION RATE: 16 BRPM

## 2021-01-01 VITALS
BODY MASS INDEX: 36.71 KG/M2 | RESPIRATION RATE: 16 BRPM | DIASTOLIC BLOOD PRESSURE: 35 MMHG | SYSTOLIC BLOOD PRESSURE: 111 MMHG | WEIGHT: 187 LBS | OXYGEN SATURATION: 99 % | HEART RATE: 38 BPM | HEIGHT: 60 IN | TEMPERATURE: 97.1 F

## 2021-01-01 DIAGNOSIS — R06.03 RESPIRATORY DISTRESS: ICD-10-CM

## 2021-01-01 DIAGNOSIS — D64.9 ANEMIA, UNSPECIFIED TYPE: ICD-10-CM

## 2021-01-01 DIAGNOSIS — I46.9 CARDIAC ARREST (HCC): ICD-10-CM

## 2021-01-01 DIAGNOSIS — N30.00 ACUTE CYSTITIS WITHOUT HEMATURIA: Primary | ICD-10-CM

## 2021-01-01 DIAGNOSIS — R79.89 ELEVATED BRAIN NATRIURETIC PEPTIDE (BNP) LEVEL: ICD-10-CM

## 2021-01-01 DIAGNOSIS — J93.9 PNEUMOTHORAX, UNSPECIFIED TYPE: ICD-10-CM

## 2021-01-01 DIAGNOSIS — I50.23 ACUTE ON CHRONIC SYSTOLIC CONGESTIVE HEART FAILURE (HCC): ICD-10-CM

## 2021-01-01 DIAGNOSIS — D72.829 LEUKOCYTOSIS, UNSPECIFIED TYPE: ICD-10-CM

## 2021-01-01 DIAGNOSIS — R73.9 HYPERGLYCEMIA: Primary | ICD-10-CM

## 2021-01-01 DIAGNOSIS — A41.9 SEPTICEMIA (HCC): ICD-10-CM

## 2021-01-01 DIAGNOSIS — F41.9 ANXIETY: ICD-10-CM

## 2021-01-01 DIAGNOSIS — R53.1 GENERALIZED WEAKNESS: ICD-10-CM

## 2021-01-01 DIAGNOSIS — E87.20 LACTIC ACIDEMIA: ICD-10-CM

## 2021-01-01 DIAGNOSIS — R74.01 TRANSAMINITIS: Primary | ICD-10-CM

## 2021-01-01 DIAGNOSIS — I48.91 ATRIAL FIBRILLATION WITH RVR (HCC): Primary | ICD-10-CM

## 2021-01-01 DIAGNOSIS — E16.2 HYPOGLYCEMIA: ICD-10-CM

## 2021-01-01 DIAGNOSIS — J96.00 ACUTE RESPIRATORY FAILURE, UNSPECIFIED WHETHER WITH HYPOXIA OR HYPERCAPNIA (HCC): Primary | ICD-10-CM

## 2021-01-01 DIAGNOSIS — R60.9 PERIPHERAL EDEMA: ICD-10-CM

## 2021-01-01 DIAGNOSIS — I50.9 CONGESTIVE HEART FAILURE, UNSPECIFIED HF CHRONICITY, UNSPECIFIED HEART FAILURE TYPE (HCC): ICD-10-CM

## 2021-01-01 DIAGNOSIS — E87.1 HYPONATREMIA: ICD-10-CM

## 2021-01-01 DIAGNOSIS — R77.8 TROPONIN LEVEL ELEVATED: ICD-10-CM

## 2021-01-01 DIAGNOSIS — R65.10 SIRS (SYSTEMIC INFLAMMATORY RESPONSE SYNDROME) (HCC): ICD-10-CM

## 2021-01-01 DIAGNOSIS — R09.02 HYPOXIA: ICD-10-CM

## 2021-01-01 DIAGNOSIS — R06.00 DYSPNEA, UNSPECIFIED TYPE: ICD-10-CM

## 2021-01-01 DIAGNOSIS — N17.9 ACUTE KIDNEY INJURY (HCC): ICD-10-CM

## 2021-01-01 DIAGNOSIS — I47.20 V-TACH: ICD-10-CM

## 2021-01-01 DIAGNOSIS — E87.5 HYPERKALEMIA: ICD-10-CM

## 2021-01-01 DIAGNOSIS — R79.89 SERUM CREATININE RAISED: ICD-10-CM

## 2021-01-01 LAB
ABO/RH: NORMAL
ALBUMIN SERPL-MCNC: 2.2 GM/DL (ref 3.4–5)
ALBUMIN SERPL-MCNC: 2.4 GM/DL (ref 3.4–5)
ALBUMIN SERPL-MCNC: 2.5 GM/DL (ref 3.4–5)
ALBUMIN SERPL-MCNC: 2.6 GM/DL (ref 3.4–5)
ALBUMIN SERPL-MCNC: 2.8 GM/DL (ref 3.4–5)
ALBUMIN SERPL-MCNC: 2.9 GM/DL (ref 3.4–5)
ALBUMIN SERPL-MCNC: 3 GM/DL (ref 3.4–5)
ALBUMIN SERPL-MCNC: 3 GM/DL (ref 3.4–5)
ALBUMIN SERPL-MCNC: 3.1 GM/DL (ref 3.4–5)
ALBUMIN SERPL-MCNC: 3.2 GM/DL (ref 3.4–5)
ALBUMIN SERPL-MCNC: 3.3 GM/DL (ref 3.4–5)
ALBUMIN SERPL-MCNC: 3.4 GM/DL (ref 3.4–5)
ALBUMIN SERPL-MCNC: 3.5 GM/DL (ref 3.4–5)
ALBUMIN SERPL-MCNC: 3.6 GM/DL (ref 3.4–5)
ALBUMIN SERPL-MCNC: 3.6 GM/DL (ref 3.4–5)
ALBUMIN SERPL-MCNC: 3.8 GM/DL (ref 3.4–5)
ALBUMIN SERPL-MCNC: 3.9 GM/DL (ref 3.4–5)
ALBUMIN SERPL-MCNC: 4 GM/DL (ref 3.4–5)
ALBUMIN SERPL-MCNC: 4 GM/DL (ref 3.4–5)
ALP BLD-CCNC: 101 IU/L (ref 40–128)
ALP BLD-CCNC: 103 IU/L (ref 40–128)
ALP BLD-CCNC: 104 IU/L (ref 40–128)
ALP BLD-CCNC: 105 IU/L (ref 40–128)
ALP BLD-CCNC: 106 IU/L (ref 40–129)
ALP BLD-CCNC: 107 IU/L (ref 40–128)
ALP BLD-CCNC: 109 IU/L (ref 40–128)
ALP BLD-CCNC: 110 IU/L (ref 40–128)
ALP BLD-CCNC: 111 IU/L (ref 40–129)
ALP BLD-CCNC: 118 IU/L (ref 40–128)
ALP BLD-CCNC: 119 IU/L (ref 40–128)
ALP BLD-CCNC: 122 IU/L (ref 40–128)
ALP BLD-CCNC: 122 IU/L (ref 40–128)
ALP BLD-CCNC: 123 IU/L (ref 40–129)
ALP BLD-CCNC: 125 IU/L (ref 40–128)
ALP BLD-CCNC: 126 IU/L (ref 40–128)
ALP BLD-CCNC: 126 IU/L (ref 40–128)
ALP BLD-CCNC: 128 IU/L (ref 40–128)
ALP BLD-CCNC: 130 IU/L (ref 40–128)
ALP BLD-CCNC: 137 IU/L (ref 40–128)
ALP BLD-CCNC: 144 IU/L (ref 40–128)
ALP BLD-CCNC: 145 IU/L (ref 40–128)
ALP BLD-CCNC: 147 IU/L (ref 40–128)
ALP BLD-CCNC: 148 IU/L (ref 40–129)
ALP BLD-CCNC: 152 IU/L (ref 40–128)
ALP BLD-CCNC: 158 IU/L (ref 40–129)
ALP BLD-CCNC: 160 IU/L (ref 40–129)
ALP BLD-CCNC: 162 IU/L (ref 40–128)
ALP BLD-CCNC: 162 IU/L (ref 40–128)
ALP BLD-CCNC: 164 IU/L (ref 40–128)
ALP BLD-CCNC: 167 IU/L (ref 40–129)
ALP BLD-CCNC: 172 IU/L (ref 40–128)
ALP BLD-CCNC: 173 IU/L (ref 40–128)
ALP BLD-CCNC: 173 IU/L (ref 40–129)
ALP BLD-CCNC: 174 IU/L (ref 40–128)
ALP BLD-CCNC: 177 IU/L (ref 40–129)
ALP BLD-CCNC: 191 IU/L (ref 40–129)
ALP BLD-CCNC: 204 IU/L (ref 40–128)
ALP BLD-CCNC: 204 IU/L (ref 40–129)
ALP BLD-CCNC: 205 IU/L (ref 40–129)
ALP BLD-CCNC: 215 IU/L (ref 40–129)
ALP BLD-CCNC: 228 IU/L (ref 40–129)
ALP BLD-CCNC: 233 IU/L (ref 40–129)
ALP BLD-CCNC: 243 IU/L (ref 40–129)
ALP BLD-CCNC: 88 IU/L (ref 40–129)
ALP BLD-CCNC: 94 IU/L (ref 40–128)
ALP BLD-CCNC: 99 IU/L (ref 40–128)
ALT SERPL-CCNC: 10 U/L (ref 10–40)
ALT SERPL-CCNC: 10 U/L (ref 10–40)
ALT SERPL-CCNC: 103 U/L (ref 10–40)
ALT SERPL-CCNC: 11 U/L (ref 10–40)
ALT SERPL-CCNC: 11 U/L (ref 10–40)
ALT SERPL-CCNC: 113 U/L (ref 10–40)
ALT SERPL-CCNC: 12 U/L (ref 10–40)
ALT SERPL-CCNC: 13 U/L (ref 10–40)
ALT SERPL-CCNC: 13 U/L (ref 10–40)
ALT SERPL-CCNC: 133 U/L (ref 10–40)
ALT SERPL-CCNC: 14 U/L (ref 10–40)
ALT SERPL-CCNC: 142 U/L (ref 10–40)
ALT SERPL-CCNC: 15 U/L (ref 10–40)
ALT SERPL-CCNC: 16 U/L (ref 10–40)
ALT SERPL-CCNC: 168 U/L (ref 10–40)
ALT SERPL-CCNC: 17 U/L (ref 10–40)
ALT SERPL-CCNC: 17 U/L (ref 10–40)
ALT SERPL-CCNC: 18 U/L (ref 10–40)
ALT SERPL-CCNC: 19 U/L (ref 10–40)
ALT SERPL-CCNC: 19 U/L (ref 10–40)
ALT SERPL-CCNC: 194 U/L (ref 10–40)
ALT SERPL-CCNC: 20 U/L (ref 10–40)
ALT SERPL-CCNC: 204 U/L (ref 10–40)
ALT SERPL-CCNC: 216 U/L (ref 10–40)
ALT SERPL-CCNC: 24 U/L (ref 10–40)
ALT SERPL-CCNC: 24 U/L (ref 10–40)
ALT SERPL-CCNC: 25 U/L (ref 10–40)
ALT SERPL-CCNC: 254 U/L (ref 10–40)
ALT SERPL-CCNC: 283 U/L (ref 10–40)
ALT SERPL-CCNC: 289 U/L (ref 10–40)
ALT SERPL-CCNC: 29 U/L (ref 10–40)
ALT SERPL-CCNC: 36 U/L (ref 10–40)
ALT SERPL-CCNC: 365 U/L (ref 10–40)
ALT SERPL-CCNC: 46 U/L (ref 10–40)
ALT SERPL-CCNC: 48 U/L (ref 10–40)
ALT SERPL-CCNC: 55 U/L (ref 10–40)
ALT SERPL-CCNC: 55 U/L (ref 10–40)
ALT SERPL-CCNC: 56 U/L (ref 10–40)
ALT SERPL-CCNC: 72 U/L (ref 10–40)
ALT SERPL-CCNC: 782 U/L (ref 10–40)
ALT SERPL-CCNC: 98 U/L (ref 10–40)
AMMONIA: 14 UMOL/L (ref 11–51)
AMMONIA: 18 UMOL/L (ref 11–51)
AMPHETAMINES: NEGATIVE
ANION GAP SERPL CALCULATED.3IONS-SCNC: 10 MMOL/L (ref 4–16)
ANION GAP SERPL CALCULATED.3IONS-SCNC: 11 MMOL/L (ref 4–16)
ANION GAP SERPL CALCULATED.3IONS-SCNC: 12 MMOL/L (ref 4–16)
ANION GAP SERPL CALCULATED.3IONS-SCNC: 13 MMOL/L (ref 4–16)
ANION GAP SERPL CALCULATED.3IONS-SCNC: 14 MMOL/L (ref 4–16)
ANION GAP SERPL CALCULATED.3IONS-SCNC: 14 MMOL/L (ref 4–16)
ANION GAP SERPL CALCULATED.3IONS-SCNC: 15 MMOL/L (ref 4–16)
ANION GAP SERPL CALCULATED.3IONS-SCNC: 16 MMOL/L (ref 4–16)
ANION GAP SERPL CALCULATED.3IONS-SCNC: 17 MMOL/L (ref 4–16)
ANION GAP SERPL CALCULATED.3IONS-SCNC: 17 MMOL/L (ref 4–16)
ANION GAP SERPL CALCULATED.3IONS-SCNC: 19 MMOL/L (ref 4–16)
ANION GAP SERPL CALCULATED.3IONS-SCNC: 20 MMOL/L (ref 4–16)
ANION GAP SERPL CALCULATED.3IONS-SCNC: 22 MMOL/L (ref 4–16)
ANION GAP SERPL CALCULATED.3IONS-SCNC: 31 MMOL/L (ref 4–16)
ANION GAP SERPL CALCULATED.3IONS-SCNC: 4 MMOL/L (ref 4–16)
ANION GAP SERPL CALCULATED.3IONS-SCNC: 5 MMOL/L (ref 4–16)
ANION GAP SERPL CALCULATED.3IONS-SCNC: 6 MMOL/L (ref 4–16)
ANION GAP SERPL CALCULATED.3IONS-SCNC: 7 MMOL/L (ref 4–16)
ANION GAP SERPL CALCULATED.3IONS-SCNC: 8 MMOL/L (ref 4–16)
ANION GAP SERPL CALCULATED.3IONS-SCNC: 9 MMOL/L (ref 4–16)
ANISOCYTOSIS: ABNORMAL
ANTIBODY SCREEN: NEGATIVE
APTT: 23.6 SECONDS (ref 25.1–37.1)
APTT: 46.3 SECONDS (ref 25.1–37.1)
AST SERPL-CCNC: 111 IU/L (ref 15–37)
AST SERPL-CCNC: 12 IU/L (ref 15–37)
AST SERPL-CCNC: 13 IU/L (ref 15–37)
AST SERPL-CCNC: 130 IU/L (ref 15–37)
AST SERPL-CCNC: 148 IU/L (ref 15–37)
AST SERPL-CCNC: 15 IU/L (ref 15–37)
AST SERPL-CCNC: 152 IU/L (ref 15–37)
AST SERPL-CCNC: 16 IU/L (ref 15–37)
AST SERPL-CCNC: 17 IU/L (ref 15–37)
AST SERPL-CCNC: 18 IU/L (ref 15–37)
AST SERPL-CCNC: 18 IU/L (ref 15–37)
AST SERPL-CCNC: 19 IU/L (ref 15–37)
AST SERPL-CCNC: 19 IU/L (ref 15–37)
AST SERPL-CCNC: 20 IU/L (ref 15–37)
AST SERPL-CCNC: 21 IU/L (ref 15–37)
AST SERPL-CCNC: 21 IU/L (ref 15–37)
AST SERPL-CCNC: 216 IU/L (ref 15–37)
AST SERPL-CCNC: 22 IU/L (ref 15–37)
AST SERPL-CCNC: 22 IU/L (ref 15–37)
AST SERPL-CCNC: 23 IU/L (ref 15–37)
AST SERPL-CCNC: 23 IU/L (ref 15–37)
AST SERPL-CCNC: 24 IU/L (ref 15–37)
AST SERPL-CCNC: 246 IU/L (ref 15–37)
AST SERPL-CCNC: 25 IU/L (ref 15–37)
AST SERPL-CCNC: 2752 IU/L (ref 15–37)
AST SERPL-CCNC: 28 IU/L (ref 15–37)
AST SERPL-CCNC: 29 IU/L (ref 15–37)
AST SERPL-CCNC: 29 IU/L (ref 15–37)
AST SERPL-CCNC: 32 IU/L (ref 15–37)
AST SERPL-CCNC: 32 IU/L (ref 15–37)
AST SERPL-CCNC: 329 IU/L (ref 15–37)
AST SERPL-CCNC: 33 IU/L (ref 15–37)
AST SERPL-CCNC: 33 IU/L (ref 15–37)
AST SERPL-CCNC: 34 IU/L (ref 15–37)
AST SERPL-CCNC: 38 IU/L (ref 15–37)
AST SERPL-CCNC: 43 IU/L (ref 15–37)
AST SERPL-CCNC: 459 IU/L (ref 15–37)
AST SERPL-CCNC: 484 IU/L (ref 15–37)
AST SERPL-CCNC: 57 IU/L (ref 15–37)
AST SERPL-CCNC: 84 IU/L (ref 15–37)
BACTERIA: ABNORMAL /HPF
BACTERIA: NEGATIVE /HPF
BANDED NEUTROPHILS ABSOLUTE COUNT: 3.33 K/CU MM
BANDED NEUTROPHILS RELATIVE PERCENT: 7 % (ref 5–11)
BARBITURATE SCREEN URINE: NEGATIVE
BASE EXCESS MIXED: 1.7 (ref 0–2.3)
BASE EXCESS: 14 (ref 0–2.4)
BASE EXCESS: 4 (ref 0–2.4)
BASE EXCESS: 7 (ref 0–2.4)
BASOPHILS ABSOLUTE: 0 K/CU MM
BASOPHILS ABSOLUTE: 0.1 K/CU MM
BASOPHILS RELATIVE PERCENT: 0 % (ref 0–1)
BASOPHILS RELATIVE PERCENT: 0 % (ref 0–1)
BASOPHILS RELATIVE PERCENT: 0.1 % (ref 0–1)
BASOPHILS RELATIVE PERCENT: 0.2 % (ref 0–1)
BASOPHILS RELATIVE PERCENT: 0.3 % (ref 0–1)
BASOPHILS RELATIVE PERCENT: 0.4 % (ref 0–1)
BASOPHILS RELATIVE PERCENT: 0.5 % (ref 0–1)
BASOPHILS RELATIVE PERCENT: 0.5 % (ref 0–1)
BASOPHILS RELATIVE PERCENT: 0.6 % (ref 0–1)
BASOPHILS RELATIVE PERCENT: 0.6 % (ref 0–1)
BASOPHILS RELATIVE PERCENT: 0.9 % (ref 0–1)
BENZODIAZEPINE SCREEN, URINE: ABNORMAL
BETA-HYDROXYBUTYRATE: 6.7 MG/DL (ref 0–3)
BILIRUB SERPL-MCNC: 0.4 MG/DL (ref 0–1)
BILIRUB SERPL-MCNC: 0.5 MG/DL (ref 0–1)
BILIRUB SERPL-MCNC: 0.6 MG/DL (ref 0–1)
BILIRUB SERPL-MCNC: 0.7 MG/DL (ref 0–1)
BILIRUB SERPL-MCNC: 0.8 MG/DL (ref 0–1)
BILIRUB SERPL-MCNC: 0.9 MG/DL (ref 0–1)
BILIRUB SERPL-MCNC: 0.9 MG/DL (ref 0–1)
BILIRUB SERPL-MCNC: 1 MG/DL (ref 0–1)
BILIRUB SERPL-MCNC: 1.1 MG/DL (ref 0–1)
BILIRUB SERPL-MCNC: 1.1 MG/DL (ref 0–1)
BILIRUB SERPL-MCNC: 1.2 MG/DL (ref 0–1)
BILIRUB SERPL-MCNC: 1.2 MG/DL (ref 0–1)
BILIRUB SERPL-MCNC: 1.3 MG/DL (ref 0–1)
BILIRUB SERPL-MCNC: 1.4 MG/DL (ref 0–1)
BILIRUB SERPL-MCNC: 1.5 MG/DL (ref 0–1)
BILIRUB SERPL-MCNC: 1.7 MG/DL (ref 0–1)
BILIRUB SERPL-MCNC: 2.3 MG/DL (ref 0–1)
BILIRUB SERPL-MCNC: 2.5 MG/DL (ref 0–1)
BILIRUB SERPL-MCNC: 2.7 MG/DL (ref 0–1)
BILIRUB SERPL-MCNC: 3.6 MG/DL (ref 0–1)
BILIRUB SERPL-MCNC: 3.6 MG/DL (ref 0–1)
BILIRUBIN DIRECT: 1 MG/DL (ref 0–0.3)
BILIRUBIN DIRECT: 2.1 MG/DL (ref 0–0.3)
BILIRUBIN URINE: NEGATIVE MG/DL
BILIRUBIN, INDIRECT: 0.4 MG/DL (ref 0–0.7)
BILIRUBIN, INDIRECT: 1.5 MG/DL (ref 0–0.7)
BLOOD, URINE: ABNORMAL
BLOOD, URINE: NEGATIVE
BLOOD, URINE: NEGATIVE
BUN BLDV-MCNC: 12 MG/DL (ref 6–23)
BUN BLDV-MCNC: 13 MG/DL (ref 6–23)
BUN BLDV-MCNC: 14 MG/DL (ref 6–23)
BUN BLDV-MCNC: 15 MG/DL (ref 6–23)
BUN BLDV-MCNC: 16 MG/DL (ref 6–23)
BUN BLDV-MCNC: 16 MG/DL (ref 6–23)
BUN BLDV-MCNC: 18 MG/DL (ref 6–23)
BUN BLDV-MCNC: 19 MG/DL (ref 6–23)
BUN BLDV-MCNC: 20 MG/DL (ref 6–23)
BUN BLDV-MCNC: 21 MG/DL (ref 6–23)
BUN BLDV-MCNC: 22 MG/DL (ref 6–23)
BUN BLDV-MCNC: 22 MG/DL (ref 6–23)
BUN BLDV-MCNC: 23 MG/DL (ref 6–23)
BUN BLDV-MCNC: 24 MG/DL (ref 6–23)
BUN BLDV-MCNC: 25 MG/DL (ref 6–23)
BUN BLDV-MCNC: 25 MG/DL (ref 6–23)
BUN BLDV-MCNC: 26 MG/DL (ref 6–23)
BUN BLDV-MCNC: 27 MG/DL (ref 6–23)
BUN BLDV-MCNC: 27 MG/DL (ref 6–23)
BUN BLDV-MCNC: 28 MG/DL (ref 6–23)
BUN BLDV-MCNC: 29 MG/DL (ref 6–23)
BUN BLDV-MCNC: 29 MG/DL (ref 6–23)
BUN BLDV-MCNC: 30 MG/DL (ref 6–23)
BUN BLDV-MCNC: 30 MG/DL (ref 6–23)
BUN BLDV-MCNC: 31 MG/DL (ref 6–23)
BUN BLDV-MCNC: 32 MG/DL (ref 6–23)
BUN BLDV-MCNC: 32 MG/DL (ref 6–23)
BUN BLDV-MCNC: 33 MG/DL (ref 6–23)
BUN BLDV-MCNC: 36 MG/DL (ref 6–23)
BUN BLDV-MCNC: 36 MG/DL (ref 6–23)
BUN BLDV-MCNC: 38 MG/DL (ref 6–23)
BUN BLDV-MCNC: 41 MG/DL (ref 6–23)
BUN BLDV-MCNC: 41 MG/DL (ref 6–23)
BUN BLDV-MCNC: 42 MG/DL (ref 6–23)
BUN BLDV-MCNC: 50 MG/DL (ref 6–23)
BUN BLDV-MCNC: 52 MG/DL (ref 6–23)
BUN BLDV-MCNC: 55 MG/DL (ref 6–23)
BURR CELLS: ABNORMAL
CALCIUM OXALATE CRYSTALS: ABNORMAL /HPF
CALCIUM SERPL-MCNC: 7.5 MG/DL (ref 8.3–10.6)
CALCIUM SERPL-MCNC: 7.7 MG/DL (ref 8.3–10.6)
CALCIUM SERPL-MCNC: 7.8 MG/DL (ref 8.3–10.6)
CALCIUM SERPL-MCNC: 7.9 MG/DL (ref 8.3–10.6)
CALCIUM SERPL-MCNC: 7.9 MG/DL (ref 8.3–10.6)
CALCIUM SERPL-MCNC: 8 MG/DL (ref 8.3–10.6)
CALCIUM SERPL-MCNC: 8.1 MG/DL (ref 8.3–10.6)
CALCIUM SERPL-MCNC: 8.2 MG/DL (ref 8.3–10.6)
CALCIUM SERPL-MCNC: 8.3 MG/DL (ref 8.3–10.6)
CALCIUM SERPL-MCNC: 8.3 MG/DL (ref 8.3–10.6)
CALCIUM SERPL-MCNC: 8.4 MG/DL (ref 8.3–10.6)
CALCIUM SERPL-MCNC: 8.5 MG/DL (ref 8.3–10.6)
CALCIUM SERPL-MCNC: 8.6 MG/DL (ref 8.3–10.6)
CALCIUM SERPL-MCNC: 8.7 MG/DL (ref 8.3–10.6)
CALCIUM SERPL-MCNC: 8.8 MG/DL (ref 8.3–10.6)
CALCIUM SERPL-MCNC: 8.8 MG/DL (ref 8.3–10.6)
CALCIUM SERPL-MCNC: 8.9 MG/DL (ref 8.3–10.6)
CALCIUM SERPL-MCNC: 8.9 MG/DL (ref 8.3–10.6)
CALCIUM SERPL-MCNC: 9 MG/DL (ref 8.3–10.6)
CALCIUM SERPL-MCNC: 9.1 MG/DL (ref 8.3–10.6)
CALCIUM SERPL-MCNC: 9.2 MG/DL (ref 8.3–10.6)
CALCIUM SERPL-MCNC: 9.3 MG/DL (ref 8.3–10.6)
CALCIUM SERPL-MCNC: 9.3 MG/DL (ref 8.3–10.6)
CALCIUM SERPL-MCNC: 9.4 MG/DL (ref 8.3–10.6)
CALCIUM SERPL-MCNC: 9.5 MG/DL (ref 8.3–10.6)
CALCIUM SERPL-MCNC: 9.7 MG/DL (ref 8.3–10.6)
CANNABINOID SCREEN URINE: NEGATIVE
CARBON MONOXIDE, BLOOD: 2.3 % (ref 0–5)
CARBON MONOXIDE, BLOOD: 2.5 % (ref 0–5)
CHLORIDE BLD-SCNC: 100 MMOL/L (ref 99–110)
CHLORIDE BLD-SCNC: 101 MMOL/L (ref 99–110)
CHLORIDE BLD-SCNC: 102 MMOL/L (ref 99–110)
CHLORIDE BLD-SCNC: 103 MMOL/L (ref 99–110)
CHLORIDE BLD-SCNC: 105 MMOL/L (ref 99–110)
CHLORIDE BLD-SCNC: 105 MMOL/L (ref 99–110)
CHLORIDE BLD-SCNC: 87 MMOL/L (ref 99–110)
CHLORIDE BLD-SCNC: 88 MMOL/L (ref 99–110)
CHLORIDE BLD-SCNC: 88 MMOL/L (ref 99–110)
CHLORIDE BLD-SCNC: 89 MMOL/L (ref 99–110)
CHLORIDE BLD-SCNC: 91 MMOL/L (ref 99–110)
CHLORIDE BLD-SCNC: 91 MMOL/L (ref 99–110)
CHLORIDE BLD-SCNC: 93 MMOL/L (ref 99–110)
CHLORIDE BLD-SCNC: 94 MMOL/L (ref 99–110)
CHLORIDE BLD-SCNC: 95 MMOL/L (ref 99–110)
CHLORIDE BLD-SCNC: 95 MMOL/L (ref 99–110)
CHLORIDE BLD-SCNC: 96 MMOL/L (ref 99–110)
CHLORIDE BLD-SCNC: 97 MMOL/L (ref 99–110)
CHLORIDE BLD-SCNC: 98 MMOL/L (ref 99–110)
CHLORIDE BLD-SCNC: 99 MMOL/L (ref 99–110)
CHLORIDE URINE RANDOM: 47 MMOL/L (ref 43–210)
CLARITY: ABNORMAL
CLARITY: CLEAR
CO2 CONTENT: 14.7 MMOL/L (ref 19–24)
CO2 CONTENT: 25.4 MMOL/L (ref 19–24)
CO2: 11 MMOL/L (ref 21–32)
CO2: 19 MMOL/L (ref 21–32)
CO2: 20 MMOL/L (ref 21–32)
CO2: 20 MMOL/L (ref 21–32)
CO2: 21 MMOL/L (ref 21–32)
CO2: 23 MMOL/L (ref 21–32)
CO2: 24 MMOL/L (ref 21–32)
CO2: 24 MMOL/L (ref 21–32)
CO2: 25 MMOL/L (ref 21–32)
CO2: 26 MMOL/L (ref 21–32)
CO2: 27 MMOL/L (ref 21–32)
CO2: 28 MMOL/L (ref 21–32)
CO2: 29 MMOL/L (ref 21–32)
CO2: 30 MMOL/L (ref 21–32)
CO2: 31 MMOL/L (ref 21–32)
CO2: 32 MMOL/L (ref 21–32)
CO2: 33 MMOL/L (ref 21–32)
CO2: 33 MMOL/L (ref 21–32)
CO2: 34 MMOL/L (ref 21–32)
CO2: 35 MMOL/L (ref 21–32)
CO2: 37 MMOL/L (ref 21–32)
COCAINE METABOLITE: NEGATIVE
COLOR: ABNORMAL
COLOR: YELLOW
COMMENT: ABNORMAL
CREAT SERPL-MCNC: 0.9 MG/DL (ref 0.6–1.1)
CREAT SERPL-MCNC: 1 MG/DL (ref 0.6–1.1)
CREAT SERPL-MCNC: 1.1 MG/DL (ref 0.6–1.1)
CREAT SERPL-MCNC: 1.2 MG/DL (ref 0.6–1.1)
CREAT SERPL-MCNC: 1.3 MG/DL (ref 0.6–1.1)
CREAT SERPL-MCNC: 1.4 MG/DL (ref 0.6–1.1)
CREAT SERPL-MCNC: 1.5 MG/DL (ref 0.6–1.1)
CREAT SERPL-MCNC: 1.6 MG/DL (ref 0.6–1.1)
CREAT SERPL-MCNC: 1.7 MG/DL (ref 0.6–1.1)
CREAT SERPL-MCNC: 1.8 MG/DL (ref 0.6–1.1)
CREAT SERPL-MCNC: 1.9 MG/DL (ref 0.6–1.1)
CREAT SERPL-MCNC: 2 MG/DL (ref 0.6–1.1)
CREAT SERPL-MCNC: 2.2 MG/DL (ref 0.6–1.1)
CREAT SERPL-MCNC: 2.3 MG/DL (ref 0.6–1.1)
CREAT SERPL-MCNC: 2.3 MG/DL (ref 0.6–1.1)
CREAT SERPL-MCNC: 2.6 MG/DL (ref 0.6–1.1)
CREAT SERPL-MCNC: 2.8 MG/DL (ref 0.6–1.1)
CREATININE URINE: 120.6 MG/DL (ref 28–217)
CREATININE URINE: 136.6 MG/DL (ref 28–217)
CREATININE URINE: 172.6 MG/DL (ref 28–217)
CREATININE URINE: 64 MG/DL (ref 28–217)
CULTURE: ABNORMAL
CULTURE: NORMAL
DIFFERENTIAL TYPE: ABNORMAL
DOSE AMOUNT: ABNORMAL
DOSE AMOUNT: ABNORMAL
DOSE AMOUNT: NORMAL
DOSE TIME: ABNORMAL
DOSE TIME: ABNORMAL
DOSE TIME: NORMAL
EKG ATRIAL RATE: 105 BPM
EKG ATRIAL RATE: 112 BPM
EKG ATRIAL RATE: 116 BPM
EKG ATRIAL RATE: 121 BPM
EKG ATRIAL RATE: 163 BPM
EKG ATRIAL RATE: 170 BPM
EKG ATRIAL RATE: 241 BPM
EKG ATRIAL RATE: 42 BPM
EKG ATRIAL RATE: 73 BPM
EKG ATRIAL RATE: 78 BPM
EKG DIAGNOSIS: NORMAL
EKG P AXIS: -21 DEGREES
EKG P AXIS: 51 DEGREES
EKG P AXIS: 53 DEGREES
EKG P AXIS: 75 DEGREES
EKG P AXIS: 94 DEGREES
EKG P-R INTERVAL: 158 MS
EKG P-R INTERVAL: 186 MS
EKG P-R INTERVAL: 192 MS
EKG P-R INTERVAL: 196 MS
EKG P-R INTERVAL: 210 MS
EKG Q-T INTERVAL: 310 MS
EKG Q-T INTERVAL: 320 MS
EKG Q-T INTERVAL: 332 MS
EKG Q-T INTERVAL: 344 MS
EKG Q-T INTERVAL: 348 MS
EKG Q-T INTERVAL: 388 MS
EKG Q-T INTERVAL: 390 MS
EKG Q-T INTERVAL: 442 MS
EKG Q-T INTERVAL: 446 MS
EKG Q-T INTERVAL: 478 MS
EKG Q-T INTERVAL: 542 MS
EKG QRS DURATION: 130 MS
EKG QRS DURATION: 134 MS
EKG QRS DURATION: 134 MS
EKG QRS DURATION: 136 MS
EKG QRS DURATION: 136 MS
EKG QRS DURATION: 138 MS
EKG QRS DURATION: 138 MS
EKG QRS DURATION: 140 MS
EKG QRS DURATION: 144 MS
EKG QRS DURATION: 146 MS
EKG QRS DURATION: 186 MS
EKG QTC CALCULATION (BAZETT): 389 MS
EKG QTC CALCULATION (BAZETT): 478 MS
EKG QTC CALCULATION (BAZETT): 491 MS
EKG QTC CALCULATION (BAZETT): 494 MS
EKG QTC CALCULATION (BAZETT): 496 MS
EKG QTC CALCULATION (BAZETT): 503 MS
EKG QTC CALCULATION (BAZETT): 515 MS
EKG QTC CALCULATION (BAZETT): 522 MS
EKG QTC CALCULATION (BAZETT): 523 MS
EKG QTC CALCULATION (BAZETT): 529 MS
EKG QTC CALCULATION (BAZETT): 559 MS
EKG R AXIS: -10 DEGREES
EKG R AXIS: -11 DEGREES
EKG R AXIS: -15 DEGREES
EKG R AXIS: -19 DEGREES
EKG R AXIS: -23 DEGREES
EKG R AXIS: -23 DEGREES
EKG R AXIS: -25 DEGREES
EKG R AXIS: -25 DEGREES
EKG R AXIS: -28 DEGREES
EKG R AXIS: -33 DEGREES
EKG R AXIS: -45 DEGREES
EKG T AXIS: 116 DEGREES
EKG T AXIS: 118 DEGREES
EKG T AXIS: 121 DEGREES
EKG T AXIS: 127 DEGREES
EKG T AXIS: 129 DEGREES
EKG T AXIS: 131 DEGREES
EKG T AXIS: 135 DEGREES
EKG T AXIS: 142 DEGREES
EKG T AXIS: 144 DEGREES
EKG T AXIS: 145 DEGREES
EKG T AXIS: 96 DEGREES
EKG VENTRICULAR RATE: 105 BPM
EKG VENTRICULAR RATE: 112 BPM
EKG VENTRICULAR RATE: 116 BPM
EKG VENTRICULAR RATE: 121 BPM
EKG VENTRICULAR RATE: 149 BPM
EKG VENTRICULAR RATE: 154 BPM
EKG VENTRICULAR RATE: 161 BPM
EKG VENTRICULAR RATE: 40 BPM
EKG VENTRICULAR RATE: 64 BPM
EKG VENTRICULAR RATE: 73 BPM
EKG VENTRICULAR RATE: 78 BPM
EOSINOPHILS ABSOLUTE: 0 K/CU MM
EOSINOPHILS ABSOLUTE: 0.1 K/CU MM
EOSINOPHILS ABSOLUTE: 0.1 K/CU MM
EOSINOPHILS ABSOLUTE: 0.2 K/CU MM
EOSINOPHILS ABSOLUTE: 0.3 K/CU MM
EOSINOPHILS ABSOLUTE: 0.3 K/CU MM
EOSINOPHILS ABSOLUTE: 0.4 K/CU MM
EOSINOPHILS ABSOLUTE: 0.4 K/CU MM
EOSINOPHILS ABSOLUTE: 0.5 K/CU MM
EOSINOPHILS ABSOLUTE: 0.5 K/CU MM
EOSINOPHILS ABSOLUTE: 0.7 K/CU MM
EOSINOPHILS RELATIVE PERCENT: 0 % (ref 0–3)
EOSINOPHILS RELATIVE PERCENT: 0.1 % (ref 0–3)
EOSINOPHILS RELATIVE PERCENT: 0.8 % (ref 0–3)
EOSINOPHILS RELATIVE PERCENT: 0.9 % (ref 0–3)
EOSINOPHILS RELATIVE PERCENT: 1 % (ref 0–3)
EOSINOPHILS RELATIVE PERCENT: 1.1 % (ref 0–3)
EOSINOPHILS RELATIVE PERCENT: 1.3 % (ref 0–3)
EOSINOPHILS RELATIVE PERCENT: 1.6 % (ref 0–3)
EOSINOPHILS RELATIVE PERCENT: 2 % (ref 0–3)
EOSINOPHILS RELATIVE PERCENT: 2.5 % (ref 0–3)
EOSINOPHILS RELATIVE PERCENT: 3.1 % (ref 0–3)
EOSINOPHILS RELATIVE PERCENT: 3.3 % (ref 0–3)
EOSINOPHILS RELATIVE PERCENT: 3.5 % (ref 0–3)
EOSINOPHILS RELATIVE PERCENT: 3.6 % (ref 0–3)
EOSINOPHILS RELATIVE PERCENT: 3.6 % (ref 0–3)
EOSINOPHILS RELATIVE PERCENT: 4.1 % (ref 0–3)
EOSINOPHILS RELATIVE PERCENT: 5.5 % (ref 0–3)
ESTIMATED AVERAGE GLUCOSE: 169 MG/DL
ESTIMATED AVERAGE GLUCOSE: 214 MG/DL
ESTIMATED AVERAGE GLUCOSE: 266 MG/DL
ESTIMATED AVERAGE GLUCOSE: 280 MG/DL
FERRITIN: 46 NG/ML (ref 15–150)
FOLATE: 9.9 NG/ML (ref 3.1–17.5)
FOLATE: >20 NG/ML (ref 3.1–17.5)
GFR AFRICAN AMERICAN: 21 ML/MIN/1.73M2
GFR AFRICAN AMERICAN: 22 ML/MIN/1.73M2
GFR AFRICAN AMERICAN: 26 ML/MIN/1.73M2
GFR AFRICAN AMERICAN: 26 ML/MIN/1.73M2
GFR AFRICAN AMERICAN: 27 ML/MIN/1.73M2
GFR AFRICAN AMERICAN: 30 ML/MIN/1.73M2
GFR AFRICAN AMERICAN: 32 ML/MIN/1.73M2
GFR AFRICAN AMERICAN: 34 ML/MIN/1.73M2
GFR AFRICAN AMERICAN: 37 ML/MIN/1.73M2
GFR AFRICAN AMERICAN: 39 ML/MIN/1.73M2
GFR AFRICAN AMERICAN: 42 ML/MIN/1.73M2
GFR AFRICAN AMERICAN: 46 ML/MIN/1.73M2
GFR AFRICAN AMERICAN: 48 ML/MIN/1.73M2
GFR AFRICAN AMERICAN: 50 ML/MIN/1.73M2
GFR AFRICAN AMERICAN: 55 ML/MIN/1.73M2
GFR AFRICAN AMERICAN: >60 ML/MIN/1.73M2
GFR NON-AFRICAN AMERICAN: 17 ML/MIN/1.73M2
GFR NON-AFRICAN AMERICAN: 18 ML/MIN/1.73M2
GFR NON-AFRICAN AMERICAN: 21 ML/MIN/1.73M2
GFR NON-AFRICAN AMERICAN: 21 ML/MIN/1.73M2
GFR NON-AFRICAN AMERICAN: 22 ML/MIN/1.73M2
GFR NON-AFRICAN AMERICAN: 25 ML/MIN/1.73M2
GFR NON-AFRICAN AMERICAN: 27 ML/MIN/1.73M2
GFR NON-AFRICAN AMERICAN: 28 ML/MIN/1.73M2
GFR NON-AFRICAN AMERICAN: 30 ML/MIN/1.73M2
GFR NON-AFRICAN AMERICAN: 32 ML/MIN/1.73M2
GFR NON-AFRICAN AMERICAN: 35 ML/MIN/1.73M2
GFR NON-AFRICAN AMERICAN: 38 ML/MIN/1.73M2
GFR NON-AFRICAN AMERICAN: 39 ML/MIN/1.73M2
GFR NON-AFRICAN AMERICAN: 41 ML/MIN/1.73M2
GFR NON-AFRICAN AMERICAN: 45 ML/MIN/1.73M2
GFR NON-AFRICAN AMERICAN: 50 ML/MIN/1.73M2
GFR NON-AFRICAN AMERICAN: 56 ML/MIN/1.73M2
GFR NON-AFRICAN AMERICAN: >60 ML/MIN/1.73M2
GLUCOSE BLD-MCNC: 100 MG/DL (ref 70–99)
GLUCOSE BLD-MCNC: 101 MG/DL (ref 70–99)
GLUCOSE BLD-MCNC: 102 MG/DL (ref 70–99)
GLUCOSE BLD-MCNC: 103 MG/DL (ref 70–99)
GLUCOSE BLD-MCNC: 104 MG/DL (ref 70–99)
GLUCOSE BLD-MCNC: 108 MG/DL (ref 70–99)
GLUCOSE BLD-MCNC: 110 MG/DL (ref 70–99)
GLUCOSE BLD-MCNC: 111 MG/DL (ref 70–99)
GLUCOSE BLD-MCNC: 112 MG/DL (ref 70–99)
GLUCOSE BLD-MCNC: 113 MG/DL (ref 70–99)
GLUCOSE BLD-MCNC: 113 MG/DL (ref 70–99)
GLUCOSE BLD-MCNC: 114 MG/DL (ref 70–99)
GLUCOSE BLD-MCNC: 115 MG/DL (ref 70–99)
GLUCOSE BLD-MCNC: 118 MG/DL (ref 70–99)
GLUCOSE BLD-MCNC: 121 MG/DL (ref 70–99)
GLUCOSE BLD-MCNC: 121 MG/DL (ref 70–99)
GLUCOSE BLD-MCNC: 122 MG/DL (ref 70–99)
GLUCOSE BLD-MCNC: 123 MG/DL (ref 70–99)
GLUCOSE BLD-MCNC: 124 MG/DL (ref 70–99)
GLUCOSE BLD-MCNC: 124 MG/DL (ref 70–99)
GLUCOSE BLD-MCNC: 126 MG/DL (ref 70–99)
GLUCOSE BLD-MCNC: 127 MG/DL (ref 70–99)
GLUCOSE BLD-MCNC: 129 MG/DL (ref 70–99)
GLUCOSE BLD-MCNC: 131 MG/DL (ref 70–99)
GLUCOSE BLD-MCNC: 132 MG/DL (ref 70–99)
GLUCOSE BLD-MCNC: 132 MG/DL (ref 70–99)
GLUCOSE BLD-MCNC: 133 MG/DL (ref 70–99)
GLUCOSE BLD-MCNC: 135 MG/DL (ref 70–99)
GLUCOSE BLD-MCNC: 138 MG/DL (ref 70–99)
GLUCOSE BLD-MCNC: 139 MG/DL (ref 70–99)
GLUCOSE BLD-MCNC: 140 MG/DL (ref 70–99)
GLUCOSE BLD-MCNC: 140 MG/DL (ref 70–99)
GLUCOSE BLD-MCNC: 142 MG/DL (ref 70–99)
GLUCOSE BLD-MCNC: 143 MG/DL (ref 70–99)
GLUCOSE BLD-MCNC: 144 MG/DL (ref 70–99)
GLUCOSE BLD-MCNC: 145 MG/DL (ref 70–99)
GLUCOSE BLD-MCNC: 146 MG/DL (ref 70–99)
GLUCOSE BLD-MCNC: 146 MG/DL (ref 70–99)
GLUCOSE BLD-MCNC: 147 MG/DL (ref 70–99)
GLUCOSE BLD-MCNC: 148 MG/DL (ref 70–99)
GLUCOSE BLD-MCNC: 149 MG/DL (ref 70–99)
GLUCOSE BLD-MCNC: 150 MG/DL (ref 70–99)
GLUCOSE BLD-MCNC: 151 MG/DL (ref 70–99)
GLUCOSE BLD-MCNC: 152 MG/DL (ref 70–99)
GLUCOSE BLD-MCNC: 152 MG/DL (ref 70–99)
GLUCOSE BLD-MCNC: 153 MG/DL (ref 70–99)
GLUCOSE BLD-MCNC: 154 MG/DL (ref 70–99)
GLUCOSE BLD-MCNC: 154 MG/DL (ref 70–99)
GLUCOSE BLD-MCNC: 155 MG/DL (ref 70–99)
GLUCOSE BLD-MCNC: 156 MG/DL (ref 70–99)
GLUCOSE BLD-MCNC: 156 MG/DL (ref 70–99)
GLUCOSE BLD-MCNC: 158 MG/DL (ref 70–99)
GLUCOSE BLD-MCNC: 160 MG/DL (ref 70–99)
GLUCOSE BLD-MCNC: 160 MG/DL (ref 70–99)
GLUCOSE BLD-MCNC: 161 MG/DL (ref 70–99)
GLUCOSE BLD-MCNC: 162 MG/DL (ref 70–99)
GLUCOSE BLD-MCNC: 163 MG/DL (ref 70–99)
GLUCOSE BLD-MCNC: 163 MG/DL (ref 70–99)
GLUCOSE BLD-MCNC: 164 MG/DL (ref 70–99)
GLUCOSE BLD-MCNC: 165 MG/DL (ref 70–99)
GLUCOSE BLD-MCNC: 165 MG/DL (ref 70–99)
GLUCOSE BLD-MCNC: 168 MG/DL (ref 70–99)
GLUCOSE BLD-MCNC: 170 MG/DL (ref 70–99)
GLUCOSE BLD-MCNC: 173 MG/DL (ref 70–99)
GLUCOSE BLD-MCNC: 176 MG/DL (ref 70–99)
GLUCOSE BLD-MCNC: 176 MG/DL (ref 70–99)
GLUCOSE BLD-MCNC: 177 MG/DL (ref 70–99)
GLUCOSE BLD-MCNC: 178 MG/DL (ref 70–99)
GLUCOSE BLD-MCNC: 180 MG/DL (ref 70–99)
GLUCOSE BLD-MCNC: 181 MG/DL (ref 70–99)
GLUCOSE BLD-MCNC: 181 MG/DL (ref 70–99)
GLUCOSE BLD-MCNC: 182 MG/DL (ref 70–99)
GLUCOSE BLD-MCNC: 185 MG/DL (ref 70–99)
GLUCOSE BLD-MCNC: 186 MG/DL (ref 70–99)
GLUCOSE BLD-MCNC: 187 MG/DL (ref 70–99)
GLUCOSE BLD-MCNC: 188 MG/DL (ref 70–99)
GLUCOSE BLD-MCNC: 188 MG/DL (ref 70–99)
GLUCOSE BLD-MCNC: 191 MG/DL (ref 70–99)
GLUCOSE BLD-MCNC: 191 MG/DL (ref 70–99)
GLUCOSE BLD-MCNC: 192 MG/DL (ref 70–99)
GLUCOSE BLD-MCNC: 193 MG/DL (ref 70–99)
GLUCOSE BLD-MCNC: 195 MG/DL (ref 70–99)
GLUCOSE BLD-MCNC: 197 MG/DL (ref 70–99)
GLUCOSE BLD-MCNC: 199 MG/DL (ref 70–99)
GLUCOSE BLD-MCNC: 199 MG/DL (ref 70–99)
GLUCOSE BLD-MCNC: 200 MG/DL (ref 70–99)
GLUCOSE BLD-MCNC: 201 MG/DL (ref 70–99)
GLUCOSE BLD-MCNC: 201 MG/DL (ref 70–99)
GLUCOSE BLD-MCNC: 202 MG/DL (ref 70–99)
GLUCOSE BLD-MCNC: 203 MG/DL (ref 70–99)
GLUCOSE BLD-MCNC: 205 MG/DL (ref 70–99)
GLUCOSE BLD-MCNC: 206 MG/DL (ref 70–99)
GLUCOSE BLD-MCNC: 207 MG/DL (ref 70–99)
GLUCOSE BLD-MCNC: 209 MG/DL (ref 70–99)
GLUCOSE BLD-MCNC: 210 MG/DL (ref 70–99)
GLUCOSE BLD-MCNC: 210 MG/DL (ref 70–99)
GLUCOSE BLD-MCNC: 211 MG/DL (ref 70–99)
GLUCOSE BLD-MCNC: 211 MG/DL (ref 70–99)
GLUCOSE BLD-MCNC: 214 MG/DL (ref 70–99)
GLUCOSE BLD-MCNC: 214 MG/DL (ref 70–99)
GLUCOSE BLD-MCNC: 218 MG/DL (ref 70–99)
GLUCOSE BLD-MCNC: 220 MG/DL (ref 70–99)
GLUCOSE BLD-MCNC: 221 MG/DL (ref 70–99)
GLUCOSE BLD-MCNC: 223 MG/DL (ref 70–99)
GLUCOSE BLD-MCNC: 224 MG/DL (ref 70–99)
GLUCOSE BLD-MCNC: 224 MG/DL (ref 70–99)
GLUCOSE BLD-MCNC: 226 MG/DL (ref 70–99)
GLUCOSE BLD-MCNC: 228 MG/DL (ref 70–99)
GLUCOSE BLD-MCNC: 229 MG/DL (ref 70–99)
GLUCOSE BLD-MCNC: 229 MG/DL (ref 70–99)
GLUCOSE BLD-MCNC: 230 MG/DL (ref 70–99)
GLUCOSE BLD-MCNC: 230 MG/DL (ref 70–99)
GLUCOSE BLD-MCNC: 234 MG/DL (ref 70–99)
GLUCOSE BLD-MCNC: 236 MG/DL (ref 70–99)
GLUCOSE BLD-MCNC: 238 MG/DL (ref 70–99)
GLUCOSE BLD-MCNC: 238 MG/DL (ref 70–99)
GLUCOSE BLD-MCNC: 239 MG/DL (ref 70–99)
GLUCOSE BLD-MCNC: 240 MG/DL (ref 70–99)
GLUCOSE BLD-MCNC: 240 MG/DL (ref 70–99)
GLUCOSE BLD-MCNC: 242 MG/DL (ref 70–99)
GLUCOSE BLD-MCNC: 242 MG/DL (ref 70–99)
GLUCOSE BLD-MCNC: 244 MG/DL (ref 70–99)
GLUCOSE BLD-MCNC: 247 MG/DL (ref 70–99)
GLUCOSE BLD-MCNC: 247 MG/DL (ref 70–99)
GLUCOSE BLD-MCNC: 25 MG/DL (ref 70–99)
GLUCOSE BLD-MCNC: 250 MG/DL (ref 70–99)
GLUCOSE BLD-MCNC: 250 MG/DL (ref 70–99)
GLUCOSE BLD-MCNC: 251 MG/DL (ref 70–99)
GLUCOSE BLD-MCNC: 251 MG/DL (ref 70–99)
GLUCOSE BLD-MCNC: 253 MG/DL (ref 70–99)
GLUCOSE BLD-MCNC: 253 MG/DL (ref 70–99)
GLUCOSE BLD-MCNC: 257 MG/DL (ref 70–99)
GLUCOSE BLD-MCNC: 258 MG/DL (ref 70–99)
GLUCOSE BLD-MCNC: 260 MG/DL (ref 70–99)
GLUCOSE BLD-MCNC: 262 MG/DL (ref 70–99)
GLUCOSE BLD-MCNC: 265 MG/DL (ref 70–99)
GLUCOSE BLD-MCNC: 266 MG/DL (ref 70–99)
GLUCOSE BLD-MCNC: 266 MG/DL (ref 70–99)
GLUCOSE BLD-MCNC: 270 MG/DL (ref 70–99)
GLUCOSE BLD-MCNC: 271 MG/DL (ref 70–99)
GLUCOSE BLD-MCNC: 272 MG/DL (ref 70–99)
GLUCOSE BLD-MCNC: 272 MG/DL (ref 70–99)
GLUCOSE BLD-MCNC: 273 MG/DL (ref 70–99)
GLUCOSE BLD-MCNC: 275 MG/DL (ref 70–99)
GLUCOSE BLD-MCNC: 282 MG/DL (ref 70–99)
GLUCOSE BLD-MCNC: 283 MG/DL (ref 70–99)
GLUCOSE BLD-MCNC: 285 MG/DL (ref 70–99)
GLUCOSE BLD-MCNC: 286 MG/DL (ref 70–99)
GLUCOSE BLD-MCNC: 287 MG/DL (ref 70–99)
GLUCOSE BLD-MCNC: 288 MG/DL (ref 70–99)
GLUCOSE BLD-MCNC: 288 MG/DL (ref 70–99)
GLUCOSE BLD-MCNC: 289 MG/DL (ref 70–99)
GLUCOSE BLD-MCNC: 291 MG/DL (ref 70–99)
GLUCOSE BLD-MCNC: 292 MG/DL (ref 70–99)
GLUCOSE BLD-MCNC: 293 MG/DL (ref 70–99)
GLUCOSE BLD-MCNC: 294 MG/DL (ref 70–99)
GLUCOSE BLD-MCNC: 296 MG/DL (ref 70–99)
GLUCOSE BLD-MCNC: 297 MG/DL (ref 70–99)
GLUCOSE BLD-MCNC: 297 MG/DL (ref 70–99)
GLUCOSE BLD-MCNC: 299 MG/DL (ref 70–99)
GLUCOSE BLD-MCNC: 300 MG/DL (ref 70–99)
GLUCOSE BLD-MCNC: 303 MG/DL (ref 70–99)
GLUCOSE BLD-MCNC: 312 MG/DL (ref 70–99)
GLUCOSE BLD-MCNC: 313 MG/DL (ref 70–99)
GLUCOSE BLD-MCNC: 314 MG/DL (ref 70–99)
GLUCOSE BLD-MCNC: 318 MG/DL (ref 70–99)
GLUCOSE BLD-MCNC: 328 MG/DL (ref 70–99)
GLUCOSE BLD-MCNC: 331 MG/DL (ref 70–99)
GLUCOSE BLD-MCNC: 332 MG/DL (ref 70–99)
GLUCOSE BLD-MCNC: 340 MG/DL (ref 70–99)
GLUCOSE BLD-MCNC: 345 MG/DL (ref 70–99)
GLUCOSE BLD-MCNC: 351 MG/DL (ref 70–99)
GLUCOSE BLD-MCNC: 353 MG/DL (ref 70–99)
GLUCOSE BLD-MCNC: 360 MG/DL (ref 70–99)
GLUCOSE BLD-MCNC: 363 MG/DL (ref 70–99)
GLUCOSE BLD-MCNC: 377 MG/DL (ref 70–99)
GLUCOSE BLD-MCNC: 382 MG/DL (ref 70–99)
GLUCOSE BLD-MCNC: 407 MG/DL (ref 70–99)
GLUCOSE BLD-MCNC: 427 MG/DL (ref 70–99)
GLUCOSE BLD-MCNC: 449 MG/DL (ref 70–99)
GLUCOSE BLD-MCNC: 455 MG/DL (ref 70–99)
GLUCOSE BLD-MCNC: 459 MG/DL (ref 70–99)
GLUCOSE BLD-MCNC: 480 MG/DL (ref 70–99)
GLUCOSE BLD-MCNC: 56 MG/DL (ref 70–99)
GLUCOSE BLD-MCNC: 563 MG/DL (ref 70–99)
GLUCOSE BLD-MCNC: 63 MG/DL (ref 70–99)
GLUCOSE BLD-MCNC: 64 MG/DL (ref 70–99)
GLUCOSE BLD-MCNC: 65 MG/DL (ref 70–99)
GLUCOSE BLD-MCNC: 68 MG/DL (ref 70–99)
GLUCOSE BLD-MCNC: 70 MG/DL (ref 70–99)
GLUCOSE BLD-MCNC: 716 MG/DL (ref 70–99)
GLUCOSE BLD-MCNC: 723 MG/DL (ref 70–99)
GLUCOSE BLD-MCNC: 73 MG/DL (ref 70–99)
GLUCOSE BLD-MCNC: 75 MG/DL (ref 70–99)
GLUCOSE BLD-MCNC: 75 MG/DL (ref 70–99)
GLUCOSE BLD-MCNC: 77 MG/DL (ref 70–99)
GLUCOSE BLD-MCNC: 78 MG/DL (ref 70–99)
GLUCOSE BLD-MCNC: 80 MG/DL (ref 70–99)
GLUCOSE BLD-MCNC: 81 MG/DL (ref 70–99)
GLUCOSE BLD-MCNC: 82 MG/DL (ref 70–99)
GLUCOSE BLD-MCNC: 82 MG/DL (ref 70–99)
GLUCOSE BLD-MCNC: 85 MG/DL (ref 70–99)
GLUCOSE BLD-MCNC: 88 MG/DL (ref 70–99)
GLUCOSE BLD-MCNC: 88 MG/DL (ref 70–99)
GLUCOSE BLD-MCNC: 89 MG/DL (ref 70–99)
GLUCOSE BLD-MCNC: 89 MG/DL (ref 70–99)
GLUCOSE BLD-MCNC: 91 MG/DL (ref 70–99)
GLUCOSE BLD-MCNC: 92 MG/DL (ref 70–99)
GLUCOSE BLD-MCNC: 93 MG/DL (ref 70–99)
GLUCOSE BLD-MCNC: 94 MG/DL
GLUCOSE BLD-MCNC: 94 MG/DL (ref 70–99)
GLUCOSE BLD-MCNC: 94 MG/DL (ref 70–99)
GLUCOSE BLD-MCNC: 97 MG/DL (ref 70–99)
GLUCOSE BLD-MCNC: 97 MG/DL (ref 70–99)
GLUCOSE BLD-MCNC: 98 MG/DL (ref 70–99)
GLUCOSE BLD-MCNC: 98 MG/DL (ref 70–99)
GLUCOSE BLD-MCNC: >600 MG/DL (ref 70–99)
GLUCOSE, URINE: 150 MG/DL
GLUCOSE, URINE: >500 MG/DL
GLUCOSE, URINE: NEGATIVE MG/DL
GRANULAR CASTS: 4 /LPF
HBA1C MFR BLD: 10.9 % (ref 4.2–6.3)
HBA1C MFR BLD: 11.4 % (ref 4.2–6.3)
HBA1C MFR BLD: 7.5 % (ref 4.2–6.3)
HBA1C MFR BLD: 9.1 % (ref 4.2–6.3)
HCO3 ARTERIAL: 13.5 MMOL/L (ref 18–23)
HCO3 ARTERIAL: 24.4 MMOL/L (ref 18–23)
HCO3 VENOUS: 19.7 MMOL/L (ref 19–25)
HCO3 VENOUS: 22.1 MMOL/L (ref 19–25)
HCT VFR BLD CALC: 22.9 % (ref 37–47)
HCT VFR BLD CALC: 24.9 % (ref 37–47)
HCT VFR BLD CALC: 26.9 % (ref 37–47)
HCT VFR BLD CALC: 27.3 % (ref 37–47)
HCT VFR BLD CALC: 27.6 % (ref 37–47)
HCT VFR BLD CALC: 28 % (ref 37–47)
HCT VFR BLD CALC: 28.1 % (ref 37–47)
HCT VFR BLD CALC: 30.7 % (ref 37–47)
HCT VFR BLD CALC: 30.8 % (ref 37–47)
HCT VFR BLD CALC: 31.1 % (ref 37–47)
HCT VFR BLD CALC: 31.2 % (ref 37–47)
HCT VFR BLD CALC: 32.2 % (ref 37–47)
HCT VFR BLD CALC: 32.3 % (ref 37–47)
HCT VFR BLD CALC: 32.8 % (ref 37–47)
HCT VFR BLD CALC: 33.7 % (ref 37–47)
HCT VFR BLD CALC: 33.9 % (ref 37–47)
HCT VFR BLD CALC: 34.1 % (ref 37–47)
HCT VFR BLD CALC: 34.4 % (ref 37–47)
HCT VFR BLD CALC: 34.6 % (ref 37–47)
HCT VFR BLD CALC: 34.6 % (ref 37–47)
HCT VFR BLD CALC: 35.1 % (ref 37–47)
HCT VFR BLD CALC: 35.2 % (ref 37–47)
HCT VFR BLD CALC: 35.3 % (ref 37–47)
HCT VFR BLD CALC: 35.3 % (ref 37–47)
HCT VFR BLD CALC: 36.2 % (ref 37–47)
HCT VFR BLD CALC: 37.1 % (ref 37–47)
HCT VFR BLD CALC: 37.1 % (ref 37–47)
HCT VFR BLD CALC: 38.1 % (ref 37–47)
HCT VFR BLD CALC: 38.7 % (ref 37–47)
HCT VFR BLD CALC: 38.8 % (ref 37–47)
HCT VFR BLD CALC: 40.2 % (ref 37–47)
HEMOGLOBIN: 10 GM/DL (ref 12.5–16)
HEMOGLOBIN: 10.1 GM/DL (ref 12.5–16)
HEMOGLOBIN: 10.2 GM/DL (ref 12.5–16)
HEMOGLOBIN: 10.3 GM/DL (ref 12.5–16)
HEMOGLOBIN: 10.6 GM/DL (ref 12.5–16)
HEMOGLOBIN: 10.6 GM/DL (ref 12.5–16)
HEMOGLOBIN: 10.7 GM/DL (ref 12.5–16)
HEMOGLOBIN: 11 GM/DL (ref 12.5–16)
HEMOGLOBIN: 11.2 GM/DL (ref 12.5–16)
HEMOGLOBIN: 11.4 GM/DL (ref 12.5–16)
HEMOGLOBIN: 11.5 GM/DL (ref 12.5–16)
HEMOGLOBIN: 11.6 GM/DL (ref 12.5–16)
HEMOGLOBIN: 11.7 GM/DL (ref 12.5–16)
HEMOGLOBIN: 11.8 GM/DL (ref 12.5–16)
HEMOGLOBIN: 12 GM/DL (ref 12.5–16)
HEMOGLOBIN: 12 GM/DL (ref 12.5–16)
HEMOGLOBIN: 12.2 GM/DL (ref 12.5–16)
HEMOGLOBIN: 12.2 GM/DL (ref 12.5–16)
HEMOGLOBIN: 12.4 GM/DL (ref 12.5–16)
HEMOGLOBIN: 12.6 GM/DL (ref 12.5–16)
HEMOGLOBIN: 12.7 GM/DL (ref 12.5–16)
HEMOGLOBIN: 12.9 GM/DL (ref 12.5–16)
HEMOGLOBIN: 7 GM/DL (ref 12.5–16)
HEMOGLOBIN: 7.1 GM/DL (ref 12.5–16)
HEMOGLOBIN: 8.2 GM/DL (ref 12.5–16)
HEMOGLOBIN: 8.2 GM/DL (ref 12.5–16)
HEMOGLOBIN: 8.4 GM/DL (ref 12.5–16)
HEMOGLOBIN: 8.9 GM/DL (ref 12.5–16)
HEMOGLOBIN: 8.9 GM/DL (ref 12.5–16)
HEMOGLOBIN: 9.3 GM/DL (ref 12.5–16)
HEMOGLOBIN: 9.8 GM/DL (ref 12.5–16)
HOMOCYSTEINE: 15.1 UMOL/L (ref 0–10)
HYALINE CASTS: 10 /LPF
HYALINE CASTS: 5 /LPF
IMMATURE NEUTROPHIL %: 0 % (ref 0–0.43)
IMMATURE NEUTROPHIL %: 0.4 % (ref 0–0.43)
IMMATURE NEUTROPHIL %: 0.5 % (ref 0–0.43)
IMMATURE NEUTROPHIL %: 0.6 % (ref 0–0.43)
IMMATURE NEUTROPHIL %: 0.6 % (ref 0–0.43)
IMMATURE NEUTROPHIL %: 0.7 % (ref 0–0.43)
IMMATURE NEUTROPHIL %: 0.8 % (ref 0–0.43)
IMMATURE NEUTROPHIL %: 0.8 % (ref 0–0.43)
IMMATURE NEUTROPHIL %: 0.9 % (ref 0–0.43)
IMMATURE NEUTROPHIL %: 1.2 % (ref 0–0.43)
IMMATURE NEUTROPHIL %: 1.3 % (ref 0–0.43)
IMMATURE NEUTROPHIL %: 1.3 % (ref 0–0.43)
IMMATURE NEUTROPHIL %: 1.4 % (ref 0–0.43)
INR BLD: 1.07 INDEX
INR BLD: 1.17 INDEX
INR BLD: 1.5 INDEX
INR BLD: 1.93 INDEX
IRON: 42 UG/DL (ref 37–145)
KETONES, URINE: NEGATIVE MG/DL
LACTATE: 1 MMOL/L (ref 0.4–2)
LACTATE: 1.3 MMOL/L (ref 0.4–2)
LACTATE: 1.9 MMOL/L (ref 0.4–2)
LACTATE: 2 MMOL/L (ref 0.4–2)
LACTATE: 6 MMOL/L (ref 0.4–2)
LACTATE: 8.1 MMOL/L (ref 0.4–2)
LACTATE: 8.8 MMOL/L (ref 0.4–2)
LACTIC ACID, SEPSIS: 5.3 MMOL/L (ref 0.5–1.9)
LEUKOCYTE ESTERASE, URINE: ABNORMAL
LEUKOCYTE ESTERASE, URINE: NEGATIVE
LIPASE: 11 IU/L (ref 13–60)
LIPASE: 11 IU/L (ref 13–60)
LV EF: 18 %
LVEF MODALITY: NORMAL
LYMPHOCYTES ABSOLUTE: 0.5 K/CU MM
LYMPHOCYTES ABSOLUTE: 0.8 K/CU MM
LYMPHOCYTES ABSOLUTE: 0.9 K/CU MM
LYMPHOCYTES ABSOLUTE: 1 K/CU MM
LYMPHOCYTES ABSOLUTE: 1 K/CU MM
LYMPHOCYTES ABSOLUTE: 1.1 K/CU MM
LYMPHOCYTES ABSOLUTE: 1.2 K/CU MM
LYMPHOCYTES ABSOLUTE: 1.2 K/CU MM
LYMPHOCYTES ABSOLUTE: 1.3 K/CU MM
LYMPHOCYTES ABSOLUTE: 1.3 K/CU MM
LYMPHOCYTES ABSOLUTE: 1.4 K/CU MM
LYMPHOCYTES ABSOLUTE: 1.5 K/CU MM
LYMPHOCYTES ABSOLUTE: 1.5 K/CU MM
LYMPHOCYTES ABSOLUTE: 1.6 K/CU MM
LYMPHOCYTES ABSOLUTE: 1.6 K/CU MM
LYMPHOCYTES ABSOLUTE: 1.7 K/CU MM
LYMPHOCYTES ABSOLUTE: 1.8 K/CU MM
LYMPHOCYTES ABSOLUTE: 2.4 K/CU MM
LYMPHOCYTES RELATIVE PERCENT: 11.2 % (ref 24–44)
LYMPHOCYTES RELATIVE PERCENT: 11.4 % (ref 24–44)
LYMPHOCYTES RELATIVE PERCENT: 12.1 % (ref 24–44)
LYMPHOCYTES RELATIVE PERCENT: 12.6 % (ref 24–44)
LYMPHOCYTES RELATIVE PERCENT: 12.7 % (ref 24–44)
LYMPHOCYTES RELATIVE PERCENT: 13.3 % (ref 24–44)
LYMPHOCYTES RELATIVE PERCENT: 13.4 % (ref 24–44)
LYMPHOCYTES RELATIVE PERCENT: 13.4 % (ref 24–44)
LYMPHOCYTES RELATIVE PERCENT: 14.5 % (ref 24–44)
LYMPHOCYTES RELATIVE PERCENT: 15.2 % (ref 24–44)
LYMPHOCYTES RELATIVE PERCENT: 15.7 % (ref 24–44)
LYMPHOCYTES RELATIVE PERCENT: 18.5 % (ref 24–44)
LYMPHOCYTES RELATIVE PERCENT: 4.1 % (ref 24–44)
LYMPHOCYTES RELATIVE PERCENT: 4.9 % (ref 24–44)
LYMPHOCYTES RELATIVE PERCENT: 5 % (ref 24–44)
LYMPHOCYTES RELATIVE PERCENT: 6 % (ref 24–44)
LYMPHOCYTES RELATIVE PERCENT: 6.2 % (ref 24–44)
LYMPHOCYTES RELATIVE PERCENT: 6.4 % (ref 24–44)
LYMPHOCYTES RELATIVE PERCENT: 7.9 % (ref 24–44)
Lab: ABNORMAL
Lab: ABNORMAL
Lab: NORMAL
MAGNESIUM: 1.5 MG/DL (ref 1.8–2.4)
MAGNESIUM: 1.7 MG/DL (ref 1.8–2.4)
MAGNESIUM: 1.8 MG/DL (ref 1.8–2.4)
MAGNESIUM: 1.9 MG/DL (ref 1.8–2.4)
MAGNESIUM: 2 MG/DL (ref 1.8–2.4)
MAGNESIUM: 2.1 MG/DL (ref 1.8–2.4)
MAGNESIUM: 2.2 MG/DL (ref 1.8–2.4)
MAGNESIUM: 2.4 MG/DL (ref 1.8–2.4)
MCH RBC QN AUTO: 28.7 PG (ref 27–31)
MCH RBC QN AUTO: 29 PG (ref 27–31)
MCH RBC QN AUTO: 29.1 PG (ref 27–31)
MCH RBC QN AUTO: 29.3 PG (ref 27–31)
MCH RBC QN AUTO: 29.8 PG (ref 27–31)
MCH RBC QN AUTO: 29.9 PG (ref 27–31)
MCH RBC QN AUTO: 30 PG (ref 27–31)
MCH RBC QN AUTO: 30.1 PG (ref 27–31)
MCH RBC QN AUTO: 30.2 PG (ref 27–31)
MCH RBC QN AUTO: 30.4 PG (ref 27–31)
MCH RBC QN AUTO: 30.5 PG (ref 27–31)
MCH RBC QN AUTO: 30.7 PG (ref 27–31)
MCH RBC QN AUTO: 30.8 PG (ref 27–31)
MCH RBC QN AUTO: 30.8 PG (ref 27–31)
MCH RBC QN AUTO: 31 PG (ref 27–31)
MCH RBC QN AUTO: 31.2 PG (ref 27–31)
MCH RBC QN AUTO: 31.3 PG (ref 27–31)
MCH RBC QN AUTO: 31.4 PG (ref 27–31)
MCH RBC QN AUTO: 31.5 PG (ref 27–31)
MCH RBC QN AUTO: 32.2 PG (ref 27–31)
MCH RBC QN AUTO: 32.2 PG (ref 27–31)
MCHC RBC AUTO-ENTMCNC: 28.1 % (ref 32–36)
MCHC RBC AUTO-ENTMCNC: 29.8 % (ref 32–36)
MCHC RBC AUTO-ENTMCNC: 30 % (ref 32–36)
MCHC RBC AUTO-ENTMCNC: 30.2 % (ref 32–36)
MCHC RBC AUTO-ENTMCNC: 30.4 % (ref 32–36)
MCHC RBC AUTO-ENTMCNC: 30.5 % (ref 32–36)
MCHC RBC AUTO-ENTMCNC: 31 % (ref 32–36)
MCHC RBC AUTO-ENTMCNC: 31.1 % (ref 32–36)
MCHC RBC AUTO-ENTMCNC: 31.5 % (ref 32–36)
MCHC RBC AUTO-ENTMCNC: 31.7 % (ref 32–36)
MCHC RBC AUTO-ENTMCNC: 31.8 % (ref 32–36)
MCHC RBC AUTO-ENTMCNC: 31.8 % (ref 32–36)
MCHC RBC AUTO-ENTMCNC: 31.9 % (ref 32–36)
MCHC RBC AUTO-ENTMCNC: 32 % (ref 32–36)
MCHC RBC AUTO-ENTMCNC: 32.1 % (ref 32–36)
MCHC RBC AUTO-ENTMCNC: 32.1 % (ref 32–36)
MCHC RBC AUTO-ENTMCNC: 32.3 % (ref 32–36)
MCHC RBC AUTO-ENTMCNC: 32.3 % (ref 32–36)
MCHC RBC AUTO-ENTMCNC: 32.5 % (ref 32–36)
MCHC RBC AUTO-ENTMCNC: 32.5 % (ref 32–36)
MCHC RBC AUTO-ENTMCNC: 32.8 % (ref 32–36)
MCHC RBC AUTO-ENTMCNC: 32.8 % (ref 32–36)
MCHC RBC AUTO-ENTMCNC: 32.9 % (ref 32–36)
MCHC RBC AUTO-ENTMCNC: 33 % (ref 32–36)
MCHC RBC AUTO-ENTMCNC: 33.1 % (ref 32–36)
MCHC RBC AUTO-ENTMCNC: 33.2 % (ref 32–36)
MCHC RBC AUTO-ENTMCNC: 33.4 % (ref 32–36)
MCHC RBC AUTO-ENTMCNC: 33.7 % (ref 32–36)
MCHC RBC AUTO-ENTMCNC: 34 % (ref 32–36)
MCHC RBC AUTO-ENTMCNC: 34 % (ref 32–36)
MCHC RBC AUTO-ENTMCNC: 34.2 % (ref 32–36)
MCV RBC AUTO: 101.5 FL (ref 78–100)
MCV RBC AUTO: 104.2 FL (ref 78–100)
MCV RBC AUTO: 105 FL (ref 78–100)
MCV RBC AUTO: 90.9 FL (ref 78–100)
MCV RBC AUTO: 91.2 FL (ref 78–100)
MCV RBC AUTO: 91.5 FL (ref 78–100)
MCV RBC AUTO: 91.9 FL (ref 78–100)
MCV RBC AUTO: 92 FL (ref 78–100)
MCV RBC AUTO: 92 FL (ref 78–100)
MCV RBC AUTO: 92.2 FL (ref 78–100)
MCV RBC AUTO: 92.6 FL (ref 78–100)
MCV RBC AUTO: 92.9 FL (ref 78–100)
MCV RBC AUTO: 93.4 FL (ref 78–100)
MCV RBC AUTO: 93.9 FL (ref 78–100)
MCV RBC AUTO: 94 FL (ref 78–100)
MCV RBC AUTO: 94.1 FL (ref 78–100)
MCV RBC AUTO: 94.4 FL (ref 78–100)
MCV RBC AUTO: 94.5 FL (ref 78–100)
MCV RBC AUTO: 94.6 FL (ref 78–100)
MCV RBC AUTO: 94.6 FL (ref 78–100)
MCV RBC AUTO: 95 FL (ref 78–100)
MCV RBC AUTO: 95.1 FL (ref 78–100)
MCV RBC AUTO: 95.8 FL (ref 78–100)
MCV RBC AUTO: 96.5 FL (ref 78–100)
MCV RBC AUTO: 96.9 FL (ref 78–100)
MCV RBC AUTO: 97.2 FL (ref 78–100)
MCV RBC AUTO: 97.2 FL (ref 78–100)
MCV RBC AUTO: 97.5 FL (ref 78–100)
MCV RBC AUTO: 98.2 FL (ref 78–100)
MCV RBC AUTO: 99.2 FL (ref 78–100)
MCV RBC AUTO: 99.3 FL (ref 78–100)
METAMYELOCYTES ABSOLUTE COUNT: 0.95 K/CU MM
METAMYELOCYTES PERCENT: 2 %
METHEMOGLOBIN ARTERIAL: 1 %
METHEMOGLOBIN ARTERIAL: 1.4 %
MONOCYTES ABSOLUTE: 0.5 K/CU MM
MONOCYTES ABSOLUTE: 0.6 K/CU MM
MONOCYTES ABSOLUTE: 0.7 K/CU MM
MONOCYTES ABSOLUTE: 0.9 K/CU MM
MONOCYTES ABSOLUTE: 1 K/CU MM
MONOCYTES ABSOLUTE: 1 K/CU MM
MONOCYTES ABSOLUTE: 1.1 K/CU MM
MONOCYTES ABSOLUTE: 1.1 K/CU MM
MONOCYTES ABSOLUTE: 1.2 K/CU MM
MONOCYTES ABSOLUTE: 1.2 K/CU MM
MONOCYTES ABSOLUTE: 1.4 K/CU MM
MONOCYTES ABSOLUTE: 1.5 K/CU MM
MONOCYTES ABSOLUTE: 1.6 K/CU MM
MONOCYTES RELATIVE PERCENT: 1 % (ref 0–4)
MONOCYTES RELATIVE PERCENT: 10 % (ref 0–4)
MONOCYTES RELATIVE PERCENT: 10.6 % (ref 0–4)
MONOCYTES RELATIVE PERCENT: 11 % (ref 0–4)
MONOCYTES RELATIVE PERCENT: 11.3 % (ref 0–4)
MONOCYTES RELATIVE PERCENT: 11.4 % (ref 0–4)
MONOCYTES RELATIVE PERCENT: 12.1 % (ref 0–4)
MONOCYTES RELATIVE PERCENT: 12.3 % (ref 0–4)
MONOCYTES RELATIVE PERCENT: 4.4 % (ref 0–4)
MONOCYTES RELATIVE PERCENT: 4.8 % (ref 0–4)
MONOCYTES RELATIVE PERCENT: 5.1 % (ref 0–4)
MONOCYTES RELATIVE PERCENT: 5.4 % (ref 0–4)
MONOCYTES RELATIVE PERCENT: 5.6 % (ref 0–4)
MONOCYTES RELATIVE PERCENT: 6 % (ref 0–4)
MONOCYTES RELATIVE PERCENT: 6.5 % (ref 0–4)
MONOCYTES RELATIVE PERCENT: 6.7 % (ref 0–4)
MONOCYTES RELATIVE PERCENT: 6.9 % (ref 0–4)
MONOCYTES RELATIVE PERCENT: 7.3 % (ref 0–4)
MONOCYTES RELATIVE PERCENT: 7.6 % (ref 0–4)
MONOCYTES RELATIVE PERCENT: 8.6 % (ref 0–4)
MONOCYTES RELATIVE PERCENT: 9.3 % (ref 0–4)
MUCUS: ABNORMAL HPF
NITRITE URINE, QUANTITATIVE: NEGATIVE
NUCLEATED RBC %: 0 %
NUCLEATED RBC %: 0.3 %
O2 SAT, VEN: 61.7 % (ref 50–70)
O2 SAT, VEN: 94.3 % (ref 50–70)
O2 SATURATION: 94.6 % (ref 96–97)
O2 SATURATION: 94.6 % (ref 96–97)
OPIATES, URINE: NEGATIVE
OXYCODONE: NEGATIVE
PCO2 ARTERIAL: 32 MMHG (ref 32–45)
PCO2 ARTERIAL: 38 MMHG (ref 32–45)
PCO2, VEN: 43 MMHG (ref 38–52)
PCO2, VEN: 45 MMHG (ref 38–52)
PCT TRANSFERRIN: 17 % (ref 10–44)
PDW BLD-RTO: 13.2 % (ref 11.7–14.9)
PDW BLD-RTO: 13.3 % (ref 11.7–14.9)
PDW BLD-RTO: 13.4 % (ref 11.7–14.9)
PDW BLD-RTO: 13.7 % (ref 11.7–14.9)
PDW BLD-RTO: 13.7 % (ref 11.7–14.9)
PDW BLD-RTO: 13.8 % (ref 11.7–14.9)
PDW BLD-RTO: 13.8 % (ref 11.7–14.9)
PDW BLD-RTO: 13.9 % (ref 11.7–14.9)
PDW BLD-RTO: 13.9 % (ref 11.7–14.9)
PDW BLD-RTO: 14 % (ref 11.7–14.9)
PDW BLD-RTO: 14.1 % (ref 11.7–14.9)
PDW BLD-RTO: 14.2 % (ref 11.7–14.9)
PDW BLD-RTO: 14.2 % (ref 11.7–14.9)
PDW BLD-RTO: 14.3 % (ref 11.7–14.9)
PDW BLD-RTO: 14.4 % (ref 11.7–14.9)
PDW BLD-RTO: 14.5 % (ref 11.7–14.9)
PDW BLD-RTO: 14.5 % (ref 11.7–14.9)
PDW BLD-RTO: 14.6 % (ref 11.7–14.9)
PDW BLD-RTO: 14.6 % (ref 11.7–14.9)
PDW BLD-RTO: 14.8 % (ref 11.7–14.9)
PDW BLD-RTO: 14.8 % (ref 11.7–14.9)
PDW BLD-RTO: 14.9 % (ref 11.7–14.9)
PDW BLD-RTO: 15.1 % (ref 11.7–14.9)
PDW BLD-RTO: 15.8 % (ref 11.7–14.9)
PDW BLD-RTO: 18.1 % (ref 11.7–14.9)
PDW BLD-RTO: 18.3 % (ref 11.7–14.9)
PDW BLD-RTO: 18.4 % (ref 11.7–14.9)
PDW BLD-RTO: 19 % (ref 11.7–14.9)
PDW BLD-RTO: 19.7 % (ref 11.7–14.9)
PH BLOOD: 7.16 (ref 7.34–7.45)
PH BLOOD: 7.49 (ref 7.34–7.45)
PH VENOUS: 7.27 (ref 7.32–7.42)
PH VENOUS: 7.3 (ref 7.32–7.42)
PH, URINE: 5 (ref 5–8)
PH, URINE: 6 (ref 5–8)
PH, URINE: 6 (ref 5–8)
PH, URINE: 7 (ref 5–8)
PHENCYCLIDINE, URINE: NEGATIVE
PHOSPHORUS: 2.2 MG/DL (ref 2.5–4.9)
PHOSPHORUS: 2.3 MG/DL (ref 2.5–4.9)
PHOSPHORUS: 2.3 MG/DL (ref 2.5–4.9)
PHOSPHORUS: 3 MG/DL (ref 2.5–4.9)
PHOSPHORUS: 3.3 MG/DL (ref 2.5–4.9)
PHOSPHORUS: 3.3 MG/DL (ref 2.5–4.9)
PHOSPHORUS: 3.4 MG/DL (ref 2.5–4.9)
PHOSPHORUS: 3.5 MG/DL (ref 2.5–4.9)
PHOSPHORUS: 3.7 MG/DL (ref 2.5–4.9)
PHOSPHORUS: 4.1 MG/DL (ref 2.5–4.9)
PHOSPHORUS: 4.2 MG/DL (ref 2.5–4.9)
PHOSPHORUS: 4.4 MG/DL (ref 2.5–4.9)
PHOSPHORUS: 4.4 MG/DL (ref 2.5–4.9)
PHOSPHORUS: 5.4 MG/DL (ref 2.5–4.9)
PLATELET # BLD: 205 K/CU MM (ref 140–440)
PLATELET # BLD: 209 K/CU MM (ref 140–440)
PLATELET # BLD: 217 K/CU MM (ref 140–440)
PLATELET # BLD: 224 K/CU MM (ref 140–440)
PLATELET # BLD: 227 K/CU MM (ref 140–440)
PLATELET # BLD: 228 K/CU MM (ref 140–440)
PLATELET # BLD: 228 K/CU MM (ref 140–440)
PLATELET # BLD: 243 K/CU MM (ref 140–440)
PLATELET # BLD: 255 K/CU MM (ref 140–440)
PLATELET # BLD: 258 K/CU MM (ref 140–440)
PLATELET # BLD: 266 K/CU MM (ref 140–440)
PLATELET # BLD: 273 K/CU MM (ref 140–440)
PLATELET # BLD: 278 K/CU MM (ref 140–440)
PLATELET # BLD: 279 K/CU MM (ref 140–440)
PLATELET # BLD: 291 K/CU MM (ref 140–440)
PLATELET # BLD: 292 K/CU MM (ref 140–440)
PLATELET # BLD: 294 K/CU MM (ref 140–440)
PLATELET # BLD: 296 K/CU MM (ref 140–440)
PLATELET # BLD: 297 K/CU MM (ref 140–440)
PLATELET # BLD: 321 K/CU MM (ref 140–440)
PLATELET # BLD: 322 K/CU MM (ref 140–440)
PLATELET # BLD: 324 K/CU MM (ref 140–440)
PLATELET # BLD: 346 K/CU MM (ref 140–440)
PLATELET # BLD: 361 K/CU MM (ref 140–440)
PLATELET # BLD: 362 K/CU MM (ref 140–440)
PLATELET # BLD: 368 K/CU MM (ref 140–440)
PLATELET # BLD: 374 K/CU MM (ref 140–440)
PLATELET # BLD: 374 K/CU MM (ref 140–440)
PLATELET # BLD: 412 K/CU MM (ref 140–440)
PLATELET # BLD: 449 K/CU MM (ref 140–440)
PLATELET # BLD: 465 K/CU MM (ref 140–440)
PLT MORPHOLOGY: ABNORMAL
PMV BLD AUTO: 10.1 FL (ref 7.5–11.1)
PMV BLD AUTO: 10.2 FL (ref 7.5–11.1)
PMV BLD AUTO: 10.3 FL (ref 7.5–11.1)
PMV BLD AUTO: 10.3 FL (ref 7.5–11.1)
PMV BLD AUTO: 10.4 FL (ref 7.5–11.1)
PMV BLD AUTO: 10.5 FL (ref 7.5–11.1)
PMV BLD AUTO: 10.5 FL (ref 7.5–11.1)
PMV BLD AUTO: 10.6 FL (ref 7.5–11.1)
PMV BLD AUTO: 10.8 FL (ref 7.5–11.1)
PMV BLD AUTO: 10.8 FL (ref 7.5–11.1)
PMV BLD AUTO: 11 FL (ref 7.5–11.1)
PMV BLD AUTO: 9 FL (ref 7.5–11.1)
PMV BLD AUTO: 9.1 FL (ref 7.5–11.1)
PMV BLD AUTO: 9.2 FL (ref 7.5–11.1)
PMV BLD AUTO: 9.3 FL (ref 7.5–11.1)
PMV BLD AUTO: 9.5 FL (ref 7.5–11.1)
PMV BLD AUTO: 9.6 FL (ref 7.5–11.1)
PMV BLD AUTO: 9.7 FL (ref 7.5–11.1)
PMV BLD AUTO: 9.8 FL (ref 7.5–11.1)
PMV BLD AUTO: 9.9 FL (ref 7.5–11.1)
PO2 ARTERIAL: 80 MMHG (ref 75–100)
PO2 ARTERIAL: 95 MMHG (ref 75–100)
PO2, VEN: 163 MMHG (ref 28–48)
PO2, VEN: 38 MMHG (ref 28–48)
POLYCHROMASIA: ABNORMAL
POTASSIUM SERPL-SCNC: 2.8 MMOL/L (ref 3.5–5.1)
POTASSIUM SERPL-SCNC: 3.2 MMOL/L (ref 3.5–5.1)
POTASSIUM SERPL-SCNC: 3.3 MMOL/L (ref 3.5–5.1)
POTASSIUM SERPL-SCNC: 3.4 MMOL/L (ref 3.5–5.1)
POTASSIUM SERPL-SCNC: 3.5 MMOL/L (ref 3.5–5.1)
POTASSIUM SERPL-SCNC: 3.6 MMOL/L (ref 3.5–5.1)
POTASSIUM SERPL-SCNC: 3.7 MMOL/L (ref 3.5–5.1)
POTASSIUM SERPL-SCNC: 3.7 MMOL/L (ref 3.5–5.1)
POTASSIUM SERPL-SCNC: 3.8 MMOL/L (ref 3.5–5.1)
POTASSIUM SERPL-SCNC: 3.9 MMOL/L (ref 3.5–5.1)
POTASSIUM SERPL-SCNC: 4 MMOL/L (ref 3.5–5.1)
POTASSIUM SERPL-SCNC: 4.1 MMOL/L (ref 3.5–5.1)
POTASSIUM SERPL-SCNC: 4.2 MMOL/L (ref 3.5–5.1)
POTASSIUM SERPL-SCNC: 4.3 MMOL/L (ref 3.5–5.1)
POTASSIUM SERPL-SCNC: 4.4 MMOL/L (ref 3.5–5.1)
POTASSIUM SERPL-SCNC: 4.5 MMOL/L (ref 3.5–5.1)
POTASSIUM SERPL-SCNC: 4.6 MMOL/L (ref 3.5–5.1)
POTASSIUM SERPL-SCNC: 4.7 MMOL/L (ref 3.5–5.1)
POTASSIUM SERPL-SCNC: 4.8 MMOL/L (ref 3.5–5.1)
POTASSIUM SERPL-SCNC: 4.9 MMOL/L (ref 3.5–5.1)
POTASSIUM SERPL-SCNC: 5 MMOL/L (ref 3.5–5.1)
POTASSIUM SERPL-SCNC: 5 MMOL/L (ref 3.5–5.1)
POTASSIUM SERPL-SCNC: 5.6 MMOL/L (ref 3.5–5.1)
POTASSIUM SERPL-SCNC: 5.7 MMOL/L (ref 3.5–5.1)
POTASSIUM SERPL-SCNC: 5.9 MMOL/L (ref 3.5–5.1)
POTASSIUM, UR: 33.5 MMOL/L (ref 22–119)
PRO-BNP: 2797 PG/ML
PRO-BNP: 3177 PG/ML
PRO-BNP: 3209 PG/ML
PRO-BNP: 5655 PG/ML
PRO-BNP: 6938 PG/ML
PRO-BNP: ABNORMAL PG/ML
PROCALCITONIN: 0.14
PROCALCITONIN: 0.15
PROCALCITONIN: 0.18
PROCALCITONIN: 0.22
PROCALCITONIN: 0.23
PROCALCITONIN: 0.31
PROCALCITONIN: 0.51
PROCALCITONIN: 1.2
PROCALCITONIN: 3.69
PROT/CREAT RATIO, UR: 0.6
PROT/CREAT RATIO, UR: 0.8
PROT/CREAT RATIO, UR: 0.9
PROT/CREAT RATIO, UR: 1.7
PROTEIN UA: 100 MG/DL
PROTEIN UA: 30 MG/DL
PROTEIN UA: >500 MG/DL
PROTEIN UA: >500 MG/DL
PROTEIN UA: NEGATIVE MG/DL
PROTHROMBIN TIME: 12.9 SECONDS (ref 11.7–14.5)
PROTHROMBIN TIME: 15.1 SECONDS (ref 11.7–14.5)
PROTHROMBIN TIME: 18.2 SECONDS (ref 11.7–14.5)
PROTHROMBIN TIME: 25.1 SECONDS (ref 11.7–14.5)
RAPID INFLUENZA  B AGN: NEGATIVE
RAPID INFLUENZA A AGN: NEGATIVE
RBC # BLD: 2.39 M/CU MM (ref 4.2–5.4)
RBC # BLD: 2.42 M/CU MM (ref 4.2–5.4)
RBC # BLD: 2.81 M/CU MM (ref 4.2–5.4)
RBC # BLD: 2.83 M/CU MM (ref 4.2–5.4)
RBC # BLD: 2.83 M/CU MM (ref 4.2–5.4)
RBC # BLD: 2.86 M/CU MM (ref 4.2–5.4)
RBC # BLD: 3.06 M/CU MM (ref 4.2–5.4)
RBC # BLD: 3.16 M/CU MM (ref 4.2–5.4)
RBC # BLD: 3.17 M/CU MM (ref 4.2–5.4)
RBC # BLD: 3.2 M/CU MM (ref 4.2–5.4)
RBC # BLD: 3.23 M/CU MM (ref 4.2–5.4)
RBC # BLD: 3.36 M/CU MM (ref 4.2–5.4)
RBC # BLD: 3.39 M/CU MM (ref 4.2–5.4)
RBC # BLD: 3.42 M/CU MM (ref 4.2–5.4)
RBC # BLD: 3.42 M/CU MM (ref 4.2–5.4)
RBC # BLD: 3.47 M/CU MM (ref 4.2–5.4)
RBC # BLD: 3.57 M/CU MM (ref 4.2–5.4)
RBC # BLD: 3.61 M/CU MM (ref 4.2–5.4)
RBC # BLD: 3.68 M/CU MM (ref 4.2–5.4)
RBC # BLD: 3.74 M/CU MM (ref 4.2–5.4)
RBC # BLD: 3.78 M/CU MM (ref 4.2–5.4)
RBC # BLD: 3.83 M/CU MM (ref 4.2–5.4)
RBC # BLD: 3.84 M/CU MM (ref 4.2–5.4)
RBC # BLD: 3.86 M/CU MM (ref 4.2–5.4)
RBC # BLD: 3.9 M/CU MM (ref 4.2–5.4)
RBC # BLD: 3.91 M/CU MM (ref 4.2–5.4)
RBC # BLD: 3.91 M/CU MM (ref 4.2–5.4)
RBC # BLD: 3.95 M/CU MM (ref 4.2–5.4)
RBC # BLD: 4.04 M/CU MM (ref 4.2–5.4)
RBC # BLD: 4.14 M/CU MM (ref 4.2–5.4)
RBC # BLD: 4.25 M/CU MM (ref 4.2–5.4)
RBC URINE: 1 /HPF (ref 0–6)
RBC URINE: 17 /HPF (ref 0–6)
RBC URINE: 33 /HPF (ref 0–6)
RBC URINE: 37 /HPF (ref 0–6)
RBC URINE: 88 /HPF (ref 0–6)
RBC URINE: <1 /HPF (ref 0–6)
RBC URINE: ABNORMAL /HPF (ref 0–6)
SALICYLATE LEVEL: <0.3 MG/DL (ref 15–30)
SARS-COV-2, NAAT: NOT DETECTED
SEGMENTED NEUTROPHILS ABSOLUTE COUNT: 10 K/CU MM
SEGMENTED NEUTROPHILS ABSOLUTE COUNT: 10.1 K/CU MM
SEGMENTED NEUTROPHILS ABSOLUTE COUNT: 10.1 K/CU MM
SEGMENTED NEUTROPHILS ABSOLUTE COUNT: 10.5 K/CU MM
SEGMENTED NEUTROPHILS ABSOLUTE COUNT: 13.8 K/CU MM
SEGMENTED NEUTROPHILS ABSOLUTE COUNT: 14.1 K/CU MM
SEGMENTED NEUTROPHILS ABSOLUTE COUNT: 20 K/CU MM
SEGMENTED NEUTROPHILS ABSOLUTE COUNT: 20.8 K/CU MM
SEGMENTED NEUTROPHILS ABSOLUTE COUNT: 40.4 K/CU MM
SEGMENTED NEUTROPHILS ABSOLUTE COUNT: 6.2 K/CU MM
SEGMENTED NEUTROPHILS ABSOLUTE COUNT: 6.2 K/CU MM
SEGMENTED NEUTROPHILS ABSOLUTE COUNT: 6.6 K/CU MM
SEGMENTED NEUTROPHILS ABSOLUTE COUNT: 7 K/CU MM
SEGMENTED NEUTROPHILS ABSOLUTE COUNT: 7.1 K/CU MM
SEGMENTED NEUTROPHILS ABSOLUTE COUNT: 7.5 K/CU MM
SEGMENTED NEUTROPHILS ABSOLUTE COUNT: 7.5 K/CU MM
SEGMENTED NEUTROPHILS ABSOLUTE COUNT: 7.6 K/CU MM
SEGMENTED NEUTROPHILS ABSOLUTE COUNT: 8.4 K/CU MM
SEGMENTED NEUTROPHILS ABSOLUTE COUNT: 9.5 K/CU MM
SEGMENTED NEUTROPHILS ABSOLUTE COUNT: 9.5 K/CU MM
SEGMENTED NEUTROPHILS ABSOLUTE COUNT: 9.8 K/CU MM
SEGMENTED NEUTROPHILS RELATIVE PERCENT: 67.1 % (ref 36–66)
SEGMENTED NEUTROPHILS RELATIVE PERCENT: 67.6 % (ref 36–66)
SEGMENTED NEUTROPHILS RELATIVE PERCENT: 68.1 % (ref 36–66)
SEGMENTED NEUTROPHILS RELATIVE PERCENT: 71.7 % (ref 36–66)
SEGMENTED NEUTROPHILS RELATIVE PERCENT: 71.8 % (ref 36–66)
SEGMENTED NEUTROPHILS RELATIVE PERCENT: 72 % (ref 36–66)
SEGMENTED NEUTROPHILS RELATIVE PERCENT: 73.6 % (ref 36–66)
SEGMENTED NEUTROPHILS RELATIVE PERCENT: 74.5 % (ref 36–66)
SEGMENTED NEUTROPHILS RELATIVE PERCENT: 76.6 % (ref 36–66)
SEGMENTED NEUTROPHILS RELATIVE PERCENT: 77.6 % (ref 36–66)
SEGMENTED NEUTROPHILS RELATIVE PERCENT: 77.7 % (ref 36–66)
SEGMENTED NEUTROPHILS RELATIVE PERCENT: 78.1 % (ref 36–66)
SEGMENTED NEUTROPHILS RELATIVE PERCENT: 80.1 % (ref 36–66)
SEGMENTED NEUTROPHILS RELATIVE PERCENT: 80.9 % (ref 36–66)
SEGMENTED NEUTROPHILS RELATIVE PERCENT: 81.9 % (ref 36–66)
SEGMENTED NEUTROPHILS RELATIVE PERCENT: 84.1 % (ref 36–66)
SEGMENTED NEUTROPHILS RELATIVE PERCENT: 85 % (ref 36–66)
SEGMENTED NEUTROPHILS RELATIVE PERCENT: 87 % (ref 36–66)
SEGMENTED NEUTROPHILS RELATIVE PERCENT: 88.2 % (ref 36–66)
SEGMENTED NEUTROPHILS RELATIVE PERCENT: 88.5 % (ref 36–66)
SEGMENTED NEUTROPHILS RELATIVE PERCENT: 89.2 % (ref 36–66)
SODIUM BLD-SCNC: 127 MMOL/L (ref 135–145)
SODIUM BLD-SCNC: 128 MMOL/L (ref 135–145)
SODIUM BLD-SCNC: 129 MMOL/L (ref 135–145)
SODIUM BLD-SCNC: 129 MMOL/L (ref 135–145)
SODIUM BLD-SCNC: 130 MMOL/L (ref 135–145)
SODIUM BLD-SCNC: 130 MMOL/L (ref 135–145)
SODIUM BLD-SCNC: 131 MMOL/L (ref 135–145)
SODIUM BLD-SCNC: 132 MMOL/L (ref 135–145)
SODIUM BLD-SCNC: 133 MMOL/L (ref 135–145)
SODIUM BLD-SCNC: 133 MMOL/L (ref 135–145)
SODIUM BLD-SCNC: 134 MMOL/L (ref 135–145)
SODIUM BLD-SCNC: 135 MMOL/L (ref 135–145)
SODIUM BLD-SCNC: 135 MMOL/L (ref 135–145)
SODIUM BLD-SCNC: 136 MMOL/L (ref 135–145)
SODIUM BLD-SCNC: 137 MMOL/L (ref 135–145)
SODIUM BLD-SCNC: 138 MMOL/L (ref 135–145)
SODIUM BLD-SCNC: 139 MMOL/L (ref 135–145)
SODIUM BLD-SCNC: 140 MMOL/L (ref 135–145)
SODIUM BLD-SCNC: 141 MMOL/L (ref 135–145)
SODIUM BLD-SCNC: 142 MMOL/L (ref 135–145)
SODIUM BLD-SCNC: 145 MMOL/L (ref 135–145)
SODIUM URINE: 10 MMOL/L (ref 35–167)
SODIUM URINE: 15 MMOL/L (ref 35–167)
SODIUM URINE: 24 MMOL/L (ref 35–167)
SODIUM URINE: 40 MMOL/L (ref 35–167)
SOURCE: NORMAL
SPECIFIC GRAVITY UA: 1.01 (ref 1–1.03)
SPECIFIC GRAVITY UA: 1.02 (ref 1–1.03)
SPECIFIC GRAVITY UA: 1.02 (ref 1–1.03)
SPECIMEN: ABNORMAL
SPECIMEN: ABNORMAL
SPECIMEN: NORMAL
SQUAMOUS EPITHELIAL: 1 /HPF
SQUAMOUS EPITHELIAL: 1 /HPF
SQUAMOUS EPITHELIAL: 2 /HPF
SQUAMOUS EPITHELIAL: <1 /HPF
T4 FREE: 1.28 NG/DL (ref 0.9–1.8)
TOTAL CK: 30 IU/L (ref 26–140)
TOTAL CK: 478 IU/L (ref 26–140)
TOTAL IMMATURE NEUTOROPHIL: 0 K/CU MM
TOTAL IMMATURE NEUTOROPHIL: 0.04 K/CU MM
TOTAL IMMATURE NEUTOROPHIL: 0.06 K/CU MM
TOTAL IMMATURE NEUTOROPHIL: 0.06 K/CU MM
TOTAL IMMATURE NEUTOROPHIL: 0.07 K/CU MM
TOTAL IMMATURE NEUTOROPHIL: 0.08 K/CU MM
TOTAL IMMATURE NEUTOROPHIL: 0.09 K/CU MM
TOTAL IMMATURE NEUTOROPHIL: 0.13 K/CU MM
TOTAL IMMATURE NEUTOROPHIL: 0.14 K/CU MM
TOTAL IMMATURE NEUTOROPHIL: 0.15 K/CU MM
TOTAL IMMATURE NEUTOROPHIL: 0.15 K/CU MM
TOTAL IMMATURE NEUTOROPHIL: 0.17 K/CU MM
TOTAL IMMATURE NEUTOROPHIL: 0.18 K/CU MM
TOTAL IMMATURE NEUTOROPHIL: 0.22 K/CU MM
TOTAL IRON BINDING CAPACITY: 250 UG/DL (ref 250–450)
TOTAL NUCLEATED RBC: 0 K/CU MM
TOTAL NUCLEATED RBC: 0.1 K/CU MM
TOTAL PROTEIN: 4.8 GM/DL (ref 6.4–8.2)
TOTAL PROTEIN: 4.8 GM/DL (ref 6.4–8.2)
TOTAL PROTEIN: 4.9 GM/DL (ref 6.4–8.2)
TOTAL PROTEIN: 5 GM/DL (ref 6.4–8.2)
TOTAL PROTEIN: 5.3 GM/DL (ref 6.4–8.2)
TOTAL PROTEIN: 5.4 GM/DL (ref 6.4–8.2)
TOTAL PROTEIN: 5.5 GM/DL (ref 6.4–8.2)
TOTAL PROTEIN: 5.6 GM/DL (ref 6.4–8.2)
TOTAL PROTEIN: 5.7 GM/DL (ref 6.4–8.2)
TOTAL PROTEIN: 5.8 GM/DL (ref 6.4–8.2)
TOTAL PROTEIN: 5.9 GM/DL (ref 6.4–8.2)
TOTAL PROTEIN: 6 GM/DL (ref 6.4–8.2)
TOTAL PROTEIN: 6.2 GM/DL (ref 6.4–8.2)
TOTAL PROTEIN: 6.2 GM/DL (ref 6.4–8.2)
TOTAL PROTEIN: 6.3 GM/DL (ref 6.4–8.2)
TOTAL PROTEIN: 6.4 GM/DL (ref 6.4–8.2)
TOTAL PROTEIN: 6.5 GM/DL (ref 6.4–8.2)
TRANSITIONAL EPITHELIAL: 1 /HPF
TRICHOMONAS: ABNORMAL /HPF
TROPONIN T: 0.01 NG/ML
TROPONIN T: 0.02 NG/ML
TROPONIN T: <0.01 NG/ML
TSH HIGH SENSITIVITY: 0.81 UIU/ML (ref 0.27–4.2)
TSH HIGH SENSITIVITY: 2.86 UIU/ML (ref 0.27–4.2)
TSH HIGH SENSITIVITY: 7.15 UIU/ML (ref 0.27–4.2)
UNSATURATED IRON BINDING CAPACITY: 208 UG/DL (ref 110–370)
URINE TOTAL PROTEIN: 105.6 MG/DL
URINE TOTAL PROTEIN: 293.1 MG/DL
URINE TOTAL PROTEIN: 54.2 MG/DL
URINE TOTAL PROTEIN: 87.4 MG/DL
UROBILINOGEN, URINE: 2 MG/DL (ref 0.2–1)
UROBILINOGEN, URINE: NEGATIVE MG/DL (ref 0.2–1)
VANCOMYCIN RANDOM: 10.7 UG/ML
VANCOMYCIN RANDOM: 17.6 UG/ML
VANCOMYCIN TROUGH: 13.7 UG/ML (ref 10–20)
VANCOMYCIN TROUGH: 14.8 UG/ML (ref 10–20)
VANCOMYCIN TROUGH: 22.2 UG/ML (ref 10–20)
VITAMIN B-12: 1364 PG/ML (ref 211–911)
VITAMIN B-12: >2000 PG/ML (ref 211–911)
WBC # BLD: 10.1 K/CU MM (ref 4–10.5)
WBC # BLD: 10.5 K/CU MM (ref 4–10.5)
WBC # BLD: 10.5 K/CU MM (ref 4–10.5)
WBC # BLD: 10.7 K/CU MM (ref 4–10.5)
WBC # BLD: 11.8 K/CU MM (ref 4–10.5)
WBC # BLD: 12.2 K/CU MM (ref 4–10.5)
WBC # BLD: 12.3 K/CU MM (ref 4–10.5)
WBC # BLD: 12.5 K/CU MM (ref 4–10.5)
WBC # BLD: 13 K/CU MM (ref 4–10.5)
WBC # BLD: 13.2 K/CU MM (ref 4–10.5)
WBC # BLD: 13.5 K/CU MM (ref 4–10.5)
WBC # BLD: 13.6 K/CU MM (ref 4–10.5)
WBC # BLD: 14 K/CU MM (ref 4–10.5)
WBC # BLD: 14.7 K/CU MM (ref 4–10.5)
WBC # BLD: 15.1 K/CU MM (ref 4–10.5)
WBC # BLD: 16 K/CU MM (ref 4–10.5)
WBC # BLD: 16.8 K/CU MM (ref 4–10.5)
WBC # BLD: 17 K/CU MM (ref 4–10.5)
WBC # BLD: 17.5 K/CU MM (ref 4–10.5)
WBC # BLD: 19 K/CU MM (ref 4–10.5)
WBC # BLD: 22.6 K/CU MM (ref 4–10.5)
WBC # BLD: 23.5 K/CU MM (ref 4–10.5)
WBC # BLD: 47.6 K/CU MM (ref 4–10.5)
WBC # BLD: 8.5 K/CU MM (ref 4–10.5)
WBC # BLD: 8.6 K/CU MM (ref 4–10.5)
WBC # BLD: 8.7 K/CU MM (ref 4–10.5)
WBC # BLD: 9.2 K/CU MM (ref 4–10.5)
WBC # BLD: 9.6 K/CU MM (ref 4–10.5)
WBC # BLD: 9.7 K/CU MM (ref 4–10.5)
WBC # BLD: ABNORMAL 10*3/UL
WBC CLUMP: ABNORMAL /HPF
WBC UA: 15 /HPF (ref 0–5)
WBC UA: 3 /HPF (ref 0–5)
WBC UA: 4 /HPF (ref 0–5)
WBC UA: 490 /HPF (ref 0–5)
WBC UA: 7 /HPF (ref 0–5)
WBC UA: 7 /HPF (ref 0–5)
WBC UA: ABNORMAL /HPF (ref 0–5)

## 2021-01-01 PROCEDURE — 97530 THERAPEUTIC ACTIVITIES: CPT

## 2021-01-01 PROCEDURE — 81001 URINALYSIS AUTO W/SCOPE: CPT

## 2021-01-01 PROCEDURE — 6370000000 HC RX 637 (ALT 250 FOR IP): Performed by: INTERNAL MEDICINE

## 2021-01-01 PROCEDURE — 99233 SBSQ HOSP IP/OBS HIGH 50: CPT | Performed by: NURSE PRACTITIONER

## 2021-01-01 PROCEDURE — 80053 COMPREHEN METABOLIC PANEL: CPT

## 2021-01-01 PROCEDURE — 85025 COMPLETE CBC W/AUTO DIFF WBC: CPT

## 2021-01-01 PROCEDURE — 6370000000 HC RX 637 (ALT 250 FOR IP): Performed by: PSYCHIATRY & NEUROLOGY

## 2021-01-01 PROCEDURE — 80048 BASIC METABOLIC PNL TOTAL CA: CPT

## 2021-01-01 PROCEDURE — 93010 ELECTROCARDIOGRAM REPORT: CPT | Performed by: INTERNAL MEDICINE

## 2021-01-01 PROCEDURE — 92526 ORAL FUNCTION THERAPY: CPT | Performed by: SPEECH-LANGUAGE PATHOLOGIST

## 2021-01-01 PROCEDURE — 97166 OT EVAL MOD COMPLEX 45 MIN: CPT

## 2021-01-01 PROCEDURE — 82962 GLUCOSE BLOOD TEST: CPT

## 2021-01-01 PROCEDURE — 94761 N-INVAS EAR/PLS OXIMETRY MLT: CPT

## 2021-01-01 PROCEDURE — 6370000000 HC RX 637 (ALT 250 FOR IP): Performed by: GENERAL PRACTICE

## 2021-01-01 PROCEDURE — 36415 COLL VENOUS BLD VENIPUNCTURE: CPT

## 2021-01-01 PROCEDURE — 82746 ASSAY OF FOLIC ACID SERUM: CPT

## 2021-01-01 PROCEDURE — 2580000003 HC RX 258: Performed by: INTERNAL MEDICINE

## 2021-01-01 PROCEDURE — 84156 ASSAY OF PROTEIN URINE: CPT

## 2021-01-01 PROCEDURE — 83880 ASSAY OF NATRIURETIC PEPTIDE: CPT

## 2021-01-01 PROCEDURE — 95819 EEG AWAKE AND ASLEEP: CPT

## 2021-01-01 PROCEDURE — 2700000000 HC OXYGEN THERAPY PER DAY

## 2021-01-01 PROCEDURE — 97110 THERAPEUTIC EXERCISES: CPT

## 2021-01-01 PROCEDURE — 6360000002 HC RX W HCPCS: Performed by: EMERGENCY MEDICINE

## 2021-01-01 PROCEDURE — 2500000003 HC RX 250 WO HCPCS

## 2021-01-01 PROCEDURE — 76937 US GUIDE VASCULAR ACCESS: CPT

## 2021-01-01 PROCEDURE — 36569 INSJ PICC 5 YR+ W/O IMAGING: CPT

## 2021-01-01 PROCEDURE — 2580000003 HC RX 258: Performed by: PHYSICIAN ASSISTANT

## 2021-01-01 PROCEDURE — 80202 ASSAY OF VANCOMYCIN: CPT

## 2021-01-01 PROCEDURE — 6360000002 HC RX W HCPCS: Performed by: GENERAL PRACTICE

## 2021-01-01 PROCEDURE — 6370000000 HC RX 637 (ALT 250 FOR IP): Performed by: PHYSICIAN ASSISTANT

## 2021-01-01 PROCEDURE — 85610 PROTHROMBIN TIME: CPT

## 2021-01-01 PROCEDURE — 77001 FLUOROGUIDE FOR VEIN DEVICE: CPT

## 2021-01-01 PROCEDURE — 93005 ELECTROCARDIOGRAM TRACING: CPT | Performed by: STUDENT IN AN ORGANIZED HEALTH CARE EDUCATION/TRAINING PROGRAM

## 2021-01-01 PROCEDURE — 1200000000 HC SEMI PRIVATE

## 2021-01-01 PROCEDURE — 2580000003 HC RX 258: Performed by: GENERAL PRACTICE

## 2021-01-01 PROCEDURE — 96366 THER/PROPH/DIAG IV INF ADDON: CPT

## 2021-01-01 PROCEDURE — 6370000000 HC RX 637 (ALT 250 FOR IP): Performed by: NURSE PRACTITIONER

## 2021-01-01 PROCEDURE — 82570 ASSAY OF URINE CREATININE: CPT

## 2021-01-01 PROCEDURE — 96365 THER/PROPH/DIAG IV INF INIT: CPT

## 2021-01-01 PROCEDURE — 97167 OT EVAL HIGH COMPLEX 60 MIN: CPT

## 2021-01-01 PROCEDURE — 97162 PT EVAL MOD COMPLEX 30 MIN: CPT

## 2021-01-01 PROCEDURE — 6360000002 HC RX W HCPCS: Performed by: INTERNAL MEDICINE

## 2021-01-01 PROCEDURE — 83605 ASSAY OF LACTIC ACID: CPT

## 2021-01-01 PROCEDURE — 84145 PROCALCITONIN (PCT): CPT

## 2021-01-01 PROCEDURE — 84484 ASSAY OF TROPONIN QUANT: CPT

## 2021-01-01 PROCEDURE — 2000000000 HC ICU R&B

## 2021-01-01 PROCEDURE — 36558 INSERT TUNNELED CV CATH: CPT

## 2021-01-01 PROCEDURE — APPSS60 APP SPLIT SHARED TIME 46-60 MINUTES: Performed by: NURSE PRACTITIONER

## 2021-01-01 PROCEDURE — 85730 THROMBOPLASTIN TIME PARTIAL: CPT

## 2021-01-01 PROCEDURE — 71045 X-RAY EXAM CHEST 1 VIEW: CPT

## 2021-01-01 PROCEDURE — 2500000003 HC RX 250 WO HCPCS: Performed by: EMERGENCY MEDICINE

## 2021-01-01 PROCEDURE — 99233 SBSQ HOSP IP/OBS HIGH 50: CPT | Performed by: INTERNAL MEDICINE

## 2021-01-01 PROCEDURE — 87086 URINE CULTURE/COLONY COUNT: CPT

## 2021-01-01 PROCEDURE — 84300 ASSAY OF URINE SODIUM: CPT

## 2021-01-01 PROCEDURE — 6360000002 HC RX W HCPCS

## 2021-01-01 PROCEDURE — 85027 COMPLETE CBC AUTOMATED: CPT

## 2021-01-01 PROCEDURE — 87186 SC STD MICRODIL/AGAR DIL: CPT

## 2021-01-01 PROCEDURE — 99254 IP/OBS CNSLTJ NEW/EST MOD 60: CPT | Performed by: INTERNAL MEDICINE

## 2021-01-01 PROCEDURE — 99232 SBSQ HOSP IP/OBS MODERATE 35: CPT | Performed by: INTERNAL MEDICINE

## 2021-01-01 PROCEDURE — 80307 DRUG TEST PRSMV CHEM ANLYZR: CPT

## 2021-01-01 PROCEDURE — 6360000002 HC RX W HCPCS: Performed by: PHYSICIAN ASSISTANT

## 2021-01-01 PROCEDURE — 97112 NEUROMUSCULAR REEDUCATION: CPT

## 2021-01-01 PROCEDURE — 97535 SELF CARE MNGMENT TRAINING: CPT

## 2021-01-01 PROCEDURE — 84100 ASSAY OF PHOSPHORUS: CPT

## 2021-01-01 PROCEDURE — 96361 HYDRATE IV INFUSION ADD-ON: CPT

## 2021-01-01 PROCEDURE — 97116 GAIT TRAINING THERAPY: CPT

## 2021-01-01 PROCEDURE — 83735 ASSAY OF MAGNESIUM: CPT

## 2021-01-01 PROCEDURE — 51702 INSERT TEMP BLADDER CATH: CPT

## 2021-01-01 PROCEDURE — 2500000003 HC RX 250 WO HCPCS: Performed by: GENERAL PRACTICE

## 2021-01-01 PROCEDURE — 80069 RENAL FUNCTION PANEL: CPT

## 2021-01-01 PROCEDURE — 36600 WITHDRAWAL OF ARTERIAL BLOOD: CPT

## 2021-01-01 PROCEDURE — 82805 BLOOD GASES W/O2 SATURATION: CPT

## 2021-01-01 PROCEDURE — APPNB45 APP NON BILLABLE 31-45 MINUTES: Performed by: NURSE PRACTITIONER

## 2021-01-01 PROCEDURE — 83036 HEMOGLOBIN GLYCOSYLATED A1C: CPT

## 2021-01-01 PROCEDURE — 2500000003 HC RX 250 WO HCPCS: Performed by: INTERNAL MEDICINE

## 2021-01-01 PROCEDURE — 87040 BLOOD CULTURE FOR BACTERIA: CPT

## 2021-01-01 PROCEDURE — 87635 SARS-COV-2 COVID-19 AMP PRB: CPT

## 2021-01-01 PROCEDURE — 70450 CT HEAD/BRAIN W/O DYE: CPT

## 2021-01-01 PROCEDURE — 93005 ELECTROCARDIOGRAM TRACING: CPT | Performed by: INTERNAL MEDICINE

## 2021-01-01 PROCEDURE — 96368 THER/DIAG CONCURRENT INF: CPT

## 2021-01-01 PROCEDURE — 82550 ASSAY OF CK (CPK): CPT

## 2021-01-01 PROCEDURE — 82010 KETONE BODYS QUAN: CPT

## 2021-01-01 PROCEDURE — 51798 US URINE CAPACITY MEASURE: CPT

## 2021-01-01 PROCEDURE — 6360000002 HC RX W HCPCS: Performed by: SURGERY

## 2021-01-01 PROCEDURE — 2140000000 HC CCU INTERMEDIATE R&B

## 2021-01-01 PROCEDURE — 70551 MRI BRAIN STEM W/O DYE: CPT

## 2021-01-01 PROCEDURE — 92610 EVALUATE SWALLOWING FUNCTION: CPT

## 2021-01-01 PROCEDURE — 99253 IP/OBS CNSLTJ NEW/EST LOW 45: CPT | Performed by: NURSE PRACTITIONER

## 2021-01-01 PROCEDURE — 0BH17EZ INSERTION OF ENDOTRACHEAL AIRWAY INTO TRACHEA, VIA NATURAL OR ARTIFICIAL OPENING: ICD-10-PCS | Performed by: EMERGENCY MEDICINE

## 2021-01-01 PROCEDURE — 99222 1ST HOSP IP/OBS MODERATE 55: CPT | Performed by: INTERNAL MEDICINE

## 2021-01-01 PROCEDURE — 93005 ELECTROCARDIOGRAM TRACING: CPT | Performed by: GENERAL PRACTICE

## 2021-01-01 PROCEDURE — 94664 DEMO&/EVAL PT USE INHALER: CPT

## 2021-01-01 PROCEDURE — 86850 RBC ANTIBODY SCREEN: CPT

## 2021-01-01 PROCEDURE — 82140 ASSAY OF AMMONIA: CPT

## 2021-01-01 PROCEDURE — 5A1935Z RESPIRATORY VENTILATION, LESS THAN 24 CONSECUTIVE HOURS: ICD-10-PCS | Performed by: EMERGENCY MEDICINE

## 2021-01-01 PROCEDURE — APPSS45 APP SPLIT SHARED TIME 31-45 MINUTES: Performed by: NURSE PRACTITIONER

## 2021-01-01 PROCEDURE — C1751 CATH, INF, PER/CENT/MIDLINE: HCPCS

## 2021-01-01 PROCEDURE — C1894 INTRO/SHEATH, NON-LASER: HCPCS

## 2021-01-01 PROCEDURE — 36410 VNPNXR 3YR/> PHY/QHP DX/THER: CPT

## 2021-01-01 PROCEDURE — 92526 ORAL FUNCTION THERAPY: CPT

## 2021-01-01 PROCEDURE — 36592 COLLECT BLOOD FROM PICC: CPT

## 2021-01-01 PROCEDURE — 76937 US GUIDE VASCULAR ACCESS: CPT | Performed by: SURGERY

## 2021-01-01 PROCEDURE — 87804 INFLUENZA ASSAY W/OPTIC: CPT

## 2021-01-01 PROCEDURE — 99285 EMERGENCY DEPT VISIT HI MDM: CPT

## 2021-01-01 PROCEDURE — 96374 THER/PROPH/DIAG INJ IV PUSH: CPT

## 2021-01-01 PROCEDURE — 99253 IP/OBS CNSLTJ NEW/EST LOW 45: CPT | Performed by: SURGERY

## 2021-01-01 PROCEDURE — APPNB60 APP NON BILLABLE TIME 46-60 MINS: Performed by: NURSE PRACTITIONER

## 2021-01-01 PROCEDURE — 99255 IP/OBS CONSLTJ NEW/EST HI 80: CPT | Performed by: PSYCHIATRY & NEUROLOGY

## 2021-01-01 PROCEDURE — 86900 BLOOD TYPING SEROLOGIC ABO: CPT

## 2021-01-01 PROCEDURE — 83540 ASSAY OF IRON: CPT

## 2021-01-01 PROCEDURE — 2580000003 HC RX 258: Performed by: STUDENT IN AN ORGANIZED HEALTH CARE EDUCATION/TRAINING PROGRAM

## 2021-01-01 PROCEDURE — 93005 ELECTROCARDIOGRAM TRACING: CPT | Performed by: EMERGENCY MEDICINE

## 2021-01-01 PROCEDURE — 94640 AIRWAY INHALATION TREATMENT: CPT

## 2021-01-01 PROCEDURE — 99232 SBSQ HOSP IP/OBS MODERATE 35: CPT | Performed by: NURSE PRACTITIONER

## 2021-01-01 PROCEDURE — 2580000003 HC RX 258: Performed by: EMERGENCY MEDICINE

## 2021-01-01 PROCEDURE — 83690 ASSAY OF LIPASE: CPT

## 2021-01-01 PROCEDURE — 82248 BILIRUBIN DIRECT: CPT

## 2021-01-01 PROCEDURE — 76705 ECHO EXAM OF ABDOMEN: CPT

## 2021-01-01 PROCEDURE — 86901 BLOOD TYPING SEROLOGIC RH(D): CPT

## 2021-01-01 PROCEDURE — 82803 BLOOD GASES ANY COMBINATION: CPT

## 2021-01-01 PROCEDURE — 82607 VITAMIN B-12: CPT

## 2021-01-01 PROCEDURE — 84133 ASSAY OF URINE POTASSIUM: CPT

## 2021-01-01 PROCEDURE — 2580000003 HC RX 258

## 2021-01-01 PROCEDURE — 02HV33Z INSERTION OF INFUSION DEVICE INTO SUPERIOR VENA CAVA, PERCUTANEOUS APPROACH: ICD-10-PCS | Performed by: GENERAL PRACTICE

## 2021-01-01 PROCEDURE — 82436 ASSAY OF URINE CHLORIDE: CPT

## 2021-01-01 PROCEDURE — 87088 URINE BACTERIA CULTURE: CPT

## 2021-01-01 PROCEDURE — 74176 CT ABD & PELVIS W/O CONTRAST: CPT

## 2021-01-01 PROCEDURE — 84439 ASSAY OF FREE THYROXINE: CPT

## 2021-01-01 PROCEDURE — 84443 ASSAY THYROID STIM HORMONE: CPT

## 2021-01-01 PROCEDURE — 6370000000 HC RX 637 (ALT 250 FOR IP): Performed by: STUDENT IN AN ORGANIZED HEALTH CARE EDUCATION/TRAINING PROGRAM

## 2021-01-01 PROCEDURE — 85007 BL SMEAR W/DIFF WBC COUNT: CPT

## 2021-01-01 PROCEDURE — 93306 TTE W/DOPPLER COMPLETE: CPT

## 2021-01-01 PROCEDURE — 93005 ELECTROCARDIOGRAM TRACING: CPT | Performed by: NURSE PRACTITIONER

## 2021-01-01 PROCEDURE — G0480 DRUG TEST DEF 1-7 CLASSES: HCPCS

## 2021-01-01 PROCEDURE — 99284 EMERGENCY DEPT VISIT MOD MDM: CPT

## 2021-01-01 PROCEDURE — 93880 EXTRACRANIAL BILAT STUDY: CPT

## 2021-01-01 PROCEDURE — 99283 EMERGENCY DEPT VISIT LOW MDM: CPT

## 2021-01-01 PROCEDURE — 36556 INSERT NON-TUNNEL CV CATH: CPT | Performed by: SURGERY

## 2021-01-01 PROCEDURE — 96375 TX/PRO/DX INJ NEW DRUG ADDON: CPT

## 2021-01-01 PROCEDURE — 74018 RADEX ABDOMEN 1 VIEW: CPT

## 2021-01-01 PROCEDURE — 36556 INSERT NON-TUNNEL CV CATH: CPT

## 2021-01-01 PROCEDURE — 70544 MR ANGIOGRAPHY HEAD W/O DYE: CPT

## 2021-01-01 PROCEDURE — 6360000002 HC RX W HCPCS: Performed by: STUDENT IN AN ORGANIZED HEALTH CARE EDUCATION/TRAINING PROGRAM

## 2021-01-01 PROCEDURE — 2709999900 HC NON-CHARGEABLE SUPPLY

## 2021-01-01 PROCEDURE — 82728 ASSAY OF FERRITIN: CPT

## 2021-01-01 PROCEDURE — 6370000000 HC RX 637 (ALT 250 FOR IP): Performed by: EMERGENCY MEDICINE

## 2021-01-01 PROCEDURE — 83090 ASSAY OF HOMOCYSTEINE: CPT

## 2021-01-01 PROCEDURE — 83550 IRON BINDING TEST: CPT

## 2021-01-01 RX ORDER — ASPIRIN 81 MG/1
81 TABLET, CHEWABLE ORAL DAILY
Status: DISCONTINUED | OUTPATIENT
Start: 2021-01-01 | End: 2021-01-01 | Stop reason: HOSPADM

## 2021-01-01 RX ORDER — CALCIUM GLUCONATE 20 MG/ML
1000 INJECTION, SOLUTION INTRAVENOUS ONCE
Status: COMPLETED | OUTPATIENT
Start: 2021-01-01 | End: 2021-01-01

## 2021-01-01 RX ORDER — SODIUM CHLORIDE 0.9 % (FLUSH) 0.9 %
5-40 SYRINGE (ML) INJECTION EVERY 12 HOURS SCHEDULED
Status: DISCONTINUED | OUTPATIENT
Start: 2021-01-01 | End: 2021-01-01 | Stop reason: HOSPADM

## 2021-01-01 RX ORDER — AMIODARONE HYDROCHLORIDE 200 MG/1
200 TABLET ORAL 2 TIMES DAILY
Status: DISCONTINUED | OUTPATIENT
Start: 2021-01-01 | End: 2021-01-01 | Stop reason: HOSPADM

## 2021-01-01 RX ORDER — FUROSEMIDE 10 MG/ML
40 INJECTION INTRAMUSCULAR; INTRAVENOUS 3 TIMES DAILY
Status: DISCONTINUED | OUTPATIENT
Start: 2021-01-01 | End: 2021-01-01

## 2021-01-01 RX ORDER — DEXTROSE MONOHYDRATE 50 MG/ML
100 INJECTION, SOLUTION INTRAVENOUS PRN
Status: DISCONTINUED | OUTPATIENT
Start: 2021-01-01 | End: 2021-01-01

## 2021-01-01 RX ORDER — BUSPIRONE HYDROCHLORIDE 10 MG/1
10 TABLET ORAL 3 TIMES DAILY
Status: DISCONTINUED | OUTPATIENT
Start: 2021-01-01 | End: 2021-01-01 | Stop reason: HOSPADM

## 2021-01-01 RX ORDER — SODIUM CHLORIDE 0.9 % (FLUSH) 0.9 %
5-40 SYRINGE (ML) INJECTION PRN
Status: DISCONTINUED | OUTPATIENT
Start: 2021-01-01 | End: 2021-01-01 | Stop reason: HOSPADM

## 2021-01-01 RX ORDER — ACETAMINOPHEN 325 MG/1
650 TABLET ORAL EVERY 6 HOURS PRN
Status: DISCONTINUED | OUTPATIENT
Start: 2021-01-01 | End: 2021-01-01 | Stop reason: HOSPADM

## 2021-01-01 RX ORDER — INSULIN GLARGINE 100 [IU]/ML
20 INJECTION, SOLUTION SUBCUTANEOUS NIGHTLY
Status: DISCONTINUED | OUTPATIENT
Start: 2021-01-01 | End: 2021-01-01

## 2021-01-01 RX ORDER — METOPROLOL SUCCINATE 100 MG/1
100 TABLET, EXTENDED RELEASE ORAL DAILY
Status: DISCONTINUED | OUTPATIENT
Start: 2021-01-01 | End: 2021-01-01

## 2021-01-01 RX ORDER — MIDAZOLAM HYDROCHLORIDE 2 MG/2ML
1 INJECTION, SOLUTION INTRAMUSCULAR; INTRAVENOUS ONCE
Status: COMPLETED | OUTPATIENT
Start: 2021-01-01 | End: 2021-01-01

## 2021-01-01 RX ORDER — BUSPIRONE HYDROCHLORIDE 10 MG/1
10 TABLET ORAL 3 TIMES DAILY
Qty: 90 TABLET | Refills: 0 | Status: SHIPPED | OUTPATIENT
Start: 2021-01-01

## 2021-01-01 RX ORDER — CALCIUM CHLORIDE 100 MG/ML
INJECTION INTRAVENOUS; INTRAVENTRICULAR DAILY PRN
Status: COMPLETED | OUTPATIENT
Start: 2021-01-01 | End: 2021-01-01

## 2021-01-01 RX ORDER — AMIODARONE HYDROCHLORIDE 200 MG/1
200 TABLET ORAL 2 TIMES DAILY
Qty: 60 TABLET | Refills: 1 | Status: SHIPPED | OUTPATIENT
Start: 2021-01-01

## 2021-01-01 RX ORDER — SODIUM CHLORIDE, SODIUM LACTATE, POTASSIUM CHLORIDE, CALCIUM CHLORIDE 600; 310; 30; 20 MG/100ML; MG/100ML; MG/100ML; MG/100ML
INJECTION, SOLUTION INTRAVENOUS ONCE
Status: DISCONTINUED | OUTPATIENT
Start: 2021-01-01 | End: 2021-01-01

## 2021-01-01 RX ORDER — ATORVASTATIN CALCIUM 40 MG/1
40 TABLET, FILM COATED ORAL NIGHTLY
Status: DISCONTINUED | OUTPATIENT
Start: 2021-01-01 | End: 2021-01-01 | Stop reason: HOSPADM

## 2021-01-01 RX ORDER — ONDANSETRON 4 MG/1
4 TABLET, ORALLY DISINTEGRATING ORAL EVERY 8 HOURS PRN
Status: DISCONTINUED | OUTPATIENT
Start: 2021-01-01 | End: 2021-01-01 | Stop reason: HOSPADM

## 2021-01-01 RX ORDER — DIGOXIN 0.25 MG/ML
250 INJECTION INTRAMUSCULAR; INTRAVENOUS EVERY 4 HOURS
Status: COMPLETED | OUTPATIENT
Start: 2021-01-01 | End: 2021-01-01

## 2021-01-01 RX ORDER — NITROGLYCERIN 0.4 MG/1
0.4 TABLET SUBLINGUAL EVERY 5 MIN PRN
Status: DISCONTINUED | OUTPATIENT
Start: 2021-01-01 | End: 2021-01-01 | Stop reason: HOSPADM

## 2021-01-01 RX ORDER — LORAZEPAM 0.5 MG/1
0.5 TABLET ORAL EVERY 4 HOURS PRN
Qty: 12 TABLET | Refills: 0 | Status: ON HOLD | OUTPATIENT
Start: 2021-01-01 | End: 2021-01-01

## 2021-01-01 RX ORDER — PANTOPRAZOLE SODIUM 40 MG/1
40 TABLET, DELAYED RELEASE ORAL
Status: DISCONTINUED | OUTPATIENT
Start: 2021-01-01 | End: 2021-01-01

## 2021-01-01 RX ORDER — POLYETHYLENE GLYCOL 3350 17 G/17G
17 POWDER, FOR SOLUTION ORAL DAILY PRN
Status: DISCONTINUED | OUTPATIENT
Start: 2021-01-01 | End: 2021-01-01 | Stop reason: HOSPADM

## 2021-01-01 RX ORDER — FUROSEMIDE 10 MG/ML
40 INJECTION INTRAMUSCULAR; INTRAVENOUS ONCE
Status: COMPLETED | OUTPATIENT
Start: 2021-01-01 | End: 2021-01-01

## 2021-01-01 RX ORDER — METOLAZONE 2.5 MG/1
2.5 TABLET ORAL
Status: CANCELLED | OUTPATIENT
Start: 2021-01-01

## 2021-01-01 RX ORDER — SODIUM CHLORIDE 9 MG/ML
25 INJECTION, SOLUTION INTRAVENOUS PRN
Status: DISCONTINUED | OUTPATIENT
Start: 2021-01-01 | End: 2021-01-01 | Stop reason: HOSPADM

## 2021-01-01 RX ORDER — ALOGLIPTIN 12.5 MG/1
6.25 TABLET, FILM COATED ORAL DAILY
Status: DISCONTINUED | OUTPATIENT
Start: 2021-01-01 | End: 2021-01-01 | Stop reason: HOSPADM

## 2021-01-01 RX ORDER — TORSEMIDE 20 MG/1
20 TABLET ORAL DAILY
Qty: 30 TABLET | Refills: 3 | Status: ON HOLD | OUTPATIENT
Start: 2021-01-01 | End: 2021-01-01 | Stop reason: HOSPADM

## 2021-01-01 RX ORDER — HEPARIN SODIUM 5000 [USP'U]/ML
5000 INJECTION, SOLUTION INTRAVENOUS; SUBCUTANEOUS EVERY 8 HOURS SCHEDULED
Status: CANCELLED | OUTPATIENT
Start: 2021-01-01

## 2021-01-01 RX ORDER — ASPIRIN 81 MG/1
81 TABLET ORAL DAILY
Status: DISCONTINUED | OUTPATIENT
Start: 2021-01-01 | End: 2021-01-01

## 2021-01-01 RX ORDER — DILTIAZEM HYDROCHLORIDE 5 MG/ML
10 INJECTION INTRAVENOUS ONCE
Status: COMPLETED | OUTPATIENT
Start: 2021-01-01 | End: 2021-01-01

## 2021-01-01 RX ORDER — METOPROLOL TARTRATE 50 MG/1
50 TABLET, FILM COATED ORAL 2 TIMES DAILY
Qty: 60 TABLET | Refills: 3 | Status: ON HOLD | OUTPATIENT
Start: 2021-01-01 | End: 2021-01-01 | Stop reason: HOSPADM

## 2021-01-01 RX ORDER — METOPROLOL SUCCINATE 25 MG/1
25 TABLET, EXTENDED RELEASE ORAL DAILY
Status: DISCONTINUED | OUTPATIENT
Start: 2021-01-01 | End: 2021-01-01

## 2021-01-01 RX ORDER — ACETAMINOPHEN 650 MG/1
650 SUPPOSITORY RECTAL EVERY 6 HOURS PRN
Status: DISCONTINUED | OUTPATIENT
Start: 2021-01-01 | End: 2021-01-01 | Stop reason: HOSPADM

## 2021-01-01 RX ORDER — METOPROLOL TARTRATE 5 MG/5ML
5 INJECTION INTRAVENOUS ONCE
Status: COMPLETED | OUTPATIENT
Start: 2021-01-01 | End: 2021-01-01

## 2021-01-01 RX ORDER — CHLORTHALIDONE 25 MG/1
12.5 TABLET ORAL DAILY
Status: DISCONTINUED | OUTPATIENT
Start: 2021-01-01 | End: 2021-01-01 | Stop reason: HOSPADM

## 2021-01-01 RX ORDER — CALCIUM GLUCONATE 94 MG/ML
1000 INJECTION, SOLUTION INTRAVENOUS ONCE
Status: COMPLETED | OUTPATIENT
Start: 2021-01-01 | End: 2021-01-01

## 2021-01-01 RX ORDER — MAGNESIUM OXIDE 400 MG/1
400 TABLET ORAL DAILY
Status: DISCONTINUED | OUTPATIENT
Start: 2021-01-01 | End: 2021-01-01 | Stop reason: HOSPADM

## 2021-01-01 RX ORDER — MAGNESIUM OXIDE 400 MG/1
400 TABLET ORAL DAILY
Qty: 30 TABLET | Refills: 1 | Status: ON HOLD | OUTPATIENT
Start: 2021-01-01 | End: 2021-01-01 | Stop reason: HOSPADM

## 2021-01-01 RX ORDER — ALOGLIPTIN 6.25 MG/1
6.25 TABLET, FILM COATED ORAL DAILY
Status: DISCONTINUED | OUTPATIENT
Start: 2021-01-01 | End: 2021-01-01 | Stop reason: HOSPADM

## 2021-01-01 RX ORDER — ATROPINE SULFATE 0.4 MG/ML
1 AMPUL (ML) INJECTION ONCE
Status: DISCONTINUED | OUTPATIENT
Start: 2021-01-01 | End: 2021-01-01 | Stop reason: CLARIF

## 2021-01-01 RX ORDER — EPINEPHRINE 0.1 MG/ML
SYRINGE (ML) INJECTION DAILY PRN
Status: COMPLETED | OUTPATIENT
Start: 2021-01-01 | End: 2021-01-01

## 2021-01-01 RX ORDER — 0.9 % SODIUM CHLORIDE 0.9 %
250 INTRAVENOUS SOLUTION INTRAVENOUS ONCE
Status: COMPLETED | OUTPATIENT
Start: 2021-01-01 | End: 2021-01-01

## 2021-01-01 RX ORDER — LISINOPRIL 5 MG/1
5 TABLET ORAL DAILY
Status: DISCONTINUED | OUTPATIENT
Start: 2021-01-01 | End: 2021-01-01

## 2021-01-01 RX ORDER — DEXTROSE MONOHYDRATE 25 G/50ML
12.5 INJECTION, SOLUTION INTRAVENOUS PRN
Status: DISCONTINUED | OUTPATIENT
Start: 2021-01-01 | End: 2021-01-01

## 2021-01-01 RX ORDER — POTASSIUM CHLORIDE 20 MEQ/1
40 TABLET, EXTENDED RELEASE ORAL 2 TIMES DAILY WITH MEALS
Status: COMPLETED | OUTPATIENT
Start: 2021-01-01 | End: 2021-01-01

## 2021-01-01 RX ORDER — PANTOPRAZOLE SODIUM 40 MG/1
40 TABLET, DELAYED RELEASE ORAL
Status: DISCONTINUED | OUTPATIENT
Start: 2021-01-01 | End: 2021-01-01 | Stop reason: HOSPADM

## 2021-01-01 RX ORDER — ONDANSETRON 2 MG/ML
4 INJECTION INTRAMUSCULAR; INTRAVENOUS EVERY 6 HOURS PRN
Status: DISCONTINUED | OUTPATIENT
Start: 2021-01-01 | End: 2021-01-01 | Stop reason: HOSPADM

## 2021-01-01 RX ORDER — DEXTROSE MONOHYDRATE 25 G/50ML
INJECTION, SOLUTION INTRAVENOUS
Status: COMPLETED
Start: 2021-01-01 | End: 2021-01-01

## 2021-01-01 RX ORDER — DULOXETIN HYDROCHLORIDE 30 MG/1
60 CAPSULE, DELAYED RELEASE ORAL DAILY
Status: DISCONTINUED | OUTPATIENT
Start: 2021-01-01 | End: 2021-01-01 | Stop reason: HOSPADM

## 2021-01-01 RX ORDER — INSULIN GLARGINE 100 [IU]/ML
10 INJECTION, SOLUTION SUBCUTANEOUS NIGHTLY
Qty: 1 VIAL | Refills: 3 | Status: ON HOLD | OUTPATIENT
Start: 2021-01-01 | End: 2021-01-01 | Stop reason: SDUPTHER

## 2021-01-01 RX ORDER — ASPIRIN 81 MG/1
81 TABLET ORAL DAILY
Status: DISCONTINUED | OUTPATIENT
Start: 2021-01-01 | End: 2021-01-01 | Stop reason: HOSPADM

## 2021-01-01 RX ORDER — DEXTROSE MONOHYDRATE 50 MG/ML
100 INJECTION, SOLUTION INTRAVENOUS PRN
Status: DISCONTINUED | OUTPATIENT
Start: 2021-01-01 | End: 2021-01-01 | Stop reason: HOSPADM

## 2021-01-01 RX ORDER — DEXTROSE MONOHYDRATE 25 G/50ML
12.5 INJECTION, SOLUTION INTRAVENOUS PRN
Status: DISCONTINUED | OUTPATIENT
Start: 2021-01-01 | End: 2021-01-01 | Stop reason: HOSPADM

## 2021-01-01 RX ORDER — FUROSEMIDE 10 MG/ML
80 INJECTION INTRAMUSCULAR; INTRAVENOUS 3 TIMES DAILY
Status: DISCONTINUED | OUTPATIENT
Start: 2021-01-01 | End: 2021-01-01 | Stop reason: HOSPADM

## 2021-01-01 RX ORDER — AMIODARONE HYDROCHLORIDE 200 MG/1
200 TABLET ORAL 2 TIMES DAILY
Status: DISCONTINUED | OUTPATIENT
Start: 2021-01-01 | End: 2021-01-01

## 2021-01-01 RX ORDER — NOREPINEPHRINE BIT/0.9 % NACL 16MG/250ML
2-100 INFUSION BOTTLE (ML) INTRAVENOUS CONTINUOUS
Status: DISCONTINUED | OUTPATIENT
Start: 2021-01-01 | End: 2021-01-01 | Stop reason: HOSPADM

## 2021-01-01 RX ORDER — LORAZEPAM 2 MG/ML
1 INJECTION INTRAMUSCULAR ONCE
Status: COMPLETED | OUTPATIENT
Start: 2021-01-01 | End: 2021-01-01

## 2021-01-01 RX ORDER — LOSARTAN POTASSIUM 100 MG/1
100 TABLET ORAL EVERY EVENING
Status: DISCONTINUED | OUTPATIENT
Start: 2021-01-01 | End: 2021-01-01

## 2021-01-01 RX ORDER — CHLORTHALIDONE 25 MG/1
12.5 TABLET ORAL DAILY
Qty: 30 TABLET | Refills: 3 | Status: ON HOLD | OUTPATIENT
Start: 2021-01-01 | End: 2021-01-01 | Stop reason: HOSPADM

## 2021-01-01 RX ORDER — FUROSEMIDE 10 MG/ML
20 INJECTION INTRAMUSCULAR; INTRAVENOUS 2 TIMES DAILY
Status: DISCONTINUED | OUTPATIENT
Start: 2021-01-01 | End: 2021-01-01

## 2021-01-01 RX ORDER — INSULIN GLARGINE 100 [IU]/ML
10 INJECTION, SOLUTION SUBCUTANEOUS NIGHTLY
Qty: 10 ML | Refills: 3 | Status: SHIPPED | OUTPATIENT
Start: 2021-01-01

## 2021-01-01 RX ORDER — GLIPIZIDE 5 MG/1
2.5 TABLET ORAL
Status: DISCONTINUED | OUTPATIENT
Start: 2021-01-01 | End: 2021-01-01 | Stop reason: HOSPADM

## 2021-01-01 RX ORDER — INSULIN GLARGINE 100 [IU]/ML
10 INJECTION, SOLUTION SUBCUTANEOUS NIGHTLY
Status: DISCONTINUED | OUTPATIENT
Start: 2021-01-01 | End: 2021-01-01 | Stop reason: HOSPADM

## 2021-01-01 RX ORDER — LIDOCAINE HYDROCHLORIDE 10 MG/ML
5 INJECTION, SOLUTION EPIDURAL; INFILTRATION; INTRACAUDAL; PERINEURAL SEE ADMIN INSTRUCTIONS
Status: COMPLETED | OUTPATIENT
Start: 2021-01-01 | End: 2021-01-01

## 2021-01-01 RX ORDER — POTASSIUM CHLORIDE 29.8 MG/ML
20 INJECTION INTRAVENOUS
Status: COMPLETED | OUTPATIENT
Start: 2021-01-01 | End: 2021-01-01

## 2021-01-01 RX ORDER — PANTOPRAZOLE SODIUM 40 MG/1
40 TABLET, DELAYED RELEASE ORAL DAILY
Status: DISCONTINUED | OUTPATIENT
Start: 2021-01-01 | End: 2021-01-01 | Stop reason: HOSPADM

## 2021-01-01 RX ORDER — B12/LEVOMEFOLATE CALCIUM/B-6 2-1.13-25
1 TABLET ORAL DAILY
Qty: 30 TABLET | Refills: 0 | Status: SHIPPED | OUTPATIENT
Start: 2021-01-01

## 2021-01-01 RX ORDER — POTASSIUM CHLORIDE 20 MEQ/1
40 TABLET, EXTENDED RELEASE ORAL 2 TIMES DAILY
Status: DISCONTINUED | OUTPATIENT
Start: 2021-01-01 | End: 2021-01-01

## 2021-01-01 RX ORDER — FUROSEMIDE 10 MG/ML
80 INJECTION INTRAMUSCULAR; INTRAVENOUS ONCE
Status: COMPLETED | OUTPATIENT
Start: 2021-01-01 | End: 2021-01-01

## 2021-01-01 RX ORDER — ROCURONIUM BROMIDE 10 MG/ML
INJECTION, SOLUTION INTRAVENOUS DAILY PRN
Status: COMPLETED | OUTPATIENT
Start: 2021-01-01 | End: 2021-01-01

## 2021-01-01 RX ORDER — ALBUTEROL SULFATE 90 UG/1
2 AEROSOL, METERED RESPIRATORY (INHALATION) ONCE
Status: COMPLETED | OUTPATIENT
Start: 2021-01-01 | End: 2021-01-01

## 2021-01-01 RX ORDER — LISINOPRIL 2.5 MG/1
2.5 TABLET ORAL DAILY
Status: DISCONTINUED | OUTPATIENT
Start: 2021-01-01 | End: 2021-01-01

## 2021-01-01 RX ORDER — B12/LEVOMEFOLATE CALCIUM/B-6 2-1.13-25
1 TABLET ORAL DAILY
Status: DISCONTINUED | OUTPATIENT
Start: 2021-01-01 | End: 2021-01-01 | Stop reason: HOSPADM

## 2021-01-01 RX ORDER — AMIODARONE HYDROCHLORIDE 400 MG/1
400 TABLET ORAL 2 TIMES DAILY
Qty: 9 TABLET | Refills: 0 | Status: ON HOLD | OUTPATIENT
Start: 2021-01-01 | End: 2021-01-01 | Stop reason: HOSPADM

## 2021-01-01 RX ORDER — NOREPINEPHRINE BIT/0.9 % NACL 16MG/250ML
INFUSION BOTTLE (ML) INTRAVENOUS
Status: COMPLETED
Start: 2021-01-01 | End: 2021-01-01

## 2021-01-01 RX ORDER — HYDROXYZINE PAMOATE 25 MG/1
25 CAPSULE ORAL EVERY 8 HOURS PRN
Status: DISCONTINUED | OUTPATIENT
Start: 2021-01-01 | End: 2021-01-01 | Stop reason: HOSPADM

## 2021-01-01 RX ORDER — INSULIN GLARGINE 100 [IU]/ML
30 INJECTION, SOLUTION SUBCUTANEOUS NIGHTLY
Status: DISCONTINUED | OUTPATIENT
Start: 2021-01-01 | End: 2021-01-01 | Stop reason: HOSPADM

## 2021-01-01 RX ORDER — FUROSEMIDE 10 MG/ML
40 INJECTION INTRAMUSCULAR; INTRAVENOUS 2 TIMES DAILY
Status: DISCONTINUED | OUTPATIENT
Start: 2021-01-01 | End: 2021-01-01

## 2021-01-01 RX ORDER — SODIUM CHLORIDE 9 MG/ML
INJECTION, SOLUTION INTRAVENOUS CONTINUOUS
Status: DISCONTINUED | OUTPATIENT
Start: 2021-01-01 | End: 2021-01-01

## 2021-01-01 RX ORDER — DEXTROSE MONOHYDRATE 50 MG/ML
100 INJECTION, SOLUTION INTRAVENOUS PRN
Status: DISCONTINUED | OUTPATIENT
Start: 2021-01-01 | End: 2021-01-01 | Stop reason: SDUPTHER

## 2021-01-01 RX ORDER — SODIUM CHLORIDE 9 MG/ML
25 INJECTION, SOLUTION INTRAVENOUS PRN
Status: DISCONTINUED | OUTPATIENT
Start: 2021-01-01 | End: 2021-01-01

## 2021-01-01 RX ORDER — METOPROLOL TARTRATE 5 MG/5ML
INJECTION INTRAVENOUS
Status: COMPLETED
Start: 2021-01-01 | End: 2021-01-01

## 2021-01-01 RX ORDER — DEXTROSE MONOHYDRATE 25 G/50ML
12.5 INJECTION, SOLUTION INTRAVENOUS PRN
Status: DISCONTINUED | OUTPATIENT
Start: 2021-01-01 | End: 2021-01-01 | Stop reason: SDUPTHER

## 2021-01-01 RX ORDER — HYDROXYZINE PAMOATE 25 MG/1
25 CAPSULE ORAL EVERY 8 HOURS PRN
Qty: 90 CAPSULE | Refills: 0 | Status: ON HOLD | OUTPATIENT
Start: 2021-01-01 | End: 2021-01-01 | Stop reason: HOSPADM

## 2021-01-01 RX ORDER — AMIODARONE HYDROCHLORIDE 200 MG/1
400 TABLET ORAL 2 TIMES DAILY
Status: DISCONTINUED | OUTPATIENT
Start: 2021-01-01 | End: 2021-01-01 | Stop reason: HOSPADM

## 2021-01-01 RX ORDER — INSULIN GLARGINE 100 [IU]/ML
10 INJECTION, SOLUTION SUBCUTANEOUS NIGHTLY
Status: DISCONTINUED | OUTPATIENT
Start: 2021-01-01 | End: 2021-01-01

## 2021-01-01 RX ORDER — 0.9 % SODIUM CHLORIDE 0.9 %
500 INTRAVENOUS SOLUTION INTRAVENOUS ONCE
Status: DISCONTINUED | OUTPATIENT
Start: 2021-01-01 | End: 2021-01-01

## 2021-01-01 RX ORDER — AMIODARONE HYDROCHLORIDE 200 MG/1
100 TABLET ORAL 2 TIMES DAILY
Status: DISCONTINUED | OUTPATIENT
Start: 2021-01-01 | End: 2021-01-01 | Stop reason: HOSPADM

## 2021-01-01 RX ORDER — LOSARTAN POTASSIUM 100 MG/1
100 TABLET ORAL DAILY
Status: DISCONTINUED | OUTPATIENT
Start: 2021-01-01 | End: 2021-01-01 | Stop reason: HOSPADM

## 2021-01-01 RX ORDER — LORAZEPAM 0.5 MG/1
0.5 TABLET ORAL EVERY 4 HOURS PRN
Status: DISCONTINUED | OUTPATIENT
Start: 2021-01-01 | End: 2021-01-01 | Stop reason: HOSPADM

## 2021-01-01 RX ORDER — CLOPIDOGREL BISULFATE 75 MG/1
75 TABLET ORAL DAILY
Status: DISCONTINUED | OUTPATIENT
Start: 2021-01-01 | End: 2021-01-01

## 2021-01-01 RX ORDER — LEVOTHYROXINE SODIUM 0.05 MG/1
50 TABLET ORAL DAILY
Status: DISCONTINUED | OUTPATIENT
Start: 2021-01-01 | End: 2021-01-01 | Stop reason: HOSPADM

## 2021-01-01 RX ORDER — DULOXETIN HYDROCHLORIDE 60 MG/1
60 CAPSULE, DELAYED RELEASE ORAL DAILY
Qty: 30 CAPSULE | Refills: 3 | Status: SHIPPED | OUTPATIENT
Start: 2021-01-01

## 2021-01-01 RX ORDER — METOPROLOL SUCCINATE 50 MG/1
100 TABLET, EXTENDED RELEASE ORAL DAILY
Status: DISCONTINUED | OUTPATIENT
Start: 2021-01-01 | End: 2021-01-01 | Stop reason: HOSPADM

## 2021-01-01 RX ORDER — DIPHENHYDRAMINE HCL 25 MG
50 TABLET ORAL EVERY 6 HOURS PRN
Status: DISCONTINUED | OUTPATIENT
Start: 2021-01-01 | End: 2021-01-01 | Stop reason: HOSPADM

## 2021-01-01 RX ORDER — MAGNESIUM SULFATE 1 G/100ML
1000 INJECTION INTRAVENOUS ONCE
Status: COMPLETED | OUTPATIENT
Start: 2021-01-01 | End: 2021-01-01

## 2021-01-01 RX ORDER — INSULIN GLARGINE 100 [IU]/ML
10 INJECTION, SOLUTION SUBCUTANEOUS ONCE
Status: COMPLETED | OUTPATIENT
Start: 2021-01-01 | End: 2021-01-01

## 2021-01-01 RX ORDER — INSULIN GLARGINE 100 [IU]/ML
15 INJECTION, SOLUTION SUBCUTANEOUS NIGHTLY
Status: DISCONTINUED | OUTPATIENT
Start: 2021-01-01 | End: 2021-01-01

## 2021-01-01 RX ORDER — INSULIN GLARGINE 100 [IU]/ML
30 INJECTION, SOLUTION SUBCUTANEOUS NIGHTLY
Status: DISCONTINUED | OUTPATIENT
Start: 2021-01-01 | End: 2021-01-01

## 2021-01-01 RX ORDER — CLONIDINE HYDROCHLORIDE 0.1 MG/1
0.1 TABLET ORAL EVERY 4 HOURS PRN
Status: DISCONTINUED | OUTPATIENT
Start: 2021-01-01 | End: 2021-01-01

## 2021-01-01 RX ORDER — TRAZODONE HYDROCHLORIDE 50 MG/1
50 TABLET ORAL NIGHTLY
Status: DISCONTINUED | OUTPATIENT
Start: 2021-01-01 | End: 2021-01-01 | Stop reason: HOSPADM

## 2021-01-01 RX ORDER — INSULIN GLARGINE 100 [IU]/ML
20 INJECTION, SOLUTION SUBCUTANEOUS NIGHTLY
Qty: 15 ML | Refills: 0 | Status: ON HOLD | OUTPATIENT
Start: 2021-01-01 | End: 2021-01-01 | Stop reason: HOSPADM

## 2021-01-01 RX ORDER — DULOXETIN HYDROCHLORIDE 30 MG/1
30 CAPSULE, DELAYED RELEASE ORAL DAILY
Status: DISCONTINUED | OUTPATIENT
Start: 2021-01-01 | End: 2021-01-01

## 2021-01-01 RX ORDER — NICOTINE POLACRILEX 4 MG
15 LOZENGE BUCCAL PRN
Status: DISCONTINUED | OUTPATIENT
Start: 2021-01-01 | End: 2021-01-01 | Stop reason: HOSPADM

## 2021-01-01 RX ORDER — POTASSIUM CHLORIDE 750 MG/1
10 TABLET, FILM COATED, EXTENDED RELEASE ORAL 2 TIMES DAILY
Status: DISCONTINUED | OUTPATIENT
Start: 2021-01-01 | End: 2021-01-01 | Stop reason: HOSPADM

## 2021-01-01 RX ORDER — GLIPIZIDE 5 MG/1
2.5 TABLET ORAL
Qty: 60 TABLET | Refills: 3 | Status: ON HOLD | OUTPATIENT
Start: 2021-01-01 | End: 2021-01-01 | Stop reason: HOSPADM

## 2021-01-01 RX ORDER — FUROSEMIDE 40 MG/1
40 TABLET ORAL 2 TIMES DAILY
Status: DISCONTINUED | OUTPATIENT
Start: 2021-01-01 | End: 2021-01-01

## 2021-01-01 RX ORDER — METOPROLOL SUCCINATE 100 MG/1
100 TABLET, EXTENDED RELEASE ORAL DAILY
Qty: 90 TABLET | Refills: 0 | Status: SHIPPED | OUTPATIENT
Start: 2021-01-01

## 2021-01-01 RX ORDER — VANCOMYCIN 1.25 G/250ML
20 INJECTION, SOLUTION INTRAVENOUS ONCE
Status: COMPLETED | OUTPATIENT
Start: 2021-01-01 | End: 2021-01-01

## 2021-01-01 RX ORDER — NICOTINE POLACRILEX 4 MG
15 LOZENGE BUCCAL PRN
Status: DISCONTINUED | OUTPATIENT
Start: 2021-01-01 | End: 2021-01-01

## 2021-01-01 RX ORDER — VANCOMYCIN 1 G/200ML
1000 INJECTION, SOLUTION INTRAVENOUS EVERY 24 HOURS
Status: DISCONTINUED | OUTPATIENT
Start: 2021-01-01 | End: 2021-01-01

## 2021-01-01 RX ORDER — DEXTROSE MONOHYDRATE 25 G/50ML
25 INJECTION, SOLUTION INTRAVENOUS PRN
Status: DISCONTINUED | OUTPATIENT
Start: 2021-01-01 | End: 2021-01-01 | Stop reason: HOSPADM

## 2021-01-01 RX ORDER — MAGNESIUM SULFATE 4 G/50ML
4000 INJECTION INTRAVENOUS ONCE
Status: COMPLETED | OUTPATIENT
Start: 2021-01-01 | End: 2021-01-01

## 2021-01-01 RX ORDER — ALBUTEROL SULFATE 90 UG/1
2 AEROSOL, METERED RESPIRATORY (INHALATION) 4 TIMES DAILY PRN
Status: DISCONTINUED | OUTPATIENT
Start: 2021-01-01 | End: 2021-01-01 | Stop reason: HOSPADM

## 2021-01-01 RX ORDER — DEXTROSE AND SODIUM CHLORIDE 5; .9 G/100ML; G/100ML
INJECTION, SOLUTION INTRAVENOUS CONTINUOUS
Status: DISCONTINUED | OUTPATIENT
Start: 2021-01-01 | End: 2021-01-01

## 2021-01-01 RX ORDER — AMIODARONE HYDROCHLORIDE 50 MG/ML
INJECTION, SOLUTION INTRAVENOUS DAILY PRN
Status: COMPLETED | OUTPATIENT
Start: 2021-01-01 | End: 2021-01-01

## 2021-01-01 RX ORDER — POTASSIUM CHLORIDE 7.45 MG/ML
10 INJECTION INTRAVENOUS PRN
Status: DISCONTINUED | OUTPATIENT
Start: 2021-01-01 | End: 2021-01-01 | Stop reason: HOSPADM

## 2021-01-01 RX ORDER — AMIODARONE HYDROCHLORIDE 200 MG/1
100 TABLET ORAL 2 TIMES DAILY
Status: DISCONTINUED | OUTPATIENT
Start: 2021-01-01 | End: 2021-01-01

## 2021-01-01 RX ORDER — TORSEMIDE 20 MG/1
20 TABLET ORAL DAILY
Status: DISCONTINUED | OUTPATIENT
Start: 2021-01-01 | End: 2021-01-01 | Stop reason: HOSPADM

## 2021-01-01 RX ORDER — SODIUM CHLORIDE, SODIUM LACTATE, POTASSIUM CHLORIDE, AND CALCIUM CHLORIDE .6; .31; .03; .02 G/100ML; G/100ML; G/100ML; G/100ML
2000 INJECTION, SOLUTION INTRAVENOUS ONCE
Status: COMPLETED | OUTPATIENT
Start: 2021-01-01 | End: 2021-01-01

## 2021-01-01 RX ORDER — METHYLPREDNISOLONE SODIUM SUCCINATE 40 MG/ML
40 INJECTION, POWDER, LYOPHILIZED, FOR SOLUTION INTRAMUSCULAR; INTRAVENOUS ONCE
Status: COMPLETED | OUTPATIENT
Start: 2021-01-01 | End: 2021-01-01

## 2021-01-01 RX ORDER — METOPROLOL TARTRATE 50 MG/1
50 TABLET, FILM COATED ORAL 2 TIMES DAILY
Status: DISCONTINUED | OUTPATIENT
Start: 2021-01-01 | End: 2021-01-01 | Stop reason: HOSPADM

## 2021-01-01 RX ORDER — SPIRONOLACTONE 25 MG/1
25 TABLET ORAL 2 TIMES DAILY
Status: DISCONTINUED | OUTPATIENT
Start: 2021-01-01 | End: 2021-01-01

## 2021-01-01 RX ORDER — NICOTINE POLACRILEX 4 MG
15 LOZENGE BUCCAL PRN
Status: DISCONTINUED | OUTPATIENT
Start: 2021-01-01 | End: 2021-01-01 | Stop reason: SDUPTHER

## 2021-01-01 RX ORDER — LOSARTAN POTASSIUM 100 MG/1
100 TABLET ORAL DAILY
Qty: 30 TABLET | Refills: 3 | Status: ON HOLD | OUTPATIENT
Start: 2021-01-01 | End: 2021-01-01 | Stop reason: HOSPADM

## 2021-01-01 RX ORDER — GLIPIZIDE 5 MG/1
2.5 TABLET ORAL
Status: DISCONTINUED | OUTPATIENT
Start: 2021-01-01 | End: 2021-01-01

## 2021-01-01 RX ORDER — MAGNESIUM SULFATE IN WATER 40 MG/ML
2000 INJECTION, SOLUTION INTRAVENOUS ONCE
Status: COMPLETED | OUTPATIENT
Start: 2021-01-01 | End: 2021-01-01

## 2021-01-01 RX ORDER — TRAZODONE HYDROCHLORIDE 50 MG/1
50 TABLET ORAL NIGHTLY
COMMUNITY

## 2021-01-01 RX ORDER — METOPROLOL SUCCINATE 100 MG/1
100 TABLET, EXTENDED RELEASE ORAL DAILY
Status: DISCONTINUED | OUTPATIENT
Start: 2021-01-01 | End: 2021-01-01 | Stop reason: HOSPADM

## 2021-01-01 RX ORDER — TRAZODONE HYDROCHLORIDE 50 MG/1
50 TABLET ORAL NIGHTLY PRN
Status: DISCONTINUED | OUTPATIENT
Start: 2021-01-01 | End: 2021-01-01 | Stop reason: HOSPADM

## 2021-01-01 RX ORDER — INSULIN GLARGINE 100 [IU]/ML
0.25 INJECTION, SOLUTION SUBCUTANEOUS NIGHTLY
Status: DISCONTINUED | OUTPATIENT
Start: 2021-01-01 | End: 2021-01-01

## 2021-01-01 RX ORDER — DOPAMINE HYDROCHLORIDE 160 MG/100ML
5 INJECTION, SOLUTION INTRAVENOUS CONTINUOUS
Status: DISCONTINUED | OUTPATIENT
Start: 2021-01-01 | End: 2021-01-01 | Stop reason: HOSPADM

## 2021-01-01 RX ORDER — AMLODIPINE BESYLATE 2.5 MG/1
2.5 TABLET ORAL DAILY
Status: DISCONTINUED | OUTPATIENT
Start: 2021-01-01 | End: 2021-01-01

## 2021-01-01 RX ORDER — VANCOMYCIN 1 G/200ML
1000 INJECTION, SOLUTION INTRAVENOUS EVERY 24 HOURS
Status: DISCONTINUED | OUTPATIENT
Start: 2021-01-01 | End: 2021-01-01 | Stop reason: DRUGHIGH

## 2021-01-01 RX ORDER — METOPROLOL SUCCINATE 50 MG/1
50 TABLET, EXTENDED RELEASE ORAL DAILY
Status: DISCONTINUED | OUTPATIENT
Start: 2021-01-01 | End: 2021-01-01 | Stop reason: HOSPADM

## 2021-01-01 RX ORDER — 0.9 % SODIUM CHLORIDE 0.9 %
500 INTRAVENOUS SOLUTION INTRAVENOUS ONCE
Status: COMPLETED | OUTPATIENT
Start: 2021-01-01 | End: 2021-01-01

## 2021-01-01 RX ORDER — SPIRONOLACTONE 25 MG/1
25 TABLET ORAL DAILY
Status: DISCONTINUED | OUTPATIENT
Start: 2021-01-01 | End: 2021-01-01

## 2021-01-01 RX ORDER — VANCOMYCIN 1.75 G/350ML
1250 INJECTION, SOLUTION INTRAVENOUS EVERY 24 HOURS
Status: DISCONTINUED | OUTPATIENT
Start: 2021-01-01 | End: 2021-01-01

## 2021-01-01 RX ORDER — VANCOMYCIN 1 G/200ML
1000 INJECTION, SOLUTION INTRAVENOUS ONCE
Status: DISCONTINUED | OUTPATIENT
Start: 2021-01-01 | End: 2021-01-01

## 2021-01-01 RX ORDER — LISINOPRIL 5 MG/1
5 TABLET ORAL DAILY
Status: DISCONTINUED | OUTPATIENT
Start: 2021-01-01 | End: 2021-01-01 | Stop reason: HOSPADM

## 2021-01-01 RX ORDER — DEXTROSE MONOHYDRATE 25 G/50ML
INJECTION, SOLUTION INTRAVENOUS DAILY PRN
Status: COMPLETED | OUTPATIENT
Start: 2021-01-01 | End: 2021-01-01

## 2021-01-01 RX ORDER — ETOMIDATE 2 MG/ML
INJECTION INTRAVENOUS DAILY PRN
Status: COMPLETED | OUTPATIENT
Start: 2021-01-01 | End: 2021-01-01

## 2021-01-01 RX ORDER — LORAZEPAM 0.5 MG/1
0.5 TABLET ORAL EVERY 4 HOURS PRN
Qty: 12 TABLET | Refills: 0 | Status: ON HOLD | OUTPATIENT
Start: 2021-01-01 | End: 2021-01-01 | Stop reason: HOSPADM

## 2021-01-01 RX ORDER — ATROPINE SULFATE 0.1 MG/ML
1 INJECTION INTRAVENOUS ONCE
Status: COMPLETED | OUTPATIENT
Start: 2021-01-01 | End: 2021-01-01

## 2021-01-01 RX ORDER — VANCOMYCIN 1 G/200ML
1000 INJECTION, SOLUTION INTRAVENOUS ONCE
Status: COMPLETED | OUTPATIENT
Start: 2021-01-01 | End: 2021-01-01

## 2021-01-01 RX ORDER — MIDAZOLAM HYDROCHLORIDE 2 MG/2ML
1 INJECTION, SOLUTION INTRAMUSCULAR; INTRAVENOUS
Status: COMPLETED | OUTPATIENT
Start: 2021-01-01 | End: 2021-01-01

## 2021-01-01 RX ADMIN — INSULIN LISPRO 3 UNITS: 100 INJECTION, SOLUTION INTRAVENOUS; SUBCUTANEOUS at 17:04

## 2021-01-01 RX ADMIN — VANCOMYCIN HYDROCHLORIDE 1000 MG: 1 INJECTION, POWDER, LYOPHILIZED, FOR SOLUTION INTRAVENOUS at 05:47

## 2021-01-01 RX ADMIN — SPIRONOLACTONE 25 MG: 25 TABLET ORAL at 08:23

## 2021-01-01 RX ADMIN — TRAZODONE HYDROCHLORIDE 50 MG: 50 TABLET ORAL at 22:26

## 2021-01-01 RX ADMIN — ACETAMINOPHEN 650 MG: 325 TABLET ORAL at 08:45

## 2021-01-01 RX ADMIN — FUROSEMIDE 40 MG: 10 INJECTION, SOLUTION INTRAMUSCULAR; INTRAVENOUS at 19:51

## 2021-01-01 RX ADMIN — ATORVASTATIN CALCIUM 40 MG: 40 TABLET, FILM COATED ORAL at 20:19

## 2021-01-01 RX ADMIN — DEXTROSE MONOHYDRATE 7.5 MG/HR: 50 INJECTION, SOLUTION INTRAVENOUS at 00:21

## 2021-01-01 RX ADMIN — SODIUM CHLORIDE, PRESERVATIVE FREE 10 ML: 5 INJECTION INTRAVENOUS at 10:06

## 2021-01-01 RX ADMIN — SODIUM CHLORIDE, PRESERVATIVE FREE 10 ML: 5 INJECTION INTRAVENOUS at 09:09

## 2021-01-01 RX ADMIN — SODIUM CHLORIDE, PRESERVATIVE FREE 10 ML: 5 INJECTION INTRAVENOUS at 20:13

## 2021-01-01 RX ADMIN — PANTOPRAZOLE SODIUM 40 MG: 40 TABLET, DELAYED RELEASE ORAL at 06:47

## 2021-01-01 RX ADMIN — FUROSEMIDE 40 MG: 10 INJECTION, SOLUTION INTRAMUSCULAR; INTRAVENOUS at 06:50

## 2021-01-01 RX ADMIN — TRAZODONE HYDROCHLORIDE 50 MG: 50 TABLET ORAL at 22:53

## 2021-01-01 RX ADMIN — INSULIN GLARGINE 10 UNITS: 100 INJECTION, SOLUTION SUBCUTANEOUS at 21:15

## 2021-01-01 RX ADMIN — TRAZODONE HYDROCHLORIDE 50 MG: 50 TABLET ORAL at 20:46

## 2021-01-01 RX ADMIN — APIXABAN 5 MG: 5 TABLET, FILM COATED ORAL at 10:37

## 2021-01-01 RX ADMIN — SPIRONOLACTONE 25 MG: 25 TABLET ORAL at 18:20

## 2021-01-01 RX ADMIN — APIXABAN 5 MG: 5 TABLET, FILM COATED ORAL at 10:29

## 2021-01-01 RX ADMIN — INSULIN LISPRO 6 UNITS: 100 INJECTION, SOLUTION INTRAVENOUS; SUBCUTANEOUS at 12:22

## 2021-01-01 RX ADMIN — BUSPIRONE HYDROCHLORIDE 10 MG: 10 TABLET ORAL at 21:15

## 2021-01-01 RX ADMIN — AMLODIPINE BESYLATE 2.5 MG: 2.5 TABLET ORAL at 08:52

## 2021-01-01 RX ADMIN — ACETAMINOPHEN 650 MG: 325 TABLET ORAL at 20:37

## 2021-01-01 RX ADMIN — VANCOMYCIN 1750 MG: 1.25 INJECTION, SOLUTION INTRAVENOUS at 11:37

## 2021-01-01 RX ADMIN — TRAZODONE HYDROCHLORIDE 50 MG: 50 TABLET ORAL at 20:33

## 2021-01-01 RX ADMIN — MAGNESIUM OXIDE 400 MG (241.3 MG MAGNESIUM) TABLET 400 MG: TABLET at 10:29

## 2021-01-01 RX ADMIN — SODIUM CHLORIDE, PRESERVATIVE FREE 10 ML: 5 INJECTION INTRAVENOUS at 09:07

## 2021-01-01 RX ADMIN — METOPROLOL SUCCINATE 100 MG: 100 TABLET, EXTENDED RELEASE ORAL at 09:31

## 2021-01-01 RX ADMIN — LEVOTHYROXINE SODIUM 50 MCG: 0.05 TABLET ORAL at 06:15

## 2021-01-01 RX ADMIN — ALOGLIPTIN 6.25 MG: 6.25 TABLET, FILM COATED ORAL at 09:53

## 2021-01-01 RX ADMIN — INSULIN LISPRO 10 UNITS: 100 INJECTION, SOLUTION INTRAVENOUS; SUBCUTANEOUS at 13:37

## 2021-01-01 RX ADMIN — SODIUM CHLORIDE, PRESERVATIVE FREE 10 ML: 5 INJECTION INTRAVENOUS at 08:49

## 2021-01-01 RX ADMIN — SODIUM CHLORIDE, PRESERVATIVE FREE 10 ML: 5 INJECTION INTRAVENOUS at 09:48

## 2021-01-01 RX ADMIN — HYDROXYZINE PAMOATE 25 MG: 25 CAPSULE ORAL at 04:49

## 2021-01-01 RX ADMIN — PANTOPRAZOLE SODIUM 40 MG: 40 TABLET, DELAYED RELEASE ORAL at 06:41

## 2021-01-01 RX ADMIN — APIXABAN 5 MG: 5 TABLET, FILM COATED ORAL at 21:57

## 2021-01-01 RX ADMIN — DEXTROSE MONOHYDRATE 10 MG/HR: 50 INJECTION, SOLUTION INTRAVENOUS at 07:46

## 2021-01-01 RX ADMIN — LEVOTHYROXINE SODIUM 50 MCG: 0.05 TABLET ORAL at 06:13

## 2021-01-01 RX ADMIN — Medication 1 TABLET: at 10:04

## 2021-01-01 RX ADMIN — LIDOCAINE HYDROCHLORIDE ANHYDROUS 5 ML: 10 INJECTION, SOLUTION INFILTRATION at 08:40

## 2021-01-01 RX ADMIN — EPINEPHRINE 0.1 MG: 0.1 INJECTION, SOLUTION ENDOTRACHEAL; INTRACARDIAC; INTRAVENOUS at 09:43

## 2021-01-01 RX ADMIN — METOPROLOL TARTRATE 50 MG: 50 TABLET, FILM COATED ORAL at 08:47

## 2021-01-01 RX ADMIN — SODIUM CHLORIDE, PRESERVATIVE FREE 10 ML: 5 INJECTION INTRAVENOUS at 08:24

## 2021-01-01 RX ADMIN — BUSPIRONE HYDROCHLORIDE 10 MG: 10 TABLET ORAL at 09:43

## 2021-01-01 RX ADMIN — ONDANSETRON 4 MG: 2 INJECTION INTRAMUSCULAR; INTRAVENOUS at 00:24

## 2021-01-01 RX ADMIN — METOPROLOL TARTRATE 50 MG: 50 TABLET, FILM COATED ORAL at 20:42

## 2021-01-01 RX ADMIN — LORAZEPAM 0.5 MG: 0.5 TABLET ORAL at 23:27

## 2021-01-01 RX ADMIN — SODIUM CHLORIDE, PRESERVATIVE FREE 10 ML: 5 INJECTION INTRAVENOUS at 21:00

## 2021-01-01 RX ADMIN — METOPROLOL TARTRATE 50 MG: 50 TABLET, FILM COATED ORAL at 09:31

## 2021-01-01 RX ADMIN — ETOMIDATE 20 MG: 2 INJECTION, SOLUTION INTRAVENOUS at 09:36

## 2021-01-01 RX ADMIN — POTASSIUM CHLORIDE 10 MEQ: 7.46 INJECTION, SOLUTION INTRAVENOUS at 09:13

## 2021-01-01 RX ADMIN — ENOXAPARIN SODIUM 30 MG: 30 INJECTION SUBCUTANEOUS at 09:35

## 2021-01-01 RX ADMIN — ASPIRIN 81 MG: 81 TABLET, CHEWABLE ORAL at 08:55

## 2021-01-01 RX ADMIN — CLONIDINE HYDROCHLORIDE 0.3 MG: 0.2 TABLET ORAL at 20:59

## 2021-01-01 RX ADMIN — MIDAZOLAM 1 MG: 1 INJECTION INTRAMUSCULAR; INTRAVENOUS at 09:51

## 2021-01-01 RX ADMIN — DIPHENHYDRAMINE HYDROCHLORIDE 50 MG: 25 TABLET ORAL at 20:59

## 2021-01-01 RX ADMIN — LEVOTHYROXINE SODIUM 50 MCG: 0.05 TABLET ORAL at 06:39

## 2021-01-01 RX ADMIN — METOPROLOL TARTRATE 50 MG: 50 TABLET, FILM COATED ORAL at 07:53

## 2021-01-01 RX ADMIN — Medication 2 MCG/MIN: at 09:52

## 2021-01-01 RX ADMIN — INSULIN LISPRO 3 UNITS: 100 INJECTION, SOLUTION INTRAVENOUS; SUBCUTANEOUS at 08:55

## 2021-01-01 RX ADMIN — AMIODARONE HYDROCHLORIDE 200 MG: 200 TABLET ORAL at 20:41

## 2021-01-01 RX ADMIN — PANTOPRAZOLE SODIUM 40 MG: 40 TABLET, DELAYED RELEASE ORAL at 06:38

## 2021-01-01 RX ADMIN — LEVOTHYROXINE SODIUM 50 MCG: 0.05 TABLET ORAL at 05:26

## 2021-01-01 RX ADMIN — BUSPIRONE HYDROCHLORIDE 10 MG: 10 TABLET ORAL at 15:04

## 2021-01-01 RX ADMIN — MAGNESIUM OXIDE 400 MG (241.3 MG MAGNESIUM) TABLET 400 MG: TABLET at 10:04

## 2021-01-01 RX ADMIN — APIXABAN 5 MG: 5 TABLET, FILM COATED ORAL at 21:28

## 2021-01-01 RX ADMIN — AMIODARONE HYDROCHLORIDE 100 MG: 200 TABLET ORAL at 20:45

## 2021-01-01 RX ADMIN — AMIODARONE HYDROCHLORIDE 200 MG: 200 TABLET ORAL at 09:38

## 2021-01-01 RX ADMIN — APIXABAN 5 MG: 5 TABLET, FILM COATED ORAL at 09:43

## 2021-01-01 RX ADMIN — AMIODARONE HYDROCHLORIDE 200 MG: 200 TABLET ORAL at 08:21

## 2021-01-01 RX ADMIN — SPIRONOLACTONE 25 MG: 25 TABLET ORAL at 09:32

## 2021-01-01 RX ADMIN — METOPROLOL SUCCINATE 100 MG: 100 TABLET, EXTENDED RELEASE ORAL at 08:38

## 2021-01-01 RX ADMIN — FUROSEMIDE 20 MG: 10 INJECTION, SOLUTION INTRAVENOUS at 08:23

## 2021-01-01 RX ADMIN — APIXABAN 5 MG: 5 TABLET, FILM COATED ORAL at 22:26

## 2021-01-01 RX ADMIN — BUSPIRONE HYDROCHLORIDE 10 MG: 10 TABLET ORAL at 13:37

## 2021-01-01 RX ADMIN — TRAZODONE HYDROCHLORIDE 50 MG: 50 TABLET ORAL at 00:22

## 2021-01-01 RX ADMIN — INSULIN LISPRO 2 UNITS: 100 INJECTION, SOLUTION INTRAVENOUS; SUBCUTANEOUS at 21:38

## 2021-01-01 RX ADMIN — Medication 50 MEQ: at 09:43

## 2021-01-01 RX ADMIN — INSULIN LISPRO 3 UNITS: 100 INJECTION, SOLUTION INTRAVENOUS; SUBCUTANEOUS at 09:53

## 2021-01-01 RX ADMIN — METOPROLOL SUCCINATE 100 MG: 100 TABLET, EXTENDED RELEASE ORAL at 08:24

## 2021-01-01 RX ADMIN — SPIRONOLACTONE 25 MG: 25 TABLET ORAL at 17:52

## 2021-01-01 RX ADMIN — INSULIN GLARGINE 15 UNITS: 100 INJECTION, SOLUTION SUBCUTANEOUS at 21:01

## 2021-01-01 RX ADMIN — APIXABAN 5 MG: 5 TABLET, FILM COATED ORAL at 20:42

## 2021-01-01 RX ADMIN — INSULIN LISPRO 3 UNITS: 100 INJECTION, SOLUTION INTRAVENOUS; SUBCUTANEOUS at 11:24

## 2021-01-01 RX ADMIN — DILTIAZEM HYDROCHLORIDE 30 MG: 30 TABLET, FILM COATED ORAL at 00:51

## 2021-01-01 RX ADMIN — DEXTROSE MONOHYDRATE 5 MG/HR: 50 INJECTION, SOLUTION INTRAVENOUS at 14:22

## 2021-01-01 RX ADMIN — DULOXETINE HYDROCHLORIDE 60 MG: 30 CAPSULE, DELAYED RELEASE ORAL at 10:04

## 2021-01-01 RX ADMIN — APIXABAN 5 MG: 5 TABLET, FILM COATED ORAL at 20:37

## 2021-01-01 RX ADMIN — HYDROXYZINE PAMOATE 25 MG: 25 CAPSULE ORAL at 15:07

## 2021-01-01 RX ADMIN — METOPROLOL SUCCINATE 25 MG: 25 TABLET, EXTENDED RELEASE ORAL at 09:23

## 2021-01-01 RX ADMIN — APIXABAN 5 MG: 5 TABLET, FILM COATED ORAL at 10:05

## 2021-01-01 RX ADMIN — APIXABAN 5 MG: 5 TABLET, FILM COATED ORAL at 21:11

## 2021-01-01 RX ADMIN — AMIODARONE HYDROCHLORIDE 100 MG: 200 TABLET ORAL at 09:47

## 2021-01-01 RX ADMIN — EPINEPHRINE 1 MG: 0.1 INJECTION, SOLUTION ENDOTRACHEAL; INTRACARDIAC; INTRAVENOUS at 15:35

## 2021-01-01 RX ADMIN — ALOGLIPTIN 6.25 MG: 6.25 TABLET, FILM COATED ORAL at 10:08

## 2021-01-01 RX ADMIN — AMIODARONE HYDROCHLORIDE 200 MG: 200 TABLET ORAL at 09:25

## 2021-01-01 RX ADMIN — SODIUM CHLORIDE, PRESERVATIVE FREE 10 ML: 5 INJECTION INTRAVENOUS at 20:33

## 2021-01-01 RX ADMIN — APIXABAN 5 MG: 5 TABLET, FILM COATED ORAL at 20:59

## 2021-01-01 RX ADMIN — SODIUM ZIRCONIUM CYCLOSILICATE 10 G: 5 POWDER, FOR SUSPENSION ORAL at 15:14

## 2021-01-01 RX ADMIN — CEFEPIME HYDROCHLORIDE 2000 MG: 2 INJECTION, POWDER, FOR SOLUTION INTRAVENOUS at 11:05

## 2021-01-01 RX ADMIN — SODIUM CHLORIDE, PRESERVATIVE FREE 5 ML: 5 INJECTION INTRAVENOUS at 09:02

## 2021-01-01 RX ADMIN — CEFEPIME HYDROCHLORIDE 2000 MG: 2 INJECTION, POWDER, FOR SOLUTION INTRAVENOUS at 19:51

## 2021-01-01 RX ADMIN — APIXABAN 5 MG: 5 TABLET, FILM COATED ORAL at 09:30

## 2021-01-01 RX ADMIN — APIXABAN 5 MG: 5 TABLET, FILM COATED ORAL at 22:29

## 2021-01-01 RX ADMIN — FUROSEMIDE 40 MG: 10 INJECTION, SOLUTION INTRAMUSCULAR; INTRAVENOUS at 06:44

## 2021-01-01 RX ADMIN — VANCOMYCIN 1000 MG: 1 INJECTION, SOLUTION INTRAVENOUS at 10:05

## 2021-01-01 RX ADMIN — ALOGLIPTIN 6.25 MG: 6.25 TABLET, FILM COATED ORAL at 09:34

## 2021-01-01 RX ADMIN — Medication 50 MEQ: at 15:40

## 2021-01-01 RX ADMIN — ASPIRIN 81 MG: 81 TABLET, CHEWABLE ORAL at 07:49

## 2021-01-01 RX ADMIN — SODIUM CHLORIDE, PRESERVATIVE FREE 10 ML: 5 INJECTION INTRAVENOUS at 09:44

## 2021-01-01 RX ADMIN — SPIRONOLACTONE 25 MG: 25 TABLET ORAL at 08:21

## 2021-01-01 RX ADMIN — LEVOTHYROXINE SODIUM 50 MCG: 0.05 TABLET ORAL at 06:38

## 2021-01-01 RX ADMIN — Medication 1 TABLET: at 09:31

## 2021-01-01 RX ADMIN — ATROPINE SULFATE 1 MG: 0.1 INJECTION PARENTERAL at 13:25

## 2021-01-01 RX ADMIN — METOPROLOL TARTRATE 50 MG: 50 TABLET, FILM COATED ORAL at 09:52

## 2021-01-01 RX ADMIN — APIXABAN 5 MG: 5 TABLET, FILM COATED ORAL at 09:52

## 2021-01-01 RX ADMIN — ASPIRIN 81 MG: 81 TABLET, CHEWABLE ORAL at 10:29

## 2021-01-01 RX ADMIN — BUSPIRONE HYDROCHLORIDE 10 MG: 10 TABLET ORAL at 17:30

## 2021-01-01 RX ADMIN — BUSPIRONE HYDROCHLORIDE 10 MG: 10 TABLET ORAL at 13:15

## 2021-01-01 RX ADMIN — SODIUM CHLORIDE, PRESERVATIVE FREE 10 ML: 5 INJECTION INTRAVENOUS at 11:15

## 2021-01-01 RX ADMIN — SODIUM CHLORIDE, PRESERVATIVE FREE 10 ML: 5 INJECTION INTRAVENOUS at 20:41

## 2021-01-01 RX ADMIN — APIXABAN 5 MG: 5 TABLET, FILM COATED ORAL at 20:33

## 2021-01-01 RX ADMIN — ASPIRIN 81 MG: 81 TABLET, COATED ORAL at 08:34

## 2021-01-01 RX ADMIN — FUROSEMIDE 40 MG: 10 INJECTION, SOLUTION INTRAMUSCULAR; INTRAVENOUS at 08:41

## 2021-01-01 RX ADMIN — APIXABAN 5 MG: 5 TABLET, FILM COATED ORAL at 07:49

## 2021-01-01 RX ADMIN — AMIODARONE HYDROCHLORIDE 200 MG: 200 TABLET ORAL at 08:56

## 2021-01-01 RX ADMIN — INSULIN GLARGINE 10 UNITS: 100 INJECTION, SOLUTION SUBCUTANEOUS at 21:36

## 2021-01-01 RX ADMIN — LEVOTHYROXINE SODIUM 50 MCG: 0.05 TABLET ORAL at 06:41

## 2021-01-01 RX ADMIN — BUSPIRONE HYDROCHLORIDE 10 MG: 10 TABLET ORAL at 14:25

## 2021-01-01 RX ADMIN — AMIODARONE HYDROCHLORIDE 300 MG: 50 INJECTION, SOLUTION INTRAVENOUS at 09:44

## 2021-01-01 RX ADMIN — SODIUM CHLORIDE, PRESERVATIVE FREE 10 ML: 5 INJECTION INTRAVENOUS at 21:22

## 2021-01-01 RX ADMIN — AMLODIPINE BESYLATE 2.5 MG: 2.5 TABLET ORAL at 12:22

## 2021-01-01 RX ADMIN — Medication 1 TABLET: at 09:42

## 2021-01-01 RX ADMIN — INSULIN LISPRO 6 UNITS: 100 INJECTION, SOLUTION INTRAVENOUS; SUBCUTANEOUS at 13:01

## 2021-01-01 RX ADMIN — METOPROLOL SUCCINATE 100 MG: 50 TABLET, EXTENDED RELEASE ORAL at 08:58

## 2021-01-01 RX ADMIN — SODIUM CHLORIDE, PRESERVATIVE FREE 10 ML: 5 INJECTION INTRAVENOUS at 22:30

## 2021-01-01 RX ADMIN — DEXTROSE MONOHYDRATE 150 MG: 50 INJECTION, SOLUTION INTRAVENOUS at 13:00

## 2021-01-01 RX ADMIN — FUROSEMIDE 40 MG: 10 INJECTION, SOLUTION INTRAMUSCULAR; INTRAVENOUS at 20:32

## 2021-01-01 RX ADMIN — SODIUM CHLORIDE, PRESERVATIVE FREE 10 ML: 5 INJECTION INTRAVENOUS at 09:13

## 2021-01-01 RX ADMIN — SODIUM CHLORIDE, PRESERVATIVE FREE 10 ML: 5 INJECTION INTRAVENOUS at 08:07

## 2021-01-01 RX ADMIN — ALOGLIPTIN 6.25 MG: 6.25 TABLET, FILM COATED ORAL at 10:18

## 2021-01-01 RX ADMIN — APIXABAN 5 MG: 5 TABLET, FILM COATED ORAL at 09:38

## 2021-01-01 RX ADMIN — SODIUM CHLORIDE, PRESERVATIVE FREE 10 ML: 5 INJECTION INTRAVENOUS at 09:45

## 2021-01-01 RX ADMIN — TORSEMIDE 20 MG: 20 TABLET ORAL at 09:03

## 2021-01-01 RX ADMIN — CEFEPIME HYDROCHLORIDE 2000 MG: 2 INJECTION, POWDER, FOR SOLUTION INTRAVENOUS at 09:06

## 2021-01-01 RX ADMIN — PANTOPRAZOLE SODIUM 40 MG: 40 TABLET, DELAYED RELEASE ORAL at 05:44

## 2021-01-01 RX ADMIN — APIXABAN 5 MG: 5 TABLET, FILM COATED ORAL at 11:16

## 2021-01-01 RX ADMIN — INSULIN GLARGINE 10 UNITS: 100 INJECTION, SOLUTION SUBCUTANEOUS at 21:11

## 2021-01-01 RX ADMIN — INSULIN GLARGINE 15 UNITS: 100 INJECTION, SOLUTION SUBCUTANEOUS at 20:15

## 2021-01-01 RX ADMIN — METOPROLOL TARTRATE 25 MG: 25 TABLET, FILM COATED ORAL at 11:59

## 2021-01-01 RX ADMIN — BUSPIRONE HYDROCHLORIDE 10 MG: 10 TABLET ORAL at 13:20

## 2021-01-01 RX ADMIN — Medication 1 TABLET: at 08:41

## 2021-01-01 RX ADMIN — POTASSIUM BICARBONATE 40 MEQ: 782 TABLET, EFFERVESCENT ORAL at 08:20

## 2021-01-01 RX ADMIN — POTASSIUM CHLORIDE 10 MEQ: 7.46 INJECTION, SOLUTION INTRAVENOUS at 13:33

## 2021-01-01 RX ADMIN — Medication 1 TABLET: at 07:53

## 2021-01-01 RX ADMIN — METOPROLOL SUCCINATE 100 MG: 50 TABLET, EXTENDED RELEASE ORAL at 08:06

## 2021-01-01 RX ADMIN — ALOGLIPTIN 6.25 MG: 6.25 TABLET, FILM COATED ORAL at 09:32

## 2021-01-01 RX ADMIN — APIXABAN 5 MG: 5 TABLET, FILM COATED ORAL at 22:00

## 2021-01-01 RX ADMIN — TRAZODONE HYDROCHLORIDE 50 MG: 50 TABLET ORAL at 21:39

## 2021-01-01 RX ADMIN — TRAZODONE HYDROCHLORIDE 50 MG: 50 TABLET ORAL at 21:04

## 2021-01-01 RX ADMIN — SODIUM CHLORIDE, PRESERVATIVE FREE 10 ML: 5 INJECTION INTRAVENOUS at 11:05

## 2021-01-01 RX ADMIN — ASPIRIN 81 MG: 81 TABLET, CHEWABLE ORAL at 09:38

## 2021-01-01 RX ADMIN — METOPROLOL SUCCINATE 100 MG: 100 TABLET, EXTENDED RELEASE ORAL at 09:26

## 2021-01-01 RX ADMIN — ASPIRIN 81 MG: 81 TABLET ORAL at 10:04

## 2021-01-01 RX ADMIN — SODIUM CHLORIDE: 9 INJECTION, SOLUTION INTRAVENOUS at 11:35

## 2021-01-01 RX ADMIN — Medication 1 TABLET: at 09:30

## 2021-01-01 RX ADMIN — METOPROLOL SUCCINATE 100 MG: 100 TABLET, EXTENDED RELEASE ORAL at 08:34

## 2021-01-01 RX ADMIN — SODIUM CHLORIDE, PRESERVATIVE FREE 10 ML: 5 INJECTION INTRAVENOUS at 09:04

## 2021-01-01 RX ADMIN — AMIODARONE HYDROCHLORIDE 200 MG: 200 TABLET ORAL at 09:30

## 2021-01-01 RX ADMIN — SPIRONOLACTONE 25 MG: 25 TABLET ORAL at 17:00

## 2021-01-01 RX ADMIN — METOPROLOL SUCCINATE 25 MG: 25 TABLET, EXTENDED RELEASE ORAL at 07:49

## 2021-01-01 RX ADMIN — LORAZEPAM 1 MG: 2 INJECTION INTRAMUSCULAR; INTRAVENOUS at 21:17

## 2021-01-01 RX ADMIN — APIXABAN 5 MG: 5 TABLET, FILM COATED ORAL at 23:19

## 2021-01-01 RX ADMIN — INSULIN LISPRO 3 UNITS: 100 INJECTION, SOLUTION INTRAVENOUS; SUBCUTANEOUS at 12:20

## 2021-01-01 RX ADMIN — INSULIN LISPRO 9 UNITS: 100 INJECTION, SOLUTION INTRAVENOUS; SUBCUTANEOUS at 17:45

## 2021-01-01 RX ADMIN — INSULIN LISPRO 6 UNITS: 100 INJECTION, SOLUTION INTRAVENOUS; SUBCUTANEOUS at 09:19

## 2021-01-01 RX ADMIN — CHLORTHALIDONE 12.5 MG: 25 TABLET ORAL at 09:03

## 2021-01-01 RX ADMIN — SPIRONOLACTONE 25 MG: 25 TABLET ORAL at 17:44

## 2021-01-01 RX ADMIN — TRAZODONE HYDROCHLORIDE 50 MG: 50 TABLET ORAL at 20:45

## 2021-01-01 RX ADMIN — PANTOPRAZOLE SODIUM 40 MG: 40 TABLET, DELAYED RELEASE ORAL at 06:54

## 2021-01-01 RX ADMIN — METOPROLOL SUCCINATE 100 MG: 50 TABLET, EXTENDED RELEASE ORAL at 08:35

## 2021-01-01 RX ADMIN — ALOGLIPTIN 6.25 MG: 6.25 TABLET, FILM COATED ORAL at 09:02

## 2021-01-01 RX ADMIN — POTASSIUM CHLORIDE 20 MEQ: 29.8 INJECTION, SOLUTION INTRAVENOUS at 09:38

## 2021-01-01 RX ADMIN — SODIUM CHLORIDE, PRESERVATIVE FREE 10 ML: 5 INJECTION INTRAVENOUS at 10:41

## 2021-01-01 RX ADMIN — INSULIN LISPRO 1 UNITS: 100 INJECTION, SOLUTION INTRAVENOUS; SUBCUTANEOUS at 22:30

## 2021-01-01 RX ADMIN — DILTIAZEM HYDROCHLORIDE 30 MG: 30 TABLET, FILM COATED ORAL at 17:50

## 2021-01-01 RX ADMIN — VANCOMYCIN 1000 MG: 1 INJECTION, SOLUTION INTRAVENOUS at 05:31

## 2021-01-01 RX ADMIN — AMIODARONE HYDROCHLORIDE 100 MG: 200 TABLET ORAL at 20:30

## 2021-01-01 RX ADMIN — SODIUM CHLORIDE, PRESERVATIVE FREE 10 ML: 5 INJECTION INTRAVENOUS at 21:28

## 2021-01-01 RX ADMIN — INSULIN LISPRO 3 UNITS: 100 INJECTION, SOLUTION INTRAVENOUS; SUBCUTANEOUS at 17:55

## 2021-01-01 RX ADMIN — ALBUTEROL SULFATE 2 PUFF: 90 AEROSOL, METERED RESPIRATORY (INHALATION) at 09:09

## 2021-01-01 RX ADMIN — DILTIAZEM HYDROCHLORIDE 30 MG: 30 TABLET, FILM COATED ORAL at 13:16

## 2021-01-01 RX ADMIN — CALCIUM CHLORIDE 1000 MG: 100 INJECTION, SOLUTION INTRAVENOUS; INTRAVENTRICULAR at 15:32

## 2021-01-01 RX ADMIN — CLOPIDOGREL BISULFATE 75 MG: 75 TABLET ORAL at 09:32

## 2021-01-01 RX ADMIN — SODIUM CHLORIDE, PRESERVATIVE FREE 10 ML: 5 INJECTION INTRAVENOUS at 20:29

## 2021-01-01 RX ADMIN — ATORVASTATIN CALCIUM 40 MG: 40 TABLET, FILM COATED ORAL at 21:21

## 2021-01-01 RX ADMIN — TRAZODONE HYDROCHLORIDE 50 MG: 50 TABLET ORAL at 20:29

## 2021-01-01 RX ADMIN — BUSPIRONE HYDROCHLORIDE 10 MG: 10 TABLET ORAL at 08:21

## 2021-01-01 RX ADMIN — VANCOMYCIN 1250 MG: 1.75 INJECTION, SOLUTION INTRAVENOUS at 20:59

## 2021-01-01 RX ADMIN — CEFEPIME HYDROCHLORIDE 2000 MG: 2 INJECTION, POWDER, FOR SOLUTION INTRAVENOUS at 20:34

## 2021-01-01 RX ADMIN — EPINEPHRINE 0.1 MG: 0.1 INJECTION, SOLUTION ENDOTRACHEAL; INTRACARDIAC; INTRAVENOUS at 09:42

## 2021-01-01 RX ADMIN — Medication 1 TABLET: at 08:52

## 2021-01-01 RX ADMIN — APIXABAN 5 MG: 5 TABLET, FILM COATED ORAL at 08:52

## 2021-01-01 RX ADMIN — POTASSIUM CHLORIDE 40 MEQ: 1500 TABLET, EXTENDED RELEASE ORAL at 09:43

## 2021-01-01 RX ADMIN — DULOXETINE HYDROCHLORIDE 60 MG: 30 CAPSULE, DELAYED RELEASE ORAL at 08:46

## 2021-01-01 RX ADMIN — SODIUM CHLORIDE, PRESERVATIVE FREE 10 ML: 5 INJECTION INTRAVENOUS at 10:08

## 2021-01-01 RX ADMIN — DIGOXIN 250 MCG: 0.25 INJECTION INTRAMUSCULAR; INTRAVENOUS at 01:22

## 2021-01-01 RX ADMIN — POTASSIUM BICARBONATE 40 MEQ: 782 TABLET, EFFERVESCENT ORAL at 20:51

## 2021-01-01 RX ADMIN — SODIUM CHLORIDE, PRESERVATIVE FREE 10 ML: 5 INJECTION INTRAVENOUS at 20:54

## 2021-01-01 RX ADMIN — AMIODARONE HYDROCHLORIDE 1 MG/MIN: 50 INJECTION, SOLUTION INTRAVENOUS at 13:18

## 2021-01-01 RX ADMIN — AMIODARONE HYDROCHLORIDE 150 MG: 50 INJECTION, SOLUTION INTRAVENOUS at 15:37

## 2021-01-01 RX ADMIN — CEFEPIME HYDROCHLORIDE 2000 MG: 2 INJECTION, POWDER, FOR SOLUTION INTRAVENOUS at 09:33

## 2021-01-01 RX ADMIN — PANTOPRAZOLE SODIUM 40 MG: 40 TABLET, DELAYED RELEASE ORAL at 08:41

## 2021-01-01 RX ADMIN — POTASSIUM BICARBONATE 40 MEQ: 782 TABLET, EFFERVESCENT ORAL at 22:00

## 2021-01-01 RX ADMIN — AMIODARONE HYDROCHLORIDE 200 MG: 200 TABLET ORAL at 09:06

## 2021-01-01 RX ADMIN — ENOXAPARIN SODIUM 30 MG: 30 INJECTION SUBCUTANEOUS at 08:45

## 2021-01-01 RX ADMIN — INSULIN GLARGINE 10 UNITS: 100 INJECTION, SOLUTION SUBCUTANEOUS at 21:39

## 2021-01-01 RX ADMIN — INSULIN GLARGINE 10 UNITS: 100 INJECTION, SOLUTION SUBCUTANEOUS at 22:24

## 2021-01-01 RX ADMIN — DEXTROSE MONOHYDRATE 50 ML: 25 INJECTION, SOLUTION INTRAVENOUS at 23:20

## 2021-01-01 RX ADMIN — BUSPIRONE HYDROCHLORIDE 10 MG: 10 TABLET ORAL at 21:55

## 2021-01-01 RX ADMIN — INSULIN LISPRO 1 UNITS: 100 INJECTION, SOLUTION INTRAVENOUS; SUBCUTANEOUS at 21:30

## 2021-01-01 RX ADMIN — PANTOPRAZOLE SODIUM 40 MG: 40 TABLET, DELAYED RELEASE ORAL at 05:48

## 2021-01-01 RX ADMIN — ASPIRIN 81 MG: 81 TABLET, CHEWABLE ORAL at 09:23

## 2021-01-01 RX ADMIN — METOPROLOL SUCCINATE 100 MG: 100 TABLET, EXTENDED RELEASE ORAL at 08:25

## 2021-01-01 RX ADMIN — DEXTROSE MONOHYDRATE 5 MG/HR: 50 INJECTION, SOLUTION INTRAVENOUS at 22:28

## 2021-01-01 RX ADMIN — ETOMIDATE 20 MG: 2 INJECTION, SOLUTION INTRAVENOUS at 09:39

## 2021-01-01 RX ADMIN — SODIUM CHLORIDE, PRESERVATIVE FREE 10 ML: 5 INJECTION INTRAVENOUS at 20:34

## 2021-01-01 RX ADMIN — CLOPIDOGREL BISULFATE 75 MG: 75 TABLET ORAL at 08:21

## 2021-01-01 RX ADMIN — SODIUM CHLORIDE, PRESERVATIVE FREE 10 ML: 5 INJECTION INTRAVENOUS at 09:29

## 2021-01-01 RX ADMIN — CEFEPIME HYDROCHLORIDE 2000 MG: 2 INJECTION, POWDER, FOR SOLUTION INTRAVENOUS at 20:53

## 2021-01-01 RX ADMIN — BUSPIRONE HYDROCHLORIDE 10 MG: 10 TABLET ORAL at 14:46

## 2021-01-01 RX ADMIN — SODIUM CHLORIDE, PRESERVATIVE FREE 10 ML: 5 INJECTION INTRAVENOUS at 21:08

## 2021-01-01 RX ADMIN — HYDROXYZINE PAMOATE 25 MG: 25 CAPSULE ORAL at 16:01

## 2021-01-01 RX ADMIN — ONDANSETRON 4 MG: 4 TABLET, ORALLY DISINTEGRATING ORAL at 14:29

## 2021-01-01 RX ADMIN — BUSPIRONE HYDROCHLORIDE 10 MG: 10 TABLET ORAL at 09:31

## 2021-01-01 RX ADMIN — PANTOPRAZOLE SODIUM 40 MG: 40 TABLET, DELAYED RELEASE ORAL at 06:00

## 2021-01-01 RX ADMIN — TRAZODONE HYDROCHLORIDE 50 MG: 50 TABLET ORAL at 20:41

## 2021-01-01 RX ADMIN — TRAZODONE HYDROCHLORIDE 50 MG: 50 TABLET ORAL at 22:16

## 2021-01-01 RX ADMIN — TRAZODONE HYDROCHLORIDE 50 MG: 50 TABLET ORAL at 22:10

## 2021-01-01 RX ADMIN — METOPROLOL SUCCINATE 100 MG: 100 TABLET, EXTENDED RELEASE ORAL at 09:37

## 2021-01-01 RX ADMIN — AMIODARONE HYDROCHLORIDE 100 MG: 200 TABLET ORAL at 09:03

## 2021-01-01 RX ADMIN — DILTIAZEM HYDROCHLORIDE 30 MG: 30 TABLET, FILM COATED ORAL at 18:42

## 2021-01-01 RX ADMIN — BUSPIRONE HYDROCHLORIDE 10 MG: 10 TABLET ORAL at 10:29

## 2021-01-01 RX ADMIN — CEFEPIME HYDROCHLORIDE 2000 MG: 2 INJECTION, POWDER, FOR SOLUTION INTRAVENOUS at 20:30

## 2021-01-01 RX ADMIN — CLONIDINE HYDROCHLORIDE 0.3 MG: 0.2 TABLET ORAL at 21:22

## 2021-01-01 RX ADMIN — DULOXETINE HYDROCHLORIDE 60 MG: 30 CAPSULE, DELAYED RELEASE ORAL at 10:37

## 2021-01-01 RX ADMIN — FUROSEMIDE 20 MG: 10 INJECTION, SOLUTION INTRAVENOUS at 17:00

## 2021-01-01 RX ADMIN — Medication 2 PUFF: at 09:10

## 2021-01-01 RX ADMIN — FUROSEMIDE 20 MG: 10 INJECTION, SOLUTION INTRAVENOUS at 09:28

## 2021-01-01 RX ADMIN — INSULIN GLARGINE 10 UNITS: 100 INJECTION, SOLUTION SUBCUTANEOUS at 21:31

## 2021-01-01 RX ADMIN — DULOXETINE HYDROCHLORIDE 60 MG: 30 CAPSULE, DELAYED RELEASE ORAL at 08:41

## 2021-01-01 RX ADMIN — INSULIN LISPRO 6 UNITS: 100 INJECTION, SOLUTION INTRAVENOUS; SUBCUTANEOUS at 21:55

## 2021-01-01 RX ADMIN — ACETAMINOPHEN 650 MG: 325 TABLET ORAL at 23:46

## 2021-01-01 RX ADMIN — ATORVASTATIN CALCIUM 40 MG: 40 TABLET, FILM COATED ORAL at 21:28

## 2021-01-01 RX ADMIN — METHYLPREDNISOLONE SODIUM SUCCINATE 40 MG: 40 INJECTION, POWDER, FOR SOLUTION INTRAMUSCULAR; INTRAVENOUS at 09:55

## 2021-01-01 RX ADMIN — BUSPIRONE HYDROCHLORIDE 10 MG: 10 TABLET ORAL at 21:57

## 2021-01-01 RX ADMIN — SODIUM CHLORIDE, PRESERVATIVE FREE 10 ML: 5 INJECTION INTRAVENOUS at 21:36

## 2021-01-01 RX ADMIN — MAGNESIUM OXIDE 400 MG (241.3 MG MAGNESIUM) TABLET 400 MG: TABLET at 08:58

## 2021-01-01 RX ADMIN — BUSPIRONE HYDROCHLORIDE 10 MG: 10 TABLET ORAL at 09:52

## 2021-01-01 RX ADMIN — PANTOPRAZOLE SODIUM 40 MG: 40 TABLET, DELAYED RELEASE ORAL at 06:48

## 2021-01-01 RX ADMIN — ACETAMINOPHEN 650 MG: 325 TABLET ORAL at 19:01

## 2021-01-01 RX ADMIN — Medication 50 MEQ: at 15:33

## 2021-01-01 RX ADMIN — DEXTROSE MONOHYDRATE 15 MG/HR: 50 INJECTION, SOLUTION INTRAVENOUS at 04:37

## 2021-01-01 RX ADMIN — INSULIN LISPRO 9 UNITS: 100 INJECTION, SOLUTION INTRAVENOUS; SUBCUTANEOUS at 12:01

## 2021-01-01 RX ADMIN — TRAZODONE HYDROCHLORIDE 50 MG: 50 TABLET ORAL at 22:29

## 2021-01-01 RX ADMIN — LISINOPRIL 5 MG: 5 TABLET ORAL at 09:47

## 2021-01-01 RX ADMIN — LOSARTAN POTASSIUM 100 MG: 100 TABLET, FILM COATED ORAL at 18:20

## 2021-01-01 RX ADMIN — DIGOXIN 250 MCG: 0.25 INJECTION INTRAMUSCULAR; INTRAVENOUS at 20:56

## 2021-01-01 RX ADMIN — METOPROLOL SUCCINATE 100 MG: 100 TABLET, EXTENDED RELEASE ORAL at 08:56

## 2021-01-01 RX ADMIN — SODIUM CHLORIDE, PRESERVATIVE FREE 10 ML: 5 INJECTION INTRAVENOUS at 09:05

## 2021-01-01 RX ADMIN — DULOXETINE HYDROCHLORIDE 60 MG: 30 CAPSULE, DELAYED RELEASE ORAL at 07:54

## 2021-01-01 RX ADMIN — APIXABAN 5 MG: 5 TABLET, FILM COATED ORAL at 08:55

## 2021-01-01 RX ADMIN — APIXABAN 5 MG: 5 TABLET, FILM COATED ORAL at 20:14

## 2021-01-01 RX ADMIN — INSULIN LISPRO 3 UNITS: 100 INJECTION, SOLUTION INTRAVENOUS; SUBCUTANEOUS at 09:19

## 2021-01-01 RX ADMIN — APIXABAN 5 MG: 5 TABLET, FILM COATED ORAL at 08:34

## 2021-01-01 RX ADMIN — PANTOPRAZOLE SODIUM 40 MG: 40 TABLET, DELAYED RELEASE ORAL at 08:32

## 2021-01-01 RX ADMIN — INSULIN GLARGINE 15 UNITS: 100 INJECTION, SOLUTION SUBCUTANEOUS at 21:02

## 2021-01-01 RX ADMIN — APIXABAN 5 MG: 5 TABLET, FILM COATED ORAL at 21:34

## 2021-01-01 RX ADMIN — ALOGLIPTIN 6.25 MG: 6.25 TABLET, FILM COATED ORAL at 11:15

## 2021-01-01 RX ADMIN — METOPROLOL TARTRATE 50 MG: 50 TABLET, FILM COATED ORAL at 21:35

## 2021-01-01 RX ADMIN — SODIUM CHLORIDE, PRESERVATIVE FREE 10 ML: 5 INJECTION INTRAVENOUS at 21:12

## 2021-01-01 RX ADMIN — ASPIRIN 81 MG: 81 TABLET, CHEWABLE ORAL at 09:31

## 2021-01-01 RX ADMIN — Medication 1 TABLET: at 09:03

## 2021-01-01 RX ADMIN — CEFEPIME HYDROCHLORIDE 2000 MG: 2 INJECTION, POWDER, FOR SOLUTION INTRAVENOUS at 08:48

## 2021-01-01 RX ADMIN — GLIPIZIDE 2.5 MG: 5 TABLET ORAL at 06:37

## 2021-01-01 RX ADMIN — BUSPIRONE HYDROCHLORIDE 10 MG: 10 TABLET ORAL at 12:23

## 2021-01-01 RX ADMIN — SODIUM ZIRCONIUM CYCLOSILICATE 10 G: 10 POWDER, FOR SUSPENSION ORAL at 12:59

## 2021-01-01 RX ADMIN — DIPHENHYDRAMINE HYDROCHLORIDE 50 MG: 25 TABLET ORAL at 21:39

## 2021-01-01 RX ADMIN — DULOXETINE HYDROCHLORIDE 60 MG: 30 CAPSULE, DELAYED RELEASE ORAL at 09:31

## 2021-01-01 RX ADMIN — POTASSIUM CHLORIDE 10 MEQ: 750 TABLET, FILM COATED, EXTENDED RELEASE ORAL at 09:23

## 2021-01-01 RX ADMIN — DULOXETINE HYDROCHLORIDE 60 MG: 30 CAPSULE, DELAYED RELEASE ORAL at 09:42

## 2021-01-01 RX ADMIN — LEVOTHYROXINE SODIUM 50 MCG: 0.05 TABLET ORAL at 05:48

## 2021-01-01 RX ADMIN — ALOGLIPTIN 6.25 MG: 6.25 TABLET, FILM COATED ORAL at 09:37

## 2021-01-01 RX ADMIN — FUROSEMIDE 20 MG: 10 INJECTION, SOLUTION INTRAVENOUS at 09:12

## 2021-01-01 RX ADMIN — SODIUM CHLORIDE, PRESERVATIVE FREE 10 ML: 5 INJECTION INTRAVENOUS at 20:22

## 2021-01-01 RX ADMIN — DIPHENHYDRAMINE HYDROCHLORIDE 50 MG: 25 TABLET ORAL at 20:41

## 2021-01-01 RX ADMIN — CHLORTHALIDONE 12.5 MG: 25 TABLET ORAL at 09:02

## 2021-01-01 RX ADMIN — APIXABAN 5 MG: 5 TABLET, FILM COATED ORAL at 21:04

## 2021-01-01 RX ADMIN — Medication 1 TABLET: at 08:58

## 2021-01-01 RX ADMIN — BUSPIRONE HYDROCHLORIDE 10 MG: 10 TABLET ORAL at 20:29

## 2021-01-01 RX ADMIN — POTASSIUM CHLORIDE 10 MEQ: 750 TABLET, FILM COATED, EXTENDED RELEASE ORAL at 07:49

## 2021-01-01 RX ADMIN — POTASSIUM CHLORIDE 10 MEQ: 750 TABLET, FILM COATED, EXTENDED RELEASE ORAL at 21:28

## 2021-01-01 RX ADMIN — POTASSIUM CHLORIDE 10 MEQ: 7.46 INJECTION, SOLUTION INTRAVENOUS at 11:12

## 2021-01-01 RX ADMIN — SACUBITRIL AND VALSARTAN 1 TABLET: 24; 26 TABLET, FILM COATED ORAL at 07:49

## 2021-01-01 RX ADMIN — METOPROLOL TARTRATE 50 MG: 50 TABLET, FILM COATED ORAL at 20:41

## 2021-01-01 RX ADMIN — SODIUM CHLORIDE, PRESERVATIVE FREE 10 ML: 5 INJECTION INTRAVENOUS at 20:46

## 2021-01-01 RX ADMIN — APIXABAN 5 MG: 5 TABLET, FILM COATED ORAL at 20:40

## 2021-01-01 RX ADMIN — APIXABAN 5 MG: 5 TABLET, FILM COATED ORAL at 15:39

## 2021-01-01 RX ADMIN — DULOXETINE HYDROCHLORIDE 60 MG: 30 CAPSULE, DELAYED RELEASE ORAL at 08:07

## 2021-01-01 RX ADMIN — PANTOPRAZOLE SODIUM 40 MG: 40 TABLET, DELAYED RELEASE ORAL at 08:06

## 2021-01-01 RX ADMIN — INSULIN LISPRO 3 UNITS: 100 INJECTION, SOLUTION INTRAVENOUS; SUBCUTANEOUS at 16:41

## 2021-01-01 RX ADMIN — METOPROLOL SUCCINATE 100 MG: 50 TABLET, EXTENDED RELEASE ORAL at 10:04

## 2021-01-01 RX ADMIN — SODIUM CHLORIDE, PRESERVATIVE FREE 10 ML: 5 INJECTION INTRAVENOUS at 08:42

## 2021-01-01 RX ADMIN — LEVOTHYROXINE SODIUM 50 MCG: 0.05 TABLET ORAL at 05:35

## 2021-01-01 RX ADMIN — INSULIN GLARGINE 10 UNITS: 100 INJECTION, SOLUTION SUBCUTANEOUS at 21:43

## 2021-01-01 RX ADMIN — FUROSEMIDE 40 MG: 10 INJECTION, SOLUTION INTRAMUSCULAR; INTRAVENOUS at 21:51

## 2021-01-01 RX ADMIN — VANCOMYCIN HYDROCHLORIDE 1000 MG: 1 INJECTION, POWDER, LYOPHILIZED, FOR SOLUTION INTRAVENOUS at 05:51

## 2021-01-01 RX ADMIN — TRAZODONE HYDROCHLORIDE 50 MG: 50 TABLET ORAL at 00:44

## 2021-01-01 RX ADMIN — DULOXETINE HYDROCHLORIDE 60 MG: 30 CAPSULE, DELAYED RELEASE ORAL at 08:58

## 2021-01-01 RX ADMIN — BUSPIRONE HYDROCHLORIDE 10 MG: 10 TABLET ORAL at 20:41

## 2021-01-01 RX ADMIN — INSULIN LISPRO 2 UNITS: 100 INJECTION, SOLUTION INTRAVENOUS; SUBCUTANEOUS at 21:39

## 2021-01-01 RX ADMIN — MAGNESIUM OXIDE 400 MG (241.3 MG MAGNESIUM) TABLET 400 MG: TABLET at 08:06

## 2021-01-01 RX ADMIN — INSULIN GLARGINE 15 UNITS: 100 INJECTION, SOLUTION SUBCUTANEOUS at 21:52

## 2021-01-01 RX ADMIN — MAGNESIUM OXIDE 400 MG (241.3 MG MAGNESIUM) TABLET 400 MG: TABLET at 09:42

## 2021-01-01 RX ADMIN — INSULIN GLARGINE 20 UNITS: 100 INJECTION, SOLUTION SUBCUTANEOUS at 20:15

## 2021-01-01 RX ADMIN — TRAZODONE HYDROCHLORIDE 50 MG: 50 TABLET ORAL at 20:59

## 2021-01-01 RX ADMIN — METOPROLOL TARTRATE 25 MG: 25 TABLET, FILM COATED ORAL at 22:37

## 2021-01-01 RX ADMIN — Medication 1 TABLET: at 09:52

## 2021-01-01 RX ADMIN — FUROSEMIDE 40 MG: 10 INJECTION, SOLUTION INTRAMUSCULAR; INTRAVENOUS at 18:21

## 2021-01-01 RX ADMIN — AMIODARONE HYDROCHLORIDE 200 MG: 200 TABLET ORAL at 20:09

## 2021-01-01 RX ADMIN — APIXABAN 5 MG: 5 TABLET, FILM COATED ORAL at 09:31

## 2021-01-01 RX ADMIN — APIXABAN 5 MG: 5 TABLET, FILM COATED ORAL at 08:54

## 2021-01-01 RX ADMIN — ACETAMINOPHEN 650 MG: 325 TABLET ORAL at 21:21

## 2021-01-01 RX ADMIN — GLIPIZIDE 2.5 MG: 5 TABLET ORAL at 06:17

## 2021-01-01 RX ADMIN — METOPROLOL TARTRATE 50 MG: 50 TABLET, FILM COATED ORAL at 21:04

## 2021-01-01 RX ADMIN — SODIUM CHLORIDE, PRESERVATIVE FREE 10 ML: 5 INJECTION INTRAVENOUS at 19:48

## 2021-01-01 RX ADMIN — BUSPIRONE HYDROCHLORIDE 10 MG: 10 TABLET ORAL at 07:53

## 2021-01-01 RX ADMIN — METOPROLOL TARTRATE 50 MG: 50 TABLET, FILM COATED ORAL at 09:18

## 2021-01-01 RX ADMIN — ALOGLIPTIN 6.25 MG: 6.25 TABLET, FILM COATED ORAL at 09:35

## 2021-01-01 RX ADMIN — ALOGLIPTIN 6.25 MG: 6.25 TABLET, FILM COATED ORAL at 09:00

## 2021-01-01 RX ADMIN — APIXABAN 5 MG: 5 TABLET, FILM COATED ORAL at 09:03

## 2021-01-01 RX ADMIN — AMLODIPINE BESYLATE 2.5 MG: 2.5 TABLET ORAL at 09:19

## 2021-01-01 RX ADMIN — INSULIN GLARGINE 10 UNITS: 100 INJECTION, SOLUTION SUBCUTANEOUS at 20:41

## 2021-01-01 RX ADMIN — METOPROLOL TARTRATE 5 MG: 5 INJECTION INTRAVENOUS at 10:09

## 2021-01-01 RX ADMIN — LISINOPRIL 5 MG: 5 TABLET ORAL at 20:59

## 2021-01-01 RX ADMIN — INSULIN LISPRO 6 UNITS: 100 INJECTION, SOLUTION INTRAVENOUS; SUBCUTANEOUS at 12:23

## 2021-01-01 RX ADMIN — SODIUM CHLORIDE, PRESERVATIVE FREE 10 ML: 5 INJECTION INTRAVENOUS at 08:56

## 2021-01-01 RX ADMIN — BUSPIRONE HYDROCHLORIDE 10 MG: 10 TABLET ORAL at 22:25

## 2021-01-01 RX ADMIN — SODIUM CHLORIDE, PRESERVATIVE FREE 10 ML: 5 INJECTION INTRAVENOUS at 09:31

## 2021-01-01 RX ADMIN — LORAZEPAM 0.5 MG: 0.5 TABLET ORAL at 23:35

## 2021-01-01 RX ADMIN — INSULIN LISPRO 2 UNITS: 100 INJECTION, SOLUTION INTRAVENOUS; SUBCUTANEOUS at 00:16

## 2021-01-01 RX ADMIN — LORAZEPAM 0.5 MG: 0.5 TABLET ORAL at 20:46

## 2021-01-01 RX ADMIN — FUROSEMIDE 40 MG: 10 INJECTION, SOLUTION INTRAMUSCULAR; INTRAVENOUS at 10:07

## 2021-01-01 RX ADMIN — DEXTROSE MONOHYDRATE 5 MG/HR: 50 INJECTION, SOLUTION INTRAVENOUS at 02:16

## 2021-01-01 RX ADMIN — VANCOMYCIN HYDROCHLORIDE 1500 MG: 5 INJECTION, POWDER, LYOPHILIZED, FOR SOLUTION INTRAVENOUS at 21:14

## 2021-01-01 RX ADMIN — INSULIN LISPRO 3 UNITS: 100 INJECTION, SOLUTION INTRAVENOUS; SUBCUTANEOUS at 08:58

## 2021-01-01 RX ADMIN — FUROSEMIDE 20 MG: 10 INJECTION, SOLUTION INTRAVENOUS at 17:44

## 2021-01-01 RX ADMIN — ASPIRIN 81 MG: 81 TABLET ORAL at 08:06

## 2021-01-01 RX ADMIN — BUSPIRONE HYDROCHLORIDE 10 MG: 10 TABLET ORAL at 20:34

## 2021-01-01 RX ADMIN — LORAZEPAM 0.5 MG: 0.5 TABLET ORAL at 22:03

## 2021-01-01 RX ADMIN — INSULIN GLARGINE 10 UNITS: 100 INJECTION, SOLUTION SUBCUTANEOUS at 21:50

## 2021-01-01 RX ADMIN — INSULIN LISPRO 9 UNITS: 100 INJECTION, SOLUTION INTRAVENOUS; SUBCUTANEOUS at 10:39

## 2021-01-01 RX ADMIN — APIXABAN 5 MG: 5 TABLET, FILM COATED ORAL at 20:45

## 2021-01-01 RX ADMIN — DEXTROSE AND SODIUM CHLORIDE: 5; .9 INJECTION, SOLUTION INTRAVENOUS at 01:08

## 2021-01-01 RX ADMIN — LORAZEPAM 0.5 MG: 0.5 TABLET ORAL at 03:59

## 2021-01-01 RX ADMIN — MAGNESIUM OXIDE 400 MG (241.3 MG MAGNESIUM) TABLET 400 MG: TABLET at 13:17

## 2021-01-01 RX ADMIN — POTASSIUM CHLORIDE 40 MEQ: 1500 TABLET, EXTENDED RELEASE ORAL at 17:17

## 2021-01-01 RX ADMIN — AMIODARONE HYDROCHLORIDE 400 MG: 200 TABLET ORAL at 21:28

## 2021-01-01 RX ADMIN — INSULIN LISPRO 10 UNITS: 100 INJECTION, SOLUTION INTRAVENOUS; SUBCUTANEOUS at 08:57

## 2021-01-01 RX ADMIN — SODIUM CHLORIDE, PRESERVATIVE FREE 10 ML: 5 INJECTION INTRAVENOUS at 22:32

## 2021-01-01 RX ADMIN — HYDROXYZINE PAMOATE 25 MG: 25 CAPSULE ORAL at 23:16

## 2021-01-01 RX ADMIN — CEFEPIME HYDROCHLORIDE 2000 MG: 2 INJECTION, POWDER, FOR SOLUTION INTRAVENOUS at 09:28

## 2021-01-01 RX ADMIN — SODIUM CHLORIDE, PRESERVATIVE FREE 10 ML: 5 INJECTION INTRAVENOUS at 22:10

## 2021-01-01 RX ADMIN — ONDANSETRON 4 MG: 2 INJECTION INTRAMUSCULAR; INTRAVENOUS at 20:46

## 2021-01-01 RX ADMIN — ACETAMINOPHEN 650 MG: 325 TABLET ORAL at 13:47

## 2021-01-01 RX ADMIN — PANTOPRAZOLE SODIUM 40 MG: 40 TABLET, DELAYED RELEASE ORAL at 06:13

## 2021-01-01 RX ADMIN — LEVOTHYROXINE SODIUM 50 MCG: 0.05 TABLET ORAL at 06:17

## 2021-01-01 RX ADMIN — DEXTROSE MONOHYDRATE 50 ML: 25 INJECTION, SOLUTION INTRAVENOUS at 23:24

## 2021-01-01 RX ADMIN — DILTIAZEM HYDROCHLORIDE 30 MG: 30 TABLET, FILM COATED ORAL at 22:29

## 2021-01-01 RX ADMIN — DIPHENHYDRAMINE HYDROCHLORIDE 50 MG: 25 TABLET ORAL at 23:11

## 2021-01-01 RX ADMIN — BUSPIRONE HYDROCHLORIDE 10 MG: 10 TABLET ORAL at 15:55

## 2021-01-01 RX ADMIN — LEVOTHYROXINE SODIUM 50 MCG: 0.05 TABLET ORAL at 06:31

## 2021-01-01 RX ADMIN — CALCIUM GLUCONATE 1000 MG: 98 INJECTION, SOLUTION INTRAVENOUS at 13:55

## 2021-01-01 RX ADMIN — SPIRONOLACTONE 25 MG: 25 TABLET ORAL at 09:26

## 2021-01-01 RX ADMIN — BUSPIRONE HYDROCHLORIDE 10 MG: 10 TABLET ORAL at 14:10

## 2021-01-01 RX ADMIN — SODIUM CHLORIDE, PRESERVATIVE FREE 10 ML: 5 INJECTION INTRAVENOUS at 09:08

## 2021-01-01 RX ADMIN — ASPIRIN 81 MG: 81 TABLET, CHEWABLE ORAL at 09:05

## 2021-01-01 RX ADMIN — BUSPIRONE HYDROCHLORIDE 10 MG: 10 TABLET ORAL at 13:56

## 2021-01-01 RX ADMIN — SODIUM CHLORIDE, PRESERVATIVE FREE 10 ML: 5 INJECTION INTRAVENOUS at 08:55

## 2021-01-01 RX ADMIN — INSULIN LISPRO 3 UNITS: 100 INJECTION, SOLUTION INTRAVENOUS; SUBCUTANEOUS at 12:55

## 2021-01-01 RX ADMIN — SODIUM CHLORIDE 25 ML: 9 INJECTION, SOLUTION INTRAVENOUS at 17:22

## 2021-01-01 RX ADMIN — ALOGLIPTIN 6.25 MG: 6.25 TABLET, FILM COATED ORAL at 08:22

## 2021-01-01 RX ADMIN — TRAZODONE HYDROCHLORIDE 50 MG: 50 TABLET ORAL at 20:19

## 2021-01-01 RX ADMIN — BUSPIRONE HYDROCHLORIDE 10 MG: 10 TABLET ORAL at 21:04

## 2021-01-01 RX ADMIN — SODIUM CHLORIDE, PRESERVATIVE FREE 10 ML: 5 INJECTION INTRAVENOUS at 09:46

## 2021-01-01 RX ADMIN — LEVOTHYROXINE SODIUM 50 MCG: 0.05 TABLET ORAL at 06:47

## 2021-01-01 RX ADMIN — PANTOPRAZOLE SODIUM 40 MG: 40 TABLET, DELAYED RELEASE ORAL at 06:39

## 2021-01-01 RX ADMIN — INSULIN LISPRO 12 UNITS: 100 INJECTION, SOLUTION INTRAVENOUS; SUBCUTANEOUS at 09:57

## 2021-01-01 RX ADMIN — PANTOPRAZOLE SODIUM 40 MG: 40 TABLET, DELAYED RELEASE ORAL at 06:09

## 2021-01-01 RX ADMIN — Medication 1 TABLET: at 08:23

## 2021-01-01 RX ADMIN — LORAZEPAM 0.5 MG: 0.5 TABLET ORAL at 00:44

## 2021-01-01 RX ADMIN — TORSEMIDE 20 MG: 20 TABLET ORAL at 12:09

## 2021-01-01 RX ADMIN — ASPIRIN 81 MG: 81 TABLET, CHEWABLE ORAL at 10:37

## 2021-01-01 RX ADMIN — APIXABAN 5 MG: 5 TABLET, FILM COATED ORAL at 21:12

## 2021-01-01 RX ADMIN — Medication 1 TABLET: at 09:39

## 2021-01-01 RX ADMIN — LISINOPRIL 2.5 MG: 2.5 TABLET ORAL at 09:23

## 2021-01-01 RX ADMIN — INSULIN LISPRO 10 UNITS: 100 INJECTION, SOLUTION INTRAVENOUS; SUBCUTANEOUS at 14:02

## 2021-01-01 RX ADMIN — FUROSEMIDE 80 MG: 10 INJECTION, SOLUTION INTRAMUSCULAR; INTRAVENOUS at 15:03

## 2021-01-01 RX ADMIN — CEFTRIAXONE SODIUM 1000 MG: 1 INJECTION, POWDER, FOR SOLUTION INTRAMUSCULAR; INTRAVENOUS at 21:42

## 2021-01-01 RX ADMIN — INSULIN LISPRO 9 UNITS: 100 INJECTION, SOLUTION INTRAVENOUS; SUBCUTANEOUS at 11:30

## 2021-01-01 RX ADMIN — DIGOXIN 250 MCG: 0.25 INJECTION INTRAMUSCULAR; INTRAVENOUS at 15:49

## 2021-01-01 RX ADMIN — SODIUM CHLORIDE, PRESERVATIVE FREE 10 ML: 5 INJECTION INTRAVENOUS at 10:18

## 2021-01-01 RX ADMIN — DILTIAZEM HYDROCHLORIDE 30 MG: 30 TABLET, FILM COATED ORAL at 00:09

## 2021-01-01 RX ADMIN — INSULIN HUMAN 4 UNITS/HR: 1 INJECTION, SOLUTION INTRAVENOUS at 20:41

## 2021-01-01 RX ADMIN — INSULIN LISPRO 3 UNITS: 100 INJECTION, SOLUTION INTRAVENOUS; SUBCUTANEOUS at 13:26

## 2021-01-01 RX ADMIN — DULOXETINE HYDROCHLORIDE 30 MG: 30 CAPSULE, DELAYED RELEASE ORAL at 09:01

## 2021-01-01 RX ADMIN — DILTIAZEM HYDROCHLORIDE 30 MG: 30 TABLET, FILM COATED ORAL at 12:55

## 2021-01-01 RX ADMIN — DILTIAZEM HYDROCHLORIDE 30 MG: 30 TABLET, FILM COATED ORAL at 12:10

## 2021-01-01 RX ADMIN — SODIUM CHLORIDE, PRESERVATIVE FREE 10 ML: 5 INJECTION INTRAVENOUS at 22:18

## 2021-01-01 RX ADMIN — METOPROLOL TARTRATE 50 MG: 50 TABLET, FILM COATED ORAL at 08:55

## 2021-01-01 RX ADMIN — CLONIDINE HYDROCHLORIDE 0.3 MG: 0.2 TABLET ORAL at 20:45

## 2021-01-01 RX ADMIN — FUROSEMIDE 40 MG: 10 INJECTION, SOLUTION INTRAMUSCULAR; INTRAVENOUS at 15:49

## 2021-01-01 RX ADMIN — SODIUM CHLORIDE, PRESERVATIVE FREE 10 ML: 5 INJECTION INTRAVENOUS at 08:41

## 2021-01-01 RX ADMIN — DULOXETINE HYDROCHLORIDE 30 MG: 30 CAPSULE, DELAYED RELEASE ORAL at 08:52

## 2021-01-01 RX ADMIN — Medication 1 TABLET: at 08:54

## 2021-01-01 RX ADMIN — POTASSIUM CHLORIDE 40 MEQ: 1500 TABLET, EXTENDED RELEASE ORAL at 17:13

## 2021-01-01 RX ADMIN — ASPIRIN 81 MG: 81 TABLET, CHEWABLE ORAL at 09:52

## 2021-01-01 RX ADMIN — DEXTROSE MONOHYDRATE 5 MG/HR: 50 INJECTION, SOLUTION INTRAVENOUS at 09:26

## 2021-01-01 RX ADMIN — APIXABAN 5 MG: 5 TABLET, FILM COATED ORAL at 10:13

## 2021-01-01 RX ADMIN — ASPIRIN 81 MG: 81 TABLET, CHEWABLE ORAL at 09:30

## 2021-01-01 RX ADMIN — INSULIN LISPRO 3 UNITS: 100 INJECTION, SOLUTION INTRAVENOUS; SUBCUTANEOUS at 09:02

## 2021-01-01 RX ADMIN — SPIRONOLACTONE 25 MG: 25 TABLET ORAL at 08:34

## 2021-01-01 RX ADMIN — DILTIAZEM HYDROCHLORIDE 30 MG: 30 TABLET, FILM COATED ORAL at 23:11

## 2021-01-01 RX ADMIN — METFORMIN HYDROCHLORIDE 500 MG: 500 TABLET ORAL at 09:48

## 2021-01-01 RX ADMIN — TORSEMIDE 20 MG: 20 TABLET ORAL at 08:34

## 2021-01-01 RX ADMIN — VANCOMYCIN HYDROCHLORIDE 1000 MG: 1 INJECTION, POWDER, LYOPHILIZED, FOR SOLUTION INTRAVENOUS at 05:36

## 2021-01-01 RX ADMIN — ASPIRIN 81 MG: 81 TABLET ORAL at 08:58

## 2021-01-01 RX ADMIN — INSULIN LISPRO 6 UNITS: 100 INJECTION, SOLUTION INTRAVENOUS; SUBCUTANEOUS at 13:02

## 2021-01-01 RX ADMIN — BUSPIRONE HYDROCHLORIDE 10 MG: 10 TABLET ORAL at 08:58

## 2021-01-01 RX ADMIN — BUSPIRONE HYDROCHLORIDE 10 MG: 10 TABLET ORAL at 21:12

## 2021-01-01 RX ADMIN — SPIRONOLACTONE 25 MG: 25 TABLET ORAL at 18:44

## 2021-01-01 RX ADMIN — APIXABAN 5 MG: 5 TABLET, FILM COATED ORAL at 20:58

## 2021-01-01 RX ADMIN — AMIODARONE HYDROCHLORIDE 400 MG: 200 TABLET ORAL at 21:57

## 2021-01-01 RX ADMIN — HYDROXYZINE PAMOATE 25 MG: 25 CAPSULE ORAL at 09:52

## 2021-01-01 RX ADMIN — POTASSIUM BICARBONATE 40 MEQ: 782 TABLET, EFFERVESCENT ORAL at 09:28

## 2021-01-01 RX ADMIN — INSULIN LISPRO 6 UNITS: 100 INJECTION, SOLUTION INTRAVENOUS; SUBCUTANEOUS at 12:19

## 2021-01-01 RX ADMIN — ASPIRIN 81 MG: 81 TABLET, CHEWABLE ORAL at 08:21

## 2021-01-01 RX ADMIN — METOPROLOL SUCCINATE 100 MG: 100 TABLET, EXTENDED RELEASE ORAL at 09:14

## 2021-01-01 RX ADMIN — SODIUM CHLORIDE, POTASSIUM CHLORIDE, SODIUM LACTATE AND CALCIUM CHLORIDE 2000 ML: 600; 310; 30; 20 INJECTION, SOLUTION INTRAVENOUS at 20:32

## 2021-01-01 RX ADMIN — SACUBITRIL AND VALSARTAN 1 TABLET: 24; 26 TABLET, FILM COATED ORAL at 10:29

## 2021-01-01 RX ADMIN — LORAZEPAM 0.5 MG: 0.5 TABLET ORAL at 18:25

## 2021-01-01 RX ADMIN — DILTIAZEM HYDROCHLORIDE 30 MG: 30 TABLET, FILM COATED ORAL at 23:27

## 2021-01-01 RX ADMIN — ASPIRIN 81 MG: 81 TABLET, CHEWABLE ORAL at 16:48

## 2021-01-01 RX ADMIN — LEVOTHYROXINE SODIUM 50 MCG: 0.05 TABLET ORAL at 10:05

## 2021-01-01 RX ADMIN — SODIUM ZIRCONIUM CYCLOSILICATE 10 G: 10 POWDER, FOR SUSPENSION ORAL at 11:15

## 2021-01-01 RX ADMIN — SODIUM CHLORIDE, PRESERVATIVE FREE 10 ML: 5 INJECTION INTRAVENOUS at 08:22

## 2021-01-01 RX ADMIN — INSULIN LISPRO 3 UNITS: 100 INJECTION, SOLUTION INTRAVENOUS; SUBCUTANEOUS at 07:39

## 2021-01-01 RX ADMIN — INSULIN GLARGINE 10 UNITS: 100 INJECTION, SOLUTION SUBCUTANEOUS at 22:01

## 2021-01-01 RX ADMIN — Medication 1 TABLET: at 10:08

## 2021-01-01 RX ADMIN — ROCURONIUM BROMIDE 100 MG: 10 SOLUTION INTRAVENOUS at 09:40

## 2021-01-01 RX ADMIN — MAGNESIUM OXIDE 400 MG (241.3 MG MAGNESIUM) TABLET 400 MG: TABLET at 07:49

## 2021-01-01 RX ADMIN — LEVOTHYROXINE SODIUM 50 MCG: 0.05 TABLET ORAL at 06:48

## 2021-01-01 RX ADMIN — ACETAMINOPHEN 650 MG: 325 TABLET ORAL at 18:58

## 2021-01-01 RX ADMIN — LISINOPRIL 5 MG: 5 TABLET ORAL at 09:03

## 2021-01-01 RX ADMIN — LORAZEPAM 0.5 MG: 0.5 TABLET ORAL at 12:09

## 2021-01-01 RX ADMIN — INSULIN GLARGINE 10 UNITS: 100 INJECTION, SOLUTION SUBCUTANEOUS at 21:12

## 2021-01-01 RX ADMIN — BUSPIRONE HYDROCHLORIDE 10 MG: 10 TABLET ORAL at 20:33

## 2021-01-01 RX ADMIN — AMIODARONE HYDROCHLORIDE 200 MG: 200 TABLET ORAL at 20:53

## 2021-01-01 RX ADMIN — DULOXETINE HYDROCHLORIDE 30 MG: 30 CAPSULE, DELAYED RELEASE ORAL at 08:55

## 2021-01-01 RX ADMIN — BUSPIRONE HYDROCHLORIDE 10 MG: 10 TABLET ORAL at 08:07

## 2021-01-01 RX ADMIN — POTASSIUM CHLORIDE 20 MEQ: 29.8 INJECTION, SOLUTION INTRAVENOUS at 10:23

## 2021-01-01 RX ADMIN — AMIODARONE HYDROCHLORIDE 200 MG: 200 TABLET ORAL at 20:40

## 2021-01-01 RX ADMIN — INSULIN LISPRO 4 UNITS: 100 INJECTION, SOLUTION INTRAVENOUS; SUBCUTANEOUS at 08:46

## 2021-01-01 RX ADMIN — POTASSIUM CHLORIDE 10 MEQ: 750 TABLET, FILM COATED, EXTENDED RELEASE ORAL at 20:19

## 2021-01-01 RX ADMIN — Medication 1 TABLET: at 08:55

## 2021-01-01 RX ADMIN — INSULIN LISPRO 10 UNITS: 100 INJECTION, SOLUTION INTRAVENOUS; SUBCUTANEOUS at 12:20

## 2021-01-01 RX ADMIN — POTASSIUM CHLORIDE 20 MEQ: 29.8 INJECTION, SOLUTION INTRAVENOUS at 11:08

## 2021-01-01 RX ADMIN — INSULIN GLARGINE 10 UNITS: 100 INJECTION, SOLUTION SUBCUTANEOUS at 22:08

## 2021-01-01 RX ADMIN — INSULIN GLARGINE 10 UNITS: 100 INJECTION, SOLUTION SUBCUTANEOUS at 21:09

## 2021-01-01 RX ADMIN — LISINOPRIL 2.5 MG: 2.5 TABLET ORAL at 16:48

## 2021-01-01 RX ADMIN — BUSPIRONE HYDROCHLORIDE 10 MG: 10 TABLET ORAL at 10:13

## 2021-01-01 RX ADMIN — INSULIN LISPRO 6 UNITS: 100 INJECTION, SOLUTION INTRAVENOUS; SUBCUTANEOUS at 12:30

## 2021-01-01 RX ADMIN — ACETAMINOPHEN 650 MG: 325 TABLET ORAL at 16:50

## 2021-01-01 RX ADMIN — HYDROXYZINE PAMOATE 25 MG: 25 CAPSULE ORAL at 23:46

## 2021-01-01 RX ADMIN — LISINOPRIL 5 MG: 5 TABLET ORAL at 08:35

## 2021-01-01 RX ADMIN — LEVOTHYROXINE SODIUM 50 MCG: 0.05 TABLET ORAL at 08:41

## 2021-01-01 RX ADMIN — ASPIRIN 81 MG: 81 TABLET, COATED ORAL at 09:47

## 2021-01-01 RX ADMIN — CHLORTHALIDONE 12.5 MG: 25 TABLET ORAL at 07:53

## 2021-01-01 RX ADMIN — SODIUM CHLORIDE, PRESERVATIVE FREE 10 ML: 5 INJECTION INTRAVENOUS at 08:58

## 2021-01-01 RX ADMIN — SODIUM CHLORIDE, PRESERVATIVE FREE 10 ML: 5 INJECTION INTRAVENOUS at 21:35

## 2021-01-01 RX ADMIN — INSULIN LISPRO 3 UNITS: 100 INJECTION, SOLUTION INTRAVENOUS; SUBCUTANEOUS at 09:03

## 2021-01-01 RX ADMIN — LOSARTAN POTASSIUM 100 MG: 100 TABLET, FILM COATED ORAL at 10:13

## 2021-01-01 RX ADMIN — DILTIAZEM HYDROCHLORIDE 30 MG: 30 TABLET, FILM COATED ORAL at 06:34

## 2021-01-01 RX ADMIN — INSULIN LISPRO 3 UNITS: 100 INJECTION, SOLUTION INTRAVENOUS; SUBCUTANEOUS at 08:22

## 2021-01-01 RX ADMIN — SODIUM CHLORIDE 250 ML: 9 INJECTION, SOLUTION INTRAVENOUS at 20:48

## 2021-01-01 RX ADMIN — SODIUM CHLORIDE, PRESERVATIVE FREE 10 ML: 5 INJECTION INTRAVENOUS at 09:58

## 2021-01-01 RX ADMIN — PANTOPRAZOLE SODIUM 40 MG: 40 TABLET, DELAYED RELEASE ORAL at 06:11

## 2021-01-01 RX ADMIN — ALOGLIPTIN 6.25 MG: 6.25 TABLET, FILM COATED ORAL at 09:38

## 2021-01-01 RX ADMIN — ALOGLIPTIN 6.25 MG: 6.25 TABLET, FILM COATED ORAL at 10:04

## 2021-01-01 RX ADMIN — AMIODARONE HYDROCHLORIDE 200 MG: 200 TABLET ORAL at 20:34

## 2021-01-01 RX ADMIN — INSULIN GLARGINE 10 UNITS: 100 INJECTION, SOLUTION SUBCUTANEOUS at 20:50

## 2021-01-01 RX ADMIN — POTASSIUM CHLORIDE 20 MEQ: 29.8 INJECTION, SOLUTION INTRAVENOUS at 14:37

## 2021-01-01 RX ADMIN — INSULIN LISPRO 9 UNITS: 100 INJECTION, SOLUTION INTRAVENOUS; SUBCUTANEOUS at 12:21

## 2021-01-01 RX ADMIN — FUROSEMIDE 20 MG: 10 INJECTION, SOLUTION INTRAVENOUS at 09:32

## 2021-01-01 RX ADMIN — DULOXETINE HYDROCHLORIDE 60 MG: 30 CAPSULE, DELAYED RELEASE ORAL at 09:03

## 2021-01-01 RX ADMIN — AMIODARONE HYDROCHLORIDE 150 MG: 50 INJECTION, SOLUTION INTRAVENOUS at 15:36

## 2021-01-01 RX ADMIN — APIXABAN 5 MG: 5 TABLET, FILM COATED ORAL at 21:22

## 2021-01-01 RX ADMIN — BUSPIRONE HYDROCHLORIDE 10 MG: 10 TABLET ORAL at 08:41

## 2021-01-01 RX ADMIN — LORAZEPAM 0.5 MG: 0.5 TABLET ORAL at 02:20

## 2021-01-01 RX ADMIN — SODIUM CHLORIDE, PRESERVATIVE FREE 10 ML: 5 INJECTION INTRAVENOUS at 21:53

## 2021-01-01 RX ADMIN — POTASSIUM CHLORIDE 10 MEQ: 750 TABLET, FILM COATED, EXTENDED RELEASE ORAL at 08:21

## 2021-01-01 RX ADMIN — ASPIRIN 81 MG: 81 TABLET, CHEWABLE ORAL at 08:56

## 2021-01-01 RX ADMIN — ALOGLIPTIN 6.25 MG: 12.5 TABLET, FILM COATED ORAL at 16:39

## 2021-01-01 RX ADMIN — PANTOPRAZOLE SODIUM 40 MG: 40 TABLET, DELAYED RELEASE ORAL at 07:03

## 2021-01-01 RX ADMIN — Medication 1 TABLET: at 11:16

## 2021-01-01 RX ADMIN — METOPROLOL SUCCINATE 50 MG: 50 TABLET, EXTENDED RELEASE ORAL at 10:29

## 2021-01-01 RX ADMIN — PANTOPRAZOLE SODIUM 40 MG: 40 TABLET, DELAYED RELEASE ORAL at 06:31

## 2021-01-01 RX ADMIN — SPIRONOLACTONE 25 MG: 25 TABLET ORAL at 17:49

## 2021-01-01 RX ADMIN — PANTOPRAZOLE SODIUM 40 MG: 40 TABLET, DELAYED RELEASE ORAL at 09:56

## 2021-01-01 RX ADMIN — AMIODARONE HYDROCHLORIDE 200 MG: 200 TABLET ORAL at 09:41

## 2021-01-01 RX ADMIN — INSULIN LISPRO 12 UNITS: 100 INJECTION, SOLUTION INTRAVENOUS; SUBCUTANEOUS at 11:34

## 2021-01-01 RX ADMIN — INSULIN GLARGINE 10 UNITS: 100 INJECTION, SOLUTION SUBCUTANEOUS at 20:30

## 2021-01-01 RX ADMIN — AMIODARONE HYDROCHLORIDE 200 MG: 200 TABLET ORAL at 09:32

## 2021-01-01 RX ADMIN — SODIUM CHLORIDE, PRESERVATIVE FREE 10 ML: 5 INJECTION INTRAVENOUS at 20:50

## 2021-01-01 RX ADMIN — LEVOTHYROXINE SODIUM 50 MCG: 0.05 TABLET ORAL at 08:06

## 2021-01-01 RX ADMIN — DILTIAZEM HYDROCHLORIDE 30 MG: 30 TABLET, FILM COATED ORAL at 05:44

## 2021-01-01 RX ADMIN — LEVOTHYROXINE SODIUM 50 MCG: 0.05 TABLET ORAL at 11:16

## 2021-01-01 RX ADMIN — ASPIRIN 81 MG: 81 TABLET, CHEWABLE ORAL at 10:04

## 2021-01-01 RX ADMIN — CEFEPIME HYDROCHLORIDE 2000 MG: 2 INJECTION, POWDER, FOR SOLUTION INTRAVENOUS at 19:48

## 2021-01-01 RX ADMIN — INSULIN LISPRO 3 UNITS: 100 INJECTION, SOLUTION INTRAVENOUS; SUBCUTANEOUS at 13:36

## 2021-01-01 RX ADMIN — LORAZEPAM 0.5 MG: 0.5 TABLET ORAL at 16:23

## 2021-01-01 RX ADMIN — METOPROLOL SUCCINATE 25 MG: 25 TABLET, EXTENDED RELEASE ORAL at 08:21

## 2021-01-01 RX ADMIN — METOPROLOL TARTRATE 25 MG: 25 TABLET, FILM COATED ORAL at 08:52

## 2021-01-01 RX ADMIN — AMIODARONE HYDROCHLORIDE 400 MG: 200 TABLET ORAL at 09:23

## 2021-01-01 RX ADMIN — LEVOTHYROXINE SODIUM 50 MCG: 0.05 TABLET ORAL at 06:09

## 2021-01-01 RX ADMIN — AMIODARONE HYDROCHLORIDE 100 MG: 200 TABLET ORAL at 20:59

## 2021-01-01 RX ADMIN — APIXABAN 5 MG: 5 TABLET, FILM COATED ORAL at 09:25

## 2021-01-01 RX ADMIN — METOPROLOL SUCCINATE 100 MG: 50 TABLET, EXTENDED RELEASE ORAL at 09:43

## 2021-01-01 RX ADMIN — LEVOTHYROXINE SODIUM 50 MCG: 0.05 TABLET ORAL at 09:43

## 2021-01-01 RX ADMIN — LEVOTHYROXINE SODIUM 50 MCG: 0.05 TABLET ORAL at 06:26

## 2021-01-01 RX ADMIN — INSULIN LISPRO 3 UNITS: 100 INJECTION, SOLUTION INTRAVENOUS; SUBCUTANEOUS at 17:25

## 2021-01-01 RX ADMIN — METOPROLOL SUCCINATE 100 MG: 100 TABLET, EXTENDED RELEASE ORAL at 08:21

## 2021-01-01 RX ADMIN — SODIUM CHLORIDE, PRESERVATIVE FREE 10 ML: 5 INJECTION INTRAVENOUS at 20:37

## 2021-01-01 RX ADMIN — METOPROLOL TARTRATE 5 MG: 5 INJECTION INTRAVENOUS at 09:23

## 2021-01-01 RX ADMIN — FUROSEMIDE 20 MG: 10 INJECTION, SOLUTION INTRAVENOUS at 17:49

## 2021-01-01 RX ADMIN — ALOGLIPTIN 6.25 MG: 12.5 TABLET, FILM COATED ORAL at 08:35

## 2021-01-01 RX ADMIN — SODIUM CHLORIDE, PRESERVATIVE FREE 10 ML: 5 INJECTION INTRAVENOUS at 09:00

## 2021-01-01 RX ADMIN — CEFEPIME HYDROCHLORIDE 2000 MG: 2 INJECTION, POWDER, FOR SOLUTION INTRAVENOUS at 09:39

## 2021-01-01 RX ADMIN — INSULIN LISPRO 4 UNITS: 100 INJECTION, SOLUTION INTRAVENOUS; SUBCUTANEOUS at 23:18

## 2021-01-01 RX ADMIN — ALOGLIPTIN 6.25 MG: 6.25 TABLET, FILM COATED ORAL at 07:54

## 2021-01-01 RX ADMIN — METOPROLOL SUCCINATE 25 MG: 25 TABLET, EXTENDED RELEASE ORAL at 15:40

## 2021-01-01 RX ADMIN — LEVOTHYROXINE SODIUM 50 MCG: 0.05 TABLET ORAL at 06:08

## 2021-01-01 RX ADMIN — FUROSEMIDE 20 MG: 10 INJECTION, SOLUTION INTRAVENOUS at 17:22

## 2021-01-01 RX ADMIN — CEFEPIME HYDROCHLORIDE 2000 MG: 2 INJECTION, POWDER, FOR SOLUTION INTRAVENOUS at 09:41

## 2021-01-01 RX ADMIN — BUSPIRONE HYDROCHLORIDE 10 MG: 10 TABLET ORAL at 20:37

## 2021-01-01 RX ADMIN — ACETAMINOPHEN 650 MG: 325 TABLET ORAL at 22:16

## 2021-01-01 RX ADMIN — INSULIN LISPRO 6 UNITS: 100 INJECTION, SOLUTION INTRAVENOUS; SUBCUTANEOUS at 10:25

## 2021-01-01 RX ADMIN — ASPIRIN 81 MG: 81 TABLET ORAL at 09:43

## 2021-01-01 RX ADMIN — SODIUM CHLORIDE, PRESERVATIVE FREE 10 ML: 5 INJECTION INTRAVENOUS at 10:05

## 2021-01-01 RX ADMIN — FUROSEMIDE 40 MG: 10 INJECTION, SOLUTION INTRAMUSCULAR; INTRAVENOUS at 08:57

## 2021-01-01 RX ADMIN — HYDROXYZINE PAMOATE 25 MG: 25 CAPSULE ORAL at 15:56

## 2021-01-01 RX ADMIN — Medication 1 TABLET: at 08:47

## 2021-01-01 RX ADMIN — APIXABAN 5 MG: 5 TABLET, FILM COATED ORAL at 20:09

## 2021-01-01 RX ADMIN — INSULIN LISPRO 9 UNITS: 100 INJECTION, SOLUTION INTRAVENOUS; SUBCUTANEOUS at 11:57

## 2021-01-01 RX ADMIN — ASPIRIN 81 MG: 81 TABLET, CHEWABLE ORAL at 09:41

## 2021-01-01 RX ADMIN — ASPIRIN 81 MG: 81 TABLET, CHEWABLE ORAL at 08:47

## 2021-01-01 RX ADMIN — CEFEPIME HYDROCHLORIDE 2000 MG: 2 INJECTION, POWDER, FOR SOLUTION INTRAVENOUS at 08:23

## 2021-01-01 RX ADMIN — INSULIN LISPRO 3 UNITS: 100 INJECTION, SOLUTION INTRAVENOUS; SUBCUTANEOUS at 12:11

## 2021-01-01 RX ADMIN — DEXTROSE MONOHYDRATE 2.5 MG/HR: 50 INJECTION, SOLUTION INTRAVENOUS at 22:27

## 2021-01-01 RX ADMIN — INSULIN LISPRO 6 UNITS: 100 INJECTION, SOLUTION INTRAVENOUS; SUBCUTANEOUS at 16:36

## 2021-01-01 RX ADMIN — INSULIN LISPRO 3 UNITS: 100 INJECTION, SOLUTION INTRAVENOUS; SUBCUTANEOUS at 17:27

## 2021-01-01 RX ADMIN — METOPROLOL TARTRATE 50 MG: 50 TABLET, FILM COATED ORAL at 22:00

## 2021-01-01 RX ADMIN — SODIUM CHLORIDE, PRESERVATIVE FREE 10 ML: 5 INJECTION INTRAVENOUS at 21:13

## 2021-01-01 RX ADMIN — BUSPIRONE HYDROCHLORIDE 10 MG: 10 TABLET ORAL at 22:10

## 2021-01-01 RX ADMIN — SODIUM CHLORIDE, PRESERVATIVE FREE 10 ML: 5 INJECTION INTRAVENOUS at 07:48

## 2021-01-01 RX ADMIN — DILTIAZEM HYDROCHLORIDE 30 MG: 30 TABLET, FILM COATED ORAL at 06:54

## 2021-01-01 RX ADMIN — INSULIN GLARGINE 10 UNITS: 100 INJECTION, SOLUTION SUBCUTANEOUS at 22:39

## 2021-01-01 RX ADMIN — Medication 1 TABLET: at 09:18

## 2021-01-01 RX ADMIN — APIXABAN 5 MG: 5 TABLET, FILM COATED ORAL at 20:20

## 2021-01-01 RX ADMIN — LEVOTHYROXINE SODIUM 50 MCG: 0.05 TABLET ORAL at 06:11

## 2021-01-01 RX ADMIN — TRAZODONE HYDROCHLORIDE 50 MG: 50 TABLET ORAL at 20:44

## 2021-01-01 RX ADMIN — ASPIRIN 81 MG: 81 TABLET, CHEWABLE ORAL at 08:54

## 2021-01-01 RX ADMIN — DULOXETINE HYDROCHLORIDE 60 MG: 30 CAPSULE, DELAYED RELEASE ORAL at 09:18

## 2021-01-01 RX ADMIN — FUROSEMIDE 20 MG: 10 INJECTION, SOLUTION INTRAVENOUS at 18:41

## 2021-01-01 RX ADMIN — ONDANSETRON 4 MG: 4 TABLET, ORALLY DISINTEGRATING ORAL at 10:10

## 2021-01-01 RX ADMIN — INSULIN LISPRO 8 UNITS: 100 INJECTION, SOLUTION INTRAVENOUS; SUBCUTANEOUS at 17:15

## 2021-01-01 RX ADMIN — APIXABAN 5 MG: 5 TABLET, FILM COATED ORAL at 07:53

## 2021-01-01 RX ADMIN — LORAZEPAM 0.5 MG: 0.5 TABLET ORAL at 15:10

## 2021-01-01 RX ADMIN — APIXABAN 5 MG: 5 TABLET, FILM COATED ORAL at 08:22

## 2021-01-01 RX ADMIN — CEFEPIME HYDROCHLORIDE 2000 MG: 2 INJECTION, POWDER, FOR SOLUTION INTRAVENOUS at 08:34

## 2021-01-01 RX ADMIN — ONDANSETRON 4 MG: 4 TABLET, ORALLY DISINTEGRATING ORAL at 05:27

## 2021-01-01 RX ADMIN — ACETAMINOPHEN 650 MG: 325 TABLET ORAL at 21:53

## 2021-01-01 RX ADMIN — INSULIN GLARGINE 30 UNITS: 100 INJECTION, SOLUTION SUBCUTANEOUS at 20:44

## 2021-01-01 RX ADMIN — ASPIRIN 81 MG: 81 TABLET, CHEWABLE ORAL at 11:16

## 2021-01-01 RX ADMIN — TRAZODONE HYDROCHLORIDE 50 MG: 50 TABLET ORAL at 21:21

## 2021-01-01 RX ADMIN — CEFEPIME HYDROCHLORIDE 2000 MG: 2 INJECTION, POWDER, FOR SOLUTION INTRAVENOUS at 21:11

## 2021-01-01 RX ADMIN — AMIODARONE HYDROCHLORIDE 100 MG: 200 TABLET ORAL at 08:34

## 2021-01-01 RX ADMIN — APIXABAN 5 MG: 5 TABLET, FILM COATED ORAL at 08:35

## 2021-01-01 RX ADMIN — INSULIN LISPRO 9 UNITS: 100 INJECTION, SOLUTION INTRAVENOUS; SUBCUTANEOUS at 10:13

## 2021-01-01 RX ADMIN — AMIODARONE HYDROCHLORIDE 200 MG: 200 TABLET ORAL at 09:31

## 2021-01-01 RX ADMIN — SODIUM CHLORIDE, PRESERVATIVE FREE 10 ML: 5 INJECTION INTRAVENOUS at 09:23

## 2021-01-01 RX ADMIN — HYDROXYZINE PAMOATE 25 MG: 25 CAPSULE ORAL at 02:20

## 2021-01-01 RX ADMIN — BUSPIRONE HYDROCHLORIDE 10 MG: 10 TABLET ORAL at 13:47

## 2021-01-01 RX ADMIN — DILTIAZEM HYDROCHLORIDE 30 MG: 30 TABLET, FILM COATED ORAL at 09:31

## 2021-01-01 RX ADMIN — METOPROLOL SUCCINATE 100 MG: 100 TABLET, EXTENDED RELEASE ORAL at 09:48

## 2021-01-01 RX ADMIN — INSULIN LISPRO 1 UNITS: 100 INJECTION, SOLUTION INTRAVENOUS; SUBCUTANEOUS at 22:02

## 2021-01-01 RX ADMIN — LOSARTAN POTASSIUM 100 MG: 100 TABLET, FILM COATED ORAL at 07:54

## 2021-01-01 RX ADMIN — ALOGLIPTIN 6.25 MG: 6.25 TABLET, FILM COATED ORAL at 08:52

## 2021-01-01 RX ADMIN — METOPROLOL SUCCINATE 100 MG: 100 TABLET, EXTENDED RELEASE ORAL at 09:32

## 2021-01-01 RX ADMIN — SODIUM CHLORIDE, PRESERVATIVE FREE 10 ML: 5 INJECTION INTRAVENOUS at 19:58

## 2021-01-01 RX ADMIN — SODIUM CHLORIDE, PRESERVATIVE FREE 10 ML: 5 INJECTION INTRAVENOUS at 20:14

## 2021-01-01 RX ADMIN — SODIUM CHLORIDE 500 ML: 9 INJECTION, SOLUTION INTRAVENOUS at 01:10

## 2021-01-01 RX ADMIN — BUSPIRONE HYDROCHLORIDE 10 MG: 10 TABLET ORAL at 22:38

## 2021-01-01 RX ADMIN — SODIUM CHLORIDE, PRESERVATIVE FREE 10 ML: 5 INJECTION INTRAVENOUS at 09:33

## 2021-01-01 RX ADMIN — LORAZEPAM 0.5 MG: 0.5 TABLET ORAL at 14:46

## 2021-01-01 RX ADMIN — DILTIAZEM HYDROCHLORIDE 10 MG: 5 INJECTION INTRAVENOUS at 05:50

## 2021-01-01 RX ADMIN — INSULIN LISPRO 1 UNITS: 100 INJECTION, SOLUTION INTRAVENOUS; SUBCUTANEOUS at 20:54

## 2021-01-01 RX ADMIN — INSULIN LISPRO 3 UNITS: 100 INJECTION, SOLUTION INTRAVENOUS; SUBCUTANEOUS at 17:38

## 2021-01-01 RX ADMIN — LEVOTHYROXINE SODIUM 50 MCG: 0.05 TABLET ORAL at 06:54

## 2021-01-01 RX ADMIN — AMIODARONE HYDROCHLORIDE 400 MG: 200 TABLET ORAL at 20:20

## 2021-01-01 RX ADMIN — INSULIN GLARGINE 30 UNITS: 100 INJECTION, SOLUTION SUBCUTANEOUS at 21:56

## 2021-01-01 RX ADMIN — INSULIN LISPRO 10 UNITS: 100 INJECTION, SOLUTION INTRAVENOUS; SUBCUTANEOUS at 07:41

## 2021-01-01 RX ADMIN — FUROSEMIDE 40 MG: 10 INJECTION, SOLUTION INTRAMUSCULAR; INTRAVENOUS at 18:15

## 2021-01-01 RX ADMIN — DULOXETINE HYDROCHLORIDE 60 MG: 30 CAPSULE, DELAYED RELEASE ORAL at 09:52

## 2021-01-01 RX ADMIN — BUSPIRONE HYDROCHLORIDE 10 MG: 10 TABLET ORAL at 08:56

## 2021-01-01 RX ADMIN — DIPHENHYDRAMINE HYDROCHLORIDE 50 MG: 25 TABLET ORAL at 15:43

## 2021-01-01 RX ADMIN — METOPROLOL SUCCINATE 100 MG: 100 TABLET, EXTENDED RELEASE ORAL at 20:59

## 2021-01-01 RX ADMIN — TRAZODONE HYDROCHLORIDE 50 MG: 50 TABLET ORAL at 21:57

## 2021-01-01 RX ADMIN — SODIUM CHLORIDE, PRESERVATIVE FREE 10 ML: 5 INJECTION INTRAVENOUS at 09:53

## 2021-01-01 RX ADMIN — ATORVASTATIN CALCIUM 40 MG: 40 TABLET, FILM COATED ORAL at 20:59

## 2021-01-01 RX ADMIN — TRAZODONE HYDROCHLORIDE 50 MG: 50 TABLET ORAL at 23:46

## 2021-01-01 RX ADMIN — LEVOTHYROXINE SODIUM 50 MCG: 0.05 TABLET ORAL at 05:29

## 2021-01-01 RX ADMIN — EPINEPHRINE 1 MG: 0.1 INJECTION, SOLUTION ENDOTRACHEAL; INTRACARDIAC; INTRAVENOUS at 15:38

## 2021-01-01 RX ADMIN — INSULIN LISPRO 10 UNITS: 100 INJECTION, SOLUTION INTRAVENOUS; SUBCUTANEOUS at 17:21

## 2021-01-01 RX ADMIN — APIXABAN 5 MG: 5 TABLET, FILM COATED ORAL at 22:37

## 2021-01-01 RX ADMIN — POTASSIUM PHOSPHATE, MONOBASIC AND POTASSIUM PHOSPHATE, DIBASIC 10 MMOL: 224; 236 INJECTION, SOLUTION, CONCENTRATE INTRAVENOUS at 17:00

## 2021-01-01 RX ADMIN — ASPIRIN 81 MG: 81 TABLET ORAL at 08:41

## 2021-01-01 RX ADMIN — DILTIAZEM HYDROCHLORIDE 30 MG: 30 TABLET, FILM COATED ORAL at 16:23

## 2021-01-01 RX ADMIN — AMIODARONE HYDROCHLORIDE 0.5 MG/MIN: 50 INJECTION, SOLUTION INTRAVENOUS at 00:48

## 2021-01-01 RX ADMIN — SODIUM CHLORIDE, PRESERVATIVE FREE 10 ML: 5 INJECTION INTRAVENOUS at 21:39

## 2021-01-01 RX ADMIN — PANTOPRAZOLE SODIUM 40 MG: 40 TABLET, DELAYED RELEASE ORAL at 05:29

## 2021-01-01 RX ADMIN — SODIUM CHLORIDE, PRESERVATIVE FREE 10 ML: 5 INJECTION INTRAVENOUS at 20:51

## 2021-01-01 RX ADMIN — Medication 1 TABLET: at 09:05

## 2021-01-01 RX ADMIN — INSULIN LISPRO 6 UNITS: 100 INJECTION, SOLUTION INTRAVENOUS; SUBCUTANEOUS at 18:07

## 2021-01-01 RX ADMIN — ALOGLIPTIN 6.25 MG: 6.25 TABLET, FILM COATED ORAL at 10:07

## 2021-01-01 RX ADMIN — BUSPIRONE HYDROCHLORIDE 10 MG: 10 TABLET ORAL at 20:40

## 2021-01-01 RX ADMIN — INSULIN LISPRO 2 UNITS: 100 INJECTION, SOLUTION INTRAVENOUS; SUBCUTANEOUS at 17:41

## 2021-01-01 RX ADMIN — SODIUM CHLORIDE, PRESERVATIVE FREE 10 ML: 5 INJECTION INTRAVENOUS at 21:57

## 2021-01-01 RX ADMIN — AMIODARONE HYDROCHLORIDE 100 MG: 200 TABLET ORAL at 21:57

## 2021-01-01 RX ADMIN — METOPROLOL TARTRATE 50 MG: 50 TABLET, FILM COATED ORAL at 20:40

## 2021-01-01 RX ADMIN — SODIUM CHLORIDE, PRESERVATIVE FREE 10 ML: 5 INJECTION INTRAVENOUS at 08:35

## 2021-01-01 RX ADMIN — BUSPIRONE HYDROCHLORIDE 10 MG: 10 TABLET ORAL at 09:03

## 2021-01-01 RX ADMIN — APIXABAN 5 MG: 5 TABLET, FILM COATED ORAL at 08:21

## 2021-01-01 RX ADMIN — POTASSIUM BICARBONATE 40 MEQ: 782 TABLET, EFFERVESCENT ORAL at 09:32

## 2021-01-01 RX ADMIN — POTASSIUM CHLORIDE 10 MEQ: 750 TABLET, FILM COATED, EXTENDED RELEASE ORAL at 23:19

## 2021-01-01 RX ADMIN — CEFEPIME HYDROCHLORIDE 2000 MG: 2 INJECTION, POWDER, FOR SOLUTION INTRAVENOUS at 09:30

## 2021-01-01 RX ADMIN — DEXTROSE MONOHYDRATE 10 MG/HR: 50 INJECTION, SOLUTION INTRAVENOUS at 19:07

## 2021-01-01 RX ADMIN — CEFEPIME 2000 MG: 2 INJECTION, POWDER, FOR SOLUTION INTRAVENOUS at 20:31

## 2021-01-01 RX ADMIN — Medication 1 TABLET: at 08:57

## 2021-01-01 RX ADMIN — DEXTROSE MONOHYDRATE 10 MG/HR: 50 INJECTION, SOLUTION INTRAVENOUS at 15:40

## 2021-01-01 RX ADMIN — HYDROXYZINE PAMOATE 25 MG: 25 CAPSULE ORAL at 20:44

## 2021-01-01 RX ADMIN — BUSPIRONE HYDROCHLORIDE 10 MG: 10 TABLET ORAL at 21:35

## 2021-01-01 RX ADMIN — ALOGLIPTIN 6.25 MG: 6.25 TABLET, FILM COATED ORAL at 08:38

## 2021-01-01 RX ADMIN — ASPIRIN 81 MG: 81 TABLET, CHEWABLE ORAL at 08:52

## 2021-01-01 RX ADMIN — LEVOTHYROXINE SODIUM 50 MCG: 0.05 TABLET ORAL at 07:03

## 2021-01-01 RX ADMIN — METOPROLOL TARTRATE 25 MG: 25 TABLET, FILM COATED ORAL at 08:54

## 2021-01-01 RX ADMIN — TRAZODONE HYDROCHLORIDE 50 MG: 50 TABLET ORAL at 21:55

## 2021-01-01 RX ADMIN — ACETAMINOPHEN 650 MG: 325 TABLET ORAL at 11:26

## 2021-01-01 RX ADMIN — LOSARTAN POTASSIUM 100 MG: 100 TABLET, FILM COATED ORAL at 09:02

## 2021-01-01 RX ADMIN — FUROSEMIDE 40 MG: 10 INJECTION, SOLUTION INTRAMUSCULAR; INTRAVENOUS at 18:49

## 2021-01-01 RX ADMIN — SPIRONOLACTONE 25 MG: 25 TABLET ORAL at 18:40

## 2021-01-01 RX ADMIN — INSULIN LISPRO 9 UNITS: 100 INJECTION, SOLUTION INTRAVENOUS; SUBCUTANEOUS at 12:22

## 2021-01-01 RX ADMIN — SACUBITRIL AND VALSARTAN 1 TABLET: 24; 26 TABLET, FILM COATED ORAL at 21:28

## 2021-01-01 RX ADMIN — TRAZODONE HYDROCHLORIDE 50 MG: 50 TABLET ORAL at 22:00

## 2021-01-01 RX ADMIN — INSULIN LISPRO 3 UNITS: 100 INJECTION, SOLUTION INTRAVENOUS; SUBCUTANEOUS at 09:56

## 2021-01-01 RX ADMIN — DEXTROSE MONOHYDRATE 10 MG/HR: 50 INJECTION, SOLUTION INTRAVENOUS at 22:30

## 2021-01-01 RX ADMIN — DOPAMINE HYDROCHLORIDE IN DEXTROSE 5 MCG/KG/MIN: 1.6 INJECTION, SOLUTION INTRAVENOUS at 13:38

## 2021-01-01 RX ADMIN — LISINOPRIL 5 MG: 5 TABLET ORAL at 08:24

## 2021-01-01 RX ADMIN — INSULIN LISPRO 2 UNITS: 100 INJECTION, SOLUTION INTRAVENOUS; SUBCUTANEOUS at 20:20

## 2021-01-01 RX ADMIN — SODIUM CHLORIDE, PRESERVATIVE FREE 10 ML: 5 INJECTION INTRAVENOUS at 20:24

## 2021-01-01 RX ADMIN — METOPROLOL TARTRATE 50 MG: 50 TABLET, FILM COATED ORAL at 09:03

## 2021-01-01 RX ADMIN — APIXABAN 5 MG: 5 TABLET, FILM COATED ORAL at 22:10

## 2021-01-01 RX ADMIN — INSULIN LISPRO 3 UNITS: 100 INJECTION, SOLUTION INTRAVENOUS; SUBCUTANEOUS at 09:32

## 2021-01-01 RX ADMIN — BUSPIRONE HYDROCHLORIDE 10 MG: 10 TABLET ORAL at 20:19

## 2021-01-01 RX ADMIN — AMIODARONE HYDROCHLORIDE 200 MG: 200 TABLET ORAL at 08:22

## 2021-01-01 RX ADMIN — PANTOPRAZOLE SODIUM 40 MG: 40 TABLET, DELAYED RELEASE ORAL at 11:15

## 2021-01-01 RX ADMIN — HYDROXYZINE PAMOATE 25 MG: 25 CAPSULE ORAL at 22:37

## 2021-01-01 RX ADMIN — SODIUM CHLORIDE, PRESERVATIVE FREE 10 ML: 5 INJECTION INTRAVENOUS at 08:25

## 2021-01-01 RX ADMIN — APIXABAN 5 MG: 5 TABLET, FILM COATED ORAL at 08:24

## 2021-01-01 RX ADMIN — INSULIN LISPRO 6 UNITS: 100 INJECTION, SOLUTION INTRAVENOUS; SUBCUTANEOUS at 13:20

## 2021-01-01 RX ADMIN — LEVOTHYROXINE SODIUM 50 MCG: 0.05 TABLET ORAL at 09:56

## 2021-01-01 RX ADMIN — DEXTROSE MONOHYDRATE 10 MG/HR: 50 INJECTION, SOLUTION INTRAVENOUS at 18:24

## 2021-01-01 RX ADMIN — INSULIN GLARGINE 20 UNITS: 100 INJECTION, SOLUTION SUBCUTANEOUS at 20:59

## 2021-01-01 RX ADMIN — PANTOPRAZOLE SODIUM 40 MG: 40 TABLET, DELAYED RELEASE ORAL at 06:18

## 2021-01-01 RX ADMIN — DILTIAZEM HYDROCHLORIDE 30 MG: 30 TABLET, FILM COATED ORAL at 17:31

## 2021-01-01 RX ADMIN — ONDANSETRON 4 MG: 4 TABLET, ORALLY DISINTEGRATING ORAL at 13:44

## 2021-01-01 RX ADMIN — ACETAMINOPHEN 650 MG: 325 TABLET ORAL at 10:36

## 2021-01-01 RX ADMIN — INSULIN LISPRO 1 UNITS: 100 INJECTION, SOLUTION INTRAVENOUS; SUBCUTANEOUS at 07:40

## 2021-01-01 RX ADMIN — FENTANYL CITRATE 25 MCG/HR: 50 INJECTION, SOLUTION INTRAMUSCULAR; INTRAVENOUS at 10:22

## 2021-01-01 RX ADMIN — BUSPIRONE HYDROCHLORIDE 10 MG: 10 TABLET ORAL at 13:17

## 2021-01-01 RX ADMIN — APIXABAN 5 MG: 5 TABLET, FILM COATED ORAL at 10:04

## 2021-01-01 RX ADMIN — SODIUM CHLORIDE, PRESERVATIVE FREE 10 ML: 5 INJECTION INTRAVENOUS at 09:34

## 2021-01-01 RX ADMIN — METOPROLOL TARTRATE 50 MG: 50 TABLET, FILM COATED ORAL at 20:29

## 2021-01-01 RX ADMIN — POTASSIUM BICARBONATE 40 MEQ: 782 TABLET, EFFERVESCENT ORAL at 21:11

## 2021-01-01 RX ADMIN — DEXTROSE MONOHYDRATE 10 MG/HR: 50 INJECTION, SOLUTION INTRAVENOUS at 06:43

## 2021-01-01 RX ADMIN — LOSARTAN POTASSIUM 100 MG: 100 TABLET, FILM COATED ORAL at 17:49

## 2021-01-01 RX ADMIN — PANTOPRAZOLE SODIUM 40 MG: 40 TABLET, DELAYED RELEASE ORAL at 07:28

## 2021-01-01 RX ADMIN — SODIUM CHLORIDE, PRESERVATIVE FREE 10 ML: 5 INJECTION INTRAVENOUS at 21:11

## 2021-01-01 RX ADMIN — HYDROXYZINE PAMOATE 25 MG: 25 CAPSULE ORAL at 20:29

## 2021-01-01 RX ADMIN — INSULIN GLARGINE 20 UNITS: 100 INJECTION, SOLUTION SUBCUTANEOUS at 22:23

## 2021-01-01 RX ADMIN — INSULIN LISPRO 10 UNITS: 100 INJECTION, SOLUTION INTRAVENOUS; SUBCUTANEOUS at 12:25

## 2021-01-01 RX ADMIN — AMLODIPINE BESYLATE 2.5 MG: 2.5 TABLET ORAL at 08:47

## 2021-01-01 RX ADMIN — SODIUM CHLORIDE, PRESERVATIVE FREE 10 ML: 5 INJECTION INTRAVENOUS at 10:12

## 2021-01-01 RX ADMIN — BUSPIRONE HYDROCHLORIDE 10 MG: 10 TABLET ORAL at 10:37

## 2021-01-01 RX ADMIN — VANCOMYCIN 1250 MG: 1.75 INJECTION, SOLUTION INTRAVENOUS at 20:43

## 2021-01-01 RX ADMIN — SODIUM CHLORIDE, PRESERVATIVE FREE 10 ML: 5 INJECTION INTRAVENOUS at 09:38

## 2021-01-01 RX ADMIN — AMIODARONE HYDROCHLORIDE 200 MG: 200 TABLET ORAL at 21:11

## 2021-01-01 RX ADMIN — AMLODIPINE BESYLATE 2.5 MG: 2.5 TABLET ORAL at 08:55

## 2021-01-01 RX ADMIN — INSULIN LISPRO 3 UNITS: 100 INJECTION, SOLUTION INTRAVENOUS; SUBCUTANEOUS at 08:53

## 2021-01-01 RX ADMIN — METOPROLOL SUCCINATE 100 MG: 100 TABLET, EXTENDED RELEASE ORAL at 09:03

## 2021-01-01 RX ADMIN — APIXABAN 5 MG: 5 TABLET, FILM COATED ORAL at 20:41

## 2021-01-01 RX ADMIN — Medication 1 TABLET: at 09:41

## 2021-01-01 RX ADMIN — FUROSEMIDE 40 MG: 10 INJECTION, SOLUTION INTRAMUSCULAR; INTRAVENOUS at 10:56

## 2021-01-01 RX ADMIN — NITROGLYCERIN 0.4 MG: 0.4 TABLET SUBLINGUAL at 18:11

## 2021-01-01 RX ADMIN — APIXABAN 5 MG: 5 TABLET, FILM COATED ORAL at 09:02

## 2021-01-01 RX ADMIN — INSULIN LISPRO 8 UNITS: 100 INJECTION, SOLUTION INTRAVENOUS; SUBCUTANEOUS at 16:40

## 2021-01-01 RX ADMIN — LEVOTHYROXINE SODIUM 50 MCG: 0.05 TABLET ORAL at 05:44

## 2021-01-01 RX ADMIN — APIXABAN 5 MG: 5 TABLET, FILM COATED ORAL at 21:55

## 2021-01-01 RX ADMIN — MIDAZOLAM HYDROCHLORIDE 1 MG: 1 INJECTION, SOLUTION INTRAMUSCULAR; INTRAVENOUS at 10:36

## 2021-01-01 RX ADMIN — METOPROLOL TARTRATE 50 MG: 50 TABLET, FILM COATED ORAL at 21:12

## 2021-01-01 RX ADMIN — Medication 1 TABLET: at 10:13

## 2021-01-01 RX ADMIN — POTASSIUM CHLORIDE 20 MEQ: 29.8 INJECTION, SOLUTION INTRAVENOUS at 08:34

## 2021-01-01 RX ADMIN — ATORVASTATIN CALCIUM 40 MG: 40 TABLET, FILM COATED ORAL at 21:57

## 2021-01-01 RX ADMIN — ASPIRIN 81 MG: 81 TABLET, CHEWABLE ORAL at 10:08

## 2021-01-01 RX ADMIN — APIXABAN 5 MG: 5 TABLET, FILM COATED ORAL at 10:08

## 2021-01-01 RX ADMIN — SACUBITRIL AND VALSARTAN 1 TABLET: 24; 26 TABLET, FILM COATED ORAL at 21:57

## 2021-01-01 RX ADMIN — ASPIRIN 81 MG: 81 TABLET, CHEWABLE ORAL at 08:23

## 2021-01-01 RX ADMIN — LOSARTAN POTASSIUM 100 MG: 100 TABLET, FILM COATED ORAL at 18:45

## 2021-01-01 RX ADMIN — ASPIRIN 81 MG: 81 TABLET, CHEWABLE ORAL at 09:18

## 2021-01-01 RX ADMIN — SODIUM CHLORIDE, PRESERVATIVE FREE 10 ML: 5 INJECTION INTRAVENOUS at 09:35

## 2021-01-01 RX ADMIN — BUSPIRONE HYDROCHLORIDE 10 MG: 10 TABLET ORAL at 23:02

## 2021-01-01 RX ADMIN — FUROSEMIDE 80 MG: 10 INJECTION, SOLUTION INTRAVENOUS at 15:16

## 2021-01-01 RX ADMIN — TRAZODONE HYDROCHLORIDE 50 MG: 50 TABLET ORAL at 21:28

## 2021-01-01 RX ADMIN — POTASSIUM CHLORIDE 10 MEQ: 750 TABLET, FILM COATED, EXTENDED RELEASE ORAL at 21:57

## 2021-01-01 RX ADMIN — METFORMIN HYDROCHLORIDE 500 MG: 500 TABLET ORAL at 20:59

## 2021-01-01 RX ADMIN — SPIRONOLACTONE 25 MG: 25 TABLET ORAL at 09:38

## 2021-01-01 RX ADMIN — INSULIN LISPRO 5 UNITS: 100 INJECTION, SOLUTION INTRAVENOUS; SUBCUTANEOUS at 22:41

## 2021-01-01 RX ADMIN — ALOGLIPTIN 6.25 MG: 6.25 TABLET, FILM COATED ORAL at 10:37

## 2021-01-01 RX ADMIN — INSULIN LISPRO 6 UNITS: 100 INJECTION, SOLUTION INTRAVENOUS; SUBCUTANEOUS at 09:26

## 2021-01-01 RX ADMIN — APIXABAN 5 MG: 5 TABLET, FILM COATED ORAL at 20:30

## 2021-01-01 RX ADMIN — LOSARTAN POTASSIUM 100 MG: 100 TABLET, FILM COATED ORAL at 18:42

## 2021-01-01 RX ADMIN — PANTOPRAZOLE SODIUM 40 MG: 40 TABLET, DELAYED RELEASE ORAL at 09:47

## 2021-01-01 RX ADMIN — DILTIAZEM HYDROCHLORIDE 30 MG: 30 TABLET, FILM COATED ORAL at 05:48

## 2021-01-01 RX ADMIN — SODIUM CHLORIDE, PRESERVATIVE FREE 10 ML: 5 INJECTION INTRAVENOUS at 22:01

## 2021-01-01 RX ADMIN — LISINOPRIL 2.5 MG: 2.5 TABLET ORAL at 08:20

## 2021-01-01 RX ADMIN — LORAZEPAM 0.5 MG: 0.5 TABLET ORAL at 02:31

## 2021-01-01 RX ADMIN — INSULIN LISPRO 6 UNITS: 100 INJECTION, SOLUTION INTRAVENOUS; SUBCUTANEOUS at 11:20

## 2021-01-01 RX ADMIN — SPIRONOLACTONE 25 MG: 25 TABLET ORAL at 09:31

## 2021-01-01 RX ADMIN — BUSPIRONE HYDROCHLORIDE 10 MG: 10 TABLET ORAL at 10:04

## 2021-01-01 RX ADMIN — METOPROLOL TARTRATE 50 MG: 50 TABLET, FILM COATED ORAL at 20:33

## 2021-01-01 RX ADMIN — SODIUM CHLORIDE, PRESERVATIVE FREE 10 ML: 5 INJECTION INTRAVENOUS at 08:54

## 2021-01-01 RX ADMIN — FUROSEMIDE 40 MG: 10 INJECTION, SOLUTION INTRAMUSCULAR; INTRAVENOUS at 11:15

## 2021-01-01 RX ADMIN — VANCOMYCIN HYDROCHLORIDE 1000 MG: 1 INJECTION, POWDER, LYOPHILIZED, FOR SOLUTION INTRAVENOUS at 06:04

## 2021-01-01 RX ADMIN — LOSARTAN POTASSIUM 100 MG: 100 TABLET, FILM COATED ORAL at 09:31

## 2021-01-01 RX ADMIN — POTASSIUM CHLORIDE 10 MEQ: 750 TABLET, FILM COATED, EXTENDED RELEASE ORAL at 09:35

## 2021-01-01 RX ADMIN — DULOXETINE HYDROCHLORIDE 60 MG: 30 CAPSULE, DELAYED RELEASE ORAL at 10:13

## 2021-01-01 RX ADMIN — MAGNESIUM SULFATE HEPTAHYDRATE 1000 MG: 1 INJECTION, SOLUTION INTRAVENOUS at 17:24

## 2021-01-01 RX ADMIN — TRAZODONE HYDROCHLORIDE 50 MG: 50 TABLET ORAL at 20:34

## 2021-01-01 RX ADMIN — APIXABAN 5 MG: 5 TABLET, FILM COATED ORAL at 08:58

## 2021-01-01 RX ADMIN — BUSPIRONE HYDROCHLORIDE 10 MG: 10 TABLET ORAL at 09:23

## 2021-01-01 RX ADMIN — INSULIN GLARGINE 30 UNITS: 100 INJECTION, SOLUTION SUBCUTANEOUS at 22:21

## 2021-01-01 RX ADMIN — PANTOPRAZOLE SODIUM 40 MG: 40 TABLET, DELAYED RELEASE ORAL at 05:36

## 2021-01-01 RX ADMIN — APIXABAN 5 MG: 5 TABLET, FILM COATED ORAL at 20:53

## 2021-01-01 RX ADMIN — INSULIN LISPRO 3 UNITS: 100 INJECTION, SOLUTION INTRAVENOUS; SUBCUTANEOUS at 18:42

## 2021-01-01 RX ADMIN — FUROSEMIDE 40 MG: 10 INJECTION, SOLUTION INTRAMUSCULAR; INTRAVENOUS at 15:58

## 2021-01-01 RX ADMIN — AMIODARONE HYDROCHLORIDE 200 MG: 200 TABLET ORAL at 20:46

## 2021-01-01 RX ADMIN — ACETAMINOPHEN 650 MG: 325 TABLET ORAL at 13:15

## 2021-01-01 RX ADMIN — ACETAMINOPHEN 650 MG: 325 TABLET ORAL at 17:57

## 2021-01-01 RX ADMIN — LEVOTHYROXINE SODIUM 50 MCG: 0.05 TABLET ORAL at 06:34

## 2021-01-01 RX ADMIN — ATORVASTATIN CALCIUM 40 MG: 40 TABLET, FILM COATED ORAL at 20:45

## 2021-01-01 RX ADMIN — APIXABAN 5 MG: 5 TABLET, FILM COATED ORAL at 09:32

## 2021-01-01 RX ADMIN — POTASSIUM BICARBONATE 40 MEQ: 782 TABLET, EFFERVESCENT ORAL at 08:34

## 2021-01-01 RX ADMIN — LORAZEPAM 0.5 MG: 0.5 TABLET ORAL at 12:02

## 2021-01-01 RX ADMIN — PANTOPRAZOLE SODIUM 40 MG: 40 TABLET, DELAYED RELEASE ORAL at 06:26

## 2021-01-01 RX ADMIN — LOSARTAN POTASSIUM 100 MG: 100 TABLET, FILM COATED ORAL at 09:03

## 2021-01-01 RX ADMIN — DEXTROSE MONOHYDRATE 10 MG/HR: 50 INJECTION, SOLUTION INTRAVENOUS at 21:54

## 2021-01-01 RX ADMIN — SODIUM CHLORIDE, PRESERVATIVE FREE 10 ML: 5 INJECTION INTRAVENOUS at 08:53

## 2021-01-01 RX ADMIN — ASPIRIN 81 MG: 81 TABLET, CHEWABLE ORAL at 09:03

## 2021-01-01 RX ADMIN — PANTOPRAZOLE SODIUM 40 MG: 40 TABLET, DELAYED RELEASE ORAL at 06:15

## 2021-01-01 RX ADMIN — BUSPIRONE HYDROCHLORIDE 10 MG: 10 TABLET ORAL at 07:49

## 2021-01-01 RX ADMIN — INSULIN LISPRO 9 UNITS: 100 INJECTION, SOLUTION INTRAVENOUS; SUBCUTANEOUS at 13:39

## 2021-01-01 RX ADMIN — CEFEPIME HYDROCHLORIDE 2000 MG: 2 INJECTION, POWDER, FOR SOLUTION INTRAVENOUS at 08:21

## 2021-01-01 RX ADMIN — ASPIRIN 81 MG: 81 TABLET, CHEWABLE ORAL at 09:02

## 2021-01-01 RX ADMIN — DILTIAZEM HYDROCHLORIDE 30 MG: 30 TABLET, FILM COATED ORAL at 05:31

## 2021-01-01 RX ADMIN — LEVOTHYROXINE SODIUM 50 MCG: 0.05 TABLET ORAL at 05:12

## 2021-01-01 RX ADMIN — CHLORTHALIDONE 12.5 MG: 25 TABLET ORAL at 16:48

## 2021-01-01 RX ADMIN — AMIODARONE HYDROCHLORIDE 400 MG: 200 TABLET ORAL at 07:49

## 2021-01-01 RX ADMIN — DULOXETINE HYDROCHLORIDE 60 MG: 30 CAPSULE, DELAYED RELEASE ORAL at 09:02

## 2021-01-01 RX ADMIN — LORAZEPAM 0.5 MG: 0.5 TABLET ORAL at 11:26

## 2021-01-01 RX ADMIN — LOSARTAN POTASSIUM 100 MG: 100 TABLET, FILM COATED ORAL at 10:37

## 2021-01-01 RX ADMIN — DIPHENHYDRAMINE HYDROCHLORIDE 50 MG: 25 TABLET ORAL at 20:46

## 2021-01-01 RX ADMIN — SODIUM CHLORIDE, PRESERVATIVE FREE 10 ML: 5 INJECTION INTRAVENOUS at 08:23

## 2021-01-01 RX ADMIN — INSULIN LISPRO 3 UNITS: 100 INJECTION, SOLUTION INTRAVENOUS; SUBCUTANEOUS at 08:52

## 2021-01-01 RX ADMIN — APIXABAN 5 MG: 5 TABLET, FILM COATED ORAL at 08:07

## 2021-01-01 RX ADMIN — SODIUM CHLORIDE, PRESERVATIVE FREE 10 ML: 5 INJECTION INTRAVENOUS at 22:00

## 2021-01-01 RX ADMIN — SPIRONOLACTONE 25 MG: 25 TABLET ORAL at 09:41

## 2021-01-01 RX ADMIN — INSULIN LISPRO 2 UNITS: 100 INJECTION, SOLUTION INTRAVENOUS; SUBCUTANEOUS at 09:52

## 2021-01-01 RX ADMIN — APIXABAN 5 MG: 5 TABLET, FILM COATED ORAL at 08:47

## 2021-01-01 RX ADMIN — CEFEPIME HYDROCHLORIDE 2000 MG: 2 INJECTION, POWDER, FOR SOLUTION INTRAVENOUS at 20:51

## 2021-01-01 RX ADMIN — DILTIAZEM HYDROCHLORIDE 30 MG: 30 TABLET, FILM COATED ORAL at 07:02

## 2021-01-01 RX ADMIN — SPIRONOLACTONE 25 MG: 25 TABLET ORAL at 12:39

## 2021-01-01 RX ADMIN — LEVOTHYROXINE SODIUM 50 MCG: 0.05 TABLET ORAL at 08:36

## 2021-01-01 RX ADMIN — FUROSEMIDE 20 MG: 10 INJECTION, SOLUTION INTRAVENOUS at 08:21

## 2021-01-01 RX ADMIN — CEFEPIME HYDROCHLORIDE 2000 MG: 2 INJECTION, POWDER, FOR SOLUTION INTRAVENOUS at 09:27

## 2021-01-01 RX ADMIN — BUSPIRONE HYDROCHLORIDE 10 MG: 10 TABLET ORAL at 08:52

## 2021-01-01 RX ADMIN — INSULIN LISPRO 3 UNITS: 100 INJECTION, SOLUTION INTRAVENOUS; SUBCUTANEOUS at 08:47

## 2021-01-01 RX ADMIN — SODIUM CHLORIDE: 9 INJECTION, SOLUTION INTRAVENOUS at 02:04

## 2021-01-01 RX ADMIN — APIXABAN 5 MG: 5 TABLET, FILM COATED ORAL at 20:29

## 2021-01-01 RX ADMIN — FUROSEMIDE 20 MG: 10 INJECTION, SOLUTION INTRAVENOUS at 17:51

## 2021-01-01 RX ADMIN — ASPIRIN 81 MG: 81 TABLET, COATED ORAL at 09:03

## 2021-01-01 RX ADMIN — METFORMIN HYDROCHLORIDE 500 MG: 500 TABLET ORAL at 16:17

## 2021-01-01 RX ADMIN — SODIUM CHLORIDE, PRESERVATIVE FREE 10 ML: 5 INJECTION INTRAVENOUS at 20:43

## 2021-01-01 RX ADMIN — PANTOPRAZOLE SODIUM 40 MG: 40 TABLET, DELAYED RELEASE ORAL at 05:26

## 2021-01-01 RX ADMIN — BUSPIRONE HYDROCHLORIDE 10 MG: 10 TABLET ORAL at 21:28

## 2021-01-01 RX ADMIN — INSULIN LISPRO 4 UNITS: 100 INJECTION, SOLUTION INTRAVENOUS; SUBCUTANEOUS at 12:57

## 2021-01-01 RX ADMIN — METOPROLOL TARTRATE 50 MG: 50 TABLET, FILM COATED ORAL at 09:02

## 2021-01-01 RX ADMIN — POTASSIUM CHLORIDE 20 MEQ: 29.8 INJECTION, SOLUTION INTRAVENOUS at 08:48

## 2021-01-01 RX ADMIN — SODIUM CHLORIDE, PRESERVATIVE FREE 10 ML: 5 INJECTION INTRAVENOUS at 20:53

## 2021-01-01 RX ADMIN — AMLODIPINE BESYLATE 2.5 MG: 2.5 TABLET ORAL at 09:52

## 2021-01-01 RX ADMIN — LEVOTHYROXINE SODIUM 50 MCG: 0.05 TABLET ORAL at 07:28

## 2021-01-01 RX ADMIN — FUROSEMIDE 20 MG: 10 INJECTION, SOLUTION INTRAVENOUS at 09:31

## 2021-01-01 RX ADMIN — METOPROLOL TARTRATE 50 MG: 50 TABLET, FILM COATED ORAL at 10:12

## 2021-01-01 RX ADMIN — ACETAMINOPHEN 650 MG: 325 TABLET ORAL at 00:22

## 2021-01-01 RX ADMIN — ALOGLIPTIN 6.25 MG: 6.25 TABLET, FILM COATED ORAL at 09:26

## 2021-01-01 RX ADMIN — BUSPIRONE HYDROCHLORIDE 10 MG: 10 TABLET ORAL at 09:02

## 2021-01-01 RX ADMIN — APIXABAN 5 MG: 5 TABLET, FILM COATED ORAL at 09:05

## 2021-01-01 RX ADMIN — ASPIRIN 81 MG: 81 TABLET, COATED ORAL at 08:25

## 2021-01-01 RX ADMIN — DEXTROSE MONOHYDRATE 7.5 MG/HR: 50 INJECTION, SOLUTION INTRAVENOUS at 03:45

## 2021-01-01 RX ADMIN — APIXABAN 5 MG: 5 TABLET, FILM COATED ORAL at 20:34

## 2021-01-01 RX ADMIN — INSULIN LISPRO 6 UNITS: 100 INJECTION, SOLUTION INTRAVENOUS; SUBCUTANEOUS at 13:27

## 2021-01-01 RX ADMIN — HYDROXYZINE PAMOATE 25 MG: 25 CAPSULE ORAL at 21:04

## 2021-01-01 RX ADMIN — APIXABAN 5 MG: 5 TABLET, FILM COATED ORAL at 09:41

## 2021-01-01 RX ADMIN — DEXTROSE MONOHYDRATE 10 MG/HR: 50 INJECTION, SOLUTION INTRAVENOUS at 11:08

## 2021-01-01 RX ADMIN — ALOGLIPTIN 6.25 MG: 6.25 TABLET, FILM COATED ORAL at 09:43

## 2021-01-01 RX ADMIN — BUSPIRONE HYDROCHLORIDE 10 MG: 10 TABLET ORAL at 08:47

## 2021-01-01 RX ADMIN — DILTIAZEM HYDROCHLORIDE 30 MG: 30 TABLET, FILM COATED ORAL at 13:30

## 2021-01-01 RX ADMIN — CEFTRIAXONE SODIUM 1000 MG: 1 INJECTION, POWDER, FOR SOLUTION INTRAMUSCULAR; INTRAVENOUS at 20:48

## 2021-01-01 RX ADMIN — METFORMIN HYDROCHLORIDE 500 MG: 500 TABLET ORAL at 09:05

## 2021-01-01 RX ADMIN — INSULIN LISPRO 3 UNITS: 100 INJECTION, SOLUTION INTRAVENOUS; SUBCUTANEOUS at 10:05

## 2021-01-01 RX ADMIN — AMIODARONE HYDROCHLORIDE 200 MG: 200 TABLET ORAL at 20:51

## 2021-01-01 RX ADMIN — DILTIAZEM HYDROCHLORIDE 10 MG: 5 INJECTION INTRAVENOUS at 20:57

## 2021-01-01 RX ADMIN — EPINEPHRINE 1 MG: 0.1 INJECTION, SOLUTION ENDOTRACHEAL; INTRACARDIAC; INTRAVENOUS at 15:31

## 2021-01-01 RX ADMIN — METOPROLOL TARTRATE 50 MG: 50 TABLET, FILM COATED ORAL at 10:37

## 2021-01-01 RX ADMIN — BUSPIRONE HYDROCHLORIDE 10 MG: 10 TABLET ORAL at 09:19

## 2021-01-01 RX ADMIN — MAGNESIUM SULFATE HEPTAHYDRATE 2000 MG: 40 INJECTION, SOLUTION INTRAVENOUS at 17:55

## 2021-01-01 RX ADMIN — APIXABAN 5 MG: 5 TABLET, FILM COATED ORAL at 08:57

## 2021-01-01 RX ADMIN — DILTIAZEM HYDROCHLORIDE 30 MG: 30 TABLET, FILM COATED ORAL at 13:37

## 2021-01-01 RX ADMIN — FUROSEMIDE 20 MG: 10 INJECTION, SOLUTION INTRAVENOUS at 18:42

## 2021-01-01 RX ADMIN — SODIUM CHLORIDE, PRESERVATIVE FREE 10 ML: 5 INJECTION INTRAVENOUS at 21:42

## 2021-01-01 RX ADMIN — METOPROLOL TARTRATE 50 MG: 50 TABLET, FILM COATED ORAL at 21:11

## 2021-01-01 RX ADMIN — ASPIRIN 81 MG: 81 TABLET, CHEWABLE ORAL at 07:53

## 2021-01-01 RX ADMIN — FUROSEMIDE 40 MG: 10 INJECTION, SOLUTION INTRAMUSCULAR; INTRAVENOUS at 08:06

## 2021-01-01 RX ADMIN — AMIODARONE HYDROCHLORIDE 100 MG: 200 TABLET ORAL at 08:25

## 2021-01-01 RX ADMIN — FUROSEMIDE 40 MG: 10 INJECTION, SOLUTION INTRAMUSCULAR; INTRAVENOUS at 13:07

## 2021-01-01 RX ADMIN — POTASSIUM BICARBONATE 40 MEQ: 782 TABLET, EFFERVESCENT ORAL at 20:29

## 2021-01-01 RX ADMIN — BUSPIRONE HYDROCHLORIDE 10 MG: 10 TABLET ORAL at 16:34

## 2021-01-01 RX ADMIN — ALOGLIPTIN 6.25 MG: 6.25 TABLET, FILM COATED ORAL at 09:28

## 2021-01-01 RX ADMIN — PANTOPRAZOLE SODIUM 40 MG: 40 TABLET, DELAYED RELEASE ORAL at 10:04

## 2021-01-01 RX ADMIN — BUSPIRONE HYDROCHLORIDE 10 MG: 10 TABLET ORAL at 15:43

## 2021-01-01 RX ADMIN — LEVOTHYROXINE SODIUM 50 MCG: 0.05 TABLET ORAL at 06:00

## 2021-01-01 RX ADMIN — DILTIAZEM HYDROCHLORIDE 30 MG: 30 TABLET, FILM COATED ORAL at 14:22

## 2021-01-01 RX ADMIN — TRAZODONE HYDROCHLORIDE 50 MG: 50 TABLET ORAL at 01:05

## 2021-01-01 RX ADMIN — ATORVASTATIN CALCIUM 40 MG: 40 TABLET, FILM COATED ORAL at 23:18

## 2021-01-01 RX ADMIN — Medication 1 TABLET: at 10:37

## 2021-01-01 RX ADMIN — ACETAMINOPHEN 650 MG: 325 TABLET ORAL at 18:25

## 2021-01-01 RX ADMIN — DILTIAZEM HYDROCHLORIDE 10 MG: 5 INJECTION INTRAVENOUS at 14:23

## 2021-01-01 RX ADMIN — ONDANSETRON 4 MG: 4 TABLET, ORALLY DISINTEGRATING ORAL at 16:38

## 2021-01-01 RX ADMIN — INSULIN LISPRO 1 UNITS: 100 INJECTION, SOLUTION INTRAVENOUS; SUBCUTANEOUS at 09:07

## 2021-01-01 RX ADMIN — Medication 1 TABLET: at 09:32

## 2021-01-01 RX ADMIN — PANTOPRAZOLE SODIUM 40 MG: 40 TABLET, DELAYED RELEASE ORAL at 05:35

## 2021-01-01 RX ADMIN — MAGNESIUM SULFATE HEPTAHYDRATE 4000 MG: 80 INJECTION, SOLUTION INTRAVENOUS at 06:52

## 2021-01-01 RX ADMIN — BUSPIRONE HYDROCHLORIDE 10 MG: 10 TABLET ORAL at 14:06

## 2021-01-01 RX ADMIN — ALOGLIPTIN 6.25 MG: 6.25 TABLET, FILM COATED ORAL at 08:57

## 2021-01-01 RX ADMIN — DILTIAZEM HYDROCHLORIDE 30 MG: 30 TABLET, FILM COATED ORAL at 18:27

## 2021-01-01 RX ADMIN — ALOGLIPTIN 6.25 MG: 6.25 TABLET, FILM COATED ORAL at 09:03

## 2021-01-01 RX ADMIN — FUROSEMIDE 40 MG: 40 TABLET ORAL at 20:59

## 2021-01-01 RX ADMIN — ALOGLIPTIN 6.25 MG: 6.25 TABLET, FILM COATED ORAL at 08:21

## 2021-01-01 RX ADMIN — Medication 1 TABLET: at 09:02

## 2021-01-01 RX ADMIN — APIXABAN 5 MG: 5 TABLET, FILM COATED ORAL at 21:52

## 2021-01-01 RX ADMIN — PANTOPRAZOLE SODIUM 40 MG: 40 TABLET, DELAYED RELEASE ORAL at 06:34

## 2021-01-01 RX ADMIN — DEXTROSE MONOHYDRATE 10 MG/HR: 50 INJECTION, SOLUTION INTRAVENOUS at 23:50

## 2021-01-01 RX ADMIN — AMIODARONE HYDROCHLORIDE 200 MG: 200 TABLET ORAL at 20:14

## 2021-01-01 RX ADMIN — AMIODARONE HYDROCHLORIDE 100 MG: 200 TABLET ORAL at 21:21

## 2021-01-01 RX ADMIN — VANCOMYCIN HYDROCHLORIDE 1000 MG: 1 INJECTION, POWDER, LYOPHILIZED, FOR SOLUTION INTRAVENOUS at 06:00

## 2021-01-01 RX ADMIN — SODIUM CHLORIDE, PRESERVATIVE FREE 10 ML: 5 INJECTION INTRAVENOUS at 22:38

## 2021-01-01 RX ADMIN — LEVOTHYROXINE SODIUM 50 MCG: 0.05 TABLET ORAL at 05:36

## 2021-01-01 RX ADMIN — INSULIN LISPRO 3 UNITS: 100 INJECTION, SOLUTION INTRAVENOUS; SUBCUTANEOUS at 12:27

## 2021-01-01 RX ADMIN — Medication 1 TABLET: at 08:07

## 2021-01-01 RX ADMIN — METOPROLOL TARTRATE 25 MG: 25 TABLET, FILM COATED ORAL at 20:59

## 2021-01-01 RX ADMIN — BUSPIRONE HYDROCHLORIDE 10 MG: 10 TABLET ORAL at 14:07

## 2021-01-01 RX ADMIN — TORSEMIDE 20 MG: 20 TABLET ORAL at 08:25

## 2021-01-01 RX ADMIN — PANTOPRAZOLE SODIUM 40 MG: 40 TABLET, DELAYED RELEASE ORAL at 06:08

## 2021-01-01 RX ADMIN — ALOGLIPTIN 6.25 MG: 6.25 TABLET, FILM COATED ORAL at 08:54

## 2021-01-01 RX ADMIN — SODIUM CHLORIDE, PRESERVATIVE FREE 10 ML: 5 INJECTION INTRAVENOUS at 21:56

## 2021-01-01 RX ADMIN — DILTIAZEM HYDROCHLORIDE 30 MG: 30 TABLET, FILM COATED ORAL at 18:20

## 2021-01-01 RX ADMIN — TRAZODONE HYDROCHLORIDE 50 MG: 50 TABLET ORAL at 22:37

## 2021-01-01 RX ADMIN — APIXABAN 5 MG: 5 TABLET, FILM COATED ORAL at 09:19

## 2021-01-01 RX ADMIN — SODIUM CHLORIDE, PRESERVATIVE FREE 10 ML: 5 INJECTION INTRAVENOUS at 22:05

## 2021-01-01 RX ADMIN — ALOGLIPTIN 6.25 MG: 12.5 TABLET, FILM COATED ORAL at 08:25

## 2021-01-01 RX ADMIN — INSULIN LISPRO 12 UNITS: 100 INJECTION, SOLUTION INTRAVENOUS; SUBCUTANEOUS at 13:07

## 2021-01-01 RX ADMIN — POTASSIUM CHLORIDE 10 MEQ: 7.46 INJECTION, SOLUTION INTRAVENOUS at 12:28

## 2021-01-01 RX ADMIN — VANCOMYCIN 1000 MG: 1 INJECTION, SOLUTION INTRAVENOUS at 05:37

## 2021-01-01 RX ADMIN — DEXTROSE MONOHYDRATE 25 G: 25 INJECTION, SOLUTION INTRAVENOUS at 15:36

## 2021-01-01 RX ADMIN — LEVOTHYROXINE SODIUM 50 MCG: 0.05 TABLET ORAL at 05:31

## 2021-01-01 RX ADMIN — CEFEPIME HYDROCHLORIDE 2000 MG: 2 INJECTION, POWDER, FOR SOLUTION INTRAVENOUS at 09:12

## 2021-01-01 RX ADMIN — FUROSEMIDE 40 MG: 10 INJECTION, SOLUTION INTRAMUSCULAR; INTRAVENOUS at 14:23

## 2021-01-01 RX ADMIN — POTASSIUM CHLORIDE 10 MEQ: 750 TABLET, FILM COATED, EXTENDED RELEASE ORAL at 21:50

## 2021-01-01 RX ADMIN — AMIODARONE HYDROCHLORIDE 200 MG: 200 TABLET ORAL at 22:29

## 2021-01-01 RX ADMIN — FUROSEMIDE 40 MG: 40 TABLET ORAL at 09:47

## 2021-01-01 RX ADMIN — INSULIN LISPRO 6 UNITS: 100 INJECTION, SOLUTION INTRAVENOUS; SUBCUTANEOUS at 10:30

## 2021-01-01 RX ADMIN — ASPIRIN 81 MG: 81 TABLET, CHEWABLE ORAL at 10:13

## 2021-01-01 RX ADMIN — AMIODARONE HYDROCHLORIDE 400 MG: 200 TABLET ORAL at 10:29

## 2021-01-01 RX ADMIN — MAGNESIUM OXIDE 400 MG (241.3 MG MAGNESIUM) TABLET 400 MG: TABLET at 08:41

## 2021-01-01 RX ADMIN — POTASSIUM CHLORIDE 10 MEQ: 750 TABLET, FILM COATED, EXTENDED RELEASE ORAL at 10:29

## 2021-01-01 RX ADMIN — INSULIN LISPRO 3 UNITS: 100 INJECTION, SOLUTION INTRAVENOUS; SUBCUTANEOUS at 17:41

## 2021-01-01 RX ADMIN — APIXABAN 5 MG: 5 TABLET, FILM COATED ORAL at 20:46

## 2021-01-01 RX ADMIN — LORAZEPAM 0.5 MG: 0.5 TABLET ORAL at 12:18

## 2021-01-01 RX ADMIN — SODIUM CHLORIDE, PRESERVATIVE FREE 10 ML: 5 INJECTION INTRAVENOUS at 20:35

## 2021-01-01 RX ADMIN — PANTOPRAZOLE SODIUM 40 MG: 40 TABLET, DELAYED RELEASE ORAL at 06:17

## 2021-01-01 RX ADMIN — INSULIN GLARGINE 15 UNITS: 100 INJECTION, SOLUTION SUBCUTANEOUS at 21:15

## 2021-01-01 RX ADMIN — APIXABAN 5 MG: 5 TABLET, FILM COATED ORAL at 21:39

## 2021-01-01 RX ADMIN — INSULIN LISPRO 3 UNITS: 100 INJECTION, SOLUTION INTRAVENOUS; SUBCUTANEOUS at 10:10

## 2021-01-01 RX ADMIN — CLONIDINE HYDROCHLORIDE 0.3 MG: 0.2 TABLET ORAL at 21:56

## 2021-01-01 RX ADMIN — FUROSEMIDE 20 MG: 10 INJECTION, SOLUTION INTRAVENOUS at 09:41

## 2021-01-01 RX ADMIN — PANTOPRAZOLE SODIUM 40 MG: 40 TABLET, DELAYED RELEASE ORAL at 20:59

## 2021-01-01 RX ADMIN — PANTOPRAZOLE SODIUM 40 MG: 40 TABLET, DELAYED RELEASE ORAL at 09:43

## 2021-01-01 RX ADMIN — SODIUM CHLORIDE, PRESERVATIVE FREE 10 ML: 5 INJECTION INTRAVENOUS at 22:29

## 2021-01-01 RX ADMIN — APIXABAN 5 MG: 5 TABLET, FILM COATED ORAL at 09:47

## 2021-01-01 RX ADMIN — ASPIRIN 81 MG: 81 TABLET, COATED ORAL at 09:26

## 2021-01-01 RX ADMIN — INSULIN LISPRO 3 UNITS: 100 INJECTION, SOLUTION INTRAVENOUS; SUBCUTANEOUS at 12:31

## 2021-01-01 RX ADMIN — ACETAMINOPHEN 650 MG: 325 TABLET ORAL at 21:52

## 2021-01-01 RX ADMIN — CALCIUM GLUCONATE 1000 MG: 20 INJECTION, SOLUTION INTRAVENOUS at 10:28

## 2021-01-01 RX ADMIN — DIGOXIN 250 MCG: 0.25 INJECTION INTRAMUSCULAR; INTRAVENOUS at 12:39

## 2021-01-01 RX ADMIN — APIXABAN 5 MG: 5 TABLET, FILM COATED ORAL at 09:23

## 2021-01-01 RX ADMIN — POLYETHYLENE GLYCOL (3350) 17 G: 17 POWDER, FOR SOLUTION ORAL at 14:57

## 2021-01-01 RX ADMIN — PANTOPRAZOLE SODIUM 40 MG: 40 TABLET, DELAYED RELEASE ORAL at 05:12

## 2021-01-01 RX ADMIN — INSULIN LISPRO 4 UNITS: 100 INJECTION, SOLUTION INTRAVENOUS; SUBCUTANEOUS at 16:05

## 2021-01-01 RX ADMIN — INSULIN GLARGINE 10 UNITS: 100 INJECTION, SOLUTION SUBCUTANEOUS at 20:59

## 2021-01-01 RX ADMIN — ACETAMINOPHEN 650 MG: 325 TABLET ORAL at 12:21

## 2021-01-01 RX ADMIN — SODIUM CHLORIDE, PRESERVATIVE FREE 10 ML: 5 INJECTION INTRAVENOUS at 21:05

## 2021-01-01 RX ADMIN — BUSPIRONE HYDROCHLORIDE 10 MG: 10 TABLET ORAL at 15:26

## 2021-01-01 ASSESSMENT — PAIN DESCRIPTION - DESCRIPTORS
DESCRIPTORS: ACHING
DESCRIPTORS: ACHING;DISCOMFORT
DESCRIPTORS: ACHING
DESCRIPTORS: ACHING
DESCRIPTORS: ACHING;DISCOMFORT
DESCRIPTORS: HEADACHE
DESCRIPTORS: HEADACHE

## 2021-01-01 ASSESSMENT — ENCOUNTER SYMPTOMS
BACK PAIN: 0
COUGH: 0
VOMITING: 0
SHORTNESS OF BREATH: 1
SHORTNESS OF BREATH: 1
CONSTIPATION: 0
NAUSEA: 0
ABDOMINAL PAIN: 0
SHORTNESS OF BREATH: 0
DIARRHEA: 0
SHORTNESS OF BREATH: 0
EYE PAIN: 0
BLOOD IN STOOL: 0
SHORTNESS OF BREATH: 1
COLOR CHANGE: 0
CHEST TIGHTNESS: 0
PHOTOPHOBIA: 0
WHEEZING: 0

## 2021-01-01 ASSESSMENT — PAIN SCALES - GENERAL
PAINLEVEL_OUTOF10: 0
PAINLEVEL_OUTOF10: 3
PAINLEVEL_OUTOF10: 0
PAINLEVEL_OUTOF10: 0
PAINLEVEL_OUTOF10: 8
PAINLEVEL_OUTOF10: 8
PAINLEVEL_OUTOF10: 0
PAINLEVEL_OUTOF10: 10
PAINLEVEL_OUTOF10: 0
PAINLEVEL_OUTOF10: 4
PAINLEVEL_OUTOF10: 0
PAINLEVEL_OUTOF10: 8
PAINLEVEL_OUTOF10: 0
PAINLEVEL_OUTOF10: 10
PAINLEVEL_OUTOF10: 8
PAINLEVEL_OUTOF10: 0
PAINLEVEL_OUTOF10: 8
PAINLEVEL_OUTOF10: 0
PAINLEVEL_OUTOF10: 3
PAINLEVEL_OUTOF10: 0
PAINLEVEL_OUTOF10: 0
PAINLEVEL_OUTOF10: 7
PAINLEVEL_OUTOF10: 0
PAINLEVEL_OUTOF10: 4
PAINLEVEL_OUTOF10: 0
PAINLEVEL_OUTOF10: 3
PAINLEVEL_OUTOF10: 0
PAINLEVEL_OUTOF10: 3
PAINLEVEL_OUTOF10: 0
PAINLEVEL_OUTOF10: 0
PAINLEVEL_OUTOF10: 2
PAINLEVEL_OUTOF10: 0
PAINLEVEL_OUTOF10: 9
PAINLEVEL_OUTOF10: 3
PAINLEVEL_OUTOF10: 3
PAINLEVEL_OUTOF10: 0
PAINLEVEL_OUTOF10: 7
PAINLEVEL_OUTOF10: 0
PAINLEVEL_OUTOF10: 8
PAINLEVEL_OUTOF10: 0
PAINLEVEL_OUTOF10: 8
PAINLEVEL_OUTOF10: 8
PAINLEVEL_OUTOF10: 0
PAINLEVEL_OUTOF10: 0
PAINLEVEL_OUTOF10: 3
PAINLEVEL_OUTOF10: 0
PAINLEVEL_OUTOF10: 2
PAINLEVEL_OUTOF10: 0
PAINLEVEL_OUTOF10: 2
PAINLEVEL_OUTOF10: 0
PAINLEVEL_OUTOF10: 3
PAINLEVEL_OUTOF10: 5
PAINLEVEL_OUTOF10: 0
PAINLEVEL_OUTOF10: 9
PAINLEVEL_OUTOF10: 0
PAINLEVEL_OUTOF10: 3
PAINLEVEL_OUTOF10: 0
PAINLEVEL_OUTOF10: 4

## 2021-01-01 ASSESSMENT — PAIN DESCRIPTION - PAIN TYPE
TYPE: CHRONIC PAIN
TYPE: ACUTE PAIN
TYPE: ACUTE PAIN
TYPE: CHRONIC PAIN
TYPE: ACUTE PAIN
TYPE: ACUTE PAIN
TYPE: ACUTE PAIN;CHRONIC PAIN
TYPE: ACUTE PAIN
TYPE: ACUTE PAIN

## 2021-01-01 ASSESSMENT — PAIN - FUNCTIONAL ASSESSMENT
PAIN_FUNCTIONAL_ASSESSMENT: ACTIVITIES ARE NOT PREVENTED
PAIN_FUNCTIONAL_ASSESSMENT: PREVENTS OR INTERFERES WITH MANY ACTIVE NOT PASSIVE ACTIVITIES
PAIN_FUNCTIONAL_ASSESSMENT: ACTIVITIES ARE NOT PREVENTED

## 2021-01-01 ASSESSMENT — PAIN DESCRIPTION - LOCATION
LOCATION: HEAD
LOCATION: BACK
LOCATION: BACK
LOCATION: GENERALIZED
LOCATION: HEAD
LOCATION: BACK
LOCATION: LEG
LOCATION: GENERALIZED

## 2021-01-01 ASSESSMENT — PAIN DESCRIPTION - PROGRESSION
CLINICAL_PROGRESSION: NOT CHANGED

## 2021-01-01 ASSESSMENT — PAIN DESCRIPTION - FREQUENCY
FREQUENCY: INTERMITTENT
FREQUENCY: INTERMITTENT
FREQUENCY: CONTINUOUS

## 2021-01-01 ASSESSMENT — PAIN SCALES - WONG BAKER

## 2021-01-01 ASSESSMENT — PAIN DESCRIPTION - ONSET
ONSET: ON-GOING

## 2021-01-01 ASSESSMENT — PAIN DESCRIPTION - ORIENTATION
ORIENTATION: LOWER
ORIENTATION: LEFT;RIGHT
ORIENTATION: LOWER

## 2021-01-01 ASSESSMENT — PULMONARY FUNCTION TESTS: PIF_VALUE: 32

## 2021-01-14 NOTE — CONSULTS
CARDIOLOGY CONSULT NOTE   Reason for consultation:  nsvt    Referring physician:  Sd Matthews MD     Primary care physician: Josefa Patel PA-C      Dear   Thanks for the consult. History of present illness:Danae is a 60 y. o.year old who  presents with fall and admitted to hospital, cardiology consult is called for NSVT  Chief Complaint   Patient presents with    Fall     rt sided pain, didnt hit head     Blood pressure, cholesterol, blood glucose and weight are well controlled. Past medical history:    has a past medical history of Abnormal PFTs (pulmonary function tests), Atrial fibrillation ([address] CAD (coronary artery disease), CHF (congestive heart failure) ([address] Diabetes mellitus ([address] Dizziness - light-headed, Family history of coronary artery disease, H/O 24 hour EKG monitoring, H/O cardiovascular stress test, H/O cardiovascular stress test, H/O echocardiogram, H/O echocardiogram, Heart imaging, Heartburn, History of cardiac cath, History of cardiac monitoring, History of cardioversion, History of MI (myocardial infarction), History of nuclear MUGA, History of nuclear Muga stress test, History of PTCA, Hx of cardiovascular stress test, Hx of transesophageal echocardiography (LUKE) for monitoring, Hyperlipidemia, Hypertension, Insomnia, SOB (shortness of breath), Tachycardia, and TIA (transient ischemic attack). Past surgical history:   has a past surgical history that includes  section; Tubal ligation; Cholecystectomy; Diagnostic Cardiac Cath Lab Procedure (2006, 2009); Percutaneous Transluminal Coronary Angio (10/06/2010); other surgical history (2017); and ablation of dysrhythmic focus. Social History:   reports that she quit smoking about 13 years ago. Her smoking use included cigarettes. She has a 60.00 pack-year smoking history. She has never used smokeless tobacco. She reports that she does not drink alcohol or use drugs.   Family history:   no family history of CAD, STROKE of DM    Allergies   Allergen Reactions    Amiodarone Other (See Comments)     dizziness    Iv Dye [Iodides] Nausea And Vomiting    Vicodin [Hydrocodone-Acetaminophen] Nausea And Vomiting       No current facility-administered medications for this encounter. No current facility-administered medications for this encounter. Current Outpatient Medications   Medication Sig Dispense Refill    lisinopril (PRINIVIL;ZESTRIL) 5 MG tablet Take 1 tablet by mouth daily 30 tablet 3    metoprolol succinate (TOPROL XL) 100 MG extended release tablet Take 1 tablet by mouth daily TAKE ONE TABLET BY MOUTH DAILY 30 tablet 3    furosemide (LASIX) 40 MG tablet Take 1 tablet by mouth 2 times daily 60 tablet 3    cloNIDine (CATAPRES) 0.3 MG tablet Take 0.3 mg by mouth nightly      ADMELOG SOLOSTAR 100 UNIT/ML pen Inject into the skin See Admin Instructions      atorvastatin (LIPITOR) 40 MG tablet Take 40 mg by mouth nightly      metFORMIN (GLUCOPHAGE) 500 MG tablet Take 500 mg by mouth 2 times daily      pantoprazole (PROTONIX) 40 MG tablet Take 40 mg by mouth daily      levothyroxine (SYNTHROID) 50 MCG tablet Take 50 mcg by mouth Daily      apixaban (ELIQUIS) 5 MG TABS tablet Take 1 tablet by mouth 2 times daily 60 tablet 2    amiodarone (PACERONE) 100 MG tablet Take 1 tablet by mouth 2 times daily First take two tabs by mouth two times daily for 7 days, then take one tab by mouth twice daily 67 tablet 0    nitroGLYCERIN (NITROSTAT) 0.4 MG SL tablet up to max of 3 total doses.  If no relief after 1 dose, call 911. 25 tablet 3    albuterol sulfate HFA (VENTOLIN HFA) 108 (90 Base) MCG/ACT inhaler Inhale 2 puffs into the lungs 4 times daily as needed for Wheezing 1 Inhaler 5    aspirin 81 MG EC tablet Take 81 mg by mouth daily       Review of Systems:   · Constitutional: No Fever or Weight Loss   · Eyes: No Decreased Vision  · ENT: No Headaches, Hearing Loss or Vertigo  · Cardiovascular: No chest pain, dyspnea on exertion, palpitations or loss of consciousness  · Respiratory: No cough or wheezing    · Gastrointestinal: No abdominal pain, appetite loss, blood in stools, constipation, diarrhea or heartburn  · Genitourinary: No dysuria, trouble voiding, or hematuria  · Musculoskeletal:  No gait disturbance, weakness or joint complaints  · Integumentary: No rash or pruritis  · Neurological: No TIA or stroke symptoms  · Psychiatric: No anxiety or depression  · Endocrine: No malaise, fatigue or temperature intolerance  · Hematologic/Lymphatic: No bleeding problems, blood clots or swollen lymph nodes  · Allergic/Immunologic: No nasal congestion or hives  All systems negative except as marked. ·   ·      Physical Examination:    Vitals:    12/13/20 1300   BP: (!) 186/86   Pulse: 82   Resp: 20   Temp: 98.6 °F (37 °C)   SpO2: 97%      Wt Readings from Last 3 Encounters:   12/30/20 190 lb 3.2 oz (86.3 kg)   12/12/20 206 lb 4.8 oz (93.6 kg)   12/01/20 185 lb 4.8 oz (84.1 kg)     Body mass index is 38.98 kg/m². General Appearance:  No distress, conversant    Constitutional:  Well developed, Well nourished, No acute distress, Non-toxic appearance. HENT:  Normocephalic, Atraumatic, Bilateral external ears normal, Oropharynx moist, No oral exudates, Nose normal. Neck- Normal range of motion, No tenderness, Supple, No stridor,no apical-carotid delay, no carotid bruit  Eyes:  PERRL, EOMI, Conjunctiva normal, No discharge. Respiratory:  Normal breath sounds, No respiratory distress, No wheezing, No chest tenderness. ,no use of accessory muscles, diaphragm movement is normal  Cardiovascular: (PMI) apex non displaced,no lifts no thrills, no s3,no s4, Normal heart rate, Normal rhythm, No murmurs, No rubs, No gallops.  Carotid arteries pulse and amplitude are normal no bruit, no abdominal bruit noted ( normal abdominal aorta ausculation), femoral arteries pulse and amplitude are normal no bruit, pedal pulses are test, History of PTCA, Hx of cardiovascular stress test, Hx of transesophageal echocardiography (LUKE) for monitoring, Hyperlipidemia, Hypertension, Insomnia, SOB (shortness of breath), Tachycardia, and TIA (transient ischemic attack). Recommendations:    1. NSVT\" STABLE, CONTINUE AMIODARONE, OUT PATIENT EP FOLLOW UP  2. AFIB\" H/O ABLATION, CONTINEU AMIODARONE AND ELIQUIS  3. CAD: H/O PCI, STABLE  4. DM: STABLE  5. Health maintenance: exerise and diet  All labs, medications and tests reviewed, continue all other medications of all above medical condition listed as is.          Samira Orozco MD, 1/14/2021 7:41 AM

## 2021-06-11 PROBLEM — I48.0 PAROXYSMAL ATRIAL FIBRILLATION (HCC): Status: ACTIVE | Noted: 2021-01-01

## 2021-06-11 NOTE — ED PROVIDER NOTES
eMERGENCY dEPARTMENT eNCOUnter      PCP: Garrett Asencio PA-C    CHIEF COMPLAINT    Chief Complaint   Patient presents with    Altered Mental Status     Daughter reports patient has been confused x 3 days and recent urinalysis had wbc's and rbc's so possible UTI       HPI    Emma Morales is a 60 y.o. female who presents with A. fib with RVR, altered mental status. Patient has been feeling short of breath recently, both with ambulation and when she would lie flat. She states that she went to her primary care provider today who found that her heart rate was fast, she was in A. fib with RVR and instructed her to go to the emergency department. States that she has had some intermittent confusion the last few days. Her daughter states that she called her and told her she was pregnant, and she told her there is no way that is possible, and some other things that seemed off with her in the last few days. She does admit to some chest congestion, denies cough, fever, chills, chest pain. She denies increase in leg swelling. She does states she has history of CHF, takes 40 mg of Lasix daily. She also does have history of A. fib, she not sure what medication she takes for that. She denies abdominal pain, nausea, vomiting, diarrhea. She does admit to dysuria and increasing frequency. REVIEW OF SYSTEMS    Constitutional:  Denies fever, chills.    HENT:  Denies sore throat or ear pain   Cardiovascular:  Denies chest pain, palpitations   Respiratory:  Denies cough, + shortness of breath    GI:  Denies abdominal pain, nausea, vomiting, or diarrhea  :  + urinary symptoms, denies flank pain  Musculoskeletal:  Denies back pain, extremity pain  Skin:  Denies rash, color change  Neurologic:  Denies headache, focal weakness or sensory changes   Lymphatic:  Denies swollen glands, edema    All other review of systems are negative  See HPI and nursing notes for additional information     PAST MEDICAL AND SURGICAL HISTORY    Past Medical History:   Diagnosis Date    Abnormal PFTs (pulmonary function tests) 4/14 4/14    Atrial fibrillation (HCC)     CAD (coronary artery disease)     CHF (congestive heart failure) (HCC)     Diabetes mellitus (HCC)     Dizziness - light-headed     Family history of coronary artery disease     H/O 24 hour EKG monitoring 02/09/2012,12/10/2010    02/09/2012 predominant rhythm is sinus with short runs of SVT no episode of atrial fib. pt has left bundle branch morphology, max heart is 132, min is 48 with an average of 74 ,infrequent PVCs are seen    H/O cardiovascular stress test 4/1/2013, 1/31/2012 4/1/2013-    H/O cardiovascular stress test 4/16/14,02/09/2012 4/16/14 EF 52% No ischemia, normal study. 02/09/2012 EF 58% tehnique-technetium LVEF is normal globally    H/O echocardiogram 03/27/13 4/14 EF 50-55% mild MR, TR 3/13EF 35-45% decreasedr L ventrical systolic function.  H/O echocardiogram 4/14 4/14-EF50-55% Mild MR/TR. 10/11 EF > 55% LVS function is normal, calcified mitral apparatus, impaired LV relaxation, no PE    Heart imaging 02/09/2012 02/09/2012 MUGA rest EF 58%LVEF is normal globally    Heartburn     History of cardiac cath 05/08/2009, 06/14/2006 05/08/2009,moderate stenosis  small diagonal    History of cardiac monitoring 2/9/15    Event - sinus rhythm    History of cardioversion 3/14/14    History of MI (myocardial infarction)     History of nuclear MUGA 12/03/2020    EF 41%    History of nuclear Muga stress test 07/14/2020    EF 41%    History of PTCA 10/06/2010    10/06/2010 stent 3.0x24 taxus stent left main 90% stenosis mid seg reduced to 0% second area of [id number] to 40% not flow limiting EF 40%    Hx of cardiovascular stress test 8/21/2015    lexiscan-normal,EF70%    Hx of transesophageal echocardiography ([facility]KE) for monitoring 08/07/2017    Noted to have MARY GRACE Blood Clot.     Hyperlipidemia     Hypertension     Insomnia     SOB (shortness of breath)     Tachycardia 2012    report in [facility] consult    TIA (transient ischemic attack)      Past Surgical History:   Procedure Laterality Date    ABLATION OF DYSRHYTHMIC FOCUS       SECTION      CHOLECYSTECTOMY      DIAGNOSTIC CARDIAC CATH LAB PROCEDURE  2006, 2009 moderate stenosis small diagonal    OTHER SURGICAL HISTORY  2017    afib ablation LUKE    PTCA  10/06/2010    10/06/2010 stent 3.0x24 taxus stent 90% stenosis mid segment reduced to 0% second area of stenosis 30-40% not flow limiting EF 40%    TUBAL LIGATION         CURRENT MEDICATIONS    Current Outpatient Rx   Medication Sig Dispense Refill    amiodarone (PACERONE) 100 MG tablet Take 1 tablet by mouth 2 times daily First take two tabs by mouth two times daily for 7 days, then take one tab by mouth twice daily 67 tablet 0    nitroGLYCERIN (NITROSTAT) 0.4 MG SL tablet up to max of 3 total doses.  If no relief after 1 dose, call 911. 25 tablet 3    lisinopril (PRINIVIL;ZESTRIL) 5 MG tablet Take 1 tablet by mouth daily 30 tablet 3    metoprolol succinate (TOPROL XL) 100 MG extended release tablet Take 1 tablet by mouth daily TAKE ONE TABLET BY MOUTH DAILY 30 tablet 3    furosemide (LASIX) 40 MG tablet Take 1 tablet by mouth 2 times daily 60 tablet 3    albuterol sulfate HFA (VENTOLIN HFA) 108 (90 Base) MCG/ACT inhaler Inhale 2 puffs into the lungs 4 times daily as needed for Wheezing 1 Inhaler 5    cloNIDine (CATAPRES) 0.3 MG tablet Take 0.3 mg by mouth nightly      ADMELOG SOLOSTAR 100 UNIT/ML pen Inject into the skin See Admin Instructions      atorvastatin (LIPITOR) 40 MG tablet Take 40 mg by mouth nightly      metFORMIN (GLUCOPHAGE) 500 MG tablet Take 500 mg by mouth 2 times daily      pantoprazole (PROTONIX) 40 MG tablet Take 40 mg by mouth daily      levothyroxine (SYNTHROID) 50 MCG tablet Take 50 mcg by mouth Daily      apixaban (ELIQUIS) 5 MG TABS tablet Take 1 tablet by mouth 2 times daily 60 tablet 2    aspirin 81 MG EC tablet Take 81 mg by mouth daily         ALLERGIES    Allergies   Allergen Reactions    Amiodarone Other (See Comments)     dizziness    Iv Dye [Iodides] Nausea And Vomiting    Vicodin [Hydrocodone-Acetaminophen] Nausea And Vomiting       SOCIAL AND FAMILY HISTORY    Social History     Socioeconomic History    Marital status:      Spouse name: Not on file    Number of children: Not on file    Years of education: Not on file    Highest education level: Not on file   Occupational History    Not on file   Tobacco Use    Smoking status: Former Smoker     Packs/day: 3.00     Years: 20.00     Pack years: 60.00     Types: Cigarettes     Quit date: 2007     Years since quittin.3    Smokeless tobacco: Never Used    Tobacco comment: reviewed 8/12/15   Vaping Use    Vaping Use: Never used   Substance and Sexual Activity    Alcohol use: No     Comment: occas. caffeine 3 mtn. dew a day    Drug use: No    Sexual activity: Not Currently   Other Topics Concern    Not on file   Social History Narrative    Not on file     Social Determinants of Health     Financial Resource Strain:     Difficulty of Paying Living Expenses:    Food Insecurity:     Worried About Running Out of Food in the Last Year:     [address] in the Last Year:    Transportation Needs:     Lack of Transportation (Medical):      Lack of Transportation (Non-Medical):    Physical Activity:     Days of Exercise per Week:     Minutes of Exercise per Session:    Stress:     Feeling of Stress :    Social Connections:     Frequency of Communication with Friends and Family:     Frequency of Social Gatherings with Friends and Family:     Attends [employer] Services:     Active Member of Clubs or Organizations:     Attends Club or Organization Meetings:     Marital Status:    Intimate Partner Violence:     Fear of Current or Ex-Partner:     Emotionally Abused:     Physically Abused:     Sexually Abused:      Family History   Problem Relation Age of Onset    Diabetes Father     Heart Disease Father          PHYSICAL EXAM    VITAL SIGNS: There were no vitals taken for this visit. Constitutional:  Well developed, No acute distress. HENT:  Normocephalic, Atraumatic, PERRL. EOMI. Sclera clear. Conjunctiva normal.   Neck: supple without ridigity  Cardiovascular: Echocardiogram, irregular rhythm  Respiratory:  Nonlabored breathing. Decreased breath sounds in bilateral bases. Abdomen: Soft, Non tender, bowel sounds present. Musculoskeletal: Trace ankle edema, No tenderness  Integument:  Warm, Dry, no rash  Neurologic:  Alert & oriented , No focal deficits noted. Speech normal.  Psychiatric:  Affect normal, Mood normal.       Labs:  Labs Reviewed   CBC WITH AUTO DIFFERENTIAL - Abnormal; Notable for the following components:       Result Value    WBC 12.2 (*)     RBC 3.95 (*)     MCH 32.2 (*)     Segs Relative 81.9 (*)     Lymphocytes % 12.7 (*)     Monocytes % 4.4 (*)     Immature Neutrophil % 0.5 (*)     All other components within normal limits   COMPREHENSIVE METABOLIC PANEL - Abnormal; Notable for the following components:    Chloride 97 (*)     CREATININE 1.5 (*)     Glucose 113 (*)     Alkaline Phosphatase 233 (*)     GFR Non- 35 (*)     GFR  42 (*)     All other components within normal limits   TROPONIN - Abnormal; Notable for the following components:    Troponin T 0.024 (*)     All other components within normal limits   BRAIN NATRIURETIC PEPTIDE - Abnormal; Notable for the following components:    Pro-BNP 35,985 (*)     All other components within normal limits   APTT - Abnormal; Notable for the following components:    aPTT 23.6 (*)     All other components within normal limits   LACTIC ACID, PLASMA   MAGNESIUM   PROTIME-INR   URINALYSIS         EKG    This EKG was interpreted by me.  Rate is 154, rhythm is with RVR we will plan for admission to the hospital for further evaluation and care. Due to the immediate potential for life-threatening deterioration due to a fib with rvr, I spent 30 minutes providing critical care. This time is excluding time spent performing procedures. I spoke with PA for Dr. Taffy Cheadle who is on call for Dr. Ariane Stern who admits patient's of Marcellus CHEN.       Clinical  IMPRESSION    Congestive heart failure, A. fib with RVR          (Please note the MDM and HPI sections of this note were completed with a voice recognition program.  Efforts were made to edit the dictations but occasionally words are mis-transcribed.)      Adilson Larios DO  06/11/21 [id number]

## 2021-06-12 NOTE — CONSULTS
Nephrology Service Consultation    Patient:  José Miguel Heredia  MRN: [id number]  Consulting physician:  Sydni Melendez, *  Reason for Consult: arf on ckd     History Obtained From:  patient, electronic medical record  PCP: Hans Draper PA-C    HISTORY OF PRESENT ILLNESS:   The patient is a 60 y.o. female who presents with weakness and recent fall at home, spouse went to [place] and lives alone. Daughter across the street. Saw pcp yesterday and concern for more weakness and sent to [facility]. She state better this am and noted arf on ckd and known to us from prior and came to fu. Pt with cad, chf, Dm2, hx weakness. Baseline creat 1.4 and asked fu    Past Medical History:        Diagnosis Date    Abnormal PFTs (pulmonary function tests) 4/14 4/14    Atrial fibrillation (HCC)     CAD (coronary artery disease)     CHF (congestive heart failure) (HCC)     Diabetes mellitus (HCC)     Dizziness - light-headed     Family history of coronary artery disease     H/O 24 hour EKG monitoring 02/09/2012,12/10/2010    02/09/2012 predominant rhythm is sinus with short runs of SVT no episode of atrial fib. pt has left bundle branch morphology, max heart is 132, min is 48 with an average of 74 ,infrequent PVCs are seen    H/O cardiovascular stress test 4/1/2013, 1/31/2012 4/1/2013-    H/O cardiovascular stress test 4/16/14,02/09/2012 4/16/14 EF 52% No ischemia, normal study. 02/09/2012 EF 58% tehnique-technetium LVEF is normal globally    H/O echocardiogram 03/27/13 4/14 EF 50-55% mild MR, TR 3/13EF 35-45% decreasedr L ventrical systolic function.     H/O echocardiogram 4/14 4/14-EF50-55% Mild MR/TR. 10/11 EF > 55% LVS function is normal, calcified mitral apparatus, impaired LV relaxation, no PE    Heart imaging 02/09/2012 02/09/2012 MUGA rest EF 58%LVEF is normal globally    Heartburn     History of cardiac cath 05/08/2009, 06/14/2006 05/08/2009,moderate stenosis  small diagonal    History of cardiac monitoring 2/9/15    Event - sinus rhythm    History of cardioversion 3/14/14    History of MI (myocardial infarction)     History of nuclear MUGA 2020    EF 41%    History of nuclear Muga stress test 2020    EF 41%    History of PTCA 10/06/2010    10/06/2010 stent 3.0x24 taxus stent left main 90% stenosis mid seg reduced to 0% second area of [id number] to 40% not flow limiting EF 40%    Hx of cardiovascular stress test 2015    lexiscan-normal,EF70%    Hx of transesophageal echocardiography ([facility]KE) for monitoring 2017    Noted to have MARY GRACE Blood Clot.  Hyperlipidemia     Hypertension     Insomnia     SOB (shortness of breath)     Tachycardia 2012    report in [facility] consult    TIA (transient ischemic attack)        Past Surgical History:        Procedure Laterality Date    ABLATION OF DYSRHYTHMIC FOCUS       SECTION      CHOLECYSTECTOMY      DIAGNOSTIC CARDIAC CATH LAB PROCEDURE  2006, 2009 moderate stenosis small diagonal    OTHER SURGICAL HISTORY  2017    afib ablation LUKE    PTCA  10/06/2010    10/06/2010 stent 3.0x24 taxus stent 90% stenosis mid segment reduced to 0% second area of stenosis 30-40% not flow limiting EF 40%    TUBAL LIGATION         Medications:   Scheduled Meds:   traZODone  50 mg Oral Nightly    sodium chloride flush  5-40 mL Intravenous 2 times per day    pantoprazole  40 mg Oral QAM AC    insulin lispro  0-6 Units Subcutaneous TID WC    insulin lispro  0-3 Units Subcutaneous Nightly    amiodarone  100 mg Oral BID    apixaban  5 mg Oral BID    aspirin  81 mg Oral Daily    atorvastatin  40 mg Oral Nightly    cloNIDine  0.3 mg Oral Nightly    [Held by provider] furosemide  40 mg Oral BID    levothyroxine  50 mcg Oral Daily    metFORMIN  500 mg Oral BID WC    metoprolol succinate  100 mg Oral Daily    pantoprazole  40 mg Oral QAM AC    [Held by provider] lisinopril hours.    Invalid input(s): LDLCALCU  ABGs:   Lab Results   Component Value Date    PO2ART 293.2 09/05/2017    NFF3NAP 36.6 09/05/2017     INR:   Recent Labs     06/11/21  1300 06/12/21  0515   INR 1.07 1.50     Renal Labs  Albumin:    Lab Results   Component Value Date    LABALBU 3.4 06/12/2021     Calcium:    Lab Results   Component Value Date    CALCIUM 8.3 06/12/2021     Phosphorus:    Lab Results   Component Value Date    PHOS 2.9 08/11/2018     U/A:    Lab Results   Component Value Date    NITRU NEGATIVE 06/11/2021    COLORU YELLOW 06/11/2021    WBCUA 4 06/11/2021    RBCUA 1 06/11/2021    MUCUS RARE 06/11/2021    TRICHOMONAS NONE SEEN 06/11/2021    YEAST RARE 09/01/2020    BACTERIA NEGATIVE 06/11/2021    CLARITYU CLEAR 06/11/2021    SPECGRAV 1.007 06/11/2021    UROBILINOGEN NEGATIVE 06/11/2021    BILIRUBINUR NEGATIVE 06/11/2021    BLOODU SMALL 06/11/2021    KETUA NEGATIVE 06/11/2021     ABG:    Lab Results   Component Value Date    KUU1NZG 36.6 09/05/2017    PO2ART 293.2 09/05/2017    LKQ3JHM 21.9 09/05/2017    BEART 2.1 11/01/2011     HgBA1c:    Lab Results   Component Value Date    LABA1C 9.1 06/12/2021     Microalbumen/Creatinine ratio:  No components found for: RUCREAT  TSH:  No results found for: TSH  IRON:    Lab Results   Component Value Date    IRON 72 05/24/2019     Iron Saturation:  No components found for: PERCENTFE  TIBC:    Lab Results   Component Value Date    TIBC 232 05/24/2019     FERRITIN:  No results found for: FERRITIN  RPR:  No results found for: RPR  DAVY:    Lab Results   Component Value Date    DAVY None Detected 05/24/2019    DAVY  05/24/2019     (NOTE)  If suspicion of connective tissue disease is strong and DAVY EIA is   negative, consider testing for DAVY by IFA ([id number]). INTERPRETIVE INFORMATION: Anti-Nuclear Antibodies (DAVY), IgG by   KD  Anti-Nuclear Antibodies (DAVY), IgG by KD: DAVY specimens are   screened using enzyme-linked immunosorbent assay (KD)   methodology.  All KD results reported as Detected are further   tested by indirect fluorescent assay (IFA) using HEp-2 substrate   with an IgG-specific conjugate. The DAVY KD screen is designed   to detect antibodies against dsDNA, histone, SS-A (Ro), SS-B (La),   Carroll, snRNP/Sm, Scl-70, Nelli-1, centromere, and an extract of lysed   HEp-2 cells. DAVY KD assays have been reported to have lower   sensitivities than DAVY IFA for systemic autoimmune rheumatic   diseases (SARD). Negative results do not necessarily rule out SARD. Performed by [facility] , [id number] [facility] [phone]  www. Albin Ma MD, Lab.  Director       24 Hour Urine for Creatinine Clearance:  No components found for: Jose Alejandro Ellis, [id number]  -----------------------------------------------------------------      Assessment and Recommendations     Patient Active Problem List   Diagnosis Code    History of CVA (cerebrovascular accident) without residual deficits Z86.73    Morbid obesity ([facility] E66.01    DM (diabetes mellitus), type 2, uncontrolled ([facility] E11.65    Persistent [facility] fibrillation ([facility] I48.19    CAD (coronary artery disease) I25.10    CHF (congestive heart failure) ([facility]) I50.9    Light headed R44    History of MI (myocardial infarction) I25.2    Diabetes mellitus ([facility] E11.9    Hypertension I10    TIA (transient ischemic attack) G45.9    Hyperlipidemia E78.5    SOB (shortness of breath) R06.02    H/O cardiovascular stress test Z92.89    Family history of coronary artery disease Z82.49    PAF (paroxysmal atrial fibrillation) ([facility]) I48.0    Chest pain R07.9    Atrial thrombus without antecedent myocardial infarction I51.3    S/P ablation of atrial fibrillation Z98.890, Z86.79    Gastroenteritis K52.9    YESIKA (acute kidney injury) ([facility] N17.9    Acute cystitis N30.00    Elevated liver enzymes R74.8    CHF (congestive heart failure), NYHA class I, acute on chronic, combined (HCC) I50.43    Pneumonia J18.9    Multifocal pneumonia J18.9    Atrial fibrillation with RVR (HCC) I48.91    Shoulder pain M25.519    Paroxysmal atrial fibrillation (HCC) I48.0     Imp/plan  1 ckd 3 with arf from atn/pra  2 weakness sp fall with a fib rvr  3 Dm2  4 htn  5 chf ef 30%      Plan  1 renal slight increase from baseline as treat chf and diuresis.  Needed in above setting and maintain low dose diuretic and ace and monitor  2 head ct negative and correct hr  3 ssi  4 bp stable  5 cardiac improved with diuresis  Aware risk with diuresis and dw pt and aware    Electronically signed by Dominga Ruggiero MD on 6/12/2021 at 11:31 AM

## 2021-06-12 NOTE — PLAN OF CARE
Problem: Falls - Risk of:  Goal: Will remain free from falls  Description: Will remain free from falls  6/12/2021 1233 by Demarcus Farfan RN  Outcome: Ongoing  6/12/2021 1233 by Demarcus Farfan RN  Outcome: Ongoing  8/03/2673 0840 by Portia Nieto RN  Outcome: Ongoing  Goal: Absence of physical injury  Description: Absence of physical injury  6/12/2021 1233 by Demarcus Farfan RN  Outcome: Ongoing  6/12/2021 1233 by Demarcus Farfan RN  Outcome: Ongoing  8/02/3749 3266 by Portia Nieto RN  Outcome: Ongoing

## 2021-06-12 NOTE — CONSULTS
INPATIENT CARDIOLOGY CONSULT NOTE       Reason for consultation:  Afib     Referring physician:  Wesley Jackman MD     Primary care physician: Royal Carney PA-C      Dear Wesley Jackman MD Thank you for the consult    Chief Complaint   Patient presents with    Altered Mental Status     Daughter reports patient has been confused x 3 days and recent urinalysis had wbc's and rbc's so possible UTI       History of present illness:Danae is a 60 y. o.year old who  presents with  Chief Complaint   Patient presents with    Altered Mental Status     Daughter reports patient has been confused x 3 days and recent urinalysis had wbc's and rbc's so possible UTI       Patient is a pleasant 62 with old female with prior medical history significant for paroxysmal atrial fibrillation who presents to the hospital with change of mental status and confusion. Patient noted to have urinary tract infection for which she has been treated. Cardiology is consulted for A. fib RVR. Overnight patient is converted to sinus rhythm already. Patient has prior medical history significant for coronary disease s/p PCI, history of cardiomyopathy with last MUGA scan shows EF ~ 41% , paroxysmal A. fib. S/p pulmonary vein isolation in 2017 by Dr. Greyson Griffith. EKG on admission shows atrial fibrillation with rapid ventricular response, left bundle branch block  Telemetry currently shows sinus rhythm at 80 bpm    Lab work reveals potassium 3.7, creatinine 1.7.  proBNP 20,000. [facility] 11/30/2020     Procedure Summary   1. Heavily calcified vessels. Mild to moderate main vessel CAD. significant small vessel disease of Diagonal1 & OM1   2. Global Hypokinesis with moderate to severely reduced LV   function. LVEF is about 30 %. Patient tolerated the procedure well. No immediate complications. Blood loss: about 10 cc. Recommendations   Medical management.          Past medical history:    has a past medical history of Abnormal PFTs (pulmonary function tests), Atrial fibrillation (HCC), CAD (coronary artery disease), CHF (congestive heart failure) ([facility] [address] Diabetes mellitus ([facility] [address] Dizziness - light-headed, Family history of coronary artery disease, H/O 24 hour EKG monitoring, H/O cardiovascular stress test, H/O cardiovascular stress test, H/O echocardiogram, H/O echocardiogram, Heart imaging, Heartburn, History of cardiac cath, History of cardiac monitoring, History of cardioversion, History of MI (myocardial infarction), History of nuclear MUGA, History of nuclear Muga stress test, History of PTCA, Hx of cardiovascular stress test, Hx of transesophageal echocardiography (LUKE) for monitoring, Hyperlipidemia, Hypertension, Insomnia, SOB (shortness of breath), Tachycardia, and TIA (transient ischemic attack). Past surgical history:   has a past surgical history that includes  section; Tubal ligation; Cholecystectomy; Diagnostic Cardiac Cath Lab Procedure (2006, 2009); Percutaneous Transluminal Coronary Angio (10/06/2010); other surgical history (2017); and ablation of dysrhythmic focus. Social History:   reports that she quit smoking about 14 years ago. Her smoking use included cigarettes. She has a 60.00 pack-year smoking history. She has never used smokeless tobacco. She reports that she does not drink alcohol and does not use drugs.   Family history:   no family history of CAD, STROKE of DM    Allergies   Allergen Reactions    Amiodarone Other (See Comments)     dizziness    Iv Dye [Iodides] Nausea And Vomiting    Vicodin [Hydrocodone-Acetaminophen] Nausea And Vomiting       traZODone (DESYREL) tablet 50 mg, Nightly  dilTIAZem 100 mg in dextrose 5 % 100 mL infusion (ADD-[place]), Continuous  sodium chloride flush 0.9 % injection 5-40 mL, 2 times per day  sodium chloride flush 0.9 % injection 5-40 mL, PRN  0.9 % sodium chloride infusion, PRN  ondansetron (ZOFRAN-ODT) disintegrating tablet 4 mg, Q8H PRN   Or  ondansetron (ZOFRAN) injection 4 mg, Q6H PRN  polyethylene glycol (GLYCOLAX) packet 17 g, Daily PRN  acetaminophen (TYLENOL) tablet 650 mg, Q6H PRN   Or  acetaminophen (TYLENOL) suppository 650 mg, Q6H PRN  pantoprazole (PROTONIX) tablet 40 mg, QAM AC  insulin lispro (HUMALOG) injection vial 0-6 Units, TID WC  insulin lispro (HUMALOG) injection vial 0-3 Units, Nightly  glucose (GLUTOSE) 40 % oral gel 15 g, PRN  dextrose 50 % IV solution, PRN  glucagon (rDNA) injection 1 mg, PRN  dextrose 5 % solution, PRN  albuterol sulfate  (90 Base) MCG/ACT inhaler 2 puff, 4x Daily PRN  amiodarone (CORDARONE) tablet 100 mg, BID  apixaban (ELIQUIS) tablet 5 mg, BID  aspirin EC tablet 81 mg, Daily  atorvastatin (LIPITOR) tablet 40 mg, Nightly  cloNIDine (CATAPRES) tablet 0.3 mg, Nightly  furosemide (LASIX) tablet 40 mg, BID  levothyroxine (SYNTHROID) tablet 50 mcg, Daily  metFORMIN (GLUCOPHAGE) tablet 500 mg, BID WC  metoprolol succinate (TOPROL XL) extended release tablet 100 mg, Daily  nitroGLYCERIN (NITROSTAT) SL tablet 0.4 mg, Q5 Min PRN  pantoprazole (PROTONIX) tablet 40 mg, QAM AC  lisinopril (PRINIVIL;ZESTRIL) tablet 5 mg, Daily      Current Facility-Administered Medications   Medication Dose Route Frequency Provider Last Rate Last Admin    traZODone (DESYREL) tablet 50 mg  50 mg Oral Nightly Madisyn Mathis MD   50 mg at 06/12/21 0105    dilTIAZem 100 mg in dextrose 5 % 100 mL infusion (ADD-[place])  5-15 mg/hr Intravenous Continuous Ros Beaulieu DO   Stopped at 06/12/21 0328    sodium chloride flush 0.9 % injection 5-40 mL  5-40 mL Intravenous 2 times per day APRIL Carney   10 mL at 06/12/21 0948    sodium chloride flush 0.9 % injection 5-40 mL  5-40 mL Intravenous PRN APRIL Carney        0.9 % sodium chloride infusion  25 mL Intravenous PRN APRIL Carney        ondansetron (ZOFRAN-ODT) disintegrating tablet 4 mg  4 mg Oral Q8H PRN APRIL Carney Or    ondansetron (ZOFRAN) injection 4 mg  4 mg Intravenous Q6H PRN APRIL Eduardo   4 mg at 06/12/21 0024    polyethylene glycol (GLYCOLAX) packet 17 g  17 g Oral Daily PRN APRIL Eduardo        acetaminophen (TYLENOL) tablet 650 mg  650 mg Oral Q6H PRN APRIL Eduardo        Or    acetaminophen (TYLENOL) suppository 650 mg  650 mg Rectal Q6H PRN APRIL Eduardo        pantoprazole (PROTONIX) tablet 40 mg  40 mg Oral QAM AC APRIL Escobar   40 mg at 06/12/21 0947    insulin lispro (HUMALOG) injection vial 0-6 Units  0-6 Units Subcutaneous TID WC APRIL Eduardo   2 Units at 06/12/21 8290    insulin lispro (HUMALOG) injection vial 0-3 Units  0-3 Units Subcutaneous Nightly APRIL Eduardo   1 Units at 06/11/21 2230    glucose (GLUTOSE) 40 % oral gel 15 g  15 g Oral PRN APRIL Eduardo        dextrose 50 % IV solution  12.5 g Intravenous PRN APRIL Eduardo        glucagon (rDNA) injection 1 mg  1 mg Intramuscular PRN APRIL Eduardo        dextrose 5 % solution  100 mL/hr Intravenous PRN APRIL Eduardo        albuterol sulfate  (90 Base) MCG/ACT inhaler 2 puff  2 puff Inhalation 4x Daily PRN Tanner Vieyra MD        amiodarone (CORDARONE) tablet 100 mg  100 mg Oral BID Tanner Vieyra MD   100 mg at 06/12/21 0947    apixaban (ELIQUIS) tablet 5 mg  5 mg Oral BID Tanner Vieyra MD   5 mg at 06/12/21 0947    aspirin EC tablet 81 mg  81 mg Oral Daily Tanner Vieyra MD   81 mg at 06/12/21 0947    atorvastatin (LIPITOR) tablet 40 mg  40 mg Oral Nightly Tanner Vieyra MD   40 mg at 06/11/21 2059    cloNIDine (CATAPRES) tablet 0.3 mg  0.3 mg Oral Nightly Tanner Vieyra MD   0.3 mg at 06/11/21 2059    furosemide (LASIX) tablet 40 mg  40 mg Oral BID Tanner Vieyra MD   40 mg at 06/12/21 0947    levothyroxine (SYNTHROID) tablet 50 mcg  50 mcg Oral Daily Tanner Vieyra MD   50 mcg at 06/12/21 1696    metFORMIN (GLUCOPHAGE) tablet 500 mg  500 mg Oral BID WC Monica Brunson MD   500 mg at 06/12/21 0948    metoprolol succinate (TOPROL XL) extended release tablet 100 mg  100 mg Oral Daily Monica Brunson MD   100 mg at 06/12/21 0948    nitroGLYCERIN (NITROSTAT) SL tablet 0.4 mg  0.4 mg Sublingual Q5 Min PRN Monica Brunson MD        pantoprazole (PROTONIX) tablet 40 mg  40 mg Oral QAM AC Monica Brunson MD   40 mg at 06/12/21 0526    lisinopril (PRINIVIL;ZESTRIL) tablet 5 mg  5 mg Oral Daily Monica Brunson MD   5 mg at 06/12/21 0947         Review of Systems:     · Constitutional: No Fever or Weight Loss   · Eyes: No Decreased Vision  · ENT: No Headaches, Hearing Loss or Vertigo  · Cardiovascular: No chest pain, dyspnea on exertion, palpitations or loss of consciousness  · Respiratory: No cough or wheezing    · Gastrointestinal: No abdominal pain, appetite loss, blood in stools, constipation, diarrhea or heartburn  · Genitourinary: No dysuria, trouble voiding, or hematuria  · Musculoskeletal:  No gait disturbance, weakness or joint complaints  · Integumentary: No rash or pruritis  · Neurological: No TIA or stroke symptoms  · Psychiatric: No anxiety or depression  · Endocrine: No malaise, fatigue or temperature intolerance  · Hematologic/Lymphatic: No bleeding problems, blood clots or swollen lymph nodes  · Allergic/Immunologic: No nasal congestion or hives    All other systems were reviewed and were negative otherwise. Physical Examination:      Vitals:    06/12/21 0945   BP: (!) 122/53   Pulse: 65   Resp: 18   Temp: 98.2 °F (36.8 °C)   SpO2: 96%      Wt Readings from Last 3 Encounters:   06/11/21 190 lb 4.1 oz (86.3 kg)   12/30/20 190 lb 3.2 oz (86.3 kg)   12/12/20 206 lb 4.8 oz (93.6 kg)     Body mass index is 35.97 kg/m².     General Appearance:  No distress, conversant  Constitutional:  Well developed, Well nourished  HEENT:  Normocephalic, Atraumatic, Oropharynx moist, No oral exudates,   Nose normal. Neck Supple Carotid: no carotid bruit  Eyes:  Conjunctiva normal, No discharge. Respiratory:    Normal breath sounds, No respiratory distress, No wheezing, no use of accessory muscles, diaphragm movement is normal  No chest Tenderness  Cardiovascular: S1-S2 No murmurs auscultated. No rubs, thrills or gallops. Normal  rhythm. Pedal pulses are normal. No pedal edema  GI:  Soft Non tender, non distended. :  No CVA tenderness. Musculoskeletal:   No tenderness, No cyanosis, No clubbing. Integument:  Warm, Dry, No erythema, No rash. Lymphatic:  No lymphadenopathy noted. Neurologic:  Alert & oriented x 3  No focal deficits noted. Psychiatric:  Affect normal, Judgment normal, Mood normal.       Lab Review     Recent Labs     06/12/21  0515   WBC 8.6   HGB 11.0*   HCT 34.6*         Recent Labs     06/12/21  0515      K 3.7   CL 99   CO2 28   BUN 21   CREATININE 1.7*     Recent Labs     06/12/21  0515   AST 21   ALT 17   BILITOT 0.5   ALKPHOS 191*     No results for input(s): TROPONINI in the last 72 hours. Lab Results   Component Value Date     (H) 04/17/2013    BNP 61 05/07/2012    BNP 86 01/30/2012     Lab Results   Component Value Date    INR 1.50 06/12/2021    PROTIME 18.2 (H) 06/12/2021         All labs, images, EKGs were personally reviewed      Assessment: 60 y. o.year old with PMH of  has a past medical history of Abnormal PFTs (pulmonary function tests), Atrial fibrillation ([address] CAD (coronary artery disease), CHF (congestive heart failure) ([facility] [address] Diabetes mellitus ([address] Dizziness - light-headed, Family history of coronary artery disease, H/O 24 hour EKG monitoring, H/O cardiovascular stress test, H/O cardiovascular stress test, H/O echocardiogram, H/O echocardiogram, Heart imaging, Heartburn, History of cardiac cath, History of cardiac monitoring, History of cardioversion, History of MI (myocardial infarction), History of nuclear MUGA, History of nuclear Muga stress test, History of PTCA, Hx of cardiovascular stress test, Hx of transesophageal echocardiography (LUKE) for monitoring, Hyperlipidemia, Hypertension, Insomnia, SOB (shortness of breath), Tachycardia, and TIA (transient ischemic attack). Recommendations:      1. Paroxysmal A. fib. Admission with A. fib with RVR. Currently back to sinus rhythm. Continue with oral amiodarone 100 mg p.o. twice daily. Continue with Eliquis 5 mg p.o. twice daily for stroke prophylaxis - Continue with beta-blocker Toprol- mg daily. We will stop Cardizem drip. 2. Congestive heart failure, systolic, NYHA II on admission, currently well compensated. history of ischemic heart disease with LVEF of around 40% on MUGA scan. Currently on Lasix 40 twice daily (will hold). Patient creatinine has been trending up since December 2020 being normal at 0.9-1.7 currently. Will consult nephrology to evaluate. We will hold off ACE inhibitor's/diuretics. 3. Acute on chronic renal failure: Nephrology consultation as above. 4. Hyperlipidemia: Continue with Lipitor 40 mg daily  5. History of coronary disease s/p PCI. Stable. Continue with aspirin/beta-blocker/statin therapy. 6. Diabetes mellitus: Patient is on Metformin/insulin.       Thank you for the consult    Dr. Adeola Peguero  6/12/2021 10:31 AM

## 2021-06-12 NOTE — PROGRESS NOTES
INTERNAL MEDICINE PROGRESS NOTE        Kathy Llanos Devan   1955   Primary Care Physician:  Tona Thibodeaux PA-C  Admit Date: 6/11/2021     Subjective:   Pt is doing better today. Denies chest pain, SOB, nausea, vomiting, abdominal pain. Remainder of ROS is unremarkable. Meds, labs and other notes reviewed. Pt now in NSR. Objective:   BP (!) 122/53   Pulse 65   Temp 98.2 °F (36.8 °C) (Oral)   Resp 18   Ht 5' 0.98\" (1.549 m)   Wt 190 lb 4.1 oz (86.3 kg)   SpO2 96%   BMI 35.97 kg/m²    Recent Labs     06/11/21  1808 06/11/21 2015 06/12/21  0730 06/12/21  0950   POCGLU 178* 195* 242* 220*       No intake/output data recorded. No intake/output data recorded.     Neck: no adenopathy and supple, symmetrical, trachea midline  Lungs: clear to auscultation bilaterally  Heart: regular rate and rhythm and S1, S2 normal  Abdomen: soft, non-tender; bowel sounds normal; no masses,  no organomegaly  Extremities: extremities normal, atraumatic, no cyanosis or edema  Neurologic: Grossly normal    Data Review  CBC with Differential:    Recent Labs     06/11/21  1300 06/12/21  0515   WBC 12.2* 8.6   RBC 3.95* 3.57*   HGB 12.7 11.0*   HCT 37.1 34.6*    266   MCV 93.9 96.9   MCH 32.2* 30.8   MCHC 34.2 31.8*   RDW 13.2 13.4   SEGSPCT 81.9* 76.6*   LYMPHOPCT 12.7* 15.7*   MONOPCT 4.4* 5.6*   BASOPCT 0.4 0.5   MONOSABS 0.5 0.5   LYMPHSABS 1.6 1.3   EOSABS 0.0 0.1   BASOSABS 0.1 0.0   DIFFTYPE AUTOMATED DIFFERENTIAL AUTOMATED DIFFERENTIAL     CMP:    Recent Labs     06/11/21  1300 06/12/21  0515    138   K 3.7 3.7   CL 97* 99   CO2 23 28   BUN 19 21   CREATININE 1.5* 1.7*   GFRAA 42* 37*   LABGLOM 35* 30*   GLUCOSE 113* 272*   PROT 6.4 5.0*   LABALBU 4.0 3.4   CALCIUM 9.0 8.3   BILITOT 0.8 0.5   ALKPHOS 233* 191*   AST 32 21   ALT 24 17     PT/INR:    Recent Labs     06/11/21  1300 06/12/21  0515   PROTIME 12.9 18.2*   INR 1.07 1.50     Meds:    traZODone  50 mg Oral Nightly    sodium chloride flush  5-40 mL Intravenous 2 times per day    pantoprazole  40 mg Oral QAM AC    insulin lispro  0-6 Units Subcutaneous TID WC    insulin lispro  0-3 Units Subcutaneous Nightly    amiodarone  100 mg Oral BID    apixaban  5 mg Oral BID    aspirin  81 mg Oral Daily    atorvastatin  40 mg Oral Nightly    cloNIDine  0.3 mg Oral Nightly    furosemide  40 mg Oral BID    levothyroxine  50 mcg Oral Daily    metFORMIN  500 mg Oral BID WC    metoprolol succinate  100 mg Oral Daily    pantoprazole  40 mg Oral QAM AC    lisinopril  5 mg Oral Daily     PRN Meds: sodium chloride flush, sodium chloride, ondansetron **OR** ondansetron, polyethylene glycol, acetaminophen **OR** acetaminophen, glucose, dextrose, glucagon (rDNA), dextrose, albuterol sulfate HFA, nitroGLYCERIN    Assessment/Plan:   1. Atrial fibrillation with rapid ventricular response. Now in NSR.   2.  Acute systolic congestive heart failure. Improving. 3.  Hypertension. 4.  Hyperlipidemia. 5.  Poorly controlled Diabetes mellitus. Will add Amaryl and Tradjenta. Will DC Metformin due to CKD. Continue SSI coverage. 6.  Coronary artery disease. 7.  YESIKA on CKD. As per Nephrology.        Veronika Padilla MD, MD  6/12/2021 10:43 AM

## 2021-06-12 NOTE — PLAN OF CARE
Problem: Falls - Risk of:  Goal: Will remain free from falls  Description: Will remain free from falls  6/12/2021 1233 by Zach Angel RN  Outcome: Ongoing  9/59/2075 2859 by Adriana Kim RN  Outcome: Ongoing  Goal: Absence of physical injury  Description: Absence of physical injury  6/12/2021 1233 by Zach Angel RN  Outcome: Ongoing  5/36/5981 [id number] by Adriana Kim RN  Outcome: Ongoing     Problem: Pain:  Goal: Pain level will decrease  Description: Pain level will decrease  6/12/2021 1233 by Zach Angel RN  Outcome: Ongoing  1/90/7312 4515 by Adriana Kim RN  Outcome: Ongoing  Goal: Control of acute pain  Description: Control of acute pain  6/12/2021 1233 by Zach Angel RN  Outcome: Ongoing  7/48/0213 0189 by Adriana Kim RN  Outcome: Ongoing  Goal: Control of chronic pain  Description: Control of chronic pain  6/12/2021 1233 by Zach Angel RN  Outcome: Ongoing  3/22/3753 2278 by Adriana Kim RN  Outcome: Ongoing

## 2021-06-12 NOTE — H&P
in 2007. Denies any alcohol  abuse or drug use. FAMILY HISTORY:  Significant for hypertension, coronary artery disease,  and diabetes mellitus. PHYSICAL EXAMINATION:  VITAL SIGNS:  Temperature 97.9 degrees Fahrenheit, blood pressure  157/98, pulse 138, respiratory rate 24, oxygen saturation 94% on room  air. HEENT EXAMINATION:  Conjunctivae are normal.  Sclerae nonicteric. Oropharynx is moist.  NECK:  Supple. CHEST:  Clear to auscultation bilaterally. There is no wheezing, rales,  crackles or rhonchi. CARDIOVASCULAR:  Heart examination, tachycardic. Irregular rhythm. ABDOMEN:  Soft, nontender. Positive bowel sounds. EXTREMITY EXAMINATION:  No edema. No calf tenderness. NEUROLOGIC EXAMINATION:  The patient is awake, alert, and oriented x3. She is responding to questions appropriately. Neuro exam at this point  is nonfocal.    LABORATORY STUDIES AND X-RAY FINDINGS:  On the CBC, white count is 12.2,  hemoglobin 12.7, hematocrit 37.1, and platelet count 964,113. ProTime  12.9, INR 1.0, magnesium 1.8. Lactic acid 2.0. Metabolic panel reveals  sodium 136, potassium 3.7, chloride 97, CO2 23, BUN 19, creatinine 1.5,  glucose 113, calcium 9.0. Liver function tests are normal, alkaline  phosphatase 233. Troponin T 0.024. Electrocardiogram, atrial fibrillation with rapid ventricular response,  rate of 154 beats minute, left axis deviation, left bundle-branch block. ProBNP 35,985. Chest x-ray, cardiomegaly with mild edema. CT scan of the head, no acute intracranial abnormality. Age related  changes. Nonspecific left mastoid effusion. Urinalysis, nitrite negative, leukocyte esterase is small, bacteria  negative. IMPRESSION:  1. Atrial fibrillation with rapid ventricular response. 2.  Acute systolic congestive heart failure. 3.  Hypertension. 4.  Hyperlipidemia. 5.  Diabetes mellitus. 6.  Coronary artery disease.     PLAN:  The patient was seen and evaluated initially in the emergency  room, admitted to medical floor. I have seen the patient on the medical  floor. At the time of my examination, she remains in atrial  fibrillation with rapid ventricular response. The patient has been  started on a Cardizem drip. Her home medicines reviewed and reconciled including beta blocker and  amiodarone and anticoagulation. The patient's cardiologist has been  consulted. She will also put on Accu-Chek with sliding scale coverage. There is no evidence of any underlying infection at this point. The  patient's cardiac enzyme troponin is slightly abnormal, this will be  repeated. Further recommendations to follow.         Dameon Castle MD    D: 06/11/2021 19:59:08       T: 06/11/2021 20:08:49     [email or web address]  Job#: [id number]     Doc#: [id number]    CC:  MD Dr. Luci Caputo

## 2021-06-13 NOTE — PROGRESS NOTES
Nephrology Progress Note  6/13/2021 10:13 AM  Subjective: Interval History: Vivien Stephens is a 60 y.o. female with no acute distress and monitor at this time still tired and weak        Data:   Scheduled Meds:   traZODone  50 mg Oral Nightly    torsemide  20 mg Oral Daily    sodium chloride flush  5-40 mL Intravenous 2 times per day    pantoprazole  40 mg Oral QAM AC    insulin lispro  0-6 Units Subcutaneous TID WC    insulin lispro  0-3 Units Subcutaneous Nightly    amiodarone  100 mg Oral BID    apixaban  5 mg Oral BID    aspirin  81 mg Oral Daily    atorvastatin  40 mg Oral Nightly    cloNIDine  0.3 mg Oral Nightly    levothyroxine  50 mcg Oral Daily    metFORMIN  500 mg Oral BID WC    metoprolol succinate  100 mg Oral Daily    lisinopril  5 mg Oral Daily     Continuous Infusions:   sodium chloride      dextrose           CBC   Recent Labs     06/11/21  1300 06/12/21  0515   WBC 12.2* 8.6   HGB 12.7 11.0*   HCT 37.1 34.6*    266      BMP   Recent Labs     06/11/21  1300 06/12/21  0515 06/13/21  0603    138 135   K 3.7 3.7 4.0   CL 97* 99 98*   CO2 23 28 28   PHOS  --   --  4.4   BUN 19 21 20   CREATININE 1.5* 1.7* 1.8*     Hepatic:   Recent Labs     06/11/21  1300 06/12/21  0515   AST 32 21   ALT 24 17   BILITOT 0.8 0.5   ALKPHOS 233* 191*     Troponin: No results for input(s): TROPONINI in the last 72 hours. BNP: No results for input(s): BNP in the last 72 hours. Lipids: No results for input(s): CHOL, HDL in the last 72 hours.     Invalid input(s): LDLCALCU  ABGs:   Lab Results   Component Value Date    PO2ART 293.2 09/05/2017    REL7JWB 36.6 09/05/2017     INR:   Recent Labs     06/11/21  1300 06/12/21  0515   INR 1.07 1.50     Renal Labs  Albumin:    Lab Results   Component Value Date    LABALBU 3.1 06/13/2021     Calcium:    Lab Results   Component Value Date    CALCIUM 8.1 06/13/2021     Phosphorus:    Lab Results   Component Value Date    PHOS 4.4 06/13/2021 U/A:    Lab Results   Component Value Date    NITRU NEGATIVE 06/11/2021    COLORU YELLOW 06/11/2021    WBCUA 4 06/11/2021    RBCUA 1 06/11/2021    MUCUS RARE 06/11/2021    TRICHOMONAS NONE SEEN 06/11/2021    YEAST RARE 09/01/2020    BACTERIA NEGATIVE 06/11/2021    CLARITYU CLEAR 06/11/2021    SPECGRAV 1.007 06/11/2021    UROBILINOGEN NEGATIVE 06/11/2021    BILIRUBINUR NEGATIVE 06/11/2021    BLOODU SMALL 06/11/2021    KETUA NEGATIVE 06/11/2021     ABG:    Lab Results   Component Value Date    IZT6RYQ 36.6 09/05/2017    PO2ART 293.2 09/05/2017    IJL8ZDZ 21.9 09/05/2017    BEART 2.1 11/01/2011     HgBA1c:    Lab Results   Component Value Date    LABA1C 9.1 06/12/2021     Microalbumen/Creatinine ratio:  No components found for: RUCREAT  TSH:  No results found for: TSH  IRON:    Lab Results   Component Value Date    IRON 72 05/24/2019     Iron Saturation:  No components found for: PERCENTFE  TIBC:    Lab Results   Component Value Date    TIBC 232 05/24/2019     FERRITIN:  No results found for: FERRITIN  RPR:  No results found for: RPR  DAVY:    Lab Results   Component Value Date    DAVY None Detected 05/24/2019    DAVY  05/24/2019     (NOTE)  If suspicion of connective tissue disease is strong and DAVY EIA is   negative, consider testing for DAVY by IFA ([id number]). INTERPRETIVE INFORMATION: Anti-Nuclear Antibodies (DAVY), IgG by   KD  Anti-Nuclear Antibodies (DAVY), IgG by KD: DAVY specimens are   screened using enzyme-linked immunosorbent assay (KD)   methodology. All KD results reported as Detected are further   tested by indirect fluorescent assay (IFA) using HEp-2 substrate   with an IgG-specific conjugate. The DAVY KD screen is designed   to detect antibodies against dsDNA, histone, SS-A (Ro), SS-B (La),   Carroll, snRNP/Sm, Scl-70, Nelli-1, centromere, and an extract of lysed   HEp-2 cells.  DAVY KD assays have been reported to have lower   sensitivities than DAVY IFA for systemic autoimmune rheumatic diseases (SARD). Negative results do not necessarily rule out SARD. Performed by [facility]  [address], [address] [phone]  www. Trudy Hatfield MD, Lab. Director       24 Hour Urine for Creatinine Clearance:  No components found for: CREAT4, UHRS10, UTV10      Objective:   I/O: 06/12 0701 - 06/13 0700  In: 840 [P.O.:840]  Out: 200 [Urine:200]  I/O last 3 completed shifts: In: 840 [P.O.:840]  Out: 200 [Urine:200]  No intake/output data recorded. Vitals: BP (!) 142/56   Pulse 70   Temp 98.1 °F (36.7 °C) (Oral)   Resp 15   Ht 5' 0.98\" (1.549 m)   Wt 190 lb 4.1 oz (86.3 kg)   SpO2 100%   BMI 35.97 kg/m²  {  General appearance: awake weak  HEENT: Head: Normal, normocephalic, atraumatic. Neck: supple, symmetrical, trachea midline  Lungs: diminished breath sounds bilaterally  Heart: S1, S2 normal  Abdomen: abnormal findings:  soft nt  Extremities: edema trace  Neurologic: Mental status: alertness: alert but weak        Assessment and Plan:      IMP:  1 ckd 3 with arf from atn/pra  2 weakness sp fall with a fib rvr  3 Dm2  4 htn  5 chf ef 30%    Plan     #1 creatinine holding at 1.8 we will monitor at this time with gentle diuresis only made 200 cc urine output will monitor  2. In the setting of weakness follow-up therapy recommendations and if need rehab  3. Glucose slightly improved  4. Blood pressure holding slightly better with adjusted medicines and monitor and for now maintain ACE inhibitor  5. Cardiac status monitor and chest x-ray no overt congestive heart failure  6. Slowly improving will work on plans for rehab  7.   Cardiology on the case and maximize medical therapy is the plan           Popeye Hobson MD, MD

## 2021-06-13 NOTE — PROGRESS NOTES
INTERNAL MEDICINE PROGRESS NOTE        Sarah Beth Hartman   1955   Primary Care Physician:  Carolee Hinton PA-C  Admit Date: 6/11/2021     Subjective:   Pt is doing better today. Denies chest pain, SOB, nausea, vomiting, abdominal pain. Remainder of ROS is unremarkable. Meds, labs and other notes reviewed. Pt now in NSR. Objective:   BP (!) 142/56   Pulse 70   Temp 98.1 °F (36.7 °C) (Oral)   Resp 15   Ht 5' 0.98\" (1.549 m)   Wt 190 lb 4.1 oz (86.3 kg)   SpO2 100%   BMI 35.97 kg/m²    Recent Labs     06/12/21  1618 06/12/21  2128 06/13/21  0625 06/13/21  1107   POCGLU 201* 181* 195* 299*       I/O last 3 completed shifts: In: 840 [P.O.:840]  Out: 200 [Urine:200]  No intake/output data recorded.     Neck: no adenopathy and supple, symmetrical, trachea midline  Lungs: clear to auscultation bilaterally  Heart: regular rate and rhythm and S1, S2 normal  Abdomen: soft, non-tender; bowel sounds normal; no masses,  no organomegaly  Extremities: extremities normal, atraumatic, no cyanosis or edema  Neurologic: Grossly normal    Data Review  CBC with Differential:    Recent Labs     06/11/21  1300 06/12/21  0515   WBC 12.2* 8.6   RBC 3.95* 3.57*   HGB 12.7 11.0*   HCT 37.1 34.6*    266   MCV 93.9 96.9   MCH 32.2* 30.8   MCHC 34.2 31.8*   RDW 13.2 13.4   SEGSPCT 81.9* 76.6*   LYMPHOPCT 12.7* 15.7*   MONOPCT 4.4* 5.6*   BASOPCT 0.4 0.5   MONOSABS 0.5 0.5   LYMPHSABS 1.6 1.3   EOSABS 0.0 0.1   BASOSABS 0.1 0.0   DIFFTYPE AUTOMATED DIFFERENTIAL AUTOMATED DIFFERENTIAL     CMP:    Recent Labs     06/11/21  1300 06/12/21  0515 06/13/21  0603    138 135   K 3.7 3.7 4.0   CL 97* 99 98*   CO2 23 28 28   BUN 19 21 20   CREATININE 1.5* 1.7* 1.8*   GFRAA 42* 37* 34*   LABGLOM 35* 30* 28*   GLUCOSE 113* 272* 228*   PROT 6.4 5.0*  --    LABALBU 4.0 3.4 3.1*   CALCIUM 9.0 8.3 8.1*   BILITOT 0.8 0.5  --    ALKPHOS 233* 191*  --    AST 32 21  --    ALT 24 17  --      PT/INR:    Recent Labs     06/11/21  1300

## 2021-06-13 NOTE — PLAN OF CARE
Problem: Falls - Risk of:  Goal: Will remain free from falls  Description: Will remain free from falls  1/13/5069 3013 by Bambi Alves RN  Outcome: Ongoing  6/12/2021 1233 by Natty Wynne RN  Outcome: Ongoing  6/12/2021 1233 by Natty Wynne RN  Outcome: Ongoing  Goal: Absence of physical injury  Description: Absence of physical injury  6/76/2985 6612 by Bambi Alves RN  Outcome: Ongoing  6/12/2021 1233 by Natty Wynne RN  Outcome: Ongoing  6/12/2021 1233 by Natty Wynne RN  Outcome: Ongoing     Problem: Pain:  Description: Pain management should include both nonpharmacologic and pharmacologic interventions.   Goal: Pain level will decrease  Description: Pain level will decrease  0/81/3526 7796 by Bambi Alves RN  Outcome: Ongoing  6/12/2021 1233 by Natty Wynne RN  Outcome: Ongoing  6/12/2021 1233 by Natty Wynne RN  Outcome: Ongoing  Goal: Control of acute pain  Description: Control of acute pain  6/36/2595 2872 by Bambi Alves RN  Outcome: Ongoing  6/12/2021 1233 by Natty Wynne RN  Outcome: Ongoing  6/12/2021 1233 by Natty Wynne RN  Outcome: Ongoing  Goal: Control of chronic pain  Description: Control of chronic pain  9/73/2930 6466 by Bambi Alves RN  Outcome: Ongoing  6/12/2021 1233 by Natty Wynne RN  Outcome: Ongoing  6/12/2021 1233 by Natty Wynne RN  Outcome: Ongoing

## 2021-06-13 NOTE — PROGRESS NOTES
CARDIOLOGY PROGRESS NOTE                                                  Name:  Lindsey Cornell /Age/Sex: 1955  (60 y.o. female)   MRN & CSN:  [id number] & [id number] Admission Date/Time: 2021  1:11 PM   Location:  [facility] [place]/3013-A PCP: Cordella Phalen, PA-C         Admit Date:  2021  Hospital Day: 3      SUBJECTIVE:   Seen patient as follow up as consultation for atrial fibrillation    No chest pain. No shortness of breath  No palpations    TELEMETRY: Sinus        Assessment/Plan:         1. Paroxysmal A. fib. Admission with A. fib with RVR. Currently   sinus rhythm. Continue with oral amiodarone 100 mg p.o. twice daily. Continue with Eliquis 5 mg p.o. twice daily for stroke prophylaxis - Continue with beta-blocker Toprol- mg daily. 2. Congestive heart failure, systolic, NYHA II on admission, currently well compensated. history of ischemic heart disease with LVEF of around 40% on MUGA scan. Torsemide 20 mg daily and lisinopril 5 mg p.o. daily resumed    3. Chronic renal failure: Nephrology follow up  4. Hyperlipidemia: Continue with Lipitor 40 mg daily  5. History of coronary disease s/p PCI. Stable. Continue with aspirin/beta-blocker/statin therapy. 6. Diabetes mellitus: Patient is on Metformin/insulin. No further cardiac recommendation at this point.   Please call us with any questions      Past medical history:    has a past medical history of Abnormal PFTs (pulmonary function tests), Atrial fibrillation ([facility] [address] CAD (coronary artery disease), CHF (congestive heart failure) ([facility] [address] Diabetes mellitus ([facility] [address] Dizziness - light-headed, Family history of coronary artery disease, H/O 24 hour EKG monitoring, H/O cardiovascular stress test, H/O cardiovascular stress test, H/O echocardiogram, H/O echocardiogram, Heart imaging, Heartburn, History of cardiac cath, History of cardiac monitoring, History of cardioversion, History of MI (myocardial infarction), History of nuclear MUGA, History of nuclear Muga stress test, History of PTCA, Hx of cardiovascular stress test, Hx of transesophageal echocardiography ([facility]) for monitoring, Hyperlipidemia, Hypertension, Insomnia, SOB (shortness of breath), Tachycardia, and TIA (transient ischemic attack). Past surgical history:   has a past surgical history that includes  section; Tubal ligation; Cholecystectomy; Diagnostic Cardiac Cath Lab Procedure (2006, 2009); Percutaneous Transluminal Coronary Angio (10/06/2010); other surgical history (2017); and ablation of dysrhythmic focus. Social History:   reports that she quit smoking about 14 years ago. Her smoking use included cigarettes. She has a 60.00 pack-year smoking history. She has never used smokeless tobacco. She reports that she does not drink alcohol and does not use drugs. Family history:  family history includes Diabetes in her father; Heart Disease in her father. OBJECTIVE:     BP (!) 142/56   Pulse 70   Temp 98.1 °F (36.7 °C) (Oral)   Resp 15   Ht 5' 0.98\" (1.549 m)   Wt 190 lb 4.1 oz (86.3 kg)   SpO2 100%   BMI 35.97 kg/m²       Intake/Output Summary (Last 24 hours) at 2021 1021  Last data filed at 2021 1819  Gross per 24 hour   Intake 840 ml   Output 200 ml   Net 640 ml       Physical Exam:    Constitutional:  Well developed, Well nourished, No acute distress, Non-toxic appearance. HENT:  Normocephalic, Atraumatic, Bilateral external ears normal, Oropharynx moist, No oral exudates, Nose normal. Neck- Normal range of motion, No tenderness, Supple, No stridor. Eyes:  EOMI, Conjunctiva normal, No discharge. Respiratory:  Normal breath sounds, No respiratory distress, No wheezing, No chest tenderness. ,no use of accessory muscles, diaphragm movement is normal  Cardiovascular S1-S2 No Murmurs, added sounds. Normal rate rhythm. No rubs gallops. Carotid pulses and amplitude are normal no bruit noted.   Pedal pulses normal femoral pulses 06/11/21  1300 06/12/21  0515   PROTIME 12.9 18.2*   INR 1.07 1.50     APTT:   Recent Labs     06/11/21  1300   APTT 23.6*          Dipesh Sandoval MD, MD 6/13/2021 10:21 AM

## 2021-06-14 NOTE — PROGRESS NOTES
06/13/2021     U/A:    Lab Results   Component Value Date    NITRU NEGATIVE 06/11/2021    COLORU YELLOW 06/11/2021    WBCUA 4 06/11/2021    RBCUA 1 06/11/2021    MUCUS RARE 06/11/2021    TRICHOMONAS NONE SEEN 06/11/2021    YEAST RARE 09/01/2020    BACTERIA NEGATIVE 06/11/2021    CLARITYU CLEAR 06/11/2021    SPECGRAV 1.007 06/11/2021    UROBILINOGEN NEGATIVE 06/11/2021    BILIRUBINUR NEGATIVE 06/11/2021    BLOODU SMALL 06/11/2021    KETUA NEGATIVE 06/11/2021     ABG:    Lab Results   Component Value Date    IAN3VCH 36.6 09/05/2017    PO2ART 293.2 09/05/2017    BNO1OUD 21.9 09/05/2017    BEART 2.1 11/01/2011     HgBA1c:    Lab Results   Component Value Date    LABA1C 9.1 06/12/2021     Microalbumen/Creatinine ratio:  No components found for: RUCREAT  TSH:  No results found for: TSH  IRON:    Lab Results   Component Value Date    IRON 72 05/24/2019     Iron Saturation:  No components found for: PERCENTFE  TIBC:    Lab Results   Component Value Date    TIBC 232 05/24/2019     FERRITIN:  No results found for: FERRITIN  RPR:  No results found for: RPR  DAVY:    Lab Results   Component Value Date    DAVY None Detected 05/24/2019    DAVY  05/24/2019     (NOTE)  If suspicion of connective tissue disease is strong and DAVY EIA is   negative, consider testing for DAVY by IFA ([id number]). INTERPRETIVE INFORMATION: Anti-Nuclear Antibodies (DAVY), IgG by   KD  Anti-Nuclear Antibodies (DAVY), IgG by KD: DAVY specimens are   screened using enzyme-linked immunosorbent assay (KD)   methodology. All KD results reported as Detected are further   tested by indirect fluorescent assay (IFA) using HEp-2 substrate   with an IgG-specific conjugate. The DAVY KD screen is designed   to detect antibodies against dsDNA, histone, SS-A (Ro), SS-B (La),   Carroll, snRNP/Sm, Scl-70, Nelli-1, centromere, and an extract of lysed   HEp-2 cells.  DAVY KD assays have been reported to have lower   sensitivities than DAVY IFA for systemic autoimmune rheumatic   diseases (SARD). Negative results do not necessarily rule out SARD. Performed by [facility], [address] [phone]  www. Monica Hoover MD, Lab. Director       24 Hour Urine for Creatinine Clearance:  No components found for: CREAT4, UHRS10, UTV10      Objective:   I/O: 06/13 0701 - 06/14 0700  In: 0   Out: 350 [Urine:350]  I/O last 3 completed shifts: In: 0   Out: 350 [Urine:350]  No intake/output data recorded. Vitals: /73   Pulse 74   Temp 98.3 °F (36.8 °C) (Oral)   Resp 12   Ht 5' 0.98\" (1.549 m)   Wt 177 lb 3.2 oz (80.4 kg)   SpO2 96%   BMI 33.50 kg/m²  {  General appearance: awake weak  HEENT: Head: Normal, normocephalic, atraumatic. Neck: supple, symmetrical, trachea midline  Lungs: diminished breath sounds bilaterally  Heart: S1, S2 normal  Abdomen: abnormal findings:  soft nt  Extremities: edema trace  Neurologic: Mental status: alertness: alert but weak        Assessment and Plan:      IMP:  1 ckd 3 with arf from atn/pra  2 weakness sp fall with a fib rvr  3 Dm2  4 htn  5 chf ef 30%    Plan     #1 renal function holding stable in the setting of diuresis try to match ins and outs this time  2. Overall improved weakness still tired but no other complaints  3. Monitor glucose control  4. Blood pressure low stable  5. Cardiac monitor  6.   Continue medical therapy and work on discharge planning possible to rehab if agreeable  Repeat lab work         Ying Grady MD, MD

## 2021-06-14 NOTE — PROGRESS NOTES
Occupational Therapy   Occupational Therapy Initial Assessment  Date: 2021   Patient Name: Gabbi Cox  MRN: [id number]     : 1955    Date of Service: 2021    Discharge Recommendations:  S Level 1, Home with Home health OT, Home with assist PRN  OT Equipment Recommendations  Other: defer    Assessment   Performance deficits / Impairments: Decreased functional mobility ; Decreased safe awareness;Decreased balance;Decreased ADL status; Decreased endurance;Decreased high-level IADLs;Decreased strength  Assessment: Pt is a 60 y.o. F admitted from home. Pt at baseline is independent w/ ADLs and high level IADLs. Pt currently presents w/ above deficits and would benefit from continued acute care OT services w/ discharge home w/ HH in order to return to [facility]. Treatment Diagnosis: Paroxysmal atrial fibrillation  Prognosis: Good  Decision Making: Medium Complexity  OT Education: OT Role;Energy Conservation;Plan of Care;Precautions;Transfer Training  REQUIRES OT FOLLOW UP: Yes  Activity Tolerance  Activity Tolerance: Patient Tolerated treatment well  Safety Devices  Safety Devices in place: Yes  Type of devices: Call light within reach; Left in chair;Gait belt;Nurse notified  Restraints  Initially in place: No           Patient Diagnosis(es): The primary encounter diagnosis was Atrial fibrillation with RVR ([facility] A diagnosis of Acute on chronic systolic congestive heart failure (HCC) was also pertinent to this visit.      has a past medical history of Abnormal PFTs (pulmonary function tests), Atrial fibrillation ([address] CAD (coronary artery disease), CHF (congestive heart failure) ([facility] [address] [facility] Diabetes mellitus ([facility] [address] Dizziness - light-headed, Family history of coronary artery disease, H/O 24 hour EKG monitoring, H/O cardiovascular stress test, H/O cardiovascular stress test, H/O echocardiogram, H/O echocardiogram, Heart imaging, Heartburn, History of cardiac cath, History of cardiac monitoring, History of cardioversion, History of MI (myocardial infarction), History of nuclear MUGA, History of nuclear Muga stress test, History of PTCA, Hx of cardiovascular stress test, Hx of transesophageal echocardiography (LUKE) for monitoring, Hyperlipidemia, Hypertension, Insomnia, SOB (shortness of breath), Tachycardia, and TIA (transient ischemic attack). has a past surgical history that includes  section; Tubal ligation; Cholecystectomy; Diagnostic Cardiac Cath Lab Procedure (2006, 2009); Percutaneous Transluminal Coronary Angio (10/06/2010); other surgical history (2017); and ablation of dysrhythmic focus. Treatment Diagnosis: Paroxysmal atrial fibrillation      Restrictions  Restrictions/Precautions  Restrictions/Precautions: General Precautions, Fall Risk  Required Braces or Orthoses?: No    Subjective   General  Chart Reviewed: Yes  Patient assessed for rehabilitation services?: Yes  Response to previous treatment: Patient with no complaints from previous session  Family / Caregiver Present: No  Patient Currently in Pain: No  Vital Signs  Patient Currently in Pain: No    Social/Functional History  Social/Functional History  Lives With: Spouse (Pt reports spouse is currently in nursing home.  Dgt checks on Pt daily.)  Type of Home: House  Home Layout: Two level, Able to Live on Main level with bedroom/bathroom  Home Access: Ramped entrance, Stairs to enter with rails (Pt reports ramped entrance in back of home)  Entrance Stairs - Number of Steps: 3  Entrance Stairs - Rails: Both  Bathroom Shower/Tub: Tub/Shower unit  Bathroom Toilet: Handicap height  Bathroom Equipment: Shower chair, Grab bars in shower  ADL Assistance: [address] [place] [address] Avenue: Independent  Homemaking Responsibilities: Yes  Ambulation Assistance: Independent  Transfer Assistance: Independent  Active : No  Patient's  Info: Dgt drives  Occupation: Retired  Type of occupation:   Additional Comments: Pt reports 5< falls in 6 months       Objective   Vision: Impaired  Vision Exceptions: Wears glasses at all times  Hearing: Within functional limits    Orientation  Overall Orientation Status: Within Normal Limits  Observation/Palpation  Posture: Good  Observation: Pt seated EOB on 3L O2, Pt agreeable to therapy  Balance  Sitting Balance: Independent  Standing Balance: Stand by assistance  Functional Mobility  Functional - Mobility Device: No device  Activity: To/from bathroom; Other  Assist Level: Contact guard assistance  Functional Mobility Comments: Pt completed functional mobility in hallway ~ 90ft, Pt demonstrated 2 LOBs. Toilet Transfers  Toilet - Technique: Ambulating  Equipment Used: Standard toilet  Toilet Transfer: Contact guard assistance  Toilet Transfers Comments: No AD required  ADL  Feeding: Independent  Grooming: Stand by assistance  UE Bathing: Supervision  LE Bathing: Stand by assistance  UE Dressing: Supervision  LE Dressing: Stand by assistance (Pt threaded B socks seated EOB.)  Toileting: Stand by assistance (Pt urinated using standard toilet.)  Tone RUE  RUE Tone: Normotonic  Tone LUE  LUE Tone: Normotonic  Coordination  Movements Are Fluid And Coordinated: Yes        Transfers  Sit to stand: Stand by assistance  Stand to sit: Stand by assistance  Transfer Comments: No AD required  Vision - Basic Assessment  Prior Vision: Wears glasses only for reading  Visual History: No significant visual history  Patient Visual Report: No visual complaint reported. Cognition  Overall Cognitive Status: WNL  Perception  Overall Perceptual Status: WFL     Sensation  Overall Sensation Status: WNL        LUE AROM (degrees)  LUE AROM : WNL  Left Hand AROM (degrees)  Left Hand AROM: WNL  RUE AROM (degrees)  RUE AROM : WNL  Right Hand AROM (degrees)  Right Hand AROM: WNL  LUE Strength  Gross LUE Strength: WNL  L Hand General: 5/5  RUE Strength  Gross RUE Strength:  WNL  R Hand General: 5/5 Therapeutic Activity Training:   Therapeutic activity training was instructed today. Cues were given for safety, sequence, UE/LE placement, awareness, and balance. Activities performed today included functional mobility training, stand-sit, sit-stand, toilet transfer. Self Care Training:   Cues were given for safety, sequence, UE/LE placement, visual cues, and balance. Activities performed today included toileting, LB dressing. Plan   Plan  Times per week: 2x+  Times per day: Daily  Current Treatment Recommendations: Strengthening, Safety Education & Training, Balance Training, Patient/Caregiver Education & Training, Self-Care / ADL, Functional Mobility Training, Equipment Evaluation, Education, & procurement, Home Management Training, Endurance Training      AM-PAC Score        AM-[facility] Inpatient Daily Activity Raw Score: 20 (06/14/21 1300)  AM-PAC Inpatient ADL T-Scale Score : 42.03 (06/14/21 1300)  ADL Inpatient CMS 0-100% Score: 38.32 (06/14/21 1300)  ADL Inpatient CMS G-Code Modifier : Brittameya Hodges (06/14/21 1300)    Goals  Short term goals  Time Frame for Short term goals: discharge  Short term goal 1: Pt will complete UB/LB dressing independently. Short term goal 2: Pt will complete UB/LB bathing independently. Short term goal 3: Pt will complete all aspects of toileting independently. Short term goal 4: Pt will complete oral hygiene/grooming in stand at sink independently. Short term goal 5: Pt will perform ther ex/ ther act to inc endurance to complete functional activities.        Therapy Time   Individual Concurrent Group Co-treatment   Time In 4389         Time Out 1211         Minutes 38             Total treatment time: 38  Treatment minutes: Brennan 61 S/OT

## 2021-06-14 NOTE — PROGRESS NOTES
INTERNAL MEDICINE PROGRESS NOTE        [facility]   1955   Primary Care Physician:  Shelly Zhu PA-C  Admit Date: 6/11/2021     Subjective:   Patient awake, lying in bed, feeling better. She is no longer in atrial fibrillation. Denies chest pain, shortness of breath.     Objective:   /73   Pulse 74   Temp 98.3 °F (36.8 °C) (Oral)   Resp 12   Ht 5' 0.98\" (1.549 m)   Wt 177 lb 3.2 oz (80.4 kg)   SpO2 96%   BMI 33.50 kg/m²    General appearance: alert, appears stated age and cooperative  Head: Normocephalic, without obvious abnormality, atraumatic  Neck: no adenopathy and supple, symmetrical, trachea midline  Lungs: clear to auscultation anteriorly  Heart: S1, S2 normal  Abdomen: soft, non-tender; bowel sounds normal;   Extremities: no clubbing, cyanosis with trace edema  Neurologic: No new deficits    Data Review  Lab Results   Component Value Date     06/13/2021    K 4.0 06/13/2021    CL 98 (L) 06/13/2021    CO2 28 06/13/2021    CREATININE 1.8 (H) 06/13/2021    BUN 20 06/13/2021    CALCIUM 8.1 (L) 06/13/2021     Lab Results   Component Value Date    WBC 8.6 06/12/2021    HGB 11.0 (L) 06/12/2021    HCT 34.6 (L) 06/12/2021    MCV 96.9 06/12/2021     06/12/2021     INR/Prothrombin Time      Meds:    glipiZIDE  2.5 mg Oral QAM AC    alogliptin  6.25 mg Oral Daily    traZODone  50 mg Oral Nightly    torsemide  20 mg Oral Daily    sodium chloride flush  5-40 mL Intravenous 2 times per day    pantoprazole  40 mg Oral QAM AC    insulin lispro  0-6 Units Subcutaneous TID WC    insulin lispro  0-3 Units Subcutaneous Nightly    amiodarone  100 mg Oral BID    apixaban  5 mg Oral BID    aspirin  81 mg Oral Daily    atorvastatin  40 mg Oral Nightly    cloNIDine  0.3 mg Oral Nightly    levothyroxine  50 mcg Oral Daily    metoprolol succinate  100 mg Oral Daily    lisinopril  5 mg Oral Daily     PRN Meds: sodium chloride flush, sodium chloride, ondansetron **OR** ondansetron, polyethylene glycol, acetaminophen **OR** acetaminophen, glucose, dextrose, glucagon (rDNA), dextrose, albuterol sulfate HFA, nitroGLYCERIN    Assessment/Plan:   Patient Active Hospital Problem List:  Patient Active Problem List   Diagnosis    History of CVA (cerebrovascular accident) without residual deficits    [facility]    DM (diabetes mellitus), type 2, uncontrolled ([facility]    Persistent [facility] fibrillation ([facility]    CAD (coronary artery disease)    CHF (congestive heart failure) ([facility]    Light headed    History of MI (myocardial infarction)    Diabetes mellitus ([facility]    Hypertension    TIA (transient ischemic attack)    Hyperlipidemia    SOB (shortness of breath)    H/O cardiovascular stress test    Family history of coronary artery disease    PAF (paroxysmal [facility] fibrillation) ([facility])    Chest pain    Atrial thrombus without antecedent myocardial infarction    S/P ablation of atrial fibrillation    Gastroenteritis    YSEIKA (acute kidney injury) ([facility]    Acute cystitis    Elevated liver enzymes    CHF (congestive heart failure), NYHA class I, acute on chronic, combined ([facility]    Pneumonia    Multifocal pneumonia    Atrial fibrillation with RVR ([facility]    Shoulder pain    Paroxysmal atrial fibrillation (HCC)     Assessment:  A. fib RVRs/p prior ablation: Now NSR. On amiodarone, Eliquis, Toprol. Cardiology following. CHF: EF 30%, on torsemide, BB, and lisinopril. Cardiology following. YESIKA on CKD:  DM2, uncontrolled: A1c 10.1. On Glipizide, alogliptin, SSI. Monitor POC glucose. HTN/HLD/CAD s/p stent: on statin. CPM.   Hypothyroidism: On Synthroid.    Generalized weakness/falls: PT/OT today. CM for discharge planning. Hx cerebral anerysms    Plan:  Pending a.m. blood work. PT/OT today. Case management for discharge planning. Consultants notes reviewed. Monitor labs and patient condition.      Electronically signed by Annia Islas PA-C on 6/14/2021 at 9:00 AM   I have independently evaluated and examined this patient today. I have reviewed radiologic and biochemical tests on this patient. Management Plan is developed mutually with Collette Rower, PA. I have reviewed above note and agree with assessment and plan.  Possible dc tomorrow AM

## 2021-06-15 NOTE — PROGRESS NOTES
Nephrology Progress Note  6/15/2021 9:39 AM  Subjective: Interval History: Kayley Bailey is a 60 y.o. female doing okay weak in bed no distress. Data:   Scheduled Meds:   glipiZIDE  2.5 mg Oral QAM AC    alogliptin  6.25 mg Oral Daily    traZODone  50 mg Oral Nightly    torsemide  20 mg Oral Daily    sodium chloride flush  5-40 mL Intravenous 2 times per day    pantoprazole  40 mg Oral QAM AC    insulin lispro  0-6 Units Subcutaneous TID WC    insulin lispro  0-3 Units Subcutaneous Nightly    amiodarone  100 mg Oral BID    apixaban  5 mg Oral BID    aspirin  81 mg Oral Daily    atorvastatin  40 mg Oral Nightly    cloNIDine  0.3 mg Oral Nightly    levothyroxine  50 mcg Oral Daily    metoprolol succinate  100 mg Oral Daily    lisinopril  5 mg Oral Daily     Continuous Infusions:   sodium chloride      dextrose           CBC   Recent Labs     06/15/21  0828   WBC 9.6   HGB 12.6   HCT 38.8         BMP   Recent Labs     06/13/21  0603 06/14/21  1234 06/15/21  0828    145 137   K 4.0 4.5 4.5   CL 98* 101 97*   CO2 28 34* 33*   PHOS 4.4 3.5 3.3   BUN 20 19 20   CREATININE 1.8* 1.5* 1.6*     Hepatic:   No results for input(s): AST, ALT, ALB, BILITOT, ALKPHOS in the last 72 hours. Troponin: No results for input(s): TROPONINI in the last 72 hours. BNP: No results for input(s): BNP in the last 72 hours. Lipids: No results for input(s): CHOL, HDL in the last 72 hours. Invalid input(s): LDLCALCU  ABGs:   Lab Results   Component Value Date    PO2ART 293.2 09/05/2017    BXJ5OYL 36.6 09/05/2017     INR:   No results for input(s): INR in the last 72 hours.   Renal Labs  Albumin:    Lab Results   Component Value Date    LABALBU 3.4 06/15/2021     Calcium:    Lab Results   Component Value Date    CALCIUM 8.4 06/15/2021     Phosphorus:    Lab Results   Component Value Date    PHOS 3.3 06/15/2021     U/A:    Lab Results   Component Value Date    NITRU NEGATIVE 06/11/2021    COLORU YELLOW 06/11/2021    WBCUA 4 06/11/2021    RBCUA 1 06/11/2021    MUCUS RARE 06/11/2021    TRICHOMONAS NONE SEEN 06/11/2021    YEAST RARE 09/01/2020    BACTERIA NEGATIVE 06/11/2021    CLARITYU CLEAR 06/11/2021    SPECGRAV 1.007 06/11/2021    UROBILINOGEN NEGATIVE 06/11/2021    BILIRUBINUR NEGATIVE 06/11/2021    BLOODU SMALL 06/11/2021    KETUA NEGATIVE 06/11/2021     ABG:    Lab Results   Component Value Date    CBA7LDU 36.6 09/05/2017    PO2ART 293.2 09/05/2017    ABM8DAL 21.9 09/05/2017    BEART 2.1 11/01/2011     HgBA1c:    Lab Results   Component Value Date    LABA1C 9.1 06/12/2021     Microalbumen/Creatinine ratio:  No components found for: RUCREAT  TSH:  No results found for: TSH  IRON:    Lab Results   Component Value Date    IRON 72 05/24/2019     Iron Saturation:  No components found for: PERCENTFE  TIBC:    Lab Results   Component Value Date    TIBC 232 05/24/2019     FERRITIN:  No results found for: FERRITIN  RPR:  No results found for: RPR  DAVY:    Lab Results   Component Value Date    DAVY None Detected 05/24/2019    DAVY  05/24/2019     (NOTE)  If suspicion of connective tissue disease is strong and DAVY EIA is   negative, consider testing for DAVY by IFA ([id number]). INTERPRETIVE INFORMATION: Anti-Nuclear Antibodies (DAVY), IgG by   KD  Anti-Nuclear Antibodies (DAVY), IgG by KD: DAVY specimens are   screened using enzyme-linked immunosorbent assay (KD)   methodology. All KD results reported as Detected are further   tested by indirect fluorescent assay (IFA) using HEp-2 substrate   with an IgG-specific conjugate. The DAVY KD screen is designed   to detect antibodies against dsDNA, histone, SS-A (Ro), SS-B (La),   Carroll, snRNP/Sm, Scl-70, Nelli-1, centromere, and an extract of lysed   HEp-2 cells. DAVY KD assays have been reported to have lower   sensitivities than DAVY IFA for systemic autoimmune rheumatic   diseases (SARD). Negative results do not necessarily rule out SARD.   Performed by [facility] [place] [address], [id number] [facility] [phone]  www. Malia Santiago MD, Lab. Director       24 Hour Urine for Creatinine Clearance:  No components found for: CREAT4, UHRS10, UTV10      Objective:   I/O: No intake/output data recorded. No intake/output data recorded. No intake/output data recorded. Vitals: BP (!) 143/79   Pulse 72   Temp 97.9 °F (36.6 °C) (Oral)   Resp 15   Ht 5' 0.98\" (1.549 m)   Wt 178 lb 12.8 oz (81.1 kg)   SpO2 98%   BMI 33.80 kg/m²  {  General appearance: awake weak  HEENT: Head: Normal, normocephalic, atraumatic. Neck: supple, symmetrical, trachea midline  Lungs: diminished breath sounds bilaterally  Heart: S1, S2 normal  Abdomen: abnormal findings:  soft nt  Extremities: edema trace  Neurologic: Mental status: alertness: alert and still weak        Assessment and Plan:      IMP:  1 ckd 3 with arf from atn/pra  2 weakness sp fall with a fib rvr  3 Dm2  4 htn  5 chf ef 30%    Plan     #1 renal function holding close to baseline we will monitor  2. Patient prefers to go home not to rehab and getting home health care  3. Monitor glucose control  4. Blood pressure overall stable  5. Cardiac stable in the setting of diuresis creatinine holding 1.6  6.   At this time recommend going to rehab the patient does not want to work on maintain current meds and discharged to home with home care         Scarlett Huerta MD, MD

## 2021-06-15 NOTE — CARE COORDINATION
Noted pt discharged with [facility] Odell. [facility]. [facility] Jess/Gregor who were not able to accept [facility]. Spoke with Iona Garrick/Lisa who accepted referral and clinicals faxed.

## 2021-06-15 NOTE — DISCHARGE SUMMARY
Kristal Birmingham MD, Internal Medicine    [address] [facility]   [phone]   Patient ID  Matthias Maurer   1955  [id number]          Admit date: 6/11/2021   Discharge date: 6/15/2021      Admitting Physician: Nataliia Morales MD   Discharge Physician: Rosa Elena Chambers PA-C    Discharge Diagnoses:   A. fib RVR s/p prior ablation: now NSR. On amiodarone, Eliquis, Toprol. Cardiology consulted. CHF: EF 30%, on torsemide, BB, and lisinopril. Cardiology consulted. YESIKA on CKD: creatinine stable around 1.5-1.6. Nephrology consulted. DM2, uncontrolled: A1c 10.1. Meds adjusted. CPM.  HTN/HLD/CAD s/p stent: on statin. CPM.   Hypothyroidism: On Synthroid. Generalized weakness/falls: will go home with Yakelin  PT/OT. Hx cerebral anerysms        Patient Active Problem List   Diagnosis    History of CVA (cerebrovascular accident) without residual deficits    Morbid obesity ([address]    DM (diabetes mellitus), type 2, uncontrolled ([address]    Persistent atrial fibrillation ([address]    CAD (coronary artery disease)    CHF (congestive heart failure) ([address]    Light headed    History of MI (myocardial infarction)    Diabetes mellitus ([address]    Hypertension    TIA (transient ischemic attack)    Hyperlipidemia    SOB (shortness of breath)    H/O cardiovascular stress test    Family history of coronary artery disease    PAF (paroxysmal atrial fibrillation) (HCC)    Chest pain    Atrial thrombus without antecedent myocardial infarction    S/P ablation of atrial fibrillation    Gastroenteritis    YESIKA (acute kidney injury) ([address]    Acute cystitis    Elevated liver enzymes    CHF (congestive heart failure), NYHA class I, acute on chronic, combined ([address]    Pneumonia    Multifocal pneumonia    Atrial fibrillation with RVR ([address]    Shoulder pain    Paroxysmal atrial fibrillation [facility])       Discharged Condition: fair    Hospital Course:  The patient presented to to ER with AMS and dizziness. She has hx of prior CVA, afib s/p prior ablation, on eliquis, HTN, HLD, DM2, CAD, CHF, cerebral aneurysms. She was seen in PCP office found to be in afib RVR so she was sent to ER. CT head was non acute. EKG showed afib RVR. Renal function slightly worse than baseline. She was started on cardizem gtt. She converted to sinus. Cardiology and nephrology were consulted. Renal function has remained stable around 1.7. Overall patient feeling better. PT/OT recommend [facility]. She will go [facility]. She is to follow up with PCP, cardiology, and nephrology as outpatient. Relevant Investigations  CT HEAD, 6/11/21  No acute intracranial abnormality. Age related changes including chronic small vessel ischemic disease and cerebral atrophy. Nonspecific left mastoid effusion.      Disposition: home    Patient Instructions:    Renetta Jose   Home Medication Instructions CRU:[id number]    Printed on:06/15/21 8999     Medication Information                        albuterol sulfate HFA (VENTOLIN HFA) 108 (90 Base) MCG/ACT inhaler  Inhale 2 puffs into the lungs 4 times daily as needed for Wheezing             amiodarone (PACERONE) 100 MG tablet  Take 1 tablet by mouth 2 times daily First take two tabs by mouth two times daily for 7 days, then take one tab by mouth twice daily             apixaban (ELIQUIS) 5 MG TABS tablet  Take 1 tablet by mouth 2 times daily             aspirin 81 MG EC tablet  Take 81 mg by mouth daily             atorvastatin (LIPITOR) 40 MG tablet  Take 40 mg by mouth nightly             cloNIDine (CATAPRES) 0.3 MG tablet  Take 0.3 mg by mouth nightly             glipiZIDE (GLUCOTROL) 5 MG tablet  Take 0.5 tablets by mouth every morning (before breakfast)             insulin lispro (HUMALOG) 100 UNIT/ML injection vial  Inject into the skin 3 times daily (before meals)             levothyroxine (SYNTHROID) 50 MCG tablet  Take 50 mcg by mouth Daily             lisinopril (PRINIVIL;ZESTRIL) 5 MG tablet  Take 1 tablet by mouth daily             metoprolol succinate (TOPROL XL) 100 MG extended release tablet  Take 1 tablet by mouth daily TAKE ONE TABLET BY MOUTH DAILY             nitroGLYCERIN (NITROSTAT) 0.4 MG SL tablet  up to max of 3 total doses. If no relief after 1 dose, call 911. pantoprazole (PROTONIX) 40 MG tablet  Take 40 mg by mouth daily             SITagliptin (JANUVIA) 25 MG tablet  Take 1 tablet by mouth daily             torsemide (DEMADEX) 20 MG tablet  Take 1 tablet by mouth daily             traZODone (DESYREL) 50 MG tablet  Take 50 mg by mouth nightly                    Activity: activity as tolerated  Diet: cardiac diet    Follow-up with Bela Palacios PA-C in 5 days    Signed: Electronically signed by Jenna Diez PA-C on 6/15/2021 at 2:07 PM  I have independently evaluated and examined this patient today. I have reviewed radiologic and biochemical tests on this patient. Management Plan is developed mutually with APRIL Urrutia. I have reviewed above note and agree with assessment and plan.   Time spent on discharge 35 minutes

## 2021-07-15 PROBLEM — E11.01 DIABETIC HYPEROSMOLAR COMA (HCC): Status: ACTIVE | Noted: 2021-01-01

## 2021-07-15 NOTE — ED PROVIDER NOTES
EMERGENCY DEPARTMENT ENCOUNTER      CHIEF COMPLAINT    Chief Complaint   Patient presents with    Hyperglycemia       HPI    Luis Pedro is a 60 y.o. female with history significant for type 2 diabetes atrial fibrillation CAD heart failure preserved ejection fraction hypertension TIAs who presents with mental status hyperglycemia. Patient was found passing out on her porch EMS was called, patient's glucose was above highest detectable level. Patient still altered unable to obtain much more history. REVIEW OF SYSTEMS unable to obtain due to altered mental status        Pertinent positives and negatives are delineated in HPI and ROS above, all other systems are reviewed and are negative    PAST MEDICAL HISTORY    Past Medical History:   Diagnosis Date    Abnormal PFTs (pulmonary function tests) 4/14 4/14    Atrial fibrillation ([address]     CAD (coronary artery disease)     CHF (congestive heart failure) (HCC)     Diabetes mellitus (HCC)     Dizziness - light-headed     Family history of coronary artery disease     H/O 24 hour EKG monitoring 02/09/2012,12/10/2010    02/09/2012 predominant rhythm is sinus with short runs of SVT no episode of atrial fib. pt has left bundle branch morphology, max heart is 132, min is 48 with an average of 74 ,infrequent PVCs are seen    H/O cardiovascular stress test 4/1/2013, 1/31/2012 4/1/2013-    H/O cardiovascular stress test 4/16/14,02/09/2012 4/16/14 EF 52% No ischemia, normal study. 02/09/2012 EF 58% tehnique-technetium LVEF is normal globally    H/O echocardiogram 03/27/13 4/14 EF 50-55% mild MR, TR 3/13EF 35-45% decreasedr L ventrical systolic function.     H/O echocardiogram 4/14 4/14-EF50-55% Mild MR/TR. 10/11 EF > 55% LVS function is normal, calcified mitral apparatus, impaired LV relaxation, no PE    Heart imaging 02/09/2012 02/09/2012 MUGA rest EF 58%LVEF is normal globally    Heartburn     History of cardiac cath 05/08/2009, 2006,moderate stenosis  small diagonal    History of cardiac monitoring 2/9/15    Event - sinus rhythm    History of cardioversion 3/14/14    History of MI (myocardial infarction)     History of nuclear MUGA 2020    EF 41%    History of nuclear Muga stress test 2020    EF 41%    History of PTCA 10/06/2010    10/06/2010 stent 3.0x24 taxus stent left main 90% stenosis mid seg reduced to 0% second area of [id number] to 40% not flow limiting EF 40%    Hx of cardiovascular stress test 2015    lexiscan-normal,EF70%    Hx of transesophageal echocardiography (LUKE) for monitoring 2017    Noted to have MARY GRACE Blood Clot.  Hyperlipidemia     Hypertension     Insomnia     SOB (shortness of breath)     Tachycardia 2012    report in epic notes-Dr Nunn hosp. consult    TIA (transient ischemic attack)      Medical history reviewed and no pertinent past medical history other than the ones mentioned above    SURGICAL HISTORY    Past Surgical History:   Procedure Laterality Date    ABLATION OF DYSRHYTHMIC FOCUS       SECTION      CHOLECYSTECTOMY      DIAGNOSTIC CARDIAC CATH LAB PROCEDURE  2006, 2009 moderate stenosis small diagonal    OTHER SURGICAL HISTORY  2017    afib ablation LUKE    PTCA  10/06/2010    10/06/2010 stent 3.0x24 taxus stent 90% stenosis mid segment reduced to 0% second area of stenosis 30-40% not flow limiting EF 40%    TUBAL LIGATION       Surgical history reviewed and no pertinent surgical history other than the ones mentioned above    CURRENT MEDICATIONS    Current Outpatient Rx   Medication Sig Dispense Refill    SITagliptin (JANUVIA) 25 MG tablet Take 1 tablet by mouth daily 30 tablet 3    glipiZIDE (GLUCOTROL) 5 MG tablet Take 0.5 tablets by mouth every morning (before breakfast) 60 tablet 3    torsemide (DEMADEX) 20 MG tablet Take 1 tablet by mouth daily 30 tablet 3    insulin lispro (HUMALOG) 100 UNIT/ML injection vial Inject into the skin 3 times daily (before meals)      traZODone (DESYREL) 50 MG tablet Take 50 mg by mouth nightly      amiodarone (PACERONE) 100 MG tablet Take 1 tablet by mouth 2 times daily First take two tabs by mouth two times daily for 7 days, then take one tab by mouth twice daily 67 tablet 0    nitroGLYCERIN (NITROSTAT) 0.4 MG SL tablet up to max of 3 total doses.  If no relief after 1 dose, call 911. 25 tablet 3    lisinopril (PRINIVIL;ZESTRIL) 5 MG tablet Take 1 tablet by mouth daily 30 tablet 3    metoprolol succinate (TOPROL XL) 100 MG extended release tablet Take 1 tablet by mouth daily TAKE ONE TABLET BY MOUTH DAILY 30 tablet 3    albuterol sulfate HFA (VENTOLIN HFA) 108 (90 Base) MCG/ACT inhaler Inhale 2 puffs into the lungs 4 times daily as needed for Wheezing 1 Inhaler 5    cloNIDine (CATAPRES) 0.3 MG tablet Take 0.3 mg by mouth nightly      atorvastatin (LIPITOR) 40 MG tablet Take 40 mg by mouth nightly      pantoprazole (PROTONIX) 40 MG tablet Take 40 mg by mouth daily      levothyroxine (SYNTHROID) 50 MCG tablet Take 50 mcg by mouth Daily      apixaban (ELIQUIS) 5 MG TABS tablet Take 1 tablet by mouth 2 times daily 60 tablet 2    aspirin 81 MG EC tablet Take 81 mg by mouth daily       Medication is reviewed    ALLERGIES    Allergies   Allergen Reactions    Amiodarone Other (See Comments)     dizziness    Iv Dye [Iodides] Nausea And Vomiting    Vicodin [Hydrocodone-Acetaminophen] Nausea And Vomiting     Allergy is reviewed    FAMILY HISTORY    Family History   Problem Relation Age of Onset    Diabetes Father     Heart Disease Father      Family history reviewed and no pertinent family history other than the ones mentioned above    SOCIAL HISTORY    Social History     Socioeconomic History    Marital status:      Spouse name: Not on file    Number of children: Not on file    Years of education: Not on file    Highest education level: Not on file Occupational History    Not on file   Tobacco Use    Smoking status: Former Smoker     Packs/day: 3.00     Years: 20.00     Pack years: 60.00     Types: Cigarettes     Quit date: 2007     Years since quittin.4    Smokeless tobacco: Never Used    Tobacco comment: reviewed 8/12/15   Vaping Use    Vaping Use: Never used   Substance and Sexual Activity    Alcohol use: No     Comment: occas. caffeine 3 mtn. dew a day    Drug use: No    Sexual activity: Not Currently   Other Topics Concern    Not on file   Social History Narrative    Not on file     Social Determinants of Health     Financial Resource Strain:     Difficulty of Paying Living Expenses:    Food Insecurity:     Worried About Running Out of Food in the Last Year:     [address] in the Last Year:    Transportation Needs:     Lack of Transportation (Medical):  Lack of Transportation (Non-Medical):    Physical Activity:     Days of Exercise per Week:     Minutes of Exercise per Session:    Stress:     Feeling of Stress :    Social Connections:     Frequency of Communication with Friends and Family:     Frequency of Social Gatherings with Friends and Family:     Attends Gnosticism Services:     Active Member of Clubs or Organizations:     Attends Club or Organization Meetings:     Marital Status:    Intimate Partner Violence:     Fear of Current or Ex-Partner:     Emotionally Abused:     Physically Abused:     Sexually Abused:      Live with UNKNOWN  Alcohol and recreational drug use: UNKNOWN  Social history reviewed and no pertinent social history other than the ones mentioned above    PHYSICAL EXAM    Vital Signs:BP (!) 146/100   Pulse 115   Temp 98.3 °F (36.8 °C) (Oral)   Resp 24   Ht 5' (1.524 m)   Wt 180 lb (81.6 kg)   SpO2 99%   BMI 35.15 kg/m²   I have reviewed the triage vital signs.   Constitutional: Uncomfortable appearing tachypneic  Eyes: PERRL, no conjunctival injection  HENT: NCAT, Neck supple without meningismus   CV: Tachycardic, Warm, no edema  RESP: Tachypnea but in no respiratory distress  GI: soft, non-distended, diffusely tender to palpation  MSK: No gross deformities  Skin: Warm, dry. No rashes  Neuro: Alert, disoriented moving all extremities      Labs:   Labs Reviewed - No data to display    Radiology:  No orders to display       I directly reviewed the images and radiology interpretation    EKG interpreted by myself: Tachycardic to 116, no significant ST-T wave changes, T wave does appear to be slightly peaked in V2 and V3, patient does have a left bundle branch block does not meet Sgarbossa criteria for MI    ED COURSE  Assessment & Medical Decision Making:  Beryle Barrow is a 60 y.o. female who presents with hyperglycemia altered mental status, could be infectious ACS, electrolyte abnormalities, hypoperfusion due to other etiologies I attributed patient's mental status and hyperglycemia. Patient is not type I, patient does appear to be very dry it could be HHS versus DKA which is slightly less likely given patient's type 2 diabetes. Patient does have significant elevated glucose with ketones and does have mild anion gap as well we will treat for HHS and DKA with ample fluids in addition to insulin drip. Patient also has elevated lactate despite not having a fever given patient is altered her sepsis needs to be considered as well patient's fluid received is adequate for the sepsis treatment and broad-spectrum antibiotics also initiated.     Mental status work-up was initiated with CT abdomen pelvis and CT head given patient's exam finding both of which were unremarkable patient will require admission to the hospital          DDx includes but not limited to: DKA, HHS, sepsis, electrolyte normalities, infarction, infection    Workup includes but not limited to: DKA work-up, sepsis work-up of altered mental status work-up    Treatment includes but not limited to: Broad-spectrum

## 2021-07-16 NOTE — PROGRESS NOTES
10T PT Note:    PT eval/treat orders received.  Chart reviewed.  Pt currently has COVID-19 test pending.  Per protocol, will hold PT eval until results available.     Pt is not in appropriate condition for mri at this time per nurse - ok to cancel for now and Doctor will reorder when pt condition improves

## 2021-07-16 NOTE — CONSULTS
Consult completed without success. RUE Basilic and Brachial Veins accessed without difficulty but guidewire/PICC will not advance past subclavian at IJ junction despite extensive troubleshooting/repositioning and MidLine will not meet pt's therapeutic needs. Incidentally, pt highly agitated and thrashing throughout procedure requiring 3 additional RN's at bedside attempting to hold pt still & maintain integrity of sterile field. Order for IR consult placed per CVICU RN/ICU Charge RN.

## 2021-07-16 NOTE — CONSULTS
Pt seen ,examined,interviewed and chart reviewed. Please see the dictated consult for details     Imp :   1. CKD stage 3 a/b A3-  She  Has  Atrophic  Lt kidney and  Severe ASCVD- potential other etiology- CRS/DM/ sever CMP etc   2. DKA - had high BHB level and low serum bicarb on presentation   3. ADHF withy pulm edema and severe  CMP   4. Unresponsiveness- potential  Etiology- - top of the  d/d -> CVA  tam with a.fib and massive proteinuria -    Other potential -- hypoxia -DKLA- infection   still difficult to explain-  co ac CVA - finally OD tam with ASA less likely but possible tam with tachypnea- although pulm edema   And DKA can explain the sx   5. underlying DM / a,fib  6. Low mg     Plan:  1. Stop IVF  2. lasix 40 TID  3. She needs MRI  Of radha   4. Replete mg and watch for K- mg and phos   5. Also r/o any infection - and ASA  Level   6.  Follow clinically and bio chemically       Thanks for the consult    #[id number]

## 2021-07-16 NOTE — PROGRESS NOTES
Dr. Rhonda Navarrete at bedside. Attempting to place R central line. Patient agitated. 2 MG versed given IV push. Access to vein but unable to advance wire. Ultrasound used. Consult to IR placed and cxr ordered.        Gloria Bailey RN

## 2021-07-16 NOTE — CONSULTS
INPATIENT CARDIOLOGY CONSULT NOTE       Reason for consultation:  AFIB RVR     Referring physician:  Deisi Rendon MD     Primary care physician: Vincent English PA-C      Dear Deisi Rendon MD Thank you for the consult    Chief Complaint   Patient presents with    Hyperglycemia       History of present illness:Danae is a 60 y. o.year old who  presents with  Chief Complaint   Patient presents with    Hyperglycemia     Patient is 21-year-old female with prior medical history significant for atrial fibrillation, CKD, diabetes mellitus, coronary artery disease s/p PCI, history of cardiomyopathy with last MUGA scan showing EF of 41%, A. fib s/p pulmonary vein isolation in 2017 by Dr. Forest Weir. Patient is admitted to the hospital with diabetic ketoacidosis, she is currently confused and cannot provide history. .  Cardiologist consulted for atrial fibrillation with rapid ventricular response. EKG shows A. fib RVR  Telemetry indicates A. fib RVR, blood pressure systolic more than 577. Past medical history:    has a past medical history of Abnormal PFTs (pulmonary function tests), Atrial fibrillation ([address] CAD (coronary artery disease), CHF (congestive heart failure) ([facility] [address] Diabetes mellitus ([facility] [address] Dizziness - light-headed, Family history of coronary artery disease, H/O 24 hour EKG monitoring, H/O cardiovascular stress test, H/O cardiovascular stress test, H/O echocardiogram, H/O echocardiogram, Heart imaging, Heartburn, History of cardiac cath, History of cardiac monitoring, History of cardioversion, History of MI (myocardial infarction), History of nuclear MUGA, History of nuclear Muga stress test, History of PTCA, Hx of cardiovascular stress test, Hx of transesophageal echocardiography (LUKE) for monitoring, Hyperlipidemia, Hypertension, Insomnia, SOB (shortness of breath), Tachycardia, and TIA (transient ischemic attack).   Past surgical history:   has a past surgical history that includes  section; Tubal ligation; Cholecystectomy; Diagnostic Cardiac Cath Lab Procedure (2006, 2009); Percutaneous Transluminal Coronary Angio (10/06/2010); other surgical history (2017); and ablation of dysrhythmic focus. Social History:   reports that she quit smoking about 14 years ago. Her smoking use included cigarettes. She has a 60.00 pack-year smoking history. She has never used smokeless tobacco. She reports that she does not drink alcohol and does not use drugs.   Family history:   no family history of CAD, STROKE of DM    Allergies   Allergen Reactions    Amiodarone Other (See Comments)     dizziness    Iv Dye [Iodides] Nausea And Vomiting    Vicodin [Hydrocodone-Acetaminophen] Nausea And Vomiting       albuterol sulfate  (90 Base) MCG/ACT inhaler 2 puff, 4x Daily PRN  amiodarone (CORDARONE) tablet 100 mg, BID  apixaban (ELIQUIS) tablet 5 mg, BID  aspirin EC tablet 81 mg, Daily  levothyroxine (SYNTHROID) tablet 50 mcg, Daily  metoprolol succinate (TOPROL XL) extended release tablet 100 mg, Daily  nitroGLYCERIN (NITROSTAT) SL tablet 0.4 mg, Q5 Min PRN  pantoprazole (PROTONIX) tablet 40 mg, Daily  alogliptin (NESINA) tablet 6.25 mg, Daily  traZODone (DESYREL) tablet 50 mg, Nightly PRN  glucose (GLUTOSE) 40 % oral gel 15 g, PRN  dextrose 50 % IV solution, PRN  glucagon (rDNA) injection 1 mg, PRN  dextrose 5 % solution, PRN  sodium chloride flush 0.9 % injection 5-40 mL, 2 times per day  sodium chloride flush 0.9 % injection 5-40 mL, PRN  0.9 % sodium chloride infusion, PRN  ondansetron (ZOFRAN-ODT) disintegrating tablet 4 mg, Q8H PRN   Or  ondansetron (ZOFRAN) injection 4 mg, Q6H PRN  polyethylene glycol (GLYCOLAX) packet 17 g, Daily PRN  acetaminophen (TYLENOL) tablet 650 mg, Q6H PRN   Or  acetaminophen (TYLENOL) suppository 650 mg, Q6H PRN  [Held by provider] insulin glargine (LANTUS) injection vial 20 Units, Nightly  0.9 % sodium chloride infusion, Continuous  sodium chloride flush 0.9 % injection 5-40 mL, 2 times per day  sodium chloride flush 0.9 % injection 5-40 mL, PRN  0.9 % sodium chloride infusion, PRN  dilTIAZem 100 mg in dextrose 5 % 100 mL infusion (ADD-[place]), Continuous  cefepime (MAXIPIME) 2000 mg IVPB minibag, Q12H  vancomycin (VANCOCIN) 1250 mg in 250 mL IVPB, Q24H  insulin regular (MYXREDLIN) 100 units in sodium chloride 0.9 % 100 ml infusion, Continuous      Current Facility-Administered Medications   Medication Dose Route Frequency Provider Last Rate Last Admin    albuterol sulfate  (90 Base) MCG/ACT inhaler 2 puff  2 puff Inhalation 4x Daily PRN Salvatore Calvin MD        amiodarone (CORDARONE) tablet 100 mg  100 mg Oral BID Salvatore Calvin MD        apixaban (ELIQUIS) tablet 5 mg  5 mg Oral BID Salvatore Calvin MD        aspirin EC tablet 81 mg  81 mg Oral Daily Salvatore Calvin MD        levothyroxine (SYNTHROID) tablet 50 mcg  50 mcg Oral Daily Salvatore Calvin MD        metoprolol succinate (TOPROL XL) extended release tablet 100 mg  100 mg Oral Daily Salvatore Calvin MD        nitroGLYCERIN (NITROSTAT) SL tablet 0.4 mg  0.4 mg Sublingual Q5 Min PRN Salvatore Calvin MD        pantoprazole (PROTONIX) tablet 40 mg  40 mg Oral Daily Salvatore Calvin MD        alogliptin (NESINA) tablet 6.25 mg  6.25 mg Oral Daily Salvatore Calvin MD        traZODone (DESYREL) tablet 50 mg  50 mg Oral Nightly PRN Salvatore Calvin MD        glucose (GLUTOSE) 40 % oral gel 15 g  15 g Oral PRN Salvatore Calvin MD        dextrose 50 % IV solution  12.5 g Intravenous PRN Salvatore Calvin MD        glucagon (rDNA) injection 1 mg  1 mg Intramuscular PRN Salvatore Calvin MD        dextrose 5 % solution  100 mL/hr Intravenous PRN Salvatore Calvin MD        sodium chloride flush 0.9 % injection 5-40 mL  5-40 mL Intravenous 2 times per day Salvatore Calvin MD   10 mL at 07/16/21 0945    sodium chloride flush 0.9 % injection 5-40 mL  5-40 mL Intravenous PRN Salvatore Clayton MD        0.9 % sodium chloride infusion  25 mL Intravenous PRN Salvatore Clayton MD        ondansetron (ZOFRAN-ODT) disintegrating tablet 4 mg  4 mg Oral Q8H PRN Salvatore Clayton MD        Or    ondansetron (ZOFRAN) injection 4 mg  4 mg Intravenous Q6H PRN Salvatroe Clayton MD        polyethylene glycol (GLYCOLAX) packet 17 g  17 g Oral Daily PRN Salvatore Clayton MD        acetaminophen (TYLENOL) tablet 650 mg  650 mg Oral Q6H PRN Salvatore Clayton MD        Or   Bernestine Reyes acetaminophen (TYLENOL) suppository 650 mg  650 mg Rectal Q6H PRN Salvatore Clayton MD        [Held by provider] insulin glargine (LANTUS) injection vial 20 Units  0.25 Units/kg Subcutaneous Nightly Salvatore Clayton MD        0.9 % sodium chloride infusion   Intravenous Continuous Salvatore Clayton  mL/hr at 07/16/21 1135 New Bag at 07/16/21 1135    sodium chloride flush 0.9 % injection 5-40 mL  5-40 mL Intravenous 2 times per day Salvatore Clayton MD   10 mL at 07/16/21 0929    sodium chloride flush 0.9 % injection 5-40 mL  5-40 mL Intravenous PRN Salvatore Clayton MD        0.9 % sodium chloride infusion  25 mL Intravenous PRN Salvatore Clayton MD        dilTIAZem 100 mg in dextrose 5 % 100 mL infusion (ADD-[place])  5-15 mg/hr Intravenous Continuous Salvatore Clayton MD 5 mL/hr at 07/16/21 0926 5 mg/hr at 07/16/21 0926    cefepime (MAXIPIME) 2000 mg IVPB minibag  2,000 mg Intravenous Q12H Salvatore Clayton MD   Stopped at 07/16/21 1124    vancomycin (VANCOCIN) 1250 mg in 250 mL IVPB  1,250 mg Intravenous Q24H Salvatore Clayton MD        insulin regular (MYXREDLIN) 100 units in sodium chloride 0.9 % 100 ml infusion  1-50 Units/hr Intravenous Continuous Tiera Au MD   Stopped at 07/16/21 1100         Review of Systems: Confused, review of system could not be obtained. Physical Examination:      Vitals:    07/16/21 1200   BP: (!) 145/83   Pulse: 109   Resp: 30   Temp: 99.9 °F (37.7 °C)   SpO2: 97%      Wt Readings from Last 3 Encounters:   07/15/21 180 lb (81.6 kg)   06/15/21 178 lb 12.8 oz (81.1 kg)   12/30/20 190 lb 3.2 oz (86.3 kg)     Body mass index is 35.15 kg/m². General Appearance:  ++ distress, not  conversant  Constitutional:  Well developed, Well nourished  HEENT:  Normocephalic, Atraumatic, Oropharynx moist, No oral exudates,   Nose normal. Neck Supple Carotid: no carotid bruit  Eyes:  Conjunctiva normal, No discharge. Respiratory:    Normal breath sounds, No respiratory distress, No wheezing, no use of accessory muscles, diaphragm movement is normal  No chest Tenderness  Cardiovascular: S1-S2 No murmurs auscultated. No rubs, thrills or gallops. IRIR rhythm. Pedal pulses are normal. No pedal edema  GI:  Soft Non tender, non distended. :  No CVA tenderness. Musculoskeletal:   No tenderness, No cyanosis, No clubbing. Integument:  Warm, Dry, No erythema, No rash. Lymphatic:  No lymphadenopathy noted. Neurologic: confused   Psychiatric:  Confused        Lab Review     Recent Labs     07/16/21  0555   WBC 22.6*   HGB 10.6*   HCT 32.8*         Recent Labs     07/16/21  1140      K 3.4*      CO2 26   BUN 27*   CREATININE 1.4*     Recent Labs     07/15/21  1830 07/15/21  1830 07/16/21  0555   AST 33  33   < > 329*   ALT 55*  55*   < > 204*   BILIDIR 2.1*  --   --    BILITOT 3.6*  3.6*   < > 2.7*   ALKPHOS 204*  204*   < > 205*    < > = values in this interval not displayed. No results for input(s): TROPONINI in the last 72 hours.   Lab Results   Component Value Date     (H) 04/17/2013    BNP 61 05/07/2012    BNP 86 01/30/2012     Lab Results   Component Value Date    INR 1.17 07/15/2021    PROTIME 15.1 (H) 07/15/2021         All labs, images, EKGs were personally reviewed      Assessment: 60 y. o.year old with PMH of  has a past medical history of Abnormal PFTs (pulmonary function tests), Atrial fibrillation ([facility] [address] CAD (coronary artery disease), CHF (congestive heart failure) ([facility] [address] Diabetes mellitus ([facility] [address] Dizziness - light-headed, Family history of coronary artery disease, H/O 24 hour EKG monitoring, H/O cardiovascular stress test, H/O cardiovascular stress test, H/O echocardiogram, H/O echocardiogram, Heart imaging, Heartburn, History of cardiac cath, History of cardiac monitoring, History of cardioversion, History of MI (myocardial infarction), History of nuclear MUGA, History of nuclear Muga stress test, History of PTCA, Hx of cardiovascular stress test, Hx of transesophageal echocardiography (LUKE) for monitoring, Hyperlipidemia, Hypertension, Insomnia, SOB (shortness of breath), Tachycardia, and TIA (transient ischemic attack). Recommendations:      1. Paroxysmal atrial fibrillation  2. Atrial fibrillation with rapid ventricular response    Started patient on IV diltiazem. Continue. Continue with Eliquis 5 twice daily. On oral amiodarone and milligrams twice daily. Continue  Continue oral metoprolol 100 mg daily  If patient does not convert to sinus rhythm by Monday, will consider LUKE guided cardioversion  Patient last seen in June 2021 for paroxysmal A. fib in hospital.  She then converted to sinus rhythm. 3. Hypertensive urgency: Continue with IV Cardizem drip, Toprol-XL. 4. Congestive heart failure: Last MUGA scan 41% EF. BNP is elevated at 35,000. Obtain echocardiogram.  Will give 1 dose of Lasix 80 mg. Nephrology following   5. Confusion, encephalopathy secondary to ? DKA ? Hypertension. Neurology following. 6. Hyperlipidemia: Stable  7. Coronary disease s/p PCI: Stable continue meds  8.  Diabetes mellitus, DKA, per primary team.       Thank you for the consult    Dr. Raza Cage  7/16/2021 12:43 PM

## 2021-07-16 NOTE — PROGRESS NOTES
Dr. Maricruz Wilder at bedside, udpated on pt's status, pt alert when saying name. Responds to name and moves all extremities spontaneously but not to command. Pt does not make eye contact. Pupils equal round and reactive bilaterally. Pt's skin intact. HR 130s, irregular. Unable to give PO medications d/t responsiveness. Will continue to monitor closely.

## 2021-07-16 NOTE — PROCEDURES
Patient Name: Senia Rice   Medical Record Number: [id number]  Date: 7/16/2021   Time: 2:44 PM   Room/Bed: 2128/2128-A      Ultrasound-Guided Central Line Placement Procedure Note  Indication: poor peripheral access, central venous monitoring and centrally administered medications    Consent: Unable to be obtained due to the emergent nature of this procedure. Procedure: The patient was positioned appropriately and the skin over the right subclavian vein was first prepped and draped. Using landmarks the right subclavian vein was accessed with flashback of dark venous blood. I was unable to pass a wire into the vein however. After several attempts this location was abandoned. [facility] was then prepped with chlorhexidine. Local anesthesia was obtained by infiltration using 1% Lidocaine without epinephrine. A large bore needle was used to identify the vein under sterile ultrasound guidance. A guide wire was again attempted to be passed but I could not get it to advance without resistance despite good flashback of venous blood into the syringe. The procedure was again aborted. A post procedure X-ray was ordered and showed no evidence of pneumothorax. The patient tolerated the procedure with difficulty being restless and requiring versed for sedation. .    Blood loss: less than 20 mL    Complications: unable to place a line    IR was consulted for further attempt at line placement.     Electronically signed by Sue Dougherty MD on 7/16/2021 at 2:44 PM

## 2021-07-16 NOTE — CONSULTS
Department of General Surgery   Surgical Service Dr. Panchito Ayala   Consult Note    Date of Consult: 7/16/21    Reason for Consult:  Need for central venous access  Requesting Physician:  Dr. Abraham Jon:  Altered mental status, DKA, sepsis    History Obtained From:  electronic medical record    HISTORY OF PRESENT ILLNESS:    The patient is a 60 y.o. female who presented with altered mental status and was found to have DKA and sepsis. She has been difficult to get good IV access. PICC line was attempted but unable to thread. Past Medical History:    Past Medical History:   Diagnosis Date    Abnormal PFTs (pulmonary function tests) 4/14 4/14    Atrial fibrillation (HCC)     CAD (coronary artery disease)     CHF (congestive heart failure) (HCC)     Diabetes mellitus (HCC)     Dizziness - light-headed     Family history of coronary artery disease     H/O 24 hour EKG monitoring 02/09/2012,12/10/2010    02/09/2012 predominant rhythm is sinus with short runs of SVT no episode of atrial fib. pt has left bundle branch morphology, max heart is 132, min is 48 with an average of 74 ,infrequent PVCs are seen    H/O cardiovascular stress test 4/1/2013, 1/31/2012 4/1/2013-    H/O cardiovascular stress test 4/16/14,02/09/2012 4/16/14 EF 52% No ischemia, normal study. 02/09/2012 EF 58% tehnique-technetium LVEF is normal globally    H/O echocardiogram 03/27/13 4/14 EF 50-55% mild MR, TR 3/13EF 35-45% decreasedr L ventrical systolic function.     H/O echocardiogram 4/14 4/14-EF50-55% Mild MR/TR. 10/11 EF > 55% LVS function is normal, calcified mitral apparatus, impaired LV relaxation, no PE    Heart imaging 02/09/2012 02/09/2012 MUGA rest EF 58%LVEF is normal globally    Heartburn     History of cardiac cath 05/08/2009, 06/14/2006 05/08/2009,moderate stenosis  small diagonal    History of cardiac monitoring 2/9/15    Event - sinus rhythm    History of cardioversion 3/14/14    History of MI (myocardial infarction)     History of nuclear MUGA 2020    EF 41%    History of nuclear Muga stress test 2020    EF 41%    History of PTCA 10/06/2010    10/06/2010 stent 3.0x24 taxus stent left main 90% stenosis mid seg reduced to 0% second area of [id number] to 40% not flow limiting EF 40%    Hx of cardiovascular stress test 2015    lexiscan-normal,EF70%    Hx of transesophageal echocardiography (LUKE) for monitoring 2017    Noted to have MARY GRACE Blood Clot.  Hyperlipidemia     Hypertension     Insomnia     SOB (shortness of breath)     Tachycardia 2012    report in epic notes-Dr N[facility] hosp. consult    TIA (transient ischemic attack)        Past Surgical History:    Past Surgical History:   Procedure Laterality Date    ABLATION OF DYSRHYTHMIC FOCUS       SECTION      CHOLECYSTECTOMY      DIAGNOSTIC CARDIAC CATH LAB PROCEDURE  2006, 2009 moderate stenosis small diagonal    IR NONTUNNELED VASCULAR CATHETER  2021    IR NONTUNNELED VASCULAR CATHETER 2021 [facility] PROCEDURES    OTHER SURGICAL HISTORY  2017    afib ablation LUKE    PTCA  10/06/2010    10/06/2010 stent 3.0x24 taxus stent 90% stenosis mid segment reduced to 0% second area of stenosis 30-40% not flow limiting EF 40%    TUBAL LIGATION         Current Medications:   Current Facility-Administered Medications   Medication Dose Route Frequency Provider Last Rate Last Admin    albuterol sulfate  (90 Base) MCG/ACT inhaler 2 puff  2 puff Inhalation 4x Daily PRN Salvatore Craven MD        amiodarone (CORDARONE) tablet 100 mg  100 mg Oral BID Salvatore Craven MD        apixaban (ELIQUIS) tablet 5 mg  5 mg Oral BID Salvatore Craven MD        aspirin EC tablet 81 mg  81 mg Oral Daily Salvatore Craven MD        levothyroxine (SYNTHROID) tablet 50 mcg  50 mcg Oral Daily Salvatore Craven MD        metoprolol succinate (TOPROL XL) extended release tablet 100 mg  100 mg Oral Daily Salvatore Calvin MD        nitroGLYCERIN (NITROSTAT) SL tablet 0.4 mg  0.4 mg Sublingual Q5 Min PRN Salvatore Calvin MD        pantoprazole (PROTONIX) tablet 40 mg  40 mg Oral Daily Salvatore Calvin MD        alogliptin (NESINA) tablet 6.25 mg  6.25 mg Oral Daily Salvatore Calvin MD        traZODone (DESYREL) tablet 50 mg  50 mg Oral Nightly PRN Salvatore Calvin MD        glucose (GLUTOSE) 40 % oral gel 15 g  15 g Oral PRN Salvatore Calvin MD        dextrose 50 % IV solution  12.5 g Intravenous PRN Salvatore Calvin MD        glucagon (rDNA) injection 1 mg  1 mg Intramuscular PRN Salvatore Calvin MD        dextrose 5 % solution  100 mL/hr Intravenous PRN Kei Erazo MD        sodium chloride flush 0.9 % injection 5-40 mL  5-40 mL Intravenous 2 times per day Kei Erazo MD   10 mL at 07/16/21 0945    sodium chloride flush 0.9 % injection 5-40 mL  5-40 mL Intravenous PRN Kei Erazo MD        0.9 % sodium chloride infusion  25 mL Intravenous PRN Salvatore Calvin MD        ondansetron (ZOFRAN-ODT) disintegrating tablet 4 mg  4 mg Oral Q8H PRN Salvatore Calvin MD        Or    ondansetron (ZOFRAN) injection 4 mg  4 mg Intravenous Q6H PRN Salvatore Calvin MD        polyethylene glycol (GLYCOLAX) packet 17 g  17 g Oral Daily PRN Salvatore Calvin MD        acetaminophen (TYLENOL) tablet 650 mg  650 mg Oral Q6H PRN Salvatore Calvin MD        Or   [facility] acetaminophen (TYLENOL) suppository 650 mg  650 mg Rectal Q6H PRN Salvatore Calvin MD        [Held by provider] insulin glargine (LANTUS) injection vial 20 Units  0.25 Units/kg Subcutaneous Nightly Salvatore Calvin MD        0.9 % sodium chloride infusion   Intravenous Continuous Salvatore Calvin  mL/hr at 07/16/21 1135 New Bag at 07/16/21 1135    sodium chloride flush 0.9 % injection 5-40 mL  5-40 mL Intravenous 2 times per day Salvatore Fletcher MD   10 mL at 21 0929    sodium chloride flush 0.9 % injection 5-40 mL  5-40 mL Intravenous PRN Keshav Araujo MD        0.9 % sodium chloride infusion  25 mL Intravenous PRN Salvatore Fletcher MD        dilTIAZem 100 mg in dextrose 5 % 100 mL infusion (ADD[facility])  5-15 mg/hr Intravenous Continuous Salvatore Fletcher MD 10 mL/hr at 21 1258 10 mg/hr at 21 1258    cefepime (MAXIPIME) 2000 mg IVPB minibag  2,000 mg Intravenous Q12H Keshav Araujo MD   Stopped at 21 1124    vancomycin (VANCOCIN) 1250 mg in 250 mL IVPB  1,250 mg Intravenous Q24H Salvatore Fletcher MD        furosemide (LASIX) injection 80 mg  80 mg Intravenous Once Maryan Ramirez MD        insulin regular (MYXREDLIN) 100 units in sodium chloride 0.9 % 100 ml infusion  1-50 Units/hr Intravenous Continuous Ragini Greene MD   Stopped at 21 1100       Allergies:  Amiodarone, Iv dye [iodides], and Vicodin [hydrocodone-acetaminophen]    Social History:   Social History     Socioeconomic History    Marital status:      Spouse name: Not on file    Number of children: Not on file    Years of education: Not on file    Highest education level: Not on file   Occupational History    Not on file   Tobacco Use    Smoking status: Former Smoker     Packs/day: 3.00     Years: 20.00     Pack years: 60.00     Types: Cigarettes     Quit date: 2007     Years since quittin.4    Smokeless tobacco: Never Used    Tobacco comment: reviewed 8/12/15   Vaping Use    Vaping Use: Never used   Substance and Sexual Activity    Alcohol use: No     Comment: occas. caffeine 3 mtn.  dew a day    Drug use: No    Sexual activity: Not Currently   Other Topics Concern    Not on file   Social History Narrative    Not on file     Social Determinants of Health     Financial Resource Strain:     Difficulty of Paying Living Expenses:    Food Insecurity:     Worried About Running Out of Food in the Last Year:     [address] in the Last Year:    Transportation Needs:     Lack of Transportation (Medical):  Lack of Transportation (Non-Medical):    Physical Activity:     Days of Exercise per Week:     Minutes of Exercise per Session:    Stress:     Feeling of Stress :    Social Connections:     Frequency of Communication with Friends and Family:     Frequency of Social Gatherings with Friends and Family:     Attends Holiness Services:     Active Member of Clubs or Organizations:     Attends Club or Organization Meetings:     Marital Status:    Intimate Partner Violence:     Fear of Current or Ex-Partner:     Emotionally Abused:     Physically Abused:     Sexually Abused:        Family History:   Family History   Problem Relation Age of Onset    Diabetes Father     Heart Disease Father        REVIEW OF SYSTEMS:    Unable to obtain due to altered mental status    PHYSICAL EXAM:  Vitals:    07/16/21 0900 07/16/21 1000 07/16/21 1100 07/16/21 1200   BP: (!) 202/185 (!) 166/110 (!) 152/81 (!) 145/83   Pulse: 149 87 108 109   Resp: 23 26 (!) 32 30   Temp:    99.9 °F (37.7 °C)   TempSrc:    Rectal   SpO2: 100% 95%  97%   Weight:       Height:           Physical Exam  General: lethargic, confused, in no acute distress  HEENT: mucous membranes moist  Respiratory: normal effort, no wheezes appreciated  CV: tachycardic  Abdomen: Soft, obeser, non-distended. No guarding or rebound tenderness.   Skin: warm and dry  Extremities: atraumatic  Neuro: decreased level of consciousness   Psych: mood unable to assess        DATA:    Lab Results   Component Value Date    WBC 22.6 (H) 07/16/2021    HGB 10.6 (L) 07/16/2021    HCT 32.8 (L) 07/16/2021    MCV 94.5 07/16/2021     07/16/2021     Lab Results   Component Value Date     07/16/2021    K 3.4 07/16/2021     07/16/2021    CO2 26 07/16/2021    BUN 27 07/16/2021    CREATININE 1.4 07/16/2021    GLUCOSE 75 07/16/2021    CALCIUM 9.2 07/16/2021          IMPRESSION:    60 y.o. female with encephalopathy, DKA and sepsis. Need for central venous access for medications and monitoring. Had difficulty getting PICC. Patient Active Problem List:     History of CVA (cerebrovascular accident) without residual deficits     Morbid obesity ([address]     DM (diabetes mellitus), type 2, uncontrolled ([address]     Persistent atrial fibrillation (HCC)     CAD (coronary artery disease)     CHF (congestive heart failure) ([address]     Light headed     History of MI (myocardial infarction)     Diabetes mellitus ([address]     Hypertension     TIA (transient ischemic attack)     Hyperlipidemia     SOB (shortness of breath)     H/O cardiovascular stress test     Family history of coronary artery disease     PAF (paroxysmal atrial fibrillation) (HCC)     Chest pain     Atrial thrombus without antecedent myocardial infarction     S/P ablation of atrial fibrillation     Gastroenteritis     YESIKA (acute kidney injury) ([address]     Acute cystitis     Elevated liver enzymes     CHF (congestive heart failure), NYHA class I, acute on chronic, combined (HCC)     Pneumonia     Multifocal pneumonia     Atrial fibrillation with RVR (HCC)     Shoulder pain     Paroxysmal atrial fibrillation (HCC)     Diabetic hyperosmolar coma ([address]        PLAN:  - right subclavian and right IJ attempted and vein was accessed but unable to pass a wire.   - IR was consulted and was able to place a line--their help is appreciated        Electronically signed by Farhad Crespo MD on 7/16/2021 at 2:35 PM

## 2021-07-16 NOTE — PROGRESS NOTES
Unable to draw labs at this time. Unable to gain access after several sticks. Awaiting central line placement.        Clay Turner RN

## 2021-07-16 NOTE — FLOWSHEET NOTE
Critical Glucose 402 acknowledged as pt is receiving insulin infusion this is an expected finding. Will continue with current treatment and check BG at 0700 for dosing.

## 2021-07-16 NOTE — CONSULTS
Endocrinology   Consult Note        Dear Doctor Aniya Trejo     Thank You for the Consult     Pt. Was Admitted for : Altered mental state and severe hyperglycemia possible metabolic encephalopathy    Reason for Consult: Better control of blood glucose    History Obtained From: None communicative EMR       HISTORY OF PRESENT ILLNESS:                The patient is a 60 y.o. female with significant past medical history of atrial fibrillation, had history of cardioversion CAD, has PTCA congestive heart failure, diabetes mellitus, hypertension, hyperlipidemia, CKD hypothyroidism TIA I was found by EMS lying on her front porch brought to emergency room was found to be atrial fibrillation with ventricular rapid ventricular response and also hyperglycemia with a blood glucose of 700+. She is known diabetic and supposedly taking Lantus at nighttime and also oral hypoglycemic drug in the daytime. I was  consulted for better control of blood glucose. ROS:   Pt's ROS done in detail. Abnormal ROS are noted in Medical and Surgical History Section below: Other Medical History:        Diagnosis Date    Abnormal PFTs (pulmonary function tests) 4/14 4/14    Atrial fibrillation (HCC)     CAD (coronary artery disease)     CHF (congestive heart failure) (HCC)     Diabetes mellitus (HCC)     Dizziness - light-headed     Family history of coronary artery disease     H/O 24 hour EKG monitoring 02/09/2012,12/10/2010    02/09/2012 predominant rhythm is sinus with short runs of SVT no episode of atrial fib. pt has left bundle branch morphology, max heart is 132, min is 48 with an average of 74 ,infrequent PVCs are seen    H/O cardiovascular stress test 4/1/2013, 1/31/2012 4/1/2013-    H/O cardiovascular stress test 4/16/14,02/09/2012 4/16/14 EF 52% No ischemia, normal study. 02/09/2012 EF 58% tehnique-technetium LVEF is normal globally    H/O echocardiogram 03/27/13 4/14 EF 50-55% mild MR, TR 3/13EF 35-45% decreasedr L ventrical systolic function.  H/O echocardiogram -EF50-55% Mild MR/TR. 10/11 EF > 55% LVS function is normal, calcified mitral apparatus, impaired LV relaxation, no PE    Heart imaging 2012 MUGA rest EF 58%LVEF is normal globally    Heartburn     History of cardiac cath 2009, 2006,moderate stenosis  small diagonal    History of cardiac monitoring 2/9/15    Event - sinus rhythm    History of cardioversion 3/14/14    History of MI (myocardial infarction)     History of nuclear MUGA 2020    EF 41%    History of nuclear Muga stress test 2020    EF 41%    History of PTCA 10/06/2010    10/06/2010 stent 3.0x24 taxus stent left main 90% stenosis mid seg reduced to 0% second area of [id number] to 40% not flow limiting EF 40%    Hx of cardiovascular stress test 2015    lexiscan-normal,EF70%    Hx of transesophageal echocardiography (LUKE) for monitoring 2017    Noted to have MARY GRACE Blood Clot.  Hyperlipidemia     Hypertension     Insomnia     SOB (shortness of breath)     Tachycardia 2012    report in epic notes-Dr Nunn hosp. consult    TIA (transient ischemic attack)      Surgical History:        Procedure Laterality Date    ABLATION OF DYSRHYTHMIC FOCUS       SECTION      CHOLECYSTECTOMY      DIAGNOSTIC CARDIAC CATH LAB PROCEDURE  2006, 2009 moderate stenosis small diagonal    OTHER SURGICAL HISTORY  2017    afib ablation LUKE    PTCA  10/06/2010    10/06/2010 stent 3.0x24 taxus stent 90% stenosis mid segment reduced to 0% second area of stenosis 30-40% not flow limiting EF 40%    TUBAL LIGATION         Allergies:  Amiodarone, Iv dye [iodides], and Vicodin [hydrocodone-acetaminophen]    Family History:       Problem Relation Age of Onset    Diabetes Father     Heart Disease Father      REVIEW OF SYSTEMS:  Review of System Done as noted above     PHYSICAL EXAM:      Vitals:    BP (!) 163/100   Pulse 123   Temp 97.9 °F (36.6 °C) (Axillary)   Resp 23   Ht 5' (1.524 m)   Wt 180 lb (81.6 kg)   SpO2 100%   BMI 35.15 kg/m²     CONSTITUTIONAL:  awake, alert, cooperative, appears stated age   EYES:  vision intact Fundoscopic Exam not performed   ENT:Normal  NECK:  Supple, No JVD. Thyroid Exam:Normal   LUNGS:  Has Vesicular Breath Sounds breath sounds,   CARDIOVASCULAR: Atrial fibrillation  ABDOMEN:  No scars, normal bowel sounds, soft, non-distended, non-tender, no masses palpated, no hepatolienomegaly  Musculoskeletal: Normal  Extremities: Normal, peripheral pulses normal, , has no edema   NEUROLOGIC:  Awake, not verbal not responding to verbal commands even after deep pinching chest she did not respond l. DATA:    CBC:   Recent Labs     07/16/21  0555   WBC 22.6*   HGB 10.6*       CMP:  Recent Labs     07/15/21  1830 07/16/21  0127 07/16/21  0555   * 130* 132*   K 4.8 4.7 3.6   CL 91* 89* 95*   CO2 21 19* 20*   BUN 22 26* 28*   CREATININE 1.5* 1.5* 1.5*   CALCIUM 9.1 9.5 9.0   PROT 6.4  6.4  --  5.9*   LABALBU 3.5  3.5  --  3.9   BILITOT 3.6*  3.6*  --  2.7*   ALKPHOS 204*  204*  --  205*   AST 33  33  --  329*   ALT 55*  55*  --  204*     Lipids:   Lab Results   Component Value Date    CHOL 153 01/30/2012    HDL 59 01/30/2012    TRIG 135 01/30/2012     Glucose:   Recent Labs     07/16/21  0715   POCGLU 363*     Hemoglobin A1C:   Lab Results   Component Value Date    LABA1C 9.1 06/12/2021     Free T4:   Lab Results   Component Value Date    T4FREE 1.28 06/12/2021     Free T3: No results found for: FT3  TSH High Sensitivity:   Lab Results   Component Value Date    TSHHS 2.860 06/12/2021       CT ABDOMEN PELVIS WO CONTRAST Additional Contrast? None    Result Date: 7/15/2021  EXAMINATION: CT OF THE ABDOMEN AND PELVIS WITHOUT CONTRAST 7/15/2021 7:28 pm     1.  Findings at the lung bases suggesting pulmonary edema with small bilateral effusions and associated atelectasis. 2. No acute intra-abdominal process identified. 3. Severe atherosclerotic disease. CT Head WO Contrast    Result Date: 7/15/2021  EXAMINATION: CT OF THE HEAD WITHOUT CONTRAST  7/15/2021 7:28 pm     No acute intracranial abnormality. Chronic microvascular ischemic changes and global cerebral atrophy. XR CHEST PORTABLE    Result Date: 7/15/2021  EXAMINATION: ONE XRAY VIEW OF THE CHEST 7/15/2021 5:47 pm COMPARISON: 06/13/2021 HISTORY: ORDERING SYSTEM PROVIDED HISTORY: AMS TECHNOLOGIST PROVIDED HISTORY: Reason for exam:->AMS Reason for Exam: AMS Initial encounter FINDINGS: Cardiomegaly. There is increased interstitial opacities bilaterally. No pleural effusion or pneumothorax. The osseous structures otherwise stable. Cardiomegaly with pulmonary edema.        Scheduled Medicines   Medications:    amiodarone  100 mg Oral BID    apixaban  5 mg Oral BID    aspirin  81 mg Oral Daily    cloNIDine  0.3 mg Oral Nightly    glipiZIDE  2.5 mg Oral QAM AC    levothyroxine  50 mcg Oral Daily    lisinopril  5 mg Oral Daily    metoprolol succinate  100 mg Oral Daily    pantoprazole  40 mg Oral Daily    alogliptin  6.25 mg Oral Daily    sodium chloride flush  5-40 mL Intravenous 2 times per day    [Held by provider] insulin glargine  0.25 Units/kg Subcutaneous Nightly    lidocaine PF  5 mL Intradermal See Admin Instructions    sodium chloride flush  5-40 mL Intravenous 2 times per day    cefepime  2,000 mg Intravenous Q12H      Infusions:    dextrose      sodium chloride      sodium chloride 100 mL/hr at 07/16/21 0204    sodium chloride      dilTIAZem (CARDIZEM) 100 mg in dextrose 5% 100 mL (ADD[facility])      insulin regular 4 Units/hr (07/16/21 0516)         IMPRESSION    Patient Active Problem List   Diagnosis    History of CVA (cerebrovascular accident) without residual deficits    Morbid obesity ([address]    DM (diabetes mellitus), type 2, uncontrolled ([address]    Persistent atrial fibrillation (HCC)    CAD (coronary artery disease)    CHF (congestive heart failure) (HCC)    Light headed    History of MI (myocardial infarction)    Diabetes mellitus [facility]    Hypertension    TIA (transient ischemic attack)    Hyperlipidemia    SOB (shortness of breath)    H/O cardiovascular stress test    Family history of coronary artery disease    [facility]ox[facility]) [facility]    Chest pain    Atrial thrombus without antecedent myocardial infarction    S/P ablation of atrial fibrillation    Gastroenteritis    YESIKA (acute kidney injury) ([facility]    Acute cystitis    Elevated liver enzymes    CHF (congestive heart failure), NYHA class I, acute on chronic, combined ([facility]    Pneumonia    Multifocal pneumonia    Atrial fibrillation with RVR ([facility])    Shoulder pain    Paroxysmal atrial fibrillation ([facility]    Diabetic hyperosmolar coma ([facility])         RECOMMENDATIONS:      1. Reviewed POC blood glucose . Labs and X ray results   2. Reviewed Home and Current Medicines   3. Will Start On Correction bolus Humalog/ Lantus Insulin regime   4. Monitor Blood glucose frequently   5. Modify  the dose of Insulin as needed        Will follow with you  Again thank you for sharing pt's care with me.      Truly yours,       Delma Castro MD

## 2021-07-16 NOTE — FLOWSHEET NOTE
Talked with pt's daughter Lorenzo Godinez about pt's care. Pt's  passed away on Rosalav 15, 2021. Pt's grandson just passed away last week. Pt's daughter believes these may be driving factors for the pt's depression and lack of self care medically and spiritually. Lorenzo Godinez is requesting to be updated on plan of care by physicians as able to ensure pt is able to comply with orders outside of the hospital. Questions answered and support given.

## 2021-07-16 NOTE — H&P
episode of atrial fib. pt has left bundle branch morphology, max heart is 132, min is 48 with an average of 74 ,infrequent PVCs are seen    H/O cardiovascular stress test 2013, 2012-    H/O cardiovascular stress test 14,2012 EF 52% No ischemia, normal study. 2012 EF 58% tehnique-technetium LVEF is normal globally    H/O echocardiogram 13 EF 50-55% mild MR, TR 3/13EF 35-45% decreasedr L ventrical systolic function.  H/O echocardiogram -EF50-55% Mild MR/TR. 10/11 EF > 55% LVS function is normal, calcified mitral apparatus, impaired LV relaxation, no PE    Heart imaging 2012 MUGA rest EF 58%LVEF is normal globally    Heartburn     History of cardiac cath 2009, 2006,moderate stenosis  small diagonal    History of cardiac monitoring 2/9/15    Event - sinus rhythm    History of cardioversion 3/14/14    History of MI (myocardial infarction)     History of nuclear MUGA 2020    EF 41%    History of nuclear Muga stress test 2020    EF 41%    History of PTCA 10/06/2010    10/06/2010 stent 3.0x24 taxus stent left main 90% stenosis mid seg reduced to 0% second area of osxhrden62 to 40% not flow limiting EF 40%    Hx of cardiovascular stress test 2015    lexiscan-normal,EF70%    Hx of transesophageal echocardiography (LUKE) for monitoring 2017    Noted to have MARY GRACE Blood Clot.  Hyperlipidemia     Hypertension     Insomnia     SOB (shortness of breath)     Tachycardia 2012    report in [facility] notes-[facility] consult    TIA (transient ischemic attack)        Past Surgical History:        Procedure Laterality Date    ABLATION OF DYSRHYTHMIC FOCUS       SECTION      CHOLECYSTECTOMY      DIAGNOSTIC CARDIAC CATH LAB PROCEDURE  2006, 2009 moderate stenosis small diagonal    OTHER SURGICAL HISTORY  2017    afib ablation LUKE    PTCA  10/06/2010    10/06/2010 stent 3.0x24 taxus stent 90% stenosis mid segment reduced to 0% second area of stenosis 30-40% not flow limiting EF 40%    TUBAL LIGATION         Medications Prior to Admission:    Medications Prior to Admission: SITagliptin (JANUVIA) 25 MG tablet, Take 1 tablet by mouth daily  glipiZIDE (GLUCOTROL) 5 MG tablet, Take 0.5 tablets by mouth every morning (before breakfast)  torsemide (DEMADEX) 20 MG tablet, Take 1 tablet by mouth daily  insulin lispro (HUMALOG) 100 UNIT/ML injection vial, Inject into the skin 3 times daily (before meals)  traZODone (DESYREL) 50 MG tablet, Take 50 mg by mouth nightly  amiodarone (PACERONE) 100 MG tablet, Take 1 tablet by mouth 2 times daily First take two tabs by mouth two times daily for 7 days, then take one tab by mouth twice daily  nitroGLYCERIN (NITROSTAT) 0.4 MG SL tablet, up to max of 3 total doses. If no relief after 1 dose, call 911. lisinopril (PRINIVIL;ZESTRIL) 5 MG tablet, Take 1 tablet by mouth daily  metoprolol succinate (TOPROL XL) 100 MG extended release tablet, Take 1 tablet by mouth daily TAKE ONE TABLET BY MOUTH DAILY  albuterol sulfate HFA (VENTOLIN HFA) 108 (90 Base) MCG/ACT inhaler, Inhale 2 puffs into the lungs 4 times daily as needed for Wheezing  cloNIDine (CATAPRES) 0.3 MG tablet, Take 0.3 mg by mouth nightly  atorvastatin (LIPITOR) 40 MG tablet, Take 40 mg by mouth nightly  pantoprazole (PROTONIX) 40 MG tablet, Take 40 mg by mouth daily  levothyroxine (SYNTHROID) 50 MCG tablet, Take 50 mcg by mouth Daily  apixaban (ELIQUIS) 5 MG TABS tablet, Take 1 tablet by mouth 2 times daily  aspirin 81 MG EC tablet, Take 81 mg by mouth daily    Allergies:  Amiodarone, Iv dye [iodides], and Vicodin [hydrocodone-acetaminophen]    Social History:   TOBACCO:   reports that she quit smoking about 14 years ago. Her smoking use included cigarettes. She has a 60.00 pack-year smoking history.  She has never used smokeless tobacco.  ETOH: reports no history of alcohol use. Family History:       Problem Relation Age of Onset    Diabetes Father     Heart Disease Father        REVIEW OF SYSTEMS:  Difficult to obtain review of systems due to patient condition. PHYSICAL EXAM:  BP (!) 159/82   Pulse 118   Temp (P) 98.8 °F (37.1 °C) (Oral)   Resp 23   Ht 5' (1.524 m)   Wt 180 lb (81.6 kg)   SpO2 100%   BMI 35.15 kg/m²   General appearance: Awake, semi-alert, not following commands. Head: Normocephalic,  atraumatic  Eyes: conjunctivae/corneas clear. Ears: normal external  ears  Neck: no adenopathy,supple, symmetrical, trachea midline  Lungs: Coarse breath sounds bilaterally  Heart: Tachycardic. Abdomen:soft; non-distended normal bowel sounds   Extremities:Pedal edema Trace   Skin:  No rashes or lesions  Neurologic: Awake, semi-alert, not answering questions or following commands right now. Responds to physical stimuli. DATA:  EKG:  I have reviewed EKG with the following interpretation:  Sinus tachycardia   Nonspecific intraventricular block   Cannot rule out Anterior infarct , age undetermined   T wave abnormality, consider lateral ischemia   Abnormal ECG   When compared with ECG of 11-JUN-2021 13:03,   Sinus rhythm has replaced Atrial fibrillation     Imaging:    CT abdomen/pelvis, 7/15/2021  1. Findings at the lung bases suggesting pulmonary edema with small bilateral   effusions and associated atelectasis. 2. No acute intra-abdominal process identified. 3. Severe atherosclerotic disease. CT head, 7/15/2021  No acute intracranial abnormality. Chronic microvascular ischemic changes and global cerebral atrophy. CXR, 7/15/2021  Cardiomegaly with pulmonary edema.      CBC with Differential:    Lab Results   Component Value Date    WBC 22.6 07/16/2021    RBC 3.47 07/16/2021    HGB 10.6 07/16/2021    HCT 32.8 07/16/2021     07/16/2021    MCV 94.5 07/16/2021    SEGSPCT 88.5 07/16/2021    BANDSPCT 2 08/11/2018 LYMPHOPCT 4.1 07/16/2021    EOSPCT 1.3 05/07/2012    DIFFTYPE AUTOMATED DIFFERENTIAL 07/16/2021     BMP:    Lab Results   Component Value Date     07/16/2021    K 3.6 07/16/2021    CL 95 07/16/2021    CO2 20 07/16/2021    BUN 28 07/16/2021    CREATININE 1.5 07/16/2021    CALCIUM 9.0 07/16/2021    GFRAA 42 07/16/2021    LABGLOM 35 07/16/2021    GLUCOSE 407 07/16/2021     PT/INR:  No results found for: PTINR    ASSESSMENT / PLAN:     Assessment:  Metabolic encephalopathy: could be 2/2 hyperglycemia or infection. Check MRI brain when medically stable. Treat comorbid conditions. Consult neurology. Hyperosmolar hyperglycemic state w/DM2: On insulin drip and IV fluids. Monitor POC glucose. Endocrinology has been consulted. Lactic acidosis: Elevated 6.0. WBC 22.6. Check blood culture x2, urine culture, pro-Kenrick.  Started on IV Vanco and cefepime. Trend lactate. Monitor. HFrEF: EF around 30%. Currently getting IVF. Consult cardiology. Consult nephrology for fluid mgmt. A. fib RVR: Rate elevated. Cardizem drip has been ordered. On Eliquis and amiodarone as OP. Consult cardiology. Elevated LFTs: Could be 2/2 shock or fluid overload. Monitor LFTs closely. CKD: creatinine stable around 1.5-1.6. Nephrology consulted. HTN/HLD/CAD s/p stent: cpm   Hypothyroidism: On Synthroid.    Generalized weakness    Plan:  Started on insulin gtt, POC glucose, endocrine consulted. Trouble with getting PICC, may get central line. Cardizem gtt ordered. Cardio and renal consulted. Continue IV cefepime and vanco.  Check blood culture x2, urine culture, pro-Kenrick.  MRI brain when clinically stable, neuro consulted. Monitor labs and patient condition. Electronically signed by Sumeet Wild PA-C on 7/16/2021 at 8:33 AM   I have independently evaluated and examined this patient today. I have reviewed radiologic and biochemical tests on this patient.  Management Plan is developed mutually with Sumeet Wild

## 2021-07-16 NOTE — FLOWSHEET NOTE
Notified Dr. Vicki Dominguez and Dr. Judah Cruz of critical LA and Glucose results. Pt's IV in the right hand was noted to be bleeding and was removed. [facility] infusing and Regular Insulin.

## 2021-07-16 NOTE — FLOWSHEET NOTE
Skin check off done with Annia Huggins. Multiple scabs noted and scattered abrasions noted. No appreciable skin breakdown noted to coccyx or other bony prominences.

## 2021-07-16 NOTE — CARE COORDINATION
Pt is unable to participate with CM at this time. Cm contacted pt's dgt to gather initial information to assist with discharge planning. Pt lives alone in a 1 story home. Pt is not driving at present. She has expressed interest in getting a car but dgt does not feel pt should drive. Pt was set up for [place] [id number] upon discharge her last hospitalization but it was declined when [facility] tried to reach out to pt. Pt may have felt she did not need the [facility] but pt's  had just  as well and pt may been to overwhelmed to think about dealing with HC. Pt's   6/15/21 and pt's grandson  approx 1 week ago. This grandson was pt's Amber Stevens, Iesha Couch, son. Dgt is concerned that these very recent losses could be contributing to pt's health crisis. Pt does have  Home 02 and dgt states that pt may be interested in getting a cane closer to discharge. Pt was previously on some type of waiver services/HHA services but when pt moved in with dgt, dgt discontinued the services. Unfortunately pt only stayed with dgt 3 days and moved back to her home but then with no services. Pt has a glucometer but dgt states that pt manipulates the readings based on when she checks her sugar and what she eats if she is going to check her sugar. Dgt states that pt loves candy. CM to return to see pt at a later time for continued planning when pt is hopefully more able to participate.

## 2021-07-16 NOTE — CONSULTS
Neurology Service Consult Note  [facility]  Patient Name: María Michaud  : 1955        Subjective:   Reason for consult: Found unresponsive  60 y.o. right-handed female with past medical history listed below presenting to [facility] after patient's daughter found her unresponsive on her porch. When I was called patient's blood sugar reading was read as \"high\" which either means it was greater than 600-700. Patient has been altered since arrival she was sinus tach she had hypertension, I been consulted for altered mental status there is concern that this may be stroke however upon looking at patient's chart there is extensive metabolic etiologies that are causing an overall severe metabolic encephalopathy. Patient has not had a seizure however I did discuss with nursing staff that we need to keep a low threshold for monitoring for seizure, monitoring for mental change in temperature. Patient was not lumbar puncture and in the emergency room she had no fever but she was altered, I would like to obtain blood cultures and rule out all metabolic etiologies prior to this as patient does not have fever and she does not have a stiff neck at the bedside. No family at the bedside. Past Medical History:   Diagnosis Date    Abnormal PFTs (pulmonary function tests)     Atrial fibrillation (HCC)     CAD (coronary artery disease)     CHF (congestive heart failure) (HCC)     Diabetes mellitus (HCC)     Dizziness - light-headed     Family history of coronary artery disease     H/O 24 hour EKG monitoring 2012,12/10/2010    2012 predominant rhythm is sinus with short runs of SVT no episode of atrial fib. pt has left bundle branch morphology, max heart is 132, min is 48 with an average of 74 ,infrequent PVCs are seen    H/O cardiovascular stress test 2013, 2012-    H/O cardiovascular stress test 14,2012 EF 52% No ischemia, normal study. 2012 EF 58% tehnique-technetium LVEF is normal globally    H/O echocardiogram 13 EF 50-55% mild MR, TR 3/13EF 35-45% decreasedr L ventrical systolic function.  H/O echocardiogram -EF50-55% Mild MR/TR. 10/11 EF > 55% LVS function is normal, calcified mitral apparatus, impaired LV relaxation, no PE    Heart imaging 2012 MUGA rest EF 58%LVEF is normal globally    Heartburn     History of cardiac cath 2009, 2006,moderate stenosis  small diagonal    History of cardiac monitoring 2/9/15    Event - sinus rhythm    History of cardioversion 3/14/14    History of MI (myocardial infarction)     History of nuclear MUGA 2020    EF 41%    History of nuclear Muga stress test 2020    EF 41%    History of PTCA 10/06/2010    10/06/2010 stent 3.0x24 taxus stent left main 90% stenosis mid seg reduced to 0% second area of hoqasinw55 to 40% not flow limiting EF 40%    Hx of cardiovascular stress test 2015    lexiscan-normal,EF70%    Hx of transesophageal echocardiography (LUKE) for monitoring 2017    Noted to have MARY GRACE Blood Clot.  Hyperlipidemia     Hypertension     Insomnia     SOB (shortness of breath)     Tachycardia 2012    report in epic notes-[facility] Nunn hosp. consult    TIA (transient ischemic attack)     :   Past Surgical History:   Procedure Laterality Date    ABLATION OF DYSRHYTHMIC FOCUS       SECTION      CHOLECYSTECTOMY      DIAGNOSTIC CARDIAC CATH LAB PROCEDURE  2006, 2009 moderate stenosis small diagonal    OTHER SURGICAL HISTORY  2017    afib ablation LUKE    PTCA  10/06/2010    10/06/2010 stent 3.0x24 taxus stent 90% stenosis mid segment reduced to 0% second area of stenosis 30-40% not flow limiting EF 40%    TUBAL LIGATION       Medications:  Scheduled Meds:   amiodarone  100 mg Oral BID    apixaban  5 mg Oral BID    aspirin  81 mg Oral Daily    levothyroxine  50 mcg Oral Daily    metoprolol succinate  100 mg Oral Daily    pantoprazole  40 mg Oral Daily    alogliptin  6.25 mg Oral Daily    sodium chloride flush  5-40 mL Intravenous 2 times per day    [Held by provider] insulin glargine  0.25 Units/kg Subcutaneous Nightly    sodium chloride flush  5-40 mL Intravenous 2 times per day    cefepime  2,000 mg Intravenous Q12H    vancomycin  1,250 mg Intravenous Q24H     Continuous Infusions:   dextrose      sodium chloride      sodium chloride 100 mL/hr at 21 0204    sodium chloride      dilTIAZem (CARDIZEM) 100 mg in dextrose 5% 100 mL ([facility]) 5 mg/hr (21 0926)    insulin regular 4 Units/hr (21 0848)     PRN Meds:.albuterol sulfate HFA, nitroGLYCERIN, traZODone, glucose, dextrose, glucagon (rDNA), dextrose, sodium chloride flush, sodium chloride, ondansetron **OR** ondansetron, polyethylene glycol, acetaminophen **OR** acetaminophen, sodium chloride flush, sodium chloride    Allergies   Allergen Reactions    Amiodarone Other (See Comments)     dizziness    Iv Dye [Iodides] Nausea And Vomiting    Vicodin [Hydrocodone-Acetaminophen] Nausea And Vomiting     Social History     Socioeconomic History    Marital status:      Spouse name: Not on file    Number of children: Not on file    Years of education: Not on file    Highest education level: Not on file   Occupational History    Not on file   Tobacco Use    Smoking status: Former Smoker     Packs/day: 3.00     Years: 20.00     Pack years: 60.00     Types: Cigarettes     Quit date: 2007     Years since quittin.4    Smokeless tobacco: Never Used    Tobacco comment: reviewed 8/12/15   Vaping Use    Vaping Use: Never used   Substance and Sexual Activity    Alcohol use: No     Comment: occas. caffeine 3 mtn.  dew a day    Drug use: No    Sexual activity: Not Currently   Other Topics Concern    Not on file   Social History Narrative    Not on file Social Determinants of Health     Financial Resource Strain:     Difficulty of Paying Living Expenses:    Food Insecurity:     Worried About Running Out of Food in the Last Year:     [address] in the Last Year:    Transportation Needs:     Lack of Transportation (Medical):  Lack of Transportation (Non-Medical):    Physical Activity:     Days of Exercise per Week:     Minutes of Exercise per Session:    Stress:     Feeling of Stress :    Social Connections:     Frequency of Communication with Friends and Family:     Frequency of Social Gatherings with Friends and Family:     Attends Jainism Services:     Active Member of Clubs or Organizations:     Attends Club or Organization Meetings:     Marital Status:    Intimate Partner Violence:     Fear of Current or Ex-Partner:     Emotionally Abused:     Physically Abused:     Sexually Abused:       Family History   Problem Relation Age of Onset    Diabetes Father     Heart Disease Father          ROS (10 systems)  Due to severe state of encephalopathy patient cannot answer review of system questions    Physical Exam:         Wt Readings from Last 3 Encounters:   07/15/21 180 lb (81.6 kg)   06/15/21 178 lb 12.8 oz (81.1 kg)   12/30/20 190 lb 3.2 oz (86.3 kg)     Temp Readings from Last 3 Encounters:   07/16/21 98.8 °F (37.1 °C) (Oral)   06/15/21 97.9 °F (36.6 °C) (Oral)   12/30/20 98.3 °F (36.8 °C) (Oral)     BP Readings from Last 3 Encounters:   07/16/21 (!) 166/110   06/15/21 (!) 143/79   12/30/20 (!) 170/69     Pulse Readings from Last 3 Encounters:   07/16/21 87   06/15/21 72   12/30/20 82        Gen: Altered, restrained, agitated  HEENT: NC/AT, no gaze deviation, PERRL, mmm, neck supple, no meningeal signs;    Heart: Tachycardia, regular  Lungs: Work of breathing noted  Ext: Bilateral upper extremity 2+ pitting edema, bilateral lower extremities 1+ pitting edema  Psych: Eyes closed  Skin: Various abrasions and lesions across her body, right upper extremity is edematous and red    NEUROLOGIC EXAM:    Mental Status: Patient is awake but she is severely altered encephalopathic no at concentration cannot follow commands does not name and repeat    Cranial Nerve Exam:   CN II-XII: Appears grossly intact with no gaze deviation, pupils equal and reactive bilaterally, face symmetrical, tongue appears midline she does blink to menace when I open her eyes bilaterally, resist eye opening bilaterally    Motor Exam:       Grimace to pain x4 moving against restraints x4    Deep Tendon Reflexes: 2/4 biceps, triceps, brachioradialis, patellar, and achilles b/l; flexor plantar responses b/l    Sensation: Pain UE's/LE's b/l    Coordination/Cerebellum:       Tremors--none      Gait and stance:      Gait: deferred      LABS:     Recent Labs     07/15/21  1830 07/16/21  0127 07/16/21  0555   WBC  --   --  22.6*   * 130* 132*   K 4.8 4.7 3.6   CL 91* 89* 95*   CO2 21 19* 20*   BUN 22 26* 28*   CREATININE 1.5* 1.5* 1.5*   GLUCOSE 716* 723* 407*   INR 1.17  --   --      Liver enzymes elevated  Ammonia pending  Urine culture and urine analysis pending  Blood cultures pending    IMAGING:      CT Head:  No acute intracranial abnormality.       Chronic microvascular ischemic changes and global cerebral atrophy. EEG pending    ASSESSMENT/PLAN:     3 23-year-old female with acute altered mental status found down secondary to suspected severe toxic metabolic encephalopathy superimposed on acute HHNS, elevated liver enzymes, pulmonary edema-ruling out elevated Ammonia and bacteremia. No fever thus no LP at this time. MRI would not help work up currently as there is no focal findings rather severe global encephalopathy. Need to obtain EEG. Plan of care as follows:  1. Neuro exam:  1. Encephalopathic, unable to follow commands, no upper motor neuron findings  2. Neurodiagnostics:  1. CT of the head was nonacute  2. EEG pending  3.  Ammonia pending UA and culture pending  4. Blood cultures pending  3. Medications:  1. Avoid all CNS altering medications at this time  2. If she has history of A. fib needs to be on anticoagulation will defer to internal medicine  4. PT/OT/ST:  1. Per their recommendation  5. Follow-up:  1. Pending course of admission and complete metabolic work-up      Thank you for allowing us to participate in the care of your patient. If there are any questions regarding evaluation please feel free to contact us. RENE Calabrese - CNP, 7/16/2021       ------------------------------------    Attending Note:  I have rounded on this patient with Lisa Alston CNP. I have reviewed the chart and we have discussed this case in detail. The patient was seen and examined by myself. Pertinent labs and imaging have been personally reviewed. Our findings and impressions were discussed with the patient. I concur with the Nurse Practioner's assessment and plan. Profound metabolic encephalopathy is as detailed above. Remains significantly lethargic and disoriented. Agree with MRI of the brain if she continues to not show improvement after correction of metabolic derangements. EEG is pending at this time.     Yaa Mora DO 7/16/2021 8:22 PM

## 2021-07-16 NOTE — PROGRESS NOTES
PROCEDURE PERFORMED:Central line placement  PRIMARY INDICATION FOR PROCEDURE: Medication administration    TO THE PT ROOM:  2128      IR IN THE ROOM AT WHAT TIME: 1100                            INFORMED CONSENT:  . Pt unable to sign consent,  gave telephone consent with Dr. Iris Malone and daughter Hermes Carter Their phone number [phone]. Consent placed in chart. DANIELA SCORE PRE PROCEDURE:      NURSING ASSESSMENT: Pt   Very restless, restrained, agitated and not alert. TIME OUT COMPLETE: 1104    BARRIER PRECAUTIONS & STERILE TECHNIQUE  Pt  positioned for comfort. Warm blankets given. Pt placed on Cardiac Monitor. Pt in the supine position. Pt prepped chlorhexadine and draped in a sterile fashion.      PAIN/LOCAL ANESTHESIA/SEDATION MANAGEMENT: Local, Lidocaine    INTRAOPERATIVE:    ACCESS TIME: 1132  US/FLUORO:yes  [id number]:  FINAL IMAGE TAKEN TO CONFIRM PLACEMENT OF: catheter  CONTRAST/CC:   FLUID RETURN: yes    STERILE DRESSINGS:  Applied by Anjelica Mak RN    SPECIMENS: No    EBL: <5cc    FOLLOW- UP X-RAY: Yes    COMPLICATIONS: No    STAFF PRESENT DURING PROCEDURE:  Dr Maryellen Silva RN, [facility] RN, Tonya RN, Loralie Seip RN    DANIELA SCORE POST PROCEDURE:     REPORT CALLED TO: Roma Hinds RN    PT LEFT ROOM AT WHAT TIME: 3500

## 2021-07-16 NOTE — PLAN OF CARE
Problem: Non-Violent Restraints  Goal: Removal from restraints as soon as assessed to be safe  Outcome: Ongoing  Goal: No harm/injury to patient while restraints in use  Outcome: Ongoing  Goal: Patient's dignity will be maintained  Outcome: Ongoing     Problem: Discharge Planning:  Goal: Discharged to appropriate level of care  Description: Discharged to appropriate level of care  Outcome: Ongoing     Problem: Serum Glucose Level - Abnormal:  Goal: Ability to maintain appropriate glucose levels will improve  Description: Ability to maintain appropriate glucose levels will improve  Outcome: Ongoing     Problem: Sensory Perception - Impaired:  Goal: Ability to maintain a stable neurologic state will improve  Description: Ability to maintain a stable neurologic state will improve  Outcome: Ongoing     Problem: Gas Exchange - Impaired:  Goal: Levels of oxygenation will improve  Description: Levels of oxygenation will improve  Outcome: Ongoing     Problem: Infection, Septic Shock:  Goal: Will show no infection signs and symptoms  Description: Will show no infection signs and symptoms  Outcome: Ongoing     Problem: Infection - Ventilator-Associated Pneumonia:  Goal: Absence of pulmonary infection  Description: Absence of pulmonary infection  Outcome: Ongoing     Problem: Tissue Perfusion, Altered:  Goal: Circulatory function within specified parameters  Description: Circulatory function within specified parameters  Outcome: Ongoing     Problem: Venous Thromboembolism:  Goal: Will show no signs or symptoms of venous thromboembolism  Description: Will show no signs or symptoms of venous thromboembolism  Outcome: Ongoing  Goal: Absence of signs or symptoms of impaired coagulation  Description: Absence of signs or symptoms of impaired coagulation  Outcome: Ongoing     Problem: Falls - Risk of:  Goal: Will remain free from falls  Description: Will remain free from falls  Outcome: Ongoing  Goal: Absence of physical

## 2021-07-16 NOTE — PROGRESS NOTES
This NA answered unit phone and Rosalee Edmonds ED RN was trying to call report for this pt going to 2128. This NA placed YASH Rowell RN on hold while trying to figure out what nurse was going to be receiving this pt. This NA then transferred Rosalee Edmonds ED RN to Rosalee Edmonds ICU RN.      Electronically signed by LUIS Snider on 7/15/2021 at 11:57 PM

## 2021-07-16 NOTE — PROGRESS NOTES
[address] [facility]  consulted by Dr. Haroldo Ceja for monitoring and adjustment. Indication for treatment: Sepsis   Goal trough: 15 mcg/mL  AUC/BEA: 400-600    Pertinent Laboratory Values:   Temp Readings from Last 3 Encounters:   07/16/21 97.9 °F (36.6 °C) (Axillary)   06/15/21 97.9 °F (36.6 °C) (Oral)   12/30/20 98.3 °F (36.8 °C) (Oral)     Recent Labs     07/16/21  0127 07/16/21  0410 07/16/21  0555   WBC  --   --  22.6*   LACTATE 8.1* 6.0*  --      Recent Labs     07/15/21  1830 07/16/21  0127 07/16/21  0555   BUN 22 26* 28*   CREATININE 1.5* 1.5* 1.5*     Estimated Creatinine Clearance: 36 mL/min (A) (based on SCr of 1.5 mg/dL (H)). No intake or output data in the 24 hours ending 07/16/21 0801    Pertinent Cultures:  Date    Source    Results  7/15   Blood    Ordered  7/15   Urine    Ordered    Vancomycin level:   TROUGH:  No results for input(s): VANCOTROUGH in the last 72 hours. RANDOM:  No results for input(s): VANCORANDOM in the last 72 hours.     Assessment:  · WBC and temperature: No WBC; afebrile  · SCr, BUN, and urine output:  · Scr stable  · BUN with increase  · Day(s) of therapy: 2  · Vancomycin concentration: to be collected    Plan:  · Vancomycin 1500 x1 in ER  · Continue vancomycin 1250 mg IVPB q24h  · Plan to check a trough level prior to the 4th total dose  · Pharmacy will continue to monitor patient and adjust therapy as indicated    [place] 3 7/18 @ 2030    Thank you for the consult,  David Orantes, [address] [facility]  7/16/2021 8:01 AM

## 2021-07-16 NOTE — PROGRESS NOTES
IR at bedside to place central line; successful placement. CXR en route to bedside for post insertion xray.

## 2021-07-17 NOTE — PROGRESS NOTES
Nephrology Progress Note  7/17/2021 11:46 AM        Subjective:   Admit Date: 7/15/2021  PCP: Pelon Mueller PA-C    Interval History: seen in early am - was sleeping    Diet: none    ROS:  wakes up from time to time - still restless per nurse   UOP 4.3 l/d   With CCB drip for a,fib     Data:     Current meds:    insulin glargine  30 Units Subcutaneous Nightly    insulin lispro  0-12 Units Subcutaneous Q4H    digoxin  250 mcg Intravenous Q4H    amiodarone  100 mg Oral BID    apixaban  5 mg Oral BID    aspirin  81 mg Oral Daily    levothyroxine  50 mcg Oral Daily    metoprolol succinate  100 mg Oral Daily    pantoprazole  40 mg Oral Daily    alogliptin  6.25 mg Oral Daily    sodium chloride flush  5-40 mL Intravenous 2 times per day    sodium chloride flush  5-40 mL Intravenous 2 times per day    cefepime  2,000 mg Intravenous Q12H    vancomycin  1,250 mg Intravenous Q24H    furosemide  40 mg Intravenous TID      dextrose      sodium chloride      sodium chloride      dilTIAZem (CARDIZEM) 100 mg in dextrose 5% 100 mL (ADD-[place]) 10 mg/hr (07/17/21 0643)         I/O last 3 completed shifts:   In: 2766.4 [I.V.:2464.7; IV Piggyback:301.7]  Out: 2249 [JLXEY:2178]    CBC:   Recent Labs     07/16/21  0555   WBC 22.6*   HGB 10.6*             Recent Labs     07/16/21  1140 07/16/21  1505 07/17/21  0525    134* 141   K 3.4* 4.0 3.3*    98* 99   CO2 26 24 29   BUN 27* 29* 33*   CREATININE 1.4* 1.3* 1.3*   GLUCOSE 75 139* 211*       Lab Results   Component Value Date    CALCIUM 8.7 07/17/2021    PHOS 3.5 07/17/2021       Objective:     Vitals: BP (!) 142/85   Pulse 96   Temp 98.4 °F (36.9 °C) (Core)   Resp 18   Ht 5' (1.524 m)   Wt 180 lb (81.6 kg)   SpO2 98%   BMI 35.15 kg/m²     General appearance:  As above  HEENT:  + conj pallor  Neck:  Supple- Rt IJ CVC  Lungs:  + adv BS  - limited exam   Heart:  irregular  Abdomen: soft  Extremities:  + edema   maculopapular rask b/l feet / lower legs   Has evette       Problem List :         Impression :     1. CKD stage 3 a A3 - stable from CRS type 1 - DKA etc   2. DKA- ADHF with severe CMP  3. Confusion likely multifactorial as mentioned yesterday   4. Low K from loop - Ch d// now a/fib etc   5. Met alkalosis from loop     Recommendation/Plan  :     1. Keep loop and adjust dose as she does  2. replete K add aldactone   3. BS control  4. Look for any precise infection  5. Watch for iatrogenic and nosocomial  complication  6.  Follow clinically and bio chemically       Chris Mondragon MD MD

## 2021-07-17 NOTE — CONSULTS
[address] [facility]               [address] [facility], [address] [facility]                                  CONSULTATION    PATIENT NAME: Debra Fuentes                 :        1955  MED REC NO:   [id number]                          ROOM:       2128  ACCOUNT NO:   [de-identified]                           ADMIT DATE: 07/15/2021  PROVIDER:     Ruddy Zhou MD    CONSULT DATE:  2021    CONSULT REQUESTED BY:  Dr. Dominguez Belt:  CKD stage III and alkalosis. BRIEF HISTORY:  The patient is an unfortunate 50-year-old female, who  was found unresponsive and brought to the emergency room via EMT. I am  unsure what was the whole situation. I have to probably talk to the  family and figure out what exactly happened at that time, but any event  in the emergency room, the patient was hemodynamically stable. As a  matter of fact, blood pressure was rather high. She was not very  responsive, but not severely hypoxic either, she was 94% on 2 liters and  she underwent several diagnostic tests, which include imaging and  biochemical.  Imaging study, which include chest x-ray and CT of the  abdomen and pelvis and CT of the head showed chronic microvascular  ischemic changes, global cerebral atrophy, pulmonary edema, and pleural  effusion. She also had left atrophic kidney and very atherosclerotic  aorta and other vessels. Biochemical testing showed significantly increased glucose. Her sugar  was more than 700, but her bicarb level was only 19-20 and her beta  hydroxy level was 6.7, so there is some ketoacidosis obviously. Her BUN  and creatinine was 29 and 1.3 respectively. She was given normal saline  and subsequently admitted to ICU. So far her course is complicated by two confusions, she had a central  line and had a dose of Lasix of 80 mg, producing some urine.     I have reviewed her data, looks like her creatinine was about 0.809 back  in , but since 2012, it has been increasing and fluctuating between  1.3-1.9 range, probably coincide with her ischemic cardiomyopathy, she  has very low EF and intermittent pulmonary edema and she was on  diuretics that probably caused \"sawtooth pattern\" creatinine level. Looks like she also had a massive albuminuria, although quantification  of proteinuria is only 1.7 gm whether it is all from pulmonary edema or  she has some other glomerular proteinuria is quite possible that she is  diabetic. PAST MEDICAL HISTORY:  1.  CKD stage IIIA/B A3.  2.  Diabetes mellitus with significant proteinuria. 3.  Cardiomyopathy according to cardiology and ischemic I am not sure  what her coronary anatomy is _____. 4.  Chronic atrial fibrillation. 5.  Hyperlipidemia. 6.  Hypertension. 7.  Insomnia. 8.  Mood disorder. 9.  Had an episode of CVA in the distant past.  10.  Hypothyroidism. PAST SURGICAL HISTORY:  1. Ablation for dysrhythmia. 2.  . 3.  Cholecystectomy. 4.  Looks like she had a cath in  and , I need to go back and  see what kind of anatomy and what kind of intervention if any she had. 5.  Tubal ligation. FAMILY MEDICAL HISTORY:  Diabetes and heart disease runs in the family. SOCIAL HISTORY:  I think the patient lives alone, but she has several  family tragedies apparently several family members  and she was not  mentally feeling very well according to Dr. Freddie Houser. CURRENT MEDICATIONS:  Here in the hospital, she is on 100 mL per liter  of normal saline. She is on _____, Eliquis, aspirin, cefepime, and  Synthroid, metoprolol, and she was on diltiazem drip that is probably to  lower the heart rate and also looks like insulin drip. ALLERGIES:  She has is listed allergic to AMIODARONE and VICODIN. HABITS:  She quit smoking apparently about  or so. She has  significant amount of smoking history of a 60-pack-year history.   No  illicit drug abuse or alcohol abuse.    REVIEW OF SYSTEMS:  She was unresponsive, obviously, she was in DKA with  high sugar. I am unsure whether she has any fever, chills, or rigor. Rest of the review of systems is negative as in previous paragraph or  unobtainable. PHYSICAL EXAMINATION:  GENERAL:  She is not responsive. She does open eyes and she is very  restless. VITAL SIGNS:  At the time of examination, temperature her T-max was 99.7  and blood pressure 130s/60s. HEENT:  Normocephalic and atraumatic. Eyes:  Probably 1+ conjunctival  pallor. CARDIOVASCULAR:  Tachycardia, all the heart rate came down with Cardizem  drip, I examined her twice. ABDOMEN:  Soft. She does grimace when I push the abdomen. There is  scar from previous surgery, which is well healed. She does have a Chen  catheter. She also had a right IJ. EXTREMITIES:  1+ edema bilaterally. LABORATORY VALUES AND ANCILLARY SERVICES:  As mentioned earlier, her  sodium was 134 and her magnesium was 1.7. Urinalysis, massive  albuminuria, some RBC and WBC 33 and 15 respectively. Her urine sodium  actually came down to 10, which makes extensive pulmonary edema by the  way and CBC showed leukocytosis 20.6 thousand, and hemoglobin 10.6. IMPRESSION:  A 61-year-old female with CKD stage III with diabetic  ketoacidosis and unresponsiveness. 1.  CKD stage IIIA/B A3 she has atrophic left kidney and severe  atherosclerotic cardiovascular disease, potential other etiology would  be DKA, cardiorenal syndrome, diabetes, severe cardiomyopathy. 2.  DKA, her hydroxybutyrate level was little high, that caused some of  the mentation change. 3.  Acute decompensated heart failure, pulmonary edema, and severe  cardiomyopathy. 4.  Unresponsiveness, I think she has several etiology here. 5.  Hypoxia, diabetic ketoacidosis, but with her atrial fibrillation and  proteinuria she is in high risk of CVA.   We need to make sure, she does  not have any acute CVA along with infection especially leukocytosis. Very unlikely, but we will check a salicylate level, I think it is  unlikely, but given her mood disorder and tachypnea, we will just make  sure she does not have it, although her symptoms can be easily  explainable with diabetic ketoacidosis and pulmonary edema. 6.  Underlying diabetes, AFib, etc.    PLAN:  1. Stop IV fluid, Lasix 40 t.i.d., she will need an MRI of the brain,  it is difficult to examine her neurologically now, she is very restless. 2.  We will rule out any infection, aspirin level. Follow clinically  and biochemically.         Laureen Alberto MD    D: 07/16/2021 17:37:52       T: 07/16/2021 17:47:18     [email or web address]  Job#: [id number]     Doc#: [id number]    CC:

## 2021-07-17 NOTE — PROGRESS NOTES
Progress Note( Dr. Olive Wilson)  7/17/2021  Subjective:   Admit Date: 7/15/2021  PCP: Tiffany Navarrete PA-C        Admitted For :   Altered mental state and severe hyperglycemia possible metabolic encephalopathy    Consulted For: Better control of blood glucose    Interval History: Patient is alert little more awake alert responding to verbal commands appropriately  She does not know what happened to her which was brought here  Many of the bacteremia parameters are getting better    Denies any chest pains,   Denies SOB . Denies nausea or vomiting. No new bowel or bladder symptoms. Intake/Output Summary (Last 24 hours) at 7/17/2021 0730  Last data filed at 7/17/2021 0643  Gross per 24 hour   Intake 2766.39 ml   Output 4325 ml   Net -1558.61 ml       DATA    CBC:   Recent Labs     07/16/21  0555   WBC 22.6*   HGB 10.6*       CMP:  Recent Labs     07/15/21  1830 07/16/21  0127 07/16/21  0555 07/16/21  0555 07/16/21  1140 07/16/21  1505 07/17/21  0525   *   < > 132*   < > 138 134* 141   K 4.8   < > 3.6   < > 3.4* 4.0 3.3*   CL 91*   < > 95*   < > 101 98* 99   CO2 21   < > 20*   < > 26 24 29   BUN 22   < > 28*   < > 27* 29* 33*   CREATININE 1.5*   < > 1.5*   < > 1.4* 1.3* 1.3*   CALCIUM 9.1   < > 9.0   < > 9.2 8.8 8.7   PROT 6.4  6.4  --  5.9*  --   --   --  5.3*   LABALBU 3.5  3.5  --  3.9  --   --   --  3.1*   BILITOT 3.6*  3.6*  --  2.7*  --   --   --  1.7*   ALKPHOS 204*  204*  --  205*  --   --   --  173*   AST 33  33  --  329*  --   --   --  246*   ALT 55*  55*  --  204*  --   --   --  254*    < > = values in this interval not displayed.      Lipids:   Lab Results   Component Value Date    CHOL 153 01/30/2012    HDL 59 01/30/2012    TRIG 135 01/30/2012     Glucose:  Recent Labs     07/16/21  2053 07/17/21  0212 07/17/21  0532   POCGLU 180* 200* 210*     [id number]:  Lab Results   Component Value Date    LABA1C 11.4 07/16/2021     High Sensitivity TSH:   Lab Results   Component Value Date TSH 2.860 06/12/2021     Free T3: No results found for: FT3  Free T4:  Lab Results   Component Value Date    T4FREE 1.28 06/12/2021       Echocardiogram complete 2D with doppler with color    Result Date: 7/16/2021  Transthoracic Echocardiography Report (TTE)  Demographics   Patient Name       Abhilash MAYFIELD Date of Study       07/16/2021   Date of Birth      1955         Gender              Female   Age                60 year(s)         Race                   Patient Number     [id number]         Room Number         2128   Visit Number       [id number]   Corporate ID       [id number]   Accession Number   [id number]         [facility]   Ordering Physician Manisha Shelley MD                 Physician           MD  Procedure Type of Study   TTE procedure:ECHOCARDIOGRAM COMPLETE 2D W DOPPLER W COLOR. Procedure Date Date: 07/16/2021 Start: 01:35 PM Study Location: Portable Technical Quality: Fair visualization Indications:Congestive heart failure. Patient Status: Routine Height: 60 inches Weight: 180 pounds BSA: 1.78 m2 BMI: 35.15 kg/m2 HR: 99 bpm BP: 145/83 mmHg  Conclusions   Summary  Left ventricular systolic function is abnormal.  Ejection fraction is visually estimated at 15-20%. Global hypokinesis noted. Grade III diastolic dysfunction. Mitral annular calcification is present. Moderate to severe mitral regurgitation. Mild to moderate tricuspid regurgitation; RVSP: 38 mmHg. No evidence of any pericardial effusion.    Signature   ------------------------------------------------------------------  Electronically signed by Shimon Sylvester MD (Interpreting  physician) on 07/16/2021 at 04:31 PM    CT ABDOMEN PELVIS WO CONTRAST Additional Contrast? None    Result Date: 7/15/2021  EXAMINATION: CT OF THE ABDOMEN AND PELVIS WITHOUT CONTRAST 7/15/2021 7:28 pm    1. Findings at the lung bases suggesting pulmonary edema with small bilateral effusions and associated atelectasis. 2. No acute intra-abdominal process identified. 3. Severe atherosclerotic disease. CT Head WO Contrast    Result Date: 7/15/2021  EXAMINATION: CT OF THE HEAD WITHOUT CONTRAST  7/15/2021 7:28 pm     No acute intracranial abnormality. Chronic microvascular ischemic changes and global cerebral atrophy. XR CHEST PORTABLE    Result Date: 7/16/2021  EXAMINATION: ONE XRAY VIEW OF THE CHEST 7/16/2021 10:31 am COMPARISON: July 15, 2021 HISTORY: ORDERING SYSTEM PROVIDED HISTORY: s/p central line attempt    Right IJ central venous catheter terminates in the mid SVC in satisfactory position. No pneumothorax. Pulmonary vascular congestion with interstitial pulmonary edema. XR CHEST PORTABLE    Result Date: 7/15/2021  EXAMINATION: ONE XRAY VIEW OF THE CHEST 7/15/2021 5:47 pm COMPARISON: 06/13/2021 HISTORY: ORDERING SYSTEM PROVIDED HISTORY: AMS TECHNOLOGIST PROVIDED HISTORY: Reason for exam:->AMS Reason for Exam: AMS Initial encounter FINDINGS: Cardiomegaly. There is increased interstitial opacities bilaterally. No pleural effusion or pneumothorax. The osseous structures otherwise stable. Cardiomegaly with pulmonary edema. IR NONTUNNELED VASCULAR CATHETER > 5 YEARS    Result Date: 7/16/2021  PROCEDURE: ULTRASOUND GUIDED VASCULAR ACCESS. PLACEMENT OF A NON-TUNNELED CATHETER. 7/16/2021. HISTORY: ORDERING SYSTEM PROVIDED HISTORY: Need central venous access. Request CVC Insertion TECHNOLOGIST PROVIDED HISTORY: Reason for exam:->CVC Insertion SEDATION: None. Successful ultrasound and fluoroscopy guided non-tunneled central venous catheter placement. The catheter is ready to use.        Scheduled Medicines   Medications:    insulin glargine  30 Units Subcutaneous Nightly    insulin lispro  0-12 Units Subcutaneous Q4H    amiodarone  100 mg Oral BID    apixaban  5 mg Oral BID    aspirin  81 mg Oral Daily    levothyroxine  50 mcg Oral Daily    metoprolol succinate  100 mg Oral Daily    pantoprazole  40 mg Oral Daily    alogliptin  6.25 mg Oral Daily    sodium chloride flush  5-40 mL Intravenous 2 times per day    sodium chloride flush  5-40 mL Intravenous 2 times per day    cefepime  2,000 mg Intravenous Q12H    vancomycin  1,250 mg Intravenous Q24H    furosemide  40 mg Intravenous TID      Infusions:    dextrose      sodium chloride      sodium chloride      dilTIAZem (CARDIZEM) 100 mg in dextrose 5% 100 mL (ADD-[place]) 10 mg/hr (07/17/21 1295)         Objective:   Vitals: BP (!) 147/64   Pulse 122   Temp 98.6 °F (37 °C) (Core)   Resp 21   Ht 5' (1.524 m)   Wt 180 lb (81.6 kg)   SpO2 95%   BMI 35.15 kg/m²   General appearance: alert and cooperative with exam appears to be somewhat dehydrated  Neck: no JVD or bruit  Thyroid : Normal lobes   Lungs: Has Vesicular Breath sounds   Heart: Atrial fibrillation  Abdomen: soft, non-tender; bowel sounds normal; no masses,  no organomegaly  Musculoskeletal: Normal  Extremities: extremities normal, , no edema  Neurologic:  Awake, alert, oriented to name, place and time. Cranial nerves II-XII are grossly intact. Motor is  intact. Sensory is intact. ,  and gait is normal.    Assessment:     Patient Active Problem List:     History of CVA (cerebrovascular accident) without residual deficits     Morbid obesity ([address]     DM (diabetes mellitus), type 2, uncontrolled ([address]     Persistent atrial fibrillation ([address]     CAD (coronary artery disease)     CHF (congestive heart failure) ([address]     Light headed     History of MI (myocardial infarction)     Diabetes mellitus ([address]     Hypertension     TIA (transient ischemic attack)     Hyperlipidemia     SOB (shortness of breath)     H/O cardiovascular stress test     Family history of coronary artery disease     PAF (paroxysmal atrial fibrillation) (HCC)     Chest pain     Atrial thrombus without antecedent myocardial infarction     S/P ablation of atrial fibrillation     Gastroenteritis     YESIKA (acute kidney injury) ([facility]     Acute cystitis     Elevated liver enzymes     CHF (congestive heart failure), NYHA class I, acute on chronic, combined (HCC)     Pneumonia     Multifocal pneumonia     Atrial fibrillation with RVR (HCC)     Shoulder pain     Paroxysmal atrial fibrillation (HCC)     Diabetic hyperosmolar coma ([facility]     Difficult intravenous access     Metabolic encephalopathy getting better      Plan:     1. Reviewed POC blood rate 14 with regular foot embolism there is good  2. . Labs and X ray results   3. Reviewed Current Medicines   4. On 9 correction bolus Humalog/ Basal Lantus Insulin regime does get  5. Monitor Blood glucose frequently   6. Modified  the dose of Insulin/ other medicines as needed   7. Will follow     .      Rachel Davenport MD, MD

## 2021-07-17 NOTE — PROGRESS NOTES
CARDIOLOGY  NOTE        Name:  Beryle Barrow /Age/Sex: 1955  (60 y.o. female)   MRN & CSN:  [id number] & [id number] Admission Date/Time: 7/15/2021  5:07 PM   Location:  -A PCP: Melba Gerber, [address] Day: 3        PLAN FROM CARDIOLOGY FOR TODAY:   HR control      - cardiology consult is for:  A Fib    -  Interval history:  In SR    · ASSESSMENT/ PLAN:  1. A fib in SR now  On eliquis  2. CAD stable  3. HFrEF on meds  CPM, diurese  4. Neuro appreciated          Subjective: Todays complain: Patient  No CP    HPI:  Mary Damian is a 60 y. o.year old who and presents with had concerns including Hyperglycemia. Chief Complaint   Patient presents with    Hyperglycemia           Objective: Temperature:  Current - Temp: 98.8 °F (37.1 °C);  Max - Temp  Av °F (37.2 °C)  Min: 98.4 °F (36.9 °C)  Max: 99.7 °F (37.6 °C)    Respiratory Rate : Resp  Av.2  Min: 17  Max: 30    Pulse Range: Pulse  Av.9  Min: 74  Max: 122    Blood Presuure Range:  Systolic (43KWU), KRE:951 , Min:109 , LGY:494   ; Diastolic (85CGJ), WKI:56, Min:55, Max:133      Pulse ox Range: SpO2  Av.7 %  Min: 90 %  Max: 100 %    24hr I & O:      Intake/Output Summary (Last 24 hours) at 2021 1253  Last data filed at 2021 6417  Gross per 24 hour   Intake 2766.39 ml   Output 4175 ml   Net -1408.61 ml         BP (!) 162/79   Pulse 90   Temp 98.8 °F (37.1 °C) (Core)   Resp 20   Ht 5' (1.524 m)   Wt 180 lb (81.6 kg)   SpO2 100%   BMI 35.15 kg/m²           Review of Systems:  All 14 systems reviewed, all negative       TELEMETRY: Sinus    has a past medical history of Abnormal PFTs (pulmonary function tests), Atrial fibrillation (HCC), CAD (coronary artery disease), CHF (congestive heart failure) ([facility] Diabetes mellitus ([facility] [address] Dizziness - light-headed, Family history of coronary artery disease, H/O 24 hour EKG monitoring, H/O cardiovascular stress test, H/O cardiovascular stress test, H/O echocardiogram, H/O echocardiogram, Heart imaging, Heartburn, History of cardiac cath, History of cardiac monitoring, History of cardioversion, History of MI (myocardial infarction), History of nuclear MUGA, History of nuclear Muga stress test, History of PTCA, Hx of cardiovascular stress test, Hx of transesophageal echocardiography (LUKE) for monitoring, Hyperlipidemia, Hypertension, Insomnia, SOB (shortness of breath), Tachycardia, and TIA (transient ischemic attack). has a past surgical history that includes  section; Tubal ligation; Cholecystectomy; Diagnostic Cardiac Cath Lab Procedure (2006, 2009); Percutaneous Transluminal Coronary Angio (10/06/2010); other surgical history (2017); ablation of dysrhythmic focus; and IR NONTUNNELED VASCULAR CATHETER > 5 YEARS (2021).   Physical Exam:  General:  Awake, alert, NAD  Head:normal  Eye:normal  Neck:  No JVD   Chest:  Clear to auscultation, respiration easy  Cardiovascular:  RRR S1S2  Abdomen:   nontender  Extremities:  tr edema  Pulses; palpable  Neuro: grossly normal    Medications:    insulin glargine  30 Units Subcutaneous Nightly    insulin lispro  0-12 Units Subcutaneous Q4H    digoxin  250 mcg Intravenous Q4H    spironolactone  25 mg Oral Daily    potassium chloride  40 mEq Oral BID    amiodarone  100 mg Oral BID    apixaban  5 mg Oral BID    aspirin  81 mg Oral Daily    levothyroxine  50 mcg Oral Daily    metoprolol succinate  100 mg Oral Daily    pantoprazole  40 mg Oral Daily    alogliptin  6.25 mg Oral Daily    sodium chloride flush  5-40 mL Intravenous 2 times per day    sodium chloride flush  5-40 mL Intravenous 2 times per day    cefepime  2,000 mg Intravenous Q12H    vancomycin  1,250 mg Intravenous Q24H    furosemide  40 mg Intravenous TID      dextrose      sodium chloride      sodium chloride      dilTIAZem (CARDIZEM) 100 mg in dextrose 5% 100 mL (ADD-Mantoloking) 10 mg/hr (07/17/21 0643)     albuterol sulfate HFA, nitroGLYCERIN, traZODone, glucose, dextrose, glucagon (rDNA), dextrose, sodium chloride flush, sodium chloride, ondansetron **OR** ondansetron, polyethylene glycol, acetaminophen **OR** acetaminophen, sodium chloride flush, sodium chloride    Lab Data:  CBC:   Recent Labs     07/16/21  0555 07/17/21  0525   WBC 22.6* 23.5*   HGB 10.6* 10.1*   HCT 32.8* 33.9*   MCV 94.5 105.0*    205     BMP:   Recent Labs     07/16/21  1140 07/16/21  1505 07/17/21  0525    134* 141   K 3.4* 4.0 3.3*    98* 99   CO2 26 24 29   PHOS  --   --  3.5   BUN 27* 29* 33*   CREATININE 1.4* 1.3* 1.3*     LIVER PROFILE:   Recent Labs     07/15/21  1830 07/16/21  0555 07/17/21  0525   AST 33  33 329* 246*   ALT 55*  55* 204* 254*   LIPASE 11*  --   --    BILIDIR 2.1*  --   --    BILITOT 3.6*  3.6* 2.7* 1.7*   ALKPHOS 204*  204* 205* 173*     PT/INR:   Recent Labs     07/15/21  1830   PROTIME 15.1*   INR 1.17     APTT: No results for input(s): APTT in the last 72 hours. BNP:  No results for input(s): BNP in the last 72 hours.   TROPONIN: @TROPONINI:3@  Labs, consult, tests reviewed    Assessment/ PLAN:    As above                  Amanda Monson MD, MD 7/17/2021 12:53 PM

## 2021-07-17 NOTE — PROGRESS NOTES
of atrial fibrillation; Gastroenteritis; YESIKA (acute kidney injury) ([facility] [address] Acute cystitis; Elevated liver enzymes; CHF (congestive heart failure), NYHA class I, acute on chronic, combined ([facility] [address] Pneumonia; Multifocal pneumonia; Atrial fibrillation with RVR ([facility] [address] Shoulder pain; Paroxysmal atrial fibrillation ([facility] [address] Diabetic hyperosmolar coma ([facility] [address] and Difficult intravenous access on their problem list.  ONSET DATE: this admission    Recent Chest Xray/CT of Chest: see chart    Date of Eval: 7/17/2021  Evaluating Therapist: ZENY Templeton    Current Diet level:  Current Diet : Regular  Current Liquid Diet : Thin      Primary Complaint  Patient Complaint: does not state    Pain:  Pain Assessment  Pain Assessment: FLACC  Pain Level: 0  Pain Assessment/FLACC  Pain Rating: FLACC (activity) - Face: no particular expression or smile  Pain Rating: FLACC (activity) - Legs: normal position or relaxed  Pain Rating: FLACC (activity): lying quietly, normal position, moves easily  Pain Rating: FLACC (activity) - Cry: no cry (awake or asleep)  Pain Rating: FLACC (activity) - Consolability: content, relaxed  Score: FLACC (activity): 0    Reason for Referral  Lula Arcos was referred for a bedside swallow evaluation to assess the efficiency of her swallow function, identify signs and symptoms of aspiration and make recommendations regarding safe dietary consistencies, effective compensatory strategies, and safe eating environment. Impression  Dysphagia Diagnosis: Moderate oral stage dysphagia  Dysphagia Outcome Severity Scale: Level 4: Mild moderate dysphagia- Intermittent supervision/cueing. One - two diet consistencies restricted     Treatment Plan  Requires SLP Intervention: Yes  Duration/Frequency of Treatment: 2-3x/week for LOS  D/C Recommendations:  To be determined       Recommended Diet and Intervention  Diet Solids Recommendation: Dysphagia Pureed (Dysphagia I)  Liquid Consistency Recommendation: results/recommendations  Patient Education Response: No evidence of learning  Safety Devices in place: Yes  Type of devices:  All fall risk precautions in place       Therapy Time  SLP Individual Minutes  Time In: 9931  Time Out: 305 [facility]  Minutes: 600 [facility]-SLP  7/17/2021 12:45 PM

## 2021-07-17 NOTE — PROGRESS NOTES
INTERNAL MEDICINE PROGRESS NOTE        Heriberto Pérez [facility]   1955   Primary Care Physician:  Jennifer Carlson PA-C  Admit Date: 7/15/2021     Subjective:   Drowsy but more awake and alert. She answers questions and follows command. Objective:   BP (!) 141/91   Pulse 106   Temp 98.8 °F (37.1 °C) (Core)   Resp 24   Ht 5' (1.524 m)   Wt 180 lb (81.6 kg)   SpO2 95%   BMI 35.15 kg/m²    General appearance: alert, appears stated age and cooperative  Head: Normocephalic, without obvious abnormality, atraumatic  Neck: no adenopathy and supple, symmetrical, trachea midline  Lungs: clear to auscultation bilaterally  Heart: regular rate and rhythm and S1, S2 normal  Abdomen: soft, non-tender; bowel sounds normal; no masses,  no organomegaly  Extremities: no clubbing, cyanosis or edema  Neurologic: moves all 4 extremities. Partially oriented.  Wants to eat    Data Review  Lab Results   Component Value Date     07/17/2021    K 3.3 (L) 07/17/2021    CL 99 07/17/2021    CO2 29 07/17/2021    CREATININE 1.3 (H) 07/17/2021    BUN 33 (H) 07/17/2021    CALCIUM 8.7 07/17/2021     Lab Results   Component Value Date    WBC 23.5 (H) 07/17/2021    HGB 10.1 (L) 07/17/2021    HCT 33.9 (L) 07/17/2021    .0 (H) 07/17/2021     07/17/2021     INR/Prothrombin Time      Meds:    insulin glargine  30 Units Subcutaneous Nightly    insulin lispro  0-12 Units Subcutaneous Q4H    digoxin  250 mcg Intravenous Q4H    spironolactone  25 mg Oral Daily    potassium chloride  40 mEq Oral BID    amiodarone  100 mg Oral BID    apixaban  5 mg Oral BID    aspirin  81 mg Oral Daily    levothyroxine  50 mcg Oral Daily    metoprolol succinate  100 mg Oral Daily    pantoprazole  40 mg Oral Daily    alogliptin  6.25 mg Oral Daily    sodium chloride flush  5-40 mL Intravenous 2 times per day    sodium chloride flush  5-40 mL Intravenous 2 times per day    cefepime  2,000 mg Intravenous Q12H    vancomycin  1,250 mg Intravenous Q24H    furosemide  40 mg Intravenous TID     PRN Meds: albuterol sulfate HFA, nitroGLYCERIN, traZODone, glucose, dextrose, glucagon (rDNA), dextrose, sodium chloride flush, sodium chloride, ondansetron **OR** ondansetron, polyethylene glycol, acetaminophen **OR** acetaminophen, sodium chloride flush, sodium chloride    Assessment/Plan:   Patient Active Hospital Problem List:  Patient Active Problem List   Diagnosis    History of CVA (cerebrovascular accident) without residual deficits    Morbid obesity ([facility] [address]    DM (diabetes mellitus), type 2, uncontrolled ([facility] [address]    [facility]    CAD (coronary artery disease)    CHF (congestive heart failure) [facility]    Light headed    History of MI (myocardial infarction)    Diabetes mellitus [facility]    Hypertension    TIA (transient ischemic attack)    Hyperlipidemia    SOB (shortness of breath)    H/O cardiovascular stress test    Family history of coronary artery disease    [facility]oxysmal [facility] fibrillation) ([facility])    Chest pain    Atrial thrombus without antecedent myocardial infarction    S/P ablation of atrial fibrillation    Gastroenteritis    YESIKA (acute kidney injury) ([facility]    Acute cystitis    Elevated liver enzymes    CHF (congestive heart failure), NYHA class I, acute on chronic, combined ([facility]    Pneumonia    Multifocal pneumonia    Atrial fibrillation with RVR (HCC)    Shoulder pain    Paroxysmal atrial fibrillation ([facility]    Diabetic hyperosmolar coma ([facility]    Difficult intravenous access   Diabetic hyperosmolar state : better  Mental status change: improving slowly  Leukocytosis and possible sepsis: on iv antibiotics  Depression: no suicial attempt. ( Taked to patient). Drug screen negative. A. Fib: rate is in control now  Hypertension: improved now  POssible pneumonia: on antibiotics  Pulmonary edema: continue monitoring.        Plan:  Speech therapy  Remove restraints  Iv antibiotics  Monitor glucose  Start po diet once speech therapy evaluated  Discussed with consultants.

## 2021-07-17 NOTE — PROGRESS NOTES
Neurology Service Progress Note  [facility]  Patient Name: Maia Smoker  : 1955        Subjective:   Reason for consult: Found unresponsive  Patient seen and examined today. She is more hemodynamically stable today. She is opening her eyes she is answering questions she is moving all 4 extremities she still remains encephalopathic her labs are still altered, but she is progressing. No seizures. Past Medical History:   Diagnosis Date    Abnormal PFTs (pulmonary function tests)     Atrial fibrillation (HCC)     CAD (coronary artery disease)     CHF (congestive heart failure) (HCC)     Diabetes mellitus (HCC)     Dizziness - light-headed     Family history of coronary artery disease     H/O 24 hour EKG monitoring 2012,12/10/2010    2012 predominant rhythm is sinus with short runs of SVT no episode of atrial fib. pt has left bundle branch morphology, max heart is 132, min is 48 with an average of 74 ,infrequent PVCs are seen    H/O cardiovascular stress test 2013, 2012-    H/O cardiovascular stress test 14,2012 EF 52% No ischemia, normal study. 2012 EF 58% tehnique-[employer]et[employer] LVEF is normal globally    H/O echocardiogram 13 EF 50-55% mild MR, TR 3/13EF 35-45% decreasedr L ventrical systolic function.     H/O echocardiogram -EF50-55% Mild MR/TR. 10/11 EF > 55% LVS function is normal, calcified mitral apparatus, impaired LV relaxation, no PE    Heart imaging 2012 MUGA rest EF 58%LVEF is normal globally    Heartburn     History of cardiac cath 2009, 2006,moderate stenosis  small diagonal    History of cardiac monitoring 2/9/15    Event - sinus rhythm    History of cardioversion 3/14/14    History of MI (myocardial infarction)     History of nuclear MUGA 2020    EF 41%    History of nuclear Muga stress test 2020    EF 41%    History of PTCA 10/06/2010    10/06/2010 stent 3.0x24 taxus stent left main 90% stenosis mid seg reduced to 0% second area of [id number] to 40% not flow limiting EF 40%    Hx of cardiovascular stress test 2015    lexiscan-normal,EF70%    Hx of transesophageal echocardiography (LUKE) for monitoring 2017    Noted to have MARY GRACE Blood Clot.  Hyperlipidemia     Hypertension     Insomnia     SOB (shortness of breath)     Tachycardia 2012    report in [facility] notes-[facility] Nunn hosp. consult    TIA (transient ischemic attack)     :   Past Surgical History:   Procedure Laterality Date    ABLATION OF DYSRHYTHMIC FOCUS       SECTION      CHOLECYSTECTOMY      DIAGNOSTIC CARDIAC CATH LAB PROCEDURE  2006, 2009 moderate stenosis small diagonal    IR NONTUNNELED VASCULAR CATHETER  2021    IR NONTUNNELED VASCULAR CATHETER 2021 [facility] SPECIAL PROCEDURES    OTHER SURGICAL HISTORY  2017    afib ablation LUKE    PTCA  10/06/2010    10/06/2010 stent 3.0x24 taxus stent 90% stenosis mid segment reduced to 0% second area of stenosis 30-40% not flow limiting EF 40%    TUBAL LIGATION       Medications:  Scheduled Meds:   insulin glargine  30 Units Subcutaneous Nightly    insulin lispro  0-12 Units Subcutaneous Q4H    digoxin  250 mcg Intravenous Q4H    amiodarone  100 mg Oral BID    apixaban  5 mg Oral BID    aspirin  81 mg Oral Daily    levothyroxine  50 mcg Oral Daily    metoprolol succinate  100 mg Oral Daily    pantoprazole  40 mg Oral Daily    alogliptin  6.25 mg Oral Daily    sodium chloride flush  5-40 mL Intravenous 2 times per day    sodium chloride flush  5-40 mL Intravenous 2 times per day    cefepime  2,000 mg Intravenous Q12H    vancomycin  1,250 mg Intravenous Q24H    furosemide  40 mg Intravenous TID     Continuous Infusions:   dextrose      sodium chloride      sodium chloride      dilTIAZem (CARDIZEM) 100 mg in dextrose 5% 100 mL (ADD-Othello) 10 mg/hr (21 0643)     PRN Meds:.albuterol sulfate HFA, nitroGLYCERIN, traZODone, glucose, dextrose, glucagon (rDNA), dextrose, sodium chloride flush, sodium chloride, ondansetron **OR** ondansetron, polyethylene glycol, acetaminophen **OR** acetaminophen, sodium chloride flush, sodium chloride    Allergies   Allergen Reactions    Amiodarone Other (See Comments)     dizziness    Iv Dye [Iodides] Nausea And Vomiting    Vicodin [Hydrocodone-Acetaminophen] Nausea And Vomiting     Social History     Socioeconomic History    Marital status:      Spouse name: Not on file    Number of children: Not on file    Years of education: Not on file    Highest education level: Not on file   Occupational History    Not on file   Tobacco Use    Smoking status: Former Smoker     Packs/day: 3.00     Years: 20.00     Pack years: 60.00     Types: Cigarettes     Quit date: 2007     Years since quittin.4    Smokeless tobacco: Never Used    Tobacco comment: reviewed 8/12/15   Vaping Use    Vaping Use: Never used   Substance and Sexual Activity    Alcohol use: No     Comment: occas. caffeine 3 mtn. dew a day    Drug use: No    Sexual activity: Not Currently   Other Topics Concern    Not on file   Social History Narrative    Not on file     Social Determinants of Health     Financial Resource Strain:     Difficulty of Paying Living Expenses:    Food Insecurity:     Worried About Running Out of Food in the Last Year:     [address] in the Last Year:    Transportation Needs:     Lack of Transportation (Medical):      Lack of Transportation (Non-Medical):    Physical Activity:     Days of Exercise per Week:     Minutes of Exercise per Session:    Stress:     Feeling of Stress :    Social Connections:     Frequency of Communication with Friends and Family:     Frequency of Social Gatherings with Friends and Family:     Attends Synagogue Services:     Active Member of Clubs or Organizations:  Attends Club or Organization Meetings:     Marital Status:    Intimate Partner Violence:     Fear of Current or Ex-Partner:     Emotionally Abused:     Physically Abused:     Sexually Abused:       Family History   Problem Relation Age of Onset    Diabetes Father     Heart Disease Father          ROS (10 systems)  Due to severe state of encephalopathy patient cannot answer review of system questions    Physical Exam:         Wt Readings from Last 3 Encounters:   07/15/21 180 lb (81.6 kg)   06/15/21 178 lb 12.8 oz (81.1 kg)   12/30/20 190 lb 3.2 oz (86.3 kg)     Temp Readings from Last 3 Encounters:   07/17/21 98.4 °F (36.9 °C) (Core)   06/15/21 97.9 °F (36.6 °C) (Oral)   12/30/20 98.3 °F (36.8 °C) (Oral)     BP Readings from Last 3 Encounters:   07/17/21 (!) 142/85   06/15/21 (!) 143/79   12/30/20 (!) 170/69     Pulse Readings from Last 3 Encounters:   07/17/21 96   06/15/21 72   12/30/20 82        Gen: Altered, restrained, agitated  HEENT: NC/AT, no gaze deviation, PERRL, mmm, neck supple, no meningeal signs;    Heart: Tachycardia, regular  Lungs: Work of breathing noted  Ext: Bilateral upper extremity 2+ pitting edema, bilateral lower extremities 1+ pitting edema  Psych: Eyes closed  Skin: Various abrasions and lesions across her body, right upper extremity is edematous and red    NEUROLOGIC EXAM:    Mental Status: Patient is awake but she is severely altered encephalopathic no at concentration cannot follow commands does not name and repeat    Cranial Nerve Exam:   CN II-XII: Appears grossly intact with no gaze deviation, pupils equal and reactive bilaterally, face symmetrical, tongue appears midline she does blink to menace when I open her eyes bilaterally, resist eye opening bilaterally    Motor Exam:       Grimace to pain x4 moving against restraints x4    Deep Tendon Reflexes: 2/4 biceps, triceps, brachioradialis, patellar, and achilles b/l; flexor plantar responses b/l    Sensation: Pain UE's/LE's b/l    Coordination/Cerebellum:       Tremors--none      Gait and stance:      Gait: deferred      LABS:     Recent Labs     07/15/21  1830 07/16/21  0127 07/16/21  0555 07/16/21  0555 07/16/21  1140 07/16/21  1505 07/17/21  0525   WBC  --   --  22.6*  --   --   --   --    *   < > 132*   < > 138 134* 141   K 4.8   < > 3.6   < > 3.4* 4.0 3.3*   CL 91*   < > 95*   < > 101 98* 99   CO2 21   < > 20*   < > 26 24 29   BUN 22   < > 28*   < > 27* 29* 33*   CREATININE 1.5*   < > 1.5*   < > 1.4* 1.3* 1.3*   GLUCOSE 716*   < > 407*   < > 75 139* 211*   INR 1.17  --   --   --   --   --   --     < > = values in this interval not displayed. Liver enzymes elevated  Ammonia within normal limits    Results for Lindsay Spann (MRN [id number]) as of 7/17/2021 11:52   Ref.  Range 7/16/2021 12:00   Color, UA Latest Ref Range: YELLOW  CASTILLO (A)   Clarity, UA Latest Ref Range: CLEAR  CLEAR   Bilirubin, Urine Latest Ref Range: NEGATIVE MG/DL NEGATIVE   Ketones, Urine Latest Ref Range: NEGATIVE MG/DL NEGATIVE   Specific Gravity, UA Latest Ref Range: 1.001 - 1.035  1.024   Blood, Urine Latest Ref Range: NEGATIVE  LARGE (A)   Protein, UA Latest Ref Range: NEGATIVE MG/DL >500 (HH)   Urobilinogen, Urine Latest Ref Range: 0.2 - 1.0 MG/DL 2.0 (H)   Leukocyte Esterase, Urine Latest Ref Range: NEGATIVE  SMALL (A)   Glucose, Urine Latest Ref Range: NEGATIVE MG/DL >500 (A)   Nitrite Urine, Quantitative Latest Ref Range: NEGATIVE  NEGATIVE   pH, Urine Latest Ref Range: 5.0 - 8.0  5.0   Granular Casts, UA Latest Units: /LPF 4   Hyaline Casts, UA Latest Units: /LPF 5   Mucus, UA Latest Ref Range: NEGATIVE HPF RARE (A)   WBC, UA Latest Ref Range: 0 - 5 /HPF 15 (H)   RBC, UA Latest Ref Range: 0 - 6 /HPF 33 (H)   Squam Epithel, UA Latest Units: /HPF <1   Bacteria, UA Latest Ref Range: NEGATIVE /HPF RARE (A)   Ca Oxalate Becky, UA Latest Units: /HPF OCCASIONAL   Trichomonas, UA Latest Ref Range: NONE SEEN /HPF NONE SEEN   Creatinine, Ur Latest Ref Range: 28 - 217 MG/.6   Phencyclidine, Urine Latest Ref Range: NEGATIVE  NEGATIVE   Prot/Creat Ratio, Ur Latest Ref Range: <0.2  1.7 (H)   Sodium, Ur Latest Ref Range: 35 - 167 MMOL/L 10 (L)   Urine Total Protein Latest Ref Range: <12 MG/.1 (H)       Blood cultures pending    IMAGING:      CT Head:  No acute intracranial abnormality.       Chronic microvascular ischemic changes and global cerebral atrophy. EEG:      ASSESSMENT/PLAN:     3 81-year-old female with acute altered mental status found down secondary to suspected severe toxic metabolic encephalopathy superimposed on acute HHNS, elevated liver enzymes and pulmonary edema. No fever thus no LP at this time. MRI would not help work up currently as there is no focal findings rather severe global encephalopathy. Need to obtain EEG. Plan of care as follows:  1. Neuro exam:  1. Improved mentation, non focal   2. Neurodiagnostics:  1. CT of the head was nonacute  2. EEG pending  3. Ammonia as above  4. Urine as above, culture pending   5. Blood cultures pending  3. Medications:  1. Avoid all CNS altering medications at this time  2. If she has history of A. fib needs to be on anticoagulation will defer to internal medicine  4. PT/OT/ST:  1. Per their recommendation  5. Follow-up:  1. Pending course of admission and complete metabolic work-up      Thank you for allowing us to participate in the care of your patient. If there are any questions regarding evaluation please feel free to contact us.      Marylen Ko, RENE - VINAY, 7/17/2021

## 2021-07-17 NOTE — PROGRESS NOTES
[address] [facility]  consulted by Dr. Maxine Reynolds for monitoring and adjustment. Indication for treatment: Sepsis   Goal trough: 15 mcg/mL  AUC/BEA: 400-600    Pertinent Laboratory Values:   Temp Readings from Last 3 Encounters:   07/17/21 98.8 °F (37.1 °C) (Core)   06/15/21 97.9 °F (36.6 °C) (Oral)   12/30/20 98.3 °F (36.8 °C) (Oral)     Recent Labs     07/16/21  0127 07/16/21  0410 07/16/21  0555 07/17/21  0525   WBC  --   --  22.6* 23.5*   LACTATE 8.1* 6.0*  --   --      Recent Labs     07/16/21  1140 07/16/21  1505 07/17/21  0525   BUN 27* 29* 33*   CREATININE 1.4* 1.3* 1.3*     Estimated Creatinine Clearance: 41 mL/min (A) (based on SCr of 1.3 mg/dL (H)). Intake/Output Summary (Last 24 hours) at 7/17/2021 1633  Last data filed at 7/17/2021 1446  Gross per 24 hour   Intake 2766.39 ml   Output 5825 ml   Net -3058.61 ml       Pertinent Cultures:  Date    Source    Results  7/15   Blood    Ordered  7/15   Urine    Ordered    Vancomycin level:   TROUGH:  No results for input(s): VANCOTROUGH in the last 72 hours. RANDOM:  No results for input(s): VANCORANDOM in the last 72 hours. Assessment:  · WBC and temperature: WBC @ 23.5;  Tmax = 99.9F  · SCr, BUN, and urine output:  · Scr stable, BUN trending up  · BUN with increase  · Day(s) of therapy: 3  · Vancomycin concentration: to be collected    Plan:  · Vancomycin 1500 x1 in ER  · Continue vancomycin 1250 mg IVPB q24h  · Plan to check a trough level prior to the 4th total dose  · Pharmacy will continue to monitor patient and adjust therapy as indicated    [address] 7/18 @ 2030    Thank you for the consult,  Saba Madera, [facility]  7/17/2021 4:33 PM

## 2021-07-18 NOTE — PROGRESS NOTES
Progress Note( Dr. Malreni Quinteros)  7/18/2021  Subjective:   Admit Date: 7/15/2021  PCP: Lila Narayanan PA-C        Admitted For :   Altered mental state and severe hyperglycemia possible metabolic encephalopathy    Consulted For: Better control of blood glucose    Interval History: Patient is alert little more awake alert responding to verbal commands appropriately  She does not know what happened to her which was brought here  Many of the metabolic parameters are getting better    Denies any chest pains,   Denies SOB . Denies nausea or vomiting. Able to eat somewhat better  No new bowel or bladder symptoms. Intake/Output Summary (Last 24 hours) at 7/18/2021 1547  Last data filed at 7/18/2021 1446  Gross per 24 hour   Intake 496 ml   Output 4050 ml   Net -3554 ml       DATA    CBC:   Recent Labs     07/16/21  0555 07/17/21  0525 07/18/21  0500   WBC 22.6* 23.5* 16.0*   HGB 10.6* 10.1* 11.5*    205 209    CMP:  Recent Labs     07/15/21  1830 07/16/21  0127 07/16/21  0555 07/16/21  1140 07/16/21  1505 07/17/21  0525 07/18/21  0500   *   < > 132*   < > 134* 141 139   K 4.8   < > 3.6   < > 4.0 3.3* 2.8*   CL 91*   < > 95*   < > 98* 99 94*   CO2 21   < > 20*   < > 24 29 34*   BUN 22   < > 28*   < > 29* 33* 26*   CREATININE 1.5*   < > 1.5*   < > 1.3* 1.3* 1.2*   CALCIUM 9.1   < > 9.0   < > 8.8 8.7 7.9*   PROT 6.4  6.4  --  5.9*  --   --  5.3*  --    LABALBU 3.5  3.5  --  3.9  --   --  3.1*  --    BILITOT 3.6*  3.6*  --  2.7*  --   --  1.7*  --    ALKPHOS 204*  204*  --  205*  --   --  173*  --    AST 33  33  --  329*  --   --  246*  --    ALT 55*  55*  --  204*  --   --  254*  --     < > = values in this interval not displayed.      Lipids:   Lab Results   Component Value Date    CHOL 153 01/30/2012    HDL 59 01/30/2012    TRIG 135 01/30/2012     Glucose:  Recent Labs     07/18/21  0502 07/18/21  0828 07/18/21  1114   POCGLU 177* 197* 244*     [id number]:  Lab Results   Component Value Date LABA1C 10.9 07/17/2021     High Sensitivity TSH:   Lab Results   Component Value Date    TSHHS 2.860 06/12/2021     Free T3: No results found for: FT3  Free T4:  Lab Results   Component Value Date    T4FREE 1.28 06/12/2021       Echocardiogram complete 2D with doppler with color    Result Date: 7/16/2021  Transthoracic Echocardiography Report (TTE)  Demographics   Patient Name       Omar MAYFIELD Date of Study       07/16/2021   Date of Birth      1955         Gender              Female   Age                60 year(s)         Race                   Patient Number     [id number]         Room Number         2128   Visit Number       [id number]   Corporate ID       [id number]   Accession Number   [id number]         Milana Vilchis RDMS   Ordering Physician Mehdi Rivera MD                 Physician           MD  Procedure Type of Study   TTE procedure:ECHOCARDIOGRAM COMPLETE 2D W DOPPLER W COLOR. Procedure Date Date: 07/16/2021 Start: 01:35 PM Study Location: Portable Technical Quality: Fair visualization Indications:Congestive heart failure. Patient Status: Routine Height: 60 inches Weight: 180 pounds BSA: 1.78 m2 BMI: 35.15 kg/m2 HR: 99 bpm BP: 145/83 mmHg  Conclusions   Summary  Left ventricular systolic function is abnormal.  Ejection fraction is visually estimated at 15-20%. Global hypokinesis noted. Grade III diastolic dysfunction. Mitral annular calcification is present. Moderate to severe mitral regurgitation. Mild to moderate tricuspid regurgitation; RVSP: 38 mmHg. No evidence of any pericardial effusion.    Signature   ------------------------------------------------------------------  Electronically signed by Margarito Rincon MD (Interpreting  physician) on 07/16/2021 at 04:31 PM    CT ABDOMEN PELVIS WO CONTRAST Additional Contrast? None    Result Date: 7/15/2021  EXAMINATION: CT OF THE ABDOMEN AND PELVIS WITHOUT CONTRAST 7/15/2021 7:28 pm    1. Findings at the lung bases suggesting pulmonary edema with small bilateral effusions and associated atelectasis. 2. No acute intra-abdominal process identified. 3. Severe atherosclerotic disease. CT Head WO Contrast    Result Date: 7/15/2021  EXAMINATION: CT OF THE HEAD WITHOUT CONTRAST  7/15/2021 7:28 pm     No acute intracranial abnormality. Chronic microvascular ischemic changes and global cerebral atrophy. XR CHEST PORTABLE    Result Date: 7/16/2021  EXAMINATION: ONE XRAY VIEW OF THE CHEST 7/16/2021 10:31 am COMPARISON: July 15, 2021 HISTORY: ORDERING SYSTEM PROVIDED HISTORY: s/p central line attempt    Right IJ central venous catheter terminates in the mid SVC in satisfactory position. No pneumothorax. Pulmonary vascular congestion with interstitial pulmonary edema. XR CHEST PORTABLE    Result Date: 7/15/2021  EXAMINATION: ONE XRAY VIEW OF THE CHEST 7/15/2021 5:47 pm COMPARISON: 06/13/2021 HISTORY: ORDERING SYSTEM PROVIDED HISTORY: AMS TECHNOLOGIST PROVIDED HISTORY: Reason for exam:->AMS Reason for Exam: AMS Initial encounter FINDINGS: Cardiomegaly. There is increased interstitial opacities bilaterally. No pleural effusion or pneumothorax. The osseous structures otherwise stable. Cardiomegaly with pulmonary edema. IR NONTUNNELED VASCULAR CATHETER > 5 YEARS    Result Date: 7/16/2021  PROCEDURE: ULTRASOUND GUIDED VASCULAR ACCESS. PLACEMENT OF A NON-TUNNELED CATHETER. 7/16/2021. HISTORY: ORDERING SYSTEM PROVIDED HISTORY: Need central venous access. Request CVC Insertion TECHNOLOGIST PROVIDED HISTORY: Reason for exam:->CVC Insertion SEDATION: None. Successful ultrasound and fluoroscopy guided non-tunneled central venous catheter placement. The catheter is ready to use.        Scheduled Medicines   Medications:    insulin lispro  10 Units Subcutaneous TID WC    insulin lispro  0-12 Units Subcutaneous TID WC    furosemide  20 mg Intravenous BID    insulin glargine  30 Units Subcutaneous Nightly    spironolactone  25 mg Oral Daily    potassium bicarb-citric acid  40 mEq Oral BID    amiodarone  100 mg Oral BID    apixaban  5 mg Oral BID    aspirin  81 mg Oral Daily    levothyroxine  50 mcg Oral Daily    metoprolol succinate  100 mg Oral Daily    pantoprazole  40 mg Oral Daily    alogliptin  6.25 mg Oral Daily    sodium chloride flush  5-40 mL Intravenous 2 times per day    sodium chloride flush  5-40 mL Intravenous 2 times per day    cefepime  2,000 mg Intravenous Q12H    vancomycin  1,250 mg Intravenous Q24H      Infusions:    dextrose      sodium chloride      sodium chloride      dilTIAZem (CARDIZEM) 100 mg in dextrose 5% 100 mL ([facility]) 10 mg/hr (07/18/21 1108)         Objective:   Vitals: BP (!) 151/69   Pulse 75   Temp 98.6 °F (37 °C) (Oral)   Resp 16   Ht 5' (1.524 m)   Wt 180 lb (81.6 kg)   SpO2 100%   BMI 35.15 kg/m²   General appearance: alert and cooperative with exam appears to be somewhat dehydrated  Neck: no JVD or bruit  Thyroid : Normal lobes   Lungs: Has Vesicular Breath sounds   Heart: Atrial fibrillation  Abdomen: soft, non-tender; bowel sounds normal; no masses,  no organomegaly  Musculoskeletal: Normal  Extremities: extremities normal, , no edema  Neurologic:  Awake, alert, oriented to name, place and time. Cranial nerves II-XII are grossly intact. Motor is  intact. Sensory is intact. ,  and gait is normal.    Assessment:     Patient Active Problem List:     History of CVA (cerebrovascular accident) without residual deficits     Morbid obesity ([address]     DM (diabetes mellitus), type 2, uncontrolled ([address]     Persistent atrial fibrillation ([address]     CAD (coronary artery disease)     CHF (congestive heart failure) ([address]     Light headed     History of MI (myocardial infarction)     Diabetes mellitus ([address]     Hypertension     TIA (transient ischemic attack)     Hyperlipidemia     SOB (shortness of breath)     H/O cardiovascular stress test     Family history of coronary artery disease     PAF (paroxysmal atrial fibrillation) (HCC)     Chest pain     Atrial thrombus without antecedent myocardial infarction     S/P ablation of atrial fibrillation     Gastroenteritis     YESIKA (acute kidney injury) ([address]     Acute cystitis     Elevated liver enzymes     CHF (congestive heart failure), NYHA class I, acute on chronic, combined ([address]     Pneumonia     Multifocal pneumonia     Atrial fibrillation with RVR (HCC)     Shoulder pain     Paroxysmal atrial fibrillation ([address]     Diabetic hyperosmolar coma ([facility]     Difficult intravenous access     Metabolic encephalopathy getting better      Plan:     1. Reviewed POC blood rate 14 with regular foot embolism there is good  2. . Labs and X ray results   3. Reviewed Current Medicines   4. On meal +,  correction bolus Humalog/ Basal Lantus Insulin regime does get  5. Monitor Blood glucose frequently   6. Modified  the dose of Insulin/ other medicines as needed   7. Will follow     .      Rian Moss MD, MD

## 2021-07-18 NOTE — PROGRESS NOTES
Neurology Service Progress Note  [facility]  Patient Name: Beryle Barrow  : 1955        Subjective:   Reason for consult: Found unresponsive  Patient seen and examined today. She is less responsive for me today and will not talk, will not follow commands  She has no fever  She is hemodynamically stable     Past Medical History:   Diagnosis Date    Abnormal PFTs (pulmonary function tests)     Atrial fibrillation ([address]     CAD (coronary artery disease)     CHF (congestive heart failure) (HCC)     Diabetes mellitus (HCC)     Dizziness - light-headed     Family history of coronary artery disease     H/O 24 hour EKG monitoring 2012,12/10/2010    2012 predominant rhythm is sinus with short runs of SVT no episode of atrial fib. pt has left bundle branch morphology, max heart is 132, min is 48 with an average of 74 ,infrequent PVCs are seen    H/O cardiovascular stress test 2013, 2012-    H/O cardiovascular stress test 14,2012 EF 52% No ischemia, normal study. 2012 EF 58% tehn[employer]et[employer] LVEF is normal globally    H/O echocardiogram 13 EF 50-55% mild MR, TR 3/13EF 35-45% decreasedr L ventrical systolic function.     H/O echocardiogram -EF50-55% Mild MR/TR. 10/11 EF > 55% LVS function is normal, calcified mitral apparatus, impaired LV relaxation, no PE    Heart imaging 2012 MUGA rest EF 58%LVEF is normal globally    Heartburn     History of cardiac cath 2009, 2006,moderate stenosis  small diagonal    History of cardiac monitoring 2/9/15    Event - sinus rhythm    History of cardioversion 3/14/14    History of MI (myocardial infarction)     History of nuclear MUGA 2020    EF 41%    History of nuclear [place] stress test 2020    EF 41%    History of PTCA 10/06/2010    10/06/2010 stent 3.0x24 taxus stent left main 90% stenosis mid seg reduced to 0% second area of [id number] to 40% not flow limiting EF 40%    Hx of cardiovascular stress test 2015    lexiscan-normal,EF70%    Hx of transesophageal echocardiography ([facility]) for monitoring 2017    Noted to have MARY GRACE Blood Clot.  Hyperlipidemia     Hypertension     Insomnia     SOB (shortness of breath)     Tachycardia 2012    report in epic notes-Dr Nunn hosp. consult    TIA (transient ischemic attack)     :   Past Surgical History:   Procedure Laterality Date    ABLATION OF DYSRHYTHMIC FOCUS       SECTION      CHOLECYSTECTOMY      DIAGNOSTIC CARDIAC CATH LAB PROCEDURE  2006, 2009 moderate stenosis small diagonal    IR NONTUNNELED VASCULAR CATHETER  2021    IR NONTUNNELED VASCULAR CATHETER 2021 [facility] SPECIAL PROCEDURES    OTHER SURGICAL HISTORY  2017    afib ablation [facility]    PTCA  10/06/2010    10/06/2010 stent 3.0x24 taxus stent 90% stenosis mid segment reduced to 0% second area of stenosis 30-40% not flow limiting EF 40%    TUBAL LIGATION       Medications:  Scheduled Meds:   potassium chloride  20 mEq Intravenous Q2H    insulin lispro  10 Units Subcutaneous TID WC    insulin lispro  0-12 Units Subcutaneous TID WC    furosemide  20 mg Intravenous BID    insulin glargine  30 Units Subcutaneous Nightly    spironolactone  25 mg Oral Daily    potassium bicarb-citric acid  40 mEq Oral BID    amiodarone  100 mg Oral BID    apixaban  5 mg Oral BID    aspirin  81 mg Oral Daily    levothyroxine  50 mcg Oral Daily    metoprolol succinate  100 mg Oral Daily    pantoprazole  40 mg Oral Daily    alogliptin  6.25 mg Oral Daily    sodium chloride flush  5-40 mL Intravenous 2 times per day    sodium chloride flush  5-40 mL Intravenous 2 times per day    cefepime  2,000 mg Intravenous Q12H    vancomycin  1,250 mg Intravenous Q24H     Continuous Infusions:   dextrose      sodium chloride      sodium chloride      dilTIAZem (CARDIZEM) 100 mg in dextrose 5% 100 mL ([facility]) 10 mg/hr (21 1022)     PRN Meds:.albuterol sulfate HFA, nitroGLYCERIN, traZODone, glucose, dextrose, glucagon (rDNA), dextrose, sodium chloride flush, sodium chloride, ondansetron **OR** ondansetron, polyethylene glycol, acetaminophen **OR** acetaminophen, sodium chloride flush, sodium chloride    Allergies   Allergen Reactions    Amiodarone Other (See Comments)     dizziness    Iv Dye [Iodides] Nausea And Vomiting    Vicodin [Hydrocodone-Acetaminophen] Nausea And Vomiting     Social History     Socioeconomic History    Marital status:      Spouse name: Not on file    Number of children: Not on file    Years of education: Not on file    Highest education level: Not on file   Occupational History    Not on file   Tobacco Use    Smoking status: Former Smoker     Packs/day: 3.00     Years: 20.00     Pack years: 60.00     Types: Cigarettes     Quit date: 2007     Years since quittin.4    Smokeless tobacco: Never Used    Tobacco comment: reviewed 8/12/15   Vaping Use    Vaping Use: Never used   Substance and Sexual Activity    Alcohol use: No     Comment: occas. caffeine 3 mtn. dew a day    Drug use: No    Sexual activity: Not Currently   Other Topics Concern    Not on file   Social History Narrative    Not on file     Social Determinants of Health     Financial Resource Strain:     Difficulty of Paying Living Expenses:    Food Insecurity:     Worried About Running Out of Food in the Last Year:     [address] in the Last Year:    Transportation Needs:     Lack of Transportation (Medical):      Lack of Transportation (Non-Medical):    Physical Activity:     Days of Exercise per Week:     Minutes of Exercise per Session:    Stress:     Feeling of Stress :    Social Connections:     Frequency of Communication with Friends and Family:     Frequency of Social Gatherings with Friends and Family:     Attends [employer] Services:     Active Member of Clubs or Organizations:     Attends Club or Organization Meetings:     Marital Status:    Intimate Partner Violence:     Fear of Current or Ex-Partner:     Emotionally Abused:     Physically Abused:     Sexually Abused:       Family History   Problem Relation Age of Onset    Diabetes Father     Heart Disease Father          ROS (10 systems)  Due to severe state of encephalopathy patient cannot answer review of system questions    Physical Exam:         Wt Readings from Last 3 Encounters:   07/15/21 180 lb (81.6 kg)   06/15/21 178 lb 12.8 oz (81.1 kg)   12/30/20 190 lb 3.2 oz (86.3 kg)     Temp Readings from Last 3 Encounters:   07/18/21 98.4 °F (36.9 °C) (Oral)   06/15/21 97.9 °F (36.6 °C) (Oral)   12/30/20 98.3 °F (36.8 °C) (Oral)     BP Readings from Last 3 Encounters:   07/18/21 (!) 163/66   06/15/21 (!) 143/79   12/30/20 (!) 170/69     Pulse Readings from Last 3 Encounters:   07/18/21 74   06/15/21 72   12/30/20 82        Gen: Altered, restrained, agitated  HEENT: NC/AT, no gaze deviation, PERRL, mmm, neck supple, no meningeal signs;    Heart: Tachycardia, regular  Lungs: Work of breathing noted  Ext: Bilateral upper extremity 2+ pitting edema, bilateral lower extremities 1+ pitting edema  Psych: Eyes closed  Skin: Various abrasions and lesions across her body, right upper extremity is edematous and red    NEUROLOGIC EXAM:    Mental Status: Patient is awake but she is severely altered encephalopathic no at concentration cannot follow commands does not name and repeat    Cranial Nerve Exam:   CN II-XII: Appears grossly intact with no gaze deviation, pupils equal and reactive bilaterally, face symmetrical, tongue appears midline she does blink to menace when I open her eyes bilaterally, resist eye opening bilaterally    Motor Exam:       Grimace to pain x4 moving against restraints x4    Deep Tendon Reflexes: 2/4 biceps, triceps, brachioradialis, patellar, and achilles b/l; flexor plantar responses b/l    Sensation: Pain UE's/LE's b/l    Coordination/Cerebellum:       Tremors--none      Gait and stance:      Gait: deferred      LABS:     Recent Labs     07/15/21  1830 07/16/21  0127 07/16/21  0555 07/16/21  1140 07/16/21  1505 07/17/21  0525 07/18/21  0500   WBC  --   --  22.6*  --   --  23.5* 16.0*   *   < > 132*   < > 134* 141 139   K 4.8   < > 3.6   < > 4.0 3.3* 2.8*   CL 91*   < > 95*   < > 98* 99 94*   CO2 21   < > 20*   < > 24 29 34*   BUN 22   < > 28*   < > 29* 33* 26*   CREATININE 1.5*   < > 1.5*   < > 1.3* 1.3* 1.2*   GLUCOSE 716*   < > 407*   < > 139* 211* 180*   INR 1.17  --   --   --   --   --   --     < > = values in this interval not displayed. Liver enzymes elevated  Ammonia within normal limits    Results for Kulwant Rodriguez (MRN [id number]) as of 7/17/2021 11:52   Ref.  Range 7/16/2021 12:00   Color, UA Latest Ref Range: YELLOW  CASTILLO (A)   Clarity, UA Latest Ref Range: CLEAR  CLEAR   Bilirubin, Urine Latest Ref Range: NEGATIVE MG/DL NEGATIVE   Ketones, Urine Latest Ref Range: NEGATIVE MG/DL NEGATIVE   Specific Gravity, UA Latest Ref Range: 1.001 - 1.035  1.024   Blood, Urine Latest Ref Range: NEGATIVE  LARGE (A)   Protein, UA Latest Ref Range: NEGATIVE MG/DL >500 (HH)   Urobilinogen, Urine Latest Ref Range: 0.2 - 1.0 MG/DL 2.0 (H)   Leukocyte Esterase, Urine Latest Ref Range: NEGATIVE  SMALL (A)   Glucose, Urine Latest Ref Range: NEGATIVE MG/DL >500 (A)   Nitrite Urine, Quantitative Latest Ref Range: NEGATIVE  NEGATIVE   pH, Urine Latest Ref Range: 5.0 - 8.0  5.0   Granular Casts, UA Latest Units: /LPF 4   Hyaline Casts, UA Latest Units: /LPF 5   Mucus, UA Latest Ref Range: NEGATIVE HPF RARE (A)   WBC, UA Latest Ref Range: 0 - 5 /HPF 15 (H)   RBC, UA Latest Ref Range: 0 - 6 /HPF 33 (H)   Squam Epithel, UA Latest Units: /HPF <1   Bacteria, UA Latest Ref Range: NEGATIVE /HPF RARE (A)   Ca Oxalate Becky, UA Latest Units: /HPF OCCASIONAL   Trichomonas, UA Latest Ref Range: NONE SEEN /HPF NONE SEEN   Creatinine, Ur Latest Ref Range: 28 - 217 MG/.6   Phencyclidine, Urine Latest Ref Range: NEGATIVE  NEGATIVE   Prot/Creat Ratio, Ur Latest Ref Range: <0.2  1.7 (H)   Sodium, Ur Latest Ref Range: 35 - 167 MMOL/L 10 (L)   Urine Total Protein Latest Ref Range: <12 MG/.1 (H)       Blood cultures pending    IMAGING:      CT Head:  No acute intracranial abnormality.       Chronic microvascular ischemic changes and global cerebral atrophy. EEG:      ASSESSMENT/PLAN:     3 61-year-old female with acute altered mental status found down secondary to suspected severe toxic metabolic encephalopathy superimposed on acute HHNS, elevated liver enzymes and pulmonary edema. No fever thus no LP at this time. MRI would not help work up currently as there is no focal findings rather severe global encephalopathy. Need to obtain EEG. Plan of care as follows:  1. Neuro exam:  1. Improved mentation, non focal   2. Neurodiagnostics:  1. CT of the head was nonacute  2. EEG pending  3. Ammonia as above  4. Urine as above, culture pending   5. Blood cultures pending  6. MRI brain pending   3. Medications:  1. Avoid all CNS altering medications at this time  2. If she has history of A. fib needs to be on anticoagulation will defer to internal medicine  4. PT/OT/ST:  1. Per their recommendation  5. Follow-up:  1. Pending course of admission and complete metabolic work-up  2. Will sign over case to Dr. Hazel Baker      Thank you for allowing us to participate in the care of your patient. If there are any questions regarding evaluation please feel free to contact us.      Hermilo Mendez, RENE - VINAY, 7/18/2021

## 2021-07-18 NOTE — PROGRESS NOTES
INTERNAL MEDICINE PROGRESS NOTE        Shelbi Hairston [facility]   1955   Primary Care Physician:  Enrrique Chen PA-C  Admit Date: 7/15/2021     Subjective:   She does not respond back to questions all the time. The answers she gives are appropriate    Objective:   BP (!) 151/69   Pulse 75   Temp 98.6 °F (37 °C) (Oral)   Resp 16   Ht 5' (1.524 m)   Wt 180 lb (81.6 kg)   SpO2 100%   BMI 35.15 kg/m²    General appearance: alert, appears stated age and cooperative  Head: Normocephalic, without obvious abnormality, atraumatic  Neck: no adenopathy and supple, symmetrical, trachea midline  Lungs: clear to auscultation bilaterally  Heart: regular rate and rhythm and S1, S2 normal  Abdomen: soft, non-tender; bowel sounds normal; no masses,  no organomegaly  Extremities: no clubbing, cyanosis or edema  Neurologic: moves all 4 extremities.  Aphasic    Data Review  Lab Results   Component Value Date     07/18/2021    K 2.8 (LL) 07/18/2021    CL 94 (L) 07/18/2021    CO2 34 (H) 07/18/2021    CREATININE 1.2 (H) 07/18/2021    BUN 26 (H) 07/18/2021    CALCIUM 7.9 (L) 07/18/2021     Lab Results   Component Value Date    WBC 16.0 (H) 07/18/2021    HGB 11.5 (L) 07/18/2021    HCT 35.1 (L) 07/18/2021    MCV 92.9 07/18/2021     07/18/2021     INR/Prothrombin Time      Meds:    insulin lispro  10 Units Subcutaneous TID WC    insulin lispro  0-12 Units Subcutaneous TID WC    furosemide  20 mg Intravenous BID    insulin glargine  30 Units Subcutaneous Nightly    spironolactone  25 mg Oral Daily    potassium bicarb-citric acid  40 mEq Oral BID    amiodarone  100 mg Oral BID    apixaban  5 mg Oral BID    aspirin  81 mg Oral Daily    levothyroxine  50 mcg Oral Daily    metoprolol succinate  100 mg Oral Daily    pantoprazole  40 mg Oral Daily    alogliptin  6.25 mg Oral Daily    sodium chloride flush  5-40 mL Intravenous 2 times per day    sodium chloride flush  5-40 mL Intravenous 2 times per day    cefepime 2,000 mg Intravenous Q12H    vancomycin  1,250 mg Intravenous Q24H     PRN Meds: albuterol sulfate HFA, nitroGLYCERIN, traZODone, glucose, dextrose, glucagon (rDNA), dextrose, sodium chloride flush, sodium chloride, ondansetron **OR** ondansetron, polyethylene glycol, acetaminophen **OR** acetaminophen, sodium chloride flush, sodium chloride    Assessment/Plan:   Patient Active Hospital Problem List:  Patient Active Problem List   Diagnosis    History of CVA (cerebrovascular accident) without residual deficits    [facility]    DM (diabetes mellitus), type 2, uncontrolled ([facility]    Persistent atrial fibrillation ([facility]    CAD (coronary artery disease)    CHF (congestive heart failure) ([facility]    Light headed    History of MI (myocardial infarction)    Diabetes mellitus ([facility]    Hypertension    TIA (transient ischemic attack)    Hyperlipidemia    SOB (shortness of breath)    H/O cardiovascular stress test    Family history of coronary artery disease    [facility]ox[facility]    Chest pain    Atrial thrombus without antecedent myocardial infarction    S/P ablation of atrial fibrillation    Gastroenteritis    YESIKA (acute kidney injury) ([facility]    Acute cystitis    Elevated liver enzymes    CHF (congestive heart failure), NYHA class I, acute on chronic, combined ([facility]    Pneumonia    Multifocal pneumonia    Atrial fibrillation with RVR ([facility])    Shoulder pain    Paroxysmal atrial fibrillation ([facility]    Diabetic hyperosmolar coma ([facility]    Difficult intravenous access   Diabetic hyperosmolar state : better  Mental status change: improving slowly  Leukocytosis and possible sepsis: on iv antibiotics, improving  Depression: no suicial attempt. ( Taked to patient). Drug screen negative. A. Fib: rate is in control now  Hypertension: improved now  POssible pneumonia: on antibiotics  Pulmonary edema: continue monitoring.        Plan:  Speech therapy  Remove restraints  Iv antibiotics  Monitor glucose  Start po diet once speech therapy evaluated  Discussed with consultants.

## 2021-07-18 NOTE — PROGRESS NOTES
[address] [facility]  consulted by Dr. Jojo Arciniega for monitoring and adjustment. Indication for treatment: Sepsis   Goal trough: 15 mcg/mL  AUC/BEA: 400-600    Pertinent Laboratory Values:   Temp Readings from Last 3 Encounters:   07/18/21 98.6 °F (37 °C) (Oral)   06/15/21 97.9 °F (36.6 °C) (Oral)   12/30/20 98.3 °F (36.8 °C) (Oral)     Recent Labs     07/16/21  0127 07/16/21  0410 07/16/21  0555 07/17/21  0525 07/18/21  0500   WBC  --   --  22.6* 23.5* 16.0*   LACTATE 8.1* 6.0*  --   --   --      Recent Labs     07/16/21  1505 07/17/21  0525 07/18/21  0500   BUN 29* 33* 26*   CREATININE 1.3* 1.3* 1.2*     Estimated Creatinine Clearance: 45 mL/min (A) (based on SCr of 1.2 mg/dL (H)). Intake/Output Summary (Last 24 hours) at 7/18/2021 1550  Last data filed at 7/18/2021 1446  Gross per 24 hour   Intake 496 ml   Output 4050 ml   Net -3554 ml       Pertinent Cultures:  Date    Source    Results  7/15   Blood    Ordered  7/15   Urine    NGTD    Vancomycin level:   TROUGH:  No results for input(s): VANCOTROUGH in the last 72 hours. RANDOM:  No results for input(s): VANCORANDOM in the last 72 hours.     Assessment:  · WBC and temperature: WBC @ 16; afebrile  · SCr, BUN, and urine output:  · Scr stable  · BUN elevated but improved  · Day(s) of therapy: 4  · Vancomycin concentration: to be collected    Plan:  · Vancomycin 1500 x1 in ER  · Continue vancomycin 1250 mg IVPB q24h  · Plan to check a trough level prior to the 4th total dose  · Pharmacy will continue to monitor patient and adjust therapy as indicated    [place] 3 7/18 @ 2030    Thank you for the consult,  Sabas Good, [facility]  7/18/2021 3:50 PM 28-Nov-2019

## 2021-07-18 NOTE — PROGRESS NOTES
CARDIOLOGY  NOTE        Name:  Hemal Lopez /Age/Sex: 1955  (60 y.o. female)   MRN & CSN:  [id number] & [id number] Admission Date/Time: 7/15/2021  5:07 PM   Location:  -A PCP: Wileen Carrel, [address] Day: 4        PLAN FROM CARDIOLOGY FOR TODAY:   HR control      - cardiology consult is for:  A Fib    -  Interval history: In  A Fib today    · ASSESSMENT/ PLAN:  1. A fib back in a fib, HR controlled,  On eliquis, increase amio  2. CAD stable  3. HFrEF on meds  CPM, diurese  4. Neuro appreciated          Subjective: Todays complain: Patient  No CP    HPI:  Ady Martinez is a 60 y. o.year old who and presents with had concerns including Hyperglycemia. Chief Complaint   Patient presents with    Hyperglycemia           Objective: Temperature:  Current - Temp: 98.8 °F (37.1 °C);  Max - Temp  Av.7 °F (37.1 °C)  Min: 98.4 °F (36.9 °C)  Max: 98.8 °F (37.1 °C)    Respiratory Rate : Resp  Av  Min: 15  Max: 26    Pulse Range: Pulse  Av.7  Min: 68  Max: 110    Blood Presuure Range:  Systolic (19MDA), VCF:915 , Min:107 , TF   ; Diastolic (30JKF), TKO:05, Min:44, Max:97      Pulse ox Range: SpO2  Av.9 %  Min: 85 %  Max: 100 %    24hr I & O:      Intake/Output Summary (Last 24 hours) at 2021 1842  Last data filed at 2021 1832  Gross per 24 hour   Intake 772 ml   Output 4050 ml   Net -3278 ml         BP (!) 173/54   Pulse 75   Temp 98.8 °F (37.1 °C) (Oral)   Resp 20   Ht 5' (1.524 m)   Wt 180 lb (81.6 kg)   SpO2 100%   BMI 35.15 kg/m²           Review of Systems:  All 14 systems reviewed, all negative       TELEMETRY: Sinus    has a past medical history of Abnormal PFTs (pulmonary function tests), Atrial fibrillation (HCC), CAD (coronary artery disease), CHF (congestive heart failure) ([facility] Diabetes mellitus ([facility] Dizziness - light-headed, Family history of coronary artery disease, H/O 24 hour EKG monitoring, H/O cardiovascular stress test, H/O cardiovascular stress test, H/O echocardiogram, H/O echocardiogram, Heart imaging, Heartburn, History of cardiac cath, History of cardiac monitoring, History of cardioversion, History of MI (myocardial infarction), History of nuclear MUGA, History of nuclear Muga stress test, History of PTCA, Hx of cardiovascular stress test, Hx of transesophageal echocardiography (LUKE) for monitoring, Hyperlipidemia, Hypertension, Insomnia, SOB (shortness of breath), Tachycardia, and TIA (transient ischemic attack). has a past surgical history that includes  section; Tubal ligation; Cholecystectomy; Diagnostic Cardiac Cath Lab Procedure (2006, 2009); Percutaneous Transluminal Coronary Angio (10/06/2010); other surgical history (2017); ablation of dysrhythmic focus; and IR NONTUNNELED VASCULAR CATHETER > 5 YEARS (2021).   Physical Exam:  General:  Awake, alert, NAD  Head:normal  Eye:normal  Neck:  No JVD   Chest:  Clear to auscultation, respiration easy  Cardiovascular:  RRR S1S2  Abdomen:   nontender  Extremities:  tr edema  Pulses; palpable  Neuro: grossly normal    Medications:    insulin lispro  10 Units Subcutaneous TID WC    insulin lispro  0-12 Units Subcutaneous TID WC    furosemide  20 mg Intravenous BID    insulin glargine  30 Units Subcutaneous Nightly    spironolactone  25 mg Oral Daily    potassium bicarb-citric acid  40 mEq Oral BID    amiodarone  100 mg Oral BID    apixaban  5 mg Oral BID    aspirin  81 mg Oral Daily    levothyroxine  50 mcg Oral Daily    metoprolol succinate  100 mg Oral Daily    pantoprazole  40 mg Oral Daily    alogliptin  6.25 mg Oral Daily    sodium chloride flush  5-40 mL Intravenous 2 times per day    sodium chloride flush  5-40 mL Intravenous 2 times per day    cefepime  2,000 mg Intravenous Q12H    vancomycin  1,250 mg Intravenous Q24H      dextrose      sodium chloride      sodium chloride  dilTIAZem (CARDIZEM) 100 mg in dextrose 5% 100 mL ([facility]) 10 mg/hr (07/18/21 1108)     albuterol sulfate HFA, nitroGLYCERIN, traZODone, glucose, dextrose, glucagon (rDNA), dextrose, sodium chloride flush, sodium chloride, ondansetron **OR** ondansetron, polyethylene glycol, acetaminophen **OR** acetaminophen, sodium chloride flush, sodium chloride    Lab Data:  CBC:   Recent Labs     07/16/21  0555 07/17/21  0525 07/18/21  0500   WBC 22.6* 23.5* 16.0*   HGB 10.6* 10.1* 11.5*   HCT 32.8* 33.9* 35.1*   MCV 94.5 105.0* 92.9    205 209     BMP:   Recent Labs     07/16/21  1505 07/17/21  0525 07/18/21  0500   * 141 139   K 4.0 3.3* 2.8*   CL 98* 99 94*   CO2 24 29 34*   PHOS  --  3.5 2.3*   BUN 29* 33* 26*   CREATININE 1.3* 1.3* 1.2*     LIVER PROFILE:   Recent Labs     07/16/21  0555 07/17/21  0525   * 246*   * 254*   BILITOT 2.7* 1.7*   ALKPHOS 205* 173*     PT/INR:   No results for input(s): PROTIME, INR in the last 72 hours. APTT: No results for input(s): APTT in the last 72 hours. BNP:  No results for input(s): BNP in the last 72 hours.   TROPONIN: @TROPONINI:3@  Labs, consult, tests reviewed    Assessment/ PLAN:    As above                  Julian Kilpatrick MD, MD 7/18/2021 6:42 PM

## 2021-07-18 NOTE — PROGRESS NOTES
Nephrology Progress Note  7/18/2021 9:19 AM        Subjective:   Admit Date: 7/15/2021  PCP: Jason Moore PA-C    Interval History: slowly waking up - not oriented fully yet     Diet: none    ROS:  No overt sob  UOP 4.4. L/d   CCB trip       Data:     Current meds:    potassium chloride  20 mEq Intravenous Q2H    magnesium sulfate  4,000 mg Intravenous Once    insulin lispro  10 Units Subcutaneous TID WC    insulin lispro  0-12 Units Subcutaneous TID WC    furosemide  20 mg Intravenous BID    insulin glargine  30 Units Subcutaneous Nightly    spironolactone  25 mg Oral Daily    potassium bicarb-citric acid  40 mEq Oral BID    amiodarone  100 mg Oral BID    apixaban  5 mg Oral BID    aspirin  81 mg Oral Daily    levothyroxine  50 mcg Oral Daily    metoprolol succinate  100 mg Oral Daily    pantoprazole  40 mg Oral Daily    alogliptin  6.25 mg Oral Daily    sodium chloride flush  5-40 mL Intravenous 2 times per day    sodium chloride flush  5-40 mL Intravenous 2 times per day    cefepime  2,000 mg Intravenous Q12H    vancomycin  1,250 mg Intravenous Q24H      dextrose      sodium chloride      sodium chloride      dilTIAZem (CARDIZEM) 100 mg in dextrose 5% 100 mL ([facility]) 12.5 mg/hr (07/18/21 0726)         I/O last 3 completed shifts:   In: 136 [I.V.:136]  Out: 4400 [Urine:4400]    CBC:   Recent Labs     07/16/21  0555 07/17/21  0525 07/18/21  0500   WBC 22.6* 23.5* 16.0*   HGB 10.6* 10.1* 11.5*    205 209          Recent Labs     07/16/21  1505 07/17/21  0525 07/18/21  0500   * 141 139   K 4.0 3.3* 2.8*   CL 98* 99 94*   CO2 24 29 34*   BUN 29* 33* 26*   CREATININE 1.3* 1.3* 1.2*   GLUCOSE 139* 211* 180*       Lab Results   Component Value Date    CALCIUM 7.9 (L) 07/18/2021    PHOS 2.3 (L) 07/18/2021       Objective:     Vitals: BP (!) 152/51   Pulse 72   Temp 98.4 °F (36.9 °C) (Oral)   Resp 18   Ht 5' (1.524 m)   Wt 180 lb (81.6 kg)   SpO2 98%   BMI 35.15 kg/m² General appearance:  Awake , goes back to sleep this am   HEENT:  + conj pallor  Neck:  Supple- Rt Ij  Lungs:  + avd BS  Heart:  irregular  Abdomen: soft  Extremities:  Less edema   Less maculopapular rash  at legs   Has evette       Problem List :         Impression :     1. CKD stage 3 a a3- stable - good UOP  2. ADHF- DKA better   3. Low  K , mag from loop   4. Met alkalosis - a.fib etc   5. mentation getting better     Recommendation/Plan  :     1. Reduce loop  2. replete K/mg  3. Follow clinically  4. Once  Ac condition resolves than re eval for any the potential etiology  5.  Follow clinically and bio chemically       Reyna Rae MD MD

## 2021-07-19 NOTE — PROGRESS NOTES
Nephrology Progress Note  7/19/2021 10:32 AM        Subjective:   Admit Date: 7/15/2021  PCP: Tim Daly PA-C    Interval History: seen in early am     Diet: none    ROS:  Awake , but not oriented   UOP 2.3 L/d   No overt sob     Data:     Current med's:    vancomycin  1,000 mg Intravenous Q24H    insulin lispro  0-18 Units Subcutaneous TID WC    insulin lispro  0-9 Units Subcutaneous 2 times per day    insulin lispro  10 Units Subcutaneous TID WC    furosemide  20 mg Intravenous BID    amiodarone  200 mg Oral BID    insulin glargine  30 Units Subcutaneous Nightly    spironolactone  25 mg Oral Daily    potassium bicarb-citric acid  40 mEq Oral BID    apixaban  5 mg Oral BID    aspirin  81 mg Oral Daily    levothyroxine  50 mcg Oral Daily    metoprolol succinate  100 mg Oral Daily    pantoprazole  40 mg Oral Daily    alogliptin  6.25 mg Oral Daily    sodium chloride flush  5-40 mL Intravenous 2 times per day    sodium chloride flush  5-40 mL Intravenous 2 times per day    cefepime  2,000 mg Intravenous Q12H      dextrose      sodium chloride      sodium chloride      dilTIAZem (CARDIZEM) 100 mg in dextrose 5% 100 mL ([facility]) 10 mg/hr (07/19/21 0746)         I/O last 3 completed shifts: In: 1060.7 [P.O.:360; I.V.:400.7;  IV Piggyback:300]  Out: 2300 [Urine:2300]    CBC:   Recent Labs     07/17/21  0525 07/18/21  0500 07/19/21  0550   WBC 23.5* 16.0* 13.0*   HGB 10.1* 11.5* 12.2*    209 224          Recent Labs     07/17/21  0525 07/18/21  0500 07/19/21  0550    139 140   K 3.3* 2.8* 3.4*   CL 99 94* 96*   CO2 29 34* 37*   BUN 33* 26* 18   CREATININE 1.3* 1.2* 1.0   GLUCOSE 211* 180* 133*       Lab Results   Component Value Date    CALCIUM 8.1 (L) 07/19/2021    PHOS 2.2 (L) 07/19/2021       Objective:     Vitals: BP (!) 169/60   Pulse 76   Temp 98.2 °F (36.8 °C) (Oral)   Resp 13   Ht 5' (1.524 m)   Wt 180 lb (81.6 kg)   SpO2 99%   BMI 35.15 kg/m²     General appearance: As above- in bed with HOB elevated 30 deg   HEENT:  + conj pallor  Neck:  Supple- Rt IJ CVC   Lungs:  Limited exam , + adv Bs  Heart:  irregular  Abdomen: soft  Extremities:  Trace LE edema now    has alas     Problem List :         Impression :     1. CKD stage 3 A3- stable - good UOP  2. ADHF with underlying a.fib- better   3. Met  alkaosis from loop and ? tachypnea  4. She  Has   risk for CVA we should look into it tam with a.fib- proteinuria/DM  she is a prime suspect   5. DM/HTN   6. Low K    Recommendation/Plan  :     1. Make aldactone BID  2. Also replete K  3. Also get BNP ion am   4. Repeat CXR   5. Assess for CVA  6. Manual BP_ good BS control  7.  Follow clinically and bio chemically       Drew Marie MD MD

## 2021-07-19 NOTE — PROGRESS NOTES
CARDIOLOGY  NOTE        Name:  Imer Naidu /Age/Sex: 1955  (60 y.o. female)   MRN & CSN:  [id number] & [id number] Admission Date/Time: 7/15/2021  5:07 PM   Location:  -A PCP: Anders Fields, [address] Day: 5        PLAN FROM CARDIOLOGY FOR TODAY:   HR control      - cardiology consult is for:  A Fib    -  Interval history: In  A Fib     · ASSESSMENT/ PLAN:  1. A fib back in a fib, HR controlled,  On eliquis,  amio  2. CAD stable  3. HFrEF on meds  CPM, diurese prn  4. Neuro yun in progress          Subjective: Todays complain: Patient confused    HPI:  Aditya is a 60 y. o.year old who and presents with had concerns including Hyperglycemia. Chief Complaint   Patient presents with    Hyperglycemia           Objective: Temperature:  Current - Temp: 98.2 °F (36.8 °C);  Max - Temp  Av.5 °F (36.9 °C)  Min: 97.9 °F (36.6 °C)  Max: 98.8 °F (37.1 °C)    Respiratory Rate : Resp  Av.1  Min: 11  Max: 28    Pulse Range: Pulse  Av.6  Min: 63  Max: 86    Blood Presuure Range:  Systolic (92OUE), POR:571 , Min:147 , CHRIS:062   ; Diastolic (27VNI), UAL:83, Min:53, Max:151      Pulse ox Range: SpO2  Av.2 %  Min: 85 %  Max: 100 %    24hr I & O:      Intake/Output Summary (Last 24 hours) at 2021 1108  Last data filed at 2021 0700  Gross per 24 hour   Intake 1060.67 ml   Output 2300 ml   Net -1239.33 ml         BP (!) 147/54   Pulse 63   Temp 98.2 °F (36.8 °C) (Oral)   Resp 17   Ht 5' (1.524 m)   Wt 180 lb (81.6 kg)   SpO2 99%   BMI 35.15 kg/m²           Review of Systems:  All 14 systems reviewed, all negative       TELEMETRY: Sinus    has a past medical history of Abnormal PFTs (pulmonary function tests), Atrial fibrillation (HCC), CAD (coronary artery disease), CHF (congestive heart failure) ([facility] Diabetes mellitus ([facility] Dizziness - light-headed, Family history of coronary artery disease, H/O 25 hour EKG monitoring, H/O cardiovascular stress test, H/O cardiovascular stress test, H/O echocardiogram, H/O echocardiogram, Heart imaging, Heartburn, History of cardiac cath, History of cardiac monitoring, History of cardioversion, History of MI (myocardial infarction), History of nuclear MUGA, History of nuclear Muga stress test, History of PTCA, Hx of cardiovascular stress test, Hx of transesophageal echocardiography (LUKE) for monitoring, Hyperlipidemia, Hypertension, Insomnia, SOB (shortness of breath), Tachycardia, and TIA (transient ischemic attack). has a past surgical history that includes  section; Tubal ligation; Cholecystectomy; Diagnostic Cardiac Cath Lab Procedure (2006, 2009); Percutaneous Transluminal Coronary Angio (10/06/2010); other surgical history (2017); ablation of dysrhythmic focus; and IR NONTUNNELED VASCULAR CATHETER > 5 YEARS (2021).   Physical Exam:  General:  Awake, alert, NAD  Head:normal  Eye:normal  Neck:  No JVD   Chest:  Clear to auscultation, respiration easy  Cardiovascular:  RRR S1S2  Abdomen:   nontender  Extremities:  tr edema  Pulses; palpable  Neuro: grossly normal    Medications:    vancomycin  1,000 mg Intravenous Q24H    insulin lispro  0-18 Units Subcutaneous TID WC    insulin lispro  0-9 Units Subcutaneous 2 times per day    spironolactone  25 mg Oral BID    insulin lispro  10 Units Subcutaneous TID WC    furosemide  20 mg Intravenous BID    amiodarone  200 mg Oral BID    insulin glargine  30 Units Subcutaneous Nightly    potassium bicarb-citric acid  40 mEq Oral BID    apixaban  5 mg Oral BID    aspirin  81 mg Oral Daily    levothyroxine  50 mcg Oral Daily    metoprolol succinate  100 mg Oral Daily    pantoprazole  40 mg Oral Daily    alogliptin  6.25 mg Oral Daily    sodium chloride flush  5-40 mL Intravenous 2 times per day    sodium chloride flush  5-40 mL Intravenous 2 times per day    cefepime  2,000 mg Intravenous Q12H      dextrose      sodium chloride      sodium chloride      dilTIAZem (CARDIZEM) 100 mg in dextrose 5% 100 mL ([facility]) 10 mg/hr (07/19/21 0746)     potassium chloride, albuterol sulfate HFA, nitroGLYCERIN, traZODone, glucose, dextrose, glucagon (rDNA), dextrose, sodium chloride flush, sodium chloride, ondansetron **OR** ondansetron, polyethylene glycol, acetaminophen **OR** acetaminophen, sodium chloride flush, sodium chloride    Lab Data:  CBC:   Recent Labs     07/17/21  0525 07/18/21  0500 07/19/21  0550   WBC 23.5* 16.0* 13.0*   HGB 10.1* 11.5* 12.2*   HCT 33.9* 35.1* 36.2*   .0* 92.9 92.6    209 224     BMP:   Recent Labs     07/17/21  0525 07/18/21  0500 07/19/21  0550    139 140   K 3.3* 2.8* 3.4*   CL 99 94* 96*   CO2 29 34* 37*   PHOS 3.5 2.3* 2.2*   BUN 33* 26* 18   CREATININE 1.3* 1.2* 1.0     LIVER PROFILE:   Recent Labs     07/17/21  0525 07/19/21  0550   * 152*   * 194*   BILITOT 1.7* 1.5*   ALKPHOS 173* 162*     PT/INR:   No results for input(s): PROTIME, INR in the last 72 hours. APTT: No results for input(s): APTT in the last 72 hours. BNP:  No results for input(s): BNP in the last 72 hours.   TROPONIN: @TROPONINI:3@  Labs, consult, tests reviewed    Assessment/ PLAN:    As above                  Onel Byrd MD, MD 7/19/2021 11:08 AM

## 2021-07-19 NOTE — PROGRESS NOTES
EEG is complete and on the . Dr Ana Robles has been notified via [facility].  Pt moved her head during the entire test.

## 2021-07-19 NOTE — PROGRESS NOTES
INTERNAL MEDICINE PROGRESS NOTE        Nancy Alicea Inaycjacoby   1955   Primary Care Physician:  Dirk Regalado PA-C  Admit Date: 7/15/2021     Subjective:   Patient awake, doing ok. She answers some questions with one word answers but still remains confused. Objective:   BP (!) 156/55   Pulse 67   Temp 99.3 °F (37.4 °C) (Oral)   Resp 18   Ht 5' (1.524 m)   Wt 180 lb (81.6 kg)   SpO2 100%   BMI 35.15 kg/m²    General appearance: alert, appears stated age and cooperative  Head: Normocephalic, without obvious abnormality, atraumatic  Neck: no adenopathy and supple, symmetrical, trachea midline  Lungs: clear to auscultation bilaterally  Heart: regular rate and rhythm and S1, S2 normal  Abdomen: soft, non-tender; bowel sounds normal; no masses,  no organomegaly  Extremities: no clubbing, cyanosis or edema  Neurologic: moves all 4 extremities.  Aphasic    Data Review  Lab Results   Component Value Date     07/19/2021    K 3.4 (L) 07/19/2021    CL 96 (L) 07/19/2021    CO2 37 (H) 07/19/2021    CREATININE 1.0 07/19/2021    BUN 18 07/19/2021    CALCIUM 8.1 (L) 07/19/2021     Lab Results   Component Value Date    WBC 13.0 (H) 07/19/2021    HGB 12.2 (L) 07/19/2021    HCT 36.2 (L) 07/19/2021    MCV 92.6 07/19/2021     07/19/2021     INR/Prothrombin Time      Meds:    vancomycin  1,000 mg Intravenous Q24H    insulin lispro  0-18 Units Subcutaneous TID WC    insulin lispro  0-9 Units Subcutaneous 2 times per day    spironolactone  25 mg Oral BID    insulin lispro  10 Units Subcutaneous TID WC    furosemide  20 mg Intravenous BID    amiodarone  200 mg Oral BID    insulin glargine  30 Units Subcutaneous Nightly    potassium bicarb-citric acid  40 mEq Oral BID    apixaban  5 mg Oral BID    aspirin  81 mg Oral Daily    levothyroxine  50 mcg Oral Daily    metoprolol succinate  100 mg Oral Daily    pantoprazole  40 mg Oral Daily    alogliptin  6.25 mg Oral Daily    sodium chloride flush  5-40 mL negative. A. Fib: rate is in control now  Hypertension: improved now  Possible pneumonia: on antibiotics  Pulmonary edema: continue monitoring. Plan:  EEG report reviewed. needs MRI. Start PO diet once speech therapy evaluated  Continue IV antibiotics. Monitor labs and patient condition. Electronically signed by Ella Hoff PA-C on 7/19/2021 at 12:55 PM   I have independently evaluated and examined this patient today. I have reviewed radiologic and biochemical tests on this patient. Management Plan is developed mutually with APRIL Blue. I have reviewed above note and agree with assessment and plan. Replace electrolytes, wait for mri, ativan before MRI for compliance.  ( agitated this am)

## 2021-07-19 NOTE — PROCEDURES
[address], Suraj Guajardo, [address] [facility]                             Routine EEG Report        History: This is a patient presenting with altered mental status  to assess for seizure disorder. Technical:  This routine EEG recording was collected digitally and stored in a computer that has seizure detection as well as spike detection software. Report: There was significant amount of movement/electrical artifact seen throughout the recording. With the patient awake, the background did not show any clear occipital dominant rhythm. There was irregular polymorphic delta/theta rhythm in the range of 3-4 Hz. No focal slowing or epileptiform discharges were noted    No sleep pattern was seen. Photic stimulation were performed as an activating maneuver during the recording; no abnormalities were elicited by this maneuver. Hyperventilation was not performed. Clinical Event: None    Single channel EKG showed regular rhythm. IMPRESSION:    Abnormal EEG due to moderate to severe range diffuse slowing of the background. This is nonspecific finding and can be seen in variety of encephalopathies.         Signed: Electronically signed by Tuan Lake DO

## 2021-07-19 NOTE — PROGRESS NOTES
[address] [facility]  consulted by Dr. Ozzie Quintana for monitoring and adjustment. Indication for treatment: Sepsis   Goal trough: 15 mcg/mL  AUC/BEA: 400-600    Pertinent Laboratory Values:   Temp Readings from Last 3 Encounters:   07/18/21 97.9 °F (36.6 °C) (Oral)   06/15/21 97.9 °F (36.6 °C) (Oral)   12/30/20 98.3 °F (36.8 °C) (Oral)     Recent Labs     07/16/21  0127 07/16/21  0410 07/16/21  0555 07/17/21  0525 07/18/21  0500   WBC  --   --  22.6* 23.5* 16.0*   LACTATE 8.1* 6.0*  --   --   --      Recent Labs     07/16/21  1505 07/17/21  0525 07/18/21  0500   BUN 29* 33* 26*   CREATININE 1.3* 1.3* 1.2*     Estimated Creatinine Clearance: 45 mL/min (A) (based on SCr of 1.2 mg/dL (H)). Intake/Output Summary (Last 24 hours) at 7/19/2021 0051  Last data filed at 7/18/2021 1832  Gross per 24 hour   Intake 636 ml   Output 3300 ml   Net -2664 ml       Pertinent Cultures:  Date                             Source                                     Results  7/15                             Blood                                       Ordered  7/15                             Urine                                        NGTD    Vancomycin level:   TROUGH:    Recent Labs     07/18/21  2030   VANCOTROUGH 22.2*     Assessment:  WBC trending down (23.5 -> 16); Afebrile  SCr, BUN, and urine output:   SCr stable  BUN elevated but improved  Day(s) of therapy: 4  Vancomycin concentration:   07/18 - 22.2 (Vancomycin 1,250 mg IV Q 24 Hours)    Plan:  Adjust to Vancomycin 1,000 mg IV Q 24 Hours  Pharmacy will continue to monitor patient and adjust therapy as indicated    [place] 3 07/21/2021 @ 5:30 AM    Thank you for the consult.   Archana Fitzpatrick, [address] [place] [address]   7/19/2021 12:51 AM

## 2021-07-19 NOTE — PLAN OF CARE
Problem: Non-Violent Restraints  Goal: Removal from restraints as soon as assessed to be safe  Outcome: Ongoing  Goal: No harm/injury to patient while restraints in use  Outcome: Ongoing  Goal: Patient's dignity will be maintained  Outcome: Ongoing     Problem: Discharge Planning:  Goal: Discharged to appropriate level of care  Description: Discharged to appropriate level of care  Outcome: Ongoing     Problem: Serum Glucose Level - Abnormal:  Goal: Ability to maintain appropriate glucose levels will improve  Description: Ability to maintain appropriate glucose levels will improve  Outcome: Ongoing     Problem: Sensory Perception - Impaired:  Goal: Ability to maintain a stable neurologic state will improve  Description: Ability to maintain a stable neurologic state will improve  Outcome: Ongoing     Problem: Gas Exchange - Impaired:  Goal: Levels of oxygenation will improve  Description: Levels of oxygenation will improve  Outcome: Ongoing     Problem: Infection, Septic Shock:  Goal: Will show no infection signs and symptoms  Description: Will show no infection signs and symptoms  Outcome: Ongoing     Problem: Infection - Ventilator-Associated Pneumonia:  Goal: Absence of pulmonary infection  Description: Absence of pulmonary infection  Outcome: Ongoing     Problem: Tissue Perfusion, Altered:  Goal: Circulatory function within specified parameters  Description: Circulatory function within specified parameters  Outcome: Ongoing     Problem: Venous Thromboembolism:  Goal: Will show no signs or symptoms of venous thromboembolism  Description: Will show no signs or symptoms of venous thromboembolism  Outcome: Ongoing  Goal: Absence of signs or symptoms of impaired coagulation  Description: Absence of signs or symptoms of impaired coagulation  Outcome: Ongoing     Problem: Falls - Risk of:  Goal: Will remain free from falls  Description: Will remain free from falls  Outcome: Ongoing  Goal: Absence of physical injury  Description: Absence of physical injury  Outcome: Ongoing     Problem: Pain:  Description: Pain management should include both nonpharmacologic and pharmacologic interventions.   Goal: Pain level will decrease  Description: Pain level will decrease  Outcome: Ongoing  Goal: Control of acute pain  Description: Control of acute pain  Outcome: Ongoing  Goal: Control of chronic pain  Description: Control of chronic pain  Outcome: Ongoing     Problem: Skin Integrity:  Goal: Will show no infection signs and symptoms  Description: Will show no infection signs and symptoms  Outcome: Ongoing  Goal: Absence of new skin breakdown  Description: Absence of new skin breakdown  Outcome: Ongoing

## 2021-07-20 NOTE — FLOWSHEET NOTE
Unable to give PM meds; patient not following commands and spit out the one pill I was able to place in mouth.

## 2021-07-20 NOTE — PROGRESS NOTES
INTERNAL MEDICINE PROGRESS NOTE        Solange American Craycraft   1955   Primary Care Physician:  Pedro Pablo Angel PA-C  Admit Date: 7/15/2021     Subjective:   Patient awake, alert, still remains confused. Still only answers sometimes and with one-word responses. Denies any pain. Objective:   BP (!) 171/76   Pulse 72   Temp 98.6 °F (37 °C) (Oral)   Resp 17   Ht 5' (1.524 m)   Wt 180 lb (81.6 kg)   SpO2 98%   BMI 35.15 kg/m²    General appearance: alert, appears stated age and cooperative  Head: Normocephalic, without obvious abnormality, atraumatic  Neck: no adenopathy and supple, symmetrical, trachea midline  Lungs: clear to auscultation bilaterally  Heart: regular rate and rhythm and S1, S2 normal  Abdomen: soft, non-tender; bowel sounds normal; no masses,  no organomegaly  Extremities: no clubbing, cyanosis or edema  Neurologic: moves all 4 extremities.  Aphasic    Data Review  Lab Results   Component Value Date     07/20/2021    K 3.5 07/20/2021    CL 95 (L) 07/20/2021    CO2 35 (H) 07/20/2021    CREATININE 0.9 07/20/2021    BUN 16 07/20/2021    CALCIUM 8.0 (L) 07/20/2021     Lab Results   Component Value Date    WBC 13.6 (H) 07/20/2021    HGB 11.6 (L) 07/20/2021    HCT 35.2 (L) 07/20/2021    MCV 91.2 07/20/2021     07/20/2021     INR/Prothrombin Time      Meds:    insulin glargine  15 Units Subcutaneous Nightly    vancomycin  1,000 mg Intravenous Q24H    insulin lispro  0-18 Units Subcutaneous TID WC    insulin lispro  0-9 Units Subcutaneous 2 times per day    spironolactone  25 mg Oral BID    clopidogrel  75 mg Oral Daily    insulin lispro  10 Units Subcutaneous TID WC    furosemide  20 mg Intravenous BID    amiodarone  200 mg Oral BID    potassium bicarb-citric acid  40 mEq Oral BID    apixaban  5 mg Oral BID    levothyroxine  50 mcg Oral Daily    metoprolol succinate  100 mg Oral Daily    pantoprazole  40 mg Oral Daily    alogliptin  6.25 mg Oral Daily    sodium chloride flush  5-40 mL Intravenous 2 times per day    sodium chloride flush  5-40 mL Intravenous 2 times per day    cefepime  2,000 mg Intravenous Q12H     PRN Meds: LORazepam, potassium chloride, albuterol sulfate HFA, nitroGLYCERIN, traZODone, glucose, dextrose, glucagon (rDNA), dextrose, sodium chloride flush, sodium chloride, ondansetron **OR** ondansetron, polyethylene glycol, acetaminophen **OR** acetaminophen, sodium chloride flush, sodium chloride    Assessment/Plan:   Patient Active Hospital Problem List:  Patient Active Problem List   Diagnosis    History of CVA (cerebrovascular accident) without residual deficits    Morbid obesity ([facility]    DM (diabetes mellitus), type 2, uncontrolled ([facility]    Persistent atrial fibrillation ([facility]    CAD (coronary artery disease)    CHF (congestive heart failure) ([facility]    Light headed    History of MI (myocardial infarction)    Diabetes mellitus ([facility]    Hypertension    TIA (transient ischemic attack)    Hyperlipidemia    SOB (shortness of breath)    H/O cardiovascular stress test    Family history of coronary artery disease    PAF (paroxysmal atrial fibrillation) (HCC)    Chest pain    Atrial thrombus without antecedent myocardial infarction    S/P ablation of atrial fibrillation    Gastroenteritis    YESIKA (acute kidney injury) ([facility]    Acute cystitis    Elevated liver enzymes    CHF (congestive heart failure), NYHA class I, acute on chronic, combined ([facility]    Pneumonia    Multifocal pneumonia    Atrial fibrillation with RVR (HCC)    Shoulder pain    Paroxysmal atrial fibrillation ([facility]    Diabetic hyperosmolar coma ([facility]    Difficult intravenous access    HFrEF (heart failure with reduced ejection fraction) ([facility]     Diabetic hyperosmolar state : better  Mental status change: improving slowly. EEG nonspecific abnl seen in encephalopathies. Check MRI brain.   Leukocytosis and possible sepsis: on iv antibiotics, improving  Depression: no suicial attempt. (Taked to patient). Drug screen negative. A. Fib: rate is in control now  Hypertension: improved now  Elevated LFTs: Slowly improving. CT abdomen nonacute. Monitor. Possible pneumonia: on antibiotics  Pulmonary edema: continue monitoring. Plan:  Add PRN Ativan. EEG report reviewed. Needs MRI. Consultants notes reviewed. Continue IV antibiotics. Monitor labs and patient condition. Electronically signed by Iris Oliver PA-C on 7/20/2021 at 9:09 AM     I have independently evaluated and examined this patient today. I have reviewed radiologic and biochemical tests on this patient. Management Plan is developed mutually with APRIL Valdes. I have reviewed above note and agree with assessment and plan.   monitor condition for now. Discussed with nursing staff.  Monitor condition closely

## 2021-07-20 NOTE — CONSULTS
Willard Ledesma MD.  Section of General Neurology - Adult  Consult Note        Reason for Consult:    Requesting Physician:  No referring provider defined for this encounter. Thank you for your kind referral.    CHIEF COMPLAINT:  Confusion weakness         HISTORY OF PRESENT ILLNESS:              The patient is a 60 y.o. female with a history of weakness confusion lethargy since admission. pt had hyperglycemia in the 700 range. pt s CT brain was neg. pt has A fib and is on eliquis. pt also is on asa. Past Medical History:        Diagnosis Date    Abnormal PFTs (pulmonary function tests) 4/14 4/14    Atrial fibrillation (HCC)     CAD (coronary artery disease)     CHF (congestive heart failure) (HCC)     Diabetes mellitus (HCC)     Dizziness - light-headed     Family history of coronary artery disease     H/O 24 hour EKG monitoring 02/09/2012,12/10/2010    02/09/2012 predominant rhythm is sinus with short runs of SVT no episode of atrial fib. pt has left bundle branch morphology, max heart is 132, min is 48 with an average of 74 ,infrequent PVCs are seen    H/O cardiovascular stress test 4/1/2013, 1/31/2012 4/1/2013-    H/O cardiovascular stress test 4/16/14,02/09/2012 4/16/14 EF 52% No ischemia, normal study. 02/09/2012 EF 58% tehnique-technetium LVEF is normal globally    H/O echocardiogram 03/27/13 4/14 EF 50-55% mild MR, TR 3/13EF 35-45% decreasedr L ventrical systolic function.     H/O echocardiogram 4/14 4/14-EF50-55% Mild MR/TR. 10/11 EF > 55% LVS function is normal, calcified mitral apparatus, impaired LV relaxation, no PE    Heart imaging 02/09/2012 02/09/2012 MUGA rest EF 58%LVEF is normal globally    Heartburn     History of cardiac cath 05/08/2009, 06/14/2006 05/08/2009,moderate stenosis  small diagonal    History of cardiac monitoring 2/9/15    Event - sinus rhythm    History of cardioversion 3/14/14    History of MI (myocardial infarction)     History of nuclear MUGA 2020    EF 41%    History of nuclear Muga stress test 2020    EF 41%    History of PTCA 10/06/2010    10/06/2010 stent 3.0x24 taxus stent left main 90% stenosis mid seg reduced to 0% second area of [id number] to 40% not flow limiting EF 40%    Hx of cardiovascular stress test 2015    lexiscan-normal,EF70%    Hx of transesophageal echocardiography (LUKE) for monitoring 2017    Noted to have MARY GRACE Blood Clot.  Hyperlipidemia     Hypertension     Insomnia     SOB (shortness of breath)     Tachycardia 2012    report in [facility] notes-Dr Nunn hosp. consult    TIA (transient ischemic attack)      Past Surgical History:        Procedure Laterality Date    ABLATION OF DYSRHYTHMIC FOCUS       SECTION      CHOLECYSTECTOMY      DIAGNOSTIC CARDIAC CATH LAB PROCEDURE  2006, 2009 moderate stenosis small diagonal    IR NONTUNNELED VASCULAR CATHETER  2021    IR NONTUNNELED VASCULAR CATHETER 2021 [facility] SPECIAL PROCEDURES    OTHER SURGICAL HISTORY  2017    afib ablation LUKE    PTCA  10/06/2010    10/06/2010 stent 3.0x24 taxus stent 90% stenosis mid segment reduced to 0% second area of stenosis 30-40% not flow limiting EF 40%    TUBAL LIGATION       Current Medications:   Current Facility-Administered Medications: vancomycin 1000 mg IVPB in 250 mL D5W addavial, 1,000 mg, Intravenous, Q24H  potassium chloride 10 mEq/100 mL IVPB (Peripheral Line), 10 mEq, Intravenous, PRN  insulin lispro (HUMALOG) injection vial 0-18 Units, 0-18 Units, Subcutaneous, TID WC  insulin lispro (HUMALOG) injection vial 0-9 Units, 0-9 Units, Subcutaneous, 2 times per day  spironolactone (ALDACTONE) tablet 25 mg, 25 mg, Oral, BID  potassium phosphate 10 mmol in dextrose 5 % 250 mL IVPB, 10 mmol, Intravenous, Once  clopidogrel (PLAVIX) tablet 75 mg, 75 mg, Oral, Daily  insulin lispro (HUMALOG) injection vial 10 Units, 10 Units, Subcutaneous, TID WC  furosemide (LASIX) injection 20 mg, 20 mg, Intravenous, BID  amiodarone (CORDARONE) tablet 200 mg, 200 mg, Oral, BID  insulin glargine (LANTUS) injection vial 30 Units, 30 Units, Subcutaneous, Nightly  potassium bicarb-citric acid (EFFER-K) effervescent tablet 40 mEq, 40 mEq, Oral, BID  albuterol sulfate  (90 Base) MCG/ACT inhaler 2 puff, 2 puff, Inhalation, 4x Daily PRN  apixaban (ELIQUIS) tablet 5 mg, 5 mg, Oral, BID  levothyroxine (SYNTHROID) tablet 50 mcg, 50 mcg, Oral, Daily  metoprolol succinate (TOPROL XL) extended release tablet 100 mg, 100 mg, Oral, Daily  nitroGLYCERIN (NITROSTAT) SL tablet 0.4 mg, 0.4 mg, Sublingual, Q5 Min PRN  pantoprazole (PROTONIX) tablet 40 mg, 40 mg, Oral, Daily  alogliptin (NESINA) tablet 6.25 mg, 6.25 mg, Oral, Daily  traZODone (DESYREL) tablet 50 mg, 50 mg, Oral, Nightly PRN  glucose (GLUTOSE) 40 % oral gel 15 g, 15 g, Oral, PRN  dextrose 50 % IV solution, 12.5 g, Intravenous, PRN  glucagon (rDNA) injection 1 mg, 1 mg, Intramuscular, PRN  dextrose 5 % solution, 100 mL/hr, Intravenous, PRN  sodium chloride flush 0.9 % injection 5-40 mL, 5-40 mL, Intravenous, 2 times per day  sodium chloride flush 0.9 % injection 5-40 mL, 5-40 mL, Intravenous, PRN  0.9 % sodium chloride infusion, 25 mL, Intravenous, PRN  ondansetron (ZOFRAN-ODT) disintegrating tablet 4 mg, 4 mg, Oral, Q8H PRN **OR** ondansetron (ZOFRAN) injection 4 mg, 4 mg, Intravenous, Q6H PRN  polyethylene glycol (GLYCOLAX) packet 17 g, 17 g, Oral, Daily PRN  acetaminophen (TYLENOL) tablet 650 mg, 650 mg, Oral, Q6H PRN **OR** acetaminophen (TYLENOL) suppository 650 mg, 650 mg, Rectal, Q6H PRN  sodium chloride flush 0.9 % injection 5-40 mL, 5-40 mL, Intravenous, 2 times per day  sodium chloride flush 0.9 % injection 5-40 mL, 5-40 mL, Intravenous, PRN  0.9 % sodium chloride infusion, 25 mL, Intravenous, PRN  dilTIAZem 100 mg in dextrose 5 % 100 mL infusion (ADD-[place]), 5-15 mg/hr, Intravenous, Continuous  cefepime (MAXIPIME) 2000 mg IVPB minibag, 2,000 mg, Intravenous, Q12H  Allergies:  Amiodarone, Iv dye [iodides], and Vicodin [hydrocodone-acetaminophen]    Social History:  TOBACCO:   reports that she quit smoking about 14 years ago. Her smoking use included cigarettes. She has a 60.00 pack-year smoking history. She has never used smokeless tobacco.  ETOH:   reports no history of alcohol use. DRUGS:   reports no history of drug use. Family History:       Problem Relation Age of Onset    Diabetes Father     Heart Disease Father        REVIEW OF SYSTEMS:  CONSTITUTIONAL:  negative  HEENT:  negative  RESPIRATORY:  negative  CARDIOVASCULAR:  negative  GASTROINTESTINAL:  negative  GENITOURINARY:  negative  MUSCULOSKELETAL:  negative  BEHAVIOR/PSYCH:  Negative    ROS unavailable    Family hx neg    PHYSICAL EXAM  ------------------------  Vitals:  BP (!) 162/78   Pulse 82   Temp 97.8 °F (36.6 °C) (Oral)   Resp 12   Ht 5' (1.524 m)   Wt 180 lb (81.6 kg)   SpO2 99%   BMI 35.15 kg/m²      General: drowsy confused. Well developed, well nourished, well groomed. No apparent distress. HEENT:  Normocephalic, atraumatic. Pupils equal, round, reactive to light. No scleral icterus. No conjunctival injection. Normal lips, teeth, and gums. No nasal discharge. Neck:  Supple  Heart:  RRR, no murmurs, gallops, rubs  Lungs:  CTA bilaterally, bilat symmetrical expansion, no wheeze, rales, or rhonchi  Abdomen:   Bowel sounds present, soft, nontender, no masses, no organomegaly, no peritoneal signs  Extremities:  No clubbing, cyanosis, or edema  Skin:  Warm and dry, no open lesions or rash  Breast: deferred  Rectal: deferred  Genitalia:  deferred    NEUROLOGICAL EXAM  ---------------------------------    Mental Status Exam:              Confused drowsy oriented to person place as Suraj Guajardo but does not think in the Hospital follows simple but not complex commands  Moves eyes pupils 4mm right from previous surgery 3 mm left moves eyes on own but not to commands  Weak in arm legs 4+/5 jo with weakness of the hands left worse than right.   resp on own            CBC with Differential:    Lab Results   Component Value Date    WBC 13.0 07/19/2021    RBC 3.91 07/19/2021    HGB 12.2 07/19/2021    HCT 36.2 07/19/2021     07/19/2021    MCV 92.6 07/19/2021    MCH 31.2 07/19/2021    MCHC 33.7 07/19/2021    RDW 13.8 07/19/2021    SEGSPCT 77.6 07/19/2021    BANDSPCT 2 08/11/2018    LYMPHOPCT 11.4 07/19/2021    MONOPCT 8.6 07/19/2021    EOSPCT 1.3 05/07/2012    BASOPCT 0.2 07/19/2021    MONOSABS 1.1 07/19/2021    LYMPHSABS 1.5 07/19/2021    EOSABS 0.2 07/19/2021    BASOSABS 0.0 07/19/2021    DIFFTYPE AUTOMATED DIFFERENTIAL 07/19/2021     CMP:    Lab Results   Component Value Date     07/19/2021    K 3.4 07/19/2021    CL 96 07/19/2021    CO2 37 07/19/2021    BUN 18 07/19/2021    CREATININE 1.0 07/19/2021    GFRAA >60 07/19/2021    LABGLOM 56 07/19/2021    GLUCOSE 133 07/19/2021    PROT 5.4 07/19/2021    PROT 7.1 05/07/2012    LABALBU 3.0 07/19/2021    CALCIUM 8.1 07/19/2021    BILITOT 1.5 07/19/2021    ALKPHOS 162 07/19/2021     07/19/2021     07/19/2021     BMP:    Lab Results   Component Value Date     07/19/2021    K 3.4 07/19/2021    CL 96 07/19/2021    CO2 37 07/19/2021    BUN 18 07/19/2021    LABALBU 3.0 07/19/2021    CREATININE 1.0 07/19/2021    CALCIUM 8.1 07/19/2021    GFRAA >60 07/19/2021    LABGLOM 56 07/19/2021    GLUCOSE 133 07/19/2021     PT/INR:    Lab Results   Component Value Date    PROTIME 15.1 07/15/2021    PROTIME 10.4 05/07/2012    INR 1.17 07/15/2021     PTT:    Lab Results   Component Value Date    APTT 23.6 06/11/2021   [APTT  U/A:    Lab Results   Component Value Date    COLORU CASTILLO 07/16/2021    WBCUA 15 07/16/2021    RBCUA 33 07/16/2021    MUCUS RARE 07/16/2021    TRICHOMONAS NONE SEEN 07/16/2021    YEAST RARE 09/01/2020    BACTERIA RARE 07/16/2021    CLARITYU CLEAR 07/16/2021    SPECGRAV 1.024 07/16/2021 LEUKOCYTESUR SMALL 07/16/2021    UROBILINOGEN 2.0 07/16/2021    BILIRUBINUR NEGATIVE 07/16/2021    BLOODU LARGE 07/16/2021     TSH:  No results found for: TSH  VITAMIN B12: No components found for: B12  FOLATE:  No results found for: FOLATE  RPR:  No results found for: RPR  DAVY:    Lab Results   Component Value Date    DAVY None Detected 05/24/2019    DAVY  05/24/2019     (NOTE)  If suspicion of connective tissue disease is strong and DAVY EIA is   negative, consider testing for DAVY by IFA ([id number]). INTERPRETIVE INFORMATION: Anti-Nuclear Antibodies (DAVY), IgG by   KD  Anti-Nuclear Antibodies (DAVY), IgG by KD: DAVY specimens are   screened using enzyme-linked immunosorbent assay (KD)   methodology. All KD results reported as Detected are further   tested by indirect fluorescent assay (IFA) using HEp-2 substrate   with an IgG-specific conjugate. The DAVY KD screen is designed   to detect antibodies against dsDNA, histone, SS-A (Ro), SS-B (La),   Carroll, snRNP/Sm, Scl-70, Nelli-1, centromere, and an extract of lysed   HEp-2 cells. DAVY KD assays have been reported to have lower   sensitivities than DAVY IFA for systemic autoimmune rheumatic   diseases (SARD). Negative results do not necessarily rule out SARD. Performed by [address] [phone]  www. Arcadio Flores MD, Lab. Director       Urine Toxicology:  No components found for: IAMMENTA, IBARBIT, IBENZO, ICOCAINE, IMARTHC, IOPIATES, IPHENCYC     IMPRESSION:    Metabolic encephalopathy    TIA r/o cardio carotid embolic event    Hyperglycemia    ? Sepsis    A fib    PLAN:    CT brain neg    Mri brain    C doppler    Echo    B 12 folate TSH Homocysteine level    Continue eliquis     DC asa    Start Plavix    Discussed plan of care with pts nurse. Reinier Nance MD MD  BOARD CERTIFIED-NEUROLOGY.

## 2021-07-20 NOTE — PROGRESS NOTES
CARDIOLOGY  NOTE        Name:  Adam Wall /Age/Sex: 1955  (60 y.o. female)   MRN & CSN:  [id number] & [id number] Admission Date/Time: 7/15/2021  5:07 PM   Location:  -A PCP: Ebb Bence, [address] Day: 6        PLAN FROM CARDIOLOGY FOR TODAY:   CPM    - cardiology consult is for:  A Fib    -  Interval history: In  A Fib     · ASSESSMENT/ PLAN:  1. A fib back in a fib, HR controlled,  On eliquis,  amio  2. CAD stable  3. HFrEF on meds  CPM, diurese prn  4. Neuro yun in progress, pt very confused          Subjective: Todays complain: Patient confused    HPI:  Markel Clark is a 60 y. o.year old who and presents with had concerns including Hyperglycemia. Chief Complaint   Patient presents with    Hyperglycemia           Objective: Temperature:  Current - Temp: 98.1 °F (36.7 °C);  Max - Temp  Av.4 °F (36.9 °C)  Min: 97.8 °F (36.6 °C)  Max: 99.3 °F (37.4 °C)    Respiratory Rate : Resp  Avg: 15.2  Min: 10  Max: 21    Pulse Range: Pulse  Av.1  Min: 61  Max: 82    Blood Presuure Range:  Systolic (82VEA), PWE:776 , Min:137 , EYT:660   ; Diastolic (24PWH), CRW:97, Min:54, Max:98      Pulse ox Range: SpO2  Av.2 %  Min: 95 %  Max: 100 %    24hr I & O:      Intake/Output Summary (Last 24 hours) at 2021 0731  Last data filed at 2021 8469  Gross per 24 hour   Intake 670 ml   Output 2600 ml   Net -1930 ml         BP (!) 162/98   Pulse 72   Temp 98.1 °F (36.7 °C) (Oral)   Resp 20   Ht 5' (1.524 m)   Wt 180 lb (81.6 kg)   SpO2 96%   BMI 35.15 kg/m²           Review of Systems:  All 14 systems reviewed, all negative       TELEMETRY: Sinus    has a past medical history of Abnormal PFTs (pulmonary function tests), Atrial fibrillation (HCC), CAD (coronary artery disease), CHF (congestive heart failure) ([facility] [address] Diabetes mellitus ([facility] [address] Dizziness - light-headed, Family history of coronary artery disease, H/O 25 hour EKG monitoring, H/O cardiovascular stress test, H/O cardiovascular stress test, H/O echocardiogram, H/O echocardiogram, Heart imaging, Heartburn, History of cardiac cath, History of cardiac monitoring, History of cardioversion, History of MI (myocardial infarction), History of nuclear MUGA, History of nuclear Muga stress test, History of PTCA, Hx of cardiovascular stress test, Hx of transesophageal echocardiography (LUKE) for monitoring, Hyperlipidemia, Hypertension, Insomnia, SOB (shortness of breath), Tachycardia, and TIA (transient ischemic attack). has a past surgical history that includes  section; Tubal ligation; Cholecystectomy; Diagnostic Cardiac Cath Lab Procedure (2006, 2009); Percutaneous Transluminal Coronary Angio (10/06/2010); other surgical history (2017); ablation of dysrhythmic focus; and IR NONTUNNELED VASCULAR CATHETER > 5 YEARS (2021).   Physical Exam:  General:  Awake, alert, NAD  Head:normal  Eye:normal  Neck:  No JVD   Chest:  Clear to auscultation, respiration easy  Cardiovascular:  RRR S1S2  Abdomen:   nontender  Extremities:  tr edema  Pulses; palpable  Neuro: grossly normal    Medications:    insulin glargine  15 Units Subcutaneous Nightly    vancomycin  1,000 mg Intravenous Q24H    insulin lispro  0-18 Units Subcutaneous TID WC    insulin lispro  0-9 Units Subcutaneous 2 times per day    spironolactone  25 mg Oral BID    clopidogrel  75 mg Oral Daily    insulin lispro  10 Units Subcutaneous TID WC    furosemide  20 mg Intravenous BID    amiodarone  200 mg Oral BID    potassium bicarb-citric acid  40 mEq Oral BID    apixaban  5 mg Oral BID    levothyroxine  50 mcg Oral Daily    metoprolol succinate  100 mg Oral Daily    pantoprazole  40 mg Oral Daily    alogliptin  6.25 mg Oral Daily    sodium chloride flush  5-40 mL Intravenous 2 times per day    sodium chloride flush  5-40 mL Intravenous 2 times per day    cefepime  2,000 mg Intravenous Q12H      dextrose      sodium chloride      sodium chloride      dilTIAZem (CARDIZEM) 100 mg in dextrose 5% 100 mL (ADD-[place]) 5 mg/hr (07/20/21 0418)     potassium chloride, albuterol sulfate HFA, nitroGLYCERIN, traZODone, glucose, dextrose, glucagon (rDNA), dextrose, sodium chloride flush, sodium chloride, ondansetron **OR** ondansetron, polyethylene glycol, acetaminophen **OR** acetaminophen, sodium chloride flush, sodium chloride    Lab Data:  CBC:   Recent Labs     07/18/21  0500 07/19/21  0550 07/20/21  0510   WBC 16.0* 13.0* 13.6*   HGB 11.5* 12.2* 11.6*   HCT 35.1* 36.2* 35.2*   MCV 92.9 92.6 91.2    224 258     BMP:   Recent Labs     07/18/21  0500 07/19/21  0550 07/20/21  0510    140 137   K 2.8* 3.4* 3.5   CL 94* 96* 95*   CO2 34* 37* 35*   PHOS 2.3* 2.2* 2.3*   BUN 26* 18 16   CREATININE 1.2* 1.0 0.9     LIVER PROFILE:   Recent Labs     07/19/21  0550 07/20/21  0510   * 130*   * 168*   BILITOT 1.5* 1.4*   ALKPHOS 162* 158*     PT/INR:   No results for input(s): PROTIME, INR in the last 72 hours. APTT: No results for input(s): APTT in the last 72 hours. BNP:  No results for input(s): BNP in the last 72 hours.   TROPONIN: @TROPONINI:3@  Labs, consult, tests reviewed    Assessment/ PLAN:    As above                  Clifton Garcia MD, MD 7/20/2021 7:31 AM

## 2021-07-21 NOTE — FLOWSHEET NOTE
Patient pulled off her CVC dressing; as soon as I temporarily reinforced before changing dressing, she reached to pull it off again. Pulled with left hand when I held her right hand. Dr Tori Cruz phoned, [facility] to place soft restraints as patient is confused and not able to be redirected.

## 2021-07-21 NOTE — PROGRESS NOTES
Gender              Female   Age                60 year(s)         Race                   Patient Number     [id number]         Room Number         2128   Visit Number       [id number]   Corporate ID       [id number]   Accession Number   [id number]         Miroslava Eason   Ordering Physician Bg Escudero MD                 Physician           MD  Procedure Type of Study   TTE procedure:ECHOCARDIOGRAM COMPLETE 2D W DOPPLER W COLOR. Procedure Date Date: 07/16/2021 Start: 01:35 PM Study Location: Portable Technical Quality: Fair visualization Indications:Congestive heart failure. Patient Status: Routine Height: 60 inches Weight: 180 pounds BSA: 1.78 m2 BMI: 35.15 kg/m2 HR: 99 bpm BP: 145/83 mmHg  Conclusions   Summary  Left ventricular systolic function is abnormal.  Ejection fraction is visually estimated at 15-20%. Global hypokinesis noted. Grade III diastolic dysfunction. Mitral annular calcification is present. Moderate to severe mitral regurgitation. Mild to moderate tricuspid regurgitation; RVSP: 38 mmHg. No evidence of any pericardial effusion. Signature   ------------------------------------------------------------------  Electronically signed by Svetlana Meraz MD (Interpreting  physician) on 07/16/2021 at 04:31 PM    CT ABDOMEN PELVIS WO CONTRAST Additional Contrast? None    Result Date: 7/15/2021  EXAMINATION: CT OF THE ABDOMEN AND PELVIS WITHOUT CONTRAST 7/15/2021 7:28 pm    1. Findings at the lung bases suggesting pulmonary edema with small bilateral effusions and associated atelectasis. 2. No acute intra-abdominal process identified. 3. Severe atherosclerotic disease. CT Head WO Contrast    Result Date: 7/15/2021  EXAMINATION: CT OF THE HEAD WITHOUT CONTRAST  7/15/2021 7:28 pm     No acute intracranial abnormality.  Chronic mg Oral Daily    pantoprazole  40 mg Oral Daily    alogliptin  6.25 mg Oral Daily    sodium chloride flush  5-40 mL Intravenous 2 times per day    sodium chloride flush  5-40 mL Intravenous 2 times per day    cefepime  2,000 mg Intravenous Q12H      Infusions:    dextrose      sodium chloride      sodium chloride      dilTIAZem (CARDIZEM) 100 mg in dextrose 5% 100 mL (ADD-[place]) 5 mg/hr (07/20/21 4454)         Objective:   Vitals: BP (!) 185/92   Pulse 74   Temp 98.6 °F (37 °C) (Axillary)   Resp 20   Ht 5' (1.524 m)   Wt 180 lb (81.6 kg)   SpO2 97%   BMI 35.15 kg/m²   General appearance: alert and cooperative with exam appears to be somewhat dehydrated  Neck: no JVD or bruit  Thyroid : Normal lobes   Lungs: Has Vesicular Breath sounds   Heart: Atrial fibrillation  Abdomen: soft, non-tender; bowel sounds normal; no masses,  no organomegaly  Musculoskeletal: Normal  Extremities: extremities normal, , no edema  Neurologic:  Awake, alert, oriented to name, place and time. Cranial nerves II-XII are grossly intact. Motor is  intact. Sensory is intact. ,  and gait is normal.    Assessment:     Patient Active Problem List:     History of CVA (cerebrovascular accident) without residual deficits     Morbid obesity ([address]     DM (diabetes mellitus), type 2, uncontrolled ([address]     Persistent atrial fibrillation (HCC)     CAD (coronary artery disease)     CHF (congestive heart failure) ([address]     Light headed     History of MI (myocardial infarction)     Diabetes mellitus ([address]     Hypertension     TIA (transient ischemic attack)     Hyperlipidemia     SOB (shortness of breath)     H/O cardiovascular stress test     Family history of coronary artery disease     PAF (paroxysmal atrial fibrillation) (HCC)     Chest pain     Atrial thrombus without antecedent myocardial infarction     S/P ablation of atrial fibrillation     Gastroenteritis     YESIKA (acute kidney injury) ([address]     Acute cystitis     Elevated liver enzymes     CHF (congestive heart failure), NYHA class I, acute on chronic, combined (HCC)     Pneumonia     Multifocal pneumonia     Atrial fibrillation with RVR (HCC)     Shoulder pain     Paroxysmal atrial fibrillation (HCC)     Diabetic hyperosmolar coma ([facility] [address]     Difficult intravenous access     Metabolic encephalopathy getting better      Plan:     1. Reviewed POC blood rate 14 with regular foot embolism there is good  2. . Labs and X ray results   3. Reviewed Current Medicines   4. On meal +,  correction bolus Humalog/ Basal Lantus Insulin regime does get  5. Monitor Blood glucose frequently   6. Modified  the dose of Insulin/ other medicines as needed   7. Plan for MRI of of brain  8. Will follow     .      Brittney Wood MD, MD

## 2021-07-21 NOTE — CARE COORDINATION
Spoke to Dr Zach Beltran. He will contact patient's daughter Lianne Byrne - my need PEG.  Poss SNF needs- will need PT/OT eval. Carter Junior RN

## 2021-07-21 NOTE — PROGRESS NOTES
NEUROLOGY NOTE  DR. Madalyn Harrington MD.  -------------------------------------------------  Subjective:    Pt is less confused and doing better today    Objective:    BP (!) 154/65   Pulse 80   Temp 98.5 °F (36.9 °C) (Oral)   Resp 11   Ht 5' (1.524 m)   Wt 180 lb (81.6 kg)   SpO2 98%   BMI 35.15 kg/m²   HEENT nl      Neuro exam    Alert oriented to person place mildly confused  eomi pupils 3 mm jo  Squeezes fingers to commands  Equivocal toes  resp on own      RADIOLOGY  -----------------    Echocardiogram complete 2D with doppler with color    Result Date: 7/16/2021  Transthoracic Echocardiography Report (TTE)  Demographics   Patient Name       Aung MAYFIELD Date of Study       07/16/2021   Date of Birth      1955         Gender              Female   Age                60 year(s)         Race                   Patient Number     [id number]         Room Number         2128   Visit Number       [id number]   Corporate ID       [id number]   Accession Number   [id number]         Marlene Tyson RDMS   Ordering Physician Pat Gupta MD                 Physician           MD  Procedure Type of Study   TTE procedure:ECHOCARDIOGRAM COMPLETE 2D W DOPPLER W COLOR. Procedure Date Date: 07/16/2021 Start: 01:35 PM Study Location: Portable Technical Quality: Fair visualization Indications:Congestive heart failure. Patient Status: Routine Height: 60 inches Weight: 180 pounds BSA: 1.78 m2 BMI: 35.15 kg/m2 HR: 99 bpm BP: 145/83 mmHg  Conclusions   Summary  Left ventricular systolic function is abnormal.  Ejection fraction is visually estimated at 15-20%. Global hypokinesis noted. Grade III diastolic dysfunction. Mitral annular calcification is present. Moderate to severe mitral regurgitation. Mild to moderate tricuspid regurgitation; RVSP: 38 mmHg.   No evidence of any pericardial effusion. Signature   ------------------------------------------------------------------  Electronically signed by Shimon Sylvester MD (Interpreting  physician) on 07/16/2021 at 04:31 PM  ------------------------------------------------------------------   Findings   Left Ventricle  Left ventricular systolic function is abnormal.  Ejection fraction is visually estimated at 15-20%. Global hypokinesis noted. Grade III diastolic dysfunction. Left ventricle size is normal.   Left Atrium  Essentially normal left atrium. Right Atrium  Essentially normal right atrium. Right Ventricle  Essentially normal right ventricle. Aortic Valve  Trace aortic regurgitation noted. Mitral Valve  Mitral annular calcification is present. Moderate to severe mitral regurgitation. Tricuspid Valve  Mild to moderate tricuspid regurgitation; RVSP: 38 mmHg. Pulmonic Valve  The pulmonic valve was not well visualized. Pericardial Effusion  No evidence of any pericardial effusion. Pleural Effusion  No evidence of pleural effusion. Miscellaneous  The aorta is within normal limits.   M-Mode/2D Measurements & Calculations   LV Diastolic Dimension:   LV Systolic Dimension:   LA Dimension: 3.8 cmAO  4.86 cm                   4.6 cm                   Root Dimension: 2.8 cm  LV FS:5.4 %               LV Volume Diastolic: 020  LV PW Diastolic: 2.95 cm  ml  LV PW Systolic: 3.81 cm   LV Volume Systolic: 460  Septum Diastolic: 8.90 cm ml                       RV Diastolic Dimension:  Septum Systolic: 1.70 cm  LV EDV/LV EDV Index: 116 3.13 cm  CO: 2.42 l/min            ml/65 m2LV ESV/LV ESV  CI: 1.36 l/m*m2           Index: 108 ml/61 m2      LA/Aorta: 1.36                            EF Calculated (A4C): 6.9  LV Area Diastolic: 96.6   %  cm2                       EF Calculated (2D): 13.3  LV Area Systolic: 32 cm2  %                             LV Length: 7.68 cm                             LVOT: 1.9 cm  Doppler Measurements & Calculations   MV Peak E-Wave: 136  AV Peak Velocity: 125 cm/s    LVOT Peak Velocity: 64.6  cm/s                 AV Peak Gradient: 6.25 mmHg   cm/s  MV Peak A-Wave: 68.5 AV Mean Velocity: 86.6 cm/s   LVOT Mean Velocity: 39.8  cm/s                 AV Mean Gradient: 3 mmHg      cm/s  MV E/A Ratio: 1.99   AV VTI: 21 cm                 LVOT Peak Gradient: 2  MV Peak Gradient:    AV Area (Continuity):1.16 cm2 mmHgLVOT Mean Gradient: 1  7.4 mmHg                                           mmHg                       LVOT VTI: 8.63 cm             Estimated RVSP: 38 mmHg  MV P1/2t: 41 msec                                  Estimated RAP:3 mmHg  MVA by PHT:5.37 cm2  Estimated PASP: 35.49 mmHg   MV E' Septal                                       TR Velocity:285 cm/s  Velocity: 4.89 cm/s                                TR Gradient:32.49 mmHg  MV E' Lateral  Velocity: 10.4 cm/s  MV E/E' septal:  27.81  MV E/E' lateral:  13.08      CT ABDOMEN PELVIS WO CONTRAST Additional Contrast? None    Result Date: 7/15/2021  EXAMINATION: CT OF THE ABDOMEN AND PELVIS WITHOUT CONTRAST 7/15/2021 7:28 pm TECHNIQUE: CT of the abdomen and pelvis was performed without the administration of intravenous contrast. Multiplanar reformatted images are provided for review. Dose modulation, iterative reconstruction, and/or weight based adjustment of the mA/kV was utilized to reduce the radiation dose to as low as reasonably achievable. COMPARISON: MRI abdomen May 24, 2019; CT abdomen May 23, 2019 HISTORY: ORDERING SYSTEM PROVIDED HISTORY: Abdominal pain TECHNOLOGIST PROVIDED HISTORY: Reason for exam:->Abdominal pain Additional Contrast?->None Reason for Exam: Abdominal pain FINDINGS: Lower Chest: Imaged lung bases demonstrate partially imaged small and associated atelectasis. Imaged lung fields demonstrate findings suggesting mild pulmonary edema, however evaluation is limited due to respiratory motion artifact.   Coronary artery atherosclerotic disease present. Calcification of the mitral valve. Partially imaged heart is enlarged. Organs: Evaluation of the solid organs is limited due to lack of intravenous contrast.  The nonenhanced liver demonstrates no acute abnormality. The gallbladder is surgically absent. Numerous calcified granulomata throughout the spleen. Atrophy of the pancreas. The nonenhanced adrenal glands demonstrate no acute findings. Redemonstration of right adrenal adenoma is noted on previous exams. The nonenhanced kidneys are grossly stable with similar atrophy of the left kidney when compared with prior exams. No hydronephrosis identified. GI/Bowel: No bowel obstruction is evident. The colon is collapsed and not well evaluated. No evidence for appendicitis. The small bowel is normal in caliber. Small hiatal hernia. Pelvis: Bladder and solid pelvic organs demonstrate no acute findings. Peritoneum/Retroperitoneum: The abdominal aorta is normal in caliber with severe calcified atherosclerotic plaque throughout. No retroperitoneal adenopathy identified. No free fluid or free air identified within the abdomen and pelvis. Bones/Soft Tissues: Soft tissues demonstrate mild anasarca. Postoperative changes of the right anterior abdominal wall. No acute osseous abnormality identified. 1. Findings at the lung bases suggesting pulmonary edema with small bilateral effusions and associated atelectasis. 2. No acute intra-abdominal process identified. 3. Severe atherosclerotic disease. CT Head WO Contrast    Result Date: 7/15/2021  EXAMINATION: CT OF THE HEAD WITHOUT CONTRAST  7/15/2021 7:28 pm TECHNIQUE: CT of the head was performed without the administration of intravenous contrast. Dose modulation, iterative reconstruction, and/or weight based adjustment of the mA/kV was utilized to reduce the radiation dose to as low as reasonably achievable.  COMPARISON: CT brain June 11, 2021 HISTORY: ORDERING SYSTEM PROVIDED HISTORY: NANCY TECHNOLOGIST PROVIDED HISTORY: Reason for exam:->AMS Has a \"code stroke\" or \"stroke alert\" been called? ->No Decision Support Exception - unselect if not a suspected or confirmed emergency medical condition->Emergency Medical Condition (MA) Reason for Exam: AMS FINDINGS: BRAIN/VENTRICLES: There is no acute intracranial hemorrhage, mass effect or midline shift. No abnormal extra-axial fluid collection. The gray-white differentiation is maintained without evidence of an acute infarct. There is no evidence of hydrocephalus. Severe atherosclerotic change of the intracranial vasculature. There is moderate periventricular and subcortical white matter hypoattenuation most consistent with microvascular ischemic changes. Global cerebral atrophy noted. ORBITS: The visualized portion of the orbits demonstrate no acute abnormality. SINUSES: The visualized paranasal sinuses and mastoid air cells demonstrate no acute abnormality. SOFT TISSUES/SKULL:  No acute abnormality of the visualized skull or soft tissues. No acute intracranial abnormality. Chronic microvascular ischemic changes and global cerebral atrophy. XR CHEST PORTABLE    Result Date: 7/16/2021  EXAMINATION: ONE XRAY VIEW OF THE CHEST 7/16/2021 10:31 am COMPARISON: July 15, 2021 HISTORY: ORDERING SYSTEM PROVIDED HISTORY: s/p central line attempt TECHNOLOGIST PROVIDED HISTORY: Reason for exam:->s/p central line attempt Reason for Exam: s/p central line attempt FINDINGS: Right IJ central venous catheter terminates in the mid SVC in satisfactory position. No appreciable pneumothorax. Cardiomediastinal silhouette appears unchanged. Pulmonary vascular congestion and diffuse interstitial prominence. Trace bilateral pleural effusions cannot be excluded. Minimal bibasilar atelectasis. Right IJ central venous catheter terminates in the mid SVC in satisfactory position. No pneumothorax. Pulmonary vascular congestion with interstitial pulmonary edema.      XR CHEST PORTABLE    Result Date: 7/15/2021  EXAMINATION: ONE XRAY VIEW OF THE CHEST 7/15/2021 5:47 pm COMPARISON: 06/13/2021 HISTORY: ORDERING SYSTEM PROVIDED HISTORY: AMS TECHNOLOGIST PROVIDED HISTORY: Reason for exam:->AMS Reason for Exam: AMS Initial encounter FINDINGS: Cardiomegaly. There is increased interstitial opacities bilaterally. No pleural effusion or pneumothorax. The osseous structures otherwise stable. Cardiomegaly with pulmonary edema. IR NONTUNNELED VASCULAR CATHETER > 5 YEARS    Result Date: 7/16/2021  PROCEDURE: ULTRASOUND GUIDED VASCULAR ACCESS. PLACEMENT OF A NON-TUNNELED CATHETER. 7/16/2021. HISTORY: ORDERING SYSTEM PROVIDED HISTORY: Need central venous access. Request CVC Insertion TECHNOLOGIST PROVIDED HISTORY: Reason for exam:->CVC Insertion SEDATION: None. FLUOROSCOPY DOSE AND TYPE OR TIME AND EXPOSURES: None. TECHNIQUE: Informed consent was obtained after a detailed explanation of the procedure including risks, benefits, and alternatives. Universal protocol was observed. The right neck and chest were prepped and draped in sterile fashion using maximum sterile barrier technique. Local anesthesia was achieved with lidocaine. A micropuncture needle was used to access the right internal jugular vein using ultrasound guidance. An ultrasound image demonstrating patency of the vein with needle tip located within it. An image was obtained and stored in PACs. A 0.035 guidewire was used to place a triple lumen central venous catheter after fascial tract dilation. The catheter flushed easily and there was a good blood return. The catheter was sutured to the skin. The catheter was locked with heparinized saline. The patient tolerated the procedure well and there were no immediate complications. EBL: Less than 1 ml. FINDINGS: Chest x ray after procedure demonstrates the tip of the catheter in the SVC. The catheter is ready to use.      Successful ultrasound and fluoroscopy guided DOSE AMT.  GIVEN - 1,000 mg     DOSE TIME DOSE TIME GIVEN - 0600    CBC auto differential    Collection Time: 07/21/21  5:45 AM   Result Value Ref Range    WBC 14.0 (H) 4.0 - 10.5 K/CU MM    RBC 4.14 (L) 4.2 - 5.4 M/CU MM    Hemoglobin 12.4 (L) 12.5 - 16.0 GM/DL    Hematocrit 38.1 37 - 47 %    MCV 92.0 78 - 100 FL    MCH 30.0 27 - 31 PG    MCHC 32.5 32.0 - 36.0 %    RDW 14.4 11.7 - 14.9 %    Platelets 991 239 - 142 K/CU MM    MPV 10.5 7.5 - 11.1 FL    Differential Type AUTOMATED DIFFERENTIAL     Segs Relative 72.0 (H) 36 - 66 %    Lymphocytes % 12.1 (L) 24 - 44 %    Monocytes % 11.0 (H) 0 - 4 %    Eosinophils % 3.6 (H) 0 - 3 %    Basophils % 0.4 0 - 1 %    Segs Absolute 10.1 K/CU MM    Lymphocytes Absolute 1.7 K/CU MM    Monocytes Absolute 1.6 K/CU MM    Eosinophils Absolute 0.5 K/CU MM    Basophils Absolute 0.1 K/CU MM    Nucleated RBC % 0.0 %    Total Nucleated RBC 0.0 K/CU MM    Total Immature Neutrophil 0.13 K/CU MM    Immature Neutrophil % 0.9 (H) 0 - 0.43 %   Comprehensive Metabolic Panel w/ Reflex to MG    Collection Time: 07/21/21  5:45 AM   Result Value Ref Range    Sodium 139 135 - 145 MMOL/L    Potassium 3.9 3.5 - 5.1 MMOL/L    Chloride 98 (L) 99 - 110 mMol/L    CO2 34 (H) 21 - 32 MMOL/L    BUN 12 6 - 23 MG/DL    CREATININE 1.0 0.6 - 1.1 MG/DL    Glucose 118 (H) 70 - 99 MG/DL    Calcium 8.5 8.3 - 10.6 MG/DL    Albumin 3.5 3.4 - 5.0 GM/DL    Total Protein 5.4 (L) 6.4 - 8.2 GM/DL    Total Bilirubin 1.4 (H) 0.0 - 1.0 MG/DL     (H) 10 - 40 U/L    AST 84 (H) 15 - 37 IU/L    Alkaline Phosphatase 167 (H) 40 - 129 IU/L    GFR Non- 56 (L) >60 mL/min/1.73m2    GFR African American >60 >60 mL/min/1.73m2    Anion Gap 7 4 - 16   POCT Glucose    Collection Time: 07/21/21  9:00 AM   Result Value Ref Range    POC Glucose 170 (H) 70 - 99 MG/DL   POCT Glucose    Collection Time: 07/21/21 12:17 PM   Result Value Ref Range    POC Glucose 201 (H) 70 - 99 MG/DL   POCT Glucose    Collection Time: 07/21/21 5:31 PM   Result Value Ref Range    POC Glucose 155 (H) 70 - 99 MG/DL         Medical problems    Patient Active Problem List:     History of CVA (cerebrovascular accident) without residual deficits     Morbid obesity ([address]     DM (diabetes mellitus), type 2, uncontrolled ([facility])     Persistent atrial fibrillation (H[facility])     CAD (coronary artery disease)     CHF (congestive heart failure) ([facility])     Light headed     History of MI (myocardial infarction)     Diabetes mellitus ([facility])     Hypertension     TIA (transient ischemic attack)     Hyperlipidemia     SOB (shortness of breath)     H/O cardiovascular stress test     Family history of coronary artery disease     PAF (paroxysmal atrial fibrillation) ([facility])     Chest pain     Atrial thrombus without antecedent myocardial infarction     S/P ablation of atrial fibrillation     Gastroenteritis     YESIKA (acute kidney injury) ([facility] [address]     Acute cystitis     Elevated liver enzymes     CHF (congestive heart failure), NYHA class I, acute on chronic, combined ([facility])     Pneumonia     Multifocal pneumonia     Atrial fibrillation with RVR ([facility])     Shoulder pain     Paroxysmal atrial fibrillation ([facility])     Diabetic hyperosmolar coma ([facility])     Difficult intravenous access     HFrEF (heart failure with reduced ejection fraction) ([facility])      ASSESSMENT:  ---------------------    Metabolic encephalopathy     TIA r/o cardio carotid embolic event     Hyperglycemia     ? Sepsis     A fib    Right MCA left Ophthalmic artery aneurysms    Multiple intracranial stenosis     PLAN:     CT brain neg     Mri brain neg    Mra head     C doppler     Echo EF 15 %      B 12 folate TSH Homocysteine level     Continue eliquis      DC asa      Plavix     Pt is doing better today.           Electronically signed by Madalyn Harrington MD on 7/21/2021 at 7:36 PM

## 2021-07-21 NOTE — PROGRESS NOTES
Nephrology Progress Note  7/21/2021 9:38 AM        Subjective:   Admit Date: 7/15/2021  PCP: Esa Schultz PA-C    Interval History: not much changed - she is restrained     Diet: none     ROS:  Awake , not oriented   UOP 3 l/d   CCB drip       Data:     Current meds:    insulin glargine  15 Units Subcutaneous Nightly    folic acid-pyridoxine-cyancobalamin  1 tablet Oral Daily    vancomycin  1,000 mg Intravenous Q24H    insulin lispro  0-18 Units Subcutaneous TID WC    insulin lispro  0-9 Units Subcutaneous 2 times per day    spironolactone  25 mg Oral BID    clopidogrel  75 mg Oral Daily    insulin lispro  10 Units Subcutaneous TID WC    furosemide  20 mg Intravenous BID    amiodarone  200 mg Oral BID    potassium bicarb-citric acid  40 mEq Oral BID    apixaban  5 mg Oral BID    levothyroxine  50 mcg Oral Daily    metoprolol succinate  100 mg Oral Daily    pantoprazole  40 mg Oral Daily    alogliptin  6.25 mg Oral Daily    sodium chloride flush  5-40 mL Intravenous 2 times per day    sodium chloride flush  5-40 mL Intravenous 2 times per day    cefepime  2,000 mg Intravenous Q12H      dextrose      sodium chloride      sodium chloride      dilTIAZem (CARDIZEM) 100 mg in dextrose 5% 100 mL ([facility]) 5 mg/hr (07/21/21 0216)         I/O last 3 completed shifts:   In: 288 [P.O.:180; I.V.:108]  Out: 3000 [Urine:3000]    CBC:   Recent Labs     07/19/21  0550 07/20/21  0510 07/21/21  0545   WBC 13.0* 13.6* 14.0*   HGB 12.2* 11.6* 12.4*    258 279          Recent Labs     07/19/21  0550 07/20/21  0510 07/21/21  0545    137 139   K 3.4* 3.5 3.9   CL 96* 95* 98*   CO2 37* 35* 34*   BUN 18 16 12   CREATININE 1.0 0.9 1.0   GLUCOSE 133* 64* 118*       Lab Results   Component Value Date    CALCIUM 8.5 07/21/2021    PHOS 2.3 (L) 07/20/2021       Objective:     Vitals: BP (!) 170/70   Pulse 75   Temp 98.2 °F (36.8 °C) (Oral)   Resp 20   Ht 5' (1.524 m)   Wt 180 lb (81.6 kg)   SpO2 98% BMI 35.15 kg/m²     General appearance:  No ac distress -   HEENT:  + mild conj pallor - has Rt IJ CVC   Neck:  supple  Lungs:  + adv Bs  Heart:  Irregular   Abdomen: soft  Extremities:  No overt LE edema '  Has evette       Problem List :         Impression :     1. CKD stage 3- stable good UOP  2. ADHF and DKA better and seems compensated with loop   3. HTN - limited MRI no ac CVA- so we Nimo Peterson now get /80 but she cannot take anything po yet   4. Encephalopathy- as MRI no CVA likely multifactorial-     Recommendation/Plan  :     1. Manual BP  2. Keep loop   3. Once she can take po than RAAS inh   4. Goal /80 as an out pt   5. Good BS control   6.  She is still young but has multiple organ mal Fx       Aravind Hernández MD MD

## 2021-07-21 NOTE — PROGRESS NOTES
Progress Note( Dr. Thomas Yi)  7/21/2021  Subjective:   Admit Date: 7/15/2021  PCP: Sincere Cornejo PA-C        Admitted For :   Altered mental state and severe hyperglycemia possible metabolic encephalopathy    Consulted For: Better control of blood glucose    Interval History: Patient is more drowsy this morning early responding to any verbal commands   Looks like her mental status got a bit worse  CT Scan& MRI of Brain  Neg for any acute CVA     Denies any chest pains,   Denies SOB . Denies nausea or vomiting. Unable to eat of course she is so much drowsy  No new bowel or bladder symptoms.        Intake/Output Summary (Last 24 hours) at 7/21/2021 0730  Last data filed at 7/21/2021 0500  Gross per 24 hour   Intake 288 ml   Output 3000 ml   Net -2712 ml       DATA    CBC:   Recent Labs     07/19/21  0550 07/20/21  0510 07/21/21  0545   WBC 13.0* 13.6* 14.0*   HGB 12.2* 11.6* 12.4*    258 279    CMP:  Recent Labs     07/19/21  0550 07/20/21  0510 07/21/21  0545    137 139   K 3.4* 3.5 3.9   CL 96* 95* 98*   CO2 37* 35* 34*   BUN 18 16 12   CREATININE 1.0 0.9 1.0   CALCIUM 8.1* 8.0* 8.5   PROT 5.4* 5.0* 5.4*   LABALBU 3.0* 3.3* 3.5   BILITOT 1.5* 1.4* 1.4*   ALKPHOS 162* 158* 167*   * 130* 84*   * 168* 133*     Lipids:   Lab Results   Component Value Date    CHOL 153 01/30/2012    HDL 59 01/30/2012    TRIG 135 01/30/2012     Glucose:  Recent Labs     07/20/21  1750 07/20/21  2104 07/21/21  0242   POCGLU 133* 139* 114*     [id number]:  Lab Results   Component Value Date    LABA1C 10.9 07/17/2021     High Sensitivity TSH:   Lab Results   Component Value Date    TSHHS 7.150 07/20/2021     Free T3: No results found for: FT3  Free T4:  Lab Results   Component Value Date    T4FREE 1.28 06/12/2021       Echocardiogram complete 2D with doppler with color    Result Date: 7/16/2021  Transthoracic Echocardiography Report (TTE)  Demographics   Patient Name       Jacky MAYFIELD Date of Study acute intracranial abnormality. Chronic microvascular ischemic changes and global cerebral atrophy. XR CHEST PORTABLE    Result Date: 7/16/2021  EXAMINATION: ONE XRAY VIEW OF THE CHEST 7/16/2021 10:31 am COMPARISON: July 15, 2021 HISTORY: ORDERING SYSTEM PROVIDED HISTORY: s/p central line attempt    Right IJ central venous catheter terminates in the mid SVC in satisfactory position. No pneumothorax. Pulmonary vascular congestion with interstitial pulmonary edema. XR CHEST PORTABLE    Result Date: 7/15/2021  EXAMINATION: ONE XRAY VIEW OF THE CHEST 7/15/2021 5:47 pm COMPARISON: 06/13/2021 HISTORY: ORDERING SYSTEM PROVIDED HISTORY: AMS TECHNOLOGIST PROVIDED HISTORY: Reason for exam:->AMS Reason for Exam: AMS Initial encounter FINDINGS: Cardiomegaly. There is increased interstitial opacities bilaterally. No pleural effusion or pneumothorax. The osseous structures otherwise stable. Cardiomegaly with pulmonary edema. IR NONTUNNELED VASCULAR CATHETER > 5 YEARS    Result Date: 7/16/2021  PROCEDURE: ULTRASOUND GUIDED VASCULAR ACCESS. PLACEMENT OF A NON-TUNNELED CATHETER. 7/16/2021. HISTORY: ORDERING SYSTEM PROVIDED HISTORY: Need central venous access. Request CVC Insertion TECHNOLOGIST PROVIDED HISTORY: Reason for exam:->CVC Insertion SEDATION: None. Successful ultrasound and fluoroscopy guided non-tunneled central venous catheter placement. The catheter is ready to use.        Scheduled Medicines   Medications:    insulin glargine  15 Units Subcutaneous Nightly    folic acid-pyridoxine-cyancobalamin  1 tablet Oral Daily    vancomycin  1,000 mg Intravenous Q24H    insulin lispro  0-18 Units Subcutaneous TID WC    insulin lispro  0-9 Units Subcutaneous 2 times per day    spironolactone  25 mg Oral BID    clopidogrel  75 mg Oral Daily    insulin lispro  10 Units Subcutaneous TID WC    furosemide  20 mg Intravenous BID    amiodarone  200 mg Oral BID    potassium bicarb-citric acid  40 mEq Oral BID    apixaban  5 mg Oral BID    levothyroxine  50 mcg Oral Daily    metoprolol succinate  100 mg Oral Daily    pantoprazole  40 mg Oral Daily    alogliptin  6.25 mg Oral Daily    sodium chloride flush  5-40 mL Intravenous 2 times per day    sodium chloride flush  5-40 mL Intravenous 2 times per day    cefepime  2,000 mg Intravenous Q12H      Infusions:    dextrose      sodium chloride      sodium chloride      dilTIAZem (CARDIZEM) 100 mg in dextrose 5% 100 mL (ADD-[place]) 5 mg/hr (07/21/21 0216)         Objective:   Vitals: BP (!) 151/103   Pulse 69   Temp 98.6 °F (37 °C) (Axillary)   Resp 20   Ht 5' (1.524 m)   Wt 180 lb (81.6 kg)   SpO2 100%   BMI 35.15 kg/m²   General appearance: alert and cooperative with exam appears to be somewhat dehydrated  Neck: no JVD or bruit  Thyroid : Normal lobes   Lungs: Has Vesicular Breath sounds   Heart: Atrial fibrillation  Abdomen: soft, non-tender; bowel sounds normal; no masses,  no organomegaly  Musculoskeletal: Normal  Extremities: extremities normal, , no edema  Neurologic:  Awake, alert, oriented to name, place and time. Cranial nerves II-XII are grossly intact. Motor is  intact. Sensory is intact. ,  and gait is normal.    Assessment:     Patient Active Problem List:     History of CVA (cerebrovascular accident) without residual deficits     Morbid obesity ([address]     DM (diabetes mellitus), type 2, uncontrolled ([address]     Persistent atrial fibrillation ([address]     CAD (coronary artery disease)     CHF (congestive heart failure) ([address]     Light headed     History of MI (myocardial infarction)     Diabetes mellitus ([address]     Hypertension     TIA (transient ischemic attack)     Hyperlipidemia     SOB (shortness of breath)     H/O cardiovascular stress test     Family history of coronary artery disease     PAF (paroxysmal atrial fibrillation) ([facility])     Chest pain     Atrial thrombus without antecedent myocardial infarction     S/P ablation of atrial fibrillation     Gastroenteritis     YESIKA (acute kidney injury) ([facility]     Acute cystitis     Elevated liver enzymes     CHF (congestive heart failure), NYHA class I, acute on chronic, combined (HCC)     Pneumonia     Multifocal pneumonia     Atrial fibrillation with RVR (HCC)     Shoulder pain     Paroxysmal atrial fibrillation (HCC)     Diabetic hyperosmolar coma (HCC)     Difficult intravenous access     Metabolic encephalopathy getting better      Plan:     1. Reviewed POC blood rate 14 with regular foot embolism there is good  2. . Labs and X ray results   3. Reviewed Current Medicines   4. On meal +,  correction bolus Humalog/ Basal Lantus Insulin regime does get  5. Monitor Blood glucose frequently   6. Modified  the dose of Insulin/ other medicines as needed   7. Plan for MRI of of brain  8. Will follow     .      Nicolasa Olivera MD, MD

## 2021-07-21 NOTE — ADT AUTH CERT
Sepsis and Other Febrile Illness, without Focal Infection - Care Day 6 (7/20/2021) by Shaun Dick RN       Review Status Review Entered   Completed 7/21/2021 08:40      Criteria Review      Care Day: 6 Care Date: 7/20/2021 Level of Care:    Guideline Day 4    Clinical Status    ( ) * Hemodynamic stability    (X) * Afebrile or temperature acceptable for next level of care    7/21/2021 8:40 AM EDT by Elina Clemens       Temp 98.6 °F    (X) * Hypoxemia absent    7/21/2021 8:40 AM EDT by Elina Clemens       SpO2 98%    (X) * Tachypnea absent    7/21/2021 8:40 AM EDT by Elina Clemens      Resp 17    ( ) * Cultures negative or infection identified and under adequate treatment    ( ) * Mental status at baseline    ( ) * Metabolic derangement (eg, dehydration, acidosis) absent    ( ) * End organ dysfunction (eg, myocardial ischemia, renal failure) absent    ( ) * Discharge plans and education understood    Activity    ( ) * Ambulatory or acceptable for next level of care    Routes    (X) * Oral hydration    7/21/2021 8:40 AM EDT by Elina Clemens      dysphagia diet    (X) * Oral medications or regimen acceptable for next level of care    7/21/2021 8:40 AM EDT by Elina Clemens      po meds    (X) * Oral diet or acceptable for next level of care    7/21/2021 8:40 AM EDT by Elina Clemens      dysphagia diet    Interventions    ( ) * Isolation not indicated, or is performable at next level of care    Medications    ( ) * Antimicrobial treatment not necessary or treatment at next level of care arranged    * Milestone   Additional Notes   7/20  PCU      Patient awake, alert, still remains confused.  Still only answers sometimes and with one-word responses.  Denies any pain.       Objective:   BP (!) 171/76   Pulse 72   Temp 98.6 °F (37 °C) (Oral)   Resp 17   Ht 5' (1.524 m)   Wt 180 lb (81.6 kg)   SpO2 98%   BMI 35.15 kg/m²        General appearance: alert, appears stated age and cooperative   Head: Normocephalic, without obvious abnormality, atraumatic   Neck: no adenopathy and supple, symmetrical, trachea midline   Lungs: clear to auscultation bilaterally   Heart: regular rate and rhythm and S1, S2 normal   Abdomen: soft, non-tender; bowel sounds normal; no masses,  no organomegaly   Extremities: no clubbing, cyanosis or edema   Neurologic: moves all 4 extremities. Aphasic      Scheduled Medications   · insulin glargine 15 Units Subcutaneous Nightly   · vancomycin 1,000 mg Intravenous Q24H   · insulin lispro 0-18 Units Subcutaneous TID WC   · insulin lispro 0-9 Units Subcutaneous 2 times per day   · spironolactone 25 mg Oral BID   · clopidogrel 75 mg Oral Daily   · insulin lispro 10 Units Subcutaneous TID WC   · furosemide 20 mg Intravenous BID   · amiodarone 200 mg Oral BID   · potassium bicarb-citric acid 40 mEq Oral BID   · apixaban 5 mg Oral BID   · levothyroxine 50 mcg Oral Daily   · metoprolol succinate 100 mg Oral Daily   · pantoprazole 40 mg Oral Daily   · alogliptin 6.25 mg Oral Daily   · sodium chloride flush 5-40 mL Intravenous 2 times per day   · sodium chloride flush 5-40 mL Intravenous 2 times per day   · cefepime 2,000 mg Intravenous Q12H       Diabetic hyperosmolar state : better   Mental status change: improving slowly. EEG nonspecific abnl seen in encephalopathies.  Check MRI brain. Leukocytosis and possible sepsis: on iv antibiotics, improving   Depression: no suicial attempt. (Taked to patient). Drug screen negative. A. Fib: rate is in control now   Hypertension: improved now   Elevated LFTs: Slowly improving.  CT abdomen nonacute.  Monitor. Possible pneumonia: on antibiotics   Pulmonary edema: continue monitoring.            Plan:   Add PRN Ativan. EEG report reviewed. Needs MRI. Consultants notes reviewed. Continue IV antibiotics.     Monitor labs and patient condition.        Cardiology   PLAN FROM CARDIOLOGY FOR TODAY:   CPM       - cardiology consult is for:  A Fib       -  Interval history:  In  A Fib        · ASSESSMENT/ PLAN:   1.    A fib back in a fib, HR controlled,  On eliquis,  amio   2. CAD stable   3. HFrEF on meds  CPM, diurese prn   4. Neuro yun in progress, pt very confused       Nephrology   Impression :       1. CKD stage 3- stable   2. ADHF seems better with a.fib    3. Still confusion - mentation not at baseline - getting MRI soon   4. Recant DKA - K better - unable to take pills po       Recommendation/Plan  :           HTN BP recorded high but need manual to confirm    Also until ac CVA rules out - will avoid lowering BP too much    Pro BNP better    Keep diureitcs    Review w/U   Manual BP   Good BS control follow clinically and bio chemically        Endocrinology   Plan:       1. Reviewed POC blood rate 14 with regular foot embolism there is good   2. . Labs and X ray results    3. Reviewed Current Medicines    4. On meal +,  correction bolus Humalog/ Basal Lantus Insulin regime does get   5. Monitor Blood glucose frequently    6. Modified  the dose of Insulin/ other medicines as needed    7.  Plan for MRI of of brain      Neurology   ASSESSMENT:   ---------------------       Metabolic encephalopathy       TIA r/o cardio carotid embolic event       Hyperglycemia       ? Sepsis       A fib       PLAN:       CT brain neg       Mri brain neg       C doppler       Echo       B 12 folate TSH Homocysteine level       Continue eliquis        DC asa        Plavix      WBC 13.6 K/CU MM      RBC 3.86 M/CU MM      Hemoglobin 11.6 GM/DL      Hematocrit 35.2 %      MCV 91.2 FL      MCH 30.1 PG      MCHC 33.0 %      RDW 14.2 %      Platelets 668 K/CU MM      MPV 10.4 FL      Differential Type AUTOMATED DIFFERENTIAL     Segs Relative 71.7 %      Lymphocytes % 13.4 %      Monocytes % 10.6 %      Eosinophils % 3.5 %      Basophils % 0.1 %      Segs Absolute 9.8 K/CU MM      Lymphocytes Absolute 1.8 K/CU MM      Monocytes Absolute 1.5 K/CU MM      Eosinophils Absolute 0.5 K/CU MM   Basophils Absolute 0.0 K/CU MM      Nucleated RBC % 0.0 %      Total Nucleated RBC 0.0 K/CU MM      Total Immature Neutrophil 0.09 K/CU MM      Immature Neutrophil % 0.7 %       Sodium 137 MMOL/L      Potassium 3.5 MMOL/L      Chloride 95 mMol/L      CO2 35 MMOL/L      BUN 16 MG/DL      CREATININE 0.9 MG/DL      Glucose 64 MG/DL      Calcium 8.0 MG/DL      Albumin 3.3 GM/DL      Total Protein 5.0 GM/DL      Total Bilirubin 1.4 MG/DL       U/L       IU/L      Alkaline Phosphatase 158 IU/L      GFR Non-African American >60 mL/min/1.73m2      GFR African American >60 mL/min/1.73m2      Anion Gap 7       Phosphorus 2.3 MG/DL       Vitamin B-12 1364 pg/ml      Folate 9.9 NG/ML      TSH, High Sensitivity 7.150 uIu/ml       Pro-BNP 6,938 PG/ML       Homocysteine 15.1 umol/L          Sepsis and Other Febrile Illness, without Focal Infection - Care Day 4 (7/18/2021) by Amadou Dominguez RN       Review Status Review Entered   Completed 7/20/2021 14:19      Criteria Review      Care Day: 4 Care Date: 7/18/2021 Level of Care:    Guideline Day 3    Clinical Status    ( ) * Mental status at baseline    (X) * Afebrile or fever improved    7/20/2021 2:19 PM EDT by Niall Beaulieu      Temp 98.6    Routes    ( ) * Oral hydration    * Milestone   Additional Notes   7/18  PCU      She does not respond back to questions all the time.  The answers she gives are appropriate       Objective:   BP (!) 151/69   Pulse 75   Temp 98.6 °F (37 °C) (Oral)   Resp 16   Ht 5' (1.524 m)   Wt 180 lb (81.6 kg)   SpO2 100%   BMI 35.15 kg/m²      General appearance: alert, appears stated age and cooperative   Head: Normocephalic, without obvious abnormality, atraumatic   Neck: no adenopathy and supple, symmetrical, trachea midline   Lungs: clear to auscultation bilaterally   Heart: regular rate and rhythm and S1, S2 normal   Abdomen: soft, non-tender; bowel sounds normal; no masses,  no organomegaly   Extremities: no clubbing, cyanosis or edema   Neurologic: moves all 4 extremities. Aphasic      Scheduled Medications   · insulin lispro 10 Units Subcutaneous TID WC   · insulin lispro 0-12 Units Subcutaneous TID WC   · furosemide 20 mg Intravenous BID   · insulin glargine 30 Units Subcutaneous Nightly   · spironolactone 25 mg Oral Daily   · potassium bicarb-citric acid 40 mEq Oral BID   · amiodarone 100 mg Oral BID   · apixaban 5 mg Oral BID   · aspirin 81 mg Oral Daily   · levothyroxine 50 mcg Oral Daily   · metoprolol succinate 100 mg Oral Daily   · pantoprazole 40 mg Oral Daily   · alogliptin 6.25 mg Oral Daily   · sodium chloride flush 5-40 mL Intravenous 2 times per day   · sodium chloride flush 5-40 mL Intravenous 2 times per day   · cefepime 2,000 mg Intravenous Q12H   · vancomycin 1,250 mg Intravenous Q24H      Diabetic hyperosmolar state : better   Mental status change: improving slowly   Leukocytosis and possible sepsis: on iv antibiotics, improving   Depression: no suicial attempt. ( Taked to patient). Drug screen negative. A. Fib: rate is in control now   Hypertension: improved now   POssible pneumonia: on antibiotics   Pulmonary edema: continue monitoring.            Plan:   Speech therapy   Remove restraints   Iv antibiotics   Monitor glucose   Start po diet once speech therapy evaluated   Discussed with consultants. Nephrology   Impression :       1. CKD stage 3 a a3- stable - good UOP   2. ADHF- DKA better    3. Low  K , mag from loop    4. Met alkalosis - a.fib etc    5. mentation getting better        Recommendation/Plan  :       1. Reduce loop   2. replete K/mg   3. Follow clinically   4. Once  Ac condition resolves than re eval for any the potential etiology   5.  Follow clinically and bio chemically       Neurology   ASSESSMENT/PLAN:       3 78-year-old female with acute altered mental status found down secondary to suspected severe toxic metabolic encephalopathy superimposed on acute HHNS, elevated liver enzymes and pulmonary edema.  No fever thus no LP at this time. MRI would not help work up currently as there is no focal findings rather severe global encephalopathy.  Need to obtain EEG.  Plan of care as follows:   1. Neuro exam:   1. Improved mentation, non focal    2. Neurodiagnostics:   1. CT of the head was nonacute   2. EEG pending   3. Ammonia as above   4. Urine as above, culture pending    5. Blood cultures pending   6. MRI brain pending    3. Medications:   1. Avoid all CNS altering medications at this time   2. If she has history of A. fib needs to be on anticoagulation will defer to internal medicine   4. PT/OT/ST:   1. Per their recommendation   5. Follow-up:   1. Pending course of admission and complete metabolic work-up   2. Will sign over case to Dr. Terrell Wong       Endocrinology   Plan:       1. Reviewed POC blood rate 14 with regular foot embolism there is good   2. . Labs and X ray results    3. Reviewed Current Medicines    4. On meal +,  correction bolus Humalog/ Basal Lantus Insulin regime does get   5. Monitor Blood glucose frequently    6. Modified  the dose of Insulin/ other medicines as needed    7. Will follow       Cardiology   PLAN FROM CARDIOLOGY FOR TODAY:   HR control           - cardiology consult is for:  A Fib       -  Interval history:  In  A Fib today       · ASSESSMENT/ PLAN:   1.    A fib back in a fib, HR controlled,  On eliquis, increase amio   2. CAD stable   3. HFrEF on meds  CPM, diurese   4.  Neuro appreciated       Magnesium 1.5 mg/dl        Phosphorus 2.3 MG/DL       Sodium 139 MMOL/L      Potassium 2.8 MMOL/L      Chloride 94 mMol/L      CO2 34 MMOL/L      Anion Gap 11     BUN 26 MG/DL      CREATININE 1.2 MG/DL      Glucose 180 MG/DL      Calcium 7.9 MG/DL      GFR Non-African American 45 mL/min/1.73m2      GFR African American 55 mL/min/1.73m2       WBC 16.0 K/CU MM      RBC 3.78 M/CU MM      Hemoglobin 11.5 GM/DL      Hematocrit 35.1 %      MCV 92.9 FL      MCH 30.4 PG      MCHC 32.8 %      RDW 14.1 %      Platelets 875 K/CU MM      MPV 10.6 FL      Immature Neutrophil % 0.0 %      Segs Relative 87.0 %      Eosinophils % 1.0 %      Basophils % 0.0 %      Lymphocytes % 6.0 %      Monocytes % 6.0 %      Total Immature Neutrophil 0.00 K/CU MM      Segs Absolute 13.8 K/CU MM      Eosinophils Absolute 0.2 K/CU MM      Basophils Absolute 0.0 K/CU MM      Lymphocytes Absolute 1.0 K/CU MM      Monocytes Absolute 1.0 K/CU MM      Differential Type AUTOMATED DIFFERENTIAL

## 2021-07-21 NOTE — PROGRESS NOTES
Speech Language Pathology  1 [facility]  DEPARTMENT OF SPEECH/LANGUAGE PATHOLOGY  DAILY PROGRESS NOTE  Enrrique Hartman  7/21/2021  [id number]  Septicemia ([facility] [A41.9]  Hyperglycemia [R73.9]  Diabetic hyperosmolar coma ([facility] [E11.01]  Allergies   Allergen Reactions    Amiodarone Other (See Comments)     dizziness    Iv Dye [Iodides] Nausea And Vomiting    Vicodin [Hydrocodone-Acetaminophen] Nausea And Vomiting         Pt was seen this date for dysphagia treatment. IMPRESSION AND RECOMMENDATIONS:   Pt seen this date for diet tolerance monitoring and assessment for diet advancement. Pt was adequately alert for participation, however, she remains confused and requires wrist restraints. She appears restless moving her head alternately from the pillow to an upright position. Per/RN pt is occasionally verbalizing simple needs/wants and is eating small amounts. Pt was seated at 90 degrees upright and accepted PO trials of thins by straw x 2 oz, pureed solids x 2, soft/moist and regular solids. Oral swallow was [facility] with adequate bolus formation, mildly prolonged oral clearance or pureed solid and mildly reduced mastication for regular solid with suspected tongue mashing with no dentition. Concern for reduced safety awareness for regular solids due to AMS and restless movement of head. Pharyngeal swallow appears WNL with 0 s/s aspiration. Swallow timing and laryngeal excursion appear WNL. Recommend advance to Minced/moist solids, continue Thin liquids. Total Feed. Will follow for hopeful diet advancement as mental status improves. Question whether pt is meeting nutritional requirements due to AMS on day 7 of admission. Spoke with Dr. Surya Joshi regarding same and he is aware.        GOALS (current status in bold):  Short-term Goals  Timeframe for Short-term Goals: length of admission  Goal 1: Pt will tolerate pureed diet/thin liquids with adequate oral manipulation/clearance and no s/s aspiration. Goal 2: Pt will tolerate PO trials of advanced textures with adequate oral manipulation/clearance and no s/s aspiration for safe diet upgrade. Goal 3: Pt/caregivers will indicate understanding of all recommendations.      EDUCATION:  Discussed diet level advancement with RN Darlin Meigs    PAIN RATING (0-10 Scale): does not appear in acute distress  Time in/Time out: SLP Individual Minutes  Time In: 1110  Time Out: 1130  Minutes: 20    Visit number: 400 Miryam SchleicherZENY Hernandez  7/21/2021  11:27 AM

## 2021-07-21 NOTE — PROGRESS NOTES
INTERNAL MEDICINE PROGRESS NOTE        Char Cruzdevon   1955   Primary Care Physician:  Michael Melara PA-C  Admit Date: 7/15/2021     Subjective:   Patient awake, alert, still confused. She has restraints. Little more verbal today. Blood pressure elevated. MRI was nonacute. Objective:   BP (!) 170/70   Pulse 75   Temp 98.2 °F (36.8 °C) (Oral)   Resp 20   Ht 5' (1.524 m)   Wt 180 lb (81.6 kg)   SpO2 98%   BMI 35.15 kg/m²    General appearance: alert, appears stated age and cooperative  Head: Normocephalic, without obvious abnormality, atraumatic  Neck: no adenopathy and supple, symmetrical, trachea midline  Lungs: clear to auscultation bilaterally  Heart: regular rate and rhythm and S1, S2 normal  Abdomen: soft, non-tender; bowel sounds normal; no masses,  no organomegaly  Extremities: no clubbing, cyanosis or edema  Neurologic: moves all 4 extremities. Aphasic.     Data Review  Lab Results   Component Value Date     2021    K 3.9 2021    CL 98 (L) 2021    CO2 34 (H) 2021    CREATININE 1.0 2021    BUN 12 2021    CALCIUM 8.5 2021     Lab Results   Component Value Date    WBC 14.0 (H) 2021    HGB 12.4 (L) 2021    HCT 38.1 2021    MCV 92.0 2021     2021     INR/Prothrombin Time      Meds:    insulin glargine  15 Units Subcutaneous Nightly    folic acid-pyridoxine-cyancobalamin  1 tablet Oral Daily    vancomycin  1,000 mg Intravenous Q24H    insulin lispro  0-18 Units Subcutaneous TID WC    insulin lispro  0-9 Units Subcutaneous 2 times per day    spironolactone  25 mg Oral BID    clopidogrel  75 mg Oral Daily    insulin lispro  10 Units Subcutaneous TID WC    furosemide  20 mg Intravenous BID    amiodarone  200 mg Oral BID    potassium bicarb-citric acid  40 mEq Oral BID    apixaban  5 mg Oral BID    levothyroxine  50 mcg Oral Daily    metoprolol succinate  100 mg Oral Daily    pantoprazole  40 mg Oral Daily    alogliptin  6.25 mg Oral Daily    sodium chloride flush  5-40 mL Intravenous 2 times per day    sodium chloride flush  5-40 mL Intravenous 2 times per day    cefepime  2,000 mg Intravenous Q12H     PRN Meds: LORazepam, potassium chloride, albuterol sulfate HFA, nitroGLYCERIN, traZODone, glucose, dextrose, glucagon (rDNA), dextrose, sodium chloride flush, sodium chloride, ondansetron **OR** ondansetron, polyethylene glycol, acetaminophen **OR** acetaminophen, sodium chloride flush, sodium chloride    Assessment/Plan:   Patient Active Hospital Problem List:  Patient Active Problem List   Diagnosis    History of CVA (cerebrovascular accident) without residual deficits    [facility]bid obesity ([facility]    DM (diabetes mellitus), type 2, uncontrolled ([facility]    Persistent atrial fibrillation ([facility]    CAD (coronary artery disease)    CHF (congestive heart failure) ([facility]    Light headed    History of MI (myocardial infarction)    Diabetes mellitus ([facility]    Hypertension    TIA (transient ischemic attack)    Hyperlipidemia    SOB (shortness of breath)    H/O cardiovascular stress test    Family history of coronary artery disease    PAF (paroxysmal atrial fibrillation) (HCC)    Chest pain    Atrial thrombus without antecedent myocardial infarction    S/P ablation of atrial fibrillation    Gastroenteritis    YESIKA (acute kidney injury) ([facility]    Acute cystitis    Elevated liver enzymes    CHF (congestive heart failure), NYHA class I, acute on chronic, combined ([facility]    Pneumonia    Multifocal pneumonia    Atrial fibrillation with RVR (HCC)    Shoulder pain    Paroxysmal atrial fibrillation ([facility]    Diabetic hyperosmolar coma ([facility]    Difficult intravenous access    HFrEF (heart failure with reduced ejection fraction) ([facility]     Assessment:  Diabetic hyperosmolar state: better. Mental status change: EEG nonspecific abnl seen in encephalopathies. MRI brain limited but non acute.   Leukocytosis and possible sepsis: on iv antibiotics, improving  Depression: no suicial attempt. (Taked to patient). Drug screen negative. A. Fib: rate is in control now  Hypertension: elevated right now. Elevated LFTs: Slowly improving. CT abdomen nonacute. Monitor. Possible pneumonia: on antibiotics  Pulmonary edema: continue monitoring. Grief: lost  and grandson this past month. HFrEF: EF 15-20% on past ECHO. Cardiology following. Plan:  Still confused. MRI brain non acute. Continue PRN ativan. Consultants notes reviewed. Continue IV antibiotics. Monitor labs and patient condition. D/w daughter about patient's condition and plan of care. Electronically signed by Deven Villarreal PA-C on 7/21/2021 at 9:04 AM     I have independently evaluated and examined this patient today. I have reviewed radiologic and biochemical tests on this patient. Management Plan is developed mutually with APRIL Urena. I have reviewed above note and agree with assessment and plan. I have discussed with Patient's daughter about the nutritional needs and long term goals. Patient's daughter overwhelmed as she has lost father and son recently. We will discuss again tomorrow about long term care including feeding tube placement.

## 2021-07-21 NOTE — PROGRESS NOTES
NEUROLOGY NOTE  DR. Laura Haro MD.  -------------------------------------------------  Subjective:    Doing better. Denies any new symptoms. Denies headache nausea vomiting dizziness    Denies numbness weakness extremities    Denies blurring of vision double vision    Objective:    BP (!) 185/92   Pulse 74   Temp 98.6 °F (37 °C) (Axillary)   Resp 20   Ht 5' (1.524 m)   Wt 180 lb (81.6 kg)   SpO2 97%   BMI 35.15 kg/m²   HEENT nl      Neuro exam    Alert Oriented  X 3  Follow simple commands  EOMI Pupils 3 mm jo  5/5 all 4 extremities      RADIOLOGY  -----------------    Echocardiogram complete 2D with doppler with color    Result Date: 7/16/2021  Transthoracic Echocardiography Report (TTE)  Demographics   Patient Name       Ivory MAYFIELD Date of Study       07/16/2021   Date of Birth      1955         Gender              Female   Age                60 year(s)         Race                   Patient Number     [id number]         Room Number         2128   Visit Number       [id number]   Corporate ID       [id number]   Accession Number   [id number]         Ajay Campbell RDMS   Ordering Physician Luan Guevara MD                 Physician           MD  Procedure Type of Study   TTE procedure:ECHOCARDIOGRAM COMPLETE 2D W DOPPLER W COLOR. Procedure Date Date: 07/16/2021 Start: 01:35 PM Study Location: Portable Technical Quality: Fair visualization Indications:Congestive heart failure. Patient Status: Routine Height: 60 inches Weight: 180 pounds BSA: 1.78 m2 BMI: 35.15 kg/m2 HR: 99 bpm BP: 145/83 mmHg  Conclusions   Summary  Left ventricular systolic function is abnormal.  Ejection fraction is visually estimated at 15-20%. Global hypokinesis noted. Grade III diastolic dysfunction. Mitral annular calcification is present.   Moderate to LV Length: 7.68 cm                             LVOT: 1.9 cm  Doppler Measurements & Calculations   MV Peak E-Wave: 136  AV Peak Velocity: 125 cm/s    LVOT Peak Velocity: 64.6  cm/s                 AV Peak Gradient: 6.25 mmHg   cm/s  MV Peak A-Wave: 68.5 AV Mean Velocity: 86.6 cm/s   LVOT Mean Velocity: 39.8  cm/s                 AV Mean Gradient: 3 mmHg      cm/s  MV E/A Ratio: 1.99   AV VTI: 21 cm                 LVOT Peak Gradient: 2  MV Peak Gradient:    AV Area (Continuity):1.16 cm2 mmHgLVOT Mean Gradient: 1  7.4 mmHg                                           mmHg                       LVOT VTI: 8.63 cm             Estimated RVSP: 38 mmHg  MV P1/2t: 41 msec                                  Estimated RAP:3 mmHg  MVA by PHT:5.37 cm2  Estimated PASP: 35.49 mmHg   MV E' Septal                                       TR Velocity:285 cm/s  Velocity: 4.89 cm/s                                TR Gradient:32.49 mmHg  MV E' Lateral  Velocity: 10.4 cm/s  MV E/E' septal:  27.81  MV E/E' lateral:  13.08      CT ABDOMEN PELVIS WO CONTRAST Additional Contrast? None    Result Date: 7/15/2021  EXAMINATION: CT OF THE ABDOMEN AND PELVIS WITHOUT CONTRAST 7/15/2021 7:28 pm TECHNIQUE: CT of the abdomen and pelvis was performed without the administration of intravenous contrast. Multiplanar reformatted images are provided for review. Dose modulation, iterative reconstruction, and/or weight based adjustment of the mA/kV was utilized to reduce the radiation dose to as low as reasonably achievable. COMPARISON: MRI abdomen May 24, 2019; CT abdomen May 23, 2019 HISTORY: ORDERING SYSTEM PROVIDED HISTORY: Abdominal pain TECHNOLOGIST PROVIDED HISTORY: Reason for exam:->Abdominal pain Additional Contrast?->None Reason for Exam: Abdominal pain FINDINGS: Lower Chest: Imaged lung bases demonstrate partially imaged small and associated atelectasis.   Imaged lung fields demonstrate findings suggesting mild pulmonary edema, however achievable. COMPARISON: CT brain June 11, 2021 HISTORY: ORDERING SYSTEM PROVIDED HISTORY: AMS TECHNOLOGIST PROVIDED HISTORY: Reason for exam:->AMS Has a \"code stroke\" or \"stroke alert\" been called? ->No Decision Support Exception - unselect if not a suspected or confirmed emergency medical condition->Emergency Medical Condition (MA) Reason for Exam: AMS FINDINGS: BRAIN/VENTRICLES: There is no acute intracranial hemorrhage, mass effect or midline shift. No abnormal extra-axial fluid collection. The gray-white differentiation is maintained without evidence of an acute infarct. There is no evidence of hydrocephalus. Severe atherosclerotic change of the intracranial vasculature. There is moderate periventricular and subcortical white matter hypoattenuation most consistent with microvascular ischemic changes. Global cerebral atrophy noted. ORBITS: The visualized portion of the orbits demonstrate no acute abnormality. SINUSES: The visualized paranasal sinuses and mastoid air cells demonstrate no acute abnormality. SOFT TISSUES/SKULL:  No acute abnormality of the visualized skull or soft tissues. No acute intracranial abnormality. Chronic microvascular ischemic changes and global cerebral atrophy. XR CHEST PORTABLE    Result Date: 7/16/2021  EXAMINATION: ONE XRAY VIEW OF THE CHEST 7/16/2021 10:31 am COMPARISON: July 15, 2021 HISTORY: ORDERING SYSTEM PROVIDED HISTORY: s/p central line attempt TECHNOLOGIST PROVIDED HISTORY: Reason for exam:->s/p central line attempt Reason for Exam: s/p central line attempt FINDINGS: Right IJ central venous catheter terminates in the mid SVC in satisfactory position. No appreciable pneumothorax. Cardiomediastinal silhouette appears unchanged. Pulmonary vascular congestion and diffuse interstitial prominence. Trace bilateral pleural effusions cannot be excluded. Minimal bibasilar atelectasis.      Right IJ central venous catheter terminates in the mid SVC in satisfactory position. No pneumothorax. Pulmonary vascular congestion with interstitial pulmonary edema. XR CHEST PORTABLE    Result Date: 7/15/2021  EXAMINATION: ONE XRAY VIEW OF THE CHEST 7/15/2021 5:47 pm COMPARISON: 06/13/2021 HISTORY: ORDERING SYSTEM PROVIDED HISTORY: AMS TECHNOLOGIST PROVIDED HISTORY: Reason for exam:->AMS Reason for Exam: AMS Initial encounter FINDINGS: Cardiomegaly. There is increased interstitial opacities bilaterally. No pleural effusion or pneumothorax. The osseous structures otherwise stable. Cardiomegaly with pulmonary edema. IR NONTUNNELED VASCULAR CATHETER > 5 YEARS    Result Date: 7/16/2021  PROCEDURE: ULTRASOUND GUIDED VASCULAR ACCESS. PLACEMENT OF A NON-TUNNELED CATHETER. 7/16/2021. HISTORY: ORDERING SYSTEM PROVIDED HISTORY: Need central venous access. Request CVC Insertion TECHNOLOGIST PROVIDED HISTORY: Reason for exam:->CVC Insertion SEDATION: None. FLUOROSCOPY DOSE AND TYPE OR TIME AND EXPOSURES: None. TECHNIQUE: Informed consent was obtained after a detailed explanation of the procedure including risks, benefits, and alternatives. Universal protocol was observed. The right neck and chest were prepped and draped in sterile fashion using maximum sterile barrier technique. Local anesthesia was achieved with lidocaine. A micropuncture needle was used to access the right internal jugular vein using ultrasound guidance. An ultrasound image demonstrating patency of the vein with needle tip located within it. An image was obtained and stored in PACs. A 0.035 guidewire was used to place a triple lumen central venous catheter after fascial tract dilation. The catheter flushed easily and there was a good blood return. The catheter was sutured to the skin. The catheter was locked with heparinized saline. The patient tolerated the procedure well and there were no immediate complications. EBL: Less than 1 ml.  FINDINGS: Chest x ray after procedure demonstrates the tip of the catheter in the SVC. The catheter is ready to use. Successful ultrasound and fluoroscopy guided non-tunneled central venous catheter placement. The catheter is ready to use. MRI BRAIN WO CONTRAST    Result Date: 7/20/2021  EXAMINATION: MRI OF THE BRAIN WITHOUT CONTRAST  7/20/2021 4:07 pm TECHNIQUE: Multiplanar multisequence MRI of the brain was performed without the administration of intravenous contrast. COMPARISON: None. HISTORY: ORDERING SYSTEM PROVIDED HISTORY: aphasic TECHNOLOGIST PROVIDED HISTORY: Reason for exam:->aphasic Reason for Exam: aphasic Acuity: Acute Type of Exam: Initial Relevant Medical/Surgical History: none FINDINGS: Motion degrades images limiting evaluation. INTRACRANIAL STRUCTURES/VENTRICLES: There is no acute infarct. No mass effect or midline shift. No evidence of an acute intracranial hemorrhage. Areas of T2 FLAIR hyperintensity are seen in the periventricular and subcortical white matter, which are nonspecific, but may represent chronic microvascular ischemic change. There is prominence of the ventricles and sulci due to global parenchymal volume loss. Evaluation of the signal voids within the major intracranial vessels is limited given patient motion. ORBITS: The visualized portion of the orbits demonstrate no acute abnormality. SINUSES: No gross abnormality seen of the paranasal sinuses. There appears to be a left mastoid effusion. 1. Patient motion limits evaluation. 2. No convincing acute intracranial abnormality. No acute infarct. 3. Mild global parenchymal volume loss with moderate chronic microvascular ischemic changes.        LAB RESULTS  --------------------    Recent Results (from the past 24 hour(s))   POCT Glucose    Collection Time: 07/20/21  2:21 AM   Result Value Ref Range    POC Glucose 101 (H) 70 - 99 MG/DL   Brain Natriuretic Peptide    Collection Time: 07/20/21  5:10 AM   Result Value Ref Range    Pro-BNP 6,938 (H) <300 PG/ML   Comprehensive Metabolic Panel Collection Time: 07/20/21  5:10 AM   Result Value Ref Range    Sodium 137 135 - 145 MMOL/L    Potassium 3.5 3.5 - 5.1 MMOL/L    Chloride 95 (L) 99 - 110 mMol/L    CO2 35 (H) 21 - 32 MMOL/L    BUN 16 6 - 23 MG/DL    CREATININE 0.9 0.6 - 1.1 MG/DL    Glucose 64 (L) 70 - 99 MG/DL    Calcium 8.0 (L) 8.3 - 10.6 MG/DL    Albumin 3.3 (L) 3.4 - 5.0 GM/DL    Total Protein 5.0 (L) 6.4 - 8.2 GM/DL    Total Bilirubin 1.4 (H) 0.0 - 1.0 MG/DL     (H) 10 - 40 U/L     (H) 15 - 37 IU/L    Alkaline Phosphatase 158 (H) 40 - 129 IU/L    GFR Non-African American >60 >60 mL/min/1.73m2    GFR African American >60 >60 mL/min/1.73m2    Anion Gap 7 4 - 16   Phosphorus    Collection Time: 07/20/21  5:10 AM   Result Value Ref Range    Phosphorus 2.3 (L) 2.5 - 4.9 MG/DL   Magnesium    Collection Time: 07/20/21  5:10 AM   Result Value Ref Range    Magnesium 1.8 1.8 - 2.4 mg/dl   CBC auto differential    Collection Time: 07/20/21  5:10 AM   Result Value Ref Range    WBC 13.6 (H) 4.0 - 10.5 K/CU MM    RBC 3.86 (L) 4.2 - 5.4 M/CU MM    Hemoglobin 11.6 (L) 12.5 - 16.0 GM/DL    Hematocrit 35.2 (L) 37 - 47 %    MCV 91.2 78 - 100 FL    MCH 30.1 27 - 31 PG    MCHC 33.0 32.0 - 36.0 %    RDW 14.2 11.7 - 14.9 %    Platelets 697 483 - 262 K/CU MM    MPV 10.4 7.5 - 11.1 FL    Differential Type AUTOMATED DIFFERENTIAL     Segs Relative 71.7 (H) 36 - 66 %    Lymphocytes % 13.4 (L) 24 - 44 %    Monocytes % 10.6 (H) 0 - 4 %    Eosinophils % 3.5 (H) 0 - 3 %    Basophils % 0.1 0 - 1 %    Segs Absolute 9.8 K/CU MM    Lymphocytes Absolute 1.8 K/CU MM    Monocytes Absolute 1.5 K/CU MM    Eosinophils Absolute 0.5 K/CU MM    Basophils Absolute 0.0 K/CU MM    Nucleated RBC % 0.0 %    Total Nucleated RBC 0.0 K/CU MM    Total Immature Neutrophil 0.09 K/CU MM    Immature Neutrophil % 0.7 (H) 0 - 0.43 %   Vitamin B12 & Folate    Collection Time: 07/20/21  5:10 AM   Result Value Ref Range    Vitamin B-12 1364 (H) 211 - 911 pg/ml    Folate 9.9 3.1 - 17.5 NG/ML TSH without Reflex    Collection Time: 07/20/21  5:10 AM   Result Value Ref Range    TSH, High Sensitivity 7.150 (H) 0.270 - 4.20 uIu/ml   Homocysteine, Serum    Collection Time: 07/20/21  5:10 AM   Result Value Ref Range    Homocysteine 15.1 (H) 0 - 10 umol/L   Ammonia    Collection Time: 07/20/21  5:10 AM   Result Value Ref Range    Ammonia 14 11 - 51 UMOL/L   POCT Glucose    Collection Time: 07/20/21  8:35 AM   Result Value Ref Range    POC Glucose 82 70 - 99 MG/DL   POCT Glucose    Collection Time: 07/20/21 12:48 PM   Result Value Ref Range    POC Glucose 89 70 - 99 MG/DL   POCT Glucose    Collection Time: 07/20/21  5:50 PM   Result Value Ref Range    POC Glucose 133 (H) 70 - 99 MG/DL   POCT Glucose    Collection Time: 07/20/21  9:04 PM   Result Value Ref Range    POC Glucose 139 (H) 70 - 99 MG/DL         Medical problems    Patient Active Problem List:     History of CVA (cerebrovascular accident) without residual deficits     Morbid obesity ([address]     DM (diabetes mellitus), type 2, uncontrolled ([address]     Persistent atrial fibrillation (HCC)     CAD (coronary artery disease)     CHF (congestive heart failure) ([address]     Light headed     History of MI (myocardial infarction)     Diabetes mellitus (HCC)     Hypertension     TIA (transient ischemic attack)     Hyperlipidemia     SOB (shortness of breath)     H/O cardiovascular stress test     Family history of coronary artery disease     PAF (paroxysmal atrial fibrillation) ([facility]     Chest pain     Atrial thrombus without antecedent myocardial infarction     S/P ablation of atrial fibrillation     Gastroenteritis     YESIKA (acute kidney injury) ([address]     Acute cystitis     Elevated liver enzymes     CHF (congestive heart failure), NYHA class I, acute on chronic, combined (HCC)     Pneumonia     Multifocal pneumonia     Atrial fibrillation with RVR ([facility])     Shoulder pain     Paroxysmal atrial fibrillation ([address]     Diabetic hyperosmolar coma ([address]     Difficult intravenous access     HFrEF (heart failure with reduced ejection fraction) ([facility])      ASSESSMENT:  ---------------------    Metabolic encephalopathy     TIA r/o cardio carotid embolic event     Hyperglycemia     ? Sepsis     A fib     PLAN:     CT brain neg     Mri brain neg     C doppler     Echo     B 12 folate TSH Homocysteine level     Continue eliquis      DC asa      Plavix     Discussed plan of care with pts nurse.           Electronically signed by Reyna Gallego MD on 7/20/2021 at 11:48 PM

## 2021-07-21 NOTE — PROGRESS NOTES
[id number]  consulted by Dr. Sylvester Sapp for monitoring and adjustment. Indication for treatment: Sepsis   Goal trough: 15 mcg/mL  AUC/BEA: 400-600    Pertinent Laboratory Values:   Temp Readings from Last 3 Encounters:   07/21/21 98.2 °F (36.8 °C) (Oral)   06/15/21 97.9 °F (36.6 °C) (Oral)   12/30/20 98.3 °F (36.8 °C) (Oral)     Recent Labs     07/19/21  0550 07/20/21  0510 07/21/21  0545   WBC 13.0* 13.6* 14.0*     Recent Labs     07/19/21  0550 07/20/21  0510 07/21/21  0545   BUN 18 16 12   CREATININE 1.0 0.9 1.0     Estimated Creatinine Clearance: 54 mL/min (based on SCr of 1 mg/dL). Intake/Output Summary (Last 24 hours) at 7/21/2021 1138  Last data filed at 7/21/2021 0500  Gross per 24 hour   Intake 158 ml   Output 3000 ml   Net -2842 ml       Pertinent Cultures:  Date                             Source                                     Results  7/15                             Blood                                       Ordered  7/15                             Urine                                        Negative    Vancomycin level:   TROUGH:    Recent Labs     07/18/21  2030 07/21/21  0545   VANCOTROUGH 22.2* 13.7     Assessment:  · WBC trending up @ 14.0; patient remains afebrile  · SCr, BUN, and urine output:   · SCr stable  · BUN improved  · Day(s) of therapy: 6  · Vancomycin concentration:   · 07/18 - 22.2 (Vancomycin 1,250 mg IV Q 24 Hours)  · 07/21 - 13.7, ok to re-dose    Plan:  · Renal labs continue to improve  · Trough level therapeutic @ 13.7, OK to re-dose   · Continue on Vancomycin 1,000 mg IV Q 24 Hours  · Pharmacy will continue to monitor patient and adjust therapy as indicated      Thank you for the consult.     Thu Barbour, [address]  7/21/2021  11:42 AM

## 2021-07-21 NOTE — PROGRESS NOTES
Progress Note( Dr. Judi Wesley)    Subjective:   Admit Date: 7/15/2021  PCP: Enrrique Chen PA-C        Admitted For :   Altered mental state and severe hyperglycemia possible metabolic encephalopathy    Consulted For: Better control of blood glucose    Interval History: Patient is a bit drowsy barely responding to verbal command   many of the metabolic parameters are getting better    Denies any chest pains,   Denies SOB . Denies nausea or vomiting. not eating much  No new bowel or bladder symptoms.        Intake/Output Summary (Last 24 hours) at 7/20/2021 2250  Last data filed at 7/20/2021 2051  Gross per 24 hour   Intake 560 ml   Output 3500 ml   Net -2940 ml       DATA    CBC:   Recent Labs     07/18/21  0500 07/19/21  0550 07/20/21  0510   WBC 16.0* 13.0* 13.6*   HGB 11.5* 12.2* 11.6*    224 258    CMP:  Recent Labs     07/18/21  0500 07/19/21  0550 07/20/21  0510    140 137   K 2.8* 3.4* 3.5   CL 94* 96* 95*   CO2 34* 37* 35*   BUN 26* 18 16   CREATININE 1.2* 1.0 0.9   CALCIUM 7.9* 8.1* 8.0*   PROT  --  5.4* 5.0*   LABALBU  --  3.0* 3.3*   BILITOT  --  1.5* 1.4*   ALKPHOS  --  162* 158*   AST  --  152* 130*   ALT  --  194* 168*     Lipids:   Lab Results   Component Value Date    CHOL 153 01/30/2012    HDL 59 01/30/2012    TRIG 135 01/30/2012     Glucose:  Recent Labs     07/20/21  1248 07/20/21  1750 07/20/21  2104   POCGLU 89 133* 139*     [id number]:  Lab Results   Component Value Date    LABA1C 10.9 07/17/2021     High Sensitivity TSH:   Lab Results   Component Value Date    TSHHS 7.150 07/20/2021     Free T3: No results found for: FT3  Free T4:  Lab Results   Component Value Date    T4FREE 1.28 06/12/2021       Echocardiogram complete 2D with doppler with color    Result Date: 7/16/2021  Transthoracic Echocardiography Report (TTE)  Demographics   Patient Name       Susie MAYFIELD Date of Study       07/16/2021   Date of Birth      1955         Gender              Female   Age 60 year(s)         Race                   Patient Number     [id number]         Room Number         2128   Visit Number       [id number]   Corporate ID       [id number]   Accession Number   [id number]         Melvina Laguna   Ordering Physician Rylee Roberts MD                 Physician           MD  Procedure Type of Study   TTE procedure:ECHOCARDIOGRAM COMPLETE 2D W DOPPLER W COLOR. Procedure Date Date: 07/16/2021 Start: 01:35 PM Study Location: Portable Technical Quality: Fair visualization Indications:Congestive heart failure. Patient Status: Routine Height: 60 inches Weight: 180 pounds BSA: 1.78 m2 BMI: 35.15 kg/m2 HR: 99 bpm BP: 145/83 mmHg  Conclusions   Summary  Left ventricular systolic function is abnormal.  Ejection fraction is visually estimated at 15-20%. Global hypokinesis noted. Grade III diastolic dysfunction. Mitral annular calcification is present. Moderate to severe mitral regurgitation. Mild to moderate tricuspid regurgitation; RVSP: 38 mmHg. No evidence of any pericardial effusion. Signature   ------------------------------------------------------------------  Electronically signed by Dandy Marsh MD (Interpreting  physician) on 07/16/2021 at 04:31 PM    CT ABDOMEN PELVIS WO CONTRAST Additional Contrast? None    Result Date: 7/15/2021  EXAMINATION: CT OF THE ABDOMEN AND PELVIS WITHOUT CONTRAST 7/15/2021 7:28 pm    1. Findings at the lung bases suggesting pulmonary edema with small bilateral effusions and associated atelectasis. 2. No acute intra-abdominal process identified. 3. Severe atherosclerotic disease. CT Head WO Contrast    Result Date: 7/15/2021  EXAMINATION: CT OF THE HEAD WITHOUT CONTRAST  7/15/2021 7:28 pm     No acute intracranial abnormality.  Chronic microvascular ischemic changes and global cerebral atrophy. XR CHEST PORTABLE    Result Date: 7/16/2021  EXAMINATION: ONE XRAY VIEW OF THE CHEST 7/16/2021 10:31 am COMPARISON: July 15, 2021 HISTORY: ORDERING SYSTEM PROVIDED HISTORY: s/p central line attempt    Right IJ central venous catheter terminates in the mid SVC in satisfactory position. No pneumothorax. Pulmonary vascular congestion with interstitial pulmonary edema. XR CHEST PORTABLE    Result Date: 7/15/2021  EXAMINATION: ONE XRAY VIEW OF THE CHEST 7/15/2021 5:47 pm COMPARISON: 06/13/2021 HISTORY: ORDERING SYSTEM PROVIDED HISTORY: AMS TECHNOLOGIST PROVIDED HISTORY: Reason for exam:->AMS Reason for Exam: AMS Initial encounter FINDINGS: Cardiomegaly. There is increased interstitial opacities bilaterally. No pleural effusion or pneumothorax. The osseous structures otherwise stable. Cardiomegaly with pulmonary edema. IR NONTUNNELED VASCULAR CATHETER > 5 YEARS    Result Date: 7/16/2021  PROCEDURE: ULTRASOUND GUIDED VASCULAR ACCESS. PLACEMENT OF A NON-TUNNELED CATHETER. 7/16/2021. HISTORY: ORDERING SYSTEM PROVIDED HISTORY: Need central venous access. Request CVC Insertion TECHNOLOGIST PROVIDED HISTORY: Reason for exam:->CVC Insertion SEDATION: None. Successful ultrasound and fluoroscopy guided non-tunneled central venous catheter placement. The catheter is ready to use.        Scheduled Medicines   Medications:    insulin glargine  15 Units Subcutaneous Nightly    vancomycin  1,000 mg Intravenous Q24H    insulin lispro  0-18 Units Subcutaneous TID     insulin lispro  0-9 Units Subcutaneous 2 times per day    spironolactone  25 mg Oral BID    clopidogrel  75 mg Oral Daily    insulin lispro  10 Units Subcutaneous TID WC    furosemide  20 mg Intravenous BID    amiodarone  200 mg Oral BID    potassium bicarb-citric acid  40 mEq Oral BID    apixaban  5 mg Oral BID    levothyroxine  50 mcg Oral Daily    metoprolol succinate  100 mg Oral Daily    pantoprazole  40 mg Oral Daily  alogliptin  6.25 mg Oral Daily    sodium chloride flush  5-40 mL Intravenous 2 times per day    sodium chloride flush  5-40 mL Intravenous 2 times per day    cefepime  2,000 mg Intravenous Q12H      Infusions:    dextrose      sodium chloride      sodium chloride      dilTIAZem (CARDIZEM) 100 mg in dextrose 5% 100 mL (ADD-[place]) 5 mg/hr (07/20/21 5064)         Objective:   Vitals: BP (!) 185/92   Pulse 74   Temp 98.6 °F (37 °C) (Axillary)   Resp 20   Ht 5' (1.524 m)   Wt 180 lb (81.6 kg)   SpO2 97%   BMI 35.15 kg/m²   General appearance: alert and cooperative with exam appears to be somewhat dehydrated  Neck: no JVD or bruit  Thyroid : Normal lobes   Lungs: Has Vesicular Breath sounds   Heart: Atrial fibrillation  Abdomen: soft, non-tender; bowel sounds normal; no masses,  no organomegaly  Musculoskeletal: Normal  Extremities: extremities normal, , no edema  Neurologic:  Awake, alert, oriented to name, place and time. Cranial nerves II-XII are grossly intact. Motor is  intact. Sensory is intact. ,  and gait is normal.    Assessment:     Patient Active Problem List:     History of CVA (cerebrovascular accident) without residual deficits     Morbid obesity ([address]     DM (diabetes mellitus), type 2, uncontrolled ([address]     Persistent atrial fibrillation (HCC)     CAD (coronary artery disease)     CHF (congestive heart failure) ([address]     Light headed     History of MI (myocardial infarction)     Diabetes mellitus ([address]     Hypertension     TIA (transient ischemic attack)     Hyperlipidemia     SOB (shortness of breath)     H/O cardiovascular stress test     Family history of coronary artery disease     PAF (paroxysmal atrial fibrillation) (HCC)     Chest pain     Atrial thrombus without antecedent myocardial infarction     S/P ablation of atrial fibrillation     Gastroenteritis     YESIKA (acute kidney injury) ([address]     Acute cystitis     Elevated liver enzymes     CHF (congestive heart failure), NYHA class I, acute on chronic, combined ([facility]     Pneumonia     Multifocal pneumonia     Atrial fibrillation with RVR (HCC)     Shoulder pain     Paroxysmal atrial fibrillation (HCC)     Diabetic hyperosmolar coma (HCC)     Difficult intravenous access     Metabolic encephalopathy getting better      Plan:     1. Reviewed POC blood rate 14 with regular foot embolism there is good  2. . Labs and X ray results   3. Reviewed Current Medicines   4. On meal +,  correction bolus Humalog/ Basal Lantus Insulin regime does get  5. Monitor Blood glucose frequently   6. Modified  the dose of Insulin/ other medicines as needed   7. Plan for CT scan of brain  8. Will follow     .      Jamaica Crowe MD, MD

## 2021-07-22 NOTE — PROGRESS NOTES
Spoke with dtr who asked for a 2nd opinion. Dtr states the pt has 6 brother and sisters and they all have different questions and concerns. Pt's daughter has tried to give the info they wanted but more questions arise. Dtr asked that if a meeting could happen with couple of pt's  and the entire family this could alleviate her being the middle man.    -Spoke with Dr. Adria Alvarez about dtr's concern and he was fine with consult to hospitalist for 2nd opinion.   -Received call from Dr. Beryle Look (hospitalist) who declined the consult and stated that the pt's specialists should be sufficient to achieve 2nd opinion and does not feels its appropriate to do consult.  -Dr. Adria Alvarez is on vacation starting 7/23/21 and will be off thru next week. -Information relayed to pt's daughter and son. They still would like the meeting with  and family.

## 2021-07-22 NOTE — CONSULTS
[address] [facility], 1955, -A, 2021    History  [place]:  The primary encounter diagnosis was Hyperglycemia. A diagnosis of Septicemia ([facility] was also pertinent to this visit. Patient  has a past medical history of Abnormal PFTs (pulmonary function tests), Atrial fibrillation ([address] CAD (coronary artery disease), Carotid aneurysm, right ([address] CHF (congestive heart failure) ([address] Diabetes mellitus ([address] Dizziness - light-headed, Family history of coronary artery disease, H/O 24 hour EKG monitoring, H/O cardiovascular stress test, H/O cardiovascular stress test, H/O echocardiogram, H/O echocardiogram, Heart imaging, Heartburn, History of cardiac cath, History of cardiac monitoring, History of cardioversion, History of MI (myocardial infarction), History of nuclear MUGA, History of nuclear Muga stress test, History of PTCA, Hx of cardiovascular stress test, Hx of transesophageal echocardiography (LUKE) for monitoring, Hyperlipidemia, Hypertension, Insomnia, SOB (shortness of breath), Tachycardia, and TIA (transient ischemic attack). Patient  has a past surgical history that includes  section; Tubal ligation; Cholecystectomy; Diagnostic Cardiac Cath Lab Procedure (2006, 2009); Percutaneous Transluminal Coronary Angio (10/06/2010); other surgical history (2017); ablation of dysrhythmic focus; and IR NONTUNNELED VASCULAR CATHETER > 5 YEARS (2021). Subjective:  Patient states:  \"I don't know what I did\", \"yeah\", \"no you won't\", pt makes short-sentence and non-specific responses to most questions. Pain:  Pain to touch at the R hand/UE, difficult to assess d/t pt cognitive state. Communication with other providers:  Handoff to RN, co-eval with OT, discussed pt performance with RN, physician present beginning of session.   Restrictions: Fall risk, restraints, decreased cognition/orientation, central line, tele    Home Setup/Prior level of function  Social/Functional History  Lives With: Alone  Home Layout: One level    Examination of body systems (includes body structures/functions, activity/participation limitations):  · Observation:  Pt awake watching TV in supine with HOB 30* upon arrival  · Vision:  [employer] [place]  · [facility] [place]  · Cardiopulmonary:  No supplemental 02 needs  · Cognition: Decreased orientation (able to identify city and own  with increased cues), pt makes short and non-specific statements in response to questions and intermittently answers specific questions. Pt is easily distractible by objects in room, unable to attend fully to tasks upon request. see OT/SLP note for further evaluation. Musculoskeletal  · ROM R/L:  WFL, unable to fully assess. · Strength R/L:  Unable to assess this date d/t pt cognition and attention   · Gait pattern: Not tested d/t pt safety     Mobility:  · Rolling L/R:  Max A  · Supine to sit: Max A x2  · *Unable to test further mobility d/t pt cognitive status and safety concern     [facility] 6 Clicks Inpatient Mobility:       Treatment:  *Restraints removed prior to session with RN approval, replaced at end of session  Bed mobility: With pt in supine PT assesses pt cognitive orientation, deficits noted, pt is alert but pleasantly confused. Pt demonstrates volitional and purposeful movement of all four extremities while in supine, however pt is resistant to following commands to facilitate participation in transfers. Supine>sit transfer initially attempted with PT providing v/c and t/c for BLE advancement to EOB, Min A provided for RLE movement x1, pt the states \"no\" and moves RLE back to starting position, second trial v/c pt independently moves RLE toward EOB, then ceases to participate in transfer. Decision to attempt rolling to R side and performing sidely>sit with Max A x2 required for trunk and LE advancement. Max encouragement and re-direction provided throughout. Sitting balance:  Pt seated EOB x4 minutes with constant Mod A at posterior/R trunk as pt actively leans posterior/R lateral. Pt with intermittent attempt to return to bed stating \"no\" in response to encouragement to remain upright. During seated pt is easily distracted by any light touch to the RUE and exclaims in pain. OT attempting to engage pt in reaching tasks while seated with pt stating \"no I can't\". Vitals remained stable throughout. Re-direction throughout required d/t pt reaching for and pulling at central line. Pt returned to supine with Max A x2 for advancement of BLE and trunk, Max A x2 for repositioning in bed via migel pad. PT placed pillows under each LE to discourage pt from placing LE in \"figure 4\" position and float heels. Restraints re-placed. Safety: patient left in supine with bed alarm, call light within reach, RN notified and present, gait belt used. Assessment:  Pt is a 61 y/o female admitted 7/15 to the ED after discovered pt had collapsed on front porch. Pt was transported via EMS and found to have extreme hyperglycemia. Pt admitted with hyperglycemia and altered mental status, poor responsiveness. Pt with h/o diabetes mellitus type 2, HFrEF, CAD, paroxysmal atrial fibrillation, CKD, hypothyroidism, history of cerebral aneurysms. Pt baseline status has been provided by pt daughter d/t pt poor cognitive status, pt had been d/c from hospital recently with plans for [facility] which pt later declined, pt decision to not stay with daughter, concerns for pt unable to function independently. At this time pt is presenting with decreased level of function from baseline complicated by decline in cognitive status. Pt with deficits in strength, balance, ROM, and endurance likely, will continue to assess as pt cognition improves. At this time PT to recommend d/c to SNF for rehab services as pt would benefit from these services to address above deficits and ensure pt safety throughout recovery.

## 2021-07-22 NOTE — PROGRESS NOTES
Occupational [address] [place] [address] CARE OCCUPATIONAL THERAPY EVALUATION    James Ospina, 1955, 2128/2128-A, 7/23/2021    Discharge Recommendation: James Smallwooduel      History:  [place]:  The primary encounter diagnosis was Hyperglycemia. A diagnosis of Septicemia ([facility] was also pertinent to this visit. Subjective:  Patient states: \"My house is a hot mess right now! \" (pt very confused this date, limited purposeful responses)  Pain: Pt reported pain in swollen Rt hand but cognitively unable to quantify  Communication with other providers: PT Vale Dsouza, RN Jose Juan Rao  Restrictions: General Precautions, Fall Risk, Soft Wrist Restraints, Central Line, Telemetry, Pulse Ox, BP cuff, Chen, o2, Bed exit alarm    Home Setup/Prior level of function:  Social/Functional History  Lives With: Alone (spouse recently passed away)  Type of Home: House  Home Layout: One level  Home Access: Ramped entrance  Bathroom Shower/Tub: Tub/Shower unit  Bathroom Toilet: Handicap height  Bathroom Equipment: Shower chair, Grab bars in shower  ADL Assistance: Independent  Homemaking Assistance: Independent  Homemaking Responsibilities: Yes  Ambulation Assistance: Independent (uses no AD)  Transfer Assistance: Independent  Active : No (Daughter drives)  Occupation: Retired  Type of occupation:   Additional Comments: Above information taken largely from previous OT evaluation on 6-14 and current case management charting. Pt very confused this date and unable to report home setup/PLOF. Examination:  · Observation: Supine in bed upon arrival. Pleasantly confused but participatory with evaluation. · Vision: [facility]  · Hearing: WFL  · Vitals: Stable vitals throughout session    Body Systems and functions:  · ROM: Passive ROM grossly WFL in all joints in BL UEs, Unable to assess AROM due to impaired cognition/command following  · Strength:  At least 3/5 MMT all major muscle groups BL UEs observed functionally  · Sensation: Unable to assess due to impaired cognition  · Tone: Normal  · Coordination: Significant gross/fine motor coordination in BL UEs    Activities of Daily Living (ADLs):  · Feeding: Dependent (Dysphagia minced and moist diet; pt unable to functionally grasp cup or feeding utensils this date)  · Grooming: Dependent (unable to functionally grasp washcloth this date)  · UB bathing: Dependent   · LB bathing: Dependent   · UB dressing: Dependent (donning clean hospital gown)  · LB dressing: Dependent (donning BL socks)  · Toileting: Dependent (Chen in place, total assist required for a bowel movement)    Cognitive and Psychosocial Functioning:  · Overall cognitive status: Significantly impaired (pt talkative but largely purposeless and nonsensical speech, able to state birthday, followed grossly 33% of basic one step commands, unable to report any accurate information, poor overall insight/safety awareness)     Balance:   · Sitting: Mod A static sitting EOB x 8 minutes; highly retropulsive, unable to correct despite max postural cues  · Standing: Unsafe/unable to attempt this date    Functional Mobility:  · Bed Mobility: Max A rolling both directions, Max A x 2 supine to sitting EOB, Max A x 2 sitting EOB to supine, Dependent x 2 for scooting hips up in bed  · Transfers: Unsafe/unable to attempt this date  · Ambulation: See above      AM-PAC 6 click short form for inpatient daily activity:   How much help from another person does the patient currently need. .. Unable  Dep A Lot  Max A A Lot   Mod A A Little  Min A A Little   CGA  SBA None   Mod I  Indep  Sup   1. Putting on and taking off regular lower body clothing? [x] 1    [] 2   [] 2   [] 3   [] 3   [] 4      2. Bathing (including washing, rinsing, drying)? [x] 1   [] 2   [] 2 [] 3 [] 3 [] 4   3. Toileting, which includes using toilet, bedpan, or urinal? [x] 1    [] 2   [] 2   [] 3   [] 3   [] 4     4.  Putting on and taking off regular upper body clothing? [x] 1   [] 2   [] 2   [] 3   [] 3    [] 4      5. Taking care of personal grooming such as brushing teeth? [x] 1   [] 2    [] 2 [] 3    [] 3   [] 4      6. Eating meals? [x] 1   [] 2   [] 2   [] 3   [] 3   [] 4      Raw Score:  6     [24=0% impaired(CH), 23=1-19%(CI), 20-22=20-39%(CJ), 15-19=40-59%(CK), 10-14=60-79%(CL), 7-9=80-99%(CM), 6=100%(CN)]     Treatment:  Therapeutic Activity Training:   Therapeutic activity training was instructed today. Cues were given for safety, sequence, UE/LE placement, awareness, and balance. Activities performed today included bed mobility training, sitting balance/tolerance, education on role of OT, POC, importance of EOB/OOB activity, pursed lip breathing, d/c planning      Safety Measures: Gait belt used, Left in Bed, Alarm in place    Assessment:  Pt is a 60year old female with a past medical history of Abnormal PFTs (pulmonary function tests), Atrial fibrillation (HCC), CAD (coronary artery disease), Carotid aneurysm, right ([address] CHF (congestive heart failure) ([address] Diabetes mellitus ([address] Dizziness - light-headed, Family history of coronary artery disease, H/O 24 hour EKG monitoring, H/O cardiovascular stress test, H/O cardiovascular stress test, H/O echocardiogram, H/O echocardiogram, Heart imaging, Heartburn, History of cardiac cath, History of cardiac monitoring, History of cardioversion, History of MI (myocardial infarction), History of nuclear MUGA, History of nuclear Muga stress test, History of PTCA, Hx of cardiovascular stress test, Hx of transesophageal echocardiography ([facility]) for monitoring, Hyperlipidemia, Hypertension, Insomnia, SOB (shortness of breath), Tachycardia, and TIA (transient ischemic attack). Pt admitted after being found unresponsive and diagnosed with acute encephalopathy and hyperglycemia. Per charting, pt lives at home alone and is independent with ADLs and mobility.  Pt currently presents with the above impairments, and has extensive therapy needs at this time. Recommend continued OT services in SNF at discharge. Complexity: High  Prognosis: Good  Plan: 3x/week      Goals:  1. Pt will follow all basic one step commands 75+% of the time  2. Pt will complete log rolling at bed level for positioning needs min A  3. Pt will transfer supine to sitting EOB for EOB/OOB ADLs mod A  4. Pt will complete basic grooming/facial hygiene tasks seated EOB min A with setup  5. Pt will engage in 10 minutes of dynamic reaching/neuro re-ed tasks unsupported sitting EOB to improve postural control for ADLs  6. Pt will complete functional sit to stand and stand pivot transfers to chair and BSC min A x 2  7. Pt will complete toileting task on BSC max A  8.  Pt will tolerate sitting in chair for 1 meal/day to increase alertness/OOB activity tolerance      Time:   Time in: 1332  Time out: 1400  Timed treatment minutes: 18  Total time: 28      Electronically signed by:    DELIO Canales/L, [address] [place] [phone]

## 2021-07-22 NOTE — PROGRESS NOTES
Nephrology Progress Note  7/22/2021 1:02 PM        Subjective:   Admit Date: 7/15/2021  PCP: Erick Powell PA-C    Interval History: pt seen in early am   This is a late entry     Diet: none    ROS:  Awake but not oriented   Off o2   UOP 4.4 L/d   CCB drip       Data:     Current meds:    aspirin  81 mg Oral Daily    insulin glargine  15 Units Subcutaneous Nightly    folic acid-pyridoxine-cyancobalamin  1 tablet Oral Daily    vancomycin  1,000 mg Intravenous Q24H    insulin lispro  0-18 Units Subcutaneous TID WC    insulin lispro  0-9 Units Subcutaneous 2 times per day    spironolactone  25 mg Oral BID    insulin lispro  10 Units Subcutaneous TID WC    furosemide  20 mg Intravenous BID    amiodarone  200 mg Oral BID    potassium bicarb-citric acid  40 mEq Oral BID    apixaban  5 mg Oral BID    levothyroxine  50 mcg Oral Daily    metoprolol succinate  100 mg Oral Daily    pantoprazole  40 mg Oral Daily    alogliptin  6.25 mg Oral Daily    sodium chloride flush  5-40 mL Intravenous 2 times per day    sodium chloride flush  5-40 mL Intravenous 2 times per day    cefepime  2,000 mg Intravenous Q12H      dextrose      sodium chloride      sodium chloride      dilTIAZem (CARDIZEM) 100 mg in dextrose 5% 100 mL (ADD-Marion) 5 mg/hr (07/21/21 2228)         I/O last 3 completed shifts:   In: 363.3 [I.V.:120; IV Piggyback:243.3]  Out: 4400 [Urine:4400]    CBC:   Recent Labs     07/20/21  0510 07/21/21  0545   WBC 13.6* 14.0*   HGB 11.6* 12.4*    279          Recent Labs     07/20/21  0510 07/21/21  0545 07/22/21  0540    139 136   K 3.5 3.9 3.9   CL 95* 98* 96*   CO2 35* 34* 31   BUN 16 12 13   CREATININE 0.9 1.0 1.0   GLUCOSE 64* 118* 127*       Lab Results   Component Value Date    CALCIUM 8.6 07/22/2021    PHOS 3.0 07/22/2021       Objective:     Vitals: BP (!) 162/71   Pulse 82   Temp 98.5 °F (36.9 °C) (Oral)   Resp 15   Ht 5' (1.524 m)   Wt 157 lb 6.5 oz (71.4 kg)   SpO2 97%   BMI 30.74 kg/m²     General appearance:  Awake , restrained   HEENT:  ++ conj pallor  Neck:  Supple- Rt IJ CVc  Lungs:  + adv BS  Heart:  Irregular   Abdomen: soft  Extremities:  No overt edema now       Problem List :         Impression :     1. CKD stage 3 - stable so far  2. ADHF well compensated  3. DM with recent  DKA- stable  4. Encephalopathy- not much improved- I suspect she  Has  intermittent cerebral hypoxia - and the injury is not detectable by conventional MRI - she  Had multiple insult - pulm edema , low BP_ DKA may take a while to clear it up   5. Sever CMP - HTn    Recommendation/Plan  :     1. Add arb - MRA\  2. D/C KCL   3. manual BP  4. Good BS control  5. 'follow clinically  6.  Goal  /80 as an out pt       Frank Clayton MD MD

## 2021-07-22 NOTE — CARE COORDINATION
Patient remains confused; no participation in conversation with this CM. No family present. Sayda Flower RN     0848 PS message sent to TOM CHEN for PT/OT order as SNF appears warranted.  Sayda Flower RN

## 2021-07-22 NOTE — PROGRESS NOTES
Pt's daughter will be out of town sat 7/24 and sun 7/25, please contact son Pierre Laughlin [phone] if any needs arise

## 2021-07-22 NOTE — PROGRESS NOTES
Speech Language Pathology  [address] [place] [address]  DEPARTMENT OF SPEECH/LANGUAGE PATHOLOGY  DAILY PROGRESS NOTE  Esequiel Richardson [facility]  7/22/2021  [id number]  Septicemia ([facility] [address] [A41.9]  Hyperglycemia [R73.9]  Diabetic hyperosmolar coma ([facility] [address] [E11.01]  Allergies   Allergen Reactions    Amiodarone Other (See Comments)     dizziness    Iv Dye [Iodides] Nausea And Vomiting    Vicodin [Hydrocodone-Acetaminophen] Nausea And Vomiting         Pt was seen this date for dysphagia treatment. IMPRESSION AND RECOMMENDATIONS:   Jan Key was seen for a bedside swallowing treatment and diet tolerance monitoring. Pt was alert and confused throughout assessment. She was positioned upright in bed and accepted PO trials of puree, regular solids and thin liquids by straw sips. Prolonged mastication and slow oral A-P transit was observed with regular solid trials. Pharyngeal swallow appeared timely with adequate laryngeal elevation. Clear vocal quality and 0 overt s/s of aspiration were observed with all PO trials given. Recommend continue minced and moist diet/thin liquids with strict aspiration precautions. Pt is a total feed. Will follow for hopeful diet advancement as mental status improves. GOALS (current status in bold):  Short-term Goals  Timeframe for Short-term Goals: length of admission  Goal 1: Pt will tolerate minced and moist diet/thin liquids with adequate oral manipulation/clearance and no s/s aspiration. Goal being met, continue   Goal 2: Pt will tolerate PO trials of advanced textures with adequate oral manipulation/clearance and no s/s aspiration for safe diet upgrade. Goal being met, continue   Goal 3: Pt/caregivers will indicate understanding of all recommendations.  Goal being, met     EDUCATION:  Discussed diet level advancement with JONI Shaffer Party    PAIN RATING (0-10 Scale): does not appear in acute distress  Time in/Time out: SLP Individual Minutes  Time In: 1130  Time Out: 1150  Minutes: 20    Visit number: [id number], [facility], 7/22/2021

## 2021-07-22 NOTE — PLAN OF CARE
Nutrition Problem #1: Moderate malnutrition, In context of chronic illness  Intervention: Food and/or Nutrient Delivery: Continue Current Diet, Start Oral Nutrition Supplement  Nutritional Goals: Pt will consume at least 50% of meals on diet order during stay Male Completion Statement: After discussing his treatment course we decided to discontinue isotretinoin therapy at this time. He shouldn't donate blood for one month after the last dose. He should call with any new symptoms of depression.

## 2021-07-22 NOTE — PROGRESS NOTES
NEUROLOGY NOTE  DR. Mehran Crabtree MD.  -------------------------------------------------  Subjective:    Pt is less confused and doing better today    Objective:    BP (!) 160/80   Pulse 85   Temp 98.8 °F (37.1 °C) (Oral)   Resp 21   Ht 5' (1.524 m)   Wt 157 lb 6.5 oz (71.4 kg)   SpO2 97%   BMI 30.74 kg/m²   HEENT nl      Neuro exam    Alert oriented to person place mildly confused  eomi pupils 3 mm jo  Squeezes fingers to commands  Equivocal toes  resp on own      RADIOLOGY  -----------------    Echocardiogram complete 2D with doppler with color    Result Date: 7/16/2021  Transthoracic Echocardiography Report (TTE)  Demographics   Patient Name       Ck MAYFIELD Date of Study       07/16/2021   Date of Birth      1955         Gender              Female   Age                60 year(s)         Race                   Patient Number     [id number]         Room Number         2128   Visit Number       [id number]   Corporate ID       [id number]   Accession Number   [id number]         Anup Shanks RDMS   Ordering Physician Peg Swartz MD                 Physician           MD  Procedure Type of Study   TTE procedure:ECHOCARDIOGRAM COMPLETE 2D W DOPPLER W COLOR. Procedure Date Date: 07/16/2021 Start: 01:35 PM Study Location: Portable Technical Quality: Fair visualization Indications:Congestive heart failure. Patient Status: Routine Height: 60 inches Weight: 180 pounds BSA: 1.78 m2 BMI: 35.15 kg/m2 HR: 99 bpm BP: 145/83 mmHg  Conclusions   Summary  Left ventricular systolic function is abnormal.  Ejection fraction is visually estimated at 15-20%. Global hypokinesis noted. Grade III diastolic dysfunction. Mitral annular calcification is present. Moderate to severe mitral regurgitation.   Mild to moderate tricuspid regurgitation; RVSP: 38 Doppler Measurements & Calculations   MV Peak E-Wave: 136  AV Peak Velocity: 125 cm/s    LVOT Peak Velocity: 64.6  cm/s                 AV Peak Gradient: 6.25 mmHg   cm/s  MV Peak A-Wave: 68.5 AV Mean Velocity: 86.6 cm/s   LVOT Mean Velocity: 39.8  cm/s                 AV Mean Gradient: 3 mmHg      cm/s  MV E/A Ratio: 1.99   AV VTI: 21 cm                 LVOT Peak Gradient: 2  MV Peak Gradient:    AV Area (Continuity):1.16 cm2 mmHgLVOT Mean Gradient: 1  7.4 mmHg                                           mmHg                       LVOT VTI: 8.63 cm             Estimated RVSP: 38 mmHg  MV P1/2t: 41 msec                                  Estimated RAP:3 mmHg  MVA by PHT:5.37 cm2  Estimated PASP: 35.49 mmHg   MV E' Septal                                       TR Velocity:285 cm/s  Velocity: 4.89 cm/s                                TR Gradient:32.49 mmHg  MV E' Lateral  Velocity: 10.4 cm/s  MV E/E' septal:  27.81  MV E/E' lateral:  13.08      CT ABDOMEN PELVIS WO CONTRAST Additional Contrast? None    Result Date: 7/15/2021  EXAMINATION: CT OF THE ABDOMEN AND PELVIS WITHOUT CONTRAST 7/15/2021 7:28 pm TECHNIQUE: CT of the abdomen and pelvis was performed without the administration of intravenous contrast. Multiplanar reformatted images are provided for review. Dose modulation, iterative reconstruction, and/or weight based adjustment of the mA/kV was utilized to reduce the radiation dose to as low as reasonably achievable. COMPARISON: MRI abdomen May 24, 2019; CT abdomen May 23, 2019 HISTORY: ORDERING SYSTEM PROVIDED HISTORY: Abdominal pain TECHNOLOGIST PROVIDED HISTORY: Reason for exam:->Abdominal pain Additional Contrast?->None Reason for Exam: Abdominal pain FINDINGS: Lower Chest: Imaged lung bases demonstrate partially imaged small and associated atelectasis. Imaged lung fields demonstrate findings suggesting mild pulmonary edema, however evaluation is limited due to respiratory motion artifact.   Coronary artery atherosclerotic disease present. Calcification of the mitral valve. Partially imaged heart is enlarged. Organs: Evaluation of the solid organs is limited due to lack of intravenous contrast.  The nonenhanced liver demonstrates no acute abnormality. The gallbladder is surgically absent. Numerous calcified granulomata throughout the spleen. Atrophy of the pancreas. The nonenhanced adrenal glands demonstrate no acute findings. Redemonstration of right adrenal adenoma is noted on previous exams. The nonenhanced kidneys are grossly stable with similar atrophy of the left kidney when compared with prior exams. No hydronephrosis identified. GI/Bowel: No bowel obstruction is evident. The colon is collapsed and not well evaluated. No evidence for appendicitis. The small bowel is normal in caliber. Small hiatal hernia. Pelvis: Bladder and solid pelvic organs demonstrate no acute findings. Peritoneum/Retroperitoneum: The abdominal aorta is normal in caliber with severe calcified atherosclerotic plaque throughout. No retroperitoneal adenopathy identified. No free fluid or free air identified within the abdomen and pelvis. Bones/Soft Tissues: Soft tissues demonstrate mild anasarca. Postoperative changes of the right anterior abdominal wall. No acute osseous abnormality identified. 1. Findings at the lung bases suggesting pulmonary edema with small bilateral effusions and associated atelectasis. 2. No acute intra-abdominal process identified. 3. Severe atherosclerotic disease. CT Head WO Contrast    Result Date: 7/15/2021  EXAMINATION: CT OF THE HEAD WITHOUT CONTRAST  7/15/2021 7:28 pm TECHNIQUE: CT of the head was performed without the administration of intravenous contrast. Dose modulation, iterative reconstruction, and/or weight based adjustment of the mA/kV was utilized to reduce the radiation dose to as low as reasonably achievable.  COMPARISON: CT brain June 11, 2021 [facility] [address] [facility]     XR CHEST PORTABLE    Result Date: 7/15/2021  EXAMINATION: ONE XRAY VIEW OF THE CHEST 7/15/2021 5:47 pm COMPARISON: 06/13/2021 HISTORY: ORDERING SYSTEM PROVIDED HISTORY: AMS TECHNOLOGIST PROVIDED HISTORY: Reason for exam:->AMS Reason for Exam: AMS Initial encounter FINDINGS: Cardiomegaly. There is increased interstitial opacities bilaterally. No pleural effusion or pneumothorax. The osseous structures otherwise stable. Cardiomegaly with pulmonary edema. IR NONTUNNELED VASCULAR CATHETER > 5 YEARS    Result Date: 7/16/2021  PROCEDURE: ULTRASOUND GUIDED VASCULAR ACCESS. PLACEMENT OF A NON-TUNNELED CATHETER. 7/16/2021. HISTORY: ORDERING SYSTEM PROVIDED HISTORY: Need central venous access. Request CVC Insertion TECHNOLOGIST PROVIDED HISTORY: Reason for exam:->CVC Insertion SEDATION: None. FLUOROSCOPY DOSE AND TYPE OR TIME AND EXPOSURES: None. TECHNIQUE: Informed consent was obtained after a detailed explanation of the procedure including risks, benefits, and alternatives. Universal protocol was observed. The right neck and chest were prepped and draped in sterile fashion using maximum sterile barrier technique. Local anesthesia was achieved with lidocaine. A micropuncture needle was used to access the right internal jugular vein using ultrasound guidance. An ultrasound image demonstrating patency of the vein with needle tip located within it. An image was obtained and stored in PACs. A 0.035 guidewire was used to place a triple lumen central venous catheter after fascial tract dilation. The catheter flushed easily and there was a good blood return. The catheter was sutured to the skin. The catheter was locked with heparinized saline. The patient tolerated the procedure well and there were no immediate complications. EBL: Less than 1 ml. FINDINGS: Chest x ray after procedure demonstrates the tip of the catheter in the SVC. The catheter is ready to use.      Successful ultrasound and fluoroscopy guided non-tunneled central venous catheter placement. The catheter is ready to use. MRI BRAIN WO CONTRAST    Result Date: 7/20/2021  EXAMINATION: MRI OF THE BRAIN WITHOUT CONTRAST  7/20/2021 4:07 pm TECHNIQUE: Multiplanar multisequence MRI of the brain was performed without the administration of intravenous contrast. COMPARISON: None. HISTORY: ORDERING SYSTEM PROVIDED HISTORY: aphasic TECHNOLOGIST PROVIDED HISTORY: Reason for exam:->aphasic Reason for Exam: aphasic Acuity: Acute Type of Exam: Initial Relevant Medical/Surgical History: none FINDINGS: Motion degrades images limiting evaluation. INTRACRANIAL STRUCTURES/VENTRICLES: There is no acute infarct. No mass effect or midline shift. No evidence of an acute intracranial hemorrhage. Areas of T2 FLAIR hyperintensity are seen in the periventricular and subcortical white matter, which are nonspecific, but may represent chronic microvascular ischemic change. There is prominence of the ventricles and sulci due to global parenchymal volume loss. Evaluation of the signal voids within the major intracranial vessels is limited given patient motion. ORBITS: The visualized portion of the orbits demonstrate no acute abnormality. SINUSES: No gross abnormality seen of the paranasal sinuses. There appears to be a left mastoid effusion. 1. Patient motion limits evaluation. 2. No convincing acute intracranial abnormality. No acute infarct. 3. Mild global parenchymal volume loss with moderate chronic microvascular ischemic changes.        LAB RESULTS  --------------------    Recent Results (from the past 24 hour(s))   POCT Glucose    Collection Time: 07/21/21  8:55 PM   Result Value Ref Range    POC Glucose 211 (H) 70 - 99 MG/DL   POCT Glucose    Collection Time: 07/22/21  2:06 AM   Result Value Ref Range    POC Glucose 152 (H) 70 - 99 MG/DL   Comprehensive Metabolic Panel    Collection Time: 07/22/21  5:40 AM   Result Value Ref Range    Sodium 136 135 - 145 MMOL/L Potassium 3.9 3.5 - 5.1 MMOL/L    Chloride 96 (L) 99 - 110 mMol/L    CO2 31 21 - 32 MMOL/L    BUN 13 6 - 23 MG/DL    CREATININE 1.0 0.6 - 1.1 MG/DL    Glucose 127 (H) 70 - 99 MG/DL    Calcium 8.6 8.3 - 10.6 MG/DL    Albumin 3.5 3.4 - 5.0 GM/DL    Total Protein 5.5 (L) 6.4 - 8.2 GM/DL    Total Bilirubin 1.4 (H) 0.0 - 1.0 MG/DL    ALT 98 (H) 10 - 40 U/L    AST 43 (H) 15 - 37 IU/L    Alkaline Phosphatase 160 (H) 40 - 129 IU/L    GFR Non- 56 (L) >60 mL/min/1.73m2    GFR African American >60 >60 mL/min/1.73m2    Anion Gap 9 4 - 16   Phosphorus    Collection Time: 07/22/21  5:40 AM   Result Value Ref Range    Phosphorus 3.0 2.5 - 4.9 MG/DL   Magnesium    Collection Time: 07/22/21  5:40 AM   Result Value Ref Range    Magnesium 1.8 1.8 - 2.4 mg/dl   POCT Glucose    Collection Time: 07/22/21  8:45 AM   Result Value Ref Range    POC Glucose 177 (H) 70 - 99 MG/DL   POCT Glucose    Collection Time: 07/22/21  1:01 PM   Result Value Ref Range    POC Glucose 205 (H) 70 - 99 MG/DL   POCT Glucose    Collection Time: 07/22/21  5:43 PM   Result Value Ref Range    POC Glucose 153 (H) 70 - 99 MG/DL         Medical problems    Patient Active Problem List:     History of CVA (cerebrovascular accident) without residual deficits     Morbid obesity (HCC)     DM (diabetes mellitus), type 2, uncontrolled (HCC)     Persistent atrial fibrillation (HCC)     CAD (coronary artery disease)     CHF (congestive heart failure) ([facility])     Light headed     History of MI (myocardial infarction)     Diabetes mellitus (HCC)     Hypertension     TIA (transient ischemic attack)     Hyperlipidemia     SOB (shortness of breath)     H/O cardiovascular stress test     Family history of coronary artery disease     PAF (paroxysmal atrial fibrillation) ([facility])     Chest pain     Atrial thrombus without antecedent myocardial infarction     S/P ablation of atrial fibrillation     Gastroenteritis     YESIKA (acute kidney injury) ([facility] [address]     Acute cystitis Elevated liver enzymes     CHF (congestive heart failure), NYHA class I, acute on chronic, combined (HCC)     Pneumonia     Multifocal pneumonia     Atrial fibrillation with RVR ([facility])     Shoulder pain     Paroxysmal atrial fibrillation (HCC)     Diabetic hyperosmolar coma ([facility])     Difficult intravenous access     HFrEF (heart failure with reduced ejection fraction) ([facility])      ASSESSMENT:  ---------------------    Metabolic encephalopathy     TIA r/o cardio carotid embolic event     Hyperglycemia     ? Sepsis     A fib    Right MCA left Ophthalmic artery aneurysms    Multiple intracranial stenosis     PLAN:     CT brain neg     Mri brain neg    Mra head     C doppler     Echo EF 15 %      B 12 folate TSH  nl    High Homocysteine level     Continue eliquis      DC asa      Plavix    foltx     Pt is doing better today.           Electronically signed by Mc Scott MD on 7/22/2021 at 7:42 PM

## 2021-07-22 NOTE — PROGRESS NOTES
admission. Glucose 205      Wounds:  None       Current Nutrition Therapies:    ADULT DIET; Dysphagia - Minced and Moist; 3 carb choices (45 gm/meal); No Added Salt (3-4 gm)    Anthropometric Measures:  · Height: 5' (152.4 cm)  · Current Body Weight: 157 lb 6.5 oz (71.4 kg)   · Admission Body Weight:      · Usual Body Weight: 192 lb 6.4 oz (87.3 kg) (7/27/20, UBW of 180# per daughter's report)     · Ideal Body Weight: 100 lbs; % Ideal Body Weight 157.4 %   · BMI: 30.7  · Adjusted Body Weight:  ; No Adjustment   · Adjusted BMI:      · BMI Categories: Obese Class 1 (BMI 30.0-34. 9)       Nutrition Diagnosis:   · Moderate malnutrition, In context of chronic illness related to endocrine dysfuntion, food and nutrition related knowledge deficit, psychological cause or life stress as evidenced by lab values, poor intake prior to admission (18% weight loss over 1 year)    Nutrition Interventions:   Food and/or Nutrient Delivery:  Continue Current Diet, Start Oral Nutrition Supplement  Nutrition Education/Counseling:  Education needed   Coordination of Nutrition Care:  Continue to monitor while inpatient, Coordination of Community Care, Speech Therapy, Swallow Evaluation, Feeding Assistance/Environment Change    Goals:  Pt will consume at least 50% of meals on diet order during stay       Nutrition Monitoring and Evaluation:   Behavioral-Environmental Outcomes:  None Identified   Food/Nutrient Intake Outcomes:  Diet Advancement/Tolerance, Food and Nutrient Intake, Supplement Intake  Physical Signs/Symptoms Outcomes:  Biochemical Data, Chewing or Swallowing, GI Status, Fluid Status or Edema, Nutrition Focused Physical Findings, Weight     Discharge Planning:     Too soon to determine, Recommend pursue outpatient diabetes education     Electronically signed by Figueroa Garza RD, LD on 7/22/21 at 1:32 PM EDT    Contact: [phone]

## 2021-07-22 NOTE — PROGRESS NOTES
INTERNAL MEDICINE PROGRESS NOTE        Luis Casillas   1955   Primary Care Physician:  David Mcgregor PA-C  Admit Date: 7/15/2021     Subjective:   Patient awake, still confused. Responds to some simple commands. She voices no acute concerns. Objective:   BP (!) 162/71   Pulse 82   Temp 98.5 °F (36.9 °C) (Oral)   Resp 15   Ht 5' (1.524 m)   Wt 157 lb 6.5 oz (71.4 kg)   SpO2 97%   BMI 30.74 kg/m²    General appearance: alert, appears stated age and cooperative  Head: Normocephalic, without obvious abnormality, atraumatic  Neck: no adenopathy and supple, symmetrical, trachea midline  Lungs: clear to auscultation bilaterally  Heart: regular rate and rhythm and S1, S2 normal  Abdomen: soft, non-tender; bowel sounds normal; no masses,  no organomegaly  Extremities: no clubbing, cyanosis or edema  Neurologic: moves all 4 extremities. Disoriented.     Data Review  Lab Results   Component Value Date     07/22/2021    K 3.9 07/22/2021    CL 96 (L) 07/22/2021    CO2 31 07/22/2021    CREATININE 1.0 07/22/2021    BUN 13 07/22/2021    CALCIUM 8.6 07/22/2021     Lab Results   Component Value Date    WBC 14.0 (H) 07/21/2021    HGB 12.4 (L) 07/21/2021    HCT 38.1 07/21/2021    MCV 92.0 07/21/2021     07/21/2021     INR/Prothrombin Time      Meds:    aspirin  81 mg Oral Daily    insulin glargine  15 Units Subcutaneous Nightly    folic acid-pyridoxine-cyancobalamin  1 tablet Oral Daily    vancomycin  1,000 mg Intravenous Q24H    insulin lispro  0-18 Units Subcutaneous TID WC    insulin lispro  0-9 Units Subcutaneous 2 times per day    spironolactone  25 mg Oral BID    insulin lispro  10 Units Subcutaneous TID WC    furosemide  20 mg Intravenous BID    amiodarone  200 mg Oral BID    potassium bicarb-citric acid  40 mEq Oral BID    apixaban  5 mg Oral BID    levothyroxine  50 mcg Oral Daily    metoprolol succinate  100 mg Oral Daily    pantoprazole  40 mg Oral Daily    alogliptin  6.25 mg Oral Daily sodium chloride flush  5-40 mL Intravenous 2 times per day    sodium chloride flush  5-40 mL Intravenous 2 times per day    cefepime  2,000 mg Intravenous Q12H     PRN Meds: LORazepam, potassium chloride, albuterol sulfate HFA, nitroGLYCERIN, traZODone, glucose, dextrose, glucagon (rDNA), dextrose, sodium chloride flush, sodium chloride, ondansetron **OR** ondansetron, polyethylene glycol, acetaminophen **OR** acetaminophen, sodium chloride flush, sodium chloride    Assessment/Plan:   Patient Active Hospital Problem List:  Patient Active Problem List   Diagnosis    History of CVA (cerebrovascular accident) without residual deficits    Morbid obesity ([facility]    DM (diabetes mellitus), type 2, uncontrolled ([facility]    Persistent atrial fibrillation ([facility]    CAD (coronary artery disease)    CHF (congestive heart failure) ([facility]    Light headed    History of MI (myocardial infarction)    Diabetes mellitus ([facility]    Hypertension    TIA (transient ischemic attack)    Hyperlipidemia    SOB (shortness of breath)    H/O cardiovascular stress test    Family history of coronary artery disease    PAF (paroxysmal atrial fibrillation) (HCC)    Chest pain    Atrial thrombus without antecedent myocardial infarction    S/P ablation of atrial fibrillation    Gastroenteritis    YESIKA (acute kidney injury) ([facility]    Acute cystitis    Elevated liver enzymes    CHF (congestive heart failure), NYHA class I, acute on chronic, combined ([facility]    Pneumonia    Multifocal pneumonia    Atrial fibrillation with RVR (HCC)    Shoulder pain    Paroxysmal atrial fibrillation ([facility]    Diabetic hyperosmolar coma (HCC)    Difficult intravenous access    HFrEF (heart failure with reduced ejection fraction) ([facility]     Assessment:  Diabetic hyperosmolar state: better. Mental status change: EEG nonspecific abnl seen in encephalopathies. MRI brain limited but non acute. Leukocytosis and possible sepsis: on iv antibiotics, improving  Depression: no suicial attempt. (Taked to patient). Drug screen negative. A. Fib: rate is in control now  Hypertension: elevated right now. Elevated LFTs: Slowly improving. CT abdomen nonacute. Monitor. Possible pneumonia: on antibiotics  Pulmonary edema: continue monitoring. Grief: lost  and grandson this past month. HFrEF: EF 15-20% on past ECHO. Cardiology following. Plan:  Still confused. MRI brain non acute. PT/OT consults. CM for discharge planning. Continue PRN ativan. Consultants notes reviewed. Continue IV antibiotics. Monitor labs and patient condition. Electronically signed by Tara Gabriel PA-C on 7/22/2021 at 9:00 AM   I have independently evaluated and examined this patient today. I have reviewed radiologic and biochemical tests on this patient. Management Plan is developed mutually with APRIL Bustillo. I have reviewed above note and agree with assessment and plan.

## 2021-07-22 NOTE — PROGRESS NOTES
[facility]  consulted by Dr. Garry Rudolph for monitoring and adjustment. Indication for treatment: Sepsis   Goal trough: 15 mcg/mL  AUC/BEA: 400-600    Pertinent Laboratory Values:   Temp Readings from Last 3 Encounters:   07/22/21 98.8 °F (37.1 °C) (Axillary)   06/15/21 97.9 °F (36.6 °C) (Oral)   12/30/20 98.3 °F (36.8 °C) (Oral)     Recent Labs     07/20/21  0510 07/21/21  0545   WBC 13.6* 14.0*     Recent Labs     07/20/21  0510 07/21/21  0545 07/22/21  0540   BUN 16 12 13   CREATININE 0.9 1.0 1.0     Estimated Creatinine Clearance: 50 mL/min (based on SCr of 1 mg/dL).     Intake/Output Summary (Last 24 hours) at 7/22/2021 1617  Last data filed at 7/22/2021 1500  Gross per 24 hour   Intake 723.33 ml   Output 4400 ml   Net -3676.67 ml       Pertinent Cultures:  Date                             Source                                     Results  7/15                             Blood                                       Ordered  7/15                             Urine                                        Negative    Vancomycin level:   TROUGH:    Recent Labs     07/21/21  0545   VANCOTROUGH 13.7     Assessment:  · WBC previously elevated; afebrile  · SCr, BUN, and urine output:   · SCr stable  · BUN improved  · Day(s) of therapy: 7  · Vancomycin concentration:   · 07/18 - 22.2 (Vancomycin 1,250 mg IV Q 24 Hours)  · 07/21 - 13.7, therapeutic    Plan:  · Continue on Vancomycin 1,000 mg IV Q 24 Hours with a therapeutic trough  · Repeat level in 3-5 days  · Pharmacy will continue to monitor patient and adjust therapy as indicated      Thank you for the consult,  Oskar Godinez, [facility]

## 2021-07-23 PROBLEM — R41.0 DELIRIUM: Status: ACTIVE | Noted: 2021-01-01

## 2021-07-23 NOTE — PROGRESS NOTES
CARDIOLOGY  NOTE        Name:  Adam Wall /Age/Sex: 1955  (60 y.o. female)   MRN & CSN:  [id number] & [id number] Admission Date/Time: 7/15/2021  5:07 PM   Location:  -A PCP: Ebb Bence, [address]. Day: 9        PLAN FROM CARDIOLOGY FOR TODAY:   CPM    - cardiology consult is for:  A Fib    -  Interval history:  Remain in a fib    · ASSESSMENT/ PLAN:  1. A fib back in a fib, HR controlled,  On eliquis,  amio  2. CAD stable  3. HFrEF on meds  CPM, diurese   4. Neuro yun in progress, pt very confused  5. Will fu as needed          Subjective: Todays complain: Patient confused    HPI:  Markel Clark is a 60 y. o.year old who and presents with had concerns including Hyperglycemia. Chief Complaint   Patient presents with    Hyperglycemia           Objective: Temperature:  Current - Temp: 98.2 °F (36.8 °C);  Max - Temp  Av.5 °F (36.9 °C)  Min: 98.2 °F (36.8 °C)  Max: 98.8 °F (37.1 °C)    Respiratory Rate : Resp  Av  Min: 10  Max: 25    Pulse Range: Pulse  Av.7  Min: 73  Max: 86    Blood Presuure Range:  Systolic (28MPS), BVQ:646 , Min:129 , JQW:776   ; Diastolic (20UKR), VGM:06, Min:55, Max:140      Pulse ox Range: SpO2  Av.7 %  Min: 94 %  Max: 100 %    24hr I & O:      Intake/Output Summary (Last 24 hours) at 2021 0744  Last data filed at 2021 0601  Gross per 24 hour   Intake 1015.53 ml   Output 2800 ml   Net -1784.47 ml         /70   Pulse 73   Temp 98.2 °F (36.8 °C) (Axillary)   Resp 21   Ht 5' (1.524 m)   Wt 157 lb 6.5 oz (71.4 kg)   SpO2 100%   BMI 30.74 kg/m²           Review of Systems:  All 14 systems reviewed, all negative       TELEMETRY: Sinus    has a past medical history of Abnormal PFTs (pulmonary function tests), Atrial fibrillation (HCC), CAD (coronary artery disease), Carotid aneurysm, right ([facility] CH[facility] (congestive heart failure) ([facility] Diabetes mellitus ([facility] Dizziness - light-headed, Family history of coronary artery disease, H/O 24 hour EKG monitoring, H/O cardiovascular stress test, H/O cardiovascular stress test, H/O echocardiogram, H/O echocardiogram, Heart imaging, Heartburn, History of cardiac cath, History of cardiac monitoring, History of cardioversion, History of MI (myocardial infarction), History of nuclear MUGA, History of nuclear Muga stress test, History of PTCA, Hx of cardiovascular stress test, Hx of transesophageal echocardiography ([facility]) for monitoring, Hyperlipidemia, Hypertension, Insomnia, SOB (shortness of breath), Tachycardia, and TIA (transient ischemic attack). has a past surgical history that includes  section; Tubal ligation; Cholecystectomy; Diagnostic Cardiac Cath Lab Procedure (2006, 2009); Percutaneous Transluminal Coronary Angio (10/06/2010); other surgical history (2017); ablation of dysrhythmic focus; and IR NONTUNNELED VASCULAR CATHETER > 5 YEARS (2021).   Physical Exam:  General:  Awake, alert, NAD  Head:normal  Eye:normal  Neck:  No JVD   Chest:  Clear to auscultation, respiration easy  Cardiovascular:  RRR S1S2  Abdomen:   nontender  Extremities:  tr edema  Pulses; palpable  Neuro: grossly normal    Medications:    aspirin  81 mg Oral Daily    losartan  100 mg Oral QPM    insulin glargine  15 Units Subcutaneous Nightly    folic acid-pyridoxine-cyancobalamin  1 tablet Oral Daily    vancomycin  1,000 mg Intravenous Q24H    insulin lispro  0-18 Units Subcutaneous TID WC    insulin lispro  0-9 Units Subcutaneous 2 times per day    spironolactone  25 mg Oral BID    insulin lispro  10 Units Subcutaneous TID WC    furosemide  20 mg Intravenous BID    amiodarone  200 mg Oral BID    apixaban  5 mg Oral BID    levothyroxine  50 mcg Oral Daily    metoprolol succinate  100 mg Oral Daily    pantoprazole  40 mg Oral Daily    alogliptin  6.25 mg Oral Daily    sodium chloride flush  5-40 mL Intravenous 2 times per day    sodium chloride flush  5-40 mL Intravenous 2 times per day    cefepime  2,000 mg Intravenous Q12H      dextrose      sodium chloride      sodium chloride      dilTIAZem (CARDIZEM) 100 mg in dextrose 5% 100 mL ([facility]) 2.5 mg/hr (07/22/21 2227)     LORazepam, potassium chloride, albuterol sulfate HFA, nitroGLYCERIN, traZODone, glucose, dextrose, glucagon (rDNA), dextrose, sodium chloride flush, sodium chloride, ondansetron **OR** ondansetron, polyethylene glycol, acetaminophen **OR** acetaminophen, sodium chloride flush, sodium chloride    Lab Data:  CBC:   Recent Labs     07/21/21  0545   WBC 14.0*   HGB 12.4*   HCT 38.1   MCV 92.0        BMP:   Recent Labs     07/21/21  0545 07/22/21  0540 07/23/21  0415    136 140   K 3.9 3.9 4.1   CL 98* 96* 100   CO2 34* 31 32   PHOS  --  3.0  --    BUN 12 13 14   CREATININE 1.0 1.0 1.1     LIVER PROFILE:   Recent Labs     07/21/21  0545 07/22/21  0540 07/23/21  0415   AST 84* 43* 29   * 98* 72*   BILITOT 1.4* 1.4* 1.2*   ALKPHOS 167* 160* 145*     PT/INR:   No results for input(s): PROTIME, INR in the last 72 hours. APTT: No results for input(s): APTT in the last 72 hours. BNP:  No results for input(s): BNP in the last 72 hours.   TROPONIN: @TROPONINI:3@  Labs, consult, tests reviewed    Assessment/ PLAN:    As above                  Josep Beck MD, MD 7/23/2021 7:44 AM

## 2021-07-23 NOTE — PROGRESS NOTES
INTERNAL MEDICINE PROGRESS NOTE        Char Bernal Inaenadevon   1955   Primary Care Physician:  Michael Melara PA-C  Admit Date: 7/15/2021     Subjective:   Patient awake, alert, doing okay. Slightly more verbal today. She voices no acute concerns. Objective:   BP (!) 157/62   Pulse 86   Temp 98.4 °F (36.9 °C) (Oral)   Resp 20   Ht 5' (1.524 m)   Wt 157 lb 6.5 oz (71.4 kg)   SpO2 100%   BMI 30.74 kg/m²    General appearance: alert, appears stated age and cooperative  Head: Normocephalic, without obvious abnormality, atraumatic  Neck: no adenopathy and supple, symmetrical, trachea midline  Lungs: clear to auscultation bilaterally  Heart: regular rate and rhythm and S1, S2 normal  Abdomen: soft, non-tender; bowel sounds normal; no masses,  no organomegaly  Extremities: no clubbing, cyanosis or edema  Neurologic: moves all 4 extremities. Disoriented.     Data Review  Lab Results   Component Value Date     2021    K 4.1 2021     2021    CO2 32 2021    CREATININE 1.1 2021    BUN 14 2021    CALCIUM 9.0 2021     Lab Results   Component Value Date    WBC 14.0 (H) 2021    HGB 12.4 (L) 2021    HCT 38.1 2021    MCV 92.0 2021     2021     INR/Prothrombin Time      Meds:    dilTIAZem  30 mg Oral 4 times per day    aspirin  81 mg Oral Daily    losartan  100 mg Oral QPM    insulin glargine  15 Units Subcutaneous Nightly    folic acid-pyridoxine-cyancobalamin  1 tablet Oral Daily    vancomycin  1,000 mg Intravenous Q24H    insulin lispro  0-18 Units Subcutaneous TID WC    insulin lispro  0-9 Units Subcutaneous 2 times per day    spironolactone  25 mg Oral BID    insulin lispro  10 Units Subcutaneous TID WC    furosemide  20 mg Intravenous BID    amiodarone  200 mg Oral BID    apixaban  5 mg Oral BID    levothyroxine  50 mcg Oral Daily    metoprolol succinate  100 mg Oral Daily    pantoprazole  40 mg Oral Daily    alogliptin  6.25 mg Oral Daily    sodium chloride flush  5-40 mL Intravenous 2 times per day    sodium chloride flush  5-40 mL Intravenous 2 times per day    cefepime  2,000 mg Intravenous Q12H     PRN Meds: LORazepam, potassium chloride, albuterol sulfate HFA, nitroGLYCERIN, traZODone, glucose, dextrose, glucagon (rDNA), dextrose, sodium chloride flush, sodium chloride, ondansetron **OR** ondansetron, polyethylene glycol, acetaminophen **OR** acetaminophen, sodium chloride flush, sodium chloride    Assessment/Plan:   Patient Active Hospital Problem List:  Patient Active Problem List   Diagnosis    History of CVA (cerebrovascular accident) without residual deficits    [facility]    DM (diabetes mellitus), type 2, uncontrolled ([facility]    Persistent atrial fibrillation ([facility]    CAD (coronary artery disease)    CHF (congestive heart failure) ([facility]    Light headed    History of MI (myocardial infarction)    Diabetes mellitus ([facility]    Hypertension    TIA (transient ischemic attack)    Hyperlipidemia    SOB (shortness of breath)    H/O cardiovascular stress test    Family history of coronary artery disease    [facility]ox[facility]mal [facility]    Chest pain    Atrial thrombus without antecedent myocardial infarction    S/P ablation of atrial fibrillation    Gastroenteritis    YESIKA (acute kidney injury) ([facility]    Acute cystitis    Elevated liver enzymes    CHF (congestive heart failure), NYHA class I, acute on chronic, combined ([facility]    Pneumonia    Multifocal pneumonia    Atrial fibrillation with RVR ([facility]    Shoulder pain    Paroxysmal atrial fibrillation ([facility]    Diabetic hyperosmolar coma (HCC)    Difficult intravenous access    HFrEF (heart failure with reduced ejection fraction) ([facility]     Assessment:  Diabetic hyperosmolar state: better. Mental status change: EEG nonspecific abnl seen in encephalopathies. MRI brain limited but non acute.   Leukocytosis and possible sepsis: on iv antibiotics, improving  Depression: no suicial attempt. (Taked to patient). Drug screen negative. A. Fib: rate is in control now  Hypertension: elevated right now. Elevated LFTs: Slowly improving. CT abdomen nonacute. Monitor. Possible pneumonia: on antibiotics  Pulmonary edema: continue monitoring. Grief: lost  and grandson this past month. HFrEF: EF 15-20% on past ECHO. Cardiology following. Plan:  Psych consulted. May transfer to medical floor. Still confused. MRI brain non acute. PT/OT. CM for discharge planning. Continue PRN ativan. Consultants notes reviewed. Monitor labs and patient condition. Electronically signed by Darrow Lennox, PA-C on 7/23/2021 at 10:54 AM     I have independently evaluated and examined this patient today. I have reviewed radiologic and biochemical tests on this patient. Management Plan is developed mutually with Darrow Lennox, PA. I have reviewed above note and agree with assessment and plan.

## 2021-07-23 NOTE — CONSULTS
Initial Psychiatric Consult    Elena Kinney  [id number]  7/15/2021  07/23/21    ID: Patient is a 60 yrs y.o. female    CC: \"I don't remember. \"    HPI: The patient is a 60 y.o. female who presents to ER with hyperglycemia and altered mental status. She has history of diabetes mellitus type 2, HFrEF, CAD, paroxysmal atrial fibrillation, CKD, hypothyroidism, history of cerebral aneurysms. Patient reportedly found by EMS on her front porch passing out and her blood glucose was above the highest detectable level. Unable to obtain history from patient at this time. In the ER, CT of the head is nonacute. CXR shows cardiomegaly with pulmonary edema. CT of the abdomen shows no acute abdominal abnormality but does show small bilateral pleural effusions. proBNP is elevated at 35,679. Blood sugars initially elevated around the 700 range. Beta hydroxybutyrate elevated at 6.7. Lactate initially elevated at 5.3.  BUN/creatinine near baseline at 22/1.5 respectively. LFTs initially not elevated but repeat metabolic profile shows the ALT is 204, AST is 329, and alk phos is 205. Second lactate was 8.1, and third lactate was 6.0. In the ER, patient has been started on insulin drip and IV fluids. She has been admitted to ICU. Endocrinology has been consulted. Glucose has improved to around 400. Today, the patient is awake, semialert, but she is not following commands. She does respond to physical stimuli. Patient is tachycardic in the room. Patient admitted for further management. Psychiatry consulted for AMS in the context of recent loss of 2 family members. During today's interview the patient was alert but only oriented to self and city. She denies SI/HI and AV hallucinations. When asked about depression and anxiety, replies \"yep\" but hard to assess if she understands the questions as she responds \"yep\" frequently.  Bedside RN present during the interview and states patient will alternate between responding \"yep\" uttering 2-3 word phrases or just being verbally unresponsive. She has had some brief episodes of agitation. When patient asked another question, responded with her name which did not answer the question. When asked about sleep and appetite, replied \"it ain't\" She denies any previous suicide attempts or inpatient psychiatric hospitalizations. She was polite and cordial throughout the interview. Past Psychiatric History: none known    Family Psychiatric History: none known  Family History   Problem Relation Age of Onset    Diabetes Father     Heart Disease Father         Allergies:   Allergies   Allergen Reactions    Amiodarone Other (See Comments)     dizziness    Iv Dye [Iodides] Nausea And Vomiting    Vicodin [Hydrocodone-Acetaminophen] Nausea And Vomiting        OBJECTIVE  Vital Signs:  Vitals:    07/23/21 1000   BP: (!) 157/62   Pulse: 86   Resp: 20   Temp:    SpO2: 100%       Labs:  Recent Results (from the past 48 hour(s))   POCT Glucose    Collection Time: 07/21/21  5:31 PM   Result Value Ref Range    POC Glucose 155 (H) 70 - 99 MG/DL   POCT Glucose    Collection Time: 07/21/21  8:55 PM   Result Value Ref Range    POC Glucose 211 (H) 70 - 99 MG/DL   POCT Glucose    Collection Time: 07/22/21  2:06 AM   Result Value Ref Range    POC Glucose 152 (H) 70 - 99 MG/DL   Comprehensive Metabolic Panel    Collection Time: 07/22/21  5:40 AM   Result Value Ref Range    Sodium 136 135 - 145 MMOL/L    Potassium 3.9 3.5 - 5.1 MMOL/L    Chloride 96 (L) 99 - 110 mMol/L    CO2 31 21 - 32 MMOL/L    BUN 13 6 - 23 MG/DL    CREATININE 1.0 0.6 - 1.1 MG/DL    Glucose 127 (H) 70 - 99 MG/DL    Calcium 8.6 8.3 - 10.6 MG/DL    Albumin 3.5 3.4 - 5.0 GM/DL    Total Protein 5.5 (L) 6.4 - 8.2 GM/DL    Total Bilirubin 1.4 (H) 0.0 - 1.0 MG/DL    ALT 98 (H) 10 - 40 U/L    AST 43 (H) 15 - 37 IU/L    Alkaline Phosphatase 160 (H) 40 - 129 IU/L    GFR Non- 56 (L) >60 mL/min/1.73m2    GFR African American >60 >60 mL/min/1.73m2    Anion Gap 9 4 - 16   Phosphorus    Collection Time: 07/22/21  5:40 AM   Result Value Ref Range    Phosphorus 3.0 2.5 - 4.9 MG/DL   Magnesium    Collection Time: 07/22/21  5:40 AM   Result Value Ref Range    Magnesium 1.8 1.8 - 2.4 mg/dl   POCT Glucose    Collection Time: 07/22/21  8:45 AM   Result Value Ref Range    POC Glucose 177 (H) 70 - 99 MG/DL   POCT Glucose    Collection Time: 07/22/21  1:01 PM   Result Value Ref Range    POC Glucose 205 (H) 70 - 99 MG/DL   POCT Glucose    Collection Time: 07/22/21  5:43 PM   Result Value Ref Range    POC Glucose 153 (H) 70 - 99 MG/DL   POCT Glucose    Collection Time: 07/22/21  8:49 PM   Result Value Ref Range    POC Glucose 188 (H) 70 - 99 MG/DL   POCT Glucose    Collection Time: 07/23/21  1:46 AM   Result Value Ref Range    POC Glucose 124 (H) 70 - 99 MG/DL   Comprehensive Metabolic Panel    Collection Time: 07/23/21  4:15 AM   Result Value Ref Range    Sodium 140 135 - 145 MMOL/L    Potassium 4.1 3.5 - 5.1 MMOL/L    Chloride 100 99 - 110 mMol/L    CO2 32 21 - 32 MMOL/L    BUN 14 6 - 23 MG/DL    CREATININE 1.1 0.6 - 1.1 MG/DL    Glucose 118 (H) 70 - 99 MG/DL    Calcium 9.0 8.3 - 10.6 MG/DL    Albumin 3.2 (L) 3.4 - 5.0 GM/DL    Total Protein 5.6 (L) 6.4 - 8.2 GM/DL    Total Bilirubin 1.2 (H) 0.0 - 1.0 MG/DL    ALT 72 (H) 10 - 40 U/L    AST 29 15 - 37 IU/L    Alkaline Phosphatase 145 (H) 40 - 128 IU/L    GFR Non- 50 (L) >60 mL/min/1.73m2    GFR African American >60 >60 mL/min/1.73m2    Anion Gap 8 4 - 16   POCT Glucose    Collection Time: 07/23/21  7:44 AM   Result Value Ref Range    POC Glucose 176 (H) 70 - 99 MG/DL   POCT Glucose    Collection Time: 07/23/21 12:43 PM   Result Value Ref Range    POC Glucose 242 (H) 70 - 99 MG/DL       Review of Systems:  Reports of no current cardiovascular, respiratory, gastrointestinal, genitourinary, integumentary, neurological, muscuoskeletal, or immunological symptoms today.    PSYCHIATRIC: See HPI above.    PSYCHIATRIC EXAMINATION / MENTAL STATUS EXAM    CONSTITUTIONAL:    Vitals:   Vitals:    07/23/21 1000   BP: (!) 157/62   Pulse: 86   Resp: 20   Temp:    SpO2: 100%      General appearance: [x] appears age, []  appears older than stated age,               []  adequately dressed and groomed, [x] disheveled,               [x]  in no acute distress, [] appears mildly distressed, [] other           MUSCULOSKELETAL:   Gait:   [] normal, [] antalgic, [] unsteady, [x] gait not evaluated   Station:             [] erect, [] sitting, [x] recumbent, [] other        Strength/tone:  [x] muscle strength and tone appear consistent with age and condition     [] atrophy      [] abnormal movements  PSYCHIATRIC:    Appearance: Appears stated age. Alert and oriented to person, place only. No acute distress. Inadequate grooming and hygiene. Fair eye contact. No prominent physical abnormalities. Attitude: Manner is cooperative and pleasant  Motor: No psychomotor agitation, retardation or abnormal movements noted  Speech: Clearly articulated; normal rate, volume, tone & decreased amount. Language: intact understanding and production? Difficult to determine  Mood: ? Depressed, ? anxious  Affect: euthymic, full range, non-labile, congruent with mood and content of speech  Thought Production: Spontaneous. Thought Form: Coherent, linear, logical & goal-directed. No tangentiality or circumstantiality. No flight of ideas or loosening of associations. Thought Content/Perceptions: No OLIVIER, no AVH, no delusion  Insight: questionable  Judgment: questionable  Memory: Immediate, recent, and remote appear intact, though not formally tested.   Attention: maintained throughout interview  Fund of knowledge: Average  Gait/Balance: not assessed    Impression:   Delirium    Problem List:   Diabetic hyperosmolar coma ([facility] [address]    Plan:  Standard Delirium precautions:  o Redirect / reorient / reassure the patient  o Early mobilization (please allow

## 2021-07-23 NOTE — PROGRESS NOTES
Nephrology Progress Note  7/23/2021 9:11 AM        Subjective:   Admit Date: 7/15/2021  PCP: Jos Damian PA-C    Interval History: sleeping finally this am     Diet: none    ROS:  UOP 2.8 l/d  CCB drip      Data:     Current meds:    dilTIAZem  30 mg Oral 4 times per day    aspirin  81 mg Oral Daily    losartan  100 mg Oral QPM    insulin glargine  15 Units Subcutaneous Nightly    folic acid-pyridoxine-cyancobalamin  1 tablet Oral Daily    vancomycin  1,000 mg Intravenous Q24H    insulin lispro  0-18 Units Subcutaneous TID WC    insulin lispro  0-9 Units Subcutaneous 2 times per day    spironolactone  25 mg Oral BID    insulin lispro  10 Units Subcutaneous TID WC    furosemide  20 mg Intravenous BID    amiodarone  200 mg Oral BID    apixaban  5 mg Oral BID    levothyroxine  50 mcg Oral Daily    metoprolol succinate  100 mg Oral Daily    pantoprazole  40 mg Oral Daily    alogliptin  6.25 mg Oral Daily    sodium chloride flush  5-40 mL Intravenous 2 times per day    sodium chloride flush  5-40 mL Intravenous 2 times per day    cefepime  2,000 mg Intravenous Q12H      dextrose      sodium chloride      sodium chloride      dilTIAZem (CARDIZEM) 100 mg in dextrose 5% 100 mL ([facility]) 2.5 mg/hr (07/22/21 2227)         I/O last 3 completed shifts: In: 1015.5 [P.O.:700; I.V.:200.5;  IV Piggyback:115]  Out: 2800 [Urine:2800]    CBC:   Recent Labs     07/21/21  0545   WBC 14.0*   HGB 12.4*             Recent Labs     07/21/21  0545 07/22/21  0540 07/23/21  0415    136 140   K 3.9 3.9 4.1   CL 98* 96* 100   CO2 34* 31 32   BUN 12 13 14   CREATININE 1.0 1.0 1.1   GLUCOSE 118* 127* 118*       Lab Results   Component Value Date    CALCIUM 9.0 07/23/2021    PHOS 3.0 07/22/2021       Objective:     Vitals: BP (!) 145/68   Pulse 78   Temp 98.4 °F (36.9 °C) (Oral)   Resp 25   Ht 5' (1.524 m)   Wt 157 lb 6.5 oz (71.4 kg)   SpO2 100%   BMI 30.74 kg/m²     General appearance:  Sleeping- HEENT:  + conj pallor  Neck:  Supple- Rt IJ CVC  Lungs:  = adv BS  Heart:  irregular  Abdomen: soft  Extremities:  No edema   Has alas       Problem List :         Impression :     1. CKD stage 3 - stable   2. ADHF seems better UOP still close  to 3 l/d   3. Encephalopathy  slow or min lázaro improvement   4. recent DKA  5. HTN with arb now - tolerating well     Recommendation/Plan  :     1.  Adjust diureitcs a she does   2. watch UOP  3. watch for iatrogenic kenn nosocomial complication  4. follow clinically and bio chemically       Carmen Smiley MD MD

## 2021-07-23 NOTE — PROGRESS NOTES
Speech Language Pathology  [address] [place] [address]  DEPARTMENT OF SPEECH/LANGUAGE PATHOLOGY  DAILY PROGRESS NOTE  [facility]  7/23/2021  [id number]  Septicemia ([facility] [address] [A41.9]  Hyperglycemia [R73.9]  Diabetic hyperosmolar coma ([facility] [address] [E11.01]  Allergies   Allergen Reactions    Amiodarone Other (See Comments)     dizziness    Iv Dye [Iodides] Nausea And Vomiting    Vicodin [Hydrocodone-Acetaminophen] Nausea And Vomiting         Pt was seen this date for dysphagia treatment. IMPRESSION AND RECOMMENDATIONS:   Katie Schaefer was seen for a bedside swallowing treatment and diet tolerance monitoring. Pt was alert and pleasantly confused throughout assessment. She was positioned upright in bed and accepted PO trials of puree, soft/regular solids and thin liquids by straw sips. Prolonged oral manipulation and lingual residue was observed. Pt expelled trial of regular solid without mastication. Pharyngeal swallow appeared intact characterized by timely swallow initiation and 0 overt s/s of aspiration observed with all PO trials given. Recommend continue minced and moist diet/thin liquids with strict aspiration precautions. Pt is a total feed. Will follow for hopeful diet advancement as mental status improves. GOALS (current status in bold):  Short-term Goals  Timeframe for Short-term Goals: length of admission  Goal 1: Pt will tolerate minced and moist diet/thin liquids with adequate oral manipulation/clearance and no s/s aspiration. Goal being met, continue   Goal 2: Pt will tolerate PO trials of advanced textures with adequate oral manipulation/clearance and no s/s aspiration for safe diet upgrade. Goal being met, continue   Goal 3: Pt/caregivers will indicate understanding of all recommendations.  Goal being, met     EDUCATION:  Discussed POC and recommendations with JONI Rousseau    PAIN RATING (0-10 Scale): does not appear in acute distress  Time in/Time out: SLP Individual Minutes  Time In: 1510  Time Out: [facility]  Minutes: 20    Visit number: 4000 Amrit [address] Ayde SCHNE[address]ER [address], CCC-SLP, 7/23/2021

## 2021-07-23 NOTE — PROGRESS NOTES
Patient seen and examined earlier this a.m. Still slightly confused. Starting to become more alert. Right hand is still swollen but there is no evidence of any skin necrosis. We will continue to monitor this closely. CPM for comorbidities.

## 2021-07-23 NOTE — PROGRESS NOTES
Detail Level: Detailed Pt keeps asking family members where her  and grandson are.  passed in June and grandson earlier this month. Regardless what her family members say to her the pt continues to ask. Dr. Sargent Self ordered and new order noted for psych consult.

## 2021-07-23 NOTE — PROGRESS NOTES
DIscussed with daughter, about code status.  After discussion, family chose DNR CC arrest. (Do everything until heart or lung stop working)

## 2021-07-23 NOTE — PROGRESS NOTES
Physical Therapy Treatment Note  Name: Naa Kyle MRN: [id number] :   1955   Date:  2021   Admission Date: 7/15/2021 Room:  [address] [place], [place] [address]   Restrictions/Precautions:      Fall risk, restraints, decreased cognition/orientation, central line, tele  Communication with other providers:  Hand off to RN, Co-treat with OT   Subjective:  Patient states:  \"Yep\", \"No I won't\", \"Where's [place] [employer]? \" \"I can't throw up\"  Pain:   Location, Type, Intensity (0/10 to 10/10): Difficult to assess d/t pt cognitive status. Pt verbalizes pain at the R hand. Objective:    Observation:  Noted restraints on, pt awake in supine with sister present at bedside. Treatment, including education/measures:  Pt sister present and involved in session, PT answers sister's questions and provides education intermittently    Bed mobility: Increased time and attempts to have pt participate in advancement of BLE toward EOB, pt appears to resist movement and Mod A x2 required to perform supine>sit transfer. Return to supine, Mod A x2 for advancement pf BLE and trunk, Max A x2 for re-positioning in bed via migel pad. Sitting balance: Seated EOB pt demonstrates initial poor trunk control with retro and L lean, requires Min A and v/c for correction. Notable improvement this session in pt ability to attend to cues to \"sit up\", pt progresses to CGA-SBA for sitting balance with time x20 min. Pt demonstrates difficulty with weight shifting, requires Mod A at the hip and LE for WS and sequential scooting to EOB. PT v/c and demonstration for performance of LE AROM, pt stating \"nope\" when asked to perform. Pt able to activate BLE intermittently but appears to not attend to task. PT performs PROM of BLE (knee and ankle) x10 for FLX/EXT/PF/DF. OT assisting with pt ADLs in seated while PT assesses sitting balance and provides Min -CGA for support.      Sit<>Stand: From EOB, PT/OT providing Max A x2 for blocking of AD, assist to upright, v/c for sequence and encouragement of anterior WS. Pt demonstrates ability to follow cues for \"stand up\" LE weakness requires Max A x2 with increased time to upright. Upon standing pt initially demonstrates posterior lean and toe-touch weight bearing, improves minimally with v/c for \"stand up\", Max A for placement of LUE on FWW. Tolerates ~30 seconds. Second trial pt requires Mod A x2 with blocking of knees, v/c for upright, t/c for increased glute activation and anterior weight shift. Pt demonstrates improved LE activation this attempt, tolerates ~40 seconds standing. Standing: Pt requires Mod A throughout standing d/t retro tendency, postural imbalance, LE weakness, and poor command following. Tolerates standing x30 sec/x40 sec with narrow DONNY increased sway and retro lean. *Pt returned to supine with bed alarm on, restraints in place, call light in reach    Assessment / Impression:       Patient's tolerance of treatment:  Fair    Adverse Reaction: c/o nausea intermittent. Significant change in status and impact:  Improved sitting balance  Barriers to improvement:  Cognitive status    Plan for Next Session:    Continue with STS, attempt AMB, CO-TREAT  Time in:  10:50  Time out:  11:29  Timed treatment minutes:  39  Total treatment time:  44    Previously filed items:  Social/Functional History  Lives With: Alone  Home Layout: One level  Short term goals  Time Frame for Short term goals: 1 week  Short term goal 1: Pt will perform supine>sit mobility with Mod A of 1 with improved command following. Short term goal 2: Pt will perform STS from standard surface with Min a and v/c for sequencing  Short term goal 3: Pt will AMB x10 ft in room to BR with LRAD and CGA-Min for safety.        Electronically signed by:    Teddy Foote PT  7/23/2021, 11:36 AM

## 2021-07-23 NOTE — CONSULTS
[address] [place] [address], [address] [place] [address]                                  CONSULTATION    PATIENT NAME: Irina Majano                 :        1955  MED REC NO:   [id number]                          ROOM:       2128  ACCOUNT NO:   [de-identified]                           ADMIT DATE: 07/15/2021  PROVIDER:     Lazarus Baton, MD    CONSULT DATE:  2021    REFERRING PROVIDER:  Dr. Milvia Javed. REASON FOR THE CONSULTATION:  Evaluate right hand after IV infiltration. CHIEF COMPLAINT AND HISTORY OF PRESENT ILLNESS:  Is as follows: The  patient is a 76-year-old  female who presented to the ER with  hyperglycemia and altered mental status. She has a prior history of  multiple comorbidities including diabetes mellitus along with a history  of cerebral aneurysms. When she came through the emergency room at  [facility], CT of the head was nonacute, her chest x-ray showed some  cardiomegaly and pulmonary edema, and the CT of the abdomen and pelvis  showed no acute abnormalities. Her blood sugars were initially elevated  in the 700's and a beta-hydroxybutyrate was elevated at 6.7. Lactic  initially elevated at 5.3. At that time, she was started on insulin  drip, IV fluids, and admitted directly to the intensive care unit for  very close monitoring and observation. I have been asked now to see the  patient because while she was in the emergency room, trying to get her  medications for IV fluids, she did have infiltration from an IV that was  placed in the dorsum of the right hand. It is swollen, but her family  members do state that it is less than it was yesterday, and there is no  evidence of any crepitus, no skin necrosis which is present. No  evidence of any compartment syndrome, and she has good palpable right  radial and ulnar pulses.   It is edematous because it has some slight  redness, and the patient at this time is still confused. All my  questions have been answered by her family members. PAST MEDICAL HISTORY:  Coronary artery disease, CHF, insulin-dependent  diabetes mellitus, history of cerebral aneurysms, history of  percutaneous coronary intervention, hyperlipidemia, hypertension,  insomnia. PAST SURGICAL HISTORY:  Cardiac ablation, , cholecystectomy,  percutaneous coronary intervention, and tubal ligation. ALLERGIES:  AMIODARONE, IV DYE, VICODIN. MEDICATIONS:  Please refer to medication reconciliation sheet. SOCIAL HISTORY:  Quit smoking about 14 years ago, had a 60-pack-year  history. Denies smokeless tobacco.  No history of alcohol or IV drug  abuse. FAMILY HISTORY:  Noncontributory. REVIEW OF SYSTEMS:  A 10-point review of systems is otherwise negative  unless stated as above in the history of present illness. PHYSICAL EXAMINATION:  VITAL SIGNS:  Temperature is 98.8, respirations are 12, pulse is 82,  blood pressure 157/68, O2 sat 98% on room air. GENERAL:  Generally speaking, she is confused, not able to answer my  questions. She is a well-developed, well-nourished middle-aged female. HEAD, EARS, EYES, NOSE AND THROAT:  Normocephalic, atraumatic. Pupils  are equal, round, reactive to light. Extraocular muscles intact. NECK:  Trachea is midline. No lymphadenopathy. No JVD or bruits. HEART:  Positive S1 and S2.  Pulse, sinus tachycardia. LUNGS:  Clear to auscultation bilaterally. Some coarse breath sounds at  the bases. ABDOMEN:  Soft, nondistended. No rebound or guarding. No hernias,  pulsations or fluid shifts. No inguinal lymphadenopathy. EXTREMITIES:  Negative for erythema, edema, cellulitis, and cyanosis  except for the right hand on the dorsal aspect where the IV did  infiltrate. She does have some slight swelling with nonpitting edema,  and there is no evidence of any skin necrosis from the infiltration.    Fingers are slightly swollen as well, and she has good palpable right  brachial, radial, and ulnar pulses. No evidence of any crepitus. Some  slight redness. ASSESSMENT AND PLAN:  The patient is a 80-year-old  female who  presented with metabolic encephalopathy secondary to hyperglycemia. At  this time, she is starting to get this under control, but she is still  confused, and they will continue to treat all of her other medical  comorbidities. From a surgical standpoint, I would just continue to  monitor the right hand due to the IV fluid infiltration. There is no  evidence of any skin necrosis, nothing needs debrided. We will continue  just to elevate the right upper extremity hand and follow her clinical  course. I would like to thank Dr. Lady Fernandez for giving me the opportunity to assist  in her surgical care and evaluation.         Lor Allred MD    D: 07/23/2021 14:08:31       T: 07/23/2021 14:18:43     [email or web address]  Job#: [id number]     Doc#: [id number]    CC:

## 2021-07-23 NOTE — PROGRESS NOTES
Pt's katie Ahumada at bedside and proceeded to say she wanted to update code status. Explained the different types of codes and what decisions she could make and she stated, \" I know we do not want compressions\"  I explained that the DrMiryam Has to write that order,   -Dr. Denise Duffy called (on call for Nat Cabrera) and he wanted me to bypass perfectserve and call Dr. Nat Cabrera,   -called Dr. Nat Cabrera and explained then handed phone to Leno Gibbons who spoke to Dr. Nat Cabrera on phone and decision made to  Change code status to [facility].

## 2021-07-23 NOTE — PROGRESS NOTES
Occupational Therapy      Occupational Therapy Treatment Note      Name: Lety Garcia MRN: [id number] :   1955   Date:  2021   Admission Date: 7/15/2021 Room:  -A     Primary Problem: Acute encephalopathy, Hyperglycemia    Restrictions/Precautions: General Precautions, Fall Risk, Soft Wrist Restraints, Telemetry, Pulse Ox, BP cuff, Chen, o2, Bed exit alarm    Communication with other providers: PT Stephen Yuan, JONI Orozco    Subjective:  Patient states: \"[birth year]\" (pt repeatedly stating her birthday throughout session, even without prompting)  Pain: Pt denied pain this date    Objective:    Observation: Pt received in supine upon OT arrival. Pt still with significant confusion, although slightly improved from evaluation yesterday. Sister present and supportive throughout session. Objective Measures: Stable HR and o2 sats throughout session    Treatment, including education:  Therapeutic Activity Training:   Therapeutic activity training was instructed today. Cues were given for safety, sequence, UE/LE placement, awareness, and balance. Neuro-Muscular re-education:  Cues were given for position, posture, kinesthetic sense, safety, recruitment, and rationale. Cues were verbal and/or tactile. Self Care Training:   Cues were given for safety, sequence, UE/LE placement, visual cues, and balance. Pt received in supine upon OT arrival. Pt re-educated on role of OT, POC, and importance of EOB/OOB activity. Pt transferred supine to sitting EOB mod A x 2 with HOB elevated to 40'. Pt with improved participation with task this date. Pt able to sit at EOB level initially min A but progressed to close SBA with time and postural cues. Much improved static sitting balance this date and without symptoms of dizziness. Pt dependent with donning BL socks seated EOB. Pt completed facial hygiene task min A for thoroughness.  Pt able to functionally grasp washcloth this date with min verbal cues and bring washcloth to face to complete task. Pt was dependent for combing hair this date. Pt was unable to functionally grasp comb and bring to head despite hand-over-hand assist for initiation. Pt required extended time to comb hair due to extensive tangles. Pt engaged in light dynamic sitting balance task of leaning and recovery to midline with CGA for steadying. Pt completed sit to stand transfer from bed max A x 2 with cues for pushing through arms. Pt tolerated grossly 1 minute of static standing with mod A for balance using RW and mod tactile cues for anterior weight shift. Pt returned to bed mod A x 2. Pt completed second sit to stand from bed mod A x 2. Tolerated an additional minute in standing with min A for standing balance with RW. Pt returned to sitting EOB mod A x 2. Pt transferred sitting EOB to supine max A x 2, and was dependent x 2 for scooting hips up in bed. Pt left positioned for comfort in bed with all lines intact, all needs within reach, and bed alarm on. Assessment / Impression:    Patient's tolerance of treatment: Fair+  Adverse Reaction: None  Significant change in status and impact: Slightly improved mentation this date, tolerated sit-stand transfers this date  Barriers to improvement: Significantly impaired cognition      Plan for Next Session:    Continue per OT POC. Continue to recommend SNF for rehab at discharge.       Time in: 1050  Time out: 1129  Timed treatment minutes: 39  Total treatment time: 39      Electronically signed by:    DELIO Canales/L, [address] [place] [id number]

## 2021-07-23 NOTE — PROGRESS NOTES
[address] [facility]  consulted by Dr. Vasiliy Foreman for monitoring and adjustment. Indication for treatment: Sepsis   Goal trough: 15 mcg/mL  AUC/BEA: 400-600    Pertinent Laboratory Values:   Temp Readings from Last 3 Encounters:   07/23/21 98.5 °F (36.9 °C) (Oral)   06/15/21 97.9 °F (36.6 °C) (Oral)   12/30/20 98.3 °F (36.8 °C) (Oral)     Recent Labs     07/21/21  0545   WBC 14.0*     Recent Labs     07/21/21  0545 07/22/21  0540 07/23/21  0415   BUN 12 13 14   CREATININE 1.0 1.0 1.1     Estimated Creatinine Clearance: 46 mL/min (based on SCr of 1.1 mg/dL).     Intake/Output Summary (Last 24 hours) at 7/23/2021 1716  Last data filed at 7/23/2021 1400  Gross per 24 hour   Intake 775.53 ml   Output 2800 ml   Net -2024.47 ml       Pertinent Cultures:  Date                             Source                                     Results  7/15                             Blood                                       Ordered  7/15                             Urine                                        Negative    Vancomycin level:   TROUGH:    Recent Labs     07/21/21  0545   VANCOTROUGH 13.7     Assessment:  · WBC previously elevated; afebrile  · SCr, BUN, and urine output:   · SCr stable  · BUN improved  · Day(s) of therapy: 8  · Vancomycin concentration:   · 07/18 - 22.2 (Vancomycin 1,250 mg IV Q 24 Hours)  · 07/21 - 13.7, therapeutic    Plan:  · Continue on Vancomycin 1,000 mg IV Q 24 Hours with a therapeutic trough  · Predicted , trough 15.3  · Repeat level on Monday or sooner if renal function changes  · Pharmacy will continue to monitor patient and adjust therapy as indicated      Thank you for the consult,  Monica Turk, [address] [facility]

## 2021-07-24 NOTE — PROGRESS NOTES
Progress Note( Dr. Severo Massy)  7/23/2021  Subjective:   Admit Date: 7/15/2021  PCP: Anastasiia Coker PA-C        Admitted For :   Altered mental state and severe hyperglycemia possible metabolic encephalopathy    Consulted For: Better control of blood glucose    Interval History: Patient is more alert awake responding to verbal commands and answering seem to be appropriate   Tells me that she ate meal all day could not verify with nurses yet seem to be much better    CT Scan& MRI of Brain  Neg for any acute CVA     Denies any chest pains,   Denies SOB . Denies nausea or vomiting. Unable to eat of course she is so much drowsy  No new bowel or bladder symptoms.        Intake/Output Summary (Last 24 hours) at 7/23/2021 2306  Last data filed at 7/23/2021 2005  Gross per 24 hour   Intake 605.04 ml   Output 1350 ml   Net -744.96 ml       DATA    CBC:   Recent Labs     07/21/21  0545   WBC 14.0*   HGB 12.4*       CMP:  Recent Labs     07/21/21  0545 07/22/21  0540 07/23/21  0415    136 140   K 3.9 3.9 4.1   CL 98* 96* 100   CO2 34* 31 32   BUN 12 13 14   CREATININE 1.0 1.0 1.1   CALCIUM 8.5 8.6 9.0   PROT 5.4* 5.5* 5.6*   LABALBU 3.5 3.5 3.2*   BILITOT 1.4* 1.4* 1.2*   ALKPHOS 167* 160* 145*   AST 84* 43* 29   * 98* 72*     Lipids:   Lab Results   Component Value Date    CHOL 153 01/30/2012    HDL 59 01/30/2012    TRIG 135 01/30/2012     Glucose:  Recent Labs     07/23/21  1243 07/23/21  1741 07/23/21 2012   POCGLU 242* 168* 260*     [id number]:  Lab Results   Component Value Date    LABA1C 10.9 07/17/2021     High Sensitivity TSH:   Lab Results   Component Value Date    TSHHS 7.150 07/20/2021     Free T3: No results found for: FT3  Free T4:  Lab Results   Component Value Date    T4FREE 1.28 06/12/2021       Echocardiogram complete 2D with doppler with color    Result Date: 7/16/2021  Transthoracic Echocardiography Report (TTE)  Demographics   Patient Name       Pendergrassisaak Gomez TRAN Date of Study 07/16/2021   Date of Birth      1955         Gender              Female   Age                60 year(s)         Race                   Patient Number     [id number]         Room Number         2128   Visit Number       [id number]   Corporate ID       [id number]   Accession Number   [id number]         Chyna Shaw   Ordering Physician Alethea Lanier MD                 Physician           MD  Procedure Type of Study   TTE procedure:ECHOCARDIOGRAM COMPLETE 2D W DOPPLER W COLOR. Procedure Date Date: 07/16/2021 Start: 01:35 PM Study Location: Portable Technical Quality: Fair visualization Indications:Congestive heart failure. Patient Status: Routine Height: 60 inches Weight: 180 pounds BSA: 1.78 m2 BMI: 35.15 kg/m2 HR: 99 bpm BP: 145/83 mmHg  Conclusions   Summary  Left ventricular systolic function is abnormal.  Ejection fraction is visually estimated at 15-20%. Global hypokinesis noted. Grade III diastolic dysfunction. Mitral annular calcification is present. Moderate to severe mitral regurgitation. Mild to moderate tricuspid regurgitation; RVSP: 38 mmHg. No evidence of any pericardial effusion. Signature   ------------------------------------------------------------------  Electronically signed by Jerardo Nixon MD (Interpreting  physician) on 07/16/2021 at 04:31 PM    CT ABDOMEN PELVIS WO CONTRAST Additional Contrast? None    Result Date: 7/15/2021  EXAMINATION: CT OF THE ABDOMEN AND PELVIS WITHOUT CONTRAST 7/15/2021 7:28 pm    1. Findings at the lung bases suggesting pulmonary edema with small bilateral effusions and associated atelectasis. 2. No acute intra-abdominal process identified. 3. Severe atherosclerotic disease.      CT Head WO Contrast    Result Date: 7/15/2021  EXAMINATION: CT OF THE HEAD WITHOUT CONTRAST  7/15/2021 7:28 pm     No lispro  10 Units Subcutaneous TID WC    furosemide  20 mg Intravenous BID    amiodarone  200 mg Oral BID    apixaban  5 mg Oral BID    levothyroxine  50 mcg Oral Daily    metoprolol succinate  100 mg Oral Daily    pantoprazole  40 mg Oral Daily    alogliptin  6.25 mg Oral Daily    sodium chloride flush  5-40 mL Intravenous 2 times per day    sodium chloride flush  5-40 mL Intravenous 2 times per day      Infusions:    dextrose      sodium chloride      sodium chloride      dilTIAZem (CARDIZEM) 100 mg in dextrose 5% 100 mL ([facility]) Stopped (07/23/21 0717)         Objective:   Vitals: /78   Pulse 81   Temp 99.2 °F (37.3 °C) (Oral)   Resp 19   Ht 5' (1.524 m)   Wt 157 lb 6.5 oz (71.4 kg)   SpO2 99%   BMI 30.74 kg/m²   General appearance: alert and cooperative with exam appears to be somewhat dehydrated  Neck: no JVD or bruit  Thyroid : Normal lobes   Lungs: Has Vesicular Breath sounds   Heart: Atrial fibrillation  Abdomen: soft, non-tender; bowel sounds normal; no masses,  no organomegaly  Musculoskeletal: Normal  Extremities: extremities normal, , no edema  Neurologic:  Awake, alert, oriented to name, place and time. Cranial nerves II-XII are grossly intact. Motor is  intact. Sensory is neuropathy. ,  and gait is normal.    Assessment:     Patient Active Problem List:     History of CVA (cerebrovascular accident) without residual deficits     Morbid obesity ([address]     DM (diabetes mellitus), type 2, uncontrolled ([address]     Persistent atrial fibrillation ([address]     CAD (coronary artery disease)     CHF (congestive heart failure) ([address]     Light headed     History of MI (myocardial infarction)     Diabetes mellitus ([address]     Hypertension     TIA (transient ischemic attack)     Hyperlipidemia     SOB (shortness of breath)     H/O cardiovascular stress test     Family history of coronary artery disease     PAF (paroxysmal atrial fibrillation) ([facility])     Chest pain     Atrial thrombus without antecedent myocardial infarction     S/P ablation of atrial fibrillation     Gastroenteritis     YESIKA (acute kidney injury) [facility]     Acute cystitis     Elevated liver enzymes     CHF (congestive heart failure), NYHA class I, acute on chronic, combined (HCC)     Pneumonia     Multifocal pneumonia     Atrial fibrillation with RVR (HCC)     Shoulder pain     Paroxysmal atrial fibrillation (HCC)     Diabetic hyperosmolar coma ([facility]     Difficult intravenous access     Metabolic encephalopathy getting better      Plan:     1. Reviewed POC blood rate 14 with regular foot embolism there is good  2. . Labs and X ray results   3. Reviewed Current Medicines   4. On meal +,  correction bolus Humalog/ Basal Lantus Insulin regime does get  5. Monitor Blood glucose frequently   6. Modified  the dose of Insulin/ other medicines as needed   7. Will follow     .      Ruiz Murillo MD, MD

## 2021-07-24 NOTE — PROGRESS NOTES
Progress Note( Dr. Adelina Shah)  7/24/2021  Subjective:   Admit Date: 7/15/2021  PCP: Dalia Suarez PA-C        Admitted For :   Altered mental state and severe hyperglycemia possible metabolic encephalopathy    Consulted For: Better control of blood glucose    Interval History: Patient is more alert awake responding to verbal commands and some answers seem to be appropriate and other seem to be confused     Tells me that she ate meal all day could not verify with nurses yet seem to be much better    CT Scan& MRI of Brain  Neg for any acute CVA     Denies any chest pains,   Denies SOB . Denies nausea or vomiting. Unable to eat of course she is so much drowsy  No new bowel or bladder symptoms. Intake/Output Summary (Last 24 hours) at 7/24/2021 0727  Last data filed at 7/24/2021 0539  Gross per 24 hour   Intake 360 ml   Output 1000 ml   Net -640 ml       DATA    CBC:   No results for input(s): WBC, HGB, PLT in the last 72 hours.  CMP:  Recent Labs     07/22/21  0540 07/23/21  0415 07/24/21  0501    140 136   K 3.9 4.1 4.0   CL 96* 100 99   CO2 31 32 31   BUN 13 14 18   CREATININE 1.0 1.1 1.2*   CALCIUM 8.6 9.0 9.2   PROT 5.5* 5.6* 5.8*   LABALBU 3.5 3.2* 3.2*   BILITOT 1.4* 1.2* 1.0   ALKPHOS 160* 145* 137*   AST 43* 29 23   ALT 98* 72* 56*     Lipids:   Lab Results   Component Value Date    CHOL 153 01/30/2012    HDL 59 01/30/2012    TRIG 135 01/30/2012     Glucose:  Recent Labs     07/23/21  1741 07/23/21 2012 07/24/21  0130   POCGLU 168* 260* 132*     [id number]:  Lab Results   Component Value Date    LABA1C 10.9 07/17/2021     High Sensitivity TSH:   Lab Results   Component Value Date    TSHHS 7.150 07/20/2021     Free T3: No results found for: FT3  Free T4:  Lab Results   Component Value Date    T4FREE 1.28 06/12/2021       Echocardiogram complete 2D with doppler with color    Result Date: 7/16/2021  Transthoracic Echocardiography Report (TTE)  Demographics   Patient Name       Vicente Mckeon 7/15/2021 7:28 pm     No acute intracranial abnormality. Chronic microvascular ischemic changes and global cerebral atrophy. XR CHEST PORTABLE    Result Date: 7/16/2021  EXAMINATION: ONE XRAY VIEW OF THE CHEST 7/16/2021 10:31 am COMPARISON: July 15, 2021 HISTORY: ORDERING SYSTEM PROVIDED HISTORY: s/p central line attempt    Right IJ central venous catheter terminates in the mid SVC in satisfactory position. No pneumothorax. Pulmonary vascular congestion with interstitial pulmonary edema. XR CHEST PORTABLE    Result Date: 7/15/2021  EXAMINATION: ONE XRAY VIEW OF THE CHEST 7/15/2021 5:47 pm COMPARISON: 06/13/2021 HISTORY: ORDERING SYSTEM PROVIDED HISTORY: AMS TECHNOLOGIST PROVIDED HISTORY: Reason for exam:->AMS Reason for Exam: AMS Initial encounter FINDINGS: Cardiomegaly. There is increased interstitial opacities bilaterally. No pleural effusion or pneumothorax. The osseous structures otherwise stable. Cardiomegaly with pulmonary edema. IR NONTUNNELED VASCULAR CATHETER > 5 YEARS    Result Date: 7/16/2021  PROCEDURE: ULTRASOUND GUIDED VASCULAR ACCESS. PLACEMENT OF A NON-TUNNELED CATHETER. 7/16/2021. HISTORY: ORDERING SYSTEM PROVIDED HISTORY: Need central venous access. Request CVC Insertion TECHNOLOGIST PROVIDED HISTORY: Reason for exam:->CVC Insertion SEDATION: None. Successful ultrasound and fluoroscopy guided non-tunneled central venous catheter placement. The catheter is ready to use.        Scheduled Medicines   Medications:    insulin glargine  10 Units Subcutaneous Nightly    insulin lispro  0-9 Units Subcutaneous 2 times per day    dilTIAZem  30 mg Oral 4 times per day    cefepime  2,000 mg Intravenous Q24H    vancomycin  1,000 mg Intravenous Q24H    aspirin  81 mg Oral Daily    losartan  100 mg Oral QPM    folic acid-pyridoxine-cyancobalamin  1 tablet Oral Daily    insulin lispro  0-18 Units Subcutaneous TID WC    spironolactone  25 mg Oral BID    insulin lispro  10 Units Subcutaneous TID WC    furosemide  20 mg Intravenous BID    amiodarone  200 mg Oral BID    apixaban  5 mg Oral BID    levothyroxine  50 mcg Oral Daily    metoprolol succinate  100 mg Oral Daily    pantoprazole  40 mg Oral Daily    alogliptin  6.25 mg Oral Daily    sodium chloride flush  5-40 mL Intravenous 2 times per day    sodium chloride flush  5-40 mL Intravenous 2 times per day      Infusions:    dextrose      sodium chloride      sodium chloride      dilTIAZem (CARDIZEM) 100 mg in dextrose 5% 100 mL ([facility]) Stopped (07/23/21 0717)         Objective:   Vitals: BP (!) 144/52   Pulse 71   Temp 98.7 °F (37.1 °C) (Oral)   Resp 17   Ht 5' (1.524 m)   Wt 157 lb 6.5 oz (71.4 kg)   SpO2 98%   BMI 30.74 kg/m²   General appearance: alert and cooperative with exam appears to be somewhat dehydrated  Neck: no JVD or bruit  Thyroid : Normal lobes   Lungs: Has Vesicular Breath sounds   Heart: Atrial fibrillation  Abdomen: soft, non-tender; bowel sounds normal; no masses,  no organomegaly  Musculoskeletal: Normal  Extremities: extremities normal, , no edema  Neurologic:  Awake, alert, oriented to name, place and time. Cranial nerves II-XII are grossly intact. Motor is  intact. Sensory is neuropathy. ,  and gait is normal.    Assessment:     Patient Active Problem List:     History of CVA (cerebrovascular accident) without residual deficits     Morbid obesity ([address]     DM (diabetes mellitus), type 2, uncontrolled ([address]     Persistent atrial fibrillation ([address]     CAD (coronary artery disease)     CHF (congestive heart failure) ([address]     Light headed     History of MI (myocardial infarction)     Diabetes mellitus ([address]     Hypertension     TIA (transient ischemic attack)     Hyperlipidemia     SOB (shortness of breath)     H/O cardiovascular stress test     Family history of coronary artery disease     PAF (paroxysmal atrial fibrillation) ([facility])     Chest pain     Atrial thrombus without antecedent

## 2021-07-24 NOTE — PROGRESS NOTES
Nephrology Progress Note  7/24/2021 12:03 PM  Subjective: Interval History: Lety Garcia is a 60 y.o. female with weakness and arousable. On restraints. Data:   Scheduled Meds:   insulin glargine  10 Units Subcutaneous Nightly    insulin lispro  0-9 Units Subcutaneous 2 times per day    dilTIAZem  30 mg Oral 4 times per day    cefepime  2,000 mg Intravenous Q24H    vancomycin  1,000 mg Intravenous Q24H    aspirin  81 mg Oral Daily    losartan  100 mg Oral QPM    folic acid-pyridoxine-cyancobalamin  1 tablet Oral Daily    insulin lispro  0-18 Units Subcutaneous TID WC    spironolactone  25 mg Oral BID    insulin lispro  10 Units Subcutaneous TID WC    furosemide  20 mg Intravenous BID    amiodarone  200 mg Oral BID    apixaban  5 mg Oral BID    levothyroxine  50 mcg Oral Daily    metoprolol succinate  100 mg Oral Daily    pantoprazole  40 mg Oral Daily    alogliptin  6.25 mg Oral Daily    sodium chloride flush  5-40 mL Intravenous 2 times per day    sodium chloride flush  5-40 mL Intravenous 2 times per day     Continuous Infusions:   dextrose      sodium chloride      sodium chloride      dilTIAZem (CARDIZEM) 100 mg in dextrose 5% 100 mL ([facility]) Stopped (07/23/21 0717)         CBC No results for input(s): WBC, HGB, HCT, PLT in the last 72 hours. BMP   Recent Labs     07/22/21  0540 07/23/21  0415 07/24/21  0501    140 136   K 3.9 4.1 4.0   CL 96* 100 99   CO2 31 32 31   PHOS 3.0  --   --    BUN 13 14 18   CREATININE 1.0 1.1 1.2*     Hepatic:   Recent Labs     07/22/21  0540 07/23/21  0415 07/24/21  0501   AST 43* 29 23   ALT 98* 72* 56*   BILITOT 1.4* 1.2* 1.0   ALKPHOS 160* 145* 137*     Troponin: No results for input(s): TROPONINI in the last 72 hours. BNP: No results for input(s): BNP in the last 72 hours. Lipids: No results for input(s): CHOL, HDL in the last 72 hours.     Invalid input(s): LDLCALCU  ABGs:   Lab Results   Component Value Date    PO2ART 80 07/16/2021    JWL1NIA 32.0 07/16/2021     INR: No results for input(s): INR in the last 72 hours. Renal Labs  Albumin:    Lab Results   Component Value Date    LABALBU 3.2 07/24/2021     Calcium:    Lab Results   Component Value Date    CALCIUM 9.2 07/24/2021     Phosphorus:    Lab Results   Component Value Date    PHOS 3.0 07/22/2021     U/A:    Lab Results   Component Value Date    NITRU NEGATIVE 07/16/2021    COLORU CASTILLO 07/16/2021    WBCUA 15 07/16/2021    RBCUA 33 07/16/2021    MUCUS RARE 07/16/2021    TRICHOMONAS NONE SEEN 07/16/2021    YEAST RARE 09/01/2020    BACTERIA RARE 07/16/2021    CLARITYU CLEAR 07/16/2021    SPECGRAV 1.024 07/16/2021    UROBILINOGEN 2.0 07/16/2021    BILIRUBINUR NEGATIVE 07/16/2021    BLOODU LARGE 07/16/2021    KETUA NEGATIVE 07/16/2021     ABG:    Lab Results   Component Value Date    YIT7DXQ 32.0 07/16/2021    PO2ART 80 07/16/2021    ENL6ESQ 24.4 07/16/2021    BEART 2.1 11/01/2011     HgBA1c:    Lab Results   Component Value Date    LABA1C 10.9 07/17/2021     Microalbumen/Creatinine ratio:  No components found for: RUCREAT  TSH:  No results found for: TSH  IRON:    Lab Results   Component Value Date    IRON 72 05/24/2019     Iron Saturation:  No components found for: PERCENTFE  TIBC:    Lab Results   Component Value Date    TIBC 232 05/24/2019     FERRITIN:  No results found for: FERRITIN  RPR:  No results found for: RPR  DAVY:    Lab Results   Component Value Date    DAVY None Detected 05/24/2019    DAVY  05/24/2019     (NOTE)  If suspicion of connective tissue disease is strong and DAVY EIA is   negative, consider testing for DAVY by IFA ([id number]). INTERPRETIVE INFORMATION: Anti-Nuclear Antibodies (DAVY), IgG by   KD  Anti-Nuclear Antibodies (DAVY), IgG by DK: DAVY specimens are   screened using enzyme-linked immunosorbent assay (KD)   methodology.  All KD results reported as Detected are further   tested by indirect fluorescent assay (IFA) using HEp-2 substrate   with an IgG-specific conjugate. The DAVY KD screen is designed   to detect antibodies against dsDNA, histone, SS-A (Ro), SS-B (La),   Carroll, snRNP/Sm, Scl-70, Nelli-1, centromere, and an extract of lysed   HEp-2 cells. DAVY KD assays have been reported to have lower   sensitivities than DAVY IFA for systemic autoimmune rheumatic   diseases (SARD). Negative results do not necessarily rule out SARD. Performed by [place] [id number], [id number] [facility] [phone]  www. Alexys Burns MD, Lab. Director       24 Hour Urine for Creatinine Clearance:  No components found for: CREAT4, UHRS10, UTV10      Objective:   I/O: 07/23 0701 - 07/24 0700  In: 360 [P.O.:360]  Out: 1000 [Urine:1000]  I/O last 3 completed shifts: In: 360 [P.O.:360]  Out: 1000 [Urine:1000]  I/O this shift:  In: 240 [P.O.:240]  Out: -   Vitals: BP (!) 169/58   Pulse 73   Temp 98.7 °F (37.1 °C) (Oral)   Resp 17   Ht 5' (1.524 m)   Wt 157 lb 6.5 oz (71.4 kg)   SpO2 97%   BMI 30.74 kg/m²  {  General appearance: awake weak  HEENT: Head: Normal, normocephalic, atraumatic. Neck: supple, symmetrical, trachea midline  Lungs: diminished breath sounds bilaterally  Heart: S1, S2 normal  Abdomen: abnormal findings:  soft nt  Extremities: edema trace  Neurologic: Mental status: alertness: alert        Assessment and Plan:      IMP:  #1 CKD 3  2. Congestive heart failure  3. Metabolic encephalopathy  4. Type 2 diabetes uncontrolled  5. Hypertension    Plan     #1 creatinine holding at 1.2 holding stable  2. Good urine output try to keep negative next #3 affect improved  4.   Monitor glucose control next #5 blood pressure variable slightly high but also anxious will monitor for now           Junior Gallagher MD, MD

## 2021-07-24 NOTE — PLAN OF CARE
Problem: Non-Violent Restraints  Goal: Removal from restraints as soon as assessed to be safe  7/23/2021 2049 by Margo Ramirez RN  Outcome: Ongoing  7/23/2021 2039 by Margo Ramirez RN  Outcome: Ongoing  Goal: No harm/injury to patient while restraints in use  7/23/2021 2049 by Margo Ramirez RN  Outcome: Ongoing  7/23/2021 2039 by Margo Ramirez RN  Outcome: Ongoing  Goal: Patient's dignity will be maintained  7/23/2021 2049 by Margo Ramirez RN  Outcome: Ongoing  7/23/2021 2039 by Margo Ramirez RN  Outcome: Ongoing     Problem: Discharge Planning:  Goal: Discharged to appropriate level of care  Description: Discharged to appropriate level of care  7/23/2021 2049 by Margo Ramirez RN  Outcome: Ongoing  7/23/2021 2039 by Margo Ramirez RN  Outcome: Ongoing     Problem: Serum Glucose Level - Abnormal:  Goal: Ability to maintain appropriate glucose levels will improve  Description: Ability to maintain appropriate glucose levels will improve  7/23/2021 2049 by Margo Ramirez RN  Outcome: Ongoing  7/23/2021 2039 by Margo Ramirez RN  Outcome: Ongoing     Problem: Sensory Perception - Impaired:  Goal: Ability to maintain a stable neurologic state will improve  Description: Ability to maintain a stable neurologic state will improve  7/23/2021 2049 by Margo Ramirez RN  Outcome: Ongoing  7/23/2021 2039 by Margo Ramirez RN  Outcome: Ongoing     Problem: Gas Exchange - Impaired:  Goal: Levels of oxygenation will improve  Description: Levels of oxygenation will improve  7/23/2021 2049 by Margo Ramirez RN  Outcome: Ongoing  7/23/2021 2039 by Marog Ramirez RN  Outcome: Ongoing     Problem: Infection, Septic Shock:  Goal: Will show no infection signs and symptoms  Description: Will show no infection signs and symptoms  7/23/2021 2049 by Margo Ramirez RN  Outcome: Ongoing  7/23/2021 2039 by Margo Ramirez RN  Outcome: Ongoing     Problem: Infection - Ventilator-Associated Pneumonia:  Goal: Absence of pulmonary infection  Description: Absence of pulmonary infection  7/23/2021 2049 by Trinity Calabrese RN  Outcome: Ongoing  7/23/2021 2039 by Trinity Calabrese RN  Outcome: Ongoing     Problem: Tissue Perfusion, Altered:  Goal: Circulatory function within specified parameters  Description: Circulatory function within specified parameters  7/23/2021 2049 by Trinity Calabrese RN  Outcome: Ongoing  7/23/2021 2039 by Trinity Calabrese RN  Outcome: Ongoing     Problem: Venous Thromboembolism:  Goal: Will show no signs or symptoms of venous thromboembolism  Description: Will show no signs or symptoms of venous thromboembolism  Outcome: Ongoing  Goal: Absence of signs or symptoms of impaired coagulation  Description: Absence of signs or symptoms of impaired coagulation  7/23/2021 2049 by Trinity Calabrese RN  Outcome: Ongoing  7/23/2021 2039 by Trinity Calabrese RN  Outcome: Ongoing     Problem: Falls - Risk of:  Goal: Will remain free from falls  Description: Will remain free from falls  Outcome: Ongoing  Goal: Absence of physical injury  Description: Absence of physical injury  Outcome: Ongoing     Problem: Pain:  Goal: Pain level will decrease  Description: Pain level will decrease  Outcome: Ongoing  Goal: Control of acute pain  Description: Control of acute pain  Outcome: Ongoing  Goal: Control of chronic pain  Description: Control of chronic pain  Outcome: Ongoing     Problem: Skin Integrity:  Goal: Will show no infection signs and symptoms  Description: Will show no infection signs and symptoms  Outcome: Ongoing  Goal: Absence of new skin breakdown  Description: Absence of new skin breakdown  Outcome: Ongoing     Problem: Nutrition  Goal: Optimal nutrition therapy  Outcome: Ongoing

## 2021-07-24 NOTE — PROGRESS NOTES
Pt seen and examined. Still confused. AF Hemodynamically stable. Good UOP. BGL's better controlled. Labs noted. Right hand swelling from IV infiltration has significantly improved. No concerns for skin or subcutaneous tissue injury. Palpable RUE pulses. No acute surgical issues. Will sign off. Call with questions or concerns.

## 2021-07-24 NOTE — PROGRESS NOTES
Progress Note( Dr. Juno Nash)    Subjective:   Admit Date: 7/15/2021  PCP: Dirk Regalado PA-C        Admitted For :   Altered mental state and severe hyperglycemia possible metabolic encephalopathy    Consulted For: Better control of blood glucose    Interval History: Patient is more drowsy this morning early responding to any verbal commands   Looks like her mental status unchanged CT Scan& MRI of Brain  Neg for any acute CVA     Denies any chest pains,   Denies SOB . Denies nausea or vomiting. Unable to eat of course she is so much drowsy  No new bowel or bladder symptoms.        Intake/Output Summary (Last 24 hours) at 7/23/2021 2308  Last data filed at 7/23/2021 2005  Gross per 24 hour   Intake 605.04 ml   Output 1350 ml   Net -744.96 ml       DATA    CBC:   Recent Labs     07/21/21  0545   WBC 14.0*   HGB 12.4*       CMP:  Recent Labs     07/21/21  0545 07/22/21  0540 07/23/21  0415    136 140   K 3.9 3.9 4.1   CL 98* 96* 100   CO2 34* 31 32   BUN 12 13 14   CREATININE 1.0 1.0 1.1   CALCIUM 8.5 8.6 9.0   PROT 5.4* 5.5* 5.6*   LABALBU 3.5 3.5 3.2*   BILITOT 1.4* 1.4* 1.2*   ALKPHOS 167* 160* 145*   AST 84* 43* 29   * 98* 72*     Lipids:   Lab Results   Component Value Date    CHOL 153 01/30/2012    HDL 59 01/30/2012    TRIG 135 01/30/2012     Glucose:  Recent Labs     07/23/21  1243 07/23/21  1741 07/23/21 2012   POCGLU 242* 168* 260*     [id number]:  Lab Results   Component Value Date    LABA1C 10.9 07/17/2021     High Sensitivity TSH:   Lab Results   Component Value Date    TSHHS 7.150 07/20/2021     Free T3: No results found for: FT3  Free T4:  Lab Results   Component Value Date    T4FREE 1.28 06/12/2021       Echocardiogram complete 2D with doppler with color    Result Date: 7/16/2021  Transthoracic Echocardiography Report (TTE)  Demographics   Patient Name       Georges MAYFIELD Date of Study       07/16/2021   Date of Birth      1955         Gender              Female   Age 60 year(s)         Race                   Patient Number     [id number]         Room Number         2128   Visit Number       [id number]   Corporate ID       [id number]   Accession Number   [id number]         Norberto Guzmán   Ordering Physician Baldev Gore MD                 Physician           MD  Procedure Type of Study   TTE procedure:ECHOCARDIOGRAM COMPLETE 2D W DOPPLER W COLOR. Procedure Date Date: 07/16/2021 Start: 01:35 PM Study Location: Portable Technical Quality: Fair visualization Indications:Congestive heart failure. Patient Status: Routine Height: 60 inches Weight: 180 pounds BSA: 1.78 m2 BMI: 35.15 kg/m2 HR: 99 bpm BP: 145/83 mmHg  Conclusions   Summary  Left ventricular systolic function is abnormal.  Ejection fraction is visually estimated at 15-20%. Global hypokinesis noted. Grade III diastolic dysfunction. Mitral annular calcification is present. Moderate to severe mitral regurgitation. Mild to moderate tricuspid regurgitation; RVSP: 38 mmHg. No evidence of any pericardial effusion. Signature   ------------------------------------------------------------------  Electronically signed by Cynthia Murrell MD (Interpreting  physician) on 07/16/2021 at 04:31 PM    CT ABDOMEN PELVIS WO CONTRAST Additional Contrast? None    Result Date: 7/15/2021  EXAMINATION: CT OF THE ABDOMEN AND PELVIS WITHOUT CONTRAST 7/15/2021 7:28 pm    1. Findings at the lung bases suggesting pulmonary edema with small bilateral effusions and associated atelectasis. 2. No acute intra-abdominal process identified. 3. Severe atherosclerotic disease. CT Head WO Contrast    Result Date: 7/15/2021  EXAMINATION: CT OF THE HEAD WITHOUT CONTRAST  7/15/2021 7:28 pm     No acute intracranial abnormality.  Chronic microvascular ischemic changes and global cerebral atrophy. XR CHEST PORTABLE    Result Date: 7/16/2021  EXAMINATION: ONE XRAY VIEW OF THE CHEST 7/16/2021 10:31 am COMPARISON: July 15, 2021 HISTORY: ORDERING SYSTEM PROVIDED HISTORY: s/p central line attempt    Right IJ central venous catheter terminates in the mid SVC in satisfactory position. No pneumothorax. Pulmonary vascular congestion with interstitial pulmonary edema. XR CHEST PORTABLE    Result Date: 7/15/2021  EXAMINATION: ONE XRAY VIEW OF THE CHEST 7/15/2021 5:47 pm COMPARISON: 06/13/2021 HISTORY: ORDERING SYSTEM PROVIDED HISTORY: AMS TECHNOLOGIST PROVIDED HISTORY: Reason for exam:->AMS Reason for Exam: AMS Initial encounter FINDINGS: Cardiomegaly. There is increased interstitial opacities bilaterally. No pleural effusion or pneumothorax. The osseous structures otherwise stable. Cardiomegaly with pulmonary edema. IR NONTUNNELED VASCULAR CATHETER > 5 YEARS    Result Date: 7/16/2021  PROCEDURE: ULTRASOUND GUIDED VASCULAR ACCESS. PLACEMENT OF A NON-TUNNELED CATHETER. 7/16/2021. HISTORY: ORDERING SYSTEM PROVIDED HISTORY: Need central venous access. Request CVC Insertion TECHNOLOGIST PROVIDED HISTORY: Reason for exam:->CVC Insertion SEDATION: None. Successful ultrasound and fluoroscopy guided non-tunneled central venous catheter placement. The catheter is ready to use.        Scheduled Medicines   Medications:    dilTIAZem  30 mg Oral 4 times per day    [START ON 7/24/2021] cefepime  2,000 mg Intravenous Q24H    [START ON 7/24/2021] vancomycin  1,000 mg Intravenous Q24H    aspirin  81 mg Oral Daily    losartan  100 mg Oral QPM    insulin glargine  15 Units Subcutaneous Nightly    folic acid-pyridoxine-cyancobalamin  1 tablet Oral Daily    insulin lispro  0-18 Units Subcutaneous TID WC    insulin lispro  0-9 Units Subcutaneous 2 times per day    spironolactone  25 mg Oral BID    insulin lispro  10 Units Subcutaneous TID WC    furosemide  20 mg Intravenous BID    amiodarone  200 mg Oral BID    apixaban  5 mg Oral BID    levothyroxine  50 mcg Oral Daily    metoprolol succinate  100 mg Oral Daily    pantoprazole  40 mg Oral Daily    alogliptin  6.25 mg Oral Daily    sodium chloride flush  5-40 mL Intravenous 2 times per day    sodium chloride flush  5-40 mL Intravenous 2 times per day      Infusions:    dextrose      sodium chloride      sodium chloride      dilTIAZem (CARDIZEM) 100 mg in dextrose 5% 100 mL (ADD-Rombauer) Stopped (07/23/21 0717)         Objective:   Vitals: /78   Pulse 81   Temp 99.2 °F (37.3 °C) (Oral)   Resp 19   Ht 5' (1.524 m)   Wt 157 lb 6.5 oz (71.4 kg)   SpO2 99%   BMI 30.74 kg/m²   General appearance: alert and cooperative with exam appears to be somewhat dehydrated  Neck: no JVD or bruit  Thyroid : Normal lobes   Lungs: Has Vesicular Breath sounds   Heart: Atrial fibrillation  Abdomen: soft, non-tender; bowel sounds normal; no masses,  no organomegaly  Musculoskeletal: Normal  Extremities: extremities normal, , no edema  Neurologic:  Awake, alert, oriented to name, place and time. Cranial nerves II-XII are grossly intact. Motor is  intact. Sensory is intact. ,  and gait is normal.    Assessment:     Patient Active Problem List:     History of CVA (cerebrovascular accident) without residual deficits     Morbid obesity ([address]     DM (diabetes mellitus), type 2, uncontrolled ([address]     Persistent atrial fibrillation ([address]     CAD (coronary artery disease)     CHF (congestive heart failure) ([address]     Light headed     History of MI (myocardial infarction)     Diabetes mellitus ([address]     Hypertension     TIA (transient ischemic attack)     Hyperlipidemia     SOB (shortness of breath)     H/O cardiovascular stress test     Family history of coronary artery disease     PAF (paroxysmal atrial fibrillation) ([facility])     Chest pain     Atrial thrombus without antecedent myocardial infarction     S/P ablation of atrial fibrillation Gastroenteritis     YESIKA (acute kidney injury) ([facility]     Acute cystitis     Elevated liver enzymes     CHF (congestive heart failure), NYHA class I, acute on chronic, combined ([facility]     Pneumonia     Multifocal pneumonia     Atrial fibrillation with RVR (HCC)     Shoulder pain     Paroxysmal atrial fibrillation (H[facility])     Diabetic hyperosmolar coma ([facility]     Difficult intravenous access     Metabolic encephalopathy getting better      Plan:     1. Reviewed POC blood rate 14 with regular foot embolism there is good  2. . Labs and X ray results   3. Reviewed Current Medicines   4. On meal +,  correction bolus Humalog/ Basal Lantus Insulin regime does get  5. Monitor Blood glucose frequently   6. Modified  the dose of Insulin/ other medicines as needed   7. Discussed with Dr. Maria T Galvin what patient is status concerned if if patient needs feeding tube  8. Will follow     .      Delma Castro MD, MD

## 2021-07-24 NOTE — PROGRESS NOTES
[id number] [facility]  consulted by Dr. Sarah Webber for monitoring and adjustment. Indication for treatment: Sepsis   Goal trough: 15 mcg/mL  AUC/BEA: 400-600    Pertinent Laboratory Values:   Temp Readings from Last 3 Encounters:   07/24/21 98.9 °F (37.2 °C) (Oral)   06/15/21 97.9 °F (36.6 °C) (Oral)   12/30/20 98.3 °F (36.8 °C) (Oral)     No results for input(s): WBC, LACTATE in the last 72 hours. Recent Labs     07/22/21  0540 07/23/21  0415 07/24/21  0501   BUN 13 14 18   CREATININE 1.0 1.1 1.2*     Estimated Creatinine Clearance: 42 mL/min (A) (based on SCr of 1.2 mg/dL (H)). Intake/Output Summary (Last 24 hours) at 7/24/2021 1420  Last data filed at 7/24/2021 1334  Gross per 24 hour   Intake 600 ml   Output 1000 ml   Net -400 ml       Pertinent Cultures:  Date                             Source                                     Results  7/15                             Blood                                       Ordered  7/15                             Urine                                        Negative    Vancomycin level:   TROUGH:    No results for input(s): VANCOTROUGH in the last 72 hours. VANCOMYCIN RANDOM:  No results for input(s): VANCORANDOM in the last 72 hours.     Assessment:  · WBC previously elevated; afebrile  · SCr, BUN, and urine output: stable   · Day(s) of therapy: 10  · Vancomycin concentration:   · 07/18 - 22.2, supra-therapeutic on vancomycin 1,250 mg q24h  · 07/21 - 13.7, therapeutic on vancomycin 1000 mg q24h ()    Plan:  · Continue on Vancomycin 1,000 mg IV Q 24 Hours with a therapeutic trough  · Repeat level on Monday or sooner if renal function changes  · Pharmacy will continue to monitor patient and adjust therapy as indicated    Thank you for the consult,  Ebonie Parrish, [address] [facility]

## 2021-07-24 NOTE — PROGRESS NOTES
INTERNAL MEDICINE PROGRESS NOTE        Nancy Alicea Devan   1955   Primary Care Physician:  Dirk Regalado PA-C  Admit Date: 7/15/2021     Subjective:   Patient reports she is feeling well today. Patient is alert and oriented to self but not time and location. Denies chest pain, SOB, nausea, vomiting, abdominal pain. Remainder of ROS is unremarkable. Meds, labs and other notes reviewed. Objective:   BP (!) 169/65   Pulse 79   Temp 98.7 °F (37.1 °C) (Oral)   Resp 23   Ht 5' (1.524 m)   Wt 157 lb 6.5 oz (71.4 kg)   SpO2 100%   BMI 30.74 kg/m²    Recent Labs     07/23/21  1741 07/23/21 2012 07/24/21  0130 07/24/21  0804   POCGLU 168* 260* 132* 108*       I/O last 3 completed shifts: In: 360 [P.O.:360]  Out: 1000 [Urine:1000]  No intake/output data recorded. Neck: no adenopathy and supple, symmetrical, trachea midline  Lungs: clear to auscultation bilaterally  Heart: regular rate and rhythm and S1, S2 normal  Abdomen: soft, non-tender; bowel sounds normal; no masses,  no organomegaly  Extremities: extremities normal, atraumatic, no cyanosis or edema  Neurologic: Alert and oriented to self but not time and location. Data Review  CBC with Differential:  No results for input(s): WBC, RBC, HGB, HCT, PLT, MCV, MCH, MCHC, RDW, NRBC, SEGSPCT, BANDSPCT, BLASTSPCT, METASPCT, LYMPHOPCT, PROMYELOPCT, MONOPCT, EOSPCT, BASOPCT, MONOSABS, LYMPHSABS, EOSABS, BASOSABS, DIFFTYPE in the last 72 hours. Invalid input(s): MYELOPCT;3, ATYLMREL  CMP:    Recent Labs     07/22/21  0540 07/23/21  0415 07/24/21  0501    140 136   K 3.9 4.1 4.0   CL 96* 100 99   CO2 31 32 31   BUN 13 14 18   CREATININE 1.0 1.1 1.2*   GFRAA >60 >60 55*   LABGLOM 56* 50* 45*   GLUCOSE 127* 118* 121*   PROT 5.5* 5.6* 5.8*   LABALBU 3.5 3.2* 3.2*   CALCIUM 8.6 9.0 9.2   BILITOT 1.4* 1.2* 1.0   ALKPHOS 160* 145* 137*   AST 43* 29 23   ALT 98* 72* 56*     PT/INR:  No results for input(s): PROTIME, INR in the last 72 hours.   Meds:    insulin glargine  10 Units Subcutaneous Nightly    insulin lispro  0-9 Units Subcutaneous 2 times per day    dilTIAZem  30 mg Oral 4 times per day    cefepime  2,000 mg Intravenous Q24H    vancomycin  1,000 mg Intravenous Q24H    aspirin  81 mg Oral Daily    losartan  100 mg Oral QPM    folic acid-pyridoxine-cyancobalamin  1 tablet Oral Daily    insulin lispro  0-18 Units Subcutaneous TID WC    spironolactone  25 mg Oral BID    insulin lispro  10 Units Subcutaneous TID WC    furosemide  20 mg Intravenous BID    amiodarone  200 mg Oral BID    apixaban  5 mg Oral BID    levothyroxine  50 mcg Oral Daily    metoprolol succinate  100 mg Oral Daily    pantoprazole  40 mg Oral Daily    alogliptin  6.25 mg Oral Daily    sodium chloride flush  5-40 mL Intravenous 2 times per day    sodium chloride flush  5-40 mL Intravenous 2 times per day     PRN Meds: cloNIDine, LORazepam, potassium chloride, albuterol sulfate HFA, nitroGLYCERIN, traZODone, glucose, dextrose, glucagon (rDNA), dextrose, sodium chloride flush, sodium chloride, ondansetron **OR** ondansetron, polyethylene glycol, acetaminophen **OR** acetaminophen, sodium chloride flush, sodium chloride    Assessment/Plan:   1. Diabetic hyperosmolar state - Better. We will continue insulin glargine, alogliptin, and sliding scale coverage. Appreciate endocrinology consult. Patient has not been eating. Patient may need feeding tube placed. Will continue to observe and evaluate over the weekend and consult gastroenterology on 07/26/2021 if needed. 2.  Altered mental status - Possibly secondary to metabolic encephalopathy. EEG and MRI brain negative for any other possible etiology. We will continue to monitor. Appreciate neurology consult. 3.  Leukocytosis, rule out sepsis - Improving. Will continue cefepime and vancomycin. 4.  Depression/grief - Patient is grieving the loss of both her  and her grandson this past month.   Denies any suicidal/homicidal ideation. We will continue to monitor. 5.  Atrial fibrillation - Will continue amiodarone, metoprolol, and diltiazem. Appreciate cardiology consult. 6.  Hypertension - Blood pressure noted to be elevated today. Ordered clonidine 0.1 mg-- 1 tablet p.o. every 4 hours as needed for whenever systolic blood pressures greater than 272 and diastolic blood pressures greater than 95.  7.  Elevated LFTs - Improving. Will continue to monitor. 8.  Pulmonary edema/possible pneumonia - CXR performed on 07/16/2021 indicated interstitial pulmonary edema. Will continue cefepime and vancomycin. Will continue to monitor. This patient was examined and treated by Kyree Taylor PA-C under the supervision of Dr. David Schultz MD.     I spent at least 41 minutes reviewing pt's record, previous notes, other providers' notes, labs, diagnostic imaging, and documenting in Epic in addition to face-to-face time I spent with the pt. Kyree Taylor PA-C  7/24/2021 9:08 AM     Pt seen and examined. Labs, imaging, and noted reviewed. Agree with above.      David Schultz MD.

## 2021-07-24 NOTE — PROGRESS NOTES
NEUROLOGY NOTE  DR. Kayley Holden MD.  -------------------------------------------------  Subjective:    Pt is less confused and doing better today    Objective:    /78   Pulse 81   Temp 99.2 °F (37.3 °C) (Oral)   Resp 19   Ht 5' (1.524 m)   Wt 157 lb 6.5 oz (71.4 kg)   SpO2 99%   BMI 30.74 kg/m²   HEENT nl      Neuro exam    Alert oriented to person place mildly confused  eomi pupils 3 mm jo  Squeezes fingers to commands  Equivocal toes  resp on own      RADIOLOGY  -----------------    Echocardiogram complete 2D with doppler with color    Result Date: 7/16/2021  Transthoracic Echocardiography Report (TTE)  Demographics   Patient Name       Loreatha Duane M Date of Study       07/16/2021   Date of Birth      1955         Gender              Female   Age                60 year(s)         Race                   Patient Number     [id number]         Room Number         2128   Visit Number       [id number]   Corporate ID       [id number]   Accession Number   [id number]         Susie Peguero RDMS   Ordering Physician Humberto Herbert MD                 Physician           MD  Procedure Type of Study   TTE procedure:ECHOCARDIOGRAM COMPLETE 2D W DOPPLER W COLOR. Procedure Date Date: 07/16/2021 Start: 01:35 PM Study Location: Portable Technical Quality: Fair visualization Indications:Congestive heart failure. Patient Status: Routine Height: 60 inches Weight: 180 pounds BSA: 1.78 m2 BMI: 35.15 kg/m2 HR: 99 bpm BP: 145/83 mmHg  Conclusions   Summary  Left ventricular systolic function is abnormal.  Ejection fraction is visually estimated at 15-20%. Global hypokinesis noted. Grade III diastolic dysfunction. Mitral annular calcification is present. Moderate to severe mitral regurgitation. Mild to moderate tricuspid regurgitation; RVSP: 38 mmHg. No evidence of any pericardial effusion. Signature   ------------------------------------------------------------------  Electronically signed by Svetlana Meraz MD (Interpreting  physician) on 07/16/2021 at 04:31 PM  ------------------------------------------------------------------   Findings   Left Ventricle  Left ventricular systolic function is abnormal.  Ejection fraction is visually estimated at 15-20%. Global hypokinesis noted. Grade III diastolic dysfunction. Left ventricle size is normal.   Left Atrium  Essentially normal left atrium. Right Atrium  Essentially normal right atrium. Right Ventricle  Essentially normal right ventricle. Aortic Valve  Trace aortic regurgitation noted. Mitral Valve  Mitral annular calcification is present. Moderate to severe mitral regurgitation. Tricuspid Valve  Mild to moderate tricuspid regurgitation; RVSP: 38 mmHg. Pulmonic Valve  The pulmonic valve was not well visualized. Pericardial Effusion  No evidence of any pericardial effusion. Pleural Effusion  No evidence of pleural effusion. Miscellaneous  The aorta is within normal limits.   M-Mode/2D Measurements & Calculations   LV Diastolic Dimension:   LV Systolic Dimension:   LA Dimension: 3.8 cmAO  4.86 cm                   4.6 cm                   Root Dimension: 2.8 cm  LV FS:5.4 %               LV Volume Diastolic: 257  LV PW Diastolic: 5.93 cm  ml  LV PW Systolic: 3.36 cm   LV Volume Systolic: 442  Septum Diastolic: 3.76 cm ml                       RV Diastolic Dimension:  Septum Systolic: 6.31 cm  LV EDV/LV EDV Index: 116 3.13 cm  CO: 2.42 l/min            ml/65 m2LV ESV/LV ESV  CI: 1.36 l/m*m2           Index: 108 ml/61 m2      LA/Aorta: 1.36                            EF Calculated (A4C): 6.9  LV Area Diastolic: 51.5   %  cm2                       EF Calculated (2D): 12.0  LV Area Systolic: 32 cm2  %                             LV Length: 7.68 cm                             LVOT: 1.9 cm Doppler Measurements & Calculations   MV Peak E-Wave: 136  AV Peak Velocity: 125 cm/s    LVOT Peak Velocity: 64.6  cm/s                 AV Peak Gradient: 6.25 mmHg   cm/s  MV Peak A-Wave: 68.5 AV Mean Velocity: 86.6 cm/s   LVOT Mean Velocity: 39.8  cm/s                 AV Mean Gradient: 3 mmHg      cm/s  MV E/A Ratio: 1.99   AV VTI: 21 cm                 LVOT Peak Gradient: 2  MV Peak Gradient:    AV Area (Continuity):1.16 cm2 mmHgLVOT Mean Gradient: 1  7.4 mmHg                                           mmHg                       LVOT VTI: 8.63 cm             Estimated RVSP: 38 mmHg  MV P1/2t: 41 msec                                  Estimated RAP:3 mmHg  MVA by PHT:5.37 cm2  Estimated PASP: 35.49 mmHg   MV E' Septal                                       TR Velocity:285 cm/s  Velocity: 4.89 cm/s                                TR Gradient:32.49 mmHg  MV E' Lateral  Velocity: 10.4 cm/s  MV E/E' septal:  27.81  MV E/E' lateral:  13.08      CT ABDOMEN PELVIS WO CONTRAST Additional Contrast? None    Result Date: 7/15/2021  EXAMINATION: CT OF THE ABDOMEN AND PELVIS WITHOUT CONTRAST 7/15/2021 7:28 pm TECHNIQUE: CT of the abdomen and pelvis was performed without the administration of intravenous contrast. Multiplanar reformatted images are provided for review. Dose modulation, iterative reconstruction, and/or weight based adjustment of the mA/kV was utilized to reduce the radiation dose to as low as reasonably achievable. COMPARISON: MRI abdomen May 24, 2019; CT abdomen May 23, 2019 HISTORY: ORDERING SYSTEM PROVIDED HISTORY: Abdominal pain TECHNOLOGIST PROVIDED HISTORY: Reason for exam:->Abdominal pain Additional Contrast?->None Reason for Exam: Abdominal pain FINDINGS: Lower Chest: Imaged lung bases demonstrate partially imaged small and associated atelectasis. Imaged lung fields demonstrate findings suggesting mild pulmonary edema, however evaluation is limited due to respiratory motion artifact.   Coronary artery atherosclerotic disease present. Calcification of the mitral valve. Partially imaged heart is enlarged. Organs: Evaluation of the solid organs is limited due to lack of intravenous contrast.  The nonenhanced liver demonstrates no acute abnormality. The gallbladder is surgically absent. Numerous calcified granulomata throughout the spleen. Atrophy of the pancreas. The nonenhanced adrenal glands demonstrate no acute findings. Redemonstration of right adrenal adenoma is noted on previous exams. The nonenhanced kidneys are grossly stable with similar atrophy of the left kidney when compared with prior exams. No hydronephrosis identified. GI/Bowel: No bowel obstruction is evident. The colon is collapsed and not well evaluated. No evidence for appendicitis. The small bowel is normal in caliber. Small hiatal hernia. Pelvis: Bladder and solid pelvic organs demonstrate no acute findings. Peritoneum/Retroperitoneum: The abdominal aorta is normal in caliber with severe calcified atherosclerotic plaque throughout. No retroperitoneal adenopathy identified. No free fluid or free air identified within the abdomen and pelvis. Bones/Soft Tissues: Soft tissues demonstrate mild anasarca. Postoperative changes of the right anterior abdominal wall. No acute osseous abnormality identified. 1. Findings at the lung bases suggesting pulmonary edema with small bilateral effusions and associated atelectasis. 2. No acute intra-abdominal process identified. 3. Severe atherosclerotic disease. CT Head WO Contrast    Result Date: 7/15/2021  EXAMINATION: CT OF THE HEAD WITHOUT CONTRAST  7/15/2021 7:28 pm TECHNIQUE: CT of the head was performed without the administration of intravenous contrast. Dose modulation, iterative reconstruction, and/or weight based adjustment of the mA/kV was utilized to reduce the radiation dose to as low as reasonably achievable.  COMPARISON: CT brain June 11, 2021 [facility] HISTORY: AMS TECHNOLOGIST PROVIDED HISTORY: Reason for exam:->AMS Has a \"code stroke\" or \"stroke alert\" been called? ->No Decision Support Exception - unselect if not a suspected or confirmed emergency medical condition->Emergency Medical Condition (MA) Reason for Exam: AMS FINDINGS: BRAIN/VENTRICLES: There is no acute intracranial hemorrhage, mass effect or midline shift. No abnormal extra-axial fluid collection. The gray-white differentiation is maintained without evidence of an acute infarct. There is no evidence of hydrocephalus. Severe atherosclerotic change of the intracranial vasculature. There is moderate periventricular and subcortical white matter hypoattenuation most consistent with microvascular ischemic changes. Global cerebral atrophy noted. ORBITS: The visualized portion of the orbits demonstrate no acute abnormality. SINUSES: The visualized paranasal sinuses and mastoid air cells demonstrate no acute abnormality. SOFT TISSUES/SKULL:  No acute abnormality of the visualized skull or soft tissues. No acute intracranial abnormality. Chronic microvascular ischemic changes and global cerebral atrophy. XR CHEST PORTABLE    Result Date: 7/16/2021  EXAMINATION: ONE XRAY VIEW OF THE CHEST 7/16/2021 10:31 am COMPARISON: July 15, 2021 HISTORY: ORDERING SYSTEM PROVIDED HISTORY: s/p central line attempt TECHNOLOGIST PROVIDED HISTORY: Reason for exam:->s/p central line attempt Reason for Exam: s/p central line attempt FINDINGS: Right IJ central venous catheter terminates in the mid SVC in satisfactory position. No appreciable pneumothorax. Cardiomediastinal silhouette appears unchanged. Pulmonary vascular congestion and diffuse interstitial prominence. Trace bilateral pleural effusions cannot be excluded. Minimal bibasilar atelectasis. Right IJ central venous catheter terminates in the mid SVC in satisfactory position. No pneumothorax.  Pulmonary vascular congestion with interstitial pulmonary edema.     XR CHEST PORTABLE    Result Date: 7/15/2021  EXAMINATION: ONE XRAY VIEW OF THE CHEST 7/15/2021 5:47 pm COMPARISON: 06/13/2021 HISTORY: ORDERING SYSTEM PROVIDED HISTORY: AMS TECHNOLOGIST PROVIDED HISTORY: Reason for exam:->AMS Reason for Exam: AMS Initial encounter FINDINGS: Cardiomegaly. There is increased interstitial opacities bilaterally. No pleural effusion or pneumothorax. The osseous structures otherwise stable. Cardiomegaly with pulmonary edema. IR NONTUNNELED VASCULAR CATHETER > 5 YEARS    Result Date: 7/16/2021  PROCEDURE: ULTRASOUND GUIDED VASCULAR ACCESS. PLACEMENT OF A NON-TUNNELED CATHETER. 7/16/2021. HISTORY: ORDERING SYSTEM PROVIDED HISTORY: Need central venous access. Request CVC Insertion TECHNOLOGIST PROVIDED HISTORY: Reason for exam:->CVC Insertion SEDATION: None. FLUOROSCOPY DOSE AND TYPE OR TIME AND EXPOSURES: None. TECHNIQUE: Informed consent was obtained after a detailed explanation of the procedure including risks, benefits, and alternatives. Universal protocol was observed. The right neck and chest were prepped and draped in sterile fashion using maximum sterile barrier technique. Local anesthesia was achieved with lidocaine. A micropuncture needle was used to access the right internal jugular vein using ultrasound guidance. An ultrasound image demonstrating patency of the vein with needle tip located within it. An image was obtained and stored in PACs. A 0.035 guidewire was used to place a triple lumen central venous catheter after fascial tract dilation. The catheter flushed easily and there was a good blood return. The catheter was sutured to the skin. The catheter was locked with heparinized saline. The patient tolerated the procedure well and there were no immediate complications. EBL: Less than 1 ml. FINDINGS: Chest x ray after procedure demonstrates the tip of the catheter in the SVC. The catheter is ready to use.      Successful ultrasound and fluoroscopy guided non-tunneled central venous catheter placement. The catheter is ready to use. MRI BRAIN WO CONTRAST    Result Date: 7/20/2021  EXAMINATION: MRI OF THE BRAIN WITHOUT CONTRAST  7/20/2021 4:07 pm TECHNIQUE: Multiplanar multisequence MRI of the brain was performed without the administration of intravenous contrast. COMPARISON: None. HISTORY: ORDERING SYSTEM PROVIDED HISTORY: aphasic TECHNOLOGIST PROVIDED HISTORY: Reason for exam:->aphasic Reason for Exam: aphasic Acuity: Acute Type of Exam: Initial Relevant Medical/Surgical History: none FINDINGS: Motion degrades images limiting evaluation. INTRACRANIAL STRUCTURES/VENTRICLES: There is no acute infarct. No mass effect or midline shift. No evidence of an acute intracranial hemorrhage. Areas of T2 FLAIR hyperintensity are seen in the periventricular and subcortical white matter, which are nonspecific, but may represent chronic microvascular ischemic change. There is prominence of the ventricles and sulci due to global parenchymal volume loss. Evaluation of the signal voids within the major intracranial vessels is limited given patient motion. ORBITS: The visualized portion of the orbits demonstrate no acute abnormality. SINUSES: No gross abnormality seen of the paranasal sinuses. There appears to be a left mastoid effusion. 1. Patient motion limits evaluation. 2. No convincing acute intracranial abnormality. No acute infarct. 3. Mild global parenchymal volume loss with moderate chronic microvascular ischemic changes.        LAB RESULTS  --------------------    Recent Results (from the past 24 hour(s))   POCT Glucose    Collection Time: 07/23/21  1:46 AM   Result Value Ref Range    POC Glucose 124 (H) 70 - 99 MG/DL   Comprehensive Metabolic Panel    Collection Time: 07/23/21  4:15 AM   Result Value Ref Range    Sodium 140 135 - 145 MMOL/L    Potassium 4.1 3.5 - 5.1 MMOL/L    Chloride 100 99 - 110 mMol/L    CO2 32 21 - 32 MMOL/L    BUN 14 6 - 23 MG/DL CREATININE 1.1 0.6 - 1.1 MG/DL    Glucose 118 (H) 70 - 99 MG/DL    Calcium 9.0 8.3 - 10.6 MG/DL    Albumin 3.2 (L) 3.4 - 5.0 GM/DL    Total Protein 5.6 (L) 6.4 - 8.2 GM/DL    Total Bilirubin 1.2 (H) 0.0 - 1.0 MG/DL    ALT 72 (H) 10 - 40 U/L    AST 29 15 - 37 IU/L    Alkaline Phosphatase 145 (H) 40 - 128 IU/L    GFR Non- 50 (L) >60 mL/min/1.73m2    GFR African American >60 >60 mL/min/1.73m2    Anion Gap 8 4 - 16   POCT Glucose    Collection Time: 07/23/21  7:44 AM   Result Value Ref Range    POC Glucose 176 (H) 70 - 99 MG/DL   POCT Glucose    Collection Time: 07/23/21 12:43 PM   Result Value Ref Range    POC Glucose 242 (H) 70 - 99 MG/DL   POCT Glucose    Collection Time: 07/23/21  5:41 PM   Result Value Ref Range    POC Glucose 168 (H) 70 - 99 MG/DL   POCT Glucose    Collection Time: 07/23/21  8:12 PM   Result Value Ref Range    POC Glucose 260 (H) 70 - 99 MG/DL         Medical problems    Patient Active Problem List:     History of CVA (cerebrovascular accident) without residual deficits     Morbid obesity (HCC)     DM (diabetes mellitus), type 2, uncontrolled ([facility])     Persistent atrial fibrillation (HCC)     CAD (coronary artery disease)     CHF (congestive heart failure) ([facility])     Light headed     History of MI (myocardial infarction)     Diabetes mellitus (HCC)     Hypertension     TIA (transient ischemic attack)     Hyperlipidemia     SOB (shortness of breath)     H/O cardiovascular stress test     Family history of coronary artery disease     PAF (paroxysmal atrial fibrillation) ([facility])     Chest pain     Atrial thrombus without antecedent myocardial infarction     S/P ablation of atrial fibrillation     Gastroenteritis     YESIKA (acute kidney injury) ([facility] [address]     Acute cystitis     Elevated liver enzymes     CHF (congestive heart failure), NYHA class I, acute on chronic, combined (HCC)     Pneumonia     Multifocal pneumonia     Atrial fibrillation with RVR ([facility])     Shoulder pain     Paroxysmal

## 2021-07-25 NOTE — PROGRESS NOTES
NEUROLOGY NOTE  DR. Adeline Crawford MD.  -------------------------------------------------  Subjective:    Pt is less confused and doing better today    Objective:    BP (!) 145/63   Pulse 82   Temp 98.2 °F (36.8 °C) (Oral)   Resp 20   Ht 5' (1.524 m)   Wt 157 lb 6.5 oz (71.4 kg)   SpO2 92%   BMI 30.74 kg/m²   HEENT nl      Neuro exam    Alert oriented to person place mildly confused  eomi pupils 3 mm jo  Squeezes fingers to commands  Equivocal toes  resp on own      RADIOLOGY  -----------------    Echocardiogram complete 2D with doppler with color    Result Date: 7/16/2021  Transthoracic Echocardiography Report (TTE)  Demographics   Patient Name       Uvaldo MAYFIELD Date of Study       07/16/2021   Date of Birth      1955         Gender              Female   Age                60 year(s)         Race                   Patient Number     [id number]         Room Number         2128   Visit Number       [id number]   Corporate ID       [id number]   Accession Number   [id number]         Brayan Crawford RDMS   Ordering Physician Baldev Gore MD                 Physician           MD  Procedure Type of Study   TTE procedure:ECHOCARDIOGRAM COMPLETE 2D W DOPPLER W COLOR. Procedure Date Date: 07/16/2021 Start: 01:35 PM Study Location: Portable Technical Quality: Fair visualization Indications:Congestive heart failure. Patient Status: Routine Height: 60 inches Weight: 180 pounds BSA: 1.78 m2 BMI: 35.15 kg/m2 HR: 99 bpm BP: 145/83 mmHg  Conclusions   Summary  Left ventricular systolic function is abnormal.  Ejection fraction is visually estimated at 15-20%. Global hypokinesis noted. Grade III diastolic dysfunction. Mitral annular calcification is present. Moderate to severe mitral regurgitation.   Mild to moderate tricuspid regurgitation; RVSP: 38 mmHg.  No evidence of any pericardial effusion. Signature   ------------------------------------------------------------------  Electronically signed by Juan José Arreguin MD (Interpreting  physician) on 07/16/2021 at 04:31 PM  ------------------------------------------------------------------   Findings   Left Ventricle  Left ventricular systolic function is abnormal.  Ejection fraction is visually estimated at 15-20%. Global hypokinesis noted. Grade III diastolic dysfunction. Left ventricle size is normal.   Left Atrium  Essentially normal left atrium. Right Atrium  Essentially normal right atrium. Right Ventricle  Essentially normal right ventricle. Aortic Valve  Trace aortic regurgitation noted. Mitral Valve  Mitral annular calcification is present. Moderate to severe mitral regurgitation. Tricuspid Valve  Mild to moderate tricuspid regurgitation; RVSP: 38 mmHg. Pulmonic Valve  The pulmonic valve was not well visualized. Pericardial Effusion  No evidence of any pericardial effusion. Pleural Effusion  No evidence of pleural effusion. Miscellaneous  The aorta is within normal limits.   M-Mode/2D Measurements & Calculations   LV Diastolic Dimension:   LV Systolic Dimension:   LA Dimension: 3.8 cmAO  4.86 cm                   4.6 cm                   Root Dimension: 2.8 cm  LV FS:5.4 %               LV Volume Diastolic: 587  LV PW Diastolic: 6.73 cm  ml  LV PW Systolic: 6.23 cm   LV Volume Systolic: 673  Septum Diastolic: 5.62 cm ml                       RV Diastolic Dimension:  Septum Systolic: 8.33 cm  LV EDV/LV EDV Index: 116 3.13 cm  CO: 2.42 l/min            ml/65 m2LV ESV/LV ESV  CI: 1.36 l/m*m2           Index: 108 ml/61 m2      LA/Aorta: 1.36                            EF Calculated (A4C): 6.9  LV Area Diastolic: 29.8   %  cm2                       EF Calculated (2D): 80.3  LV Area Systolic: 32 cm2  %                             LV Length: 7.68 cm                             LVOT: 1.9 cm Doppler Measurements & Calculations   MV Peak E-Wave: 136  AV Peak Velocity: 125 cm/s    LVOT Peak Velocity: 64.6  cm/s                 AV Peak Gradient: 6.25 mmHg   cm/s  MV Peak A-Wave: 68.5 AV Mean Velocity: 86.6 cm/s   LVOT Mean Velocity: 39.8  cm/s                 AV Mean Gradient: 3 mmHg      cm/s  MV E/A Ratio: 1.99   AV VTI: 21 cm                 LVOT Peak Gradient: 2  MV Peak Gradient:    AV Area (Continuity):1.16 cm2 mmHgLVOT Mean Gradient: 1  7.4 mmHg                                           mmHg                       LVOT VTI: 8.63 cm             Estimated RVSP: 38 mmHg  MV P1/2t: 41 msec                                  Estimated RAP:3 mmHg  MVA by PHT:5.37 cm2  Estimated PASP: 35.49 mmHg   MV E' Septal                                       TR Velocity:285 cm/s  Velocity: 4.89 cm/s                                TR Gradient:32.49 mmHg  MV E' Lateral  Velocity: 10.4 cm/s  MV E/E' septal:  27.81  MV E/E' lateral:  13.08      CT ABDOMEN PELVIS WO CONTRAST Additional Contrast? None    Result Date: 7/15/2021  EXAMINATION: CT OF THE ABDOMEN AND PELVIS WITHOUT CONTRAST 7/15/2021 7:28 pm TECHNIQUE: CT of the abdomen and pelvis was performed without the administration of intravenous contrast. Multiplanar reformatted images are provided for review. Dose modulation, iterative reconstruction, and/or weight based adjustment of the mA/kV was utilized to reduce the radiation dose to as low as reasonably achievable. COMPARISON: MRI abdomen May 24, 2019; CT abdomen May 23, 2019 HISTORY: ORDERING SYSTEM PROVIDED HISTORY: Abdominal pain TECHNOLOGIST PROVIDED HISTORY: Reason for exam:->Abdominal pain Additional Contrast?->None Reason for Exam: Abdominal pain FINDINGS: Lower Chest: Imaged lung bases demonstrate partially imaged small and associated atelectasis. Imaged lung fields demonstrate findings suggesting mild pulmonary edema, however evaluation is limited due to respiratory motion artifact.   Coronary artery HISTORY: AMS TECHNOLOGIST PROVIDED HISTORY: Reason for exam:->AMS Has a \"code stroke\" or \"stroke alert\" been called? ->No Decision Support Exception - unselect if not a suspected or confirmed emergency medical condition->Emergency Medical Condition (MA) Reason for Exam: AMS FINDINGS: BRAIN/VENTRICLES: There is no acute intracranial hemorrhage, mass effect or midline shift. No abnormal extra-axial fluid collection. The gray-white differentiation is maintained without evidence of an acute infarct. There is no evidence of hydrocephalus. Severe atherosclerotic change of the intracranial vasculature. There is moderate periventricular and subcortical white matter hypoattenuation most consistent with microvascular ischemic changes. Global cerebral atrophy noted. ORBITS: The visualized portion of the orbits demonstrate no acute abnormality. SINUSES: The visualized paranasal sinuses and mastoid air cells demonstrate no acute abnormality. SOFT TISSUES/SKULL:  No acute abnormality of the visualized skull or soft tissues. No acute intracranial abnormality. Chronic microvascular ischemic changes and global cerebral atrophy. XR CHEST PORTABLE    Result Date: 7/16/2021  EXAMINATION: ONE XRAY VIEW OF THE CHEST 7/16/2021 10:31 am COMPARISON: July 15, 2021 HISTORY: ORDERING SYSTEM PROVIDED HISTORY: s/p central line attempt TECHNOLOGIST PROVIDED HISTORY: Reason for exam:->s/p central line attempt Reason for Exam: s/p central line attempt FINDINGS: Right IJ central venous catheter terminates in the mid SVC in satisfactory position. No appreciable pneumothorax. Cardiomediastinal silhouette appears unchanged. Pulmonary vascular congestion and diffuse interstitial prominence. Trace bilateral pleural effusions cannot be excluded. Minimal bibasilar atelectasis. Right IJ central venous catheter terminates in the mid SVC in satisfactory position. No pneumothorax.  Pulmonary vascular congestion with interstitial pulmonary edema.     XR CHEST PORTABLE    Result Date: 7/15/2021  EXAMINATION: ONE XRAY VIEW OF THE CHEST 7/15/2021 5:47 pm COMPARISON: 06/13/2021 HISTORY: ORDERING SYSTEM PROVIDED HISTORY: AMS TECHNOLOGIST PROVIDED HISTORY: Reason for exam:->AMS Reason for Exam: AMS Initial encounter FINDINGS: Cardiomegaly. There is increased interstitial opacities bilaterally. No pleural effusion or pneumothorax. The osseous structures otherwise stable. Cardiomegaly with pulmonary edema. IR NONTUNNELED VASCULAR CATHETER > 5 YEARS    Result Date: 7/16/2021  PROCEDURE: ULTRASOUND GUIDED VASCULAR ACCESS. PLACEMENT OF A NON-TUNNELED CATHETER. 7/16/2021. HISTORY: ORDERING SYSTEM PROVIDED HISTORY: Need central venous access. Request CVC Insertion TECHNOLOGIST PROVIDED HISTORY: Reason for exam:->CVC Insertion SEDATION: None. FLUOROSCOPY DOSE AND TYPE OR TIME AND EXPOSURES: None. TECHNIQUE: Informed consent was obtained after a detailed explanation of the procedure including risks, benefits, and alternatives. Universal protocol was observed. The right neck and chest were prepped and draped in sterile fashion using maximum sterile barrier technique. Local anesthesia was achieved with lidocaine. A micropuncture needle was used to access the right internal jugular vein using ultrasound guidance. An ultrasound image demonstrating patency of the vein with needle tip located within it. An image was obtained and stored in PACs. A 0.035 guidewire was used to place a triple lumen central venous catheter after fascial tract dilation. The catheter flushed easily and there was a good blood return. The catheter was sutured to the skin. The catheter was locked with heparinized saline. The patient tolerated the procedure well and there were no immediate complications. EBL: Less than 1 ml. FINDINGS: Chest x ray after procedure demonstrates the tip of the catheter in the SVC. The catheter is ready to use.      Successful ultrasound and fluoroscopy guided non-tunneled central venous catheter placement. The catheter is ready to use. MRI BRAIN WO CONTRAST    Result Date: 7/20/2021  EXAMINATION: MRI OF THE BRAIN WITHOUT CONTRAST  7/20/2021 4:07 pm TECHNIQUE: Multiplanar multisequence MRI of the brain was performed without the administration of intravenous contrast. COMPARISON: None. HISTORY: ORDERING SYSTEM PROVIDED HISTORY: aphasic TECHNOLOGIST PROVIDED HISTORY: Reason for exam:->aphasic Reason for Exam: aphasic Acuity: Acute Type of Exam: Initial Relevant Medical/Surgical History: none FINDINGS: Motion degrades images limiting evaluation. INTRACRANIAL STRUCTURES/VENTRICLES: There is no acute infarct. No mass effect or midline shift. No evidence of an acute intracranial hemorrhage. Areas of T2 FLAIR hyperintensity are seen in the periventricular and subcortical white matter, which are nonspecific, but may represent chronic microvascular ischemic change. There is prominence of the ventricles and sulci due to global parenchymal volume loss. Evaluation of the signal voids within the major intracranial vessels is limited given patient motion. ORBITS: The visualized portion of the orbits demonstrate no acute abnormality. SINUSES: No gross abnormality seen of the paranasal sinuses. There appears to be a left mastoid effusion. 1. Patient motion limits evaluation. 2. No convincing acute intracranial abnormality. No acute infarct. 3. Mild global parenchymal volume loss with moderate chronic microvascular ischemic changes.        LAB RESULTS  --------------------    Recent Results (from the past 24 hour(s))   POCT Glucose    Collection Time: 07/24/21  1:30 AM   Result Value Ref Range    POC Glucose 132 (H) 70 - 99 MG/DL   Comprehensive Metabolic Panel    Collection Time: 07/24/21  5:01 AM   Result Value Ref Range    Sodium 136 135 - 145 MMOL/L    Potassium 4.0 3.5 - 5.1 MMOL/L    Chloride 99 99 - 110 mMol/L    CO2 31 21 - 32 MMOL/L    BUN 18 6 - 23 MG/DL CREATININE 1.2 (H) 0.6 - 1.1 MG/DL    Glucose 121 (H) 70 - 99 MG/DL    Calcium 9.2 8.3 - 10.6 MG/DL    Albumin 3.2 (L) 3.4 - 5.0 GM/DL    Total Protein 5.8 (L) 6.4 - 8.2 GM/DL    Total Bilirubin 1.0 0.0 - 1.0 MG/DL    ALT 56 (H) 10 - 40 U/L    AST 23 15 - 37 IU/L    Alkaline Phosphatase 137 (H) 40 - 128 IU/L    GFR Non- 45 (L) >60 mL/min/1.73m2    GFR  55 (L) >60 mL/min/1.73m2    Anion Gap 6 4 - 16   POCT Glucose    Collection Time: 07/24/21  8:04 AM   Result Value Ref Range    POC Glucose 108 (H) 70 - 99 MG/DL   POCT Glucose    Collection Time: 07/24/21 12:00 PM   Result Value Ref Range    POC Glucose 193 (H) 70 - 99 MG/DL   POCT Glucose    Collection Time: 07/24/21  5:39 PM   Result Value Ref Range    POC Glucose 287 (H) 70 - 99 MG/DL   POCT Glucose    Collection Time: 07/24/21  9:27 PM   Result Value Ref Range    POC Glucose 207 (H) 70 - 99 MG/DL         Medical problems    Patient Active Problem List:     History of CVA (cerebrovascular accident) without residual deficits     Morbid obesity ([facility] [address]     DM (diabetes mellitus), type 2, uncontrolled ([facility])     Persistent atrial fibrillation ([facility])     CAD (coronary artery disease)     CHF (congestive heart failure) ([facility])     Light headed     History of MI (myocardial infarction)     Diabetes mellitus ([facility])     Hypertension     TIA (transient ischemic attack)     Hyperlipidemia     SOB (shortness of breath)     H/O cardiovascular stress test     Family history of coronary artery disease     PAF (paroxysmal atrial fibrillation) ([facility])     Chest pain     Atrial thrombus without antecedent myocardial infarction     S/P ablation of atrial fibrillation     Gastroenteritis     YESIKA (acute kidney injury) ([facility] [address]     Acute cystitis     Elevated liver enzymes     CHF (congestive heart failure), NYHA class I, acute on chronic, combined ([facility])     Pneumonia     Multifocal pneumonia     Atrial fibrillation with RVR ([facility])     Shoulder pain     Paroxysmal atrial fibrillation (HCC)     Diabetic hyperosmolar coma (HCC)     Difficult intravenous access     HFrEF (heart failure with reduced ejection fraction) ([facility])      ASSESSMENT:  ---------------------    Metabolic encephalopathy     TIA r/o cardio carotid embolic event     Hyperglycemia     ? Sepsis     A fib    Right MCA left Ophthalmic artery aneurysms    Multiple intracranial stenosis     PLAN:     CT brain neg     Mri brain neg    Mra head     C doppler     Echo EF 15 %      B 12 folate TSH  nl    High Homocysteine level     Continue eliquis      DC asa     Plavix    foltx     Pt is doing better today.     Discussed with pts sister.         Electronically signed by Lawson Florez MD on 7/24/2021 at 11:40 PM

## 2021-07-25 NOTE — PROGRESS NOTES
Progress Note( Dr. Deep Post)  7/25/2021  Subjective:   Admit Date: 7/15/2021  PCP: Destini Kellogg PA-C        Admitted For :   Altered mental state and severe hyperglycemia possible metabolic encephalopathy    Consulted For: Better control of blood glucose    Interval History: Patient is more alert awake responding to verbal commands and some answers seem to be appropriate and other seem to be confused     Tells me that she ate meal and nurses verified to the fact that she ate breakfast    CT Scan& MRI of Brain  Neg for any acute CVA     Denies any chest pains,   Denies SOB . Denies nausea or vomiting. Unable to eat of course she is so much drowsy  No new bowel or bladder symptoms.        Intake/Output Summary (Last 24 hours) at 7/25/2021 1242  Last data filed at 7/25/2021 1001  Gross per 24 hour   Intake 680 ml   Output 1750 ml   Net -1070 ml       DATA    CBC:   Recent Labs     07/25/21  0500   WBC 12.5*   HGB 12.9       CMP:  Recent Labs     07/23/21  0415 07/24/21  0501 07/25/21  0500    136 136   K 4.1 4.0 4.3    99 97*   CO2 32 31 30   BUN 14 18 20   CREATININE 1.1 1.2* 1.6*   CALCIUM 9.0 9.2 9.3   PROT 5.6* 5.8* 6.2*   LABALBU 3.2* 3.2* 3.9   BILITOT 1.2* 1.0 1.1*   ALKPHOS 145* 137* 148*   AST 29 23 22   ALT 72* 56* 48*     Lipids:   Lab Results   Component Value Date    CHOL 153 01/30/2012    HDL 59 01/30/2012    TRIG 135 01/30/2012     Glucose:  Recent Labs     07/25/21  0157 07/25/21  0826 07/25/21  1207   POCGLU 165* 229* 220*     CzwkfrdadiQ2T:  Lab Results   Component Value Date    LABA1C 10.9 07/17/2021     High Sensitivity TSH:   Lab Results   Component Value Date    TSHHS 7.150 07/20/2021     Free T3: No results found for: FT3  Free T4:  Lab Results   Component Value Date    T4FREE 1.28 06/12/2021       Echocardiogram complete 2D with doppler with color    Result Date: 7/16/2021  Transthoracic Echocardiography Report (TTE)  Demographics   Patient Name       Zi Gaby Spivey of Study       07/16/2021   Date of Birth      1955         Gender              Female   Age                60 year(s)         Race                   Patient Number     [id number]         Room Number         2128   Visit Number       [id number]   Corporate ID       [id number]   Accession Number   [id number]         [address] [facility]   Ordering Physician Luan Guevara MD                 Physician           MD  Procedure Type of Study   TTE procedure:ECHOCARDIOGRAM COMPLETE 2D W DOPPLER W COLOR. Procedure Date Date: 07/16/2021 Start: 01:35 PM Study Location: Portable Technical Quality: Fair visualization Indications:Congestive heart failure. Patient Status: Routine Height: 60 inches Weight: 180 pounds BSA: 1.78 m2 BMI: 35.15 kg/m2 HR: 99 bpm BP: 145/83 mmHg  Conclusions   Summary  Left ventricular systolic function is abnormal.  Ejection fraction is visually estimated at 15-20%. Global hypokinesis noted. Grade III diastolic dysfunction. Mitral annular calcification is present. Moderate to severe mitral regurgitation. Mild to moderate tricuspid regurgitation; RVSP: 38 mmHg. No evidence of any pericardial effusion. Signature   ------------------------------------------------------------------  Electronically signed by James Caldera MD (Interpreting  physician) on 07/16/2021 at 04:31 PM    CT ABDOMEN PELVIS WO CONTRAST Additional Contrast? None    Result Date: 7/15/2021  EXAMINATION: CT OF THE ABDOMEN AND PELVIS WITHOUT CONTRAST 7/15/2021 7:28 pm    1. Findings at the lung bases suggesting pulmonary edema with small bilateral effusions and associated atelectasis. 2. No acute intra-abdominal process identified. 3. Severe atherosclerotic disease.      CT Head WO Contrast    Result Date: 7/15/2021  EXAMINATION: CT OF THE HEAD WITHOUT CONTRAST  7/15/2021 7:28 pm     No acute intracranial abnormality. Chronic microvascular ischemic changes and global cerebral atrophy. XR CHEST PORTABLE    Result Date: 7/16/2021  EXAMINATION: ONE XRAY VIEW OF THE CHEST 7/16/2021 10:31 am COMPARISON: July 15, 2021 HISTORY: ORDERING SYSTEM PROVIDED HISTORY: s/p central line attempt    Right IJ central venous catheter terminates in the mid SVC in satisfactory position. No pneumothorax. Pulmonary vascular congestion with interstitial pulmonary edema. XR CHEST PORTABLE    Result Date: 7/15/2021  EXAMINATION: ONE XRAY VIEW OF THE CHEST 7/15/2021 5:47 pm COMPARISON: 06/13/2021 HISTORY: ORDERING SYSTEM PROVIDED HISTORY: AMS TECHNOLOGIST PROVIDED HISTORY: Reason for exam:->AMS Reason for Exam: AMS Initial encounter FINDINGS: Cardiomegaly. There is increased interstitial opacities bilaterally. No pleural effusion or pneumothorax. The osseous structures otherwise stable. Cardiomegaly with pulmonary edema. IR NONTUNNELED VASCULAR CATHETER > 5 YEARS    Result Date: 7/16/2021  PROCEDURE: ULTRASOUND GUIDED VASCULAR ACCESS. PLACEMENT OF A NON-TUNNELED CATHETER. 7/16/2021. HISTORY: ORDERING SYSTEM PROVIDED HISTORY: Need central venous access. Request CVC Insertion TECHNOLOGIST PROVIDED HISTORY: Reason for exam:->CVC Insertion SEDATION: None. Successful ultrasound and fluoroscopy guided non-tunneled central venous catheter placement. The catheter is ready to use.        Scheduled Medicines   Medications:    insulin glargine  10 Units Subcutaneous Nightly    insulin lispro  0-9 Units Subcutaneous 2 times per day    dilTIAZem  30 mg Oral 4 times per day    cefepime  2,000 mg Intravenous Q24H    vancomycin  1,000 mg Intravenous Q24H    aspirin  81 mg Oral Daily    losartan  100 mg Oral QPM    folic acid-pyridoxine-cyancobalamin  1 tablet Oral Daily    insulin lispro  0-18 Units Subcutaneous TID     spironolactone  25 mg Oral BID    insulin lispro  10 Units Subcutaneous TID WC    amiodarone  200 mg Oral BID    apixaban  5 mg Oral BID    levothyroxine  50 mcg Oral Daily    metoprolol succinate  100 mg Oral Daily    pantoprazole  40 mg Oral Daily    alogliptin  6.25 mg Oral Daily    sodium chloride flush  5-40 mL Intravenous 2 times per day    sodium chloride flush  5-40 mL Intravenous 2 times per day      Infusions:    dextrose      sodium chloride      sodium chloride           Objective:   Vitals: BP (!) 159/71   Pulse 82   Temp 98.8 °F (37.1 °C) (Oral)   Resp 20   Ht 5' (1.524 m)   Wt 153 lb 14.1 oz (69.8 kg)   SpO2 98%   BMI 30.05 kg/m²   General appearance: alert and cooperative with exam appears to be somewhat dehydrated  Neck: no JVD or bruit  Thyroid : Normal lobes   Lungs: Has Vesicular Breath sounds   Heart: Atrial fibrillation  Abdomen: soft, non-tender; bowel sounds normal; no masses,  no organomegaly  Musculoskeletal: Normal  Extremities: extremities normal, , no edema  Neurologic:  Awake, alert, oriented to name, place and time. Cranial nerves II-XII are grossly intact. Motor is  intact. Sensory is neuropathy. ,  and gait is normal.    Assessment:     Patient Active Problem List:     History of CVA (cerebrovascular accident) without residual deficits     Morbid obesity ([address]     DM (diabetes mellitus), type 2, uncontrolled ([address]     Persistent atrial fibrillation (HCC)     CAD (coronary artery disease)     CHF (congestive heart failure) ([address]     Light headed     History of MI (myocardial infarction)     Diabetes mellitus ([address]     Hypertension     TIA (transient ischemic attack)     Hyperlipidemia     SOB (shortness of breath)     H/O cardiovascular stress test     Family history of coronary artery disease     PAF (paroxysmal atrial fibrillation) ([facility])     Chest pain     Atrial thrombus without antecedent myocardial infarction     S/P ablation of atrial fibrillation     Gastroenteritis     YESIKA (acute kidney injury) ([address]     Acute cystitis     Elevated liver enzymes     CHF (congestive heart failure), NYHA class I, acute on chronic, combined (HCC)     Pneumonia     Multifocal pneumonia     Atrial fibrillation with RVR (HCC)     Shoulder pain     Paroxysmal atrial fibrillation (HCC)     Diabetic hyperosmolar coma ([facility] [address]     Difficult intravenous access     Metabolic encephalopathy getting better      Plan:     1. Reviewed POC blood rate 14 with regular foot embolism there is good  2. . Labs and X ray results   3. Reviewed Current Medicines   4. On meal +,  correction bolus Humalog/ Basal Lantus Insulin regime does get  5. Monitor Blood glucose frequently   6. Modified  the dose of Insulin/ other medicines as needed  7. Reviewed notes from Dr. Roby Vasques changing her CODE STATUS  8. Will follow     .      Nicolasa Olivera MD, MD

## 2021-07-25 NOTE — PROGRESS NOTES
Nephrology Progress Note  7/25/2021 11:03 AM  Subjective: Interval History: Beryle Barrow is a 60 y.o. female is a weak has restraints resting in bed      Data:   Scheduled Meds:   insulin glargine  10 Units Subcutaneous Nightly    insulin lispro  0-9 Units Subcutaneous 2 times per day    dilTIAZem  30 mg Oral 4 times per day    cefepime  2,000 mg Intravenous Q24H    vancomycin  1,000 mg Intravenous Q24H    aspirin  81 mg Oral Daily    losartan  100 mg Oral QPM    folic acid-pyridoxine-cyancobalamin  1 tablet Oral Daily    insulin lispro  0-18 Units Subcutaneous TID WC    spironolactone  25 mg Oral BID    insulin lispro  10 Units Subcutaneous TID WC    furosemide  20 mg Intravenous BID    amiodarone  200 mg Oral BID    apixaban  5 mg Oral BID    levothyroxine  50 mcg Oral Daily    metoprolol succinate  100 mg Oral Daily    pantoprazole  40 mg Oral Daily    alogliptin  6.25 mg Oral Daily    sodium chloride flush  5-40 mL Intravenous 2 times per day    sodium chloride flush  5-40 mL Intravenous 2 times per day     Continuous Infusions:   dextrose      sodium chloride      sodium chloride           CBC   Recent Labs     07/25/21  0500   WBC 12.5*   HGB 12.9   HCT 40.2         BMP   Recent Labs     07/23/21  0415 07/24/21  0501 07/25/21  0500    136 136   K 4.1 4.0 4.3    99 97*   CO2 32 31 30   BUN 14 18 20   CREATININE 1.1 1.2* 1.6*     Hepatic:   Recent Labs     07/23/21  0415 07/24/21  0501 07/25/21  0500   AST 29 23 22   ALT 72* 56* 48*   BILITOT 1.2* 1.0 1.1*   ALKPHOS 145* 137* 148*     Troponin: No results for input(s): TROPONINI in the last 72 hours. BNP: No results for input(s): BNP in the last 72 hours. Lipids: No results for input(s): CHOL, HDL in the last 72 hours. Invalid input(s): LDLCALCU  ABGs:   Lab Results   Component Value Date    PO2ART 80 07/16/2021    BMK5FTK 32.0 07/16/2021     INR: No results for input(s): INR in the last 72 hours.   Renal Labs  Albumin:    Lab Results   Component Value Date    LABALBU 3.9 07/25/2021     Calcium:    Lab Results   Component Value Date    CALCIUM 9.3 07/25/2021     Phosphorus:    Lab Results   Component Value Date    PHOS 3.0 07/22/2021     U/A:    Lab Results   Component Value Date    NITRU NEGATIVE 07/16/2021    COLORU CASTILLO 07/16/2021    WBCUA 15 07/16/2021    RBCUA 33 07/16/2021    MUCUS RARE 07/16/2021    TRICHOMONAS NONE SEEN 07/16/2021    YEAST RARE 09/01/2020    BACTERIA RARE 07/16/2021    CLARITYU CLEAR 07/16/2021    SPECGRAV 1.024 07/16/2021    UROBILINOGEN 2.0 07/16/2021    BILIRUBINUR NEGATIVE 07/16/2021    BLOODU LARGE 07/16/2021    KETUA NEGATIVE 07/16/2021     ABG:    Lab Results   Component Value Date    EUE3HEI 32.0 07/16/2021    PO2ART 80 07/16/2021    QEC8WIB 24.4 07/16/2021    BEART 2.1 11/01/2011     HgBA1c:    Lab Results   Component Value Date    LABA1C 10.9 07/17/2021     Microalbumen/Creatinine ratio:  No components found for: RUCREAT  TSH:  No results found for: TSH  IRON:    Lab Results   Component Value Date    IRON 72 05/24/2019     Iron Saturation:  No components found for: PERCENTFE  TIBC:    Lab Results   Component Value Date    TIBC 232 05/24/2019     FERRITIN:  No results found for: FERRITIN  RPR:  No results found for: RPR  DAVY:    Lab Results   Component Value Date    DAVY None Detected 05/24/2019    DAVY  05/24/2019     (NOTE)  If suspicion of connective tissue disease is strong and DAVY EIA is   negative, consider testing for DAVY by IFA ([id number]). INTERPRETIVE INFORMATION: Anti-Nuclear Antibodies (DAVY), IgG by   KD  Anti-Nuclear Antibodies (DAVY), IgG by KD: DAVY specimens are   screened using enzyme-linked immunosorbent assay (KD)   methodology. All KD results reported as Detected are further   tested by indirect fluorescent assay (IFA) using HEp-2 substrate   with an IgG-specific conjugate.  The DAVY KD screen is designed   to detect antibodies against dsDNA, histone, SS-A (Ro), SS-B (La),   Carroll, snRNP/Sm, Scl-70, Nelli-1, centromere, and an extract of lysed   HEp-2 cells. DAVY KD assays have been reported to have lower   sensitivities than DAVY IFA for systemic autoimmune rheumatic   diseases (SARD). Negative results do not necessarily rule out SARD. Performed by [facility] , [address] [phone]  www. Nighat Oneill MD, Lab. Director       24 Hour Urine for Creatinine Clearance:  No components found for: CREAT4, UHRS10, UTV10      Objective:   I/O: 07/24 0701 - 07/25 0700  In: 12 [P.O.:360]  Out: 1750 [Urine:1750]  I/O last 3 completed shifts: In: 560 [P.O.:360; IV Piggyback:200]  Out: 1750 [Urine:1750]  I/O this shift:  In: 360 [P.O.:360]  Out: -   Vitals: BP (!) 146/65   Pulse 83   Temp 98.8 °F (37.1 °C) (Oral)   Resp 17   Ht 5' (1.524 m)   Wt 153 lb 14.1 oz (69.8 kg)   SpO2 100%   BMI 30.05 kg/m²  {  General appearance: awake weak  HEENT: Head: Normal, normocephalic, atraumatic. Neck: supple, symmetrical, trachea midline  Lungs: diminished breath sounds bilaterally  Heart: S1, S2 normal  Abdomen: abnormal findings:  soft nt  Extremities: edema trace  Neurologic: Mental status: alertness: alert        Assessment and Plan:      IMP:  #1 CKD 3  2. Congestive heart failure  3. Metabolic encephalopathy  4. Type 2 diabetes uncontrolled  5. Hypertension    Plan     #1 renal function slightly worse will cut back on diuresis   #2. Oxygen support stable    #3 monitor affect slightly improved    #4 monitor glucose   #5.  Blood pressure stable           Bran Shay MD, MD

## 2021-07-25 NOTE — PROGRESS NOTES
[address] [facility]  consulted by Dr. Duane Matthews for monitoring and adjustment. Indication for treatment: Sepsis   Goal trough: 15 mcg/mL  AUC/BEA: 400-600    Pertinent Laboratory Values:   Temp Readings from Last 3 Encounters:   07/25/21 98.8 °F (37.1 °C) (Oral)   06/15/21 97.9 °F (36.6 °C) (Oral)   12/30/20 98.3 °F (36.8 °C) (Oral)     Recent Labs     07/25/21  0500   WBC 12.5*     Recent Labs     07/23/21  0415 07/24/21  0501 07/25/21  0500   BUN 14 18 20   CREATININE 1.1 1.2* 1.6*     Estimated Creatinine Clearance: 31 mL/min (A) (based on SCr of 1.6 mg/dL (H)). Intake/Output Summary (Last 24 hours) at 7/25/2021 1413  Last data filed at 7/25/2021 1001  Gross per 24 hour   Intake 560 ml   Output 1750 ml   Net -1190 ml       Pertinent Cultures:  Date                             Source                                     Results  7/15                             Blood                                       Ordered  7/15                             Urine                                        Negative    Vancomycin level:   TROUGH:    No results for input(s): VANCOTROUGH in the last 72 hours. VANCOMYCIN RANDOM:  No results for input(s): VANCORANDOM in the last 72 hours.     Assessment:  · WBC previously elevated; afebrile  · SCr, BUN, and urine output: YESIKA   · Day(s) of therapy: 11  · Vancomycin concentration:   · 07/18 - 22.2, supra-therapeutic on vancomycin 1,250 mg q24h  · 07/21 - 13.7, therapeutic on vancomycin 1000 mg q24h ()    Plan:  · Vancomycin 1000 mg q24h, patient now with possible YESIKA   · Dose administered this morning, will get level 24h post-dose   · Pharmacy will continue to monitor patient and adjust therapy as indicated    Thank you for the consult,  Reinaldo Dewitt, [facility]

## 2021-07-25 NOTE — PROGRESS NOTES
BILITOT 1.2* 1.0 1.1*   ALKPHOS 145* 137* 148*   AST 29 23 22   ALT 72* 56* 48*     PT/INR:  No results for input(s): PROTIME, INR in the last 72 hours. Meds:    insulin glargine  10 Units Subcutaneous Nightly    insulin lispro  0-9 Units Subcutaneous 2 times per day    dilTIAZem  30 mg Oral 4 times per day    cefepime  2,000 mg Intravenous Q24H    vancomycin  1,000 mg Intravenous Q24H    aspirin  81 mg Oral Daily    losartan  100 mg Oral QPM    folic acid-pyridoxine-cyancobalamin  1 tablet Oral Daily    insulin lispro  0-18 Units Subcutaneous TID WC    spironolactone  25 mg Oral BID    insulin lispro  10 Units Subcutaneous TID WC    furosemide  20 mg Intravenous BID    amiodarone  200 mg Oral BID    apixaban  5 mg Oral BID    levothyroxine  50 mcg Oral Daily    metoprolol succinate  100 mg Oral Daily    pantoprazole  40 mg Oral Daily    alogliptin  6.25 mg Oral Daily    sodium chloride flush  5-40 mL Intravenous 2 times per day    sodium chloride flush  5-40 mL Intravenous 2 times per day     PRN Meds: cloNIDine, hydrOXYzine, LORazepam, potassium chloride, albuterol sulfate HFA, nitroGLYCERIN, traZODone, glucose, dextrose, glucagon (rDNA), dextrose, sodium chloride flush, sodium chloride, ondansetron **OR** ondansetron, polyethylene glycol, acetaminophen **OR** acetaminophen, sodium chloride flush, sodium chloride    Assessment/Plan:   1. Diabetic hyperosmolar state - Better. We will continue insulin glargine, alogliptin, and sliding scale coverage. Appreciate endocrinology consult. Patient has not been eating. Patient may need feeding tube placed. Will continue to observe and evaluate over the weekend and consult gastroenterology on 07/26/2021 if needed. 2.  Altered mental status - Possibly secondary to metabolic encephalopathy. EEG and MRI brain negative for any other possible etiology. We will continue to monitor. Appreciate neurology consult.   3.  Leukocytosis, rule out sepsis - Improving. Will continue cefepime and vancomycin. 4.  Depression/grief - Patient is grieving the loss of both her  and her grandson this past month. Denies any suicidal/homicidal ideation. We will continue to monitor. 5.  Atrial fibrillation - Will continue amiodarone, metoprolol, and diltiazem. Appreciate cardiology consult. 6.  Hypertension - Will continue clonidine 0.1 mg-- 1 tablet p.o. every 4 hours as needed for whenever systolic blood pressures greater than 034 and diastolic blood pressures greater than 95.  7.  Elevated LFTs - Will continue to monitor. 8.  Pulmonary edema/possible pneumonia - CXR performed on 07/16/2021 indicated interstitial pulmonary edema. Will continue cefepime and vancomycin. Will continue to monitor. This patient was examined and treated by Liz Nogueira PA-C under the supervision of Dr. Cheri York MD.     I spent at least 41 minutes reviewing pt's record, previous notes, other providers' notes, labs, diagnostic imaging, and documenting in Epic in addition to face-to-face time I spent with the pt. Liz Nogueira PA-C  7/25/2021 10:14 AM     Pt seen and examined. Labs, imaging, and noted reviewed. Agree with above.      Cheri York MD.

## 2021-07-26 NOTE — CONSULTS
Iv Consult complete. Not a candidate for PICC placement d/t previous failed attempt to advance line though subclavian. PIV meets therapeutic needs at this time. Nexiva 20g 1.75\" Extra Long PIV inserted in RFA x1 attempt using sterile ultrasound guided technique. Brisk blood return, flushes without resistance. Patient provided jello and set up to feed herself, able to feed self with little difficulty.

## 2021-07-26 NOTE — PROGRESS NOTES
[address] [facility]  consulted by Dr. Desiree Lainez for monitoring and adjustment. Indication for treatment: Sepsis   Goal trough: 15 mcg/mL  AUC/BEA: 400-600    Pertinent Laboratory Values:   Temp Readings from Last 3 Encounters:   07/26/21 98.6 °F (37 °C) (Oral)   06/15/21 97.9 °F (36.6 °C) (Oral)   12/30/20 98.3 °F (36.8 °C) (Oral)     Recent Labs     07/25/21  0500 07/26/21  0625   WBC 12.5* 10.5     Recent Labs     07/24/21  0501 07/25/21  0500 07/26/21  0625   BUN 18 20 23   CREATININE 1.2* 1.6* 1.6*     Estimated Creatinine Clearance: 31 mL/min (A) (based on SCr of 1.6 mg/dL (H)). Intake/Output Summary (Last 24 hours) at 7/26/2021 1448  Last data filed at 7/26/2021 8603  Gross per 24 hour   Intake 110 ml   Output 1400 ml   Net -1290 ml       Pertinent Cultures:  Date                             Source                                     Results  7/15                             Blood                                       Ordered  7/15                             Urine                                        Negative    Vancomycin level:   TROUGH:    Recent Labs     07/26/21  0625   VANCOTROUGH 14.8      VANCOMYCIN RANDOM:  No results for input(s): VANCORANDOM in the last 72 hours.     Assessment:  · WBC WNL; afebrile  · SCr, BUN, and urine output: YESIKA   · Day(s) of therapy: 12  · Vancomycin concentration:   · 07/18 - 22.2, supra-therapeutic on vancomycin 1,250 mg q24h  · 07/21 - 13.7, therapeutic on vancomycin 1000 mg q24h ()  · 07/26 - 14.8, therapeutic, appropriate to re-dose    Plan:  · Adjusted from scheduled to intermittent with elevated renal labs  · Plan to redose with vancomycin 1000 mg x1  · Repeat next level tomorrow AM  · Pharmacy will continue to monitor patient and adjust therapy as indicated    Thank you for the consult,  Vanessa Duggan, [facility]

## 2021-07-26 NOTE — PLAN OF CARE
Problem: Non-Violent Restraints  Goal: Removal from restraints as soon as assessed to be safe  Outcome: Ongoing  Goal: No harm/injury to patient while restraints in use  Outcome: Ongoing  Goal: Patient's dignity will be maintained  Outcome: Ongoing     Problem: Discharge Planning:  Goal: Discharged to appropriate level of care  Description: Discharged to appropriate level of care  Outcome: Ongoing     Problem: Serum Glucose Level - Abnormal:  Goal: Ability to maintain appropriate glucose levels will improve  Description: Ability to maintain appropriate glucose levels will improve  Outcome: Ongoing     Problem: Sensory Perception - Impaired:  Goal: Ability to maintain a stable neurologic state will improve  Description: Ability to maintain a stable neurologic state will improve  Outcome: Ongoing     Problem: Gas Exchange - Impaired:  Goal: Levels of oxygenation will improve  Description: Levels of oxygenation will improve  Outcome: Ongoing     Problem: Infection, Septic Shock:  Goal: Will show no infection signs and symptoms  Description: Will show no infection signs and symptoms  Outcome: Ongoing     Problem: Infection - Ventilator-Associated Pneumonia:  Goal: Absence of pulmonary infection  Description: Absence of pulmonary infection  Outcome: Ongoing     Problem: Tissue Perfusion, Altered:  Goal: Circulatory function within specified parameters  Description: Circulatory function within specified parameters  Outcome: Ongoing     Problem: Venous Thromboembolism:  Goal: Will show no signs or symptoms of venous thromboembolism  Description: Will show no signs or symptoms of venous thromboembolism  Outcome: Ongoing  Goal: Absence of signs or symptoms of impaired coagulation  Description: Absence of signs or symptoms of impaired coagulation  Outcome: Ongoing     Problem: Falls - Risk of:  Goal: Will remain free from falls  Description: Will remain free from falls  Outcome: Ongoing  Goal: Absence of physical injury  Description: Absence of physical injury  Outcome: Ongoing     Problem: Pain:  Goal: Pain level will decrease  Description: Pain level will decrease  Outcome: Ongoing  Goal: Control of acute pain  Description: Control of acute pain  Outcome: Ongoing  Goal: Control of chronic pain  Description: Control of chronic pain  Outcome: Ongoing     Problem: Skin Integrity:  Goal: Will show no infection signs and symptoms  Description: Will show no infection signs and symptoms  Outcome: Ongoing  Goal: Absence of new skin breakdown  Description: Absence of new skin breakdown  Outcome: Ongoing     Problem: Nutrition  Goal: Optimal nutrition therapy  Outcome: Ongoing

## 2021-07-26 NOTE — PLAN OF CARE
Nutrition Problem #1: Moderate malnutrition, In context of chronic illness  Intervention: Food and/or Nutrient Delivery: Snacks (Comment), Modify Current Diet, Discontinue Oral Nutrition Supplement  Nutritional Goals: Pt will consume at least 50% of meals on diet order during stay

## 2021-07-26 NOTE — PROGRESS NOTES
Nephrology Progress Note  7/26/2021 7:30 AM        Subjective:   Admit Date: 7/15/2021  PCP: Lila Narayanan PA-C    Interval History: neurologically not better - pulled CVC out     Diet: none    ROS:  Delirious- agitation and not oriented   UOP  1400/d     Data:     Current meds:    lidocaine  5 mL Intradermal Once    sodium chloride flush  5-40 mL Intravenous 2 times per day    vancomycin (VANCOCIN) intermittent dosing (placeholder)   Other RX Placeholder    insulin glargine  10 Units Subcutaneous Nightly    insulin lispro  0-9 Units Subcutaneous 2 times per day    dilTIAZem  30 mg Oral 4 times per day    cefepime  2,000 mg Intravenous Q24H    aspirin  81 mg Oral Daily    losartan  100 mg Oral QPM    folic acid-pyridoxine-cyancobalamin  1 tablet Oral Daily    insulin lispro  0-18 Units Subcutaneous TID WC    spironolactone  25 mg Oral BID    insulin lispro  10 Units Subcutaneous TID WC    amiodarone  200 mg Oral BID    apixaban  5 mg Oral BID    levothyroxine  50 mcg Oral Daily    metoprolol succinate  100 mg Oral Daily    pantoprazole  40 mg Oral Daily    alogliptin  6.25 mg Oral Daily    sodium chloride flush  5-40 mL Intravenous 2 times per day    sodium chloride flush  5-40 mL Intravenous 2 times per day      dextrose      sodium chloride      sodium chloride           I/O last 3 completed shifts: In: 26 [P.O.:400;  I.V.:70]  Out: 1400 [Urine:1400]    CBC:   Recent Labs     07/25/21  0500 07/26/21  0625   WBC 12.5* 10.5   HGB 12.9 12.0*    346          Recent Labs     07/24/21  0501 07/25/21  0500    136   K 4.0 4.3   CL 99 97*   CO2 31 30   BUN 18 20   CREATININE 1.2* 1.6*   GLUCOSE 121* 178*       Lab Results   Component Value Date    CALCIUM 9.3 07/25/2021    PHOS 3.0 07/22/2021       Objective:     Vitals: /83   Pulse 105   Temp 98.2 °F (36.8 °C) (Oral)   Resp 21   Ht 5' (1.524 m)   Wt 153 lb 14.1 oz (69.8 kg)   SpO2 99%   BMI 30.05 kg/m²     General appearance:  Awake this am - no ac distress- no ac agitation - restrained   HEENT:  No gross conj pallor  Neck:  supple  Lungs:  + adv BS  Heart:  tachycardia  Abdomen: soft  Extremities:  No edema       Problem List :         Impression :     1. YESIKA- non oliguric - CKD stage 3- cr was up and UOP dropped- off diuretics seems like - will see the cr today and make sure alas is not clogged - and no other additional etiology-  2. ADHF- better  3. Encephalopathy of ? Etiology   4. HTN     Recommendation/Plan  :     1. Redo UA  2. Make sure alas is not clogged   3. Also review BMP  4. She has risk for ADHF\  5. BNP in am   6. Her CXR was without edema recently   7.  Also need to figure out the etiology of her encephalopathy       Paco Bautista MD MD

## 2021-07-26 NOTE — PROGRESS NOTES
Min A x2 c RW x3 trials, 1-2 minutes each. Functional Mobility: Min A c RW + cues for side step along EOB to position closer to head of bed, performed one step each LE. Self Care Training:   Cues were given for safety, sequence, UE/LE placement, visual cues, and balance. Activities performed today included LB dressing, hand hygiene, and grooming. LB Dressing: Dep for donning B  socks, Max A for underpants, pt required assistance for threading BLE and hike, did demonstrate use of RUE to hike both back and adjustment of R leg of underwear in standing. Grooming: Max A for thorough combing of hair s/p pt performed meager effort for top left of head only, identified fatigue. SBA seated to wash face and perform hand hygiene, required increased time for simple commands. All therapeutic intervention performed c emphasis on LB Dressing and grooming tasks, dynamic balance / standing tolerance to inc strength, endurance and act tolerance for inc Indep c ADL tasks, func transfers / mobility. Safety  Patient safely in bed + alarm set at end of session, with call light/phone in reach, and nursing aware. Gait belt was used for func transfers / mobility. Assessment / Impression:    Patient's tolerance of treatment: Well  Adverse Reaction: None  Significant change in status and impact:  Improving command follow and sitting / standing balance  Barriers to improvement: Continues to be limited by decreased cognition / balance / endurance      Plan for Next Session:    Continue per OT POC per patient's tolerance. Time in:  1040  Time out:  1113  Timed treatment minutes:  33  Total treatment time:  33      Electronically signed by:    IGNACIO Gonzalez  7/26/2021, 8:48 AM    Goals:  1. Pt will follow all basic one step commands 75+% of the time  2. Pt will complete log rolling at bed level for positioning needs min A  3. Pt will transfer supine to sitting EOB for EOB/OOB ADLs mod A  4.  Pt will complete basic grooming/facial hygiene tasks seated EOB min A with setup  5. Pt will engage in 10 minutes of dynamic reaching/neuro re-ed tasks unsupported sitting EOB to improve postural control for ADLs  6. Pt will complete functional sit to stand and stand pivot transfers to chair and BSC min A x 2  7. Pt will complete toileting task on BSC max A  8.  Pt will tolerate sitting in chair for 1 meal/day to increase alertness/OOB activity tolerance

## 2021-07-26 NOTE — PROGRESS NOTES
INTERNAL MEDICINE PROGRESS NOTE        Claudekalie Atwood Devan   1955   Primary Care Physician:  Josefa Patel PA-C  Admit Date: 7/15/2021     Subjective:   Patient remains less engaged today. However, patient is alert and oriented to self, year, and that she is in the hospital.  Patient is not alert and oriented to exact date nor the name of the hospital.  Nurse reports that while patient has not been eating breakfast, patient has been eating lunch and dinner. Denies chest pain, SOB, nausea, vomiting, abdominal pain. Remainder of ROS is unremarkable. Meds, labs and other notes reviewed. Objective:   /83   Pulse 105   Temp 98.2 °F (36.8 °C) (Oral)   Resp 21   Ht 5' (1.524 m)   Wt 153 lb 14.1 oz (69.8 kg)   SpO2 99%   BMI 30.05 kg/m²    Recent Labs     07/25/21  1319 07/25/21  1806 07/25/21 2050 07/26/21  0201   POCGLU 177* 122* 210* 234*       I/O last 3 completed shifts: In: 26 [P.O.:400; I.V.:70]  Out: 1400 [Urine:1400]  No intake/output data recorded.     Neck: no adenopathy and supple, symmetrical, trachea midline  Lungs: clear to auscultation bilaterally  Heart: regular rate and rhythm and S1, S2 normal  Abdomen: soft, non-tender; bowel sounds normal; no masses,  no organomegaly  Extremities: extremities normal, atraumatic, no cyanosis or edema  Neurologic: Alert and oriented to self, year, and that she is in the hospital.    Data Review  CBC with Differential:    Recent Labs     07/25/21  0500 07/26/21  0625   WBC 12.5* 10.5   RBC 4.25 3.90*   HGB 12.9 12.0*   HCT 40.2 37.1    346   MCV 94.6 95.1   MCH 30.4 30.8   MCHC 32.1 32.3   RDW 14.8 14.8   SEGSPCT 67.1* 68.1*   LYMPHOPCT 13.3* 15.2*   MONOPCT 12.3* 11.3*   BASOPCT 0.6 0.5   MONOSABS 1.5 1.2   LYMPHSABS 1.7 1.6   EOSABS 0.7 0.4   BASOSABS 0.1 0.1   DIFFTYPE AUTOMATED DIFFERENTIAL AUTOMATED DIFFERENTIAL     CMP:    Recent Labs     07/24/21  0501 07/25/21  0500 07/26/21  0625    136 137   K 4.0 4.3 4.6   CL 99 97* 101 CO2 31 30 30   BUN 18 20 23   CREATININE 1.2* 1.6* 1.6*   GFRAA 55* 39* 39*   LABGLOM 45* 32* 32*   GLUCOSE 121* 178* 191*   PROT 5.8* 6.2* 5.8*   LABALBU 3.2* 3.9 3.4   CALCIUM 9.2 9.3 9.5   BILITOT 1.0 1.1* 0.9   ALKPHOS 137* 148* 126   AST 23 22 18   ALT 56* 48* 36     PT/INR:  No results for input(s): PROTIME, INR in the last 72 hours. Meds:    lidocaine  5 mL Intradermal Once    sodium chloride flush  5-40 mL Intravenous 2 times per day    insulin glargine  15 Units Subcutaneous Nightly    insulin lispro  0-18 Units Subcutaneous 2 times per day    vancomycin (VANCOCIN) intermittent dosing (placeholder)   Other RX Placeholder    dilTIAZem  30 mg Oral 4 times per day    cefepime  2,000 mg Intravenous Q24H    aspirin  81 mg Oral Daily    losartan  100 mg Oral QPM    folic acid-pyridoxine-cyancobalamin  1 tablet Oral Daily    insulin lispro  0-18 Units Subcutaneous TID WC    insulin lispro  10 Units Subcutaneous TID WC    amiodarone  200 mg Oral BID    apixaban  5 mg Oral BID    levothyroxine  50 mcg Oral Daily    metoprolol succinate  100 mg Oral Daily    pantoprazole  40 mg Oral Daily    alogliptin  6.25 mg Oral Daily    sodium chloride flush  5-40 mL Intravenous 2 times per day    sodium chloride flush  5-40 mL Intravenous 2 times per day     PRN Meds: cloNIDine, hydrOXYzine, LORazepam, potassium chloride, albuterol sulfate HFA, nitroGLYCERIN, traZODone, glucose, dextrose, glucagon (rDNA), dextrose, sodium chloride flush, sodium chloride, ondansetron **OR** ondansetron, polyethylene glycol, acetaminophen **OR** acetaminophen, sodium chloride flush, sodium chloride    Assessment/Plan:   1. Diabetic hyperosmolar state - Better. We will continue insulin glargine, alogliptin, and sliding scale coverage. Appreciate endocrinology consult. Spoke with nurse who reports patient does not eat breakfast, but she does eat lunch and dinner.   As long as patient continues to eat, feeding tube is not necessary at this time.  2.  Altered mental status - Possibly secondary to metabolic encephalopathy. EEG and MRI brain negative for any other possible etiology. We will continue to monitor. Appreciate neurology consult. 3.  Leukocytosis, rule out sepsis - Improving. Will continue cefepime and vancomycin. 4.  Depression/grief - Patient is grieving the loss of both her  and her grandson this past month. Denies any suicidal/homicidal ideation. We will continue to monitor. 5.  Atrial fibrillation - Will continue amiodarone, metoprolol, and diltiazem. Appreciate cardiology consult. 6.  Hypertension - Will continue clonidine 0.1 mg-- 1 tablet p.o. every 4 hours as needed for whenever systolic blood pressures greater than 918 and diastolic blood pressures greater than 95.  7.  Elevated LFTs - Normal today. Will continue to monitor. 8.  Pulmonary edema/possible pneumonia - CXR performed on 07/16/2021 indicated interstitial pulmonary edema. Will continue cefepime and vancomycin. Will continue to monitor. This patient was examined and treated by Angelica Moise PA-C under the supervision of Dr. Pat Rivera MD.     I spent at least 39 minutes reviewing pt's record, previous notes, other providers' notes, labs, diagnostic imaging, and documenting in Epic in addition to face-to-face time I spent with the pt. Angelica Moise PA-C  7/26/2021 7:48 AM       Pt seen and examined. Labs, imaging, and noted reviewed. Agree with above.      Pat Rivera MD.

## 2021-07-26 NOTE — PROGRESS NOTES
Physical Therapy    Physical Therapy Treatment Note  Name: Ajit Waddell MRN: [id number] :   1955   Date:  2021   Admission Date: 7/15/2021 Room:  [address] [place], [place] [address]   Restrictions/Precautions:        Fall risk, poor command-following d/t decreased cognition, all 4 bed rails up for safety per RN. Communication with other providers:  Discussed pt appropriateness for tx prior to session, alerted RN to pt tolerance after session, co-tx with Jamey Paul. Subjective:  Patient states:  \"I had lunch, spaghetti I guess\", \"Where's my sister? \"  Pain:   Location, Type, Intensity (0/10 to 10/10):  No c/o, difficult to assess d/t cognitive status  Objective:    Observation:  Pt drowsy but awake upon entry, supine with HOB raised ~60*, all bed rails raised, no wrist restraints. Central line removed from anterior neck d/t pt pulling it out per RN report, new line placed at R UE with ACE wrap placed. Treatment, including education/measures:    Bed mobility: Pt requires Min A for sup>sit for advancement of trunk to upright and complete advancement of LE to EOB. Pt demonstrates imported bed mobility from last session, advances LE toward EOB and attempts rolling/reaching to EOB with increased time and effort. Return to supine pt requires Mod A for advancement of LE to EOB and turning of hips. Max A for repositioning toward HOB in supine, v/c for bending knees to improve pt participation in transfer with poor carryover. PT provided rolled pillow at lateral L LE to prevent \"figure 4\" position. Sitting balance: Pt tolerates x20 minutes seated balance at EOB with UE support throughout, intermittent Min A for retro/R LOB during reaching tasks, pt attempts to right self. PT assessment of pt command following in seated, pt able to perform x3 LAQ on BLE with demonstration, increased v/c and re-direction d/t pt difficulty attending to task.  Pt performs anterior scooting to EOB with SBA, increased time and effort required x4. GARAY assisting with ADLs in seated EOB including reaching tasks, pt demonstrates increased difficulty. Sit<>Stand: Pt performs a total of 3 STS from EOB to RW with CGA-Min for steadying assist and placement of UE on walker . Pt requires increased encouragement and simple v/c for \"stand up\". Pt demonstrates improved LE power and endurance this session, no retro leaning noted in transition from sit>stand nor during standing. Third STS performed in order to complete donning of underwear per pt request to wear them. GARAY provides Max A for threading and preparing, in standing pt participates in pulling up waistband with RUE, no LOB x1 minute. In standing pt performs \"marching\" exercise with max encouragement and demonstration x5 BLE, CGA throughout. Side-step: Pt performs x2 side-steps toward HOB in standing to allow proper placement with sit>supine transfer, increased postural sway and Min A for steadying required, increased v/c for sequencing required. Pt returned to supine with bed alarm, lines managed, 4 bed rails, RN notified.   Assessment / Impression:       Patient's tolerance of treatment:  fair   Adverse Reaction: none  Significant change in status and impact:  Improved LE strength   Barriers to improvement:  Cognition   Plan for Next Session:    Continue STS, attempt gait with RW and chair follow  Time in:  10:40  Time out:  11:15  Timed treatment minutes:  35  Total treatment time:  35    Previously filed items:  Social/Functional History  Lives With: Alone  Type of Home: House  Home Layout: One level  Home Access: Ramped entrance  Bathroom Shower/Tub: Tub/Shower unit  Bathroom Toilet: Handicap height  Bathroom Equipment: Shower chair, Grab bars in shower  ADL Assistance: [address]: Independent  Homemaking Responsibilities: Yes  Ambulation Assistance: Independent  Transfer Assistance: Independent  Active : No (Daughter drives)  Occupation: Retired  Type of occupation:   Short term goals  Time Frame for Short term goals: 1 week  Short term goal 1: Pt will perform supine>sit mobility with Mod A of 1 with improved command following. Short term goal 2: Pt will perform STS from standard surface with Min a and v/c for sequencing  Short term goal 3: Pt will AMB x10 ft in room to BR with LRAD and CGA-Min for safety.        Electronically signed by:    Maycol Moeller PT  7/26/2021, 1:33 PM

## 2021-07-26 NOTE — PROGRESS NOTES
NEUROLOGY NOTE  DR. Mehran Crabtree MD.  -------------------------------------------------  Subjective:    Pt is less confused and doing better today    Objective:    BP (!) 159/71   Pulse 90   Temp 98.8 °F (37.1 °C) (Oral)   Resp 19   Ht 5' (1.524 m)   Wt 153 lb 14.1 oz (69.8 kg)   SpO2 98%   BMI 30.05 kg/m²   HEENT nl      Neuro exam    Alert oriented to person place mildly confused  eomi pupils 3 mm jo  Squeezes fingers to commands  Equivocal toes  resp on own      RADIOLOGY  -----------------    Echocardiogram complete 2D with doppler with color    Result Date: 7/16/2021  Transthoracic Echocardiography Report (TTE)  Demographics   Patient Name       Ck MAYFIELD Date of Study       07/16/2021   Date of Birth      1955         Gender              Female   Age                60 year(s)         Race                   Patient Number     [id number]         Room Number         2128   Visit Number       [id number]   Corporate ID       [id number]   Accession Number   [id number]         Anup Shanks RDMS   Ordering Physician Peg Swartz MD                 Physician           MD  Procedure Type of Study   TTE procedure:ECHOCARDIOGRAM COMPLETE 2D W DOPPLER W COLOR. Procedure Date Date: 07/16/2021 Start: 01:35 PM Study Location: Portable Technical Quality: Fair visualization Indications:Congestive heart failure. Patient Status: Routine Height: 60 inches Weight: 180 pounds BSA: 1.78 m2 BMI: 35.15 kg/m2 HR: 99 bpm BP: 145/83 mmHg  Conclusions   Summary  Left ventricular systolic function is abnormal.  Ejection fraction is visually estimated at 15-20%. Global hypokinesis noted. Grade III diastolic dysfunction. Mitral annular calcification is present. Moderate to severe mitral regurgitation.   Mild to moderate tricuspid regurgitation; RVSP: 38 mmHg.  No evidence of any pericardial effusion. Signature   ------------------------------------------------------------------  Electronically signed by Paresh Conrad MD (Interpreting  physician) on 07/16/2021 at 04:31 PM  ------------------------------------------------------------------   Findings   Left Ventricle  Left ventricular systolic function is abnormal.  Ejection fraction is visually estimated at 15-20%. Global hypokinesis noted. Grade III diastolic dysfunction. Left ventricle size is normal.   Left Atrium  Essentially normal left atrium. Right Atrium  Essentially normal right atrium. Right Ventricle  Essentially normal right ventricle. Aortic Valve  Trace aortic regurgitation noted. Mitral Valve  Mitral annular calcification is present. Moderate to severe mitral regurgitation. Tricuspid Valve  Mild to moderate tricuspid regurgitation; RVSP: 38 mmHg. Pulmonic Valve  The pulmonic valve was not well visualized. Pericardial Effusion  No evidence of any pericardial effusion. Pleural Effusion  No evidence of pleural effusion. Miscellaneous  The aorta is within normal limits.   M-Mode/2D Measurements & Calculations   LV Diastolic Dimension:   LV Systolic Dimension:   LA Dimension: 3.8 cmAO  4.86 cm                   4.6 cm                   Root Dimension: 2.8 cm  LV FS:5.4 %               LV Volume Diastolic: 823  LV PW Diastolic: 6.91 cm  ml  LV PW Systolic: 6.27 cm   LV Volume Systolic: 691  Septum Diastolic: 7.38 cm ml                       RV Diastolic Dimension:  Septum Systolic: 3.43 cm  LV EDV/LV EDV Index: 116 3.13 cm  CO: 2.42 l/min            ml/65 m2LV ESV/LV ESV  CI: 1.36 l/m*m2           Index: 108 ml/61 m2      LA/Aorta: 1.36                            EF Calculated (A4C): 6.9  LV Area Diastolic: 04.3   %  cm2                       EF Calculated (2D): 07.2  LV Area Systolic: 32 cm2  %                             LV Length: 7.68 cm                             LVOT: 1.9 cm Doppler Measurements & Calculations   MV Peak E-Wave: 136  AV Peak Velocity: 125 cm/s    LVOT Peak Velocity: 64.6  cm/s                 AV Peak Gradient: 6.25 mmHg   cm/s  MV Peak A-Wave: 68.5 AV Mean Velocity: 86.6 cm/s   LVOT Mean Velocity: 39.8  cm/s                 AV Mean Gradient: 3 mmHg      cm/s  MV E/A Ratio: 1.99   AV VTI: 21 cm                 LVOT Peak Gradient: 2  MV Peak Gradient:    AV Area (Continuity):1.16 cm2 mmHgLVOT Mean Gradient: 1  7.4 mmHg                                           mmHg                       LVOT VTI: 8.63 cm             Estimated RVSP: 38 mmHg  MV P1/2t: 41 msec                                  Estimated RAP:3 mmHg  MVA by PHT:5.37 cm2  Estimated PASP: 35.49 mmHg   MV E' Septal                                       TR Velocity:285 cm/s  Velocity: 4.89 cm/s                                TR Gradient:32.49 mmHg  MV E' Lateral  Velocity: 10.4 cm/s  MV E/E' septal:  27.81  MV E/E' lateral:  13.08      CT ABDOMEN PELVIS WO CONTRAST Additional Contrast? None    Result Date: 7/15/2021  EXAMINATION: CT OF THE ABDOMEN AND PELVIS WITHOUT CONTRAST 7/15/2021 7:28 pm TECHNIQUE: CT of the abdomen and pelvis was performed without the administration of intravenous contrast. Multiplanar reformatted images are provided for review. Dose modulation, iterative reconstruction, and/or weight based adjustment of the mA/kV was utilized to reduce the radiation dose to as low as reasonably achievable. COMPARISON: MRI abdomen May 24, 2019; CT abdomen May 23, 2019 HISTORY: ORDERING SYSTEM PROVIDED HISTORY: Abdominal pain TECHNOLOGIST PROVIDED HISTORY: Reason for exam:->Abdominal pain Additional Contrast?->None Reason for Exam: Abdominal pain FINDINGS: Lower Chest: Imaged lung bases demonstrate partially imaged small and associated atelectasis. Imaged lung fields demonstrate findings suggesting mild pulmonary edema, however evaluation is limited due to respiratory motion artifact.   Coronary artery atherosclerotic disease present. Calcification of the mitral valve. Partially imaged heart is enlarged. Organs: Evaluation of the solid organs is limited due to lack of intravenous contrast.  The nonenhanced liver demonstrates no acute abnormality. The gallbladder is surgically absent. Numerous calcified granulomata throughout the spleen. Atrophy of the pancreas. The nonenhanced adrenal glands demonstrate no acute findings. Redemonstration of right adrenal adenoma is noted on previous exams. The nonenhanced kidneys are grossly stable with similar atrophy of the left kidney when compared with prior exams. No hydronephrosis identified. GI/Bowel: No bowel obstruction is evident. The colon is collapsed and not well evaluated. No evidence for appendicitis. The small bowel is normal in caliber. Small hiatal hernia. Pelvis: Bladder and solid pelvic organs demonstrate no acute findings. Peritoneum/Retroperitoneum: The abdominal aorta is normal in caliber with severe calcified atherosclerotic plaque throughout. No retroperitoneal adenopathy identified. No free fluid or free air identified within the abdomen and pelvis. Bones/Soft Tissues: Soft tissues demonstrate mild anasarca. Postoperative changes of the right anterior abdominal wall. No acute osseous abnormality identified. 1. Findings at the lung bases suggesting pulmonary edema with small bilateral effusions and associated atelectasis. 2. No acute intra-abdominal process identified. 3. Severe atherosclerotic disease. CT Head WO Contrast    Result Date: 7/15/2021  EXAMINATION: CT OF THE HEAD WITHOUT CONTRAST  7/15/2021 7:28 pm TECHNIQUE: CT of the head was performed without the administration of intravenous contrast. Dose modulation, iterative reconstruction, and/or weight based adjustment of the mA/kV was utilized to reduce the radiation dose to as low as reasonably achievable.  COMPARISON: CT brain June 11, 2021 [facility] [address] [facility]     XR CHEST PORTABLE    Result Date: 7/15/2021  EXAMINATION: ONE XRAY VIEW OF THE CHEST 7/15/2021 5:47 pm COMPARISON: 06/13/2021 HISTORY: ORDERING SYSTEM PROVIDED HISTORY: AMS TECHNOLOGIST PROVIDED HISTORY: Reason for exam:->AMS Reason for Exam: AMS Initial encounter FINDINGS: Cardiomegaly. There is increased interstitial opacities bilaterally. No pleural effusion or pneumothorax. The osseous structures otherwise stable. Cardiomegaly with pulmonary edema. IR NONTUNNELED VASCULAR CATHETER > 5 YEARS    Result Date: 7/16/2021  PROCEDURE: ULTRASOUND GUIDED VASCULAR ACCESS. PLACEMENT OF A NON-TUNNELED CATHETER. 7/16/2021. HISTORY: ORDERING SYSTEM PROVIDED HISTORY: Need central venous access. Request CVC Insertion TECHNOLOGIST PROVIDED HISTORY: Reason for exam:->CVC Insertion SEDATION: None. FLUOROSCOPY DOSE AND TYPE OR TIME AND EXPOSURES: None. TECHNIQUE: Informed consent was obtained after a detailed explanation of the procedure including risks, benefits, and alternatives. Universal protocol was observed. The right neck and chest were prepped and draped in sterile fashion using maximum sterile barrier technique. Local anesthesia was achieved with lidocaine. A micropuncture needle was used to access the right internal jugular vein using ultrasound guidance. An ultrasound image demonstrating patency of the vein with needle tip located within it. An image was obtained and stored in PACs. A 0.035 guidewire was used to place a triple lumen central venous catheter after fascial tract dilation. The catheter flushed easily and there was a good blood return. The catheter was sutured to the skin. The catheter was locked with heparinized saline. The patient tolerated the procedure well and there were no immediate complications. EBL: Less than 1 ml. FINDINGS: Chest x ray after procedure demonstrates the tip of the catheter in the SVC. The catheter is ready to use.      Successful ultrasound and fluoroscopy guided non-tunneled central venous catheter placement. The catheter is ready to use. MRI BRAIN WO CONTRAST    Result Date: 7/20/2021  EXAMINATION: MRI OF THE BRAIN WITHOUT CONTRAST  7/20/2021 4:07 pm TECHNIQUE: Multiplanar multisequence MRI of the brain was performed without the administration of intravenous contrast. COMPARISON: None. HISTORY: ORDERING SYSTEM PROVIDED HISTORY: aphasic TECHNOLOGIST PROVIDED HISTORY: Reason for exam:->aphasic Reason for Exam: aphasic Acuity: Acute Type of Exam: Initial Relevant Medical/Surgical History: none FINDINGS: Motion degrades images limiting evaluation. INTRACRANIAL STRUCTURES/VENTRICLES: There is no acute infarct. No mass effect or midline shift. No evidence of an acute intracranial hemorrhage. Areas of T2 FLAIR hyperintensity are seen in the periventricular and subcortical white matter, which are nonspecific, but may represent chronic microvascular ischemic change. There is prominence of the ventricles and sulci due to global parenchymal volume loss. Evaluation of the signal voids within the major intracranial vessels is limited given patient motion. ORBITS: The visualized portion of the orbits demonstrate no acute abnormality. SINUSES: No gross abnormality seen of the paranasal sinuses. There appears to be a left mastoid effusion. 1. Patient motion limits evaluation. 2. No convincing acute intracranial abnormality. No acute infarct. 3. Mild global parenchymal volume loss with moderate chronic microvascular ischemic changes.        LAB RESULTS  --------------------    Recent Results (from the past 24 hour(s))   POCT Glucose    Collection Time: 07/25/21  1:57 AM   Result Value Ref Range    POC Glucose 165 (H) 70 - 99 MG/DL   CBC auto differential    Collection Time: 07/25/21  5:00 AM   Result Value Ref Range    WBC 12.5 (H) 4.0 - 10.5 K/CU MM    RBC 4.25 4.2 - 5.4 M/CU MM    Hemoglobin 12.9 12.5 - 16.0 GM/DL    Hematocrit 40.2 37 - 47 %    MCV 94.6 78 - 100 FL MCH 30.4 27 - 31 PG    MCHC 32.1 32.0 - 36.0 %    RDW 14.8 11.7 - 14.9 %    Platelets 414 434 - 294 K/CU MM    MPV 10.4 7.5 - 11.1 FL    Differential Type AUTOMATED DIFFERENTIAL     Segs Relative 67.1 (H) 36 - 66 %    Lymphocytes % 13.3 (L) 24 - 44 %    Monocytes % 12.3 (H) 0 - 4 %    Eosinophils % 5.5 (H) 0 - 3 %    Basophils % 0.6 0 - 1 %    Segs Absolute 8.4 K/CU MM    Lymphocytes Absolute 1.7 K/CU MM    Monocytes Absolute 1.5 K/CU MM    Eosinophils Absolute 0.7 K/CU MM    Basophils Absolute 0.1 K/CU MM    Nucleated RBC % 0.0 %    Total Nucleated RBC 0.0 K/CU MM    Total Immature Neutrophil 0.15 K/CU MM    Immature Neutrophil % 1.2 (H) 0 - 0.43 %   Comprehensive Metabolic Panel    Collection Time: 07/25/21  5:00 AM   Result Value Ref Range    Sodium 136 135 - 145 MMOL/L    Potassium 4.3 3.5 - 5.1 MMOL/L    Chloride 97 (L) 99 - 110 mMol/L    CO2 30 21 - 32 MMOL/L    BUN 20 6 - 23 MG/DL    CREATININE 1.6 (H) 0.6 - 1.1 MG/DL    Glucose 178 (H) 70 - 99 MG/DL    Calcium 9.3 8.3 - 10.6 MG/DL    Albumin 3.9 3.4 - 5.0 GM/DL    Total Protein 6.2 (L) 6.4 - 8.2 GM/DL    Total Bilirubin 1.1 (H) 0.0 - 1.0 MG/DL    ALT 48 (H) 10 - 40 U/L    AST 22 15 - 37 IU/L    Alkaline Phosphatase 148 (H) 40 - 129 IU/L    GFR Non- 32 (L) >60 mL/min/1.73m2    GFR  39 (L) >60 mL/min/1.73m2    Anion Gap 9 4 - 16   POCT Glucose    Collection Time: 07/25/21  8:26 AM   Result Value Ref Range    POC Glucose 229 (H) 70 - 99 MG/DL   POCT Glucose    Collection Time: 07/25/21 12:07 PM   Result Value Ref Range    POC Glucose 220 (H) 70 - 99 MG/DL   POCT Glucose    Collection Time: 07/25/21  1:19 PM   Result Value Ref Range    POC Glucose 177 (H) 70 - 99 MG/DL   POCT Glucose    Collection Time: 07/25/21  6:06 PM   Result Value Ref Range    POC Glucose 122 (H) 70 - 99 MG/DL   POCT Glucose    Collection Time: 07/25/21  8:50 PM   Result Value Ref Range    POC Glucose 210 (H) 70 - 99 MG/DL         Medical problems    Patient Active Problem List:     History of CVA (cerebrovascular accident) without residual deficits     Morbid obesity ([facility] [address]     DM (diabetes mellitus), type 2, uncontrolled ([facility])     Persistent atrial fibrillation (HCC)     CAD (coronary artery disease)     CHF (congestive heart failure) ([facility] [address]     Light headed     History of MI (myocardial infarction)     Diabetes mellitus (HCC)     Hypertension     TIA (transient ischemic attack)     Hyperlipidemia     SOB (shortness of breath)     H/O cardiovascular stress test     Family history of coronary artery disease     PAF (paroxysmal atrial fibrillation) ([facility])     Chest pain     Atrial thrombus without antecedent myocardial infarction     S/P ablation of atrial fibrillation     Gastroenteritis     YESIKA (acute kidney injury) ([facility] [address]     Acute cystitis     Elevated liver enzymes     CHF (congestive heart failure), NYHA class I, acute on chronic, combined ([facility] [address]     Pneumonia     Multifocal pneumonia     Atrial fibrillation with RVR ([facility])     Shoulder pain     Paroxysmal atrial fibrillation ([facility])     Diabetic hyperosmolar coma ([facility])     Difficult intravenous access     HFrEF (heart failure with reduced ejection fraction) ([facility])      ASSESSMENT:  ---------------------    Metabolic encephalopathy     TIA r/o cardio carotid embolic event     Hyperglycemia     ? Sepsis     A fib    Right MCA left Ophthalmic artery aneurysms    Multiple intracranial stenosis     PLAN:     CT brain neg     Mri brain neg    Mra head     C doppler     Echo EF 15 %      B 12 folate TSH  nl    High Homocysteine level     Continue eliquis      DC asa     Plavix    foltx     Pt is doing better today. Discussed with pts nurse.         Electronically signed by Ivonne Georges MD on 7/25/2021 at 11:26 PM

## 2021-07-26 NOTE — CARE COORDINATION
Cm met with pt for follow up visit. Pt is alert but appears to have some confusion. Pt  unable to name president, reports year as 2020 and took awhile to figure out she was at the hospital. Pt is  Talking about cookies which is irrelevant to any thing CM talking with her about. PT/OT evals reviewed and recs for SNF noted. CM will reach out to Dgt re; possible short term SNF for rehab as a possible option at discharge.

## 2021-07-26 NOTE — ED NOTES
Patient has removed her IV from the left [facility] [place], operated by [facility]. At this time she is refusing to have it replaced. Message sent to on call MD.  No infusions due until morning. Will attempt to gain access in the morning if she will allow it.

## 2021-07-26 NOTE — PROGRESS NOTES
Pt has had increasing behaviors such as verbal outburst with profanities, pulling at alas, picking at skin, attempting to get out of bed, attempting to pull right forearm IV out. Patient is also observed to be trying to use the phone as a remote control to the TV and using the call light as a phone. During lunch time, patient was confused as to how to use utensils, she attempted to use a fork to take a drink of her water and use the straw to scoop up food. Patient seemed to do better with only one food dish and one utensil in front of her at a time. [place], [address] paged and aware of the current situation. See order for sitter.

## 2021-07-27 NOTE — PROGRESS NOTES
INTERNAL MEDICINE PROGRESS NOTE        [facility]   1955   Primary Care Physician:  Claus Romano PA-C  Admit Date: 7/15/2021     Subjective:   Pt stable today. Still some confusion. Oriented to person and place. Not oriented to time or situation. Denies chest pain, SOB, nausea, vomiting, abdominal pain. Remainder of ROS is unremarkable. Meds, labs and other notes reviewed. Objective:   BP (!) 146/69   Pulse 75   Temp 98.1 °F (36.7 °C) (Axillary)   Resp 16   Ht 5' (1.524 m)   Wt 155 lb 3.3 oz (70.4 kg)   SpO2 96%   BMI 30.31 kg/m²    Recent Labs     07/26/21  1723 07/26/21  2056 07/27/21  0148 07/27/21  0825   POCGLU 94 240* 180* 331*       I/O last 3 completed shifts: In: 80 [P.O.:24; I.V.:60]  Out: 1700 [Urine:1700]  No intake/output data recorded.     Neck: no adenopathy and supple, symmetrical, trachea midline  Lungs: clear to auscultation bilaterally  Heart: regular rate and rhythm and S1, S2 normal  Abdomen: soft, non-tender; bowel sounds normal; no masses,  no organomegaly  Extremities: extremities normal, atraumatic, no cyanosis or edema  Neurologic: Alert and oriented to self, year, and that she is in the hospital.    Data Review  CBC with Differential:    Recent Labs     07/25/21  0500 07/26/21  0625 07/27/21  0556   WBC 12.5* 10.5 10.5   RBC 4.25 3.90* 4.04*   HGB 12.9 12.0* 12.2*   HCT 40.2 37.1 38.7    346 322   MCV 94.6 95.1 95.8   MCH 30.4 30.8 30.2   MCHC 32.1 32.3 31.5*   RDW 14.8 14.8 14.6   SEGSPCT 67.1* 68.1* 71.8*   LYMPHOPCT 13.3* 15.2* 11.4*   MONOPCT 12.3* 11.3* 11.4*   BASOPCT 0.6 0.5 0.9   MONOSABS 1.5 1.2 1.2   LYMPHSABS 1.7 1.6 1.2   EOSABS 0.7 0.4 0.3   BASOSABS 0.1 0.1 0.1   DIFFTYPE AUTOMATED DIFFERENTIAL AUTOMATED DIFFERENTIAL AUTOMATED DIFFERENTIAL     CMP:    Recent Labs     07/25/21  0500 07/26/21  0625 07/27/21  0556    137 138   K 4.3 4.6 4.6   CL 97* 101 103   CO2 30 30 26   BUN 20 23 24*   CREATININE 1.6* 1.6* 1.8*   GFRAA 39* 39* 34* LABGLOM 32* 32* 28*   GLUCOSE 178* 191* 214*   PROT 6.2* 5.8* 5.7*   LABALBU 3.9 3.4 3.3*   CALCIUM 9.3 9.5 9.5   BILITOT 1.1* 0.9 0.7   ALKPHOS 148* 126 130*   AST 22 18 20   ALT 48* 36 29     PT/INR:  No results for input(s): PROTIME, INR in the last 72 hours. Meds:    insulin glargine  20 Units Subcutaneous Nightly    lidocaine  5 mL Intradermal Once    sodium chloride flush  5-40 mL Intravenous 2 times per day    insulin lispro  0-18 Units Subcutaneous 2 times per day    dilTIAZem  30 mg Oral 4 times per day    aspirin  81 mg Oral Daily    folic acid-pyridoxine-cyancobalamin  1 tablet Oral Daily    insulin lispro  0-18 Units Subcutaneous TID WC    insulin lispro  10 Units Subcutaneous TID WC    amiodarone  200 mg Oral BID    apixaban  5 mg Oral BID    levothyroxine  50 mcg Oral Daily    metoprolol succinate  100 mg Oral Daily    pantoprazole  40 mg Oral Daily    alogliptin  6.25 mg Oral Daily    sodium chloride flush  5-40 mL Intravenous 2 times per day    sodium chloride flush  5-40 mL Intravenous 2 times per day     PRN Meds: diphenhydrAMINE, cloNIDine, hydrOXYzine, LORazepam, potassium chloride, albuterol sulfate HFA, nitroGLYCERIN, traZODone, glucose, dextrose, glucagon (rDNA), dextrose, sodium chloride flush, sodium chloride, ondansetron **OR** ondansetron, polyethylene glycol, acetaminophen **OR** acetaminophen, sodium chloride flush, sodium chloride    Assessment/Plan:   1. Diabetic hyperosmolar state - Better. We will continue insulin glargine, alogliptin, and sliding scale coverage. Appreciate endocrinology consult. 2.  Altered mental status - Possibly secondary to metabolic encephalopathy. EEG and MRI brain negative for any other possible etiology. We will continue to monitor. Appreciate neurology consult. Await MRA results. 3.  Leukocytosis, rule out sepsis - resolved. No source of infection. D/w Dr Alejandro Lawrence. Will DC cefepime and vancomycin.   4.  Depression/grief - Patient is grieving the loss of both her  and her grandson this past month. Denies any suicidal/homicidal ideation. We will continue to monitor. 5.  Atrial fibrillation - Will continue amiodarone, metoprolol, and diltiazem. Appreciate cardiology consult. 6.  Hypertension - Will continue clonidine 0.1 mg-- 1 tablet p.o. every 4 hours as needed for whenever systolic blood pressures greater than 443 and diastolic blood pressures greater than 95.  7.  Elevated LFTs - Normal again today. Will continue to monitor. 8.  Pulmonary edema/possible pneumonia - CXR performed on 07/16/2021 indicated interstitial pulmonary edema. Will continue to monitor.     Jody Grigsby MD    7/27/2021 12:20 PM

## 2021-07-27 NOTE — PROGRESS NOTES
Occupational Therapy      Occupational Therapy Treatment Note      Name: Hermelindo Stewart MRN: [id number] :   1955   Date:  2021   Admission Date: 7/15/2021 Room:  -A     Primary Problem: Acute encephalopathy, Hyperglycemia    Restrictions/Precautions: General Precautions, Fall Risk, Telemetry, Pulse Ox, BP cuff, Chen, Bed exit alarm    Communication with other providers: JONI Ness    Subjective:  Patient states: \"It sounds like you're going to make me work today! \"  Pain: Pt denied pain this date    Objective:    Observation: Pt received in supine upon OT arrival. Pleasant and agreeable to treatment. Still confused, but improved mental status relative to prior sessions. Objective Measures: Stable vitals throughout session; pt oriented to person, place, year, but disoriented to month (reported it was January)    Treatment, including education:  Therapeutic Activity Training:   Therapeutic activity training was instructed today. Cues were given for safety, sequence, UE/LE placement, awareness, and balance. Self Care Training:   Cues were given for safety, sequence, UE/LE placement, visual cues, and balance. Pt received in supine upon OT arrival. Pt re-educated on role of OT, POC, and importance of OOB activity. Pt transferred supine to sitting EOB mod A with HOB elevated to 40' and mod verbal/tactile cues for task initiation. Pt able to sit at EOB level SBA with good static sitting balance. Pt dependent with donning BL socks seated EOB. Pt completed sit to stand transfer from bed CGA with min cues for safe hand placement. Pt ambulated approximately 30 ft CGA to min A with RW. Pt with extremely slow speed, short steps, and required mod encouragement to push herself to her tolerance. Pt fatigued, and recliner chair brought up behind pt. Pt transferred stand to sit min A with min cues for reaching back with arms. Pt wheeled back to room, and took brief rest break.  Pt completed sit to stand transfer from chair min A with min cues for safe hand placement. Pt ambulated 5 ft from chair to sink CGA with RW. Pt stood at sink with min A for static standing balance, and completed grooming task of combing hair mod A for thoroughness. Pt fatigued, and returned to chair min A. Pt completed seated facial hygiene task at chair level with supervision/setup. Pt left positioned for comfort in chair with all lines intact, all needs within reach, and chair alarm on. Assessment / Impression:    Patient's tolerance of treatment: Improved  Adverse Reaction: None  Significant change in status and impact: Improved functional strength, activity tolerance, and cognition relative to prior sessions  Barriers to improvement: Cognitive deficits      Plan for Next Session:    Continue per OT POC. Continue to recommend SNF for rehab at discharge.       Time in: 1049  Time out: 1119  Timed treatment minutes: 30  Total treatment time: 30      Electronically signed by:    DELIO Alcantar/L, [facility], [id number]

## 2021-07-27 NOTE — PROGRESS NOTES
0542 [facility]  consulted by Dr. Le Peguero for monitoring and adjustment. Indication for treatment: Sepsis   Goal trough: 15 mcg/mL  AUC/BEA: 400-600    Pertinent Laboratory Values:   Temp Readings from Last 3 Encounters:   07/27/21 98.1 °F (36.7 °C) (Axillary)   06/15/21 97.9 °F (36.6 °C) (Oral)   12/30/20 98.3 °F (36.8 °C) (Oral)     Recent Labs     07/25/21  0500 07/26/21  0625 07/27/21  0556   WBC 12.5* 10.5 10.5     Recent Labs     07/25/21  0500 07/26/21  0625 07/27/21  0556   BUN 20 23 24*   CREATININE 1.6* 1.6* 1.8*     Estimated Creatinine Clearance: 28 mL/min (A) (based on SCr of 1.8 mg/dL (H)).     Intake/Output Summary (Last 24 hours) at 7/27/2021 0827  Last data filed at 7/27/2021 4618  Gross per 24 hour   Intake 84 ml   Output 1700 ml   Net -1616 ml       Pertinent Cultures:  Date                             Source                                     Results  7/15                             Blood                                       Ordered  7/15                             Urine                                        Negative    Vancomycin level:   TROUGH:    Recent Labs     07/26/21  0625   VANCOTROUGH 14.8      VANCOMYCIN RANDOM:    Recent Labs     07/27/21  0556   VANCORANDOM 17.6       Assessment:  · WBC WNL; afebrile  · SCr, BUN, and urine output: YESIKA   · Day(s) of therapy: 13  · Vancomycin concentration:   · 07/18 - 22.2, supra-therapeutic on vancomycin 1,250 mg q24h  · 07/21 - 13.7, therapeutic on vancomycin 1000 mg q24h ()  · 07/26 - 14.8, therapeutic, appropriate to re-dose  · 07/27 - 17.6, therapeutic, appropriate to re-dose    Plan:  · Continue intermittent dosing with increasing renal trends  · Plan to redose with vancomycin 1000 mg x1  · Repeat next level tomorrow AM  · Pharmacy will continue to monitor patient and adjust therapy as indicated    Thank you for the consult,  Jaylon Rushing, [facility]

## 2021-07-27 NOTE — PROGRESS NOTES
Progress Note( Dr. London Dawkins)  7/26/2021  Subjective:   Admit Date: 7/15/2021  PCP: Rashmi Martínez PA-C        Admitted For :   Altered mental state and severe hyperglycemia possible metabolic encephalopathy    Consulted For: Better control of blood glucose    Interval History: Patient is more alert awake responding to verbal commands and some answers seem to be appropriate and other seem to be confused     Tells me that she ate meal and nurses verified to the fact that she ate breakfast    CT Scan& MRI of Brain  Neg for any acute CVA     Denies any chest pains,   Denies SOB . Denies nausea or vomiting. Now eating better  No new bowel or bladder symptoms.        Intake/Output Summary (Last 24 hours) at 7/26/2021 2129  Last data filed at 7/26/2021 2108  Gross per 24 hour   Intake 74 ml   Output 1650 ml   Net -1576 ml       DATA    CBC:   Recent Labs     07/25/21  0500 07/26/21  0625   WBC 12.5* 10.5   HGB 12.9 12.0*    346    CMP:  Recent Labs     07/24/21  0501 07/25/21  0500 07/26/21  0625    136 137   K 4.0 4.3 4.6   CL 99 97* 101   CO2 31 30 30   BUN 18 20 23   CREATININE 1.2* 1.6* 1.6*   CALCIUM 9.2 9.3 9.5   PROT 5.8* 6.2* 5.8*   LABALBU 3.2* 3.9 3.4   BILITOT 1.0 1.1* 0.9   ALKPHOS 137* 148* 126   AST 23 22 18   ALT 56* 48* 36     Lipids:   Lab Results   Component Value Date    CHOL 153 01/30/2012    HDL 59 01/30/2012    TRIG 135 01/30/2012     Glucose:  Recent Labs     07/26/21  1418 07/26/21  1723 07/26/21  2056   POCGLU 145* 94 240*     [id number]:  Lab Results   Component Value Date    LABA1C 10.9 07/17/2021     High Sensitivity TSH:   Lab Results   Component Value Date    TSHHS 7.150 07/20/2021     Free T3: No results found for: FT3  Free T4:  Lab Results   Component Value Date    T4FREE 1.28 06/12/2021       Echocardiogram complete 2D with doppler with color    Result Date: 7/16/2021  Transthoracic Echocardiography Report (TTE)  Demographics   Patient Name       Dolores MAYFIELD Date of pm     No acute intracranial abnormality. Chronic microvascular ischemic changes and global cerebral atrophy. XR CHEST PORTABLE    Result Date: 7/16/2021  EXAMINATION: ONE XRAY VIEW OF THE CHEST 7/16/2021 10:31 am COMPARISON: July 15, 2021 HISTORY: ORDERING SYSTEM PROVIDED HISTORY: s/p central line attempt    Right IJ central venous catheter terminates in the mid SVC in satisfactory position. No pneumothorax. Pulmonary vascular congestion with interstitial pulmonary edema. XR CHEST PORTABLE    Result Date: 7/15/2021  EXAMINATION: ONE XRAY VIEW OF THE CHEST 7/15/2021 5:47 pm COMPARISON: 06/13/2021 HISTORY: ORDERING SYSTEM PROVIDED HISTORY: AMS TECHNOLOGIST PROVIDED HISTORY: Reason for exam:->AMS Reason for Exam: AMS Initial encounter FINDINGS: Cardiomegaly. There is increased interstitial opacities bilaterally. No pleural effusion or pneumothorax. The osseous structures otherwise stable. Cardiomegaly with pulmonary edema. IR NONTUNNELED VASCULAR CATHETER > 5 YEARS    Result Date: 7/16/2021  PROCEDURE: ULTRASOUND GUIDED VASCULAR ACCESS. PLACEMENT OF A NON-TUNNELED CATHETER. 7/16/2021. HISTORY: ORDERING SYSTEM PROVIDED HISTORY: Need central venous access. Request CVC Insertion TECHNOLOGIST PROVIDED HISTORY: Reason for exam:->CVC Insertion SEDATION: None. Successful ultrasound and fluoroscopy guided non-tunneled central venous catheter placement. The catheter is ready to use.        Scheduled Medicines   Medications:    lidocaine  5 mL Intradermal Once    sodium chloride flush  5-40 mL Intravenous 2 times per day    insulin glargine  15 Units Subcutaneous Nightly    insulin lispro  0-18 Units Subcutaneous 2 times per day    vancomycin (VANCOCIN) intermittent dosing (placeholder)   Other RX Placeholder    dilTIAZem  30 mg Oral 4 times per day    cefepime  2,000 mg Intravenous Q24H    aspirin  81 mg Oral Daily    folic acid-pyridoxine-cyancobalamin  1 tablet Oral Daily    insulin lispro Gastroenteritis     YESIKA (acute kidney injury) ([facility] [address]     Acute cystitis     Elevated liver enzymes     CHF (congestive heart failure), NYHA class I, acute on chronic, combined ([facility] [address]     Pneumonia     Multifocal pneumonia     Atrial fibrillation with RVR (HCC)     Shoulder pain     Paroxysmal atrial fibrillation (HCC)     Diabetic hyperosmolar coma ([facility] [address]     Difficult intravenous access     Metabolic encephalopathy getting better      Plan:     1. Reviewed POC blood rate 14 with regular foot embolism there is good  2. . Labs and X ray results   3. Reviewed Current Medicines   4. On meal +,  correction bolus Humalog/ Basal Lantus Insulin regime does get  5. Monitor Blood glucose frequently   6. Modified  the dose of Insulin/ other medicines as needed  7. Reviewed notes from Dr. Aniya Trejo changing her CODE STATUS  8. Will follow     .      Brandi Damon MD, MD

## 2021-07-27 NOTE — PROGRESS NOTES
Physician Progress Note      Colt Parada  [facility] #:                  [id number]  :                       1955  ADMIT DATE:       7/15/2021 5:07 PM  DISCH DATE:  RESPONDING  PROVIDER #:        Kevin Guzmán MD          QUERY TEXT:    Pt admitted with DKA. Pt noted to have Elevated WBC and Pneumonia, Noted    documentation of possible  possible sepsis in  PN. If possible, please   document in the progress notes and discharge summary if you are evaluating and   /or treating any of the following: The medical record reflects the following:  Risk Factors: DKA, Metabolic encephalopathy. possible pneumonia  Clinical Indicators: \" The patient is a 60 y.o. female who presented with   altered mental status and was found to have DKA and sepsis. \" documented in the    consult notes , WBC 22.6 on admission with LA 5.3-6.0 , WBC still 13   , Pro savita on admission was 0.221, \"Leukocytosis and possible sepsis: on iv   antibiotics, improving \" documented in the  pn  Treatment: Vancomycin, Neuro , Endo , Nephro, Cardiology, Surgery consults,   Ammonia ,UA and culture,. Blood cultures pending, MRO of brain,  CT head and   abd, Xray, Central line placement in IR, EEG    Thank you David Shi RN, CDS ([phone])  Options provided:  -- Sepsis, present on admission  -- Sepsis, present on admission, now resolved  -- Sepsis, not present on admission  -- Other - I will add my own diagnosis  -- Disagree - Not applicable / Not valid  -- Disagree - Clinically unable to determine / Unknown  -- Refer to Clinical Documentation Reviewer    PROVIDER RESPONSE TEXT:    This patient has sepsis which was present on admission. Query created by: Carlitos Olea on 2021 3:55 PM      QUERY TEXT:    Pt admitted with Diabetic hyperosmolar state . Noted documentation of    Moderate malnutrition on  by the Nutrition consultant.   If possible,   please document in progress notes and discharge summary:    The medical record reflects the following:  Risk Factors:Diabetic hyperosmolar state, Malnutrition, CKD, CHF, Sepsis  Clinical Indicators: Malnutrition Assessment: Malnutrition Status: Moderate   malnutrition  Context:Chronic Illness  Findings of the 6 clinical   characteristics of malnutrition: Energy Intake:  7 - 75% or less estimated   energy requirements for 1 month or longer  Weight Loss: 7 - 20% over 1 year (18.2%) Body Fat Loss:Unable to assess Muscle   Mass Loss:  Unable to assess  Fluid Accumulation: 7 - Severe (+2, non-pitting edema noted) Extremities,   Generalized ,Pt was not adhering to diabetic diet, but eating less than usual   d/t the lost of her son and  this summer. Per chart review, pt with   unintentional 18.2 % wt loss over 1 year. Pt meets criteria for chronic   moderate malnutrition  Treatment: Nutrition consultant,Start Oral Nutrition Supplement, Nutrition   Education/Counseling:  Education needed , Coordination of Nutrition Care:    Continue to monitor while inpatient, Coordination of Community Care, Speech   Therapy, Swallow Evaluation, Feeding Assistance/Environment Change    Thank you Lluvia Carlos RN, CDS ([phone])  Options provided:  -- Moderate malnutrition confirmed present on admission  -- Moderate malnutrition  ruled out  -- Defer to Nutrition consultant documentation regarding Moderate malnutrition  -- Other - I will add my own diagnosis  -- Disagree - Not applicable / Not valid  -- Disagree - Clinically unable to determine / Unknown  -- Refer to Clinical Documentation Reviewer    PROVIDER RESPONSE TEXT:    The diagnosis of Moderate malnutrition was confirmed as present on admission.     Query created by: Leighton Valverde on 7/22/2021 1:59 PM      Electronically signed by:  Richie Adams MD 7/27/2021 4:17 PM

## 2021-07-27 NOTE — PROGRESS NOTES
Progress Note( Dr. Nicole Youssef)  7/27/2021  Subjective:   Admit Date: 7/15/2021  PCP: Anders Fields PA-C        Admitted For :   Altered mental state and severe hyperglycemia possible metabolic encephalopathy    Consulted For: Better control of blood glucose    Interval History: Patient is more alert awake responding to verbal commands and some answers seem to be appropriate and other seem to be confused     Tells me that she ate meal and nurses verified to the fact that she ate breakfast    CT Scan& MRI of Brain  Neg for any acute CVA     Denies any chest pains,   Denies SOB . Denies nausea or vomiting. Now eating better  No new bowel or bladder symptoms.        Intake/Output Summary (Last 24 hours) at 7/27/2021 0707  Last data filed at 7/27/2021 2896  Gross per 24 hour   Intake 84 ml   Output 1700 ml   Net -1616 ml       DATA    CBC:   Recent Labs     07/25/21  0500 07/26/21  0625 07/27/21  0556   WBC 12.5* 10.5 10.5   HGB 12.9 12.0* 12.2*    346 322    CMP:  Recent Labs     07/25/21  0500 07/26/21  0625 07/27/21  0556    137 138   K 4.3 4.6 4.6   CL 97* 101 103   CO2 30 30 26   BUN 20 23 24*   CREATININE 1.6* 1.6* 1.8*   CALCIUM 9.3 9.5 9.5   PROT 6.2* 5.8* 5.7*   LABALBU 3.9 3.4 3.3*   BILITOT 1.1* 0.9 0.7   ALKPHOS 148* 126 130*   AST 22 18 20   ALT 48* 36 29     Lipids:   Lab Results   Component Value Date    CHOL 153 01/30/2012    HDL 59 01/30/2012    TRIG 135 01/30/2012     Glucose:  Recent Labs     07/26/21  1723 07/26/21  2056 07/27/21  0148   POCGLU 94 240* 180*     [id number]:  Lab Results   Component Value Date    LABA1C 10.9 07/17/2021     High Sensitivity TSH:   Lab Results   Component Value Date    TSHHS 7.150 07/20/2021     Free T3: No results found for: FT3  Free T4:  Lab Results   Component Value Date    T4FREE 1.28 06/12/2021       Echocardiogram complete 2D with doppler with color    Result Date: 7/16/2021  Transthoracic Echocardiography Report (TTE)  Demographics   Patient Name Leonel MAYFIELD Date of Study       07/16/2021   Date of Birth      1955         Gender              Female   Age                60 year(s)         Race                   Patient Number     [id number]         Room Number         2128   Visit Number       [id number]   Corporate ID       [id number]   Accession Number   [id number]         Emily Felder RDMS   Ordering Physician Rylee Roberts MD                 Physician           MD  Procedure Type of Study   TTE procedure:ECHOCARDIOGRAM COMPLETE 2D W DOPPLER W COLOR. Procedure Date Date: 07/16/2021 Start: 01:35 PM Study Location: Portable Technical Quality: Fair visualization Indications:Congestive heart failure. Patient Status: Routine Height: 60 inches Weight: 180 pounds BSA: 1.78 m2 BMI: 35.15 kg/m2 HR: 99 bpm BP: 145/83 mmHg  Conclusions   Summary  Left ventricular systolic function is abnormal.  Ejection fraction is visually estimated at 15-20%. Global hypokinesis noted. Grade III diastolic dysfunction. Mitral annular calcification is present. Moderate to severe mitral regurgitation. Mild to moderate tricuspid regurgitation; RVSP: 38 mmHg. No evidence of any pericardial effusion. Signature   ------------------------------------------------------------------  Electronically signed by Dandy Marsh MD (Interpreting  physician) on 07/16/2021 at 04:31 PM    CT ABDOMEN PELVIS WO CONTRAST Additional Contrast? None    Result Date: 7/15/2021  EXAMINATION: CT OF THE ABDOMEN AND PELVIS WITHOUT CONTRAST 7/15/2021 7:28 pm    1. Findings at the lung bases suggesting pulmonary edema with small bilateral effusions and associated atelectasis. 2. No acute intra-abdominal process identified. 3. Severe atherosclerotic disease.      CT Head WO Contrast    Result Date: 7/15/2021  EXAMINATION: CT OF THE HEAD WITHOUT CONTRAST  7/15/2021 7:28 pm     No acute intracranial abnormality. Chronic microvascular ischemic changes and global cerebral atrophy. XR CHEST PORTABLE    Result Date: 7/16/2021  EXAMINATION: ONE XRAY VIEW OF THE CHEST 7/16/2021 10:31 am COMPARISON: July 15, 2021 HISTORY: ORDERING SYSTEM PROVIDED HISTORY: s/p central line attempt    Right IJ central venous catheter terminates in the mid SVC in satisfactory position. No pneumothorax. Pulmonary vascular congestion with interstitial pulmonary edema. XR CHEST PORTABLE    Result Date: 7/15/2021  EXAMINATION: ONE XRAY VIEW OF THE CHEST 7/15/2021 5:47 pm COMPARISON: 06/13/2021 HISTORY: ORDERING SYSTEM PROVIDED HISTORY: AMS TECHNOLOGIST PROVIDED HISTORY: Reason for exam:->AMS Reason for Exam: AMS Initial encounter FINDINGS: Cardiomegaly. There is increased interstitial opacities bilaterally. No pleural effusion or pneumothorax. The osseous structures otherwise stable. Cardiomegaly with pulmonary edema. IR NONTUNNELED VASCULAR CATHETER > 5 YEARS    Result Date: 7/16/2021  PROCEDURE: ULTRASOUND GUIDED VASCULAR ACCESS. PLACEMENT OF A NON-TUNNELED CATHETER. 7/16/2021. HISTORY: ORDERING SYSTEM PROVIDED HISTORY: Need central venous access. Request CVC Insertion TECHNOLOGIST PROVIDED HISTORY: Reason for exam:->CVC Insertion SEDATION: None. Successful ultrasound and fluoroscopy guided non-tunneled central venous catheter placement. The catheter is ready to use.        Scheduled Medicines   Medications:    insulin glargine  20 Units Subcutaneous Nightly    lidocaine  5 mL Intradermal Once    sodium chloride flush  5-40 mL Intravenous 2 times per day    insulin lispro  0-18 Units Subcutaneous 2 times per day    vancomycin (VANCOCIN) intermittent dosing (placeholder)   Other RX Placeholder    dilTIAZem  30 mg Oral 4 times per day    cefepime  2,000 mg Intravenous Q24H    aspirin  81 mg Oral Daily    folic acid-pyridoxine-cyancobalamin  1 tablet Oral Daily    insulin lispro  0-18 Units Subcutaneous TID WC    insulin lispro  10 Units Subcutaneous TID     amiodarone  200 mg Oral BID    apixaban  5 mg Oral BID    levothyroxine  50 mcg Oral Daily    metoprolol succinate  100 mg Oral Daily    pantoprazole  40 mg Oral Daily    alogliptin  6.25 mg Oral Daily    sodium chloride flush  5-40 mL Intravenous 2 times per day    sodium chloride flush  5-40 mL Intravenous 2 times per day      Infusions:    dextrose      sodium chloride      sodium chloride           Objective:   Vitals: BP (!) 171/81   Pulse 84   Temp 98.1 °F (36.7 °C) (Axillary)   Resp 16   Ht 5' (1.524 m)   Wt 155 lb 3.3 oz (70.4 kg)   SpO2 96%   BMI 30.31 kg/m²   General appearance: alert and cooperative with exam appears to be somewhat dehydrated  Neck: no JVD or bruit  Thyroid : Normal lobes   Lungs: Has Vesicular Breath sounds   Heart: Atrial fibrillation  Abdomen: soft, non-tender; bowel sounds normal; no masses,  no organomegaly  Musculoskeletal: Normal  Extremities: extremities normal, , no edema  Neurologic:  Awake, alert, oriented to name, place and time. Cranial nerves II-XII are grossly intact. Motor is  intact. Sensory is neuropathy. ,  and gait is normal.    Assessment:     Patient Active Problem List:     History of CVA (cerebrovascular accident) without residual deficits     Morbid obesity ([address]     DM (diabetes mellitus), type 2, uncontrolled ([address]     Persistent atrial fibrillation ([address]     CAD (coronary artery disease)     CHF (congestive heart failure) ([address]     Light headed     History of MI (myocardial infarction)     Diabetes mellitus ([address]     Hypertension     TIA (transient ischemic attack)     Hyperlipidemia     SOB (shortness of breath)     H/O cardiovascular stress test     Family history of coronary artery disease     PAF (paroxysmal atrial fibrillation) (HCC)     Chest pain     Atrial thrombus without antecedent myocardial infarction     S/P ablation of atrial fibrillation     Gastroenteritis     YESIKA (acute kidney injury) ([address]     Acute cystitis     Elevated liver enzymes     CHF (congestive heart failure), NYHA class I, acute on chronic, combined (HCC)     Pneumonia     Multifocal pneumonia     Atrial fibrillation with RVR (HCC)     Shoulder pain     Paroxysmal atrial fibrillation (HCC)     Diabetic hyperosmolar coma (HCC)     Difficult intravenous access     Metabolic encephalopathy getting better      Plan:     1. Reviewed POC blood rate 14 with regular foot embolism there is good  2. . Labs and X ray results   3. Reviewed Current Medicines   4. On meal +,  correction bolus Humalog/ Basal Lantus Insulin regime does get  5. Monitor Blood glucose frequently   6. Modified  the dose of Insulin/ other medicines as needed  7. Reviewed notes from Dr. Sargent Self changing her CODE STATUS  8. Will follow     .      Jacob Miguel MD, MD

## 2021-07-27 NOTE — PROGRESS NOTES
NEUROLOGY NOTE  DR. Mehran Crabtree MD.  -------------------------------------------------  Subjective:    Pt is less confused and doing better today    Objective:    /64   Pulse 76   Temp 97.8 °F (36.6 °C) (Axillary)   Resp 16   Ht 5' (1.524 m)   Wt 153 lb 14.1 oz (69.8 kg)   SpO2 96%   BMI 30.05 kg/m²   HEENT nl      Neuro exam    Alert oriented to person place mildly confused  eomi pupils 3 mm jo  Squeezes fingers to commands  Equivocal toes  resp on own      RADIOLOGY  -----------------    Echocardiogram complete 2D with doppler with color    Result Date: 7/16/2021  Transthoracic Echocardiography Report (TTE)  Demographics   Patient Name       Ck MAYFIELD Date of Study       07/16/2021   Date of Birth      1955         Gender              Female   Age                60 year(s)         Race                   Patient Number     [id number]         Room Number         2128   Visit Number       [id number]   Corporate ID       [id number]   Accession Number   [id number]         Anup Shanks RDMS   Ordering Physician Peg Swartz MD                 Physician           MD  Procedure Type of Study   TTE procedure:ECHOCARDIOGRAM COMPLETE 2D W DOPPLER W COLOR. Procedure Date Date: 07/16/2021 Start: 01:35 PM Study Location: Portable Technical Quality: Fair visualization Indications:Congestive heart failure. Patient Status: Routine Height: 60 inches Weight: 180 pounds BSA: 1.78 m2 BMI: 35.15 kg/m2 HR: 99 bpm BP: 145/83 mmHg  Conclusions   Summary  Left ventricular systolic function is abnormal.  Ejection fraction is visually estimated at 15-20%. Global hypokinesis noted. Grade III diastolic dysfunction. Mitral annular calcification is present. Moderate to severe mitral regurgitation.   Mild to moderate tricuspid regurgitation; RVSP: 38 mmHg.  No evidence of any pericardial effusion. Signature   ------------------------------------------------------------------  Electronically signed by Shimon Sylvester MD (Interpreting  physician) on 07/16/2021 at 04:31 PM  ------------------------------------------------------------------   Findings   Left Ventricle  Left ventricular systolic function is abnormal.  Ejection fraction is visually estimated at 15-20%. Global hypokinesis noted. Grade III diastolic dysfunction. Left ventricle size is normal.   Left Atrium  Essentially normal left atrium. Right Atrium  Essentially normal right atrium. Right Ventricle  Essentially normal right ventricle. Aortic Valve  Trace aortic regurgitation noted. Mitral Valve  Mitral annular calcification is present. Moderate to severe mitral regurgitation. Tricuspid Valve  Mild to moderate tricuspid regurgitation; RVSP: 38 mmHg. Pulmonic Valve  The pulmonic valve was not well visualized. Pericardial Effusion  No evidence of any pericardial effusion. Pleural Effusion  No evidence of pleural effusion. Miscellaneous  The aorta is within normal limits.   M-Mode/2D Measurements & Calculations   LV Diastolic Dimension:   LV Systolic Dimension:   LA Dimension: 3.8 cmAO  4.86 cm                   4.6 cm                   Root Dimension: 2.8 cm  LV FS:5.4 %               LV Volume Diastolic: 046  LV PW Diastolic: 3.15 cm  ml  LV PW Systolic: 7.93 cm   LV Volume Systolic: 730  Septum Diastolic: 3.49 cm ml                       RV Diastolic Dimension:  Septum Systolic: 4.22 cm  LV EDV/LV EDV Index: 116 3.13 cm  CO: 2.42 l/min            ml/65 m2LV ESV/LV ESV  CI: 1.36 l/m*m2           Index: 108 ml/61 m2      LA/Aorta: 1.36                            EF Calculated (A4C): 6.9  LV Area Diastolic: 97.1   %  cm2                       EF Calculated (2D): 18.5  LV Area Systolic: 32 cm2  %                             LV Length: 7.68 cm                             LVOT: 1.9 cm Doppler Measurements & Calculations   MV Peak E-Wave: 136  AV Peak Velocity: 125 cm/s    LVOT Peak Velocity: 64.6  cm/s                 AV Peak Gradient: 6.25 mmHg   cm/s  MV Peak A-Wave: 68.5 AV Mean Velocity: 86.6 cm/s   LVOT Mean Velocity: 39.8  cm/s                 AV Mean Gradient: 3 mmHg      cm/s  MV E/A Ratio: 1.99   AV VTI: 21 cm                 LVOT Peak Gradient: 2  MV Peak Gradient:    AV Area (Continuity):1.16 cm2 mmHgLVOT Mean Gradient: 1  7.4 mmHg                                           mmHg                       LVOT VTI: 8.63 cm             Estimated RVSP: 38 mmHg  MV P1/2t: 41 msec                                  Estimated RAP:3 mmHg  MVA by PHT:5.37 cm2  Estimated PASP: 35.49 mmHg   MV E' Septal                                       TR Velocity:285 cm/s  Velocity: 4.89 cm/s                                TR Gradient:32.49 mmHg  MV E' Lateral  Velocity: 10.4 cm/s  MV E/E' septal:  27.81  MV E/E' lateral:  13.08      CT ABDOMEN PELVIS WO CONTRAST Additional Contrast? None    Result Date: 7/15/2021  EXAMINATION: CT OF THE ABDOMEN AND PELVIS WITHOUT CONTRAST 7/15/2021 7:28 pm TECHNIQUE: CT of the abdomen and pelvis was performed without the administration of intravenous contrast. Multiplanar reformatted images are provided for review. Dose modulation, iterative reconstruction, and/or weight based adjustment of the mA/kV was utilized to reduce the radiation dose to as low as reasonably achievable. COMPARISON: MRI abdomen May 24, 2019; CT abdomen May 23, 2019 HISTORY: ORDERING SYSTEM PROVIDED HISTORY: Abdominal pain TECHNOLOGIST PROVIDED HISTORY: Reason for exam:->Abdominal pain Additional Contrast?->None Reason for Exam: Abdominal pain FINDINGS: Lower Chest: Imaged lung bases demonstrate partially imaged small and associated atelectasis. Imaged lung fields demonstrate findings suggesting mild pulmonary edema, however evaluation is limited due to respiratory motion artifact.   Coronary artery atherosclerotic disease present. Calcification of the mitral valve. Partially imaged heart is enlarged. Organs: Evaluation of the solid organs is limited due to lack of intravenous contrast.  The nonenhanced liver demonstrates no acute abnormality. The gallbladder is surgically absent. Numerous calcified granulomata throughout the spleen. Atrophy of the pancreas. The nonenhanced adrenal glands demonstrate no acute findings. Redemonstration of right adrenal adenoma is noted on previous exams. The nonenhanced kidneys are grossly stable with similar atrophy of the left kidney when compared with prior exams. No hydronephrosis identified. GI/Bowel: No bowel obstruction is evident. The colon is collapsed and not well evaluated. No evidence for appendicitis. The small bowel is normal in caliber. Small hiatal hernia. Pelvis: Bladder and solid pelvic organs demonstrate no acute findings. Peritoneum/Retroperitoneum: The abdominal aorta is normal in caliber with severe calcified atherosclerotic plaque throughout. No retroperitoneal adenopathy identified. No free fluid or free air identified within the abdomen and pelvis. Bones/Soft Tissues: Soft tissues demonstrate mild anasarca. Postoperative changes of the right anterior abdominal wall. No acute osseous abnormality identified. 1. Findings at the lung bases suggesting pulmonary edema with small bilateral effusions and associated atelectasis. 2. No acute intra-abdominal process identified. 3. Severe atherosclerotic disease. CT Head WO Contrast    Result Date: 7/15/2021  EXAMINATION: CT OF THE HEAD WITHOUT CONTRAST  7/15/2021 7:28 pm TECHNIQUE: CT of the head was performed without the administration of intravenous contrast. Dose modulation, iterative reconstruction, and/or weight based adjustment of the mA/kV was utilized to reduce the radiation dose to as low as reasonably achievable.  COMPARISON: CT brain June 11, 2021 [address]ISTORY: [address] HISTORY: AMS TECHNOLOGIST PROVIDED HISTORY: Reason for exam:->AMS Has a \"code stroke\" or \"stroke alert\" been called? ->No Decision Support Exception - unselect if not a suspected or confirmed emergency medical condition->Emergency Medical Condition (MA) Reason for Exam: AMS FINDINGS: BRAIN/VENTRICLES: There is no acute intracranial hemorrhage, mass effect or midline shift. No abnormal extra-axial fluid collection. The gray-white differentiation is maintained without evidence of an acute infarct. There is no evidence of hydrocephalus. Severe atherosclerotic change of the intracranial vasculature. There is moderate periventricular and subcortical white matter hypoattenuation most consistent with microvascular ischemic changes. Global cerebral atrophy noted. ORBITS: The visualized portion of the orbits demonstrate no acute abnormality. SINUSES: The visualized paranasal sinuses and mastoid air cells demonstrate no acute abnormality. SOFT TISSUES/SKULL:  No acute abnormality of the visualized skull or soft tissues. No acute intracranial abnormality. Chronic microvascular ischemic changes and global cerebral atrophy. XR CHEST PORTABLE    Result Date: 7/16/2021  EXAMINATION: ONE XRAY VIEW OF THE CHEST 7/16/2021 10:31 am COMPARISON: July 15, 2021 HISTORY: ORDERING SYSTEM PROVIDED HISTORY: s/p central line attempt TECHNOLOGIST PROVIDED HISTORY: Reason for exam:->s/p central line attempt Reason for Exam: s/p central line attempt FINDINGS: Right IJ central venous catheter terminates in the mid SVC in satisfactory position. No appreciable pneumothorax. Cardiomediastinal silhouette appears unchanged. Pulmonary vascular congestion and diffuse interstitial prominence. Trace bilateral pleural effusions cannot be excluded. Minimal bibasilar atelectasis. Right IJ central venous catheter terminates in the mid SVC in satisfactory position. No pneumothorax.  Pulmonary vascular congestion with interstitial pulmonary edema.     XR CHEST PORTABLE    Result Date: 7/15/2021  EXAMINATION: ONE XRAY VIEW OF THE CHEST 7/15/2021 5:47 pm COMPARISON: 06/13/2021 HISTORY: ORDERING SYSTEM PROVIDED HISTORY: AMS TECHNOLOGIST PROVIDED HISTORY: Reason for exam:->AMS Reason for Exam: AMS Initial encounter FINDINGS: Cardiomegaly. There is increased interstitial opacities bilaterally. No pleural effusion or pneumothorax. The osseous structures otherwise stable. Cardiomegaly with pulmonary edema. IR NONTUNNELED VASCULAR CATHETER > 5 YEARS    Result Date: 7/16/2021  PROCEDURE: ULTRASOUND GUIDED VASCULAR ACCESS. PLACEMENT OF A NON-TUNNELED CATHETER. 7/16/2021. HISTORY: ORDERING SYSTEM PROVIDED HISTORY: Need central venous access. Request CVC Insertion TECHNOLOGIST PROVIDED HISTORY: Reason for exam:->CVC Insertion SEDATION: None. FLUOROSCOPY DOSE AND TYPE OR TIME AND EXPOSURES: None. TECHNIQUE: Informed consent was obtained after a detailed explanation of the procedure including risks, benefits, and alternatives. Universal protocol was observed. The right neck and chest were prepped and draped in sterile fashion using maximum sterile barrier technique. Local anesthesia was achieved with lidocaine. A micropuncture needle was used to access the right internal jugular vein using ultrasound guidance. An ultrasound image demonstrating patency of the vein with needle tip located within it. An image was obtained and stored in PACs. A 0.035 guidewire was used to place a triple lumen central venous catheter after fascial tract dilation. The catheter flushed easily and there was a good blood return. The catheter was sutured to the skin. The catheter was locked with heparinized saline. The patient tolerated the procedure well and there were no immediate complications. EBL: Less than 1 ml. FINDINGS: Chest x ray after procedure demonstrates the tip of the catheter in the SVC. The catheter is ready to use.      Successful ultrasound and fluoroscopy guided non-tunneled central venous catheter placement. The catheter is ready to use. MRI BRAIN WO CONTRAST    Result Date: 7/20/2021  EXAMINATION: MRI OF THE BRAIN WITHOUT CONTRAST  7/20/2021 4:07 pm TECHNIQUE: Multiplanar multisequence MRI of the brain was performed without the administration of intravenous contrast. COMPARISON: None. HISTORY: ORDERING SYSTEM PROVIDED HISTORY: aphasic TECHNOLOGIST PROVIDED HISTORY: Reason for exam:->aphasic Reason for Exam: aphasic Acuity: Acute Type of Exam: Initial Relevant Medical/Surgical History: none FINDINGS: Motion degrades images limiting evaluation. INTRACRANIAL STRUCTURES/VENTRICLES: There is no acute infarct. No mass effect or midline shift. No evidence of an acute intracranial hemorrhage. Areas of T2 FLAIR hyperintensity are seen in the periventricular and subcortical white matter, which are nonspecific, but may represent chronic microvascular ischemic change. There is prominence of the ventricles and sulci due to global parenchymal volume loss. Evaluation of the signal voids within the major intracranial vessels is limited given patient motion. ORBITS: The visualized portion of the orbits demonstrate no acute abnormality. SINUSES: No gross abnormality seen of the paranasal sinuses. There appears to be a left mastoid effusion. 1. Patient motion limits evaluation. 2. No convincing acute intracranial abnormality. No acute infarct. 3. Mild global parenchymal volume loss with moderate chronic microvascular ischemic changes. LAB RESULTS  --------------------    Recent Results (from the past 24 hour(s))   POCT Glucose    Collection Time: 07/26/21  2:01 AM   Result Value Ref Range    POC Glucose 234 (H) 70 - 99 MG/DL   Vancomycin, trough    Collection Time: 07/26/21  6:25 AM   Result Value Ref Range    Vancomycin Tr 14.8 10 - 20 UG/ML    DOSE AMOUNT DOSE AMT.  GIVEN - 1000     DOSE TIME DOSE TIME GIVEN - 0600    CBC auto differential    Collection Time: 07/26/21  6:25 AM   Result Value Ref Range    WBC 10.5 4.0 - 10.5 K/CU MM    RBC 3.90 (L) 4.2 - 5.4 M/CU MM    Hemoglobin 12.0 (L) 12.5 - 16.0 GM/DL    Hematocrit 37.1 37 - 47 %    MCV 95.1 78 - 100 FL    MCH 30.8 27 - 31 PG    MCHC 32.3 32.0 - 36.0 %    RDW 14.8 11.7 - 14.9 %    Platelets 413 667 - 303 K/CU MM    MPV 10.6 7.5 - 11.1 FL    Differential Type AUTOMATED DIFFERENTIAL     Segs Relative 68.1 (H) 36 - 66 %    Lymphocytes % 15.2 (L) 24 - 44 %    Monocytes % 11.3 (H) 0 - 4 %    Eosinophils % 3.6 (H) 0 - 3 %    Basophils % 0.5 0 - 1 %    Segs Absolute 7.1 K/CU MM    Lymphocytes Absolute 1.6 K/CU MM    Monocytes Absolute 1.2 K/CU MM    Eosinophils Absolute 0.4 K/CU MM    Basophils Absolute 0.1 K/CU MM    Nucleated RBC % 0.0 %    Total Nucleated RBC 0.0 K/CU MM    Total Immature Neutrophil 0.14 K/CU MM    Immature Neutrophil % 1.3 (H) 0 - 0.43 %   Comprehensive Metabolic Panel    Collection Time: 07/26/21  6:25 AM   Result Value Ref Range    Sodium 137 135 - 145 MMOL/L    Potassium 4.6 3.5 - 5.1 MMOL/L    Chloride 101 99 - 110 mMol/L    CO2 30 21 - 32 MMOL/L    BUN 23 6 - 23 MG/DL    CREATININE 1.6 (H) 0.6 - 1.1 MG/DL    Glucose 191 (H) 70 - 99 MG/DL    Calcium 9.5 8.3 - 10.6 MG/DL    Albumin 3.4 3.4 - 5.0 GM/DL    Total Protein 5.8 (L) 6.4 - 8.2 GM/DL    Total Bilirubin 0.9 0.0 - 1.0 MG/DL    ALT 36 10 - 40 U/L    AST 18 15 - 37 IU/L    Alkaline Phosphatase 126 40 - 128 IU/L    GFR Non- 32 (L) >60 mL/min/1.73m2    GFR  39 (L) >60 mL/min/1.73m2    Anion Gap 6 4 - 16   POCT Glucose    Collection Time: 07/26/21  9:23 AM   Result Value Ref Range    POC Glucose 206 (H) 70 - 99 MG/DL   POCT Glucose    Collection Time: 07/26/21 11:34 AM   Result Value Ref Range    POC Glucose 209 (H) 70 - 99 MG/DL   Urinalysis    Collection Time: 07/26/21 11:38 AM   Result Value Ref Range    Color, UA YELLOW YELLOW    Clarity, UA CLEAR CLEAR    Glucose, Urine NEGATIVE NEGATIVE MG/DL    Bilirubin Urine NEGATIVE NEGATIVE MG/DL    Ketones, Urine NEGATIVE NEGATIVE MG/DL    Specific Gravity, UA 1.010 1.001 - 1.035    Blood, Urine LARGE (A) NEGATIVE    pH, Urine 6.0 5.0 - 8.0    Protein,  (A) NEGATIVE MG/DL    Urobilinogen, Urine NEGATIVE 0.2 - 1.0 MG/DL    Nitrite Urine, Quantitative NEGATIVE NEGATIVE    Leukocyte Esterase, Urine NEGATIVE NEGATIVE    RBC, UA 37 (H) 0 - 6 /HPF    WBC, UA 7 (H) 0 - 5 /HPF    Bacteria, UA NEGATIVE NEGATIVE /HPF    Squam Epithel, UA <1 /HPF    Mucus, UA RARE (A) NEGATIVE HPF    Trichomonas, UA NONE SEEN NONE SEEN /HPF   Sodium, urine, random    Collection Time: 07/26/21 11:38 AM   Result Value Ref Range    Sodium, Ur 24 (L) 35 - 167 MMOL/L   Protein / creatinine ratio, urine    Collection Time: 07/26/21 11:38 AM   Result Value Ref Range    Urine Total Protein 54.2 (H) <12 MG/DL    Creatinine, Ur 64.0 28 - 217 MG/DL    Prot/Creat Ratio, Ur 0.8 (H) <0.2   POCT Glucose    Collection Time: 07/26/21  2:18 PM   Result Value Ref Range    POC Glucose 145 (H) 70 - 99 MG/DL   POCT Glucose    Collection Time: 07/26/21  5:23 PM   Result Value Ref Range    POC Glucose 94 70 - 99 MG/DL   POCT Glucose    Collection Time: 07/26/21  8:56 PM   Result Value Ref Range    POC Glucose 240 (H) 70 - 99 MG/DL         Medical problems    Patient Active Problem List:     History of CVA (cerebrovascular accident) without residual deficits     Morbid obesity ([facility])     DM (diabetes mellitus), type 2, uncontrolled ([facility])     Persistent atrial fibrillation ([facility])     CAD (coronary artery disease)     CHF (congestive heart failure) ([facility])     Light headed     History of MI (myocardial infarction)     Diabetes mellitus ([facility])     Hypertension     TIA (transient ischemic attack)     Hyperlipidemia     SOB (shortness of breath)     H/O cardiovascular stress test     Family history of coronary artery disease     PAF (paroxysmal atrial fibrillation) ([facility])     Chest pain     Atrial thrombus without antecedent myocardial infarction     S/P ablation of atrial fibrillation     Gastroenteritis     YESIKA (acute kidney injury) ([facility] [address]     Acute cystitis     Elevated liver enzymes     CHF (congestive heart failure), NYHA class I, acute on chronic, combined (HCC)     Pneumonia     Multifocal pneumonia     Atrial fibrillation with RVR ([facility])     Shoulder pain     Paroxysmal atrial fibrillation ([facility])     Diabetic hyperosmolar coma ([facility])     Difficult intravenous access     HFrEF (heart failure with reduced ejection fraction) ([facility])      ASSESSMENT:  ---------------------    Metabolic encephalopathy     TIA r/o cardio carotid embolic event     Hyperglycemia     ? Sepsis     A fib    Right MCA left Ophthalmic artery aneurysms    Multiple intracranial stenosis     PLAN:     CT brain neg     Mri brain neg    Mra head     C doppler     Echo EF 15 %      B 12 folate TSH  nl    High Homocysteine level     Continue eliquis      DC asa     Plavix    foltx     Pt is doing better today. Discussed with pts nurse.         Electronically signed by Ángela Melvin MD on 7/26/2021 at 11:58 PM

## 2021-07-27 NOTE — PROGRESS NOTES
Patient arrived to room [id number] from ICU room 7756. Sitter and patient's sister at bedside. Patient is confused and agitated, stating she wants to leave. She is redirectable at this time. Patient has scattered bruising and abrasions, one abrasion actively bleeding at the left forearm. Bandage placed.

## 2021-07-27 NOTE — PROGRESS NOTES
Speech Language Pathology  [facility]  DEPARTMENT OF SPEECH/LANGUAGE PATHOLOGY  DAILY PROGRESS NOTE  Lisa López [facility]  7/27/2021  [id number]  Septicemia ([facility] [address] [A41.9]  Hyperglycemia [R73.9]  Diabetic hyperosmolar coma ([facility] [address] [E11.01]  Allergies   Allergen Reactions    Amiodarone Other (See Comments)     dizziness    Iv Dye [Iodides] Nausea And Vomiting    Vicodin [Hydrocodone-Acetaminophen] Nausea And Vomiting         Pt was seen this date for dysphagia treatment. IMPRESSION AND RECOMMENDATIONS:   Pt seen this date for diet tolerance monitoring and assessment for diet advancement. Pt's mental status has improved and she is conversant with ST, however, confusion persists. Pt seen seated upright in chair for PO trials of regular solids and thins by cup and straw. The oral phase was [facility] with mildly reduced mastication for regular solids with no dentition and normal clearance. Pharyngeal swallow appears WNL with 0 s/s aspiration. Swallow timing and laryngeal excursion appear WNL. Recommend advance to Soft and Bite-size/continue Thins. Aspiration precautions due to confusion. May require initial supervision for tray set-up. GOALS (current status in bold):  Short-term Goals  Timeframe for Short-term Goals: length of admission  Goal 1: Pt will tolerate pureed diet/thin liquids with adequate oral manipulation/clearance and no s/s aspiration. Met, Discontinue  Goal 1:  Pt will tolerate Soft and bite-size solids/Thin liquids with adequate mastication and 0 s/s asipration 100%  Goal 2: Pt will tolerate PO trials of advanced textures with adequate oral manipulation/clearance and no s/s aspiration for safe diet upgrade. Partially Met, continue  Goal 3: Pt/caregivers will indicate understanding of all recommendations.  Meeting, Continue    EDUCATION:  Discussed diet level advancement with JONI Berry    PAIN RATING (0-10 Scale):  Appears comfortable, smiles  Time in/Time out: SLP Individual Minutes  Time In: 1300  Time Out: 1320  Minutes: 20    Visit number: [address]  7/27/2021  1:27 PM

## 2021-07-27 NOTE — PROGRESS NOTES
MRI notified that patient seems to be more agitated in the evenings and that it would most likely be best to attempt MRA head in the morning. PS sent to Dr Paige Askew with update and something to give her in the morning in case of agitation or anxiety.

## 2021-07-27 NOTE — PROGRESS NOTES
HEENT:  + conj pallor  Neck:  supple  Lungs:  No gross crackles  Heart:  Irregular   Abdomen: soft  Extremities:  No overt edema   Has alas        Problem List :         Impression :     1. YESIKA- non oliguric- cr up alas draining and no overt pulm edema - repeat UA has some rbc/ wbc / alb ( alas specimen) potential etiology med's induces ATI- AIN - also has some peripheral eos   2. Sever CMP_ compensated without loop now - pro BNP lower despite higher cr   3. encephalopathy of ? Etiology -   4. HTN BP up again   5. DM with insulin     Recommendation/Plan  :     1. Manual BP  2. ? D/C abx   3. I will d/w Dr Orozco Median   4. She is with [facility] and off arb for the time  being   5.  Good BS control       Mo Roberts MD MD

## 2021-07-28 NOTE — PROGRESS NOTES
NEUROLOGY NOTE  DR. Lor Camp MD.  -------------------------------------------------  Subjective:    Pt is less confused and doing better today    Objective:    BP (!) 138/90   Pulse 66   Temp 97.4 °F (36.3 °C) (Oral)   Resp 17   Ht 5' (1.524 m)   Wt 158 lb 8 oz (71.9 kg)   SpO2 100%   BMI 30.95 kg/m²   HEENT nl      Neuro exam    Alert oriented to person place mildly confused  eomi pupils 3 mm jo  Squeezes fingers to commands  Equivocal toes  resp on own      RADIOLOGY  -----------------    Echocardiogram complete 2D with doppler with color    Result Date: 7/16/2021  Transthoracic Echocardiography Report (TTE)  Demographics   Patient Name       Eugene MAYFIELD Date of Study       07/16/2021   Date of Birth      1955         Gender              Female   Age                60 year(s)         Race                   Patient Number     [id number]         Room Number         2128   Visit Number       [id number]   Corporate ID       [id number]   Accession Number   [id number]         [facility]   Ordering Physician Anayeli Martins MD                 Physician           MD  Procedure Type of Study   TTE procedure:ECHOCARDIOGRAM COMPLETE 2D W DOPPLER W COLOR. Procedure Date Date: 07/16/2021 Start: 01:35 PM Study Location: Portable Technical Quality: Fair visualization Indications:Congestive heart failure. Patient Status: Routine Height: 60 inches Weight: 180 pounds BSA: 1.78 m2 BMI: 35.15 kg/m2 HR: 99 bpm BP: 145/83 mmHg  Conclusions   Summary  Left ventricular systolic function is abnormal.  Ejection fraction is visually estimated at 15-20%. Global hypokinesis noted. Grade III diastolic dysfunction. Mitral annular calcification is present. Moderate to severe mitral regurgitation. Mild to moderate tricuspid regurgitation; RVSP: 38 mmHg. No evidence of any pericardial effusion. Signature   ------------------------------------------------------------------  Electronically signed by Cody Cohen MD (Interpreting  physician) on 07/16/2021 at 04:31 PM  ------------------------------------------------------------------   Findings   Left Ventricle  Left ventricular systolic function is abnormal.  Ejection fraction is visually estimated at 15-20%. Global hypokinesis noted. Grade III diastolic dysfunction. Left ventricle size is normal.   Left Atrium  Essentially normal left atrium. Right Atrium  Essentially normal right atrium. Right Ventricle  Essentially normal right ventricle. Aortic Valve  Trace aortic regurgitation noted. Mitral Valve  Mitral annular calcification is present. Moderate to severe mitral regurgitation. Tricuspid Valve  Mild to moderate tricuspid regurgitation; RVSP: 38 mmHg. Pulmonic Valve  The pulmonic valve was not well visualized. Pericardial Effusion  No evidence of any pericardial effusion. Pleural Effusion  No evidence of pleural effusion. Miscellaneous  The aorta is within normal limits.   M-Mode/2D Measurements & Calculations   LV Diastolic Dimension:   LV Systolic Dimension:   LA Dimension: 3.8 cmAO  4.86 cm                   4.6 cm                   Root Dimension: 2.8 cm  LV FS:5.4 %               LV Volume Diastolic: 571  LV PW Diastolic: 6.27 cm  ml  LV PW Systolic: 6.85 cm   LV Volume Systolic: 183  Septum Diastolic: 5.03 cm ml                       RV Diastolic Dimension:  Septum Systolic: 9.31 cm  LV EDV/LV EDV Index: 116 3.13 cm  CO: 2.42 l/min            ml/65 m2LV ESV/LV ESV  CI: 1.36 l/m*m2           Index: 108 ml/61 m2      LA/Aorta: 1.36                            EF Calculated (A4C): 6.9  LV Area Diastolic: 07.9   %  cm2                       EF Calculated (2D): 22.0  LV Area Systolic: 32 cm2  %                             LV Length: 7.68 cm                             LVOT: 1.9 cm Doppler Measurements & Calculations   MV Peak E-Wave: 136  AV Peak Velocity: 125 cm/s    LVOT Peak Velocity: 64.6  cm/s                 AV Peak Gradient: 6.25 mmHg   cm/s  MV Peak A-Wave: 68.5 AV Mean Velocity: 86.6 cm/s   LVOT Mean Velocity: 39.8  cm/s                 AV Mean Gradient: 3 mmHg      cm/s  MV E/A Ratio: 1.99   AV VTI: 21 cm                 LVOT Peak Gradient: 2  MV Peak Gradient:    AV Area (Continuity):1.16 cm2 mmHgLVOT Mean Gradient: 1  7.4 mmHg                                           mmHg                       LVOT VTI: 8.63 cm             Estimated RVSP: 38 mmHg  MV P1/2t: 41 msec                                  Estimated RAP:3 mmHg  MVA by PHT:5.37 cm2  Estimated PASP: 35.49 mmHg   MV E' Septal                                       TR Velocity:285 cm/s  Velocity: 4.89 cm/s                                TR Gradient:32.49 mmHg  MV E' Lateral  Velocity: 10.4 cm/s  MV E/E' septal:  27.81  MV E/E' lateral:  13.08      CT ABDOMEN PELVIS WO CONTRAST Additional Contrast? None    Result Date: 7/15/2021  EXAMINATION: CT OF THE ABDOMEN AND PELVIS WITHOUT CONTRAST 7/15/2021 7:28 pm TECHNIQUE: CT of the abdomen and pelvis was performed without the administration of intravenous contrast. Multiplanar reformatted images are provided for review. Dose modulation, iterative reconstruction, and/or weight based adjustment of the mA/kV was utilized to reduce the radiation dose to as low as reasonably achievable. COMPARISON: MRI abdomen May 24, 2019; CT abdomen May 23, 2019 HISTORY: ORDERING SYSTEM PROVIDED HISTORY: Abdominal pain TECHNOLOGIST PROVIDED HISTORY: Reason for exam:->Abdominal pain Additional Contrast?->None Reason for Exam: Abdominal pain FINDINGS: Lower Chest: Imaged lung bases demonstrate partially imaged small and associated atelectasis. Imaged lung fields demonstrate findings suggesting mild pulmonary edema, however evaluation is limited due to respiratory motion artifact.   Coronary artery atherosclerotic disease present. Calcification of the mitral valve. Partially imaged heart is enlarged. Organs: Evaluation of the solid organs is limited due to lack of intravenous contrast.  The nonenhanced liver demonstrates no acute abnormality. The gallbladder is surgically absent. Numerous calcified granulomata throughout the spleen. Atrophy of the pancreas. The nonenhanced adrenal glands demonstrate no acute findings. Redemonstration of right adrenal adenoma is noted on previous exams. The nonenhanced kidneys are grossly stable with similar atrophy of the left kidney when compared with prior exams. No hydronephrosis identified. GI/Bowel: No bowel obstruction is evident. The colon is collapsed and not well evaluated. No evidence for appendicitis. The small bowel is normal in caliber. Small hiatal hernia. Pelvis: Bladder and solid pelvic organs demonstrate no acute findings. Peritoneum/Retroperitoneum: The abdominal aorta is normal in caliber with severe calcified atherosclerotic plaque throughout. No retroperitoneal adenopathy identified. No free fluid or free air identified within the abdomen and pelvis. Bones/Soft Tissues: Soft tissues demonstrate mild anasarca. Postoperative changes of the right anterior abdominal wall. No acute osseous abnormality identified. 1. Findings at the lung bases suggesting pulmonary edema with small bilateral effusions and associated atelectasis. 2. No acute intra-abdominal process identified. 3. Severe atherosclerotic disease. CT Head WO Contrast    Result Date: 7/15/2021  EXAMINATION: CT OF THE HEAD WITHOUT CONTRAST  7/15/2021 7:28 pm TECHNIQUE: CT of the head was performed without the administration of intravenous contrast. Dose modulation, iterative reconstruction, and/or weight based adjustment of the mA/kV was utilized to reduce the radiation dose to as low as reasonably achievable.  COMPARISON: CT brain June 11, 2021 [facility] [address] [facility]     XR CHEST PORTABLE    Result Date: 7/15/2021  EXAMINATION: ONE XRAY VIEW OF THE CHEST 7/15/2021 5:47 pm COMPARISON: 06/13/2021 HISTORY: ORDERING SYSTEM PROVIDED HISTORY: AMS TECHNOLOGIST PROVIDED HISTORY: Reason for exam:->AMS Reason for Exam: AMS Initial encounter FINDINGS: Cardiomegaly. There is increased interstitial opacities bilaterally. No pleural effusion or pneumothorax. The osseous structures otherwise stable. Cardiomegaly with pulmonary edema. IR NONTUNNELED VASCULAR CATHETER > 5 YEARS    Result Date: 7/16/2021  PROCEDURE: ULTRASOUND GUIDED VASCULAR ACCESS. PLACEMENT OF A NON-TUNNELED CATHETER. 7/16/2021. HISTORY: ORDERING SYSTEM PROVIDED HISTORY: Need central venous access. Request CVC Insertion TECHNOLOGIST PROVIDED HISTORY: Reason for exam:->CVC Insertion SEDATION: None. FLUOROSCOPY DOSE AND TYPE OR TIME AND EXPOSURES: None. TECHNIQUE: Informed consent was obtained after a detailed explanation of the procedure including risks, benefits, and alternatives. Universal protocol was observed. The right neck and chest were prepped and draped in sterile fashion using maximum sterile barrier technique. Local anesthesia was achieved with lidocaine. A micropuncture needle was used to access the right internal jugular vein using ultrasound guidance. An ultrasound image demonstrating patency of the vein with needle tip located within it. An image was obtained and stored in PACs. A 0.035 guidewire was used to place a triple lumen central venous catheter after fascial tract dilation. The catheter flushed easily and there was a good blood return. The catheter was sutured to the skin. The catheter was locked with heparinized saline. The patient tolerated the procedure well and there were no immediate complications. EBL: Less than 1 ml. FINDINGS: Chest x ray after procedure demonstrates the tip of the catheter in the SVC. The catheter is ready to use.      Successful ultrasound and fluoroscopy guided non-tunneled central venous catheter placement. The catheter is ready to use. MRI BRAIN WO CONTRAST    Result Date: 7/20/2021  EXAMINATION: MRI OF THE BRAIN WITHOUT CONTRAST  7/20/2021 4:07 pm TECHNIQUE: Multiplanar multisequence MRI of the brain was performed without the administration of intravenous contrast. COMPARISON: None. HISTORY: ORDERING SYSTEM PROVIDED HISTORY: aphasic TECHNOLOGIST PROVIDED HISTORY: Reason for exam:->aphasic Reason for Exam: aphasic Acuity: Acute Type of Exam: Initial Relevant Medical/Surgical History: none FINDINGS: Motion degrades images limiting evaluation. INTRACRANIAL STRUCTURES/VENTRICLES: There is no acute infarct. No mass effect or midline shift. No evidence of an acute intracranial hemorrhage. Areas of T2 FLAIR hyperintensity are seen in the periventricular and subcortical white matter, which are nonspecific, but may represent chronic microvascular ischemic change. There is prominence of the ventricles and sulci due to global parenchymal volume loss. Evaluation of the signal voids within the major intracranial vessels is limited given patient motion. ORBITS: The visualized portion of the orbits demonstrate no acute abnormality. SINUSES: No gross abnormality seen of the paranasal sinuses. There appears to be a left mastoid effusion. 1. Patient motion limits evaluation. 2. No convincing acute intracranial abnormality. No acute infarct. 3. Mild global parenchymal volume loss with moderate chronic microvascular ischemic changes.        LAB RESULTS  --------------------    Recent Results (from the past 24 hour(s))   POCT Glucose    Collection Time: 07/27/21  1:48 AM   Result Value Ref Range    POC Glucose 180 (H) 70 - 99 MG/DL   CBC auto differential    Collection Time: 07/27/21  5:56 AM   Result Value Ref Range    WBC 10.5 4.0 - 10.5 K/CU MM    RBC 4.04 (L) 4.2 - 5.4 M/CU MM    Hemoglobin 12.2 (L) 12.5 - 16.0 GM/DL    Hematocrit 38.7 37 - 47 %    MCV 95.8 78 - 100 FL    MCH 30.2 27 - 31 PG    MCHC 31.5 (L) 32.0 - 36.0 %    RDW 14.6 11.7 - 14.9 %    Platelets 654 030 - 228 K/CU MM    MPV 10.6 7.5 - 11.1 FL    Differential Type AUTOMATED DIFFERENTIAL     Segs Relative 71.8 (H) 36 - 66 %    Lymphocytes % 11.4 (L) 24 - 44 %    Monocytes % 11.4 (H) 0 - 4 %    Eosinophils % 3.1 (H) 0 - 3 %    Basophils % 0.9 0 - 1 %    Segs Absolute 7.6 K/CU MM    Lymphocytes Absolute 1.2 K/CU MM    Monocytes Absolute 1.2 K/CU MM    Eosinophils Absolute 0.3 K/CU MM    Basophils Absolute 0.1 K/CU MM    Nucleated RBC % 0.0 %    Total Nucleated RBC 0.0 K/CU MM    Total Immature Neutrophil 0.15 K/CU MM    Immature Neutrophil % 1.4 (H) 0 - 0.43 %   Comprehensive Metabolic Panel    Collection Time: 07/27/21  5:56 AM   Result Value Ref Range    Sodium 138 135 - 145 MMOL/L    Potassium 4.6 3.5 - 5.1 MMOL/L    Chloride 103 99 - 110 mMol/L    CO2 26 21 - 32 MMOL/L    BUN 24 (H) 6 - 23 MG/DL    CREATININE 1.8 (H) 0.6 - 1.1 MG/DL    Glucose 214 (H) 70 - 99 MG/DL    Calcium 9.5 8.3 - 10.6 MG/DL    Albumin 3.3 (L) 3.4 - 5.0 GM/DL    Total Protein 5.7 (L) 6.4 - 8.2 GM/DL    Total Bilirubin 0.7 0.0 - 1.0 MG/DL    ALT 29 10 - 40 U/L    AST 20 15 - 37 IU/L    Alkaline Phosphatase 130 (H) 40 - 128 IU/L    GFR Non- 28 (L) >60 mL/min/1.73m2    GFR  34 (L) >60 mL/min/1.73m2    Anion Gap 9 4 - 16   Phosphorus    Collection Time: 07/27/21  5:56 AM   Result Value Ref Range    Phosphorus 4.4 2.5 - 4.9 MG/DL   Magnesium    Collection Time: 07/27/21  5:56 AM   Result Value Ref Range    Magnesium 2.1 1.8 - 2.4 mg/dl   Vancomycin, random    Collection Time: 07/27/21  5:56 AM   Result Value Ref Range    Vancomycin Rm 17.6 UG/ML    DOSE AMOUNT DOSE AMT.  GIVEN - `     DOSE TIME DOSE TIME GIVEN - `    Brain Natriuretic Peptide    Collection Time: 07/27/21  5:56 AM   Result Value Ref Range    Pro-BNP 3,177 (H) <300 PG/ML   POCT Glucose    Collection Time: 07/27/21  8:25 AM   Result Value Ref Range POC Glucose 331 (H) 70 - 99 MG/DL   POCT Glucose    Collection Time: 07/27/21 12:16 PM   Result Value Ref Range    POC Glucose 288 (H) 70 - 99 MG/DL   POCT Glucose    Collection Time: 07/27/21  4:25 PM   Result Value Ref Range    POC Glucose 88 70 - 99 MG/DL   POCT Glucose    Collection Time: 07/27/21  8:12 PM   Result Value Ref Range    POC Glucose 182 (H) 70 - 99 MG/DL         Medical problems    Patient Active Problem List:     History of CVA (cerebrovascular accident) without residual deficits     Morbid obesity ([facility])     DM (diabetes mellitus), type 2, uncontrolled ([facility])     Persistent atrial fibrillation ([facility])     CAD (coronary artery disease)     CHF (congestive heart failure) ([facility])     Light headed     History of MI (myocardial infarction)     Diabetes mellitus ([facility])     Hypertension     TIA (transient ischemic attack)     Hyperlipidemia     SOB (shortness of breath)     H/O cardiovascular stress test     Family history of coronary artery disease     PAF (paroxysmal atrial fibrillation) ([facility])     Chest pain     Atrial thrombus without antecedent myocardial infarction     S/P ablation of atrial fibrillation     Gastroenteritis     YESIKA (acute kidney injury) ([facility] [address]     Acute cystitis     Elevated liver enzymes     CHF (congestive heart failure), NYHA class I, acute on chronic, combined ([facility] [address]     Pneumonia     Multifocal pneumonia     Atrial fibrillation with RVR ([facility])     Shoulder pain     Paroxysmal atrial fibrillation ([facility])     Diabetic hyperosmolar coma ([facility])     Difficult intravenous access     HFrEF (heart failure with reduced ejection fraction) ([facility])      ASSESSMENT:  ---------------------    Metabolic encephalopathy     TIA r/o cardio carotid embolic event     Hyperglycemia     ? Sepsis     A fib    Right MCA left Ophthalmic artery aneurysms    Multiple intracranial stenosis     PLAN:     CT brain neg     Mri brain neg    Mra head pend     C doppler neg     Echo EF 15 %      B 12 folate TSH  nl    High Homocysteine level     Continue eliquis      DC asa     Plavix    foltx     Pt is doing better today. Discussed with pts nurse. DELAYED LATE ENTRY NOTE FOR YESTERDAYS VISIT.     Electronically signed by Laura Haro MD on 7/28/2021 at 12:00 AM

## 2021-07-28 NOTE — PROGRESS NOTES
Scale):  Appears comfortable, smiles  Time in/Time out: SLP Individual Minutes  Time In: 6359  Time Out: [facility]  Minutes: 30    Visit number: [place], [address]  7/28/2021  4:15 PM

## 2021-07-28 NOTE — PROGRESS NOTES
INTERNAL MEDICINE PROGRESS NOTE        [facility]   1955   Primary Care Physician:  Destini Gonzales PA-C  Admit Date: 7/15/2021     Subjective:   Pt stable today. Sleeping and difficult to arouse this am. Opened eyes and followed commands. Only verbalized yes/no and did not answer some questions despite repeatedly asking. Denies cp, sob, abd pain. Objective:   BP (!) 146/68   Pulse 68   Temp 98 °F (36.7 °C) (Oral)   Resp 19   Ht 5' (1.524 m)   Wt 159 lb 14.4 oz (72.5 kg)   SpO2 95%   BMI 31.23 kg/m²    Recent Labs     07/27/21  1625 07/27/21 2012 07/28/21  0226 07/28/21  0833   POCGLU 88 182* 150* 177*       I/O last 3 completed shifts:  In: -   Out: 2050 [Urine:2050]  No intake/output data recorded.     Neck: no adenopathy and supple, symmetrical, trachea midline  Lungs: clear to auscultation bilaterally  Heart: regular rate and rhythm and S1, S2 normal  Abdomen: soft, non-tender; bowel sounds normal; no masses,  no organomegaly  Extremities: extremities normal, atraumatic, no cyanosis or edema  Neurologic: Alert and oriented to self, year, and that she is in the hospital.    Data Review  CBC with Differential:    Recent Labs     07/26/21  0625 07/27/21  0556   WBC 10.5 10.5   RBC 3.90* 4.04*   HGB 12.0* 12.2*   HCT 37.1 38.7    322   MCV 95.1 95.8   MCH 30.8 30.2   MCHC 32.3 31.5*   RDW 14.8 14.6   SEGSPCT 68.1* 71.8*   LYMPHOPCT 15.2* 11.4*   MONOPCT 11.3* 11.4*   BASOPCT 0.5 0.9   MONOSABS 1.2 1.2   LYMPHSABS 1.6 1.2   EOSABS 0.4 0.3   BASOSABS 0.1 0.1   DIFFTYPE AUTOMATED DIFFERENTIAL AUTOMATED DIFFERENTIAL     CMP:    Recent Labs     07/26/21  0625 07/27/21  0556 07/28/21  0219    138 133*   K 4.6 4.6 4.5    103 99   CO2 30 26 27   BUN 23 24* 24*   CREATININE 1.6* 1.8* 1.4*   GFRAA 39* 34* 46*   LABGLOM 32* 28* 38*   GLUCOSE 191* 214* 153*   PROT 5.8* 5.7* 5.4*   LABALBU 3.4 3.3* 3.1*   CALCIUM 9.5 9.5 9.1   BILITOT 0.9 0.7 0.7   ALKPHOS 126 130* 103   AST 18 20 16 Atrial fibrillation - Will continue amiodarone, metoprolol, and diltiazem. Appreciate cardiology consult. 6.  Hypertension - Will continue clonidine 0.1 mg-- 1 tablet p.o. every 4 hours as needed for whenever systolic blood pressures greater than 462 and diastolic blood pressures greater than 95.  7.  Elevated LFTs - Normal again today. Will continue to monitor. 8.  Pulmonary edema/possible pneumonia - CXR performed on 07/16/2021 indicated interstitial pulmonary edema. Will continue to monitor. Nephrology holding diuretics for now but may need again based on clinical course. 9. Await possible placement. Family prefers to wait until dr Bailee Willson returns to arrange. Pt seen and examined by Funmi Mack PA-C under supervision of Dr Roseline Price. 7/28/2021 11:39 AM     Pt seen and examined. Labs, imaging, and noted reviewed. Agree with above.      Roseline Price MD.

## 2021-07-28 NOTE — PROGRESS NOTES
Nephrology Progress Note  7/28/2021 8:44 AM        Subjective:   Admit Date: 7/15/2021  PCP: Rashmi Martínez PA-C    Interval History: with sitter- snoring and sleeping- did not wake her up     Diet: ?     ROS:  No overt orthopnea- PND- sob - she is lying flat with o2 sat 95 %    Data:     Current meds:    insulin glargine  20 Units Subcutaneous Nightly    lidocaine  5 mL Intradermal Once    sodium chloride flush  5-40 mL Intravenous 2 times per day    insulin lispro  0-18 Units Subcutaneous 2 times per day    dilTIAZem  30 mg Oral 4 times per day    aspirin  81 mg Oral Daily    folic acid-pyridoxine-cyancobalamin  1 tablet Oral Daily    insulin lispro  0-18 Units Subcutaneous TID WC    insulin lispro  10 Units Subcutaneous TID WC    amiodarone  200 mg Oral BID    apixaban  5 mg Oral BID    levothyroxine  50 mcg Oral Daily    metoprolol succinate  100 mg Oral Daily    pantoprazole  40 mg Oral Daily    alogliptin  6.25 mg Oral Daily    sodium chloride flush  5-40 mL Intravenous 2 times per day    sodium chloride flush  5-40 mL Intravenous 2 times per day      dextrose      sodium chloride      sodium chloride           I/O last 3 completed shifts:  In: -   Out: 2050 [Urine:2050]    CBC:   Recent Labs     07/26/21  0625 07/27/21  0556   WBC 10.5 10.5   HGB 12.0* 12.2*    322          Recent Labs     07/26/21  0625 07/27/21  0556 07/28/21  0219    138 133*   K 4.6 4.6 4.5    103 99   CO2 30 26 27   BUN 23 24* 24*   CREATININE 1.6* 1.8* 1.4*   GLUCOSE 191* 214* 153*       Lab Results   Component Value Date    CALCIUM 9.1 07/28/2021    PHOS 4.4 07/27/2021       Objective:     Vitals: BP (!) 123/54   Pulse 68   Temp 98.2 °F (36.8 °C) (Oral)   Resp 19   Ht 5' (1.524 m)   Wt 159 lb 14.4 oz (72.5 kg)   SpO2 95%   BMI 31.23 kg/m²     General appearance:   As above  HEENT:   limited   exam , + conj pallor  Neck:  supple  Lungs:  Limited exam , no crackles  Heart:  Seems RRR this am

## 2021-07-28 NOTE — ADT AUTH CERT
Sepsis and Other Febrile Illness, without Focal Infection - Care Day 13 (7/27/2021) by Paulo Vazquez RN       Review Status Review Entered   Completed 7/27/2021 14:57      Criteria Review      Care Day: 13 Care Date: 7/27/2021 Level of Care:    Guideline Day 4    Clinical Status    (X) * Hemodynamic stability    7/27/2021 2:57 PM EDT by Tomasz Olmos      stable    (X) * Afebrile or temperature acceptable for next level of care    7/27/2021 2:57 PM EDT by Tomasz Olmos      Temp 98.1 °F    (X) * Hypoxemia absent    7/27/2021 2:57 PM EDT by Tomasz Olmos      SpO2 96%    ( ) * Tachypnea absent    ( ) * Cultures negative or infection identified and under adequate treatment    ( ) * Mental status at baseline    ( ) * Metabolic derangement (eg, dehydration, acidosis) absent    ( ) * End organ dysfunction (eg, myocardial ischemia, renal failure) absent    ( ) * Discharge plans and education understood    Activity    ( ) * Ambulatory or acceptable for next level of care    Routes    (X) * Oral hydration    7/27/2021 2:57 PM EDT by Tomasz Olmos      dysphagia carb control    (X) * Oral medications or regimen acceptable for next level of care    7/27/2021 2:57 PM EDT by Tomasz Olmos      po meds    (X) * Oral diet or acceptable for next level of care    7/27/2021 2:57 PM EDT by Tomasz Olmos      dysphagia carb control    Interventions    ( ) * Isolation not indicated, or is performable at next level of care    Medications    ( ) * Antimicrobial treatment not necessary or treatment at next level of care arranged    * Milestone   Additional Notes   7/27 PCU      Pt stable today. Still some confusion. Oriented to person and place. Not oriented to time or situation.  Denies chest pain, SOB, nausea, vomiting, abdominal pain.  Remainder of ROS is unremarkable.  Meds, labs and other notes reviewed.        Objective:   BP (!) 146/69   Pulse 75   Temp 98.1 °F (36.7 °C) (Axillary)   Resp 16   Ht 5' (1.524 m)   Wt fibrillation - Will continue amiodarone, metoprolol, and diltiazem. Appreciate cardiology consult. 6.  Hypertension - Will continue clonidine 0.1 mg-- 1 tablet p.o. every 4 hours as needed for whenever systolic blood pressures greater than 274 and diastolic blood pressures greater than 95.   7.  Elevated LFTs - Normal again today. Will continue to monitor. 8.  Pulmonary edema/possible pneumonia - CXR performed on 07/16/2021 indicated interstitial pulmonary edema.  Will continue to monitor. Nephrology Progress Note   7/27/2021 7:32 AM    Impression :       1. YESIKA- non oliguric- cr up alas draining and no overt pulm edema - repeat UA has some rbc/ wbc / alb ( alas specimen) potential etiology med's induces ATI- AIN - also has some peripheral eos    2. Sever CMP_ compensated without loop now - pro BNP lower despite higher cr    3. encephalopathy of ? Etiology -    4. HTN BP up again    5. DM with insulin        Recommendation/Plan  :       1. Manual BP   2. ? D/C abx    3. I will d/w Dr Denise Duffy    4. She is with [facility] and off arb for the time  being    5.  Good BS control       Sodium 138 MMOL/L      Potassium 4.6 MMOL/L      Chloride 103 mMol/L      CO2 26 MMOL/L      BUN 24 MG/DL      CREATININE 1.8 MG/DL      Glucose 214 MG/DL      Calcium 9.5 MG/DL      Albumin 3.3 GM/DL      Total Protein 5.7 GM/DL      Total Bilirubin 0.7 MG/DL      ALT 29 U/L      AST 20 IU/L      Alkaline Phosphatase 130 IU/L      GFR Non-African American 28 mL/min/1.73m2      GFR  34 mL/min/1.73m2      Anion Gap 9       Pro-BNP 3,177 PG/ML       WBC 10.5 K/CU MM      RBC 4.04 M/CU MM      Hemoglobin 12.2 GM/DL      Hematocrit 38.7 %      MCV 95.8 FL      MCH 30.2 PG      MCHC 31.5 %      RDW 14.6 %      Platelets 852 K/CU MM      MPV 10.6 FL      Differential Type AUTOMATED DIFFERENTIAL     Segs Relative 71.8 %      Lymphocytes % 11.4 %      Monocytes % 11.4 %      Eosinophils % 3.1 %      Basophils % 0.9 %      Segs Absolute 7.6 K/CU MM      Lymphocytes Absolute 1.2 K/CU MM      Monocytes Absolute 1.2 K/CU MM      Eosinophils Absolute 0.3 K/CU MM      Basophils Absolute 0.1 K/CU MM      Nucleated RBC % 0.0 %      Total Nucleated RBC 0.0 K/CU MM      Total Immature Neutrophil 0.15 K/CU MM      Immature Neutrophil % 1.4 %             Sepsis and Other Febrile Illness, without Focal Infection - Care Day 10 (7/24/2021) by Mignon Ma RN       Review Status Review Entered   Completed 7/27/2021 09:20      Criteria Review      Care Day: 10 Care Date: 7/24/2021 Level of Care:    Guideline Day 4    Clinical Status    (X) * Hemodynamic stability    7/27/2021 9:20 AM EDT by Fred Bowman      stable    (X) * Afebrile or temperature acceptable for next level of care    7/27/2021 9:20 AM EDT by Fred Bowman      Temp 98.7 °F    (X) * Hypoxemia absent    7/27/2021 9:20 AM EDT by Fred Bowman      SpO2 100%    ( ) * Tachypnea absent    ( ) * Cultures negative or infection identified and under adequate treatment    ( ) * Mental status at baseline    ( ) * Metabolic derangement (eg, dehydration, acidosis) absent    ( ) * End organ dysfunction (eg, myocardial ischemia, renal failure) absent    ( ) * Discharge plans and education understood    Activity    ( ) * Ambulatory or acceptable for next level of care    Routes    ( ) * Oral hydration    (X) * Oral medications or regimen acceptable for next level of care    7/27/2021 9:20 AM EDT by Fred Bowman      po meds    ( ) * Oral diet or acceptable for next level of care    Interventions    ( ) * Isolation not indicated, or is performable at next level of care    Medications    ( ) * Antimicrobial treatment not necessary or treatment at next level of care arranged    * Milestone   Additional Notes   7/24  PCU      Patient reports she is feeling well today. Percell Card is alert and oriented to self but not time and location.  Denies chest pain, SOB, nausea, vomiting, abdominal pain.  Remainder of ROS is unremarkable.  Meds, labs and other notes reviewed.        Objective:   BP (!) 169/65   Pulse 79   Temp 98.7 °F (37.1 °C) (Oral)   Resp 23     Neck: no adenopathy and supple, symmetrical, trachea midline   Lungs: clear to auscultation bilaterally   Heart: regular rate and rhythm and S1, S2 normal   Abdomen: soft, non-tender; bowel sounds normal; no masses,  no organomegaly   Extremities: extremities normal, atraumatic, no cyanosis or edema   Neurologic: Alert and oriented to self but not time and location. Scheduled Medications   · insulin glargine 10 Units Subcutaneous Nightly   · insulin lispro 0-9 Units Subcutaneous 2 times per day   · dilTIAZem 30 mg Oral 4 times per day   · cefepime 2,000 mg Intravenous Q24H   · vancomycin 1,000 mg Intravenous Q24H   · aspirin 81 mg Oral Daily   · losartan 100 mg Oral QPM   · folic acid-pyridoxine-cyancobalamin 1 tablet Oral Daily   · insulin lispro 0-18 Units Subcutaneous TID WC   · spironolactone 25 mg Oral BID   · insulin lispro 10 Units Subcutaneous TID WC   · furosemide 20 mg Intravenous BID   · amiodarone 200 mg Oral BID   · apixaban 5 mg Oral BID   · levothyroxine 50 mcg Oral Daily   · metoprolol succinate 100 mg Oral Daily   · pantoprazole 40 mg Oral Daily   · alogliptin 6.25 mg Oral Daily   · sodium chloride flush 5-40 mL Intravenous 2 times per day   · sodium chloride flush 5-40 mL Intravenous 2 times per day       Assessment/Plan:   1.  Diabetic hyperosmolar state - Better.  We will continue insulin glargine, alogliptin, and sliding scale coverage.  Appreciate endocrinology consult.  Patient has not been eating.  Patient may need feeding tube placed.  Will continue to observe and evaluate over the weekend and consult gastroenterology on 07/26/2021 if needed.    2.  Altered mental status - Possibly secondary to metabolic encephalopathy.  EEG and MRI brain negative for any other possible etiology.  We will continue to monitor.  Appreciate neurology consult. 3.  Leukocytosis, rule out sepsis - Improving. Will continue cefepime and vancomycin. 4.  Depression/grief - Patient is grieving the loss of both her  and her grandson this past month.  Denies any suicidal/homicidal ideation.  We will continue to monitor. 5.  Atrial fibrillation - Will continue amiodarone, metoprolol, and diltiazem. Appreciate cardiology consult. 6.  Hypertension - Blood pressure noted to be elevated today.  Ordered clonidine 0.1 mg-- 1 tablet p.o. every 4 hours as needed for whenever systolic blood pressures greater than 730 and diastolic blood pressures greater than 95.   7.  Elevated LFTs - Improving.  Will continue to monitor. 8.  Pulmonary edema/possible pneumonia - CXR performed on 07/16/2021 indicated interstitial pulmonary edema.  Will continue cefepime and vancomycin.  Will continue to monitor.                  Endocrinology   Plan:       1. Reviewed POC blood rate 14 with regular foot embolism there is good   2. . Labs and X ray results    3. Reviewed Current Medicines    4. On meal +,  correction bolus Humalog/ Basal Lantus Insulin regime does get   5. Monitor Blood glucose frequently    6. Modified  the dose of Insulin/ other medicines as needed   7. Reviewed notes from Dr. Roby Vasques changing her CODE STATUS      General Surgery   Progress Notes       Pt seen and examined. Still confused. AF Hemodynamically stable. Good UOP. BGL's better controlled. Labs noted. Right hand swelling from IV infiltration has significantly improved. No concerns for skin or subcutaneous tissue injury. Palpable RUE pulses. No acute surgical issues.        Nephrology   Assessment and Plan:           IMP:   #1 CKD 3   2.  Congestive heart failure   3.  Metabolic encephalopathy   4.  Type 2 diabetes uncontrolled   5.  Hypertension       Plan      #1 creatinine holding at 1.2 holding stable   2.  Good urine output try to keep negative next #3 affect improved   4.  Monitor

## 2021-07-28 NOTE — PROGRESS NOTES
Progress Note( Dr. Saul Walton)  7/28/2021  Subjective:   Admit Date: 7/15/2021  PCP: Destini Gonzales PA-C        Admitted For :   Altered mental state and severe hyperglycemia possible metabolic encephalopathy    Consulted For: Better control of blood glucose    Interval History: Patient is more alert awake responding to verbal commands and some answers seem to be appropriate and other seem to be confused   Patient did not require an attendant to watch her anymore    CT Scan& MRI of Brain  Neg for any acute CVA     Denies any chest pains,   Denies SOB . Denies nausea or vomiting. Now eating better  No new bowel or bladder symptoms.        Intake/Output Summary (Last 24 hours) at 7/28/2021 0738  Last data filed at 7/28/2021 0646  Gross per 24 hour   Intake --   Output 2050 ml   Net -2050 ml       DATA    CBC:   Recent Labs     07/26/21  0625 07/27/21  0556   WBC 10.5 10.5   HGB 12.0* 12.2*    322    CMP:  Recent Labs     07/26/21  0625 07/27/21  0556 07/28/21  0219    138 133*   K 4.6 4.6 4.5    103 99   CO2 30 26 27   BUN 23 24* 24*   CREATININE 1.6* 1.8* 1.4*   CALCIUM 9.5 9.5 9.1   PROT 5.8* 5.7* 5.4*   LABALBU 3.4 3.3* 3.1*   BILITOT 0.9 0.7 0.7   ALKPHOS 126 130* 103   AST 18 20 16   ALT 36 29 25     Lipids:   Lab Results   Component Value Date    CHOL 153 01/30/2012    HDL 59 01/30/2012    TRIG 135 01/30/2012     Glucose:  Recent Labs     07/27/21  1625 07/27/21 2012 07/28/21  0226   POCGLU 88 182* 150*     [id number]:  Lab Results   Component Value Date    LABA1C 10.9 07/17/2021     High Sensitivity TSH:   Lab Results   Component Value Date    TSHHS 7.150 07/20/2021     Free T3: No results found for: FT3  Free T4:  Lab Results   Component Value Date    T4FREE 1.28 06/12/2021       Echocardiogram complete 2D with doppler with color    Result Date: 7/16/2021  Transthoracic Echocardiography Report (TTE)  Demographics   Patient Name       Sara MAYFIELD Date of Study       07/16/2021   Date of Birth      1955         Gender              Female   Age                60 year(s)         Race                   Patient Number     [id number]         Room Number         2128   Visit Number       [id number]   Corporate ID       [id number]   Accession Number   [id number]         Laina Linares   Ordering Physician Gini Guadarrama MD                 Physician           MD  Procedure Type of Study   TTE procedure:ECHOCARDIOGRAM COMPLETE 2D W DOPPLER W COLOR. Procedure Date Date: 07/16/2021 Start: 01:35 PM Study Location: Portable Technical Quality: Fair visualization Indications:Congestive heart failure. Patient Status: Routine Height: 60 inches Weight: 180 pounds BSA: 1.78 m2 BMI: 35.15 kg/m2 HR: 99 bpm BP: 145/83 mmHg  Conclusions   Summary  Left ventricular systolic function is abnormal.  Ejection fraction is visually estimated at 15-20%. Global hypokinesis noted. Grade III diastolic dysfunction. Mitral annular calcification is present. Moderate to severe mitral regurgitation. Mild to moderate tricuspid regurgitation; RVSP: 38 mmHg. No evidence of any pericardial effusion. Signature   ------------------------------------------------------------------  Electronically signed by Diogenes Walter MD (Interpreting  physician) on 07/16/2021 at 04:31 PM    CT ABDOMEN PELVIS WO CONTRAST Additional Contrast? None    Result Date: 7/15/2021  EXAMINATION: CT OF THE ABDOMEN AND PELVIS WITHOUT CONTRAST 7/15/2021 7:28 pm    1. Findings at the lung bases suggesting pulmonary edema with small bilateral effusions and associated atelectasis. 2. No acute intra-abdominal process identified. 3. Severe atherosclerotic disease.      CT Head WO Contrast    Result Date: 7/15/2021  EXAMINATION: CT OF THE HEAD WITHOUT CONTRAST  7/15/2021 7:28 pm     No acute intracranial abnormality. Chronic microvascular ischemic changes and global cerebral atrophy. XR CHEST PORTABLE    Result Date: 7/16/2021  EXAMINATION: ONE XRAY VIEW OF THE CHEST 7/16/2021 10:31 am COMPARISON: July 15, 2021 HISTORY: ORDERING SYSTEM PROVIDED HISTORY: s/p central line attempt    Right IJ central venous catheter terminates in the mid SVC in satisfactory position. No pneumothorax. Pulmonary vascular congestion with interstitial pulmonary edema. XR CHEST PORTABLE    Result Date: 7/15/2021  EXAMINATION: ONE XRAY VIEW OF THE CHEST 7/15/2021 5:47 pm COMPARISON: 06/13/2021 HISTORY: ORDERING SYSTEM PROVIDED HISTORY: AMS TECHNOLOGIST PROVIDED HISTORY: Reason for exam:->AMS Reason for Exam: AMS Initial encounter FINDINGS: Cardiomegaly. There is increased interstitial opacities bilaterally. No pleural effusion or pneumothorax. The osseous structures otherwise stable. Cardiomegaly with pulmonary edema. IR NONTUNNELED VASCULAR CATHETER > 5 YEARS    Result Date: 7/16/2021  PROCEDURE: ULTRASOUND GUIDED VASCULAR ACCESS. PLACEMENT OF A NON-TUNNELED CATHETER. 7/16/2021. HISTORY: ORDERING SYSTEM PROVIDED HISTORY: Need central venous access. Request CVC Insertion TECHNOLOGIST PROVIDED HISTORY: Reason for exam:->CVC Insertion SEDATION: None. Successful ultrasound and fluoroscopy guided non-tunneled central venous catheter placement. The catheter is ready to use.        Scheduled Medicines   Medications:    insulin glargine  20 Units Subcutaneous Nightly    lidocaine  5 mL Intradermal Once    sodium chloride flush  5-40 mL Intravenous 2 times per day    insulin lispro  0-18 Units Subcutaneous 2 times per day    dilTIAZem  30 mg Oral 4 times per day    aspirin  81 mg Oral Daily    folic acid-pyridoxine-cyancobalamin  1 tablet Oral Daily    insulin lispro  0-18 Units Subcutaneous TID     insulin lispro  10 Units Subcutaneous TID     amiodarone  200 mg Oral BID    apixaban  5 mg Oral BID    levothyroxine  50 mcg Oral Daily    metoprolol succinate  100 mg Oral Daily    pantoprazole  40 mg Oral Daily    alogliptin  6.25 mg Oral Daily    sodium chloride flush  5-40 mL Intravenous 2 times per day    sodium chloride flush  5-40 mL Intravenous 2 times per day      Infusions:    dextrose      sodium chloride      sodium chloride           Objective:   Vitals: BP (!) 123/54   Pulse 68   Temp 98.2 °F (36.8 °C) (Oral)   Resp 19   Ht 5' (1.524 m)   Wt 159 lb 14.4 oz (72.5 kg)   SpO2 95%   BMI 31.23 kg/m²   General appearance: alert and cooperative with exam appears to be somewhat dehydrated  Neck: no JVD or bruit  Thyroid : Normal lobes   Lungs: Has Vesicular Breath sounds   Heart: Atrial fibrillation  Abdomen: soft, non-tender; bowel sounds normal; no masses,  no organomegaly  Musculoskeletal: Normal  Extremities: extremities normal, , no edema  Neurologic:  Awake, alert, oriented to name, place and time. Cranial nerves II-XII are grossly intact. Motor is  intact. Sensory is neuropathy. ,  and gait is normal.    Assessment:     Patient Active Problem List:     History of CVA (cerebrovascular accident) without residual deficits     Morbid obesity ([address]     DM (diabetes mellitus), type 2, uncontrolled ([address]     Persistent atrial fibrillation (HCC)     CAD (coronary artery disease)     CHF (congestive heart failure) ([address]     Light headed     History of MI (myocardial infarction)     Diabetes mellitus ([address]     Hypertension     TIA (transient ischemic attack)     Hyperlipidemia     SOB (shortness of breath)     H/O cardiovascular stress test     Family history of coronary artery disease     PAF (paroxysmal atrial fibrillation) (HCC)     Chest pain     Atrial thrombus without antecedent myocardial infarction     S/P ablation of atrial fibrillation     Gastroenteritis     YESIKA (acute kidney injury) ([address]     Acute cystitis     Elevated liver enzymes     CHF (congestive heart failure), NYHA class I, acute on chronic, combined ([facility]     Pneumonia     Multifocal pneumonia     Atrial fibrillation with RVR (HCC)     Shoulder pain     Paroxysmal atrial fibrillation (HCC)     Diabetic hyperosmolar coma ([facility]     Difficult intravenous access     Metabolic encephalopathy getting better      Plan:     1. Reviewed POC blood rate 14 with regular foot embolism there is good  2. . Labs and X ray results   3. Reviewed Current Medicines   4. On meal +,  correction bolus Humalog/ Basal Lantus Insulin regime does get  5. Monitor Blood glucose frequently   6. Modified  the dose of Insulin/ other medicines as needed  7. Changed CODE STATUS  8. Will follow     .      Juancho Morales MD, MD

## 2021-07-29 NOTE — PROGRESS NOTES
Today's plan:  Patient has ventricular escape bradycardia with hyperkalemia and renal failure, start d50 one ampule, 10 units of regular insulin 1v, calcium gluconate, hold amiodarone, case discussed with EP    D/C CLONIDINE, D/ AMIODARONE, D/C LOPRESSOR, D/C CARDIZEM, BLOOD PRESSURE IS NORMAL FOR NOW,WILL WATCH FOR NOW, IF NEEDED, MAY ADD DOPAMINE, TEMPORARY PACER ( SHE IS DNR [facility])  Admit Date:  7/15/2021    Subjective:OK      Chief complaints on admission  Chief Complaint   Patient presents with    Hyperglycemia         History of present illness:Danae is a 60 y. o.year old who  presents with had concerns including Hyperglycemia. Past medical history:    has a past medical history of Abnormal PFTs (pulmonary function tests), Atrial fibrillation ([address] CAD (coronary artery disease), Carotid aneurysm, right ([address] CHF (congestive heart failure) ([address] Diabetes mellitus ([address] Dizziness - light-headed, Family history of coronary artery disease, H/O 24 hour EKG monitoring, H/O cardiovascular stress test, H/O cardiovascular stress test, H/O echocardiogram, H/O echocardiogram, Heart imaging, Heartburn, History of cardiac cath, History of cardiac monitoring, History of cardioversion, History of MI (myocardial infarction), History of nuclear MUGA, History of nuclear Muga stress test, History of PTCA, Hx of cardiovascular stress test, Hx of transesophageal echocardiography ([facility]) for monitoring, Hyperlipidemia, Hypertension, Insomnia, SOB (shortness of breath), Tachycardia, and TIA (transient ischemic attack). Past surgical history:   has a past surgical history that includes  section; Tubal ligation; Cholecystectomy; Diagnostic Cardiac Cath Lab Procedure (2006, 2009); Percutaneous Transluminal Coronary Angio (10/06/2010); other surgical history (2017); ablation of dysrhythmic focus; and IR NONTUNNELED VASCULAR CATHETER > 5 YEARS (2021).   Social History:   reports that she quit tenderness, No cyanosis, No clubbing. Good range of motion in all major joints. No tenderness to palpation or major deformities noted. Back- No tenderness. Integument:  Warm, Dry, No erythema, No rash. Lymphatic:  No lymphadenopathy noted. Neurologic:  Alert & oriented x 3, Normal motor function, Normal sensory function, No focal deficits noted.    Psychiatric:  Affect normal, Judgment normal, Mood normal.     Medications:    insulin glargine  15 Units Subcutaneous Nightly    sodium zirconium cyclosilicate  10 g Oral Daily    furosemide  40 mg Intravenous TID    calcium gluconate-NaCl  1,000 mg Intravenous Once    sodium chloride flush  5-40 mL Intravenous 2 times per day    insulin lispro  0-18 Units Subcutaneous 2 times per day    [Held by provider] dilTIAZem  30 mg Oral 4 times per day    aspirin  81 mg Oral Daily    folic acid-pyridoxine-cyancobalamin  1 tablet Oral Daily    insulin lispro  0-18 Units Subcutaneous TID WC    insulin lispro  10 Units Subcutaneous TID WC    [Held by provider] amiodarone  200 mg Oral BID    apixaban  5 mg Oral BID    levothyroxine  50 mcg Oral Daily    [Held by provider] metoprolol succinate  100 mg Oral Daily    pantoprazole  40 mg Oral Daily    alogliptin  6.25 mg Oral Daily    sodium chloride flush  5-40 mL Intravenous 2 times per day    sodium chloride flush  5-40 mL Intravenous 2 times per day      dextrose      sodium chloride       diphenhydrAMINE, cloNIDine, hydrOXYzine, LORazepam, potassium chloride, albuterol sulfate HFA, nitroGLYCERIN, traZODone, glucose, dextrose, glucagon (rDNA), dextrose, sodium chloride flush, ondansetron **OR** ondansetron, polyethylene glycol, acetaminophen **OR** acetaminophen, sodium chloride flush, sodium chloride    Lab Data:  CBC:   Recent Labs     07/27/21  0556   WBC 10.5   HGB 12.2*   HCT 38.7   MCV 95.8        BMP:   Recent Labs     07/27/21  0556 07/28/21  0219 07/29/21  0516    133* 139   K 4.6 4.5 5.6*    99 103   CO2 26 27 28   PHOS 4.4  --   --    BUN 24* 24* 25*   CREATININE 1.8* 1.4* 1.6*     LIVER PROFILE:   Recent Labs     07/27/21  0556 07/28/21  0219 07/29/21  0516   AST 20 16 19   ALT 29 25 24   BILITOT 0.7 0.7 0.6   ALKPHOS 130* 103 106     PT/INR: No results for input(s): PROTIME, INR in the last 72 hours. APTT: No results for input(s): APTT in the last 72 hours. BNP:  No results for input(s): BNP in the last 72 hours. TROPONIN: @TROPONINI:3@      Assessment:  60 y. o.year old who is admitted for          Plan:  1. VENTRICULAR ESCAPE WITH H/O AFIB ABLATION, AS ABOVE  2. AFIB\" H/O ABLATION, CONTINEU AMIODARONE AND ELIQUIS  3. CAD: H/O PCI, STABLE  4. DM: STABLE  All labs, medications and tests reviewed, continue all other medications of all above medical condition listed as is.       Tawanda Fair MD, MD 7/29/2021 10:01 AM

## 2021-07-29 NOTE — PROGRESS NOTES
INTERNAL MEDICINE PROGRESS NOTE        Henry Talamantes Devan   1955   Primary Care Physician:  Ondina Zhang PA-C  Admit Date: 7/15/2021     Subjective:   Pt stable today. Sleepy but arouseable. Remains confused and disoriented. Opened eyes and followed commands. Only verbalized yes/no and did not answer some questions despite repeatedly asking. Denies cp, sob, abd pain. Noted to have bradycardia. Also has hyperkalemia. BS readings reviewed. 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 Objective:   BP (!) 127/52   Pulse 66   Temp 98 °F (36.7 °C) (Oral)   Resp 19   Ht 5' (1.524 m)   Wt 159 lb 2 oz (72.2 kg)   SpO2 100%   BMI 31.08 kg/m²    Recent Labs     07/28/21  1631 07/28/21  2058 07/29/21  0252 07/29/21  0941   POCGLU 81 260* 73 182*       No intake/output data recorded.   I/O this shift:  In: -   Out: 700 [Urine:700]    Neck: no adenopathy and supple, symmetrical, trachea midline  Lungs: clear to auscultation bilaterally  Heart: regular rate and rhythm and S1, S2 normal  Abdomen: soft, non-tender; bowel sounds normal; no masses,  no organomegaly  Extremities: extremities normal, atraumatic, no cyanosis or edema  Neurologic:  Confused and disoriented    Data Review  CBC with Differential:    Recent Labs     07/27/21  0556   WBC 10.5   RBC 4.04*   HGB 12.2*   HCT 38.7      MCV 95.8   MCH 30.2   MCHC 31.5*   RDW 14.6   SEGSPCT 71.8*   LYMPHOPCT 11.4*   MONOPCT 11.4*   BASOPCT 0.9   MONOSABS 1.2   LYMPHSABS 1.2   EOSABS 0.3   BASOSABS 0.1   DIFFTYPE AUTOMATED DIFFERENTIAL     CMP:    Recent Labs     07/27/21  0556 07/28/21  0219 07/29/21  0516    133* 139   K 4.6 4.5 5.6*    99 103   CO2 26 27 28   BUN 24* 24* 25*   CREATININE 1.8* 1.4* 1.6*   GFRAA 34* 46* 39*   LABGLOM 28* 38* 32*   GLUCOSE 214* 153* 89   PROT 5.7* 5.4* 5.5*   LABALBU 3.3* 3.1* 3.6   CALCIUM 9.5 9.1 9.2   BILITOT 0.7 0.7 0.6   ALKPHOS 130* 103 106   AST 20 16 19   ALT 29 25 24     PT/INR:  No results for input(s): PROTIME, INR in the last 72 hours. Meds:    insulin glargine  15 Units Subcutaneous Nightly    sodium zirconium cyclosilicate  10 g Oral Daily    furosemide  40 mg Intravenous TID    sodium chloride flush  5-40 mL Intravenous 2 times per day    insulin lispro  0-18 Units Subcutaneous 2 times per day    aspirin  81 mg Oral Daily    folic acid-pyridoxine-cyancobalamin  1 tablet Oral Daily    insulin lispro  0-18 Units Subcutaneous TID WC    insulin lispro  10 Units Subcutaneous TID WC    apixaban  5 mg Oral BID    levothyroxine  50 mcg Oral Daily    pantoprazole  40 mg Oral Daily    alogliptin  6.25 mg Oral Daily    sodium chloride flush  5-40 mL Intravenous 2 times per day    sodium chloride flush  5-40 mL Intravenous 2 times per day     PRN Meds: dextrose, diphenhydrAMINE, hydrOXYzine, LORazepam, potassium chloride, albuterol sulfate HFA, nitroGLYCERIN, traZODone, glucose, dextrose, glucagon (rDNA), dextrose, sodium chloride flush, ondansetron **OR** ondansetron, polyethylene glycol, acetaminophen **OR** acetaminophen, sodium chloride flush, sodium chloride    Assessment/Plan:   1. Diabetic hyperosmolar state -stable. We will continue insulin glargine, alogliptin, and sliding scale coverage. Appreciate endocrinology consult. 2.  Altered mental status - Possibly secondary to metabolic encephalopathy. EEG and MRI brain negative for any other possible etiology. We will continue to monitor. Appreciate neurology consult. 3.  Bradycardia and Hyperkalemia. Will treat with Calcium gluconate, Lokelma, 1 amp D50 and R insulin. Discussed with Dr Annamaria Lind. Will hold Amiodarone, Cardizem and Metoprolol. 4.  Depression/grief - Patient is grieving the loss of both her  and her grandson this past month. 5.  Atrial fibrillation - due to severe bradycardia, will hold amiodarone, metoprolol, and diltiazem.     6. Hypertension -  7. Chronic systolic CHF. Continue Lasix.      Isidro Rush MD    7/29/2021 12:27 PM

## 2021-07-29 NOTE — PROGRESS NOTES
NEUROLOGY NOTE  DR. Thao Faustin MD.  -------------------------------------------------  Subjective:    Pt is less confused and doing better today    Objective:    BP (!) 129/57   Pulse 67   Temp 97.4 °F (36.3 °C) (Oral)   Resp 18   Ht 5' (1.524 m)   Wt 159 lb 14.4 oz (72.5 kg)   SpO2 97%   BMI 31.23 kg/m²   HEENT nl      Neuro exam    Alert oriented to person place mildly confused  eomi pupils 3 mm jo  Squeezes fingers to commands  Equivocal toes  resp on own      RADIOLOGY  -----------------    Echocardiogram complete 2D with doppler with color    Result Date: 7/16/2021  Transthoracic Echocardiography Report (TTE)  Demographics   Patient Name       Susie MAYFIELD Date of Study       07/16/2021   Date of Birth      1955         Gender              Female   Age                60 year(s)         Race                   Patient Number     [id number]         Room Number         2128   Visit Number       [id number]   Corporate ID       [id number]   Accession Number   [id number]         Laura Thurston RDMS   Ordering Physician Bg Escudero MD                 Physician           MD  Procedure Type of Study   TTE procedure:ECHOCARDIOGRAM COMPLETE 2D W DOPPLER W COLOR. Procedure Date Date: 07/16/2021 Start: 01:35 PM Study Location: Portable Technical Quality: Fair visualization Indications:Congestive heart failure. Patient Status: Routine Height: 60 inches Weight: 180 pounds BSA: 1.78 m2 BMI: 35.15 kg/m2 HR: 99 bpm BP: 145/83 mmHg  Conclusions   Summary  Left ventricular systolic function is abnormal.  Ejection fraction is visually estimated at 15-20%. Global hypokinesis noted. Grade III diastolic dysfunction. Mitral annular calcification is present. Moderate to severe mitral regurgitation.   Mild to moderate tricuspid regurgitation; RVSP: 38 mmHg.  No evidence of any pericardial effusion. Signature   ------------------------------------------------------------------  Electronically signed by Gerhard Mcdermott MD (Interpreting  physician) on 07/16/2021 at 04:31 PM  ------------------------------------------------------------------   Findings   Left Ventricle  Left ventricular systolic function is abnormal.  Ejection fraction is visually estimated at 15-20%. Global hypokinesis noted. Grade III diastolic dysfunction. Left ventricle size is normal.   Left Atrium  Essentially normal left atrium. Right Atrium  Essentially normal right atrium. Right Ventricle  Essentially normal right ventricle. Aortic Valve  Trace aortic regurgitation noted. Mitral Valve  Mitral annular calcification is present. Moderate to severe mitral regurgitation. Tricuspid Valve  Mild to moderate tricuspid regurgitation; RVSP: 38 mmHg. Pulmonic Valve  The pulmonic valve was not well visualized. Pericardial Effusion  No evidence of any pericardial effusion. Pleural Effusion  No evidence of pleural effusion. Miscellaneous  The aorta is within normal limits.   M-Mode/2D Measurements & Calculations   LV Diastolic Dimension:   LV Systolic Dimension:   LA Dimension: 3.8 cmAO  4.86 cm                   4.6 cm                   Root Dimension: 2.8 cm  LV FS:5.4 %               LV Volume Diastolic: 262  LV PW Diastolic: 5.16 cm  ml  LV PW Systolic: 4.13 cm   LV Volume Systolic: 104  Septum Diastolic: 4.22 cm ml                       RV Diastolic Dimension:  Septum Systolic: 1.38 cm  LV EDV/LV EDV Index: 116 3.13 cm  CO: 2.42 l/min            ml/65 m2LV ESV/LV ESV  CI: 1.36 l/m*m2           Index: 108 ml/61 m2      LA/Aorta: 1.36                            EF Calculated (A4C): 6.9  LV Area Diastolic: 25.7   %  cm2                       EF Calculated (2D): 42.3  LV Area Systolic: 32 cm2  %                             LV Length: 7.68 cm                             LVOT: 1.9 cm atherosclerotic disease present. Calcification of the mitral valve. Partially imaged heart is enlarged. Organs: Evaluation of the solid organs is limited due to lack of intravenous contrast.  The nonenhanced liver demonstrates no acute abnormality. The gallbladder is surgically absent. Numerous calcified granulomata throughout the spleen. Atrophy of the pancreas. The nonenhanced adrenal glands demonstrate no acute findings. Redemonstration of right adrenal adenoma is noted on previous exams. The nonenhanced kidneys are grossly stable with similar atrophy of the left kidney when compared with prior exams. No hydronephrosis identified. GI/Bowel: No bowel obstruction is evident. The colon is collapsed and not well evaluated. No evidence for appendicitis. The small bowel is normal in caliber. Small hiatal hernia. Pelvis: Bladder and solid pelvic organs demonstrate no acute findings. Peritoneum/Retroperitoneum: The abdominal aorta is normal in caliber with severe calcified atherosclerotic plaque throughout. No retroperitoneal adenopathy identified. No free fluid or free air identified within the abdomen and pelvis. Bones/Soft Tissues: Soft tissues demonstrate mild anasarca. Postoperative changes of the right anterior abdominal wall. No acute osseous abnormality identified. 1. Findings at the lung bases suggesting pulmonary edema with small bilateral effusions and associated atelectasis. 2. No acute intra-abdominal process identified. 3. Severe atherosclerotic disease. CT Head WO Contrast    Result Date: 7/15/2021  EXAMINATION: CT OF THE HEAD WITHOUT CONTRAST  7/15/2021 7:28 pm TECHNIQUE: CT of the head was performed without the administration of intravenous contrast. Dose modulation, iterative reconstruction, and/or weight based adjustment of the mA/kV was utilized to reduce the radiation dose to as low as reasonably achievable.  COMPARISON: CT brain June 11, 2021 [address]: [id number] HISTORY: AMS TECHNOLOGIST PROVIDED HISTORY: Reason for exam:->AMS Has a \"code stroke\" or \"stroke alert\" been called? ->No Decision Support Exception - unselect if not a suspected or confirmed emergency medical condition->Emergency Medical Condition (MA) Reason for Exam: AMS FINDINGS: BRAIN/VENTRICLES: There is no acute intracranial hemorrhage, mass effect or midline shift. No abnormal extra-axial fluid collection. The gray-white differentiation is maintained without evidence of an acute infarct. There is no evidence of hydrocephalus. Severe atherosclerotic change of the intracranial vasculature. There is moderate periventricular and subcortical white matter hypoattenuation most consistent with microvascular ischemic changes. Global cerebral atrophy noted. ORBITS: The visualized portion of the orbits demonstrate no acute abnormality. SINUSES: The visualized paranasal sinuses and mastoid air cells demonstrate no acute abnormality. SOFT TISSUES/SKULL:  No acute abnormality of the visualized skull or soft tissues. No acute intracranial abnormality. Chronic microvascular ischemic changes and global cerebral atrophy. XR CHEST PORTABLE    Result Date: 7/16/2021  EXAMINATION: ONE XRAY VIEW OF THE CHEST 7/16/2021 10:31 am COMPARISON: July 15, 2021 HISTORY: ORDERING SYSTEM PROVIDED HISTORY: s/p central line attempt TECHNOLOGIST PROVIDED HISTORY: Reason for exam:->s/p central line attempt Reason for Exam: s/p central line attempt FINDINGS: Right IJ central venous catheter terminates in the mid SVC in satisfactory position. No appreciable pneumothorax. Cardiomediastinal silhouette appears unchanged. Pulmonary vascular congestion and diffuse interstitial prominence. Trace bilateral pleural effusions cannot be excluded. Minimal bibasilar atelectasis. Right IJ central venous catheter terminates in the mid SVC in satisfactory position. No pneumothorax.  Pulmonary vascular congestion with interstitial pulmonary edema.     XR CHEST PORTABLE    Result Date: 7/15/2021  EXAMINATION: ONE XRAY VIEW OF THE CHEST 7/15/2021 5:47 pm COMPARISON: 06/13/2021 HISTORY: ORDERING SYSTEM PROVIDED HISTORY: AMS TECHNOLOGIST PROVIDED HISTORY: Reason for exam:->AMS Reason for Exam: AMS Initial encounter FINDINGS: Cardiomegaly. There is increased interstitial opacities bilaterally. No pleural effusion or pneumothorax. The osseous structures otherwise stable. Cardiomegaly with pulmonary edema. IR NONTUNNELED VASCULAR CATHETER > 5 YEARS    Result Date: 7/16/2021  PROCEDURE: ULTRASOUND GUIDED VASCULAR ACCESS. PLACEMENT OF A NON-TUNNELED CATHETER. 7/16/2021. HISTORY: ORDERING SYSTEM PROVIDED HISTORY: Need central venous access. Request CVC Insertion TECHNOLOGIST PROVIDED HISTORY: Reason for exam:->CVC Insertion SEDATION: None. FLUOROSCOPY DOSE AND TYPE OR TIME AND EXPOSURES: None. TECHNIQUE: Informed consent was obtained after a detailed explanation of the procedure including risks, benefits, and alternatives. Universal protocol was observed. The right neck and chest were prepped and draped in sterile fashion using maximum sterile barrier technique. Local anesthesia was achieved with lidocaine. A micropuncture needle was used to access the right internal jugular vein using ultrasound guidance. An ultrasound image demonstrating patency of the vein with needle tip located within it. An image was obtained and stored in PACs. A 0.035 guidewire was used to place a triple lumen central venous catheter after fascial tract dilation. The catheter flushed easily and there was a good blood return. The catheter was sutured to the skin. The catheter was locked with heparinized saline. The patient tolerated the procedure well and there were no immediate complications. EBL: Less than 1 ml. FINDINGS: Chest x ray after procedure demonstrates the tip of the catheter in the SVC. The catheter is ready to use.      Successful ultrasound and fluoroscopy guided non-tunneled central venous catheter placement. The catheter is ready to use. MRI BRAIN WO CONTRAST    Result Date: 7/20/2021  EXAMINATION: MRI OF THE BRAIN WITHOUT CONTRAST  7/20/2021 4:07 pm TECHNIQUE: Multiplanar multisequence MRI of the brain was performed without the administration of intravenous contrast. COMPARISON: None. HISTORY: ORDERING SYSTEM PROVIDED HISTORY: aphasic TECHNOLOGIST PROVIDED HISTORY: Reason for exam:->aphasic Reason for Exam: aphasic Acuity: Acute Type of Exam: Initial Relevant Medical/Surgical History: none FINDINGS: Motion degrades images limiting evaluation. INTRACRANIAL STRUCTURES/VENTRICLES: There is no acute infarct. No mass effect or midline shift. No evidence of an acute intracranial hemorrhage. Areas of T2 FLAIR hyperintensity are seen in the periventricular and subcortical white matter, which are nonspecific, but may represent chronic microvascular ischemic change. There is prominence of the ventricles and sulci due to global parenchymal volume loss. Evaluation of the signal voids within the major intracranial vessels is limited given patient motion. ORBITS: The visualized portion of the orbits demonstrate no acute abnormality. SINUSES: No gross abnormality seen of the paranasal sinuses. There appears to be a left mastoid effusion. 1. Patient motion limits evaluation. 2. No convincing acute intracranial abnormality. No acute infarct. 3. Mild global parenchymal volume loss with moderate chronic microvascular ischemic changes.        LAB RESULTS  --------------------    Recent Results (from the past 24 hour(s))   Comprehensive Metabolic Panel    Collection Time: 07/28/21  2:19 AM   Result Value Ref Range    Sodium 133 (L) 135 - 145 MMOL/L    Potassium 4.5 3.5 - 5.1 MMOL/L    Chloride 99 99 - 110 mMol/L    CO2 27 21 - 32 MMOL/L    BUN 24 (H) 6 - 23 MG/DL    CREATININE 1.4 (H) 0.6 - 1.1 MG/DL    Glucose 153 (H) 70 - 99 MG/DL    Calcium 9.1 8.3 - 10.6 MG/DL Albumin 3.1 (L) 3.4 - 5.0 GM/DL    Total Protein 5.4 (L) 6.4 - 8.2 GM/DL    Total Bilirubin 0.7 0.0 - 1.0 MG/DL    ALT 25 10 - 40 U/L    AST 16 15 - 37 IU/L    Alkaline Phosphatase 103 40 - 128 IU/L    GFR Non- 38 (L) >60 mL/min/1.73m2    GFR  46 (L) >60 mL/min/1.73m2    Anion Gap 7 4 - 16   Vancomycin, random    Collection Time: 07/28/21  2:19 AM   Result Value Ref Range    Vancomycin Rm 10.7 UG/ML    DOSE AMOUNT DOSE AMT.  GIVEN - `     DOSE TIME DOSE TIME GIVEN - `    Magnesium    Collection Time: 07/28/21  2:19 AM   Result Value Ref Range    Magnesium 1.9 1.8 - 2.4 mg/dl   POCT Glucose    Collection Time: 07/28/21  2:26 AM   Result Value Ref Range    POC Glucose 150 (H) 70 - 99 MG/DL   POCT Glucose    Collection Time: 07/28/21  8:33 AM   Result Value Ref Range    POC Glucose 177 (H) 70 - 99 MG/DL   POCT Glucose    Collection Time: 07/28/21 11:34 AM   Result Value Ref Range    POC Glucose 173 (H) 70 - 99 MG/DL   POCT Glucose    Collection Time: 07/28/21  4:31 PM   Result Value Ref Range    POC Glucose 81 70 - 99 MG/DL   POCT Glucose    Collection Time: 07/28/21  8:58 PM   Result Value Ref Range    POC Glucose 260 (H) 70 - 99 MG/DL         Medical problems    Patient Active Problem List:     History of CVA (cerebrovascular accident) without residual deficits     Morbid obesity ([address]     DM (diabetes mellitus), type 2, uncontrolled (HCC)     Persistent atrial fibrillation (HCC)     CAD (coronary artery disease)     CHF (congestive heart failure) (HCC)     Light headed     History of MI (myocardial infarction)     Diabetes mellitus (HCC)     Hypertension     TIA (transient ischemic attack)     Hyperlipidemia     SOB (shortness of breath)     H/O cardiovascular stress test     Family history of coronary artery disease     PAF (paroxysmal atrial fibrillation) ([facility])     Chest pain     Atrial thrombus without antecedent myocardial infarction     S/P ablation of atrial fibrillation Gastroenteritis     YESIKA (acute kidney injury) ([facility] [address]     Acute cystitis     Elevated liver enzymes     CHF (congestive heart failure), NYHA class I, acute on chronic, combined ([facility] [address]     Pneumonia     Multifocal pneumonia     Atrial fibrillation with RVR ([facility])     Shoulder pain     Paroxysmal atrial fibrillation (HCC)     Diabetic hyperosmolar coma ([facility])     Difficult intravenous access     HFrEF (heart failure with reduced ejection fraction) ([facility])      ASSESSMENT:  ---------------------    Metabolic encephalopathy     TIA r/o cardio carotid embolic event     Hyperglycemia     ? Sepsis     A fib    Right MCA left Ophthalmic artery aneurysms    Multiple intracranial stenosis    Right MCA saccular aneurysm -     PLAN:     CT brain neg     Mri brain neg    Mra head as above     C doppler neg     Echo EF 15 %      B 12 folate TSH  nl    High Homocysteine level     Continue eliquis      DC asa     Plavix    foltx     Nausea non neurologic. Cornell Vargas     Electronically signed by Thao Faustin MD on 7/28/2021 at 10:54 PM

## 2021-07-29 NOTE — PROGRESS NOTES
Comprehensive Nutrition Assessment    Type and Reason for Visit:  Reassess    Nutrition Recommendations/Plan:   · Add Low Potassium Diet to Carb Control 4, No Added Salt, Easy to Chew  · Assist with feeding as needed  · Peaches and cottage cheese for snack, between meals    Nutrition Assessment:  Remains confused and disoriented, sleepy but arousable. Improved po intake and ability to feed self over stay. Oral supplements d/c 2/2 dislike. HyperK+ noted, will modify diet and continue to follow as high nutrition risk. Malnutrition Assessment:  Malnutrition Status: Moderate malnutrition    Context:  Chronic Illness     Findings of the 6 clinical characteristics of malnutrition:  Energy Intake:  75% or less estimated energy requirements for 1 month or longer  Weight Loss:   20% over 1 year (18.2%)     Body Fat Loss:  Unable to assess     Muscle Mass Loss:  Unable to assess    Fluid Accumulation:  Severe (+2, non-pittin edema ntoed) Extremities, Generalized   Strength:  Not Performed    Estimated Daily Nutrient Needs:  Energy (kcal):  9252-1083 (1.1-1.3 stress factor); Weight Used for Energy Requirements:  Current     Protein (g):  54-68 (1.2-1.5 g/kg IBW); Weight Used for Protein Requirements:  Ideal        Fluid (ml/day):  ~1500; Method Used for Fluid Requirements:  1 ml/kcal      Nutrition Related Findings:      Wounds:  None       Current Nutrition Therapies:    ADULT DIET; Easy to Chew; 4 carb choices (60 gm/meal); No Added Salt (3-4 gm)    Anthropometric Measures:  · Height: 5' (152.4 cm)  · Current Body Weight: 159 lb 2 oz (72.2 kg)   · Admission Body Weight: 156 lb 8.4 oz (71 kg)    · Usual Body Weight: 192 lb 6.4 oz (87.3 kg) (7/27/20, UBW of 180# per daughter's report)     · Ideal Body Weight: 100 lbs; % Ideal Body Weight 153.9 %   · BMI: 31.1  · BMI Categories: Obese Class 1 (BMI 30.0-34. 9)       Nutrition Diagnosis:   · Moderate malnutrition, In context of chronic illness related to inadequate protein-energy intake, psychological cause or life stress as evidenced by poor intake prior to admission, weight loss (18% loss over past yr)    Nutrition Interventions:   Food and/or Nutrient Delivery:  Snacks (Comment), Modify Current Diet (likes cottage cheese and peaches)  Nutrition Education/Counseling:  Education not appropriate (at this time)   Coordination of Nutrition Care:  Continue to monitor while inpatient, Coordination of Community Care, Feeding Assistance/Environment Change, Speech Therapy, Swallow Evaluation    Goals:  Pt will consume at least 50% of meals on diet order during stay       Nutrition Monitoring and Evaluation:   Behavioral-Environmental Outcomes:  None Identified   Food/Nutrient Intake Outcomes:  Diet Advancement/Tolerance, Food and Nutrient Intake  Physical Signs/Symptoms Outcomes:  Biochemical Data, Chewing or Swallowing, GI Status, Fluid Status or Edema, Nutrition Focused Physical Findings, Weight     Discharge Planning:    Recommend pursue outpatient diabetes education     Electronically signed by Jacqueline Martínez RD, LD on 7/29/21 at 1:48 PM EDT    Contact: [phone]

## 2021-07-29 NOTE — CONSULTS
Electrophysiology Consult Note      Reason for consultation:  Bradycardia    Chief complaint :  Hyperglycemia    Referring physician: Sudhir Guevara      Primary care physician: Sophie Gerard PA-C      History of Present Illness:     Patient is a 58yr old female with a history of atrial fibrillation sp ablation 2017, CAD, DM-2, HTN, HLD, CKD, Hypothyroidism, cerebral aneurysms, severe left ventricular systolic dysfunction - all information obtained from chart as patient is confused. She is tells me that she is at [facility] and the year is 5. She is unable to tell me why she came to the hospital. She is unable to make form complete sentences. Per chart patient is being treated for hyperglycemia and encephalopathy. During this admission, patient found to be bradycardic and in atrial fibrillation. EP was consulted for bradycardia        Pastmedical history:   Past Medical History:   Diagnosis Date    Abnormal PFTs (pulmonary function tests) 04/2014 4/14    Atrial fibrillation (HCC)     CAD (coronary artery disease)     Carotid aneurysm, right ([address] 11/09/2020    s/p fall. referred to [facility] by Dr Shikha Javier office    [facility]congestive heart failure) [facility]     Diabetes mellitus ([facility]     Dizziness - light-headed     Family history of coronary artery disease     H/O 24 hour EKG monitoring 02/09/2012,12/10/2010    02/09/2012 predominant rhythm is sinus with short runs of SVT no episode of atrial fib. pt has left bundle branch morphology, max heart is 132, min is 48 with an average of 74 ,infrequent PVCs are seen    H/O cardiovascular stress test 4/1/2013, 1/31/2012 4/1/2013-    H/O cardiovascular stress test 4/16/14,02/09/2012 4/16/14 EF 52% No ischemia, normal study. 02/09/2012 EF 58% tehnique-technetium LVEF is normal globally    H/O echocardiogram 03/27/2013 4/14 EF 50-55% mild MR, TR 3/13EF 35-45% decreasedr L ventrical systolic function.  H/O echocardiogram 2014-EF50-55% Mild MR/TR. 10/11 EF > 55% LVS function is normal, calcified mitral apparatus, impaired LV relaxation, no PE    Heart imaging 2012 MUGA rest EF 58%LVEF is normal globally    Heartburn     History of cardiac cath 2009, 2006,moderate stenosis  small diagonal    History of cardiac monitoring 2015    Event - sinus rhythm    History of cardioversion 2014    History of MI (myocardial infarction)     History of nuclear MUGA 2020    EF 41%    History of nuclear Muga stress test 2020    EF 41%    History of PTCA 10/06/2010    10/06/2010 stent 3.0x24 taxus stent left main 90% stenosis mid seg reduced to 0% second area of fmspanaz53 to 40% not flow limiting EF 40%    Hx of cardiovascular stress test 2015    lexiscan-normal,EF70%    Hx of transesophageal echocardiography (LUKE) for monitoring 2017    Noted to have MARY GRACE Blood Clot.  Hyperlipidemia     Hypertension     Insomnia     SOB (shortness of breath)     Tachycardia 2012    report in epic notes-Dr Nunn hosp. consult    TIA (transient ischemic attack)        Surgical history :   Past Surgical History:   Procedure Laterality Date    ABLATION OF DYSRHYTHMIC FOCUS       SECTION      CHOLECYSTECTOMY      DIAGNOSTIC CARDIAC CATH LAB PROCEDURE  2006, 2009 moderate stenosis small diagonal    IR NONTUNNELED VASCULAR CATHETER  2021    IR NONTUNNELED VASCULAR CATHETER 2021 [facility] SPECIAL PROCEDURES    OTHER SURGICAL HISTORY  2017    afib ablation LUKE    PTCA  10/06/2010    10/06/2010 stent 3.0x24 taxus stent 90% stenosis mid segment reduced to 0% second area of stenosis 30-40% not flow limiting EF 40%    TUBAL LIGATION         Family history:   Family History   Problem Relation Age of Onset    Diabetes Father     Heart Disease Father        Social history : (SYNTHROID) 50 MCG tablet Take 50 mcg by mouth Daily      apixaban (ELIQUIS) 5 MG TABS tablet Take 1 tablet by mouth 2 times daily 60 tablet 2    aspirin 81 MG EC tablet Take 81 mg by mouth daily         Review of Systems:   Review of Systems   Unable to perform ROS: Mental status change           Examination:      Vitals:    07/29/21 0634 07/29/21 0945 07/29/21 1510 07/29/21 1537   BP: 128/87 (!) 127/52 (!) 140/62    Pulse:  66     Resp:  19     Temp:  98 °F (36.7 °C) 98.2 °F (36.8 °C)    TempSrc:  Oral Oral    SpO2:  100% 96% 96%   Weight:  159 lb 2 oz (72.2 kg)     Height:  5' (1.524 m)          Body mass index is 31.08 kg/m². Physical Exam  Constitutional:       General: She is not in acute distress. Appearance: She is obese. She is ill-appearing and toxic-appearing. She is not diaphoretic. HENT:      Head: Normocephalic and atraumatic. Right Ear: External ear normal.      Left Ear: External ear normal.      Nose: No congestion. Mouth/Throat:      Mouth: Mucous membranes are moist.   Eyes:      Extraocular Movements: Extraocular movements intact. Conjunctiva/sclera: Conjunctivae normal.      Pupils: Pupils are equal, round, and reactive to light. Cardiovascular:      Rate and Rhythm: Bradycardia present. Rhythm irregular. Heart sounds: Murmur (grade 2/6 systolic murmur) heard. Pulmonary:      Effort: Pulmonary effort is normal. No respiratory distress. Breath sounds: Normal breath sounds. No wheezing or rhonchi. Abdominal:      General: Abdomen is flat. Bowel sounds are normal. There is no distension. Palpations: Abdomen is soft. Tenderness: There is no abdominal tenderness. Musculoskeletal:         General: No tenderness. Cervical back: Normal range of motion. No tenderness. Right lower leg: No edema. Left lower leg: No edema. Skin:     General: Skin is warm. Neurological:      General: No focal deficit present.       Mental Status: She is alert. She is disoriented. Cranial Nerves: No cranial nerve deficit. CBC:   Lab Results   Component Value Date    WBC 10.5 07/27/2021    HGB 12.2 07/27/2021    HCT 38.7 07/27/2021     07/27/2021     Lipids:  Lab Results   Component Value Date    CHOL 153 01/30/2012    TRIG 135 01/30/2012    HDL 59 (L) 01/30/2012    LDLDIRECT 88 01/30/2012     PT/INR:   Lab Results   Component Value Date    INR 1.17 07/15/2021        BMP:    Lab Results   Component Value Date     07/29/2021    K 5.6 (HH) 07/29/2021     07/29/2021    CO2 28 07/29/2021    BUN 25 (H) 07/29/2021     CMP:   Lab Results   Component Value Date    AST 19 07/29/2021    PROT 5.5 (L) 07/29/2021    BILITOT 0.6 07/29/2021    ALKPHOS 106 07/29/2021     TSH:  No results found for: TSH    EKGINTERPRETATION - EKG Interpretation:        IMPRESSION / RECOMMENDATIONS:     1. Bradycardia  2. Multifocal atrial tachycardia  3. Hyperkalemia  4. Chronic kidney disease  5. HLD  6. Diabetic hyperosmolar state  7. Altered mental status      Patient bradycardia probably from hyperkalemia. Correct the hyperkalemia at this point. Patient unable to give much history to us as she mentation is altered. Patient also has an hyperosmolar state which could be contributing for that. Holding AV catie blocking agents and amiodarone for now. I think this should recover once the present state is improved    We will continue to follow along            Thanks again for allowing me to participate in care of this patient. Please call me if you have any questions. With best regards. Jose Rooney MD, 7/29/2021 4:54 PM     Please note this report has been partially produced using speech recognition software and may contain errors related to that system including errors in grammar, punctuation, and spelling, as well as words and phrases that may be inappropriate.  If there are any questions or concerns please feel free to contact the dictating provider for clarification.

## 2021-07-29 NOTE — CONSULTS
Attempted midline in RUE cephalic vessel without success due to patient becoming combative during insertion attempt. With 2 staff members silvia Lowe Calk 20g 1.75\" Extra Long PIV inserted in right FA a 1 attempt, brisk blood return, flushes easy.   Protective sleeves x2 placed over IV site in right FA to deter patient removal.

## 2021-07-29 NOTE — PROGRESS NOTES
Nephrology Progress Note  7/29/2021 9:50 AM        Subjective:   Admit Date: 7/15/2021 PCP: Sincere Cornejo PA-C    Interval History: Delirious this morning    Diet: None    ROS: Some hypoxia and shortness of breath overnight  Low urine output, about 700 cc per shift   hematuria      Data:     Current meds:    insulin glargine  15 Units Subcutaneous Nightly    sodium chloride flush  5-40 mL Intravenous 2 times per day    insulin lispro  0-18 Units Subcutaneous 2 times per day    [Held by provider] dilTIAZem  30 mg Oral 4 times per day    aspirin  81 mg Oral Daily    folic acid-pyridoxine-cyancobalamin  1 tablet Oral Daily    insulin lispro  0-18 Units Subcutaneous TID WC    insulin lispro  10 Units Subcutaneous TID WC    [Held by provider] amiodarone  200 mg Oral BID    apixaban  5 mg Oral BID    levothyroxine  50 mcg Oral Daily    [Held by provider] metoprolol succinate  100 mg Oral Daily    pantoprazole  40 mg Oral Daily    alogliptin  6.25 mg Oral Daily    sodium chloride flush  5-40 mL Intravenous 2 times per day    sodium chloride flush  5-40 mL Intravenous 2 times per day      dextrose      sodium chloride           No intake/output data recorded.     CBC:   Recent Labs     07/27/21  0556   WBC 10.5   HGB 12.2*             Recent Labs     07/27/21  0556 07/28/21  0219 07/29/21  0516    133* 139   K 4.6 4.5 5.6*    99 103   CO2 26 27 28   BUN 24* 24* 25*   CREATININE 1.8* 1.4* 1.6*   GLUCOSE 214* 153* 89       Lab Results   Component Value Date    CALCIUM 9.2 07/29/2021    PHOS 4.4 07/27/2021       Objective:     Vitals: /87   Pulse 66   Temp 97.4 °F (36.3 °C) (Oral)   Resp 16   Ht 5' (1.524 m)   Wt 159 lb 14.4 oz (72.5 kg)   SpO2 95%   BMI 31.23 kg/m²     General appearance: Delirious as above  HEENT: Minimal exam but no gross conjunctival pallor  Neck: Seems supple  Lungs: Limited exam but seems some adventitious breath sound  Heart: Irregular  Abdomen: Soft  Extremities: No overt edema  She has Chen      Problem List :           Impression :     1. Acute kidney injury-creatinine still 1.6-with hypoxia and shortness of breath suspect pulmonary edema, especially with severe cardiomyopathy  2. Acute decompensated heart failure off diuretics will get a stat chest x-ray  3. Underlying hypertension dysrhythmia diabetes etc.  4. Encephalopathy of undetermined etiology  5. Hyperkalemia-likely from acute kidney injury and low urine output-IV loop should lower the potassium level    Recommendation/Plan  :     1. Stat chest x-ray  2. If pulmonary edema then restart IV diuretics  3. Watch urine output\  4. Also look for any other etiology for kidney injury  5. Follow clinically and bio chemically  6.  I have spoken to the nurse about her safety as she moves around in her bed      Ted Cantor MD MD

## 2021-07-29 NOTE — PLAN OF CARE
Problem: Non-Violent Restraints  Goal: Removal from restraints as soon as assessed to be safe  Outcome: Ongoing  Goal: No harm/injury to patient while restraints in use  Outcome: Ongoing  Goal: Patient's dignity will be maintained  Outcome: Ongoing     Problem: Discharge Planning:  Goal: Discharged to appropriate level of care  Description: Discharged to appropriate level of care  Outcome: Ongoing  Goal: Ability to perform activities of daily living will improve  Description: Ability to perform activities of daily living will improve  Outcome: Ongoing  Goal: Participates in care planning  Description: Participates in care planning  Outcome: Ongoing     Problem: Serum Glucose Level - Abnormal:  Goal: Ability to maintain appropriate glucose levels will improve  Description: Ability to maintain appropriate glucose levels will improve  Outcome: Ongoing     Problem: Sensory Perception - Impaired:  Goal: Ability to maintain a stable neurologic state will improve  Description: Ability to maintain a stable neurologic state will improve  Outcome: Ongoing  Goal: Demonstrations of improved sensory functioning will increase  Description: Demonstrations of improved sensory functioning will increase  Outcome: Ongoing  Goal: Decrease in sensory misperception frequency  Description: Decrease in sensory misperception frequency  Outcome: Ongoing  Goal: Able to refrain from responding to false sensory perceptions  Description: Able to refrain from responding to false sensory perceptions  Outcome: Ongoing  Goal: Demonstrates accurate environmental perceptions  Description: Demonstrates accurate environmental perceptions  Outcome: Ongoing  Goal: Able to distinguish between reality-based and nonreality-based thinking  Description: Able to distinguish between reality-based and nonreality-based thinking  Outcome: Ongoing  Goal: Able to interrupt nonreality-based thinking  Description: Able to interrupt nonreality-based thinking  Outcome: Ongoing     Problem: Gas Exchange - Impaired:  Goal: Levels of oxygenation will improve  Description: Levels of oxygenation will improve  Outcome: Ongoing     Problem: Infection, Septic Shock:  Goal: Will show no infection signs and symptoms  Description: Will show no infection signs and symptoms  Outcome: Ongoing     Problem: Infection - Ventilator-Associated Pneumonia:  Goal: Absence of pulmonary infection  Description: Absence of pulmonary infection  Outcome: Ongoing     Problem: Tissue Perfusion, Altered:  Goal: Circulatory function within specified parameters  Description: Circulatory function within specified parameters  Outcome: Ongoing     Problem: Venous Thromboembolism:  Goal: Will show no signs or symptoms of venous thromboembolism  Description: Will show no signs or symptoms of venous thromboembolism  Outcome: Ongoing  Goal: Absence of signs or symptoms of impaired coagulation  Description: Absence of signs or symptoms of impaired coagulation  Outcome: Ongoing     Problem: Falls - Risk of:  Goal: Will remain free from falls  Description: Will remain free from falls  Outcome: Ongoing  Goal: Absence of physical injury  Description: Absence of physical injury  Outcome: Ongoing     Problem: Pain:  Description: Pain management should include both nonpharmacologic and pharmacologic interventions.   Goal: Pain level will decrease  Description: Pain level will decrease  Outcome: Ongoing  Goal: Control of acute pain  Description: Control of acute pain  Outcome: Ongoing  Goal: Control of chronic pain  Description: Control of chronic pain  Outcome: Ongoing     Problem: Skin Integrity:  Goal: Will show no infection signs and symptoms  Description: Will show no infection signs and symptoms  Outcome: Ongoing  Goal: Absence of new skin breakdown  Description: Absence of new skin breakdown  Outcome: Ongoing     Problem: Nutrition  Goal: Optimal nutrition therapy  Outcome: Ongoing     Problem: Confusion - Acute:  Goal: Absence of continued neurological deterioration signs and symptoms  Description: Absence of continued neurological deterioration signs and symptoms  Outcome: Ongoing  Goal: Mental status will be restored to baseline  Description: Mental status will be restored to baseline  Outcome: Ongoing     Problem: Injury - Risk of, Physical Injury:  Goal: Will remain free from falls  Description: Will remain free from falls  Outcome: Ongoing  Goal: Absence of physical injury  Description: Absence of physical injury  Outcome: Ongoing     Problem: Mood - Altered:  Goal: Mood stable  Description: Mood stable  Outcome: Ongoing  Goal: Absence of abusive behavior  Description: Absence of abusive behavior  Outcome: Ongoing  Goal: Verbalizations of feeling emotionally comfortable while being cared for will increase  Description: Verbalizations of feeling emotionally comfortable while being cared for will increase  Outcome: Ongoing     Problem: Psychomotor Activity - Altered:  Goal: Absence of psychomotor disturbance signs and symptoms  Description: Absence of psychomotor disturbance signs and symptoms  Outcome: Ongoing     Problem: Sleep Pattern Disturbance:  Goal: Appears well-rested  Description: Appears well-rested  Outcome: Ongoing

## 2021-07-30 NOTE — PROGRESS NOTES
Today's plan:      off CLONIDINE, off AMIODARONE, off LOPRESSOR, off CARDIZEM, BLOOD PRESSURE IS NORMAL FOR NOW,WILL WATCH FOR NOW,EP noted appreciated    Admit Date:  7/15/2021    Subjective:OK      Chief complaints on admission  Chief Complaint   Patient presents with    Hyperglycemia         History of present illness:Danae is a 60 y. o.year old who  presents with had concerns including Hyperglycemia. Past medical history:    has a past medical history of Abnormal PFTs (pulmonary function tests), Atrial fibrillation ([address] CAD (coronary artery disease), Carotid aneurysm, right ([address] CHF (congestive heart failure) ([address] Diabetes mellitus ([address] Dizziness - light-headed, Family history of coronary artery disease, H/O 24 hour EKG monitoring, H/O cardiovascular stress test, H/O cardiovascular stress test, H/O echocardiogram, H/O echocardiogram, Heart imaging, Heartburn, History of cardiac cath, History of cardiac monitoring, History of cardioversion, History of MI (myocardial infarction), History of nuclear MUGA, History of nuclear Muga stress test, History of PTCA, Hx of cardiovascular stress test, Hx of transesophageal echocardiography ([facility]) for monitoring, Hyperlipidemia, Hypertension, Insomnia, SOB (shortness of breath), Tachycardia, and TIA (transient ischemic attack). Past surgical history:   has a past surgical history that includes  section; Tubal ligation; Cholecystectomy; Diagnostic Cardiac Cath Lab Procedure (2006, 2009); Percutaneous Transluminal Coronary Angio (10/06/2010); other surgical history (2017); ablation of dysrhythmic focus; and IR NONTUNNELED VASCULAR CATHETER > 5 YEARS (2021). Social History:   reports that she quit smoking about 14 years ago. Her smoking use included cigarettes. She has a 60.00 pack-year smoking history. She has never used smokeless tobacco. She reports that she does not drink alcohol and does not use drugs.   Family history:  family history includes Diabetes in her father; Heart Disease in her father. Allergies   Allergen Reactions    Amiodarone Other (See Comments)     dizziness    Iv Dye [Iodides] Nausea And Vomiting    Vicodin [Hydrocodone-Acetaminophen] Nausea And Vomiting         Objective:   /62   Pulse 98   Temp 98.4 °F (36.9 °C) (Oral)   Resp 16   Ht 5' (1.524 m)   Wt 155 lb 5 oz (70.4 kg)   SpO2 98%   BMI 30.33 kg/m²       Intake/Output Summary (Last 24 hours) at 7/30/2021 1524  Last data filed at 7/30/2021 0340  Gross per 24 hour   Intake --   Output 3200 ml   Net -3200 ml       TELEMETRY: VENTRICULAR ESCAPE RHYTHM    Physical Exam:  Constitutional:  Well developed, Well nourished, No acute distress, Non-toxic appearance. HENT:  Normocephalic, Atraumatic, Bilateral external ears normal, Oropharynx moist, No oral exudates, Nose normal. Neck- Normal range of motion, No tenderness, Supple, No stridor. Eyes:  PERRL, EOMI, Conjunctiva normal, No discharge. Respiratory:  Normal breath sounds, No respiratory distress, No wheezing, No chest tenderness. ,no use of accessory muscles, diaphragm movement is normal  Cardiovascular: (PMI) apex non displaced,no lifts no thrills, no s3,no s4, Normal heart rate, Normal rhythm, No murmurs, No rubs, No gallops. Carotid arteries pulse and amplitude are normal no bruit, no abdominal bruit noted ( normal abdominal aorta ausculation), femoral arteries pulse and amplitude are normal no bruit, pedal pulses are normal  GI:  Bowel sounds normal, Soft, No tenderness, No masses, No pulsatile masses. : External genitalia appear normal, No masses or lesions. No discharge. No CVA tenderness. Musculoskeletal:  Intact distal pulses, No edema, No tenderness, No cyanosis, No clubbing. Good range of motion in all major joints. No tenderness to palpation or major deformities noted. Back- No tenderness. Integument:  Warm, Dry, No erythema, No rash.    Lymphatic:  No lymphadenopathy noted. Neurologic:  Alert & oriented x 3, Normal motor function, Normal sensory function, No focal deficits noted. Psychiatric:  Affect normal, Judgment normal, Mood normal.     Medications:    insulin glargine  10 Units Subcutaneous Nightly    furosemide  40 mg Intravenous BID    sodium chloride flush  5-40 mL Intravenous 2 times per day    insulin lispro  0-18 Units Subcutaneous 2 times per day    aspirin  81 mg Oral Daily    folic acid-pyridoxine-cyancobalamin  1 tablet Oral Daily    insulin lispro  0-18 Units Subcutaneous TID WC    insulin lispro  10 Units Subcutaneous TID WC    apixaban  5 mg Oral BID    levothyroxine  50 mcg Oral Daily    pantoprazole  40 mg Oral Daily    alogliptin  6.25 mg Oral Daily    sodium chloride flush  5-40 mL Intravenous 2 times per day    sodium chloride flush  5-40 mL Intravenous 2 times per day      dextrose      sodium chloride       dextrose, diphenhydrAMINE, hydrOXYzine, LORazepam, potassium chloride, albuterol sulfate HFA, nitroGLYCERIN, traZODone, glucose, dextrose, glucagon (rDNA), dextrose, sodium chloride flush, ondansetron **OR** ondansetron, polyethylene glycol, acetaminophen **OR** acetaminophen, sodium chloride flush, sodium chloride    Lab Data:  CBC:   No results for input(s): WBC, HGB, HCT, MCV, PLT in the last 72 hours. BMP:   Recent Labs     07/28/21 0219 07/29/21  0516 07/30/21 0312   * 139 137   K 4.5 5.6* 4.6   CL 99 103 99   CO2 27 28 30   PHOS  --   --  5.4*   BUN 24* 25* 28*   CREATININE 1.4* 1.6* 1.8*     LIVER PROFILE:   Recent Labs     07/28/21 0219 07/29/21  0516 07/30/21  0312   AST 16 19 16   ALT 25 24 20   BILITOT 0.7 0.6 0.6   ALKPHOS 103 106 101     PT/INR: No results for input(s): PROTIME, INR in the last 72 hours. APTT: No results for input(s): APTT in the last 72 hours. BNP:  No results for input(s): BNP in the last 72 hours. TROPONIN: @TROPONINI:3@      Assessment:  60 y. o.year old who is admitted for          Plan:  1. VENTRICULAR ESCAPE resolved,WITH H/O AFIB ABLATION, AS ABOVE  2. AFIB\" H/O ABLATION, CONTINEU  ELIQUIS  3. CAD: H/O PCI, STABLE  4. DM: STABLE  All labs, medications and tests reviewed, continue all other medications of all above medical condition listed as is.       Tawanda Fair MD, MD 7/30/2021 3:24 PM

## 2021-07-30 NOTE — PROGRESS NOTES
NEUROLOGY NOTE  DR. Thomas Horvath MD.  -------------------------------------------------  Subjective:    Pt is less confused and doing better today    Objective:    BP (!) 144/61   Pulse 82   Temp 98 °F (36.7 °C) (Oral)   Resp 20   Ht 5' (1.524 m)   Wt 159 lb 2 oz (72.2 kg)   SpO2 97%   BMI 31.08 kg/m²   HEENT nl      Neuro exam    Alert oriented to person place mildly confused  eomi pupils 3 mm jo  Squeezes fingers to commands  Equivocal toes  resp on own      RADIOLOGY  -----------------    Echocardiogram complete 2D with doppler with color    Result Date: 7/16/2021  Transthoracic Echocardiography Report (TTE)  Demographics   Patient Name       Candance Pol M Date of Study       07/16/2021   Date of Birth      1955         Gender              Female   Age                60 year(s)         Race                   Patient Number     [id number]         Room Number         2128   Visit Number       [id number]   Corporate ID       [id number]   Accession Number   [id number]         Tommy Irizarry RDMS   Ordering Physician Mel Mason MD                 Physician           MD  Procedure Type of Study   TTE procedure:ECHOCARDIOGRAM COMPLETE 2D W DOPPLER W COLOR. Procedure Date Date: 07/16/2021 Start: 01:35 PM Study Location: Portable Technical Quality: Fair visualization Indications:Congestive heart failure. Patient Status: Routine Height: 60 inches Weight: 180 pounds BSA: 1.78 m2 BMI: 35.15 kg/m2 HR: 99 bpm BP: 145/83 mmHg  Conclusions   Summary  Left ventricular systolic function is abnormal.  Ejection fraction is visually estimated at 15-20%. Global hypokinesis noted. Grade III diastolic dysfunction. Mitral annular calcification is present. Moderate to severe mitral regurgitation. Mild to moderate tricuspid regurgitation; RVSP: 38 mmHg. Doppler Measurements & Calculations   MV Peak E-Wave: 136  AV Peak Velocity: 125 cm/s    LVOT Peak Velocity: 64.6  cm/s                 AV Peak Gradient: 6.25 mmHg   cm/s  MV Peak A-Wave: 68.5 AV Mean Velocity: 86.6 cm/s   LVOT Mean Velocity: 39.8  cm/s                 AV Mean Gradient: 3 mmHg      cm/s  MV E/A Ratio: 1.99   AV VTI: 21 cm                 LVOT Peak Gradient: 2  MV Peak Gradient:    AV Area (Continuity):1.16 cm2 mmHgLVOT Mean Gradient: 1  7.4 mmHg                                           mmHg                       LVOT VTI: 8.63 cm             Estimated RVSP: 38 mmHg  MV P1/2t: 41 msec                                  Estimated RAP:3 mmHg  MVA by PHT:5.37 cm2  Estimated PASP: 35.49 mmHg   MV E' Septal                                       TR Velocity:285 cm/s  Velocity: 4.89 cm/s                                TR Gradient:32.49 mmHg  MV E' Lateral  Velocity: 10.4 cm/s  MV E/E' septal:  27.81  MV E/E' lateral:  13.08      CT ABDOMEN PELVIS WO CONTRAST Additional Contrast? None    Result Date: 7/15/2021  EXAMINATION: CT OF THE ABDOMEN AND PELVIS WITHOUT CONTRAST 7/15/2021 7:28 pm TECHNIQUE: CT of the abdomen and pelvis was performed without the administration of intravenous contrast. Multiplanar reformatted images are provided for review. Dose modulation, iterative reconstruction, and/or weight based adjustment of the mA/kV was utilized to reduce the radiation dose to as low as reasonably achievable. COMPARISON: MRI abdomen May 24, 2019; CT abdomen May 23, 2019 HISTORY: ORDERING SYSTEM PROVIDED HISTORY: Abdominal pain TECHNOLOGIST PROVIDED HISTORY: Reason for exam:->Abdominal pain Additional Contrast?->None Reason for Exam: Abdominal pain FINDINGS: Lower Chest: Imaged lung bases demonstrate partially imaged small and associated atelectasis. Imaged lung fields demonstrate findings suggesting mild pulmonary edema, however evaluation is limited due to respiratory motion artifact.   Coronary artery atherosclerotic disease present. Calcification of the mitral valve. Partially imaged heart is enlarged. Organs: Evaluation of the solid organs is limited due to lack of intravenous contrast.  The nonenhanced liver demonstrates no acute abnormality. The gallbladder is surgically absent. Numerous calcified granulomata throughout the spleen. Atrophy of the pancreas. The nonenhanced adrenal glands demonstrate no acute findings. Redemonstration of right adrenal adenoma is noted on previous exams. The nonenhanced kidneys are grossly stable with similar atrophy of the left kidney when compared with prior exams. No hydronephrosis identified. GI/Bowel: No bowel obstruction is evident. The colon is collapsed and not well evaluated. No evidence for appendicitis. The small bowel is normal in caliber. Small hiatal hernia. Pelvis: Bladder and solid pelvic organs demonstrate no acute findings. Peritoneum/Retroperitoneum: The abdominal aorta is normal in caliber with severe calcified atherosclerotic plaque throughout. No retroperitoneal adenopathy identified. No free fluid or free air identified within the abdomen and pelvis. Bones/Soft Tissues: Soft tissues demonstrate mild anasarca. Postoperative changes of the right anterior abdominal wall. No acute osseous abnormality identified. 1. Findings at the lung bases suggesting pulmonary edema with small bilateral effusions and associated atelectasis. 2. No acute intra-abdominal process identified. 3. Severe atherosclerotic disease. CT Head WO Contrast    Result Date: 7/15/2021  EXAMINATION: CT OF THE HEAD WITHOUT CONTRAST  7/15/2021 7:28 pm TECHNIQUE: CT of the head was performed without the administration of intravenous contrast. Dose modulation, iterative reconstruction, and/or weight based adjustment of the mA/kV was utilized to reduce the radiation dose to as low as reasonably achievable.  COMPARISON: CT brain June 11, 2021 [address]ORY: [address] HISTORY: AMS TECHNOLOGIST PROVIDED HISTORY: Reason for exam:->AMS Has a \"code stroke\" or \"stroke alert\" been called? ->No Decision Support Exception - unselect if not a suspected or confirmed emergency medical condition->Emergency Medical Condition (MA) Reason for Exam: AMS FINDINGS: BRAIN/VENTRICLES: There is no acute intracranial hemorrhage, mass effect or midline shift. No abnormal extra-axial fluid collection. The gray-white differentiation is maintained without evidence of an acute infarct. There is no evidence of hydrocephalus. Severe atherosclerotic change of the intracranial vasculature. There is moderate periventricular and subcortical white matter hypoattenuation most consistent with microvascular ischemic changes. Global cerebral atrophy noted. ORBITS: The visualized portion of the orbits demonstrate no acute abnormality. SINUSES: The visualized paranasal sinuses and mastoid air cells demonstrate no acute abnormality. SOFT TISSUES/SKULL:  No acute abnormality of the visualized skull or soft tissues. No acute intracranial abnormality. Chronic microvascular ischemic changes and global cerebral atrophy. XR CHEST PORTABLE    Result Date: 7/16/2021  EXAMINATION: ONE XRAY VIEW OF THE CHEST 7/16/2021 10:31 am COMPARISON: July 15, 2021 HISTORY: ORDERING SYSTEM PROVIDED HISTORY: s/p central line attempt TECHNOLOGIST PROVIDED HISTORY: Reason for exam:->s/p central line attempt Reason for Exam: s/p central line attempt FINDINGS: Right IJ central venous catheter terminates in the mid SVC in satisfactory position. No appreciable pneumothorax. Cardiomediastinal silhouette appears unchanged. Pulmonary vascular congestion and diffuse interstitial prominence. Trace bilateral pleural effusions cannot be excluded. Minimal bibasilar atelectasis. Right IJ central venous catheter terminates in the mid SVC in satisfactory position. No pneumothorax.  Pulmonary vascular congestion with interstitial pulmonary edema.     XR CHEST PORTABLE    Result Date: 7/15/2021  EXAMINATION: ONE XRAY VIEW OF THE CHEST 7/15/2021 5:47 pm COMPARISON: 06/13/2021 HISTORY: ORDERING SYSTEM PROVIDED HISTORY: AMS TECHNOLOGIST PROVIDED HISTORY: Reason for exam:->AMS Reason for Exam: AMS Initial encounter FINDINGS: Cardiomegaly. There is increased interstitial opacities bilaterally. No pleural effusion or pneumothorax. The osseous structures otherwise stable. Cardiomegaly with pulmonary edema. IR NONTUNNELED VASCULAR CATHETER > 5 YEARS    Result Date: 7/16/2021  PROCEDURE: ULTRASOUND GUIDED VASCULAR ACCESS. PLACEMENT OF A NON-TUNNELED CATHETER. 7/16/2021. HISTORY: ORDERING SYSTEM PROVIDED HISTORY: Need central venous access. Request CVC Insertion TECHNOLOGIST PROVIDED HISTORY: Reason for exam:->CVC Insertion SEDATION: None. FLUOROSCOPY DOSE AND TYPE OR TIME AND EXPOSURES: None. TECHNIQUE: Informed consent was obtained after a detailed explanation of the procedure including risks, benefits, and alternatives. Universal protocol was observed. The right neck and chest were prepped and draped in sterile fashion using maximum sterile barrier technique. Local anesthesia was achieved with lidocaine. A micropuncture needle was used to access the right internal jugular vein using ultrasound guidance. An ultrasound image demonstrating patency of the vein with needle tip located within it. An image was obtained and stored in PACs. A 0.035 guidewire was used to place a triple lumen central venous catheter after fascial tract dilation. The catheter flushed easily and there was a good blood return. The catheter was sutured to the skin. The catheter was locked with heparinized saline. The patient tolerated the procedure well and there were no immediate complications. EBL: Less than 1 ml. FINDINGS: Chest x ray after procedure demonstrates the tip of the catheter in the SVC. The catheter is ready to use.      Successful ultrasound and fluoroscopy guided non-tunneled central venous catheter placement. The catheter is ready to use. MRI BRAIN WO CONTRAST    Result Date: 7/20/2021  EXAMINATION: MRI OF THE BRAIN WITHOUT CONTRAST  7/20/2021 4:07 pm TECHNIQUE: Multiplanar multisequence MRI of the brain was performed without the administration of intravenous contrast. COMPARISON: None. HISTORY: ORDERING SYSTEM PROVIDED HISTORY: aphasic TECHNOLOGIST PROVIDED HISTORY: Reason for exam:->aphasic Reason for Exam: aphasic Acuity: Acute Type of Exam: Initial Relevant Medical/Surgical History: none FINDINGS: Motion degrades images limiting evaluation. INTRACRANIAL STRUCTURES/VENTRICLES: There is no acute infarct. No mass effect or midline shift. No evidence of an acute intracranial hemorrhage. Areas of T2 FLAIR hyperintensity are seen in the periventricular and subcortical white matter, which are nonspecific, but may represent chronic microvascular ischemic change. There is prominence of the ventricles and sulci due to global parenchymal volume loss. Evaluation of the signal voids within the major intracranial vessels is limited given patient motion. ORBITS: The visualized portion of the orbits demonstrate no acute abnormality. SINUSES: No gross abnormality seen of the paranasal sinuses. There appears to be a left mastoid effusion. 1. Patient motion limits evaluation. 2. No convincing acute intracranial abnormality. No acute infarct. 3. Mild global parenchymal volume loss with moderate chronic microvascular ischemic changes.        LAB RESULTS  --------------------    Recent Results (from the past 24 hour(s))   POCT Glucose    Collection Time: 07/29/21  2:52 AM   Result Value Ref Range    POC Glucose 73 70 - 99 MG/DL   Comprehensive Metabolic Panel    Collection Time: 07/29/21  5:16 AM   Result Value Ref Range    Sodium 139 135 - 145 MMOL/L    Potassium 5.6 (HH) 3.5 - 5.1 MMOL/L    Chloride 103 99 - 110 mMol/L    CO2 28 21 - 32 MMOL/L    BUN 25 (H) 6 - 23 MG/DL CREATININE 1.6 (H) 0.6 - 1.1 MG/DL    Glucose 89 70 - 99 MG/DL    Calcium 9.2 8.3 - 10.6 MG/DL    Albumin 3.6 3.4 - 5.0 GM/DL    Total Protein 5.5 (L) 6.4 - 8.2 GM/DL    Total Bilirubin 0.6 0.0 - 1.0 MG/DL    ALT 24 10 - 40 U/L    AST 19 15 - 37 IU/L    Alkaline Phosphatase 106 40 - 129 IU/L    GFR Non- 32 (L) >60 mL/min/1.73m2    GFR  39 (L) >60 mL/min/1.73m2    Anion Gap 8 4 - 16   POCT Glucose    Collection Time: 07/29/21  9:41 AM   Result Value Ref Range    POC Glucose 182 (H) 70 - 99 MG/DL   EKG 12 Lead    Collection Time: 07/29/21  9:54 AM   Result Value Ref Range    Ventricular Rate 40 BPM    Atrial Rate 42 BPM    QRS Duration 130 ms    Q-T Interval 478 ms    QTc Calculation (Bazett) 389 ms    R Axis -25 degrees    T Axis 142 degrees    Diagnosis       Idioventricular rhythm  Left bundle branch block  Abnormal ECG  When compared with ECG of 17-JUL-2021 10:52,  Previous ECG has undetermined rhythm, needs review  Confirmed by Low Palma MD, Margie Gore ([id number]) on 7/29/2021 3:55:42 PM     POCT Glucose    Collection Time: 07/29/21 12:58 PM   Result Value Ref Range    POC Glucose 91 70 - 99 MG/DL   POCT Glucose    Collection Time: 07/29/21  3:57 PM   Result Value Ref Range    POC Glucose 146 (H) 70 - 99 MG/DL   POCT Glucose    Collection Time: 07/29/21  8:21 PM   Result Value Ref Range    POC Glucose 282 (H) 70 - 99 MG/DL         Medical problems    Patient Active Problem List:     History of CVA (cerebrovascular accident) without residual deficits     Morbid obesity ([address]     DM (diabetes mellitus), type 2, uncontrolled (HCC)     Persistent atrial fibrillation (HCC)     CAD (coronary artery disease)     CHF (congestive heart failure) ([facility] [address]     Light headed     History of MI (myocardial infarction)     Diabetes mellitus ([facility] [address]     Hypertension     TIA (transient ischemic attack)     Hyperlipidemia     SOB (shortness of breath)     H/O cardiovascular stress test     Family history

## 2021-07-30 NOTE — PROGRESS NOTES
Progress Note( Dr. Ehsan Bosch)    Subjective:   Admit Date: 7/15/2021  PCP: Luís Fernandes PA-C        Admitted For :   Altered mental state and severe hyperglycemia possible metabolic encephalopathy    Consulted For: Better control of blood glucose    Interval History: Patient is more alert awake responding to verbal commands and some answers seem to be appropriate and other seem to be confused   Patient did not require an attendant to watch her anymore    CT Scan& MRI of Brain  Neg for any acute CVA     Denies any chest pains,   Denies SOB . Denies nausea or vomiting. Now eating better  No new bowel or bladder symptoms. Intake/Output Summary (Last 24 hours) at 7/30/2021 0723  Last data filed at 7/30/2021 0340  Gross per 24 hour   Intake --   Output 3200 ml   Net -3200 ml       DATA    CBC:   No results for input(s): WBC, HGB, PLT in the last 72 hours.  CMP:  Recent Labs     07/28/21  0219 07/29/21  0516 07/30/21  0312   * 139 137   K 4.5 5.6* 4.6   CL 99 103 99   CO2 27 28 30   BUN 24* 25* 28*   CREATININE 1.4* 1.6* 1.8*   CALCIUM 9.1 9.2 8.9   PROT 5.4* 5.5* 5.5*   LABALBU 3.1* 3.6 3.2*   BILITOT 0.7 0.6 0.6   ALKPHOS 103 106 101   AST 16 19 16   ALT 25 24 20     Lipids:   Lab Results   Component Value Date    CHOL 153 01/30/2012    HDL 59 01/30/2012    TRIG 135 01/30/2012     Glucose:  Recent Labs     07/29/21 2021 07/30/21  0248 07/30/21  0657   POCGLU 282* 292* 93     [id number]:  Lab Results   Component Value Date    LABA1C 10.9 07/17/2021     High Sensitivity TSH:   Lab Results   Component Value Date    TSHHS 7.150 07/20/2021     Free T3: No results found for: FT3  Free T4:  Lab Results   Component Value Date    T4FREE 1.28 06/12/2021       Echocardiogram complete 2D with doppler with color    Result Date: 7/16/2021  Transthoracic Echocardiography Report (TTE)  Demographics   Patient Name       Leonel MAYFIELD Date of Study       07/16/2021   Date of Birth      1955         Gender Female   Age                60 year(s)         Race                   Patient Number     [id number]         Room Number         2128   Visit Number       [id number]   Corporate ID       [id number]   Accession Number   [id number]         Xavier Dial   Ordering Physician Elena Washburn MD                 Physician           MD  Procedure Type of Study   TTE procedure:ECHOCARDIOGRAM COMPLETE 2D W DOPPLER W COLOR. Procedure Date Date: 07/16/2021 Start: 01:35 PM Study Location: Portable Technical Quality: Fair visualization Indications:Congestive heart failure. Patient Status: Routine Height: 60 inches Weight: 180 pounds BSA: 1.78 m2 BMI: 35.15 kg/m2 HR: 99 bpm BP: 145/83 mmHg  Conclusions   Summary  Left ventricular systolic function is abnormal.  Ejection fraction is visually estimated at 15-20%. Global hypokinesis noted. Grade III diastolic dysfunction. Mitral annular calcification is present. Moderate to severe mitral regurgitation. Mild to moderate tricuspid regurgitation; RVSP: 38 mmHg. No evidence of any pericardial effusion. Signature   ------------------------------------------------------------------  Electronically signed by Piter Garcia MD (Interpreting  physician) on 07/16/2021 at 04:31 PM    CT ABDOMEN PELVIS WO CONTRAST Additional Contrast? None    Result Date: 7/15/2021  EXAMINATION: CT OF THE ABDOMEN AND PELVIS WITHOUT CONTRAST 7/15/2021 7:28 pm    1. Findings at the lung bases suggesting pulmonary edema with small bilateral effusions and associated atelectasis. 2. No acute intra-abdominal process identified. 3. Severe atherosclerotic disease. CT Head WO Contrast    Result Date: 7/15/2021  EXAMINATION: CT OF THE HEAD WITHOUT CONTRAST  7/15/2021 7:28 pm     No acute intracranial abnormality.  Chronic microvascular ischemic changes and global cerebral atrophy. XR CHEST PORTABLE    Result Date: 7/16/2021  EXAMINATION: ONE XRAY VIEW OF THE CHEST 7/16/2021 10:31 am COMPARISON: July 15, 2021 HISTORY: ORDERING SYSTEM PROVIDED HISTORY: s/p central line attempt    Right IJ central venous catheter terminates in the mid SVC in satisfactory position. No pneumothorax. Pulmonary vascular congestion with interstitial pulmonary edema. XR CHEST PORTABLE    Result Date: 7/15/2021  EXAMINATION: ONE XRAY VIEW OF THE CHEST 7/15/2021 5:47 pm COMPARISON: 06/13/2021 HISTORY: ORDERING SYSTEM PROVIDED HISTORY: AMS TECHNOLOGIST PROVIDED HISTORY: Reason for exam:->AMS Reason for Exam: AMS Initial encounter FINDINGS: Cardiomegaly. There is increased interstitial opacities bilaterally. No pleural effusion or pneumothorax. The osseous structures otherwise stable. Cardiomegaly with pulmonary edema. IR NONTUNNELED VASCULAR CATHETER > 5 YEARS    Result Date: 7/16/2021  PROCEDURE: ULTRASOUND GUIDED VASCULAR ACCESS. PLACEMENT OF A NON-TUNNELED CATHETER. 7/16/2021. HISTORY: ORDERING SYSTEM PROVIDED HISTORY: Need central venous access. Request CVC Insertion TECHNOLOGIST PROVIDED HISTORY: Reason for exam:->CVC Insertion SEDATION: None. Successful ultrasound and fluoroscopy guided non-tunneled central venous catheter placement. The catheter is ready to use.        Scheduled Medicines   Medications:    insulin glargine  10 Units Subcutaneous Nightly    sodium zirconium cyclosilicate  10 g Oral Daily    furosemide  40 mg Intravenous TID    sodium chloride flush  5-40 mL Intravenous 2 times per day    insulin lispro  0-18 Units Subcutaneous 2 times per day    aspirin  81 mg Oral Daily    folic acid-pyridoxine-cyancobalamin  1 tablet Oral Daily    insulin lispro  0-18 Units Subcutaneous TID     insulin lispro  10 Units Subcutaneous TID     apixaban  5 mg Oral BID    levothyroxine  50 mcg Oral Daily    pantoprazole  40 mg Oral Daily    alogliptin  6.25 mg Oral Daily    sodium chloride flush  5-40 mL Intravenous 2 times per day    sodium chloride flush  5-40 mL Intravenous 2 times per day      Infusions:    dextrose      sodium chloride           Objective:   Vitals: BP (!) 112/40   Pulse 92   Temp 97.7 °F (36.5 °C) (Oral)   Resp 16   Ht 5' (1.524 m)   Wt 159 lb 2 oz (72.2 kg)   SpO2 97%   BMI 31.08 kg/m²   General appearance: alert and cooperative with exam appears to be somewhat dehydrated  Neck: no JVD or bruit  Thyroid : Normal lobes   Lungs: Has Vesicular Breath sounds   Heart: Atrial fibrillation  Abdomen: soft, non-tender; bowel sounds normal; no masses,  no organomegaly  Musculoskeletal: Normal  Extremities: extremities normal, , no edema  Neurologic:  Awake, alert, oriented to name, place and time. Cranial nerves II-XII are grossly intact. Motor is  intact. Sensory is neuropathy. ,  and gait is normal.    Assessment:     Patient Active Problem List:     History of CVA (cerebrovascular accident) without residual deficits     Morbid obesity ([address]     DM (diabetes mellitus), type 2, uncontrolled ([address]     Persistent atrial fibrillation (HCC)     CAD (coronary artery disease)     CHF (congestive heart failure) ([address]     Light headed     History of MI (myocardial infarction)     Diabetes mellitus ([address]     Hypertension     TIA (transient ischemic attack)     Hyperlipidemia     SOB (shortness of breath)     H/O cardiovascular stress test     Family history of coronary artery disease     PAF (paroxysmal atrial fibrillation) (HCC)     Chest pain     Atrial thrombus without antecedent myocardial infarction     S/P ablation of atrial fibrillation     Gastroenteritis     YESIKA (acute kidney injury) ([address]     Acute cystitis     Elevated liver enzymes     CHF (congestive heart failure), NYHA class I, acute on chronic, combined (HCC)     Pneumonia     Multifocal pneumonia     Atrial fibrillation with RVR (HCC)     Shoulder pain     Paroxysmal atrial fibrillation (HCC)     Diabetic hyperosmolar coma ([facility] [address]     Difficult intravenous access     Metabolic encephalopathy getting better      Plan:     1. Reviewed POC blood rate 14 with regular foot embolism there is good  2. . Labs and X ray results   3. Reviewed Current Medicines   4. On meal +,  correction bolus Humalog/ Basal Lantus Insulin regime does get  5. Monitor Blood glucose frequently   6. Modified  the dose of Insulin/ other medicines as needed  7. Changed CODE STATUS  8. Will follow     .      Tommy Parker MD, MD

## 2021-07-30 NOTE — PLAN OF CARE
Problem: Non-Violent Restraints  Goal: Removal from restraints as soon as assessed to be safe  Outcome: Met This Shift  Goal: No harm/injury to patient while restraints in use  Outcome: Met This Shift  Goal: Patient's dignity will be maintained  Outcome: Met This Shift     Problem: Discharge Planning:  Goal: Discharged to appropriate level of care  Description: Discharged to appropriate level of care  Outcome: Met This Shift  Goal: Ability to perform activities of daily living will improve  Description: Ability to perform activities of daily living will improve  Outcome: Met This Shift  Goal: Participates in care planning  Description: Participates in care planning  Outcome: Met This Shift     Problem: Serum Glucose Level - Abnormal:  Goal: Ability to maintain appropriate glucose levels will improve  Description: Ability to maintain appropriate glucose levels will improve  Outcome: Met This Shift     Problem: Sensory Perception - Impaired:  Goal: Ability to maintain a stable neurologic state will improve  Description: Ability to maintain a stable neurologic state will improve  Outcome: Met This Shift  Goal: Demonstrations of improved sensory functioning will increase  Description: Demonstrations of improved sensory functioning will increase  Outcome: Met This Shift  Goal: Decrease in sensory misperception frequency  Description: Decrease in sensory misperception frequency  Outcome: Met This Shift  Goal: Able to refrain from responding to false sensory perceptions  Description: Able to refrain from responding to false sensory perceptions  Outcome: Met This Shift  Goal: Demonstrates accurate environmental perceptions  Description: Demonstrates accurate environmental perceptions  Outcome: Met This Shift  Goal: Able to distinguish between reality-based and nonreality-based thinking  Description: Able to distinguish between reality-based and nonreality-based thinking  Outcome: Met This Shift  Goal: Able to interrupt nonreality-based thinking  Description: Able to interrupt nonreality-based thinking  Outcome: Met This Shift     Problem: Gas Exchange - Impaired:  Goal: Levels of oxygenation will improve  Description: Levels of oxygenation will improve  Outcome: Met This Shift     Problem: Infection, Septic Shock:  Goal: Will show no infection signs and symptoms  Description: Will show no infection signs and symptoms  Outcome: Met This Shift     Problem: Infection - Ventilator-Associated Pneumonia:  Goal: Absence of pulmonary infection  Description: Absence of pulmonary infection  Outcome: Met This Shift     Problem: Tissue Perfusion, Altered:  Goal: Circulatory function within specified parameters  Description: Circulatory function within specified parameters  Outcome: Met This Shift     Problem: Venous Thromboembolism:  Goal: Will show no signs or symptoms of venous thromboembolism  Description: Will show no signs or symptoms of venous thromboembolism  Outcome: Met This Shift  Goal: Absence of signs or symptoms of impaired coagulation  Description: Absence of signs or symptoms of impaired coagulation  Outcome: Met This Shift     Problem: Falls - Risk of:  Goal: Will remain free from falls  Description: Will remain free from falls  Outcome: Met This Shift  Goal: Absence of physical injury  Description: Absence of physical injury  Outcome: Met This Shift     Problem: Pain:  Goal: Pain level will decrease  Description: Pain level will decrease  Outcome: Met This Shift  Goal: Control of acute pain  Description: Control of acute pain  Outcome: Met This Shift  Goal: Control of chronic pain  Description: Control of chronic pain  Outcome: Met This Shift     Problem: Skin Integrity:  Goal: Will show no infection signs and symptoms  Description: Will show no infection signs and symptoms  Outcome: Met This Shift  Goal: Absence of new skin breakdown  Description: Absence of new skin breakdown  Outcome: Met This Shift     Problem: Nutrition  Goal: Optimal nutrition therapy  Outcome: Met This Shift     Problem: Confusion - Acute:  Goal: Absence of continued neurological deterioration signs and symptoms  Description: Absence of continued neurological deterioration signs and symptoms  Outcome: Met This Shift  Goal: Mental status will be restored to baseline  Description: Mental status will be restored to baseline  Outcome: Met This Shift     Problem: Injury - Risk of, Physical Injury:  Goal: Will remain free from falls  Description: Will remain free from falls  Outcome: Met This Shift  Goal: Absence of physical injury  Description: Absence of physical injury  Outcome: Met This Shift     Problem: Mood - Altered:  Goal: Mood stable  Description: Mood stable  Outcome: Met This Shift  Goal: Absence of abusive behavior  Description: Absence of abusive behavior  Outcome: Met This Shift  Goal: Verbalizations of feeling emotionally comfortable while being cared for will increase  Description: Verbalizations of feeling emotionally comfortable while being cared for will increase  Outcome: Met This Shift     Problem: Psychomotor Activity - Altered:  Goal: Absence of psychomotor disturbance signs and symptoms  Description: Absence of psychomotor disturbance signs and symptoms  Outcome: Met This Shift     Problem: Sleep Pattern Disturbance:  Goal: Appears well-rested  Description: Appears well-rested  Outcome: Met This Shift

## 2021-07-30 NOTE — PROGRESS NOTES
Admit Date:  7/15/2021    Admission diagnosis / Complaint : hyperglycemia      Subjective:  Ms. Sweta Islas is sleeping in bed. Per RN, patient remains restless at times and confused. Objective:   BP (!) 112/40   Pulse 92   Temp 97.7 °F (36.5 °C) (Oral)   Resp 16   Ht 5' (1.524 m)   Wt 159 lb 2 oz (72.2 kg)   SpO2 97%   BMI 31.08 kg/m²       Intake/Output Summary (Last 24 hours) at 2021 6163  Last data filed at 2021 0340  Gross per 24 hour   Intake --   Output 3200 ml   Net -3200 ml       TELEMETRY: Sinus    has a past medical history of Abnormal PFTs (pulmonary function tests), Atrial fibrillation (HCC), CAD (coronary artery disease), Carotid aneurysm, right ([address] CHF (congestive heart failure) ([address] Diabetes mellitus ([facility] [facility] Dizziness - light-headed, Family history of coronary artery disease, H/O 24 hour EKG monitoring, H/O cardiovascular stress test, H/O cardiovascular stress test, H/O echocardiogram, H/O echocardiogram, Heart imaging, Heartburn, History of cardiac cath, History of cardiac monitoring, History of cardioversion, History of MI (myocardial infarction), History of nuclear MUGA, History of nuclear Muga stress test, History of PTCA, Hx of cardiovascular stress test, Hx of transesophageal echocardiography ([facility]) for monitoring, Hyperlipidemia, Hypertension, Insomnia, SOB (shortness of breath), Tachycardia, and TIA (transient ischemic attack). has a past surgical history that includes  section; Tubal ligation; Cholecystectomy; Diagnostic Cardiac Cath Lab Procedure (2006, 2009); Percutaneous Transluminal Coronary Angio (10/06/2010); other surgical history (2017); ablation of dysrhythmic focus; and IR NONTUNNELED VASCULAR CATHETER > 5 YEARS (2021).   Physical Exam:  General:  Asleep, arousable, ill appearing  Skin:  Warm and dry  Neck:  JVD not appreciated  Chest:  Clear to auscultation, respiration easy  Cardiovascular:  RRR S1S2, grade 2.6 systolic Altered mental status    Patient remains confused and restless- tele sitter at bedside  Heart rate has improved. No further bradycardia overnight  Recommend to continue to hold AV catie blocking agents and amiodarone for now. RENE Ríos - CNP, 7/30/2021 9:26 AM     Please note this report has been partially produced using speech recognition software and may contain errors related to that system including errors in grammar, punctuation, and spelling, as well as words and phrases that may be inappropriate. If there are any questions or concerns please feel free to contact the dictating provider for clarification.

## 2021-07-30 NOTE — PROGRESS NOTES
Occupational Therapy      Occupational Therapy Treatment Note      Name: James Ospina MRN: [id number] :   1955   Date:  2021   Admission Date: 7/15/2021 Room:  [facility]0/[facility]0-A     Primary Problem: Acute encephalopathy, Hyperglycemia    Restrictions/Precautions: General Precautions, Fall Risk, Telemetry, Pulse Ox, BP cuff, Chen, Bed exit alarm, FILEMON monitor    Communication with other providers: PT Vale Dsouza, RN Sarina Ernst    Subjective:  Patient states: \"I'm worried I'm going to fall! \"  Pain: Pt denied pain this date    Objective:    Observation: Pt received in supine upon OT arrival. Agreeable to evaluation. Improved cognition from prior sessions, but still quite confused. Objective Measures: Stable HR and o2 sats throughout session, Disoriented to time, oriented to person and place    Treatment, including education:  Therapeutic Activity Training:   Therapeutic activity training was instructed today. Cues were given for safety, sequence, UE/LE placement, awareness, and balance. Self Care Training:   Cues were given for safety, sequence, UE/LE placement, visual cues, and balance. Pt received in supine upon OT arrival. Pt re-educated on role of OT, POC, and importance of EOB/OOB activity. Pt transferred supine to sitting EOB min A with HOB elevated to 40'. Pt able to scoot legs to edge and reach for bed rail this date, but min trunk boost required for uprighting. Pt able to sit at EOB level with supervision/good static sitting balance. Pt dependent for donning BL socks. Pt completed self-feeding task of eating applesauce/eggs as well as drinking apple juice with supervision/setup. Pt with much improved functional cognition with ADL tasks this date, and very purposeful throughout self-feeding tasks. Setup required for opening applesauce container and appropriate positioning of plate in front of pt. Intermittent safety cues required when eating due to impulsivity.  Tray removed, and pt completed sit to stand transfer from bed min A with mod verbal/tactile cues for technique/safe hand placement. Pt able to stand at [employer] with CGA for balance, but pt very fearful of falling this date. Pt returned to sitting EOB min A with cues for reaching back with arms. Pt re-assured that she would be safe, and completed 2 additional sit-stand transfers from bed mod A and min A respectively. Pt continued to be very fearful of falling, and was unwilling to attempt ambulation. Pt became slightly agitated with therapist. Pt returned to sitting EOB min A, and transferred sitting EOB to supine min A with assist for trunk mgmt. Pt left positioned for comfort in bed with all lines intact, all needs within reach, and bed alarm on. Assessment / Impression:    Patient's tolerance of treatment: Fair  Adverse Reaction: Became agitated mid-session, fearful of falling  Significant change in status and impact: Improved cognition but mild agitation this date  Barriers to improvement: Cognition      Plan for Next Session:    Continue per OT POC. Continue to recommend SNF for rehab at discharge.       Time in: 943  Time out: 1014  Timed treatment minutes: 31  Total treatment time: 31      Electronically signed by:    DELIO Victoria/L, [facility], .[id number]

## 2021-07-30 NOTE — PROGRESS NOTES
Progress Note( Dr. Grimes Points)  7/30/2021  Subjective:   Admit Date: 7/15/2021  PCP: Tim Daly PA-C        Admitted For :   Altered mental state and severe hyperglycemia possible metabolic encephalopathy    Consulted For: Better control of blood glucose    Interval History: Patient is more alert awake responding to verbal commands and some answers seem to be appropriate and other seem to be confused   Patient did not require an attendant to watch her anymore    CT Scan& MRI of Brain  Neg for any acute CVA     Denies any chest pains,   Denies SOB . Denies nausea or vomiting. Now eating better  No new bowel or bladder symptoms. Intake/Output Summary (Last 24 hours) at 7/30/2021 0723  Last data filed at 7/30/2021 0340  Gross per 24 hour   Intake --   Output 3200 ml   Net -3200 ml       DATA    CBC:   No results for input(s): WBC, HGB, PLT in the last 72 hours.  CMP:  Recent Labs     07/28/21  0219 07/29/21  0516 07/30/21  0312   * 139 137   K 4.5 5.6* 4.6   CL 99 103 99   CO2 27 28 30   BUN 24* 25* 28*   CREATININE 1.4* 1.6* 1.8*   CALCIUM 9.1 9.2 8.9   PROT 5.4* 5.5* 5.5*   LABALBU 3.1* 3.6 3.2*   BILITOT 0.7 0.6 0.6   ALKPHOS 103 106 101   AST 16 19 16   ALT 25 24 20     Lipids:   Lab Results   Component Value Date    CHOL 153 01/30/2012    HDL 59 01/30/2012    TRIG 135 01/30/2012     Glucose:  Recent Labs     07/29/21 2021 07/30/21  0248 07/30/21  0657   POCGLU 282* 292* 93     [id number]:  Lab Results   Component Value Date    LABA1C 10.9 07/17/2021     High Sensitivity TSH:   Lab Results   Component Value Date    TSHHS 7.150 07/20/2021     Free T3: No results found for: FT3  Free T4:  Lab Results   Component Value Date    T4FREE 1.28 06/12/2021       Echocardiogram complete 2D with doppler with color    Result Date: 7/16/2021  Transthoracic Echocardiography Report (TTE)  Demographics   Patient Name       Erasto MAYFIELD Date of Study       07/16/2021   Date of Birth      1955 Gender              Female   Age                60 year(s)         Race                   Patient Number     [id number]         Room Number         2128   Visit Number       [id number]   Corporate ID       [id number]   Accession Number   [id number]         Peewee Vargas   Ordering Physician Ilene Ledesma MD                 Physician           MD  Procedure Type of Study   TTE procedure:ECHOCARDIOGRAM COMPLETE 2D W DOPPLER W COLOR. Procedure Date Date: 07/16/2021 Start: 01:35 PM Study Location: Portable Technical Quality: Fair visualization Indications:Congestive heart failure. Patient Status: Routine Height: 60 inches Weight: 180 pounds BSA: 1.78 m2 BMI: 35.15 kg/m2 HR: 99 bpm BP: 145/83 mmHg  Conclusions   Summary  Left ventricular systolic function is abnormal.  Ejection fraction is visually estimated at 15-20%. Global hypokinesis noted. Grade III diastolic dysfunction. Mitral annular calcification is present. Moderate to severe mitral regurgitation. Mild to moderate tricuspid regurgitation; RVSP: 38 mmHg. No evidence of any pericardial effusion. Signature   ------------------------------------------------------------------  Electronically signed by Alex Galicia MD (Interpreting  physician) on 07/16/2021 at 04:31 PM    CT ABDOMEN PELVIS WO CONTRAST Additional Contrast? None    Result Date: 7/15/2021  EXAMINATION: CT OF THE ABDOMEN AND PELVIS WITHOUT CONTRAST 7/15/2021 7:28 pm    1. Findings at the lung bases suggesting pulmonary edema with small bilateral effusions and associated atelectasis. 2. No acute intra-abdominal process identified. 3. Severe atherosclerotic disease. CT Head WO Contrast    Result Date: 7/15/2021  EXAMINATION: CT OF THE HEAD WITHOUT CONTRAST  7/15/2021 7:28 pm     No acute intracranial abnormality.  Chronic microvascular ischemic changes and global cerebral atrophy. XR CHEST PORTABLE    Result Date: 7/16/2021  EXAMINATION: ONE XRAY VIEW OF THE CHEST 7/16/2021 10:31 am COMPARISON: July 15, 2021 HISTORY: ORDERING SYSTEM PROVIDED HISTORY: s/p central line attempt    Right IJ central venous catheter terminates in the mid SVC in satisfactory position. No pneumothorax. Pulmonary vascular congestion with interstitial pulmonary edema. XR CHEST PORTABLE    Result Date: 7/15/2021  EXAMINATION: ONE XRAY VIEW OF THE CHEST 7/15/2021 5:47 pm COMPARISON: 06/13/2021 HISTORY: ORDERING SYSTEM PROVIDED HISTORY: AMS TECHNOLOGIST PROVIDED HISTORY: Reason for exam:->AMS Reason for Exam: AMS Initial encounter FINDINGS: Cardiomegaly. There is increased interstitial opacities bilaterally. No pleural effusion or pneumothorax. The osseous structures otherwise stable. Cardiomegaly with pulmonary edema. IR NONTUNNELED VASCULAR CATHETER > 5 YEARS    Result Date: 7/16/2021  PROCEDURE: ULTRASOUND GUIDED VASCULAR ACCESS. PLACEMENT OF A NON-TUNNELED CATHETER. 7/16/2021. HISTORY: ORDERING SYSTEM PROVIDED HISTORY: Need central venous access. Request CVC Insertion TECHNOLOGIST PROVIDED HISTORY: Reason for exam:->CVC Insertion SEDATION: None. Successful ultrasound and fluoroscopy guided non-tunneled central venous catheter placement. The catheter is ready to use.        Scheduled Medicines   Medications:    insulin glargine  10 Units Subcutaneous Nightly    sodium zirconium cyclosilicate  10 g Oral Daily    furosemide  40 mg Intravenous TID    sodium chloride flush  5-40 mL Intravenous 2 times per day    insulin lispro  0-18 Units Subcutaneous 2 times per day    aspirin  81 mg Oral Daily    folic acid-pyridoxine-cyancobalamin  1 tablet Oral Daily    insulin lispro  0-18 Units Subcutaneous TID     insulin lispro  10 Units Subcutaneous TID     apixaban  5 mg Oral BID    levothyroxine  50 mcg Oral Daily    pantoprazole 40 mg Oral Daily    alogliptin  6.25 mg Oral Daily    sodium chloride flush  5-40 mL Intravenous 2 times per day    sodium chloride flush  5-40 mL Intravenous 2 times per day      Infusions:    dextrose      sodium chloride           Objective:   Vitals: BP (!) 112/40   Pulse 92   Temp 97.7 °F (36.5 °C) (Oral)   Resp 16   Ht 5' (1.524 m)   Wt 159 lb 2 oz (72.2 kg)   SpO2 97%   BMI 31.08 kg/m²   General appearance: alert and cooperative with exam appears to be somewhat dehydrated  Neck: no JVD or bruit  Thyroid : Normal lobes   Lungs: Has Vesicular Breath sounds   Heart: Atrial fibrillation  Abdomen: soft, non-tender; bowel sounds normal; no masses,  no organomegaly  Musculoskeletal: Normal  Extremities: extremities normal, , no edema  Neurologic:  Awake, alert, oriented to name, place and time. Cranial nerves II-XII are grossly intact. Motor is  intact. Sensory is neuropathy. ,  and gait is normal.    Assessment:     Patient Active Problem List:     History of CVA (cerebrovascular accident) without residual deficits     Morbid obesity ([address]     DM (diabetes mellitus), type 2, uncontrolled ([address]     Persistent atrial fibrillation (HCC)     CAD (coronary artery disease)     CHF (congestive heart failure) ([address]     Light headed     History of MI (myocardial infarction)     Diabetes mellitus ([address]     Hypertension     TIA (transient ischemic attack)     Hyperlipidemia     SOB (shortness of breath)     H/O cardiovascular stress test     Family history of coronary artery disease     PAF (paroxysmal atrial fibrillation) (HCC)     Chest pain     Atrial thrombus without antecedent myocardial infarction     S/P ablation of atrial fibrillation     Gastroenteritis     YESIKA (acute kidney injury) ([address]     Acute cystitis     Elevated liver enzymes     CHF (congestive heart failure), NYHA class I, acute on chronic, combined (HCC)     Pneumonia     Multifocal pneumonia     Atrial fibrillation with RVR ([address] Shoulder pain     Paroxysmal atrial fibrillation (HCC)     Diabetic hyperosmolar coma [address] [address]     Difficult intravenous access     Metabolic encephalopathy getting better      Plan:     1. Reviewed POC blood rate 14 with regular foot embolism there is good  2. . Labs and X ray results   3. Reviewed Current Medicines   4. On meal +,  correction bolus Humalog/ Basal Lantus Insulin regime does get  5. Monitor Blood glucose frequently   6. Modified  the dose of Insulin/ other medicines as needed  7. Changed CODE STATUS  8. Looking into possibility of seeing at nursing home  9. Will follow     .      Aretha Crooks MD, MD

## 2021-07-30 NOTE — PROGRESS NOTES
Nephrology Progress Note  7/30/2021 2:51 PM        Subjective:   Admit Date: 7/15/2021  PCP: Jennifer Carlson PA-C    Interval History: Patient seen early morning-she was sleeping peacefully-I did not wake her up    Diet: Probably minimal to none    ROS: Good urine output almost 4 L with diuretics of course  Sleeping      Data:     Current meds:    insulin glargine  10 Units Subcutaneous Nightly    sodium zirconium cyclosilicate  10 g Oral Daily    furosemide  40 mg Intravenous TID    sodium chloride flush  5-40 mL Intravenous 2 times per day    insulin lispro  0-18 Units Subcutaneous 2 times per day    aspirin  81 mg Oral Daily    folic acid-pyridoxine-cyancobalamin  1 tablet Oral Daily    insulin lispro  0-18 Units Subcutaneous TID WC    insulin lispro  10 Units Subcutaneous TID WC    apixaban  5 mg Oral BID    levothyroxine  50 mcg Oral Daily    pantoprazole  40 mg Oral Daily    alogliptin  6.25 mg Oral Daily    sodium chloride flush  5-40 mL Intravenous 2 times per day    sodium chloride flush  5-40 mL Intravenous 2 times per day      dextrose      sodium chloride           I/O last 3 completed shifts:  In: -   Out: 3900 [Urine:3900]    CBC: No results for input(s): WBC, HGB, PLT in the last 72 hours.        Recent Labs     07/28/21  0219 07/29/21  0516 07/30/21  0312   * 139 137   K 4.5 5.6* 4.6   CL 99 103 99   CO2 27 28 30   BUN 24* 25* 28*   CREATININE 1.4* 1.6* 1.8*   GLUCOSE 153* 89 138*       Lab Results   Component Value Date    CALCIUM 8.9 07/30/2021    PHOS 5.4 (H) 07/30/2021       Objective:     Vitals: /62   Pulse 98   Temp 98.4 °F (36.9 °C) (Oral)   Resp 16   Ht 5' (1.524 m)   Wt 155 lb 5 oz (70.4 kg)   SpO2 98%   BMI 30.33 kg/m²     General appearance: As above  HEENT: Little puffy face-no trauma but unable to check the conjunctiva  Neck: Seems supple  Lungs: Limited in the anterior exam has bronchial breath sound bilaterally  Heart: Irregular-  Abdomen: Soft  Extremities: No overt lower extremity edema  Has Chen catheter      Problem List :         Impression :     1. Acute kidney injury-likely from cardiorenal syndrome type I-good urine output-creatinine is a little upper watch  2. Acute decompensated heart failure with underlying severe cardiomyopathy-good response to IV diuretics  3. Hyperkalemia likely from poor urine output and acute kidney injury better after diuretics  4. Encephalopathy of unknown etiology  5. Poor quality of life    Recommendation/Plan  :     1. Checks x-ray did show some pulmonary edema  2. The intensify the diuretics to twice a day  3. Watch urine output and get the lab tomorrow morning  4. Also his potassium is okay discontinue potassium binders now  5. She probably will go to nursing home  6.  Follow clinically and biochemically      Paco Bautista MD MD

## 2021-07-30 NOTE — PLAN OF CARE
Problem: Non-Violent Restraints  Goal: Removal from restraints as soon as assessed to be safe  7/30/2021 1130 by Leo Olmstead RN  Outcome: Ongoing  7/30/2021 0131 by Asael Byrnes RN  Outcome: Met This Shift  Goal: No harm/injury to patient while restraints in use  7/30/2021 1130 by Leo Olmstead RN  Outcome: Ongoing  7/30/2021 0131 by Asael Byrnes RN  Outcome: Met This Shift  Goal: Patient's dignity will be maintained  7/30/2021 1130 by Leo Olmstead RN  Outcome: Ongoing  7/30/2021 0131 by Asael Byrnes RN  Outcome: Met This Shift     Problem: Non-Violent Restraints  Goal: Removal from restraints as soon as assessed to be safe  7/30/2021 1130 by Leo Olmstead RN  Outcome: Ongoing  7/30/2021 0131 by Asael Byrnes RN  Outcome: Met This Shift  Goal: No harm/injury to patient while restraints in use  7/30/2021 1130 by Leo Olmstead RN  Outcome: Ongoing  7/30/2021 0131 by Asael Byrnes RN  Outcome: Met This Shift  Goal: Patient's dignity will be maintained  7/30/2021 1130 by Leo Olmstead RN  Outcome: Ongoing  7/30/2021 0131 by Asael Byrnes RN  Outcome: Met This Shift     Problem: Discharge Planning:  Goal: Discharged to appropriate level of care  Description: Discharged to appropriate level of care  7/30/2021 1130 by Leo Olmstead RN  Outcome: Ongoing  7/30/2021 0131 by Asael Byrnes RN  Outcome: Met This Shift  Goal: Ability to perform activities of daily living will improve  Description: Ability to perform activities of daily living will improve  7/30/2021 1130 by Leo Olmstead RN  Outcome: Ongoing  7/30/2021 0131 by Asael Byrnes RN  Outcome: Met This Shift  Goal: Participates in care planning  Description: Participates in care planning  7/30/2021 1130 by Leo Olmstead RN  Outcome: Ongoing  7/30/2021 0131 by Asael yBrnes RN  Outcome: Met This Shift     Problem: Serum Glucose Level - Abnormal:  Goal: Ability to maintain appropriate glucose levels will improve  Description: Ability to maintain appropriate glucose levels will improve  7/30/2021 1130 by Avinash Guzmán RN  Outcome: Ongoing  7/30/2021 0131 by Jo Ann Vieira RN  Outcome: Met This Shift     Problem: Sensory Perception - Impaired:  Goal: Ability to maintain a stable neurologic state will improve  Description: Ability to maintain a stable neurologic state will improve  7/30/2021 1130 by Avinash Guzmán RN  Outcome: Ongoing  7/30/2021 0131 by Jo Ann Vieira RN  Outcome: Met This Shift  Goal: Demonstrations of improved sensory functioning will increase  Description: Demonstrations of improved sensory functioning will increase  7/30/2021 1130 by Avinash Guzmán RN  Outcome: Ongoing  7/30/2021 0131 by Jo Ann Vieira RN  Outcome: Met This Shift  Goal: Decrease in sensory misperception frequency  Description: Decrease in sensory misperception frequency  7/30/2021 1130 by Avinash Guzmán RN  Outcome: Ongoing  7/30/2021 0131 by Jo Ann Vieira RN  Outcome: Met This Shift  Goal: Able to refrain from responding to false sensory perceptions  Description: Able to refrain from responding to false sensory perceptions  7/30/2021 1130 by Avinash Guzmán RN  Outcome: Ongoing  7/30/2021 0131 by Jo Ann Vieira RN  Outcome: Met This Shift  Goal: Demonstrates accurate environmental perceptions  Description: Demonstrates accurate environmental perceptions  7/30/2021 1130 by Avinash Guzmán RN  Outcome: Ongoing  7/30/2021 0131 by Jo Ann Vieira RN  Outcome: Met This Shift  Goal: Able to distinguish between reality-based and nonreality-based thinking  Description: Able to distinguish between reality-based and nonreality-based thinking  7/30/2021 1130 by Avinash Guzmán RN  Outcome: Ongoing  7/30/2021 0131 by Jo Ann Vieira RN  Outcome: Met This Shift  Goal: Able to interrupt nonreality-based thinking  Description: Able to interrupt nonreality-based thinking  7/30/2021 1130 by Kibmerly Mccormick RN  Outcome: Ongoing  7/30/2021 0131 by Marvin Levine RN  Outcome: Met This Shift     Problem: Gas Exchange - Impaired:  Goal: Levels of oxygenation will improve  Description: Levels of oxygenation will improve  7/30/2021 1130 by Kimberly Mccormick RN  Outcome: Ongoing  7/30/2021 0131 by Marvin Levine RN  Outcome: Met This Shift     Problem: Infection, Septic Shock:  Goal: Will show no infection signs and symptoms  Description: Will show no infection signs and symptoms  7/30/2021 1130 by Kimberly Mccormick RN  Outcome: Ongoing  7/30/2021 0131 by Marvin Levine RN  Outcome: Met This Shift     Problem: Infection - Ventilator-Associated Pneumonia:  Goal: Absence of pulmonary infection  Description: Absence of pulmonary infection  7/30/2021 1130 by Kimberly Mccormick RN  Outcome: Ongoing  7/30/2021 0131 by Marvin Levine RN  Outcome: Met This Shift     Problem: Tissue Perfusion, Altered:  Goal: Circulatory function within specified parameters  Description: Circulatory function within specified parameters  7/30/2021 1130 by Kimberly Mccormick RN  Outcome: Ongoing  7/30/2021 0131 by Marvin Levine RN  Outcome: Met This Shift     Problem: Venous Thromboembolism:  Goal: Will show no signs or symptoms of venous thromboembolism  Description: Will show no signs or symptoms of venous thromboembolism  7/30/2021 1130 by Kimberly Mccormick RN  Outcome: Ongoing  7/30/2021 0131 by Marvin Levine RN  Outcome: Met This Shift  Goal: Absence of signs or symptoms of impaired coagulation  Description: Absence of signs or symptoms of impaired coagulation  7/30/2021 1130 by Kimberly Mccormick RN  Outcome: Ongoing  7/30/2021 0131 by Marvin Levine RN  Outcome: Met This Shift     Problem: Falls - Risk of:  Goal: Will remain free from falls  Description: Will remain free from falls  7/30/2021 1130 by Kimberly Mccormick RN  Outcome: Ongoing  7/30/2021 0131 by Marvin Levine RN  Outcome: Met This Shift  Goal: baseline  7/30/2021 1130 by Yuriy Flower RN  Outcome: Ongoing  7/30/2021 0131 by Tyrone Trejo RN  Outcome: Met This Shift     Problem: Injury - Risk of, Physical Injury:  Goal: Will remain free from falls  Description: Will remain free from falls  7/30/2021 1130 by Yuriy Flower RN  Outcome: Ongoing  7/30/2021 0131 by Tyrone Trejo RN  Outcome: Met This Shift  Goal: Absence of physical injury  Description: Absence of physical injury  7/30/2021 1130 by Yuriy Flower RN  Outcome: Ongoing  7/30/2021 0131 by Tyrone Trejo RN  Outcome: Met This Shift     Problem: Injury - Risk of, Physical Injury:  Goal: Will remain free from falls  Description: Will remain free from falls  7/30/2021 1130 by Yuriy Flower RN  Outcome: Ongoing  7/30/2021 0131 by Tyrone Trejo RN  Outcome: Met This Shift  Goal: Absence of physical injury  Description: Absence of physical injury  7/30/2021 1130 by Yuriy Flower RN  Outcome: Ongoing  7/30/2021 0131 by Tyrone Trejo RN  Outcome: Met This Shift     Problem: Mood - Altered:  Goal: Mood stable  Description: Mood stable  7/30/2021 1130 by Yuriy Flower RN  Outcome: Ongoing  7/30/2021 0131 by Tyrone Trejo RN  Outcome: Met This Shift  Goal: Absence of abusive behavior  Description: Absence of abusive behavior  7/30/2021 1130 by Yuriy Flower RN  Outcome: Ongoing  7/30/2021 0131 by Tyrone Trejo RN  Outcome: Met This Shift  Goal: Verbalizations of feeling emotionally comfortable while being cared for will increase  Description: Verbalizations of feeling emotionally comfortable while being cared for will increase  7/30/2021 1130 by Yuriy Flower RN  Outcome: Ongoing  7/30/2021 0131 by Tyrone Trejo RN  Outcome: Met This Shift     Problem: Psychomotor Activity - Altered:  Goal: Absence of psychomotor disturbance signs and symptoms  Description: Absence of psychomotor disturbance signs and symptoms  7/30/2021 1130 by Yuriy Flower RN  Outcome: Ongoing  7/30/2021 0131 by Reinier Holland RN  Outcome: Met This Shift     Problem: Sleep Pattern Disturbance:  Goal: Appears well-rested  Description: Appears well-rested  7/30/2021 1130 by Cynthia Scott RN  Outcome: Ongoing  7/30/2021 0131 by Reinier Holland RN  Outcome: Met This Shift

## 2021-07-30 NOTE — PROGRESS NOTES
INTERNAL MEDICINE PROGRESS NOTE        [facility]   1955   Primary Care Physician:  Vincent English PA-C  Admit Date: 7/15/2021     Subjective:   Pt stable today. Shakes her head yes or no to questions this am but does not open eyes when asked. Will follow basic commands. Denies pain. 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 Objective:   /70   Pulse 100   Temp 98 °F (36.7 °C) (Oral)   Resp 13   Ht 5' (1.524 m)   Wt 155 lb 5 oz (70.4 kg)   SpO2 93%   BMI 30.33 kg/m²    Recent Labs     07/29/21  1557 07/29/21 2021 07/30/21  0248 07/30/21  0657   POCGLU 146* 282* 292* 93       I/O last 3 completed shifts:  In: -   Out: 3900 [Urine:3900]  No intake/output data recorded. Neck: no adenopathy and supple, symmetrical, trachea midline  Lungs: clear to auscultation bilaterally  Heart: regular rate and rhythm and S1, S2 normal  Abdomen: soft, non-tender; bowel sounds normal; no masses,  no organomegaly  Extremities: extremities normal, atraumatic, no cyanosis or edema  Neurologic:  Confused and disoriented. Shakes head yes/no to answer questions. Data Review  CBC with Differential:    No results for input(s): WBC, RBC, HGB, HCT, PLT, MCV, MCH, MCHC, RDW, NRBC, SEGSPCT, BANDSPCT, BLASTSPCT, METASPCT, LYMPHOPCT, PROMYELOPCT, MONOPCT, EOSPCT, BASOPCT, MONOSABS, LYMPHSABS, EOSABS, BASOSABS, DIFFTYPE in the last 72 hours.     Invalid input(s): MYELOPCT;3, ATYLMREL  CMP:    Recent Labs     07/28/21  0219 07/29/21  0516 07/30/21  0312   * 139 137   K 4.5 5.6* 4.6   CL 99 103 99   CO2 27 28 30   BUN 24* 25* 28*   CREATININE 1.4* 1.6* 1.8*   GFRAA 46* 39* 34*   LABGLOM 38* 32* 28*   GLUCOSE 153* 89 138*   PROT 5.4* 5.5* 5.5*   LABALBU 3.1* 3.6 3.2*   CALCIUM 9.1 9.2 8.9   BILITOT 0.7 0.6 0.6   ALKPHOS 103 106 101   AST 16 19 16   ALT 25 24 20     PT/INR:  No results for input(s): PROTIME, INR in the last 72 hours.  Meds:    insulin glargine  10 Units Subcutaneous Nightly    sodium zirconium cyclosilicate  10 g Oral Daily    furosemide  40 mg Intravenous TID    sodium chloride flush  5-40 mL Intravenous 2 times per day    insulin lispro  0-18 Units Subcutaneous 2 times per day    aspirin  81 mg Oral Daily    folic acid-pyridoxine-cyancobalamin  1 tablet Oral Daily    insulin lispro  0-18 Units Subcutaneous TID WC    insulin lispro  10 Units Subcutaneous TID WC    apixaban  5 mg Oral BID    levothyroxine  50 mcg Oral Daily    pantoprazole  40 mg Oral Daily    alogliptin  6.25 mg Oral Daily    sodium chloride flush  5-40 mL Intravenous 2 times per day    sodium chloride flush  5-40 mL Intravenous 2 times per day     PRN Meds: dextrose, diphenhydrAMINE, hydrOXYzine, LORazepam, potassium chloride, albuterol sulfate HFA, nitroGLYCERIN, traZODone, glucose, dextrose, glucagon (rDNA), dextrose, sodium chloride flush, ondansetron **OR** ondansetron, polyethylene glycol, acetaminophen **OR** acetaminophen, sodium chloride flush, sodium chloride    Assessment/Plan:   1. Diabetic hyperosmolar state -stable. We will continue insulin glargine, alogliptin, and sliding scale coverage. Appreciate endocrinology consult. 2.  Altered mental status - Possibly secondary to metabolic encephalopathy. EEG and MRI brain negative for any other possible etiology. MRA stable 5mm sacular aneurysm. We will continue to monitor. Appreciate neurology consult. 3.  Bradycardia and Hyperkalemia. Improved at this time. Continue to hold amiodarone, cardizem and metoprolol. Patient was treated with Calcium gluconate, Lokelma, 1 amp D50 and R insulin. Repeat K+ this am 4.6. Appreciate cardiology and EP consultation. 4.  Depression/grief - Patient is grieving the loss of both her  and her grandson this past month. 5.  Atrial fibrillation - Continue to hold amiodarone, metoprolol, and diltiazem. Continue eliquis and ASA. 6.  Hypertension - continue to monitor. Pt to continue lasix. 7.  Chronic systolic CHF. Continue Lasix. Pt seen and examined by Archana Cifuentes PA-C under supervision of Dr. Jose Lew.      7/30/2021 12:08 PM

## 2021-07-30 NOTE — PROGRESS NOTES
Physical Therapy    Physical Therapy Treatment Note  Name: Camron Nam MRN: [id number] :   1955   Date:  2021   Admission Date: 7/15/2021 Room:  [address] [place], [place] [address]   Restrictions/Precautions:        fall risk, decreased cognition, fear of falls,alas  Communication with other providers:  Co-treat with ALLAN Mars  Subjective:  Patient states:  \"Nah\", \"Okay okay let me down, I can't\", \"I want a drink\"  Pain:   Location, Type, Intensity (0/10 to 10/10):  No c/o, difficult to assess d/t pt cognition  Objective:    Observation:  Pt awake in supine with HOB ~45*. Tray located near bed, pt without restraints or IV, 02 n/c resting beside pt. Cognition: Improved from last session, oriented to month/location/self, more attentive and responsive however increased agitation this session. Treatment, including education/measures:  *Increased time required d/t pt increased difficulty command following and need for encouragement for participation. Bed mobility: Supine>sit Min A for trunk to upright and LE to EOB, increased assist needed for scooting to EOB d/t pt difficulty with this task pt attempts, multiple v/c, pt not attending to task. Return to supine at end of session Min A for trunk, pt performs impulsively likely d/t not willing to continue session, pt able to advance LE to EOB, requires Max A x2 for repositioning in supine with migel pad. Sitting balance: Pt maintains upright sitting balance at EOB x15 minutes during encouragement and assist with eating breakfast, lower body dressing tasks, and preparation for transfers. Pt requires seated rest breaks between each standing trial x3 minutes d/t fatigue and resistance to attempting transfers. Pt demonstrates fair dynamic and static sitting balance at EOB with increased kyphotic and head-forward posture. Able to WS intermittently t/o session, OT assisting pt in donning of socks. Sp02 maintained at 94% on room air.     STS: Pt is resistant to transfers this session despite multiple attempts and efforts to encourage and reassure pt. Pt performs x3 STS from EOB> RW ranging from Min-Mod assist to upright, Max v/c for UE placement and sequencing for transfer. Upon standing, pt requires Min A for placement of UE on RW, pt becomes agitated stating:\"hold on! No!\", PT/OT attempts to reassure pt with pt demanding return to seated after ~25 seconds. Pt does not achieve full upright posture despite v/c and t/c. Mod A for remaining x2 stands and increased encouragement and v/c required, pt refuses gait once in standing. Tolerates standing ~20 seconds remaining trials with RW and CGA. Min A provided for control to seated. Pt confirms fear of falling. Pt returns self to supine refusing further mobility. Pt returned to supine with all needs in reach, bed alarm, RN notified. Assessment / Impression:       Patient's tolerance of treatment:  poor   Adverse Reaction: resistant to mobility  Significant change in status and impact:  none  Barriers to improvement:  Agreement to participate  Plan for Next Session:    Continue to encourage gait. AMB with RW and chair follow, cont co-treat. Time in:  09:41  Time out:  10:13  Timed treatment minutes:  32  Total treatment time:  32    Previously filed items:  Social/Functional History  Lives With: Alone  Type of Home: House  Home Layout: One level  Home Access: Ramped entrance  Bathroom Shower/Tub: Tub/Shower unit  Bathroom Toilet: Handicap height  Bathroom Equipment: Shower chair, Grab bars in shower  [facility] Responsibilities: Yes  Ambulation Assistance: Independent  Transfer Assistance: Independent  Active : No (Daughter drives)  Occupation: Retired  Type of occupation:   Short term goals  Time Frame for Short term goals: 1 week  Short term goal 1: Pt will perform supine>sit mobility with Mod A of 1 with improved command following.   Short term goal 2: Pt will perform STS from standard surface with Min a and v/c for sequencing  Short term goal 3: Pt will AMB x10 ft in room to BR with LRAD and CGA-Min for safety.        Electronically signed by:    Teddy Foote PT  7/30/2021, 10:37 AM

## 2021-07-30 NOTE — CARE COORDINATION
CM in to see Pt to follow up on discharge planning. Pt still pleasantly confused. CM call to Pt daughter Zoraida Gee to follow up on discharge planning. Pt family agreeable for SNF placement, first choice [facility]. CM call to [facility] for referral.      Whiteboard message to PT/OT for updated notes for precert. Zoraida Gee denied any other needs at this time.   CM following

## 2021-07-31 NOTE — PROGRESS NOTES
Cardiology Progress Note     Today's Plan resume low dose BB    Admit Date:  7/15/2021    Consult reason/ Seen today for: atrial fib/ RVR    Subjective and  Overnight Events:  Awake and eating- oriented to name- disoriented to place and time      Telemetry SR ST  DNR CCa     Assessment / Plan / Recommendation:     1. Ventriclar escape -  Resolved-No further bradycardia; seen by EP as well-  AV catie blocking agents held - resume low dose BB as she is now tachycardic   2. Atrial fib - ho ablation;  currently in [facility]-  - continue with anticoagulation if no contraindication   3. Combined systolic diastolic heart failure EF 15-20% Grade III DDD- clinically improving:  Shortness of breath improving -no accurate I/O- recommend strict I/O- will resume low dose BB - diuretic on hold per nephrology as   4. VHD Moderate severe MR- will follow   5. CAD- ho PCI -stable- continue with ASA  6. Hyperkalemia- resolved           History of Presenting Illness:    Chief complain on admission : 60 y. o.year old who is admitted for  Chief Complaint   Patient presents with    Hyperglycemia        Past medical history:    has a past medical history of Abnormal PFTs (pulmonary function tests), Atrial fibrillation ([address] CAD (coronary artery disease), Carotid aneurysm, right ([address] CHF (congestive heart failure) ([address] Diabetes mellitus ([address] Dizziness - light-headed, Family history of coronary artery disease, H/O 24 hour EKG monitoring, H/O cardiovascular stress test, H/O cardiovascular stress test, H/O echocardiogram, H/O echocardiogram, Heart imaging, Heartburn, History of cardiac cath, History of cardiac monitoring, History of cardioversion, History of MI (myocardial infarction), History of nuclear MUGA, History of nuclear Muga stress test, History of PTCA, Hx of cardiovascular stress test, Hx of transesophageal echocardiography ([facility]) for monitoring, Hyperlipidemia, Hypertension, Insomnia, SOB (shortness of breath), Tachycardia, and TIA (transient ischemic attack). Past surgical history:   has a past surgical history that includes  section; Tubal ligation; Cholecystectomy; Diagnostic Cardiac Cath Lab Procedure (2006, 2009); Percutaneous Transluminal Coronary Angio (10/06/2010); other surgical history (2017); ablation of dysrhythmic focus; and IR NONTUNNELED VASCULAR CATHETER > 5 YEARS (2021). Social History:   reports that she quit smoking about 14 years ago. Her smoking use included cigarettes. She has a 60.00 pack-year smoking history. She has never used smokeless tobacco. She reports that she does not drink alcohol and does not use drugs. Family history:  family history includes Diabetes in her father; Heart Disease in her father. Allergies   Allergen Reactions    Amiodarone Other (See Comments)     dizziness    Iv Dye [Iodides] Nausea And Vomiting    Vicodin [Hydrocodone-Acetaminophen] Nausea And Vomiting       Review of Systems:   All 14 systems were reviewed and are negative  Except for the positive findings which are documented     BP (!) 155/72   Pulse 100   Temp 98 °F (36.7 °C) (Oral)   Resp 20   Ht 5' (1.524 m)   Wt 158 lb 14.4 oz (72.1 kg)   SpO2 98%   BMI 31.03 kg/m²       Intake/Output Summary (Last 24 hours) at 2021 0829  Last data filed at 2021 0404  Gross per 24 hour   Intake --   Output 1625 ml   Net -1625 ml       Physical Exam:  Physical Exam  Constitutional:       Appearance: She is obese. HENT:      Head: Atraumatic. Mouth/Throat:      Mouth: Mucous membranes are moist.   Eyes:      Pupils: Pupils are equal, round, and reactive to light. Cardiovascular:      Rate and Rhythm: Regular rhythm. Heart sounds: Normal heart sounds. Pulmonary:      Breath sounds: Normal breath sounds. Skin:     General: Skin is warm and dry. Neurological:      Mental Status: She is alert. tests reviewed by myself, continue all other medications of all above medical condition listed as is except for changes mentioned above. Thank you   Please call with questions. Electronically signed by RENE Lawson Si, CNP on 7/31/2021 at 8:29 AM  I have seen ,spoken to  and examined this patient personally, independently of the nurse practitioner. I have reviewed the hospital care given to date and reviewed all pertinent labs and imaging. The plan was developed mutually at the time of the visit with the patient,  NP  and myself. I have spoken with patient, nursing staff and provided written and verbal instructions . The above note has been reviewed and I agree with the assessment, diagnosis, and treatment plan with changes made by me as follows     CARDIOLOGY ATTENDING ADDENDUM    HPI:  I have reviewed the above HPI  And agree with above   Aditya is a 60 y. o.year old who and presents with had concerns including Hyperglycemia. Chief Complaint   Patient presents with    Hyperglycemia     Interval history:  No sob    Physical Exam:  General:  Awake, confused  Head:normal  Eye:normal  Neck:  No JVD   Chest:  Clear to auscultation, respiration easy  Cardiovascular:  RRR S1S2  Abdomen:   nontender  Extremities:  tr edema  Pulses; palpable  Neuro: grossly normal      MEDICAL DECISION MAKING;    I agree with the above plan, which was planned by myself and discussed with NP.   Hr controlled  cpm        Callie Zapien MD [address]

## 2021-07-31 NOTE — PROGRESS NOTES
NEUROLOGY NOTE  DR. aLura Haro MD.  -------------------------------------------------  Subjective:    Pt states she is depressed x few months    Pt is less confused and doing better today    Objective:    BP (!) 118/52   Pulse 111   Temp 98.2 °F (36.8 °C) (Oral)   Resp 18   Ht 5' (1.524 m)   Wt 158 lb 14.4 oz (72.1 kg)   SpO2 97%   BMI 31.03 kg/m²   HEENT nl      Neuro exam    Alert oriented to person place not confused  eomi pupils 3 mm jo  Squeezes fingers to commands  Equivocal toes  resp on own      RADIOLOGY  -----------------    Echocardiogram complete 2D with doppler with color    Result Date: 7/16/2021  Transthoracic Echocardiography Report (TTE)  Demographics   Patient Name       Ivory MAYFIELD Date of Study       07/16/2021   Date of Birth      1955         Gender              Female   Age                60 year(s)         Race                   Patient Number     [id number]         Room Number         2128   Visit Number       [id number]   Corporate ID       [id number]   Accession Number   [id number]         Ajay Campbell RDMS   Ordering Physician Luan Guevara MD                 Physician           MD  Procedure Type of Study   TTE procedure:ECHOCARDIOGRAM COMPLETE 2D W DOPPLER W COLOR. Procedure Date Date: 07/16/2021 Start: 01:35 PM Study Location: Portable Technical Quality: Fair visualization Indications:Congestive heart failure. Patient Status: Routine Height: 60 inches Weight: 180 pounds BSA: 1.78 m2 BMI: 35.15 kg/m2 HR: 99 bpm BP: 145/83 mmHg  Conclusions   Summary  Left ventricular systolic function is abnormal.  Ejection fraction is visually estimated at 15-20%. Global hypokinesis noted. Grade III diastolic dysfunction. Mitral annular calcification is present. Moderate to severe mitral regurgitation.   Mild to moderate tricuspid regurgitation; RVSP: 38 mmHg. No evidence of any pericardial effusion. Signature   ------------------------------------------------------------------  Electronically signed by Paresh Conrad MD (Interpreting  physician) on 07/16/2021 at 04:31 PM  ------------------------------------------------------------------   Findings   Left Ventricle  Left ventricular systolic function is abnormal.  Ejection fraction is visually estimated at 15-20%. Global hypokinesis noted. Grade III diastolic dysfunction. Left ventricle size is normal.   Left Atrium  Essentially normal left atrium. Right Atrium  Essentially normal right atrium. Right Ventricle  Essentially normal right ventricle. Aortic Valve  Trace aortic regurgitation noted. Mitral Valve  Mitral annular calcification is present. Moderate to severe mitral regurgitation. Tricuspid Valve  Mild to moderate tricuspid regurgitation; RVSP: 38 mmHg. Pulmonic Valve  The pulmonic valve was not well visualized. Pericardial Effusion  No evidence of any pericardial effusion. Pleural Effusion  No evidence of pleural effusion. Miscellaneous  The aorta is within normal limits.   M-Mode/2D Measurements & Calculations   LV Diastolic Dimension:   LV Systolic Dimension:   LA Dimension: 3.8 cmAO  4.86 cm                   4.6 cm                   Root Dimension: 2.8 cm  LV FS:5.4 %               LV Volume Diastolic: 393  LV PW Diastolic: 8.44 cm  ml  LV PW Systolic: 9.73 cm   LV Volume Systolic: 433  Septum Diastolic: 6.98 cm ml                       RV Diastolic Dimension:  Septum Systolic: 3.58 cm  LV EDV/LV EDV Index: 116 3.13 cm  CO: 2.42 l/min            ml/65 m2LV ESV/LV ESV  CI: 1.36 l/m*m2           Index: 108 ml/61 m2      LA/Aorta: 1.36                            EF Calculated (A4C): 6.9  LV Area Diastolic: 52.5   %  cm2                       EF Calculated (2D): 59.0  LV Area Systolic: 32 cm2  %                             LV Length: 7.68 cm                             LVOT: 1.9 cm  Doppler Measurements & Calculations   MV Peak E-Wave: 136  AV Peak Velocity: 125 cm/s    LVOT Peak Velocity: 64.6  cm/s                 AV Peak Gradient: 6.25 mmHg   cm/s  MV Peak A-Wave: 68.5 AV Mean Velocity: 86.6 cm/s   LVOT Mean Velocity: 39.8  cm/s                 AV Mean Gradient: 3 mmHg      cm/s  MV E/A Ratio: 1.99   AV VTI: 21 cm                 LVOT Peak Gradient: 2  MV Peak Gradient:    AV Area (Continuity):1.16 cm2 mmHgLVOT Mean Gradient: 1  7.4 mmHg                                           mmHg                       LVOT VTI: 8.63 cm             Estimated RVSP: 38 mmHg  MV P1/2t: 41 msec                                  Estimated RAP:3 mmHg  MVA by PHT:5.37 cm2  Estimated PASP: 35.49 mmHg   MV E' Septal                                       TR Velocity:285 cm/s  Velocity: 4.89 cm/s                                TR Gradient:32.49 mmHg  MV E' Lateral  Velocity: 10.4 cm/s  MV E/E' septal:  27.81  MV E/E' lateral:  13.08      CT ABDOMEN PELVIS WO CONTRAST Additional Contrast? None    Result Date: 7/15/2021  EXAMINATION: CT OF THE ABDOMEN AND PELVIS WITHOUT CONTRAST 7/15/2021 7:28 pm TECHNIQUE: CT of the abdomen and pelvis was performed without the administration of intravenous contrast. Multiplanar reformatted images are provided for review. Dose modulation, iterative reconstruction, and/or weight based adjustment of the mA/kV was utilized to reduce the radiation dose to as low as reasonably achievable. COMPARISON: MRI abdomen May 24, 2019; CT abdomen May 23, 2019 HISTORY: ORDERING SYSTEM PROVIDED HISTORY: Abdominal pain TECHNOLOGIST PROVIDED HISTORY: Reason for exam:->Abdominal pain Additional Contrast?->None Reason for Exam: Abdominal pain FINDINGS: Lower Chest: Imaged lung bases demonstrate partially imaged small and associated atelectasis.   Imaged lung fields demonstrate findings suggesting mild pulmonary edema, however evaluation is limited due to respiratory motion artifact. Coronary artery atherosclerotic disease present. Calcification of the mitral valve. Partially imaged heart is enlarged. Organs: Evaluation of the solid organs is limited due to lack of intravenous contrast.  The nonenhanced liver demonstrates no acute abnormality. The gallbladder is surgically absent. Numerous calcified granulomata throughout the spleen. Atrophy of the pancreas. The nonenhanced adrenal glands demonstrate no acute findings. Redemonstration of right adrenal adenoma is noted on previous exams. The nonenhanced kidneys are grossly stable with similar atrophy of the left kidney when compared with prior exams. No hydronephrosis identified. GI/Bowel: No bowel obstruction is evident. The colon is collapsed and not well evaluated. No evidence for appendicitis. The small bowel is normal in caliber. Small hiatal hernia. Pelvis: Bladder and solid pelvic organs demonstrate no acute findings. Peritoneum/Retroperitoneum: The abdominal aorta is normal in caliber with severe calcified atherosclerotic plaque throughout. No retroperitoneal adenopathy identified. No free fluid or free air identified within the abdomen and pelvis. Bones/Soft Tissues: Soft tissues demonstrate mild anasarca. Postoperative changes of the right anterior abdominal wall. No acute osseous abnormality identified. 1. Findings at the lung bases suggesting pulmonary edema with small bilateral effusions and associated atelectasis. 2. No acute intra-abdominal process identified. 3. Severe atherosclerotic disease. CT Head WO Contrast    Result Date: 7/15/2021  EXAMINATION: CT OF THE HEAD WITHOUT CONTRAST  7/15/2021 7:28 pm TECHNIQUE: CT of the head was performed without the administration of intravenous contrast. Dose modulation, iterative reconstruction, and/or weight based adjustment of the mA/kV was utilized to reduce the radiation dose to as low as reasonably achievable.  COMPARISON: CT brain June 11, 2021 HISTORY: ORDERING SYSTEM PROVIDED HISTORY: AMS TECHNOLOGIST PROVIDED HISTORY: Reason for exam:->AMS Has a \"code stroke\" or \"stroke alert\" been called? ->No Decision Support Exception - unselect if not a suspected or confirmed emergency medical condition->Emergency Medical Condition (MA) Reason for Exam: AMS FINDINGS: BRAIN/VENTRICLES: There is no acute intracranial hemorrhage, mass effect or midline shift. No abnormal extra-axial fluid collection. The gray-white differentiation is maintained without evidence of an acute infarct. There is no evidence of hydrocephalus. Severe atherosclerotic change of the intracranial vasculature. There is moderate periventricular and subcortical white matter hypoattenuation most consistent with microvascular ischemic changes. Global cerebral atrophy noted. ORBITS: The visualized portion of the orbits demonstrate no acute abnormality. SINUSES: The visualized paranasal sinuses and mastoid air cells demonstrate no acute abnormality. SOFT TISSUES/SKULL:  No acute abnormality of the visualized skull or soft tissues. No acute intracranial abnormality. Chronic microvascular ischemic changes and global cerebral atrophy. XR CHEST PORTABLE    Result Date: 7/16/2021  EXAMINATION: ONE XRAY VIEW OF THE CHEST 7/16/2021 10:31 am COMPARISON: July 15, 2021 HISTORY: ORDERING SYSTEM PROVIDED HISTORY: s/p central line attempt TECHNOLOGIST PROVIDED HISTORY: Reason for exam:->s/p central line attempt Reason for Exam: s/p central line attempt FINDINGS: Right IJ central venous catheter terminates in the mid SVC in satisfactory position. No appreciable pneumothorax. Cardiomediastinal silhouette appears unchanged. Pulmonary vascular congestion and diffuse interstitial prominence. Trace bilateral pleural effusions cannot be excluded. Minimal bibasilar atelectasis. Right IJ central venous catheter terminates in the mid SVC in satisfactory position. No pneumothorax.  Pulmonary vascular congestion with interstitial pulmonary edema. XR CHEST PORTABLE    Result Date: 7/15/2021  EXAMINATION: ONE XRAY VIEW OF THE CHEST 7/15/2021 5:47 pm COMPARISON: 06/13/2021 HISTORY: ORDERING SYSTEM PROVIDED HISTORY: AMS TECHNOLOGIST PROVIDED HISTORY: Reason for exam:->AMS Reason for Exam: AMS Initial encounter FINDINGS: Cardiomegaly. There is increased interstitial opacities bilaterally. No pleural effusion or pneumothorax. The osseous structures otherwise stable. Cardiomegaly with pulmonary edema. IR NONTUNNELED VASCULAR CATHETER > 5 YEARS    Result Date: 7/16/2021  PROCEDURE: ULTRASOUND GUIDED VASCULAR ACCESS. PLACEMENT OF A NON-TUNNELED CATHETER. 7/16/2021. HISTORY: ORDERING SYSTEM PROVIDED HISTORY: Need central venous access. Request CVC Insertion TECHNOLOGIST PROVIDED HISTORY: Reason for exam:->CVC Insertion SEDATION: None. FLUOROSCOPY DOSE AND TYPE OR TIME AND EXPOSURES: None. TECHNIQUE: Informed consent was obtained after a detailed explanation of the procedure including risks, benefits, and alternatives. Universal protocol was observed. The right neck and chest were prepped and draped in sterile fashion using maximum sterile barrier technique. Local anesthesia was achieved with lidocaine. A micropuncture needle was used to access the right internal jugular vein using ultrasound guidance. An ultrasound image demonstrating patency of the vein with needle tip located within it. An image was obtained and stored in PACs. A 0.035 guidewire was used to place a triple lumen central venous catheter after fascial tract dilation. The catheter flushed easily and there was a good blood return. The catheter was sutured to the skin. The catheter was locked with heparinized saline. The patient tolerated the procedure well and there were no immediate complications. EBL: Less than 1 ml. FINDINGS: Chest x ray after procedure demonstrates the tip of the catheter in the SVC.  The catheter is ready to use.     Successful ultrasound and fluoroscopy guided non-tunneled central venous catheter placement. The catheter is ready to use. MRI BRAIN WO CONTRAST    Result Date: 7/20/2021  EXAMINATION: MRI OF THE BRAIN WITHOUT CONTRAST  7/20/2021 4:07 pm TECHNIQUE: Multiplanar multisequence MRI of the brain was performed without the administration of intravenous contrast. COMPARISON: None. HISTORY: ORDERING SYSTEM PROVIDED HISTORY: aphasic TECHNOLOGIST PROVIDED HISTORY: Reason for exam:->aphasic Reason for Exam: aphasic Acuity: Acute Type of Exam: Initial Relevant Medical/Surgical History: none FINDINGS: Motion degrades images limiting evaluation. INTRACRANIAL STRUCTURES/VENTRICLES: There is no acute infarct. No mass effect or midline shift. No evidence of an acute intracranial hemorrhage. Areas of T2 FLAIR hyperintensity are seen in the periventricular and subcortical white matter, which are nonspecific, but may represent chronic microvascular ischemic change. There is prominence of the ventricles and sulci due to global parenchymal volume loss. Evaluation of the signal voids within the major intracranial vessels is limited given patient motion. ORBITS: The visualized portion of the orbits demonstrate no acute abnormality. SINUSES: No gross abnormality seen of the paranasal sinuses. There appears to be a left mastoid effusion. 1. Patient motion limits evaluation. 2. No convincing acute intracranial abnormality. No acute infarct. 3. Mild global parenchymal volume loss with moderate chronic microvascular ischemic changes.        LAB RESULTS  --------------------    Recent Results (from the past 24 hour(s))   POCT Glucose    Collection Time: 07/30/21  9:35 PM   Result Value Ref Range    POC Glucose 289 (H) 70 - 99 MG/DL   POCT Glucose    Collection Time: 07/31/21  2:26 AM   Result Value Ref Range    POC Glucose 161 (H) 70 - 99 MG/DL   Comprehensive Metabolic Panel    Collection Time: 07/31/21  3:59 AM   Result Value Ref Range    Sodium 138 135 - 145 MMOL/L    Potassium 4.4 3.5 - 5.1 MMOL/L    Chloride 98 (L) 99 - 110 mMol/L    CO2 33 (H) 21 - 32 MMOL/L    BUN 30 (H) 6 - 23 MG/DL    CREATININE 1.9 (H) 0.6 - 1.1 MG/DL    Glucose 113 (H) 70 - 99 MG/DL    Calcium 9.2 8.3 - 10.6 MG/DL    Albumin 3.3 (L) 3.4 - 5.0 GM/DL    Total Protein 5.8 (L) 6.4 - 8.2 GM/DL    Total Bilirubin 0.5 0.0 - 1.0 MG/DL    ALT 19 10 - 40 U/L    AST 17 15 - 37 IU/L    Alkaline Phosphatase 104 40 - 128 IU/L    GFR Non- 27 (L) >60 mL/min/1.73m2    GFR  32 (L) >60 mL/min/1.73m2    Anion Gap 7 4 - 16   POCT Glucose    Collection Time: 07/31/21  7:39 AM   Result Value Ref Range    POC Glucose 140 (H) 70 - 99 MG/DL   POCT Glucose    Collection Time: 07/31/21 11:11 AM   Result Value Ref Range    POC Glucose 285 (H) 70 - 99 MG/DL         Medical problems    Patient Active Problem List:     History of CVA (cerebrovascular accident) without residual deficits     Morbid obesity ([facility])     DM (diabetes mellitus), type 2, uncontrolled ([facility])     Persistent atrial fibrillation ([facility])     CAD (coronary artery disease)     CHF (congestive heart failure) ([facility])     Light headed     History of MI (myocardial infarction)     Diabetes mellitus ([facility])     Hypertension     TIA (transient ischemic attack)     Hyperlipidemia     SOB (shortness of breath)     H/O cardiovascular stress test     Family history of coronary artery disease     PAF (paroxysmal atrial fibrillation) ([facility])     Chest pain     Atrial thrombus without antecedent myocardial infarction     S/P ablation of atrial fibrillation     Gastroenteritis     YESIKA (acute kidney injury) ([address]     Acute cystitis     Elevated liver enzymes     CHF (congestive heart failure), NYHA class I, acute on chronic, combined ([facility])     Pneumonia     Multifocal pneumonia     Atrial fibrillation with RVR ([facility])     Shoulder pain     Paroxysmal atrial fibrillation ([address]     Diabetic hyperosmolar coma ([facility]

## 2021-07-31 NOTE — PROGRESS NOTES
Nephrology Progress Note  7/31/2021 5:22 PM        Subjective:   Admit Date: 7/15/2021  PCP: Anastasiia Coker PA-C    Interval History: Seen in early morning    Diet: Probably some    ROS: She was more alert awake and oriented this morning  Although she moans and groans from time to time  Urine output more than 1.6 L/day  Her creatinine still climbing now 1.9        Data:     Current meds:    metoprolol tartrate  25 mg Oral BID    DULoxetine  30 mg Oral Daily    insulin glargine  10 Units Subcutaneous Nightly    sodium chloride flush  5-40 mL Intravenous 2 times per day    insulin lispro  0-18 Units Subcutaneous 2 times per day    aspirin  81 mg Oral Daily    folic acid-pyridoxine-cyancobalamin  1 tablet Oral Daily    insulin lispro  0-18 Units Subcutaneous TID WC    insulin lispro  10 Units Subcutaneous TID WC    apixaban  5 mg Oral BID    levothyroxine  50 mcg Oral Daily    pantoprazole  40 mg Oral Daily    alogliptin  6.25 mg Oral Daily    sodium chloride flush  5-40 mL Intravenous 2 times per day    sodium chloride flush  5-40 mL Intravenous 2 times per day      dextrose      sodium chloride           I/O last 3 completed shifts: In: 10 [I.V.:10]  Out: 1625 [Urine:1625]    CBC: No results for input(s): WBC, HGB, PLT in the last 72 hours.        Recent Labs     07/29/21  0516 07/30/21  0312 07/31/21  0359    137 138   K 5.6* 4.6 4.4    99 98*   CO2 28 30 33*   BUN 25* 28* 30*   CREATININE 1.6* 1.8* 1.9*   GLUCOSE 89 138* 113*       Lab Results   Component Value Date    CALCIUM 9.2 07/31/2021    PHOS 5.4 (H) 07/30/2021       Objective:     Vitals: BP (!) 118/52   Pulse 111   Temp 98.2 °F (36.8 °C) (Oral)   Resp 18   Ht 5' (1.524 m)   Wt 158 lb 14.4 oz (72.1 kg)   SpO2 97%   BMI 31.03 kg/m²     General appearance: As above-alert awake and oriented  HEENT: 2+ conjunctival pallor  Neck: Seems supple  Lungs: No gross crackles on auscultation and posterior lung  Heart: Tachycardia  Abdomen: Soft nontender  Extremities: No lower extremity edema  Has Chen    Problem List :         Impression :     1. Acute kidney injury-likely from cardiorenal syndrome-he is off antibiotics now  2. Acutely compensated heart failure-compensated  3. Encephalopathy-seems clearing up-I was pleased to see her mentation better this morning  4. Potassium remains okay    Recommendation/Plan  :     1. We will hold diuretics for now  2. Watch her urine output  3. Watch for pulmonary edema  4.  UA  5.  Follow clinically and biochemically      Salma Brown MD MD

## 2021-07-31 NOTE — PROGRESS NOTES
Progress Note( Dr. Tulio Babb)  2021  Subjective:   Admit Date: 7/15/2021  PCP: Michael Melara PA-C        Admitted For :   Altered mental state and severe hyperglycemia possible metabolic encephalopathy    Consulted For: Better control of blood glucose    Interval History: Patient is more alert awake responding to verbal commands and some answers seem to be appropriate and other seem to be confused   Patient did not require an attendant to watch her anymore    CT Scan& MRI of Brain  Neg for any acute CVA     Denies any chest pains,   Denies SOB . Denies nausea or vomiting. Now eating better  No new bowel or bladder symptoms. Intake/Output Summary (Last 24 hours) at 2021 0928  Last data filed at 2021 0404  Gross per 24 hour   Intake --   Output 1625 ml   Net -1625 ml       DATA    CBC:   No results for input(s): WBC, HGB, PLT in the last 72 hours.  CMP:  Recent Labs     21  0516 21  0312 21  0359    137 138   K 5.6* 4.6 4.4    99 98*   CO2 28 30 33*   BUN 25* 28* 30*   CREATININE 1.6* 1.8* 1.9*   CALCIUM 9.2 8.9 9.2   PROT 5.5* 5.5* 5.8*   LABALBU 3.6 3.2* 3.3*   BILITOT 0.6 0.6 0.5   ALKPHOS 106 101 104   AST 19 16 17   ALT 24 20 19     Lipids:   Lab Results   Component Value Date    CHOL 153 2012    HDL 59 2012    TRIG 135 2012     Glucose:  Recent Labs     21  2135 21  0226 21  0739   POCGLU 289* 161* 140*     CtmuemxxflD2L:  Lab Results   Component Value Date    LABA1C 10.9 2021     High Sensitivity TSH:   Lab Results   Component Value Date    TSHHS 7.150 2021     Free T3: No results found for: FT3  Free T4:  Lab Results   Component Value Date    T4FREE 1.28 2021       Echocardiogram complete 2D with doppler with color    Result Date: 2021  Transthoracic Echocardiography Report (TTE)  Demographics   Patient Name       Eugene MAYFIELD Date of Study       2021   Date of Birth      1955 Gender              Female   Age                60 year(s)         Race                   Patient Number     [id number]         Room Number         2128   Visit Number       [id number]   Corporate ID       [id number]   Accession Number   [id number]         Lauraine Holter   Ordering Physician Deo Mckee MD                 Physician           MD  Procedure Type of Study   TTE procedure:ECHOCARDIOGRAM COMPLETE 2D W DOPPLER W COLOR. Procedure Date Date: 07/16/2021 Start: 01:35 PM Study Location: Portable Technical Quality: Fair visualization Indications:Congestive heart failure. Patient Status: Routine Height: 60 inches Weight: 180 pounds BSA: 1.78 m2 BMI: 35.15 kg/m2 HR: 99 bpm BP: 145/83 mmHg  Conclusions   Summary  Left ventricular systolic function is abnormal.  Ejection fraction is visually estimated at 15-20%. Global hypokinesis noted. Grade III diastolic dysfunction. Mitral annular calcification is present. Moderate to severe mitral regurgitation. Mild to moderate tricuspid regurgitation; RVSP: 38 mmHg. No evidence of any pericardial effusion. Signature   ------------------------------------------------------------------  Electronically signed by Rosa Isela Wright MD (Interpreting  physician) on 07/16/2021 at 04:31 PM    CT ABDOMEN PELVIS WO CONTRAST Additional Contrast? None    Result Date: 7/15/2021  EXAMINATION: CT OF THE ABDOMEN AND PELVIS WITHOUT CONTRAST 7/15/2021 7:28 pm    1. Findings at the lung bases suggesting pulmonary edema with small bilateral effusions and associated atelectasis. 2. No acute intra-abdominal process identified. 3. Severe atherosclerotic disease. CT Head WO Contrast    Result Date: 7/15/2021  EXAMINATION: CT OF THE HEAD WITHOUT CONTRAST  7/15/2021 7:28 pm     No acute intracranial abnormality.  Chronic microvascular ischemic changes and global cerebral atrophy. XR CHEST PORTABLE    Result Date: 7/16/2021  EXAMINATION: ONE XRAY VIEW OF THE CHEST 7/16/2021 10:31 am COMPARISON: July 15, 2021 HISTORY: ORDERING SYSTEM PROVIDED HISTORY: s/p central line attempt    Right IJ central venous catheter terminates in the mid SVC in satisfactory position. No pneumothorax. Pulmonary vascular congestion with interstitial pulmonary edema. XR CHEST PORTABLE    Result Date: 7/15/2021  EXAMINATION: ONE XRAY VIEW OF THE CHEST 7/15/2021 5:47 pm COMPARISON: 06/13/2021 HISTORY: ORDERING SYSTEM PROVIDED HISTORY: AMS TECHNOLOGIST PROVIDED HISTORY: Reason for exam:->AMS Reason for Exam: AMS Initial encounter FINDINGS: Cardiomegaly. There is increased interstitial opacities bilaterally. No pleural effusion or pneumothorax. The osseous structures otherwise stable. Cardiomegaly with pulmonary edema. IR NONTUNNELED VASCULAR CATHETER > 5 YEARS    Result Date: 7/16/2021  PROCEDURE: ULTRASOUND GUIDED VASCULAR ACCESS. PLACEMENT OF A NON-TUNNELED CATHETER. 7/16/2021. HISTORY: ORDERING SYSTEM PROVIDED HISTORY: Need central venous access. Request CVC Insertion TECHNOLOGIST PROVIDED HISTORY: Reason for exam:->CVC Insertion SEDATION: None. Successful ultrasound and fluoroscopy guided non-tunneled central venous catheter placement. The catheter is ready to use.        Scheduled Medicines   Medications:    insulin glargine  10 Units Subcutaneous Nightly    sodium chloride flush  5-40 mL Intravenous 2 times per day    insulin lispro  0-18 Units Subcutaneous 2 times per day    aspirin  81 mg Oral Daily    folic acid-pyridoxine-cyancobalamin  1 tablet Oral Daily    insulin lispro  0-18 Units Subcutaneous TID     insulin lispro  10 Units Subcutaneous TID     apixaban  5 mg Oral BID    levothyroxine  50 mcg Oral Daily    pantoprazole  40 mg Oral Daily    alogliptin  6.25 mg Oral Daily    sodium chloride flush  5-40 mL Intravenous 2 times per day    sodium chloride flush  5-40 mL Intravenous 2 times per day      Infusions:    dextrose      sodium chloride           Objective:   Vitals: BP (!) 155/72   Pulse 100   Temp 98 °F (36.7 °C) (Oral)   Resp 20   Ht 5' (1.524 m)   Wt 158 lb 14.4 oz (72.1 kg)   SpO2 98%   BMI 31.03 kg/m²   General appearance: alert and cooperative with exam appears to be somewhat dehydrated  Neck: no JVD or bruit  Thyroid : Normal lobes   Lungs: Has Vesicular Breath sounds   Heart: Atrial fibrillation  Abdomen: soft, non-tender; bowel sounds normal; no masses,  no organomegaly  Musculoskeletal: Normal  Extremities: extremities normal, , no edema  Neurologic:  Awake, alert, oriented to name, place and time. Cranial nerves II-XII are grossly intact. Motor is  intact. Sensory is neuropathy. ,  and gait is normal.    Assessment:     Patient Active Problem List:     History of CVA (cerebrovascular accident) without residual deficits     Morbid obesity ([address]     DM (diabetes mellitus), type 2, uncontrolled ([address]     Persistent atrial fibrillation (HCC)     CAD (coronary artery disease)     CHF (congestive heart failure) ([address]     Light headed     History of MI (myocardial infarction)     Diabetes mellitus ([address]     Hypertension     TIA (transient ischemic attack)     Hyperlipidemia     SOB (shortness of breath)     H/O cardiovascular stress test     Family history of coronary artery disease     PAF (paroxysmal atrial fibrillation) ([facility])     Chest pain     Atrial thrombus without antecedent myocardial infarction     S/P ablation of atrial fibrillation     Gastroenteritis     YESIKA (acute kidney injury) ([address]     Acute cystitis     Elevated liver enzymes     CHF (congestive heart failure), NYHA class I, acute on chronic, combined (HCC)     Pneumonia     Multifocal pneumonia     Atrial fibrillation with RVR ([facility])     Shoulder pain     Paroxysmal atrial fibrillation ([address]     Diabetic hyperosmolar coma ([facility]     Difficult intravenous access     Metabolic encephalopathy getting better      Plan:     1. Reviewed POC blood rate 14 with regular foot embolism there is good  2. . Labs and X ray results   3. Reviewed Current Medicines   4. On meal +,  correction bolus Humalog/ Basal Lantus Insulin regime does get  5. Monitor Blood glucose frequently   6. Modified  the dose of Insulin/ other medicines as needed  7. Changed CODE STATUS  8. Looking into possibility of sending to  nursing home for rehab  9. Will follow     .      Margarita Figueroa MD, MD

## 2021-07-31 NOTE — PROGRESS NOTES
NEUROLOGY NOTE  DR. Aakash Hubbard MD.  -------------------------------------------------  Subjective:    Pt is less confused and doing better today    Objective:    /62   Pulse 98   Temp 98.4 °F (36.9 °C) (Oral)   Resp 16   Ht 5' (1.524 m)   Wt 155 lb 5 oz (70.4 kg)   SpO2 98%   BMI 30.33 kg/m²   HEENT nl      Neuro exam    Alert oriented to person place mildly confused  eomi pupils 3 mm jo  Squeezes fingers to commands  Equivocal toes  resp on own      RADIOLOGY  -----------------    Echocardiogram complete 2D with doppler with color    Result Date: 7/16/2021  Transthoracic Echocardiography Report (TTE)  Demographics   Patient Name       Olya MAYFIELD Date of Study       07/16/2021   Date of Birth      1955         Gender              Female   Age                60 year(s)         Race                   Patient Number     [id number]         Room Number         2128   Visit Number       [id number]   Corporate ID       [id number]   Accession Number   [id number]         Wenda Alpers, RDMS   Ordering Physician Colin Reyes MD                 Physician           MD  Procedure Type of Study   TTE procedure:ECHOCARDIOGRAM COMPLETE 2D W DOPPLER W COLOR. Procedure Date Date: 07/16/2021 Start: 01:35 PM Study Location: Portable Technical Quality: Fair visualization Indications:Congestive heart failure. Patient Status: Routine Height: 60 inches Weight: 180 pounds BSA: 1.78 m2 BMI: 35.15 kg/m2 HR: 99 bpm BP: 145/83 mmHg  Conclusions   Summary  Left ventricular systolic function is abnormal.  Ejection fraction is visually estimated at 15-20%. Global hypokinesis noted. Grade III diastolic dysfunction. Mitral annular calcification is present. Moderate to severe mitral regurgitation. Mild to moderate tricuspid regurgitation; RVSP: 38 mmHg.   No evidence of any pericardial effusion. Signature   ------------------------------------------------------------------  Electronically signed by Shimon Sylvester MD (Interpreting  physician) on 07/16/2021 at 04:31 PM  ------------------------------------------------------------------   Findings   Left Ventricle  Left ventricular systolic function is abnormal.  Ejection fraction is visually estimated at 15-20%. Global hypokinesis noted. Grade III diastolic dysfunction. Left ventricle size is normal.   Left Atrium  Essentially normal left atrium. Right Atrium  Essentially normal right atrium. Right Ventricle  Essentially normal right ventricle. Aortic Valve  Trace aortic regurgitation noted. Mitral Valve  Mitral annular calcification is present. Moderate to severe mitral regurgitation. Tricuspid Valve  Mild to moderate tricuspid regurgitation; RVSP: 38 mmHg. Pulmonic Valve  The pulmonic valve was not well visualized. Pericardial Effusion  No evidence of any pericardial effusion. Pleural Effusion  No evidence of pleural effusion. Miscellaneous  The aorta is within normal limits.   M-Mode/2D Measurements & Calculations   LV Diastolic Dimension:   LV Systolic Dimension:   LA Dimension: 3.8 cmAO  4.86 cm                   4.6 cm                   Root Dimension: 2.8 cm  LV FS:5.4 %               LV Volume Diastolic: 596  LV PW Diastolic: 5.58 cm  ml  LV PW Systolic: 7.36 cm   LV Volume Systolic: 790  Septum Diastolic: 1.76 cm ml                       RV Diastolic Dimension:  Septum Systolic: 8.11 cm  LV EDV/LV EDV Index: 116 3.13 cm  CO: 2.42 l/min            ml/65 m2LV ESV/LV ESV  CI: 1.36 l/m*m2           Index: 108 ml/61 m2      LA/Aorta: 1.36                            EF Calculated (A4C): 6.9  LV Area Diastolic: 61.0   %  cm2                       EF Calculated (2D): 74.8  LV Area Systolic: 32 cm2  %                             LV Length: 7.68 cm                             LVOT: 1.9 cm  Doppler Measurements & Calculations   MV Peak E-Wave: 136  AV Peak Velocity: 125 cm/s    LVOT Peak Velocity: 64.6  cm/s                 AV Peak Gradient: 6.25 mmHg   cm/s  MV Peak A-Wave: 68.5 AV Mean Velocity: 86.6 cm/s   LVOT Mean Velocity: 39.8  cm/s                 AV Mean Gradient: 3 mmHg      cm/s  MV E/A Ratio: 1.99   AV VTI: 21 cm                 LVOT Peak Gradient: 2  MV Peak Gradient:    AV Area (Continuity):1.16 cm2 mmHgLVOT Mean Gradient: 1  7.4 mmHg                                           mmHg                       LVOT VTI: 8.63 cm             Estimated RVSP: 38 mmHg  MV P1/2t: 41 msec                                  Estimated RAP:3 mmHg  MVA by PHT:5.37 cm2  Estimated PASP: 35.49 mmHg   MV E' Septal                                       TR Velocity:285 cm/s  Velocity: 4.89 cm/s                                TR Gradient:32.49 mmHg  MV E' Lateral  Velocity: 10.4 cm/s  MV E/E' septal:  27.81  MV E/E' lateral:  13.08      CT ABDOMEN PELVIS WO CONTRAST Additional Contrast? None    Result Date: 7/15/2021  EXAMINATION: CT OF THE ABDOMEN AND PELVIS WITHOUT CONTRAST 7/15/2021 7:28 pm TECHNIQUE: CT of the abdomen and pelvis was performed without the administration of intravenous contrast. Multiplanar reformatted images are provided for review. Dose modulation, iterative reconstruction, and/or weight based adjustment of the mA/kV was utilized to reduce the radiation dose to as low as reasonably achievable. COMPARISON: MRI abdomen May 24, 2019; CT abdomen May 23, 2019 HISTORY: ORDERING SYSTEM PROVIDED HISTORY: Abdominal pain TECHNOLOGIST PROVIDED HISTORY: Reason for exam:->Abdominal pain Additional Contrast?->None Reason for Exam: Abdominal pain FINDINGS: Lower Chest: Imaged lung bases demonstrate partially imaged small and associated atelectasis. Imaged lung fields demonstrate findings suggesting mild pulmonary edema, however evaluation is limited due to respiratory motion artifact.   Coronary artery atherosclerotic disease present. Calcification of the mitral valve. Partially imaged heart is enlarged. Organs: Evaluation of the solid organs is limited due to lack of intravenous contrast.  The nonenhanced liver demonstrates no acute abnormality. The gallbladder is surgically absent. Numerous calcified granulomata throughout the spleen. Atrophy of the pancreas. The nonenhanced adrenal glands demonstrate no acute findings. Redemonstration of right adrenal adenoma is noted on previous exams. The nonenhanced kidneys are grossly stable with similar atrophy of the left kidney when compared with prior exams. No hydronephrosis identified. GI/Bowel: No bowel obstruction is evident. The colon is collapsed and not well evaluated. No evidence for appendicitis. The small bowel is normal in caliber. Small hiatal hernia. Pelvis: Bladder and solid pelvic organs demonstrate no acute findings. Peritoneum/Retroperitoneum: The abdominal aorta is normal in caliber with severe calcified atherosclerotic plaque throughout. No retroperitoneal adenopathy identified. No free fluid or free air identified within the abdomen and pelvis. Bones/Soft Tissues: Soft tissues demonstrate mild anasarca. Postoperative changes of the right anterior abdominal wall. No acute osseous abnormality identified. 1. Findings at the lung bases suggesting pulmonary edema with small bilateral effusions and associated atelectasis. 2. No acute intra-abdominal process identified. 3. Severe atherosclerotic disease. CT Head WO Contrast    Result Date: 7/15/2021  EXAMINATION: CT OF THE HEAD WITHOUT CONTRAST  7/15/2021 7:28 pm TECHNIQUE: CT of the head was performed without the administration of intravenous contrast. Dose modulation, iterative reconstruction, and/or weight based adjustment of the mA/kV was utilized to reduce the radiation dose to as low as reasonably achievable.  COMPARISON: CT brain June 11, 2021 HISTORY: ORDERING SYSTEM PROVIDED HISTORY: NANCY TECHNOLOGIST PROVIDED HISTORY: Reason for exam:->AMS Has a \"code stroke\" or \"stroke alert\" been called? ->No Decision Support Exception - unselect if not a suspected or confirmed emergency medical condition->Emergency Medical Condition (MA) Reason for Exam: AMS FINDINGS: BRAIN/VENTRICLES: There is no acute intracranial hemorrhage, mass effect or midline shift. No abnormal extra-axial fluid collection. The gray-white differentiation is maintained without evidence of an acute infarct. There is no evidence of hydrocephalus. Severe atherosclerotic change of the intracranial vasculature. There is moderate periventricular and subcortical white matter hypoattenuation most consistent with microvascular ischemic changes. Global cerebral atrophy noted. ORBITS: The visualized portion of the orbits demonstrate no acute abnormality. SINUSES: The visualized paranasal sinuses and mastoid air cells demonstrate no acute abnormality. SOFT TISSUES/SKULL:  No acute abnormality of the visualized skull or soft tissues. No acute intracranial abnormality. Chronic microvascular ischemic changes and global cerebral atrophy. XR CHEST PORTABLE    Result Date: 7/16/2021  EXAMINATION: ONE XRAY VIEW OF THE CHEST 7/16/2021 10:31 am COMPARISON: July 15, 2021 HISTORY: ORDERING SYSTEM PROVIDED HISTORY: s/p central line attempt TECHNOLOGIST PROVIDED HISTORY: Reason for exam:->s/p central line attempt Reason for Exam: s/p central line attempt FINDINGS: Right IJ central venous catheter terminates in the mid SVC in satisfactory position. No appreciable pneumothorax. Cardiomediastinal silhouette appears unchanged. Pulmonary vascular congestion and diffuse interstitial prominence. Trace bilateral pleural effusions cannot be excluded. Minimal bibasilar atelectasis. Right IJ central venous catheter terminates in the mid SVC in satisfactory position. No pneumothorax. Pulmonary vascular congestion with interstitial pulmonary edema.      XR CHEST PORTABLE    Result Date: 7/15/2021  EXAMINATION: ONE XRAY VIEW OF THE CHEST 7/15/2021 5:47 pm COMPARISON: 06/13/2021 HISTORY: ORDERING SYSTEM PROVIDED HISTORY: AMS TECHNOLOGIST PROVIDED HISTORY: Reason for exam:->AMS Reason for Exam: AMS Initial encounter FINDINGS: Cardiomegaly. There is increased interstitial opacities bilaterally. No pleural effusion or pneumothorax. The osseous structures otherwise stable. Cardiomegaly with pulmonary edema. IR NONTUNNELED VASCULAR CATHETER > 5 YEARS    Result Date: 7/16/2021  PROCEDURE: ULTRASOUND GUIDED VASCULAR ACCESS. PLACEMENT OF A NON-TUNNELED CATHETER. 7/16/2021. HISTORY: ORDERING SYSTEM PROVIDED HISTORY: Need central venous access. Request CVC Insertion TECHNOLOGIST PROVIDED HISTORY: Reason for exam:->CVC Insertion SEDATION: None. FLUOROSCOPY DOSE AND TYPE OR TIME AND EXPOSURES: None. TECHNIQUE: Informed consent was obtained after a detailed explanation of the procedure including risks, benefits, and alternatives. Universal protocol was observed. The right neck and chest were prepped and draped in sterile fashion using maximum sterile barrier technique. Local anesthesia was achieved with lidocaine. A micropuncture needle was used to access the right internal jugular vein using ultrasound guidance. An ultrasound image demonstrating patency of the vein with needle tip located within it. An image was obtained and stored in PACs. A 0.035 guidewire was used to place a triple lumen central venous catheter after fascial tract dilation. The catheter flushed easily and there was a good blood return. The catheter was sutured to the skin. The catheter was locked with heparinized saline. The patient tolerated the procedure well and there were no immediate complications. EBL: Less than 1 ml. FINDINGS: Chest x ray after procedure demonstrates the tip of the catheter in the SVC. The catheter is ready to use.      Successful ultrasound and fluoroscopy guided non-tunneled central venous catheter placement. The catheter is ready to use. MRI BRAIN WO CONTRAST    Result Date: 7/20/2021  EXAMINATION: MRI OF THE BRAIN WITHOUT CONTRAST  7/20/2021 4:07 pm TECHNIQUE: Multiplanar multisequence MRI of the brain was performed without the administration of intravenous contrast. COMPARISON: None. HISTORY: ORDERING SYSTEM PROVIDED HISTORY: aphasic TECHNOLOGIST PROVIDED HISTORY: Reason for exam:->aphasic Reason for Exam: aphasic Acuity: Acute Type of Exam: Initial Relevant Medical/Surgical History: none FINDINGS: Motion degrades images limiting evaluation. INTRACRANIAL STRUCTURES/VENTRICLES: There is no acute infarct. No mass effect or midline shift. No evidence of an acute intracranial hemorrhage. Areas of T2 FLAIR hyperintensity are seen in the periventricular and subcortical white matter, which are nonspecific, but may represent chronic microvascular ischemic change. There is prominence of the ventricles and sulci due to global parenchymal volume loss. Evaluation of the signal voids within the major intracranial vessels is limited given patient motion. ORBITS: The visualized portion of the orbits demonstrate no acute abnormality. SINUSES: No gross abnormality seen of the paranasal sinuses. There appears to be a left mastoid effusion. 1. Patient motion limits evaluation. 2. No convincing acute intracranial abnormality. No acute infarct. 3. Mild global parenchymal volume loss with moderate chronic microvascular ischemic changes.        LAB RESULTS  --------------------    Recent Results (from the past 24 hour(s))   POCT Glucose    Collection Time: 07/30/21  2:48 AM   Result Value Ref Range    POC Glucose 292 (H) 70 - 99 MG/DL   Comprehensive Metabolic Panel    Collection Time: 07/30/21  3:12 AM   Result Value Ref Range    Sodium 137 135 - 145 MMOL/L    Potassium 4.6 3.5 - 5.1 MMOL/L    Chloride 99 99 - 110 mMol/L    CO2 30 21 - 32 MMOL/L    BUN 28 (H) 6 - 23 MG/DL CREATININE 1.8 (H) 0.6 - 1.1 MG/DL    Glucose 138 (H) 70 - 99 MG/DL    Calcium 8.9 8.3 - 10.6 MG/DL    Albumin 3.2 (L) 3.4 - 5.0 GM/DL    Total Protein 5.5 (L) 6.4 - 8.2 GM/DL    Total Bilirubin 0.6 0.0 - 1.0 MG/DL    ALT 20 10 - 40 U/L    AST 16 15 - 37 IU/L    Alkaline Phosphatase 101 40 - 128 IU/L    GFR Non- 28 (L) >60 mL/min/1.73m2    GFR  34 (L) >60 mL/min/1.73m2    Anion Gap 8 4 - 16   Phosphorus    Collection Time: 07/30/21  3:12 AM   Result Value Ref Range    Phosphorus 5.4 (H) 2.5 - 4.9 MG/DL   Magnesium    Collection Time: 07/30/21  3:12 AM   Result Value Ref Range    Magnesium 2.0 1.8 - 2.4 mg/dl   POCT Glucose    Collection Time: 07/30/21  6:57 AM   Result Value Ref Range    POC Glucose 93 70 - 99 MG/DL   POCT Glucose    Collection Time: 07/30/21 12:17 PM   Result Value Ref Range    POC Glucose 286 (H) 70 - 99 MG/DL   POCT Glucose    Collection Time: 07/30/21  1:47 PM   Result Value Ref Range    POC Glucose 293 (H) 70 - 99 MG/DL   POCT Glucose    Collection Time: 07/30/21  4:36 PM   Result Value Ref Range    POC Glucose 122 (H) 70 - 99 MG/DL   POCT Glucose    Collection Time: 07/30/21  9:35 PM   Result Value Ref Range    POC Glucose 289 (H) 70 - 99 MG/DL         Medical problems    Patient Active Problem List:     History of CVA (cerebrovascular accident) without residual deficits     Morbid obesity ([facility] [address]     DM (diabetes mellitus), type 2, uncontrolled (HCC)     Persistent atrial fibrillation (HCC)     CAD (coronary artery disease)     CHF (congestive heart failure) ([facility] [address]     Light headed     History of MI (myocardial infarction)     Diabetes mellitus ([facility] [address]     Hypertension     TIA (transient ischemic attack)     Hyperlipidemia     SOB (shortness of breath)     H/O cardiovascular stress test     Family history of coronary artery disease     PAF (paroxysmal atrial fibrillation) ([facility])     Chest pain     Atrial thrombus without antecedent myocardial infarction     S/P ablation of atrial fibrillation     Gastroenteritis     YESIKA (acute kidney injury) ([facility] [address]     Acute cystitis     Elevated liver enzymes     CHF (congestive heart failure), NYHA class I, acute on chronic, combined ([facility] [address]     Pneumonia     Multifocal pneumonia     Atrial fibrillation with RVR ([facility])     Shoulder pain     Paroxysmal atrial fibrillation (HCC)     Diabetic hyperosmolar coma ([facility])     Difficult intravenous access     HFrEF (heart failure with reduced ejection fraction) ([facility])      ASSESSMENT:  ---------------------    Metabolic encephalopathy     TIA r/o cardio carotid embolic event     Hyperglycemia     ? Sepsis     A fib    Right MCA left Ophthalmic artery aneurysms    Multiple intracranial stenosis    Right MCA saccular aneurysm -     PLAN:     CT brain neg     Mri brain neg    Mra head as above     C doppler neg     Echo EF 15 %      B 12 folate TSH  nl    High Homocysteine level     Continue eliquis      DC asa     Plavix    foltx     Nausea non neurologic. [place]     Electronically signed by Gracie Emanuel MD on 7/30/2021 at 11:57 PM

## 2021-08-01 NOTE — CONSULTS
Department of Urology   Consult Note  [facility] 1 2 3 4 5      Date: 2021   Patient: Beryle Barrow   : 1955   DOA: 7/15/2021   MRN: [id number]   ROOM#: [id number]     Reason for Consult:  Acute gross hematuria last 24 hours (mostly resolved)  (indwelling catheter and Eliquis)  Requesting Physician:  Dr. Lopez Quant:  Hyperglycemia/DKA ,delirium    History Obtained From:  electronic medical record- she is unable to give urological history but currently sitting in chair    HISTORY OF PRESENT ILLNESS:                The patient is a 60 y.o. female with significant past medical history of DM admitted 2021 for DKA and delirium. She has an indwelling catheter, new this admission. She is unable to give her urological history. On eliquis. CT Scan 2021 without acute urological finding. Chen in place with clear/light pink urine. She would not allow an manual vaginal exam.      By report she tosses and turns at night. I suspect catheter trauma and Eliquis as source of acute bleed. Urine culture on admission was negative. Past Medical History:        Diagnosis Date    Abnormal PFTs (pulmonary function tests) 2014    Atrial fibrillation (HCC)     CAD (coronary artery disease)     Carotid aneurysm, right ([facility] [address] 2020    s/p fall. referred to [facility] Neurosurgery by Dr Moni Conteh office    CHF (congestive heart failure) ([facility] [address]     Diabetes mellitus ([facility] [address]     Dizziness - light-headed     Family history of coronary artery disease     H/O 24 hour EKG monitoring 2012,12/10/2010    2012 predominant rhythm is sinus with short runs of SVT no episode of atrial fib. pt has left bundle branch morphology, max heart is 132, min is 48 with an average of 74 ,infrequent PVCs are seen    H/O cardiovascular stress test 2013, 2012-    H/O cardiovascular stress test 14,2012 EF 52% No ischemia, normal study. 2012 EF 58% disintegrating tablet 4 mg, 4 mg, Oral, Q8H PRN **OR** ondansetron (ZOFRAN) injection 4 mg, 4 mg, Intravenous, Q6H PRN  polyethylene glycol (GLYCOLAX) packet 17 g, 17 g, Oral, Daily PRN  acetaminophen (TYLENOL) tablet 650 mg, 650 mg, Oral, Q6H PRN **OR** acetaminophen (TYLENOL) suppository 650 mg, 650 mg, Rectal, Q6H PRN  sodium chloride flush 0.9 % injection 5-40 mL, 5-40 mL, Intravenous, 2 times per day  sodium chloride flush 0.9 % injection 5-40 mL, 5-40 mL, Intravenous, PRN  0.9 % sodium chloride infusion, 25 mL, Intravenous, PRN    Allergies:  Amiodarone, Iv dye [iodides], and Vicodin [hydrocodone-acetaminophen]    Social History:   TOBACCO:   reports that she quit smoking about 14 years ago. Her smoking use included cigarettes. She has a 60.00 pack-year smoking history. She has never used smokeless tobacco.  ETOH:   reports no history of alcohol use. DRUGS:   reports no history of drug use. MARITAL STATUS:    OCCUPATION:      Family History:         Problem Relation Age of Onset    Diabetes Father     Heart Disease Father        REVIEW OF SYSTEMS:        PHYSICAL EXAM:    VITALS:  BP (!) 153/81   Pulse 94   Temp 98.2 °F (36.8 °C) (Oral)   Resp 15   Ht 5' (1.524 m)   Wt 158 lb 14.4 oz (72.1 kg)   SpO2 94%   BMI 31.03 kg/m²     TEMPERATURE:  Current - Temp: 98.2 °F (36.8 °C); Max - Temp  Av.7 °F (36.5 °C)  Min: 97.1 °F (36.2 °C)  Max: 98.2 °F (36.8 °C)  24HR BLOOD PRESSURE RANGE:  Systolic (99ALN), VFL:177 , Min:118 , RYJ:216   ; Diastolic (40BSA), AVQ:58, Min:52, Max:82    8HR INTAKE OUTPUT:  No intake/output data recorded.   URINARY CATHETER OUTPUT (Chen):     DRAIN/TUBE OUTPUT:        CONSTITUTIONAL:  awake, alert, sitting in chair, nad  Unable to give urological history  Soft, nd.nt, benign  Chen in place with clear urine  She will not allow manual vaginal exam      Data:    WBC:    Lab Results   Component Value Date    WBC 10.5 2021     Hemoglobin/Hematocrit:    Lab Results Component Value Date    HGB 12.2 07/27/2021    HCT 38.7 07/27/2021     BMP:    Lab Results   Component Value Date     08/01/2021    K 4.7 08/01/2021    CL 97 08/01/2021    CO2 30 08/01/2021    BUN 29 08/01/2021    LABALBU 3.4 08/01/2021    CREATININE 1.6 08/01/2021    CALCIUM 9.3 08/01/2021    GFRAA 39 08/01/2021    LABGLOM 32 08/01/2021     PT/INR:    Lab Results   Component Value Date    PROTIME 15.1 07/15/2021    PROTIME 10.4 05/07/2012    INR 1.17 07/15/2021           Imaging: [unfilled]    CT Scan a/p 7/15/2021:  Reviewed:  No stones and no acute urological process      Impression: Acute gross hematuria last 24 hours now mostly resolved secondary to alas catheter and Eliquis use      Recommendation:   1. D/c alas catheter for voiding trial to avoid further catheter trauma  2. Monitor for voiding and check PVR with bladder scan. Call if retention or PVR > 200ml  3. Follow-up urology outpatient for UA check. Possible outpatient cystoscopy if significant hematuria found  4. Will sign off. Call with questions. Patient seen and examined, chart reviewed.      Electronically signed by Chip Mehta MD on 8/1/2021 at 9:14 AM

## 2021-08-01 NOTE — PROGRESS NOTES
Progress Note( Dr. London Dawkins)  8/1/2021  Subjective:   Admit Date: 7/15/2021  PCP: Rashmi Martínez PA-C        Admitted For :   Altered mental state and severe hyperglycemia possible metabolic encephalopathy    Consulted For: Better control of blood glucose    Interval History: Patient is more alert awake responding to verbal commands and some answers seem to be appropriate and other seem to be confused   Patient did not require an attendant to watch her anymore    Patient is sitting in the chair and trying to eat breakfast this morning    CT Scan& MRI of Brain  Neg for any acute CVA     Denies any chest pains,   Denies SOB . Denies nausea or vomiting. Now eating better  No new bowel or bladder symptoms. Intake/Output Summary (Last 24 hours) at 8/1/2021 1404  Last data filed at 8/1/2021 0635  Gross per 24 hour   Intake --   Output 1455 ml   Net -1455 ml       DATA    CBC:   No results for input(s): WBC, HGB, PLT in the last 72 hours.  CMP:  Recent Labs     07/30/21  0312 07/31/21  0359 08/01/21  0344    138 137   K 4.6 4.4 4.7   CL 99 98* 97*   CO2 30 33* 30   BUN 28* 30* 29*   CREATININE 1.8* 1.9* 1.6*   CALCIUM 8.9 9.2 9.3   PROT 5.5* 5.8* 6.0*   LABALBU 3.2* 3.3* 3.4   BILITOT 0.6 0.5 0.6   ALKPHOS 101 104 105   AST 16 17 22   ALT 20 19 19     Lipids:   Lab Results   Component Value Date    CHOL 153 01/30/2012    HDL 59 01/30/2012    TRIG 135 01/30/2012     Glucose:  Recent Labs     08/01/21  0217 08/01/21  0836 08/01/21  1219   POCGLU 260* 154* 224*     [id number]:  Lab Results   Component Value Date    LABA1C 10.9 07/17/2021     High Sensitivity TSH:   Lab Results   Component Value Date    TSHHS 7.150 07/20/2021     Free T3: No results found for: FT3  Free T4:  Lab Results   Component Value Date    T4FREE 1.28 06/12/2021       Echocardiogram complete 2D with doppler with color    Result Date: 7/16/2021  Transthoracic Echocardiography Report (TTE)  Demographics   Patient Name       Dolores Mcclain M Date of Study       07/16/2021   Date of Birth      1955         Gender              Female   Age                60 year(s)         Race                   Patient Number     [id number]         Room Number         2128   Visit Number       [id number]   Corporate ID       [id number]   Accession Number   [id number]         Amol Eulogio, RDMS   Ordering Physician Lance Palm MD                 Physician           MD  Procedure Type of Study   TTE procedure:ECHOCARDIOGRAM COMPLETE 2D W DOPPLER W COLOR. Procedure Date Date: 07/16/2021 Start: 01:35 PM Study Location: Portable Technical Quality: Fair visualization Indications:Congestive heart failure. Patient Status: Routine Height: 60 inches Weight: 180 pounds BSA: 1.78 m2 BMI: 35.15 kg/m2 HR: 99 bpm BP: 145/83 mmHg  Conclusions   Summary  Left ventricular systolic function is abnormal.  Ejection fraction is visually estimated at 15-20%. Global hypokinesis noted. Grade III diastolic dysfunction. Mitral annular calcification is present. Moderate to severe mitral regurgitation. Mild to moderate tricuspid regurgitation; RVSP: 38 mmHg. No evidence of any pericardial effusion. Signature   ------------------------------------------------------------------  Electronically signed by Cody Cohen MD (Interpreting  physician) on 07/16/2021 at 04:31 PM    CT ABDOMEN PELVIS WO CONTRAST Additional Contrast? None    Result Date: 7/15/2021  EXAMINATION: CT OF THE ABDOMEN AND PELVIS WITHOUT CONTRAST 7/15/2021 7:28 pm    1. Findings at the lung bases suggesting pulmonary edema with small bilateral effusions and associated atelectasis. 2. No acute intra-abdominal process identified. 3. Severe atherosclerotic disease.      CT Head WO Contrast    Result Date: 7/15/2021  EXAMINATION: CT OF THE HEAD WITHOUT CONTRAST 7/15/2021 7:28 pm     No acute intracranial abnormality. Chronic microvascular ischemic changes and global cerebral atrophy. XR CHEST PORTABLE    Result Date: 7/16/2021  EXAMINATION: ONE XRAY VIEW OF THE CHEST 7/16/2021 10:31 am COMPARISON: July 15, 2021 HISTORY: ORDERING SYSTEM PROVIDED HISTORY: s/p central line attempt    Right IJ central venous catheter terminates in the mid SVC in satisfactory position. No pneumothorax. Pulmonary vascular congestion with interstitial pulmonary edema. XR CHEST PORTABLE    Result Date: 7/15/2021  EXAMINATION: ONE XRAY VIEW OF THE CHEST 7/15/2021 5:47 pm COMPARISON: 06/13/2021 HISTORY: ORDERING SYSTEM PROVIDED HISTORY: AMS TECHNOLOGIST PROVIDED HISTORY: Reason for exam:->AMS Reason for Exam: AMS Initial encounter FINDINGS: Cardiomegaly. There is increased interstitial opacities bilaterally. No pleural effusion or pneumothorax. The osseous structures otherwise stable. Cardiomegaly with pulmonary edema. IR NONTUNNELED VASCULAR CATHETER > 5 YEARS    Result Date: 7/16/2021  PROCEDURE: ULTRASOUND GUIDED VASCULAR ACCESS. PLACEMENT OF A NON-TUNNELED CATHETER. 7/16/2021. HISTORY: ORDERING SYSTEM PROVIDED HISTORY: Need central venous access. Request CVC Insertion TECHNOLOGIST PROVIDED HISTORY: Reason for exam:->CVC Insertion SEDATION: None. Successful ultrasound and fluoroscopy guided non-tunneled central venous catheter placement. The catheter is ready to use.        Scheduled Medicines   Medications:    amLODIPine  2.5 mg Oral Daily    metoprolol tartrate  25 mg Oral BID    DULoxetine  30 mg Oral Daily    insulin glargine  10 Units Subcutaneous Nightly    sodium chloride flush  5-40 mL Intravenous 2 times per day    insulin lispro  0-18 Units Subcutaneous 2 times per day    aspirin  81 mg Oral Daily    folic acid-pyridoxine-cyancobalamin  1 tablet Oral Daily    insulin lispro  0-18 Units Subcutaneous TID WC    insulin lispro  10 Units Subcutaneous TID   apixaban  5 mg Oral BID    levothyroxine  50 mcg Oral Daily    pantoprazole  40 mg Oral Daily    alogliptin  6.25 mg Oral Daily    sodium chloride flush  5-40 mL Intravenous 2 times per day    sodium chloride flush  5-40 mL Intravenous 2 times per day      Infusions:    dextrose      sodium chloride           Objective:   Vitals: BP (!) 153/81   Pulse 94   Temp 98.2 °F (36.8 °C) (Oral)   Resp 15   Ht 5' (1.524 m)   Wt 158 lb 14.4 oz (72.1 kg)   SpO2 94%   BMI 31.03 kg/m²   General appearance: alert and cooperative with exam appears to be somewhat dehydrated  Neck: no JVD or bruit  Thyroid : Normal lobes   Lungs: Has Vesicular Breath sounds   Heart: Atrial fibrillation  Abdomen: soft, non-tender; bowel sounds normal; no masses,  no organomegaly  Musculoskeletal: Normal  Extremities: extremities normal, , no edema  Neurologic:  Awake, alert, oriented to name, place and time. Cranial nerves II-XII are grossly intact. Motor is  intact. Sensory is neuropathy. ,  and gait is normal.    Assessment:     Patient Active Problem List:     History of CVA (cerebrovascular accident) without residual deficits     Morbid obesity ([address]     DM (diabetes mellitus), type 2, uncontrolled ([address]     Persistent atrial fibrillation (HCC)     CAD (coronary artery disease)     CHF (congestive heart failure) ([address]     Light headed     History of MI (myocardial infarction)     Diabetes mellitus ([address]     Hypertension     TIA (transient ischemic attack)     Hyperlipidemia     SOB (shortness of breath)     H/O cardiovascular stress test     Family history of coronary artery disease     PAF (paroxysmal atrial fibrillation) ([facility])     Chest pain     Atrial thrombus without antecedent myocardial infarction     S/P ablation of atrial fibrillation     Gastroenteritis     YESIKA (acute kidney injury) ([address]     Acute cystitis     Elevated liver enzymes     CHF (congestive heart failure), NYHA class I, acute on chronic, combined ([address] Pneumonia     Multifocal pneumonia     Atrial fibrillation with RVR (HCC)     Shoulder pain     Paroxysmal atrial fibrillation (HCC)     Diabetic hyperosmolar coma (HCC)     Difficult intravenous access     Metabolic encephalopathy getting better      Plan:     1. Reviewed POC blood rate 14 with regular foot embolism there is good  2. . Labs and X ray results   3. Reviewed Current Medicines   4. On meal +,  correction bolus Humalog/ Basal Lantus Insulin regime does get  5. Monitor Blood glucose frequently   6. Modified  the dose of Insulin/ other medicines as needed  7. Changed CODE STATUS  8. Looking into possibility of sending to  nursing [place] for rehab  9. Will follow     .      Vashti Wade MD, MD

## 2021-08-01 NOTE — PROGRESS NOTES
Nephrology Progress Note  8/1/2021 3:05 PM        Subjective:   Admit Date: 7/15/2021  PCP: Pedro Pablo Angel PA-C    Interval History: Patient seen in early morning    Diet: Little bit better    ROS: She is much more alert awake and oriented  I had a good conversation with her  She is sitting in the chair  Also her Chen seems to be out  No shortness of breath  No confusion    Data:     Current meds:    amLODIPine  2.5 mg Oral Daily    metoprolol tartrate  25 mg Oral BID    DULoxetine  30 mg Oral Daily    insulin glargine  10 Units Subcutaneous Nightly    sodium chloride flush  5-40 mL Intravenous 2 times per day    insulin lispro  0-18 Units Subcutaneous 2 times per day    aspirin  81 mg Oral Daily    folic acid-pyridoxine-cyancobalamin  1 tablet Oral Daily    insulin lispro  0-18 Units Subcutaneous TID WC    insulin lispro  10 Units Subcutaneous TID WC    apixaban  5 mg Oral BID    levothyroxine  50 mcg Oral Daily    pantoprazole  40 mg Oral Daily    alogliptin  6.25 mg Oral Daily    sodium chloride flush  5-40 mL Intravenous 2 times per day    sodium chloride flush  5-40 mL Intravenous 2 times per day      dextrose      sodium chloride           I/O last 3 completed shifts:  In: -   Out: 1455 [Urine:1455]    CBC: No results for input(s): WBC, HGB, PLT in the last 72 hours.        Recent Labs     07/30/21  0312 07/31/21  0359 08/01/21  0344    138 137   K 4.6 4.4 4.7   CL 99 98* 97*   CO2 30 33* 30   BUN 28* 30* 29*   CREATININE 1.8* 1.9* 1.6*   GLUCOSE 138* 113* 218*       Lab Results   Component Value Date    CALCIUM 9.3 08/01/2021    PHOS 3.5 08/01/2021       Objective:     Vitals: BP (!) 153/81   Pulse 94   Temp 98.2 °F (36.8 °C) (Oral)   Resp 15   Ht 5' (1.524 m)   Wt 158 lb 14.4 oz (72.1 kg)   SpO2 94%   BMI 31.03 kg/m²     General appearance: No acute distress  HEENT: At least 2+ conjunctival pallor  Neck: Supple  Lungs: Did not hear any crackles  Heart: Irregular  Abdomen: Soft nontender  Extremities: No extremity edema  Her Chen is out      Problem List :         Impression :     1. Acute kidney injury-likely from cardiorenal syndrome-she is off diuretics now-atony and is down-urine had some RBC and minimal albumin yesterday-there was a Chen specimen-I do not see any active sediment  2. Recent acute decompensated heart failure-likely seems no pulmonary edema-she is off diuretics-we will keep an eye on it  Severe cardiomyopathy-  Encephalopathy seems to be resolving        Recommendation/Plan  :     1. Keep up diuretics for now  2. Already a chest x-ray  3. Get a proBNP level tomorrow morning  4. Also follow mainly clinically-in my good physical exam  5.        Salma Brown MD MD

## 2021-08-01 NOTE — CARE COORDINATION
Spoke with Kristy/danilo at [facility] who states patient has been accepted though they were not able to start pre-cert on Friday d/t not having therapy. She requested updated therapy notes for pre-cert initiation; WB placed.

## 2021-08-01 NOTE — PLAN OF CARE
Problem: Non-Violent Restraints  Goal: Removal from restraints as soon as assessed to be safe  8/1/2021 1616 by Bonnie Hudson  Outcome: Ongoing  8/1/2021 0342 by Demetra Davis RN  Outcome: Ongoing  Goal: No harm/injury to patient while restraints in use  8/1/2021 1616 by Bonnie Hudson  Outcome: Ongoing  8/1/2021 0342 by Demetra Davis RN  Outcome: Ongoing  Goal: Patient's dignity will be maintained  8/1/2021 1616 by Bonnie Hudson  Outcome: Ongoing  8/1/2021 0342 by Demetra Davis RN  Outcome: Ongoing     Problem: Discharge Planning:  Goal: Discharged to appropriate level of care  Description: Discharged to appropriate level of care  8/1/2021 1616 by Bonnie Hudson  Outcome: Ongoing  8/1/2021 0342 by Demetra Davis RN  Outcome: Ongoing  Goal: Ability to perform activities of daily living will improve  Description: Ability to perform activities of daily living will improve  8/1/2021 1616 by Bonnie Hudson  Outcome: Ongoing  8/1/2021 0342 by Demetra Davis RN  Outcome: Ongoing  Goal: Participates in care planning  Description: Participates in care planning  8/1/2021 1616 by Bonnie Hudson  Outcome: Ongoing  8/1/2021 0342 by Demetra Davis RN  Outcome: Ongoing     Problem: Serum Glucose Level - Abnormal:  Goal: Ability to maintain appropriate glucose levels will improve  Description: Ability to maintain appropriate glucose levels will improve  8/1/2021 1616 by Bonnie Hudson  Outcome: Ongoing  8/1/2021 0342 by Demetra Davis RN  Outcome: Ongoing     Problem: Sensory Perception - Impaired:  Goal: Ability to maintain a stable neurologic state will improve  Description: Ability to maintain a stable neurologic state will improve  8/1/2021 1616 by Bonnie Hudson  Outcome: Ongoing  8/1/2021 0342 by Demetra Davis RN  Outcome: Ongoing  Goal: Demonstrations of improved sensory functioning will increase  Description: Demonstrations of improved sensory functioning will increase  8/1/2021 1616 by Sammi Romeo  Outcome: Ongoing  8/1/2021 0342 by Aidan Hensley RN  Outcome: Ongoing  Goal: Decrease in sensory misperception frequency  Description: Decrease in sensory misperception frequency  8/1/2021 1616 by Sammi Romeo  Outcome: Ongoing  8/1/2021 0342 by Aidan Hensley RN  Outcome: Ongoing  Goal: Able to refrain from responding to false sensory perceptions  Description: Able to refrain from responding to false sensory perceptions  8/1/2021 1616 by Sammi Romeo  Outcome: Ongoing  8/1/2021 0342 by Aidan Hensley RN  Outcome: Ongoing  Goal: Demonstrates accurate environmental perceptions  Description: Demonstrates accurate environmental perceptions  8/1/2021 1616 by Sammi Romeo  Outcome: Ongoing  8/1/2021 0342 by Aidan Hensley RN  Outcome: Ongoing  Goal: Able to distinguish between reality-based and nonreality-based thinking  Description: Able to distinguish between reality-based and nonreality-based thinking  8/1/2021 1616 by Sammi Romeo  Outcome: Ongoing  8/1/2021 0342 by Aidan Hensley RN  Outcome: Ongoing  Goal: Able to interrupt nonreality-based thinking  Description: Able to interrupt nonreality-based thinking  8/1/2021 1616 by Sammi Romeo  Outcome: Ongoing  8/1/2021 0342 by Aidan Hensley RN  Outcome: Ongoing     Problem: Gas Exchange - Impaired:  Goal: Levels of oxygenation will improve  Description: Levels of oxygenation will improve  8/1/2021 1616 by Sammi Romeo  Outcome: Ongoing  8/1/2021 0342 by Aidan Hensley RN  Outcome: Ongoing     Problem: Infection, Septic Shock:  Goal: Will show no infection signs and symptoms  Description: Will show no infection signs and symptoms  8/1/2021 1616 by Sammi Romeo  Outcome: Ongoing  8/1/2021 0342 by Aidan Hensley RN  Outcome: Ongoing     Problem: Infection - Ventilator-Associated Pneumonia:  Goal: Absence of pulmonary infection  Description: Absence of pulmonary infection  8/1/2021 1616 by Hien Lizarraga  Outcome: Ongoing  8/1/2021 0342 by Daniel Ramos RN  Outcome: Ongoing     Problem: Tissue Perfusion, Altered:  Goal: Circulatory function within specified parameters  Description: Circulatory function within specified parameters  8/1/2021 1616 by Hien Lizarraga  Outcome: Ongoing  8/1/2021 0342 by Daniel Ramos RN  Outcome: Ongoing     Problem: Venous Thromboembolism:  Goal: Will show no signs or symptoms of venous thromboembolism  Description: Will show no signs or symptoms of venous thromboembolism  8/1/2021 1616 by Hien Lizarraga  Outcome: Ongoing  8/1/2021 0342 by Daniel Ramos RN  Outcome: Ongoing  Goal: Absence of signs or symptoms of impaired coagulation  Description: Absence of signs or symptoms of impaired coagulation  8/1/2021 1616 by Hien Lizarraga  Outcome: Ongoing  8/1/2021 0342 by Daniel Ramos RN  Outcome: Ongoing     Problem: Falls - Risk of:  Goal: Will remain free from falls  Description: Will remain free from falls  8/1/2021 1616 by Hien Lizarraga  Outcome: Ongoing  8/1/2021 0342 by Daniel Ramos RN  Outcome: Ongoing  Goal: Absence of physical injury  Description: Absence of physical injury  8/1/2021 1616 by Hien Lizarraga  Outcome: Ongoing  8/1/2021 0342 by Daniel Ramos RN  Outcome: Ongoing     Problem: Pain:  Goal: Pain level will decrease  Description: Pain level will decrease  8/1/2021 1616 by Hien Lizarraga  Outcome: Ongoing  8/1/2021 0342 by Daniel Ramos RN  Outcome: Ongoing  Goal: Control of acute pain  Description: Control of acute pain  8/1/2021 1616 by Hien Lizarraga  Outcome: Ongoing  8/1/2021 0342 by Daniel Ramos RN  Outcome: Ongoing  Goal: Control of chronic pain  Description: Control of chronic pain  8/1/2021 1616 by Hien Lizarraga  Outcome: Ongoing  8/1/2021 0342 by Daniel Ramos RN  Outcome: Ongoing     Problem: Skin Integrity:  Goal: Will show no infection signs and symptoms  Description: Will show no infection signs and symptoms  8/1/2021 1616 by Mimi Castellanos  Outcome: Ongoing  8/1/2021 0342 by Jennifer Jones RN  Outcome: Ongoing  Goal: Absence of new skin breakdown  Description: Absence of new skin breakdown  8/1/2021 1616 by Mimi Castellanos  Outcome: Ongoing  8/1/2021 0342 by Jennifer Jones RN  Outcome: Ongoing     Problem: Nutrition  Goal: Optimal nutrition therapy  8/1/2021 1616 by Mimi Castellanos  Outcome: Ongoing  8/1/2021 0342 by Jennifer Jones RN  Outcome: Ongoing     Problem: Confusion - Acute:  Goal: Absence of continued neurological deterioration signs and symptoms  Description: Absence of continued neurological deterioration signs and symptoms  8/1/2021 1616 by Mimi Castellanso  Outcome: Ongoing  8/1/2021 0342 by Jennifer Jones RN  Outcome: Ongoing  Goal: Mental status will be restored to baseline  Description: Mental status will be restored to baseline  8/1/2021 1616 by Mimi Castellanos  Outcome: Ongoing  8/1/2021 0342 by Jennifer Jones RN  Outcome: Ongoing     Problem: Injury - Risk of, Physical Injury:  Goal: Will remain free from falls  Description: Will remain free from falls  8/1/2021 1616 by Mimi Castellanos  Outcome: Ongoing  8/1/2021 0342 by Jennifer Jones RN  Outcome: Ongoing  Goal: Absence of physical injury  Description: Absence of physical injury  8/1/2021 1616 by Mimi Castellanos  Outcome: Ongoing  8/1/2021 0342 by Jennifer Jones RN  Outcome: Ongoing     Problem: Mood - Altered:  Goal: Mood stable  Description: Mood stable  8/1/2021 1616 by Mimi Castellanos  Outcome: Ongoing  8/1/2021 0342 by Jennifer Jones RN  Outcome: Ongoing  Goal: Absence of abusive behavior  Description: Absence of abusive behavior  8/1/2021 1616 by Mimi Castellanos  Outcome: Ongoing  8/1/2021 0342 by Jennifer Jones RN  Outcome: Ongoing  Goal: Verbalizations of feeling emotionally comfortable while being cared for will increase  Description: Verbalizations of feeling emotionally comfortable while being cared for will increase  8/1/2021 1616 by Iglesia Champion  Outcome: Ongoing  8/1/2021 0342 by Harry Ledesma RN  Outcome: Ongoing     Problem: Psychomotor Activity - Altered:  Goal: Absence of psychomotor disturbance signs and symptoms  Description: Absence of psychomotor disturbance signs and symptoms  8/1/2021 1616 by Iglesia Champion  Outcome: Ongoing  8/1/2021 0342 by Harry Ledesma RN  Outcome: Ongoing     Problem: Sleep Pattern Disturbance:  Goal: Appears well-rested  Description: Appears well-rested  8/1/2021 1616 by Iglesia Champion  Outcome: Ongoing  8/1/2021 0342 by Harry Ledesma RN  Outcome: Ongoing

## 2021-08-01 NOTE — PROGRESS NOTES
Attending Progress Note      PCP: Anastasiia Coker PA-C      Patient: Chago Cespedes   Gender: female  : 1955   Age: 60 y.o. MRN: [id number]  Room  : [address] [place] [address]      Date of Admission: 7/15/2021    Chief Complaint   Patient presents with    Hyperglycemia           Subjective:  Comfortable . . good appetite         Medications:  Reviewed  Infusion Medications    dextrose      sodium chloride       Scheduled Medications    metoprolol tartrate  25 mg Oral BID    DULoxetine  30 mg Oral Daily    insulin glargine  10 Units Subcutaneous Nightly    sodium chloride flush  5-40 mL Intravenous 2 times per day    insulin lispro  0-18 Units Subcutaneous 2 times per day    aspirin  81 mg Oral Daily    folic acid-pyridoxine-cyancobalamin  1 tablet Oral Daily    insulin lispro  0-18 Units Subcutaneous TID WC    insulin lispro  10 Units Subcutaneous TID WC    apixaban  5 mg Oral BID    levothyroxine  50 mcg Oral Daily    pantoprazole  40 mg Oral Daily    alogliptin  6.25 mg Oral Daily    sodium chloride flush  5-40 mL Intravenous 2 times per day    sodium chloride flush  5-40 mL Intravenous 2 times per day     PRN Meds: dextrose, diphenhydrAMINE, hydrOXYzine, LORazepam, potassium chloride, albuterol sulfate HFA, nitroGLYCERIN, traZODone, glucose, dextrose, glucagon (rDNA), dextrose, sodium chloride flush, ondansetron **OR** ondansetron, polyethylene glycol, acetaminophen **OR** acetaminophen, sodium chloride flush, sodium chloride      Intake/Output Summary (Last 24 hours) at 2021 2232  Last data filed at 2021 1838  Gross per 24 hour   Intake 10 ml   Output 1325 ml   Net -1315 ml       Exam:  BP (!) 164/82   Pulse 94   Temp 97.1 °F (36.2 °C) (Oral)   Resp 16   Ht 5' (1.524 m)   Wt 158 lb 14.4 oz (72.1 kg)   SpO2 96%   BMI 31.03 kg/m²   General appearance: No distress,   Respiratory:  good air entry , no Rales , No wheezing, or rhonchi,  Cardiovascular: RRR, with normal S1/S2 .  Abdomen : Soft, non-tender, non-distended  , normal bowel sounds. Legs : No edema bilaterally. No DVT signs ,    Neurologic:  Alert and oriented ,        Labs:   No results for input(s): WBC, HGB, HCT, PLT in the last 72 hours. Recent Labs     07/29/21  0516 07/30/21 0312 07/31/21  0359    137 138   K 5.6* 4.6 4.4    99 98*   CO2 28 30 33*   BUN 25* 28* 30*   CREATININE 1.6* 1.8* 1.9*   CALCIUM 9.2 8.9 9.2   PHOS  --  5.4*  --      Recent Labs     07/29/21  0516 07/30/21 0312 07/31/21  0359   AST 19 16 17   ALT 24 20 19   BILITOT 0.6 0.6 0.5   ALKPHOS 106 101 104     No results for input(s): INR in the last 72 hours. No results for input(s): Lucy Braga in the last 72 hours. Assessment/Plan:  Active Hospital Problems    Diagnosis Date Noted    Delirium [R41.0] 07/23/2021     Priority: High     Class: Acute    Bradycardia [R00.1]     HFrEF (heart failure with reduced ejection fraction) ([facility]) [I50.20]     Difficult intravenous access [Z78.9]     Diabetic hyperosmolar coma ([facility] [address] [E11.01] 07/15/2021       Plan:  Tele : no event - no fever - on room air   Consultants input noted   Cont Eliquis and ASA   Med reviewed   D/e daughter at bed side   Plan for SNF/rehab . . hopefully Monday   DVT Prophylaxis   Diet: ADULT DIET; Easy to Chew; 4 carb choices (60 gm/meal); No Added Salt (3-4 gm); Low Potassium (Less than 3000 mg/day)  Adult Oral Nutrition Supplement; Snack; Peaches and cottage cheese; 10AM, 2PM  Code Status: DNR-CCA        Treatment progress and plan was d/w pt/family .     Silvana Roberts MD

## 2021-08-01 NOTE — DISCHARGE INSTR - COC
Continuity of Care Form     Patient Name: Jan Key   :  1955                   MRN:  [id number]     Admit date:  7/15/2021                      Discharge date:  2021     Code Status Order: DNR-CCA   Advance Directives:       Admitting Physician:  Yara Gallardo MD  PCP: Esa Schultz PA-C     Discharging Nurse: Speedy Streeter 23 Unit/Room#: 3020/3020-A  Discharging Unit Phone Number: [phone]     Emergency Contact:   Extended Emergency Contact Information  Primary Emergency Contact: [address] [facility] Phone: [phone]  Mobile Phone: [phone]  Relation: Child  Preferred language: English   needed?  No  Secondary Emergency Contact: Bari heath Phone: [phone]  Relation: Child     Past Surgical History:        Past Surgical History:   Procedure Laterality Date    ABLATION OF DYSRHYTHMIC FOCUS         SECTION        CHOLECYSTECTOMY        DIAGNOSTIC CARDIAC CATH LAB PROCEDURE   2006, 2009 moderate stenosis small diagonal    IR NONTUNNELED VASCULAR CATHETER   2021     IR NONTUNNELED VASCULAR CATHETER 2021 [facility] SPECIAL PROCEDURES    OTHER SURGICAL HISTORY   2017     afib ablation [facility]    PTCA   10/06/2010     10/06/2010 stent 3.0x24 taxus stent 90% stenosis mid segment reduced to 0% second area of stenosis 30-40% not flow limiting EF 40%    TUBAL LIGATION             Immunization History:        Immunization History   Administered Date(s) Administered    Influenza Vaccine, unspecified formulation 2016    Influenza Virus Vaccine 12/10/2014    Pneumococcal Polysaccharide ([id number]) 10/20/2016         Active Problems:       Patient Active Problem List   Diagnosis Code    History of CVA (cerebrovascular accident) without residual deficits Z86.73    Morbid obesity ([address] E66.01    DM (diabetes mellitus), type 2, uncontrolled ([address] E11.65    Persistent atrial fibrillation ([facility]) I48.19    CAD (coronary artery disease) I25.10    CHF (congestive heart failure) ([facility]) I50.9    Light headed R44    History of MI (myocardial infarction) I25.2    Diabetes mellitus ([facility] E11.9    Hypertension I10    TIA (transient ischemic attack) G45.9    Hyperlipidemia E78.5    SOB (shortness of breath) R06.02    H/O cardiovascular stress test Z92.89    Family history of coronary artery disease Z82.49    PAF (paroxysmal atrial fibrillation) ([facility]) I48.0    Chest pain R07.9    Atrial thrombus without antecedent myocardial infarction I51.3    S/P ablation of atrial fibrillation Z98.890, Z86.79    Gastroenteritis K52.9    YESIKA (acute kidney injury) ([facility] N17.9    Acute cystitis N30.00    Elevated liver enzymes R74.8    CHF (congestive heart failure), NYHA class I, acute on chronic, combined ([facility]) I50.43    Pneumonia J18.9    Multifocal pneumonia J18.9    Atrial fibrillation with RVR ([facility]) I48.91    Shoulder pain M25.519    Paroxysmal atrial fibrillation ([facility]) I48.0    Diabetic hyperosmolar coma ([facility]) E11.01    Difficult intravenous access Z78.9    HFrEF (heart failure with reduced ejection fraction) ([facility]) I50.20    Delirium R41.0    Bradycardia R00. 1         Isolation/Infection:   Isolation               No Isolation             Patient Infection Status         Infection Onset Added Last Indicated Last Indicated By Review Planned Expiration Resolved Resolved By     None active     Resolved     COVID-19 Rule Out 12/11/20 12/11/20 12/11/20 COVID-19 (Ordered)     12/11/20 Rule-Out Test Resulted     COVID-19 Rule Out 11/27/20 11/27/20 11/27/20 COVID-19 (Ordered)     11/27/20 Rule-Out Test Resulted     ESBL (Extended Spectrum Beta Lactamase) 05/23/19 05/26/19 05/23/19 Urine Culture     06/12/20 Vick Vega RN                Nurse Assessment:  Last Vital Signs: BP (!) 153/81   Pulse 94   Temp 98.2 °F (36.8 °C) (Oral)   Resp 15   Ht 5' (1.524 m)   Wt 158 lb 14.4 oz (72.1 kg)   SpO2 94%   BMI 31.03 kg/m²     Last documented pain score (0-10 scale): Pain Level: 0  Last Weight:       Wt Readings from Last 1 Encounters:   07/31/21 158 lb 14.4 oz (72.1 kg)      Mental Status:  oriented, alert and coherent     IV Access:  - None     Nursing Mobility/ADLs:  Walking            Independent  Transfer            Independent  Bathing             Assisted  Dressing           Independent  [facility]       Assisted  Med Delivery   whole     Wound Care Documentation and Therapy:     Elimination:  Continence:   · Bowel: Yes  · Bladder: Yes  Urinary Catheter: None   Colostomy/Ileostomy/Ileal Conduit: No     Date of Last BM: 08/11/2021     Intake/Output Summary (Last 24 hours) at 8/1/2021 1503  Last data filed at 8/1/2021 8341      Gross per 24 hour   Intake --   Output 1455 ml   Net -1455 ml      I/O last 3 completed shifts:  In: -   Out: 7633 [Urine:1455]     Safety Concerns: At Risk for Falls     Impairments/Disabilities:      None     Patient's personal belongings (please select all that are sent with patient):  Shannan     RN SIGNATURE:  Electronically signed by Elise Caldera RN on 8/12/21 at 11:56 AM EDT                       PHYSICIAN SECTION     Nutrition Therapy:  Current Nutrition Therapy:   - Oral Diet:  Carb Control 4 carbs/meal (1800kcals/day) and Low Sodium (3-4gm), SOFT, BITE SIZED     Routes of Feeding: Oral  Liquids: No Restrictions  Daily Fluid Restriction: yes - amount 2000ml  Last Modified Barium Swallow with Video (Video Swallowing Test): N/A     Treatments at the Time of Hospital Discharge:   Respiratory Treatments: albuterol PRN  Oxygen Therapy:  is not on home oxygen therapy. Ventilator:    - No ventilator support     Rehab Therapies: Physical Therapy and Occupational Therapy, speech therapy (swallow eval).   Weight Bearing Status/Restrictions: No weight bearing restirctions  Other Medical Equipment (for information only, NOT a DME order):  n/a  Other Treatments: n/a        Prognosis: Fair     Condition at Discharge: Stable     Rehab Potential (if transferring to Rehab):  Fair     Recommended Labs or Other Treatments After Discharge:  -cbc, cmp, mg once weekly x 3 weeks.     Physician Certification: I certify the above information and transfer of Lety Garcia  is necessary for the continuing treatment of the diagnosis listed and that she requires [facility] for greater 30 days.      Update Admission H&P: No change in H&P     PHYSICIAN SIGNATURE:  Electronically signed by Annia Huggins PA-C on 8/10/21 at 8:11 AM EDT

## 2021-08-01 NOTE — PROGRESS NOTES
History of PTCA, Hx of cardiovascular stress test, Hx of transesophageal echocardiography ([facility]) for monitoring, Hyperlipidemia, Hypertension, Insomnia, SOB (shortness of breath), Tachycardia, and TIA (transient ischemic attack). Past surgical history:   has a past surgical history that includes  section; Tubal ligation; Cholecystectomy; Diagnostic Cardiac Cath Lab Procedure (2006, 2009); Percutaneous Transluminal Coronary Angio (10/06/2010); other surgical history (2017); ablation of dysrhythmic focus; and IR NONTUNNELED VASCULAR CATHETER > 5 YEARS (2021). Social History:   reports that she quit smoking about 14 years ago. Her smoking use included cigarettes. She has a 60.00 pack-year smoking history. She has never used smokeless tobacco. She reports that she does not drink alcohol and does not use drugs. Family history:  family history includes Diabetes in her father; Heart Disease in her father. Allergies   Allergen Reactions    Amiodarone Other (See Comments)     dizziness    Iv Dye [Iodides] Nausea And Vomiting    Vicodin [Hydrocodone-Acetaminophen] Nausea And Vomiting       Review of Systems:   All 14 systems were reviewed and are negative  Except for the positive findings which are documented     BP (!) 153/81   Pulse 94   Temp 98.2 °F (36.8 °C) (Oral)   Resp 15   Ht 5' (1.524 m)   Wt 158 lb 14.4 oz (72.1 kg)   SpO2 94%   BMI 31.03 kg/m²       Intake/Output Summary (Last 24 hours) at 2021 1116  Last data filed at 2021 0483  Gross per 24 hour   Intake --   Output 1455 ml   Net -1455 ml       Physical Exam:  Physical Exam  HENT:      Head: Atraumatic. Eyes:      Pupils: Pupils are equal, round, and reactive to light. Cardiovascular:      Rate and Rhythm: Regular rhythm. Heart sounds: Normal heart sounds. Pulmonary:      Effort: Pulmonary effort is normal.      Breath sounds: Normal breath sounds. Abdominal:      Palpations: Abdomen is soft. Musculoskeletal:      Right lower leg: No edema. Left lower leg: No edema. Skin:     General: Skin is warm and dry. Medications:    metoprolol tartrate  25 mg Oral BID    DULoxetine  30 mg Oral Daily    insulin glargine  10 Units Subcutaneous Nightly    sodium chloride flush  5-40 mL Intravenous 2 times per day    insulin lispro  0-18 Units Subcutaneous 2 times per day    aspirin  81 mg Oral Daily    folic acid-pyridoxine-cyancobalamin  1 tablet Oral Daily    insulin lispro  0-18 Units Subcutaneous TID WC    insulin lispro  10 Units Subcutaneous TID WC    apixaban  5 mg Oral BID    levothyroxine  50 mcg Oral Daily    pantoprazole  40 mg Oral Daily    alogliptin  6.25 mg Oral Daily    sodium chloride flush  5-40 mL Intravenous 2 times per day    sodium chloride flush  5-40 mL Intravenous 2 times per day      dextrose      sodium chloride       dextrose, diphenhydrAMINE, hydrOXYzine, LORazepam, potassium chloride, albuterol sulfate HFA, nitroGLYCERIN, traZODone, glucose, dextrose, glucagon (rDNA), dextrose, sodium chloride flush, ondansetron **OR** ondansetron, polyethylene glycol, acetaminophen **OR** acetaminophen, sodium chloride flush, sodium chloride    Lab Data:  CBC: No results for input(s): WBC, HGB, HCT, MCV, PLT in the last 72 hours. BMP:   Recent Labs     07/30/21  0312 07/31/21  0359 08/01/21  0344    138 137   K 4.6 4.4 4.7   CL 99 98* 97*   CO2 30 33* 30   PHOS 5.4*  --  3.5   BUN 28* 30* 29*   CREATININE 1.8* 1.9* 1.6*     PT/INR: No results for input(s): PROTIME, INR in the last 72 hours. BNP:  No results for input(s): PROBNP in the last 72 hours. TROPONIN: No results for input(s): TROPONINT in the last 72 hours.            Impression:  Principal Problem:    Diabetic hyperosmolar coma ([facility]  Active Problems:    Delirium    Difficult intravenous access    HFrEF (heart failure with reduced ejection fraction) ([facility])    Bradycardia  Resolved Problems:    * No resolved hospital problems. *       All labs, medications and tests reviewed by myself, continue all other medications of all above medical condition listed as is except for changes mentioned above. Thank you   Please call with questions. Electronically signed by RENE Barajas CNP on 8/1/2021 at 11:16 AM    I have seen ,spoken to  and examined this patient personally, independently of the nurse practitioner. I have reviewed the hospital care given to date and reviewed all pertinent labs and imaging. The plan was developed mutually at the time of the visit with the patient,  NP  and myself. I have spoken with patient, nursing staff and provided written and verbal instructions . The above note has been reviewed and I agree with the assessment, diagnosis, and treatment plan with changes made by me as follows     CARDIOLOGY ATTENDING ADDENDUM    HPI:  I have reviewed the above HPI  And agree with above   Rekha Escudero is a 60 y. o.year old who and presents with had concerns including Hyperglycemia. Chief Complaint   Patient presents with    Hyperglycemia     Interval history:  No cp, sob    Physical Exam:  General: confused  Head:normal  Eye:normal  Neck:  No JVD   Chest:  Clear to auscultation, respiration easy  Cardiovascular:  RRR S1S2  Abdomen:   nontender  Extremities:  tr edema  Pulses; palpable  Neuro: grossly normal      MEDICAL DECISION MAKING;    I agree with the above plan, which was planned by myself and discussed with NP.   HR better, CPM        Chano Crouch MD [facility]

## 2021-08-02 NOTE — CARE COORDINATION
LSW spoke with Cecile with [facility] who stated that they are still looking over pt info and need to talk with administration due ot pt being in restraints during this admission, calling out and being agitated. LSW informed Cecile of note that states Jesus Armstrong form [facility] stated pt has been accepted. Cecile stated she is not sure why JAME Grace was informed of that however she still need to talk with administration before precert can be stared. LSW waiting for [facility] decision if they can take pt.

## 2021-08-02 NOTE — PROGRESS NOTES
Comprehensive Nutrition Assessment    Type and Reason for Visit:  Reassess    Nutrition Recommendations/Plan:   · Continue current diet (will cancel K+ restriction)    Nutrition Assessment:  Adequate po intake, feeding self and consuming greater than half to all of meals. Pt states her appetite is good and she is hungry. Does not want oral supplements. No questions re diet for home at this time. Will continue to offer between meal snacks and follow as moderate nutrition risk. Malnutrition Assessment:  Malnutrition Status: Moderate malnutrition    Context:  Chronic Illness     Findings of the 6 clinical characteristics of malnutrition:  Energy Intake:  75% or less estimated energy requirements for 1 month or longer  Weight Loss:  20% over 1 year (18.2%)     Body Fat Loss:  Unable to assess     Muscle Mass Loss:  Unable to assess    Fluid Accumulation:  Severe (+2, non-pittin edema ntoed) Extremities, Generalized   Strength:  Not Performed    Estimated Daily Nutrient Needs:  Energy (kcal):  5288-7113 (1.1-1.3 stress factor); Weight Used for Energy Requirements:  Current     Protein (g):  54-68 (1.2-1.5 g/kg IBW); Weight Used for Protein Requirements:  Ideal        Fluid (ml/day):  ~1500; Method Used for Fluid Requirements:  1 ml/kcal      Nutrition Related Findings:  Glu 294, K+ 4.7      Wounds:  None       Current Nutrition Therapies:    ADULT DIET; Easy to Chew; 4 carb choices (60 gm/meal); No Added Salt (3-4 gm); Low Potassium (Less than 3000 mg/day)  Adult Oral Nutrition Supplement; Snack;  Peaches and cottage cheese; 10AM, 2PM    Anthropometric Measures:  · Height: 5' (152.4 cm)  · Current Body Weight: 158 lb 14.4 oz (72.1 kg)   · Admission Body Weight: 156 lb 8.4 oz (71 kg)    · Usual Body Weight: 192 lb 6.4 oz (87.3 kg) (7/27/20, UBW of 180# per daughter's report)     · Ideal Body Weight: 100 lbs; % Ideal Body Weight 153.9 %   · BMI: 31  · Adjusted Body Weight:  ; No Adjustment   · Adjusted BMI:

## 2021-08-02 NOTE — CARE COORDINATION
SERGE received a call from Cecile with [facility]. Cecile wanting to know when the sitter stopped and if pt has a . FELICIASW looked in the orders and it stated sitter with discontinued on 8/1/2021 @ 0727. Pt does have a  however pt RN stated pt is doing very well and does not need the FILEMON. SERGE left a message on Cecile's  with this info.

## 2021-08-02 NOTE — PROGRESS NOTES
INTERNAL MEDICINE PROGRESS NOTE        Heriberto Pérez [facility]   1955   Primary Care Physician:  Jennifer Carlson PA-C  Admit Date: 7/15/2021     Subjective:   Patient awake and alert. She is feeling fine this am. She does not have any nausea, vomiting or diarrhea  She does not have chest pain. Objective:   BP (!) 151/86   Pulse 89   Temp 98.4 °F (36.9 °C) (Oral)   Resp 20   Ht 5' (1.524 m)   Wt 158 lb 14.4 oz (72.1 kg)   SpO2 94%   BMI 31.03 kg/m²    General appearance: alert, appears stated age and cooperative  Head: Normocephalic, without obvious abnormality, atraumatic  Neck: no adenopathy and supple, symmetrical, trachea midline  Lungs: clear to auscultation bilaterally  Heart: regular rate and rhythm and S1, S2 normal  Abdomen: soft, non-tender; bowel sounds normal; no masses,  no organomegaly  Extremities: no clubbing, cyanosis or edema  Neurologic: moves all 4 extremities. Oriented x 3, cranial nerve intact.  Motor system: generalized weakness      Data Review  Lab Results   Component Value Date     08/01/2021    K 4.7 08/01/2021    CL 97 (L) 08/01/2021    CO2 30 08/01/2021    CREATININE 1.6 (H) 08/01/2021    BUN 29 (H) 08/01/2021    CALCIUM 9.3 08/01/2021     Lab Results   Component Value Date    WBC 10.5 07/27/2021    HGB 12.2 (L) 07/27/2021    HCT 38.7 07/27/2021    MCV 95.8 07/27/2021     07/27/2021     INR/Prothrombin Time      Meds:    amLODIPine  2.5 mg Oral Daily    busPIRone  10 mg Oral TID    metoprolol tartrate  25 mg Oral BID    DULoxetine  30 mg Oral Daily    insulin glargine  10 Units Subcutaneous Nightly    sodium chloride flush  5-40 mL Intravenous 2 times per day    insulin lispro  0-18 Units Subcutaneous 2 times per day    aspirin  81 mg Oral Daily    folic acid-pyridoxine-cyancobalamin  1 tablet Oral Daily    insulin lispro  0-18 Units Subcutaneous TID WC    insulin lispro  10 Units Subcutaneous TID WC    apixaban  5 mg Oral BID    levothyroxine  50 mcg Oral Daily    pantoprazole  40 mg Oral Daily    alogliptin  6.25 mg Oral Daily    sodium chloride flush  5-40 mL Intravenous 2 times per day    sodium chloride flush  5-40 mL Intravenous 2 times per day     PRN Meds: dextrose, diphenhydrAMINE, hydrOXYzine, LORazepam, potassium chloride, albuterol sulfate HFA, nitroGLYCERIN, traZODone, glucose, dextrose, glucagon (rDNA), dextrose, sodium chloride flush, ondansetron **OR** ondansetron, polyethylene glycol, acetaminophen **OR** acetaminophen, sodium chloride flush, sodium chloride    Assessment/Plan:   Patient Active Hospital Problem List:  Patient Active Problem List   Diagnosis    History of CVA (cerebrovascular accident) without residual deficits    [facility]bid obesity ([facility]    DM (diabetes mellitus), type 2, uncontrolled ([facility]    Persistent atrial fibrillation ([facility]    CAD (coronary artery disease)    CHF (congestive heart failure) ([facility]    Light headed    History of MI (myocardial infarction)    Diabetes mellitus ([facility]    Hypertension    TIA (transient ischemic attack)    Hyperlipidemia    SOB (shortness of breath)    H/O cardiovascular stress test    Family history of coronary artery disease    PAF (paroxysmal atrial fibrillation) (HCC)    Chest pain    Atrial thrombus without antecedent myocardial infarction    S/P ablation of atrial fibrillation    Gastroenteritis    [facility]) [facility]    Acute cystitis    Elevated liver enzymes    CHF (congestive heart failure), NYHA class I, acute on chronic, combined ([facility]    Pneumonia    Multifocal pneumonia    Atrial fibrillation with RVR ([facility])    Shoulder pain    Paroxysmal atrial fibrillation ([facility]    Diabetic hyperosmolar coma ([facility]    Difficult intravenous access    HFrEF (heart failure with reduced ejection fraction) (HCC)    Delirium    Bradycardia     Assessment:  Diabetic hyperosmolar state: resolved.    Metabolic encephalopathy: improved  Leukocytosis and possible sepsis: resolved. procal is wnl  Depression:   A. Fib: rate is in control now  Hypertension: monitor  Elevated LFTs: Slowly improving. CT abdomen nonacute. Monitor. Possible pneumonia: on antibiotics  Pulmonary edema: continue monitoring. Grief:   HFrEF: EF 15-20% on past ECHO. Cardiology following. Plan:  Continue supportive care  Possible discharge to ECF soon.   Monitor condition closely    Electronically signed by Adrienne Marcum MD on 8/2/2021 at 8:03 AM

## 2021-08-02 NOTE — PLAN OF CARE
Nutrition Problem #1: Moderate malnutrition, In context of chronic illness  Intervention: Food and/or Nutrient Delivery: Continue Current Diet, Snacks (Comment)  Nutritional Goals: Pt will consume at least 50% of meals on diet order during stay

## 2021-08-02 NOTE — PROGRESS NOTES
[facility], [address]   Progress Note  [address] 0 1 2      Date: 2021   Patient: Reginaldo Correa   : 1955   DOA: 7/15/2021   MRN: [id number]   ROOM#: 3020/3020-A     Admit Date: 7/15/2021     Collaborating Urologist on Call at time of admission: Dr. Carlita Cee  CC: Hyperglycemia/DKA ,delirium  Reason for Consult:  Acute gross hematuria last 24 hours (mostly resolved)  (indwelling catheter and Eliquis)    Subjective:     Pain: none, no nausea and no vomiting,   Bowel Movement/Flatus:   Yes  Voiding:  easily, incontinent    Pt resting in bed, denies any difficulty voiding since alas removal yesterday.     Objective:    Vitals:    BP (!) 145/74   Pulse 86   Temp 97.8 °F (36.6 °C) (Oral)   Resp 14   Ht 5' (1.524 m)   Wt 158 lb 14.4 oz (72.1 kg)   SpO2 97%   BMI 31.03 kg/m²    Temp  Av.8 °F (36.6 °C)  Min: 97 °F (36.1 °C)  Max: 98.4 °F (36.9 °C)       Intake/Output Summary (Last 24 hours) at 2021 0951  Last data filed at 2021 0900  Gross per 24 hour   Intake 720 ml   Output --   Net 720 ml       Physical Exam:   General appearance: alert, appears stated age, cooperative, no distress and mildly obese  Head: Normocephalic, without obvious abnormality, atraumatic  Back: No CVA tenderness  Abdomen: Soft, non-tender, non-distended    Labs:   WBC:    Lab Results   Component Value Date    WBC 10.5 2021      Hemoglobin/Hematocrit:    Lab Results   Component Value Date    HGB 12.2 2021    HCT 38.7 2021      BMP:   Lab Results   Component Value Date     2021    K 4.7 2021    CL 97 2021    CO2 30 2021    BUN 29 2021    LABALBU 3.4 2021    CREATININE 1.6 2021    CALCIUM 9.3 2021    GFRAA 39 2021    LABGLOM 32 2021      PT/INR:    Lab Results   Component Value Date    PROTIME 15.1 07/15/2021    PROTIME 10.4 2012    INR 1.17 07/15/2021      PTT:    Lab Results   Component Value Date    APTT 23.6 06/11/2021     Urine Culture 7/16/21: No growth at 18 to 36 hours     Imaging:  Echocardiogram complete 2D with doppler with color    Result Date: 7/16/2021  Transthoracic Echocardiography Report (TTE)  Demographics   Patient Name       Tereza Garay Date of Study       07/16/2021   Date of Birth      1955         Gender              Female   Age                60 year(s)         Race                   Patient Number     [id number]         Room Number         2128   Visit Number       [id number]   Corporate ID       [id number]   Accession Number   [id number]         [address] [place] [address]   Ordering Physician Trudy Figueroa MD                 Physician           MD  Procedure Type of Study   TTE procedure:ECHOCARDIOGRAM COMPLETE 2D W DOPPLER W COLOR. Procedure Date Date: 07/16/2021 Start: 01:35 PM Study Location: Portable Technical Quality: Fair visualization Indications:Congestive heart failure. Patient Status: Routine Height: 60 inches Weight: 180 pounds BSA: 1.78 m2 BMI: 35.15 kg/m2 HR: 99 bpm BP: 145/83 mmHg  Conclusions   Summary  Left ventricular systolic function is abnormal.  Ejection fraction is visually estimated at 15-20%. Global hypokinesis noted. Grade III diastolic dysfunction. Mitral annular calcification is present. Moderate to severe mitral regurgitation. Mild to moderate tricuspid regurgitation; RVSP: 38 mmHg. No evidence of any pericardial effusion. Signature   ------------------------------------------------------------------  Electronically signed by Lise Nolan MD (Interpreting  physician) on 07/16/2021 at 04:31 PM  ------------------------------------------------------------------   Findings   Left Ventricle  Left ventricular systolic function is abnormal.  Ejection fraction is visually estimated at 15-20%.   Global hypokinesis noted. Grade III diastolic dysfunction. Left ventricle size is normal.   Left Atrium  Essentially normal left atrium. Right Atrium  Essentially normal right atrium. Right Ventricle  Essentially normal right ventricle. Aortic Valve  Trace aortic regurgitation noted. Mitral Valve  Mitral annular calcification is present. Moderate to severe mitral regurgitation. Tricuspid Valve  Mild to moderate tricuspid regurgitation; RVSP: 38 mmHg. Pulmonic Valve  The pulmonic valve was not well visualized. Pericardial Effusion  No evidence of any pericardial effusion. Pleural Effusion  No evidence of pleural effusion. Miscellaneous  The aorta is within normal limits.   M-Mode/2D Measurements & Calculations   LV Diastolic Dimension:   LV Systolic Dimension:   LA Dimension: 3.8 cmAO  4.86 cm                   4.6 cm                   Root Dimension: 2.8 cm  LV FS:5.4 %               LV Volume Diastolic: 662  LV PW Diastolic: 5.40 cm  ml  LV PW Systolic: 5.30 cm   LV Volume Systolic: 764  Septum Diastolic: 3.41 cm ml                       RV Diastolic Dimension:  Septum Systolic: 3.27 cm  LV EDV/LV EDV Index: 116 3.13 cm  CO: 2.42 l/min            ml/65 m2LV ESV/LV ESV  CI: 1.36 l/m*m2           Index: 108 ml/61 m2      LA/Aorta: 1.36                            EF Calculated (A4C): 6.9  LV Area Diastolic: 76.1   %  cm2                       EF Calculated (2D): 09.2  LV Area Systolic: 32 cm2  %                             LV Length: 7.68 cm                             LVOT: 1.9 cm  Doppler Measurements & Calculations   MV Peak E-Wave: 136  AV Peak Velocity: 125 cm/s    LVOT Peak Velocity: 64.6  cm/s                 AV Peak Gradient: 6.25 mmHg   cm/s  MV Peak A-Wave: 68.5 AV Mean Velocity: 86.6 cm/s   LVOT Mean Velocity: 39.8  cm/s                 AV Mean Gradient: 3 mmHg      cm/s  MV E/A Ratio: 1.99   AV VTI: 21 cm                 LVOT Peak Gradient: 2  MV Peak Gradient:    AV Area (Continuity):1.16 cm2 mmHgLVOT Mean Gradient: 1  7.4 mmHg                                           mmHg                       LVOT VTI: 8.63 cm             Estimated RVSP: 38 mmHg  MV P1/2t: 41 msec                                  Estimated RAP:3 mmHg  MVA by PHT:5.37 cm2  Estimated PASP: 35.49 mmHg   MV E' Septal                                       TR Velocity:285 cm/s  Velocity: 4.89 cm/s                                TR Gradient:32.49 mmHg  MV E' Lateral  Velocity: 10.4 cm/s  MV E/E' septal:  27.81  MV E/E' lateral:  13.08      CT ABDOMEN PELVIS WO CONTRAST Additional Contrast? None    Result Date: 7/15/2021  EXAMINATION: CT OF THE ABDOMEN AND PELVIS WITHOUT CONTRAST 7/15/2021 7:28 pm TECHNIQUE: CT of the abdomen and pelvis was performed without the administration of intravenous contrast. Multiplanar reformatted images are provided for review. Dose modulation, iterative reconstruction, and/or weight based adjustment of the mA/kV was utilized to reduce the radiation dose to as low as reasonably achievable. COMPARISON: MRI abdomen May 24, 2019; CT abdomen May 23, 2019 HISTORY: ORDERING SYSTEM PROVIDED HISTORY: Abdominal pain TECHNOLOGIST PROVIDED HISTORY: Reason for exam:->Abdominal pain Additional Contrast?->None Reason for Exam: Abdominal pain FINDINGS: Lower Chest: Imaged lung bases demonstrate partially imaged small and associated atelectasis. Imaged lung fields demonstrate findings suggesting mild pulmonary edema, however evaluation is limited due to respiratory motion artifact. Coronary artery atherosclerotic disease present. Calcification of the mitral valve. Partially imaged heart is enlarged. Organs: Evaluation of the solid organs is limited due to lack of intravenous contrast.  The nonenhanced liver demonstrates no acute abnormality. The gallbladder is surgically absent. Numerous calcified granulomata throughout the spleen. Atrophy of the pancreas. The nonenhanced adrenal glands demonstrate no acute findings. Redemonstration of right adrenal adenoma is noted on previous exams. The nonenhanced kidneys are grossly stable with similar atrophy of the left kidney when compared with prior exams. No hydronephrosis identified. GI/Bowel: No bowel obstruction is evident. The colon is collapsed and not well evaluated. No evidence for appendicitis. The small bowel is normal in caliber. Small hiatal hernia. Pelvis: Bladder and solid pelvic organs demonstrate no acute findings. Peritoneum/Retroperitoneum: The abdominal aorta is normal in caliber with severe calcified atherosclerotic plaque throughout. No retroperitoneal adenopathy identified. No free fluid or free air identified within the abdomen and pelvis. Bones/Soft Tissues: Soft tissues demonstrate mild anasarca. Postoperative changes of the right anterior abdominal wall. No acute osseous abnormality identified. 1. Findings at the lung bases suggesting pulmonary edema with small bilateral effusions and associated atelectasis. 2. No acute intra-abdominal process identified. 3. Severe atherosclerotic disease. CT Head WO Contrast    Result Date: 7/15/2021  EXAMINATION: CT OF THE HEAD WITHOUT CONTRAST  7/15/2021 7:28 pm TECHNIQUE: CT of the head was performed without the administration of intravenous contrast. Dose modulation, iterative reconstruction, and/or weight based adjustment of the mA/kV was utilized to reduce the radiation dose to as low as reasonably achievable. COMPARISON: CT brain June 11, 2021 HISTORY: ORDERING SYSTEM PROVIDED HISTORY: AMS TECHNOLOGIST PROVIDED HISTORY: Reason for exam:->AMS Has a \"code stroke\" or \"stroke alert\" been called? ->No Decision Support Exception - unselect if not a suspected or confirmed emergency medical condition->Emergency Medical Condition (MA) Reason for Exam: AMS FINDINGS: BRAIN/VENTRICLES: There is no acute intracranial hemorrhage, mass effect or midline shift. No abnormal extra-axial fluid collection.   The gray-white differentiation is maintained without evidence of an acute infarct. There is no evidence of hydrocephalus. Severe atherosclerotic change of the intracranial vasculature. There is moderate periventricular and subcortical white matter hypoattenuation most consistent with microvascular ischemic changes. Global cerebral atrophy noted. ORBITS: The visualized portion of the orbits demonstrate no acute abnormality. SINUSES: The visualized paranasal sinuses and mastoid air cells demonstrate no acute abnormality. SOFT TISSUES/SKULL:  No acute abnormality of the visualized skull or soft tissues. No acute intracranial abnormality. Chronic microvascular ischemic changes and global cerebral atrophy. MRA HEAD WO CONTRAST    Result Date: 7/28/2021  EXAMINATION: MRA OF THE HEAD WITHOUT CONTRAST 7/28/2021 12:19 pm TECHNIQUE: MRA of the head was performed utilizing time-of-flight imaging with MIP images. No intravenous contrast was administered. COMPARISON: CTA head and neck December 28, 2020 HISTORY: ORDERING SYSTEM PROVIDED HISTORY: R/O ANEURYSM TECHNOLOGIST PROVIDED HISTORY: Reason for exam:->R/O ANEURYSM Reason for Exam: pt unable to follow directions. .. was medicated and multiple attempts - best images FINDINGS: Limited due to extensive motion artifacts. There is a 5 mm saccular aneurysm at the inferior aspect of right MCA bifurcation (series 4, image 118), stable. ANTERIOR CIRCULATION: No significant stenosis of the intracranial internal carotid, anterior cerebral, or middle cerebral arteries. POSTERIOR CIRCULATION: No significant stenosis of the vertebral, basilar, or posterior cerebral arteries. 5 mm saccular aneurysm at the inferior aspect of right MCA bifurcation, stable. XR CHEST PORTABLE    Result Date: 8/1/2021  EXAMINATION: ONE XRAY VIEW OF THE CHEST 8/1/2021 3:55 pm COMPARISON: Chest radiograph 07/29/2021.  HISTORY: ORDERING SYSTEM PROVIDED HISTORY: f/u on pulm edema TECHNOLOGIST PROVIDED HISTORY: PROVIDED HISTORY: Reason for exam:->s/p central line attempt Reason for Exam: s/p central line attempt FINDINGS: Right IJ central venous catheter terminates in the mid SVC in satisfactory position. No appreciable pneumothorax. Cardiomediastinal silhouette appears unchanged. Pulmonary vascular congestion and diffuse interstitial prominence. Trace bilateral pleural effusions cannot be excluded. Minimal bibasilar atelectasis. Right IJ central venous catheter terminates in the mid SVC in satisfactory position. No pneumothorax. Pulmonary vascular congestion with interstitial pulmonary edema. XR CHEST PORTABLE    Result Date: 7/15/2021  EXAMINATION: ONE XRAY VIEW OF THE CHEST 7/15/2021 5:47 pm COMPARISON: 06/13/2021 HISTORY: ORDERING SYSTEM PROVIDED HISTORY: AMS TECHNOLOGIST PROVIDED HISTORY: Reason for exam:->AMS Reason for Exam: AMS Initial encounter FINDINGS: Cardiomegaly. There is increased interstitial opacities bilaterally. No pleural effusion or pneumothorax. The osseous structures otherwise stable. Cardiomegaly with pulmonary edema. VL DUP CAROTID BILATERAL    Result Date: 7/22/2021  EXAMINATION: ULTRASOUND EVALUATION OF THE CAROTID ARTERIES 7/22/2021 COMPARISON: MRI on 07/20/2021. HISTORY: ORDERING SYSTEM PROVIDED HISTORY: r/o carotid stenosis TECHNOLOGIST PROVIDED HISTORY: Reason for exam:->r/o carotid stenosis Reason for Exam: Altered mental status Acuity: Acute Type of Exam: Ongoing Additional signs and symptoms: Pt confused and thrashing entire time Aphasia. FINDINGS: RIGHT: The right common carotid artery demonstrates peak systolic velocities of 73, 40 cm/sec in the proximal and distal segments respectively. The right internal carotid artery demonstrates the systolic velocities of 57, 81, 82 cm/sec in the proximal, mid and distal segments respectively. The external carotid artery is patent. The vertebral artery demonstrates normal antegrade flow.  Hyperechoic plaque is identified nonspecific, but may represent chronic microvascular ischemic change. There is prominence of the ventricles and sulci due to global parenchymal volume loss. Evaluation of the signal voids within the major intracranial vessels is limited given patient motion. ORBITS: The visualized portion of the orbits demonstrate no acute abnormality. SINUSES: No gross abnormality seen of the paranasal sinuses. There appears to be a left mastoid effusion. 1. Patient motion limits evaluation. 2. No convincing acute intracranial abnormality. No acute infarct. 3. Mild global parenchymal volume loss with moderate chronic microvascular ischemic changes. Assessment & Plan:      Maria Elena Giang is a 60y.o. year old female admitted 7/15/2021 for diabetic hyperosmolar coma. 1) Gross Hematuria: likely secondary to catheter trauma and Eliquis   CT Scan 7/2021 without acute urological finding   Urine cx 7/16/21 negative   Chen removed yesterday for voiding trial; PVR 10cc this morning   Plan for follow up as outpatient for UA check. Possible outpatient cystoscopy if significant hematuria found. Pt stable from a  standpoint. Will sign off, please call with any questions. Pt to follow up in our office in 2 weeks for reevaluation. Patient seen and examined, chart reviewed.      Electronically signed by Rosita Adams PA-C on 8/2/2021 at 9:51 AM

## 2021-08-02 NOTE — PROGRESS NOTES
Cardiology Progress Note     Today's Plan will sign off and be available if needed     Admit Date:  7/15/2021    Consult reason/ Seen today for: atrial fib/ RVR    Subjective and  Overnight Events: more awake today -up walking to restroom with therapy     Telemetry SR  DNR CCA    Assessment / Plan / Recommendation:     1. Ventriclar escape -  Resolved-No further bradycardia; seen by EP as well-  AV catie blocking agents held - resumed BB as she became  tachycardic - monitor closely  2. Atrial fib - ho ablation; currently in Sin[place];  continue with anticoagulation if no contraindication -on eliquis   3. Combined systolic diastolic heart failure EF 15-20% Grade III DDD- clinically improving:  Shortness of breath resolved -no accurate I/O- recommend strict I/O- increae BB - diuretic on hold per nephrology-: negative 1,455 ml yesterday   4. VHD Moderate severe MR- will follow   5. CAD- ho PCI -stable- continue with ASA  6. Hyperkalemia- resolved  7. HTN- continue with  amlodipine-can titrate up as warranted          History of Presenting Illness:    Chief complain on admission : 60 y. o.year old who is admitted for  Chief Complaint   Patient presents with    Hyperglycemia        Past medical history:    has a past medical history of Abnormal PFTs (pulmonary function tests), Atrial fibrillation ([address] CAD (coronary artery disease), Carotid aneurysm, right ([address] CHF (congestive heart failure) ([address] Diabetes mellitus ([address] Dizziness - light-headed, Family history of coronary artery disease, H/O 24 hour EKG monitoring, H/O cardiovascular stress test, H/O cardiovascular stress test, H/O echocardiogram, H/O echocardiogram, Heart imaging, Heartburn, History of cardiac cath, History of cardiac monitoring, History of cardioversion, History of MI (myocardial infarction), History of nuclear MUGA, History of nuclear Muga stress test, History of PTCA, Hx of cardiovascular stress test, Hx of transesophageal echocardiography ([facility]) for monitoring, Hyperlipidemia, Hypertension, Insomnia, SOB (shortness of breath), Tachycardia, and TIA (transient ischemic attack). Past surgical history:   has a past surgical history that includes  section; Tubal ligation; Cholecystectomy; Diagnostic Cardiac Cath Lab Procedure (2006, 2009); Percutaneous Transluminal Coronary Angio (10/06/2010); other surgical history (2017); ablation of dysrhythmic focus; and IR NONTUNNELED VASCULAR CATHETER > 5 YEARS (2021). Social History:   reports that she quit smoking about 14 years ago. Her smoking use included cigarettes. She has a 60.00 pack-year smoking history. She has never used smokeless tobacco. She reports that she does not drink alcohol and does not use drugs. Family history:  family history includes Diabetes in her father; Heart Disease in her father. Allergies   Allergen Reactions    Amiodarone Other (See Comments)     dizziness    Iv Dye [Iodides] Nausea And Vomiting    Vicodin [Hydrocodone-Acetaminophen] Nausea And Vomiting       Review of Systems:   All 14 systems were reviewed and are negative  Except for the positive findings which are documented     BP (!) 145/74   Pulse 86   Temp 97.8 °F (36.6 °C) (Oral)   Resp 24   Ht 5' (1.524 m)   Wt 158 lb 14.4 oz (72.1 kg)   SpO2 95%   BMI 31.03 kg/m²       Intake/Output Summary (Last 24 hours) at 2021 1034  Last data filed at 2021 0900  Gross per 24 hour   Intake 720 ml   Output --   Net 720 ml       Physical Exam:  Physical Exam  HENT:      Head: Atraumatic. Eyes:      Pupils: Pupils are equal, round, and reactive to light. Cardiovascular:      Rate and Rhythm: Normal rate and regular rhythm. Heart sounds: Normal heart sounds. Pulmonary:      Effort: Pulmonary effort is normal.      Breath sounds: Normal breath sounds. Abdominal:      Palpations: Abdomen is soft. Musculoskeletal:      Right lower leg: No edema. Left lower leg: No edema. Skin:     General: Skin is warm and dry. Neurological:      Mental Status: She is alert. Mental status is at baseline. Psychiatric:         Behavior: Behavior normal.          Medications:    amLODIPine  2.5 mg Oral Daily    busPIRone  10 mg Oral TID    metoprolol tartrate  25 mg Oral BID    DULoxetine  30 mg Oral Daily    insulin glargine  10 Units Subcutaneous Nightly    sodium chloride flush  5-40 mL Intravenous 2 times per day    insulin lispro  0-18 Units Subcutaneous 2 times per day    aspirin  81 mg Oral Daily    folic acid-pyridoxine-cyancobalamin  1 tablet Oral Daily    insulin lispro  0-18 Units Subcutaneous TID WC    insulin lispro  10 Units Subcutaneous TID WC    apixaban  5 mg Oral BID    levothyroxine  50 mcg Oral Daily    pantoprazole  40 mg Oral Daily    alogliptin  6.25 mg Oral Daily    sodium chloride flush  5-40 mL Intravenous 2 times per day    sodium chloride flush  5-40 mL Intravenous 2 times per day      dextrose      sodium chloride       dextrose, diphenhydrAMINE, hydrOXYzine, LORazepam, potassium chloride, albuterol sulfate HFA, nitroGLYCERIN, traZODone, glucose, dextrose, glucagon (rDNA), dextrose, sodium chloride flush, ondansetron **OR** ondansetron, polyethylene glycol, acetaminophen **OR** acetaminophen, sodium chloride flush, sodium chloride    Lab Data:  CBC: No results for input(s): WBC, HGB, HCT, MCV, PLT in the last 72 hours. BMP:   Recent Labs     07/31/21  0359 08/01/21  0344    137   K 4.4 4.7   CL 98* 97*   CO2 33* 30   PHOS  --  3.5   BUN 30* 29*   CREATININE 1.9* 1.6*     PT/INR: No results for input(s): PROTIME, INR in the last 72 hours. BNP:  No results for input(s): PROBNP in the last 72 hours. TROPONIN: No results for input(s): TROPONINT in the last 72 hours.            Impression:  Principal Problem:    Diabetic hyperosmolar coma ([facility] [address]  Active Problems:

## 2021-08-02 NOTE — PROGRESS NOTES
regular  Abdomen: Soft nontender  Extremities: No overt edema      Problem List :         Impression :     1. Acute kidney injury-mainly from cardiorenal syndrome type I-she is off diuretics for last 2 days-creatinine is down to 1.4-no clinical evidence of pulmonary edema at this time  2. Recent acute decompensated heart failure-off diuretics for 2 days-somehow well compensated  3. Severe cardiomyopathy-somehow someway she does not have any pulmonary edema off diuretics-we will have to watch closely  4. Recent encephalopathy seems to be resolving very nicely    Recommendation/Plan  :     1. She is waiting for nursing home placement  2. Patient told me that she was not on diuretics as an outpatient, I will have to talk to Dr. Lady Fernandez  3. Meanwhile low-salt restriction, watch closely for pulmonary edema  4. If she stays tomorrow we will get a proBNP level  5. If she goes to nursing home will need a CMP magnesium phosphorus in couple of weeks  6.  I will follow her up and comanage with Dr. Jack Shah MD MD

## 2021-08-02 NOTE — PROGRESS NOTES
Attending Progress Note      PCP: Itzel Desai PA-C      Patient: Maribell Parra   Gender: female  : 1955   Age: 60 y.o. MRN: [id number]  Room  : [address] [place] [address]-      Date of Admission: 7/15/2021    Chief Complaint   Patient presents with    Hyperglycemia           Subjective:  Comfortable . . had gross hematuria         Medications:  Reviewed  Infusion Medications    dextrose      sodium chloride       Scheduled Medications    amLODIPine  2.5 mg Oral Daily    busPIRone  10 mg Oral TID    metoprolol tartrate  25 mg Oral BID    DULoxetine  30 mg Oral Daily    insulin glargine  10 Units Subcutaneous Nightly    sodium chloride flush  5-40 mL Intravenous 2 times per day    insulin lispro  0-18 Units Subcutaneous 2 times per day    aspirin  81 mg Oral Daily    folic acid-pyridoxine-cyancobalamin  1 tablet Oral Daily    insulin lispro  0-18 Units Subcutaneous TID WC    insulin lispro  10 Units Subcutaneous TID WC    apixaban  5 mg Oral BID    levothyroxine  50 mcg Oral Daily    pantoprazole  40 mg Oral Daily    alogliptin  6.25 mg Oral Daily    sodium chloride flush  5-40 mL Intravenous 2 times per day    sodium chloride flush  5-40 mL Intravenous 2 times per day     PRN Meds: dextrose, diphenhydrAMINE, hydrOXYzine, LORazepam, potassium chloride, albuterol sulfate HFA, nitroGLYCERIN, traZODone, glucose, dextrose, glucagon (rDNA), dextrose, sodium chloride flush, ondansetron **OR** ondansetron, polyethylene glycol, acetaminophen **OR** acetaminophen, sodium chloride flush, sodium chloride      Intake/Output Summary (Last 24 hours) at 2021 2248  Last data filed at 2021 1934  Gross per 24 hour   Intake 600 ml   Output 1030 ml   Net -430 ml       Exam:  BP (!) 156/77   Pulse 95   Temp 97 °F (36.1 °C) (Oral)   Resp 17   Ht 5' (1.524 m)   Wt 158 lb 14.4 oz (72.1 kg)   SpO2 94%   BMI 31.03 kg/m²   General appearance: No distress,   Respiratory:  good air entry , no Rales , No wheezing, or rhonchi,  Cardiovascular: RRR, with normal S1/S2 . Abdomen : Soft, non-tender, non-distended  , normal bowel sounds. Legs : No edema bilaterally. No DVT signs ,    Neurologic:  Alert and oriented ,        Labs:   No results for input(s): WBC, HGB, HCT, PLT in the last 72 hours. Recent Labs     07/30/21 0312 07/31/21  0359 08/01/21  0344    138 137   K 4.6 4.4 4.7   CL 99 98* 97*   CO2 30 33* 30   BUN 28* 30* 29*   CREATININE 1.8* 1.9* 1.6*   CALCIUM 8.9 9.2 9.3   PHOS 5.4*  --  3.5     Recent Labs     07/30/21 0312 07/31/21  0359 08/01/21  0344   AST 16 17 22   ALT 20 19 19   BILITOT 0.6 0.5 0.6   ALKPHOS 101 104 105     No results for input(s): INR in the last 72 hours. No results for input(s): Amrita Beat in the last 72 hours. Assessment/Plan:  Active Hospital Problems    Diagnosis Date Noted    Delirium [R41.0] 07/23/2021     Priority: High     Class: Acute    Bradycardia [R00.1]     HFrEF (heart failure with reduced ejection fraction) ([facility]) [I50.20]     Difficult intravenous access [Z78.9]     Diabetic hyperosmolar coma ([facility] [address] [E11.01] 07/15/2021   hematuria     Plan:  Tele : no event - no fever - on room air   Pt pulled accidentally her Chen . . and due to that she has gross hematuria . [facility] consult requested . . and Chen removed . . monitor  Hematuria and for urinary retention   Consultants input noted   Cont Eliquis and ASA   Med reviewed   D/e daughter at bed side   Plan for SNF/rehab . . hopefully Monday   DVT Prophylaxis   Diet: ADULT DIET; Easy to Chew; 4 carb choices (60 gm/meal); No Added Salt (3-4 gm); Low Potassium (Less than 3000 mg/day)  Adult Oral Nutrition Supplement; Snack; Peaches and cottage cheese; 10AM, 2PM  Code Status: DNR-CCA        Treatment progress and plan was d/w pt/family .     Ambrocio Donis MD

## 2021-08-02 NOTE — PROGRESS NOTES
Physical Therapy    Physical Therapy Treatment Note  Name: Tamar Wetzel MRN: [id number] :   1955   Date:  2021   Admission Date: 7/15/2021 Room:  [address] [place], [place] [address]   Restrictions/Precautions:        fall risk, tele, decreased safety awareness  Communication with other providers:  Co-treat with OT Renny Herbert   Subjective:  Patient states: \"Yeah I feel better\", \"I was tryin to figure out what was [place] on\"  Pain:   Location, Type, Intensity (0/10 to 10/10): Denies  Objective:    Observation:  Pt awake in supine, alert, recognizes PT/OT. LE MMT: BLE generally 4-/5  Cognition: Pt presents with improved alertness this date, pt responds more appropriately to questions, engages in conversation, not oriented to month. Treatment, including education/measures:  Bed mobility: Pt demonstrates SBA with supine>sit, increased time required for BLE advancement to EOB. Pt able to scoot hips to EOB with v/c. Sitting balance: Pt demonstrates good static sitting and dynamic sitting balance at EOB x5 minutes, on commode x5 minutes. Mod A for management of brief. Sit<>Stands: Pt performs x1 STS from EOB>RW with CGA and min v/c for proper UE placement with AD, STS to/from commode with v/c for UE placement on grab bar for support, performs with CGA and adequate LE control and power. Return to seated in recliner at end of gait trial pt requires v/c for \"reach back\" for chair arm with good carryover. Standing: Pt demonstrates improved dynamic standing balance at commode x2min/x2min during lower body clothing management CGA without LOB, additional x4 min standing at sink for hygiene tasks with BUE reaching, no LOB, CGA. Gait: Pt AMB x75 ft with RW, chair follow, and CGA. Pt demonstrates decreased step length, shuffling pattern, PT provides v/c for increased step length with poor carryover. Pt requests return to seated d/t c/o nausea. Vitals assessed: HR 99 bpm, Sp02 95%.   Education: PT described pt progress to pt, re-oriented pt to reason for admit, discussed importance of mobility for continued improvement. Assessment / Impression:    Pt demonstrates significant improvement in cognition and tolerance to mobility this session. PT may be appropriate for no longer requiring co-treat. Patient's tolerance of treatment:  Good    Adverse Reaction: nausea  Significant change in status and impact:  none  Barriers to improvement:  none  Plan for Next Session:    Continue gait training  Time in:  09:49  Time out:  10:14  Timed treatment minutes:  25  Total treatment time:  25    Previously filed items:  Social/Functional History  Lives With: Alone  Type of Home: House  Home Layout: One level  Home Access: Ramped entrance  Bathroom Shower/Tub: Tub/Shower unit  Bathroom Toilet: Handicap height  Bathroom Equipment: Shower chair, Grab bars in shower  ADL Assistance: [address] [place] [address] Avenue: [address] [place] [address] Responsibilities: Yes  Ambulation Assistance: Independent  Transfer Assistance: Independent  Active : No (Daughter drives)  Occupation: Retired  Type of occupation:   Short term goals  Time Frame for Short term goals: 1 week  Short term goal 1: Pt will perform supine>sit mobility with Mod A of 1 with improved command following. Short term goal 2: Pt will perform STS from standard surface with Min a and v/c for sequencing  Short term goal 3: Pt will AMB x10 ft in room to BR with LRAD and CGA-Min for safety.        Electronically signed by:    Brice Bence, PT  8/2/2021, 10:01 AM

## 2021-08-02 NOTE — PROGRESS NOTES
NEUROLOGY NOTE  DR. Ivonne Georges MD.  -------------------------------------------------  Subjective:    Pt states she is depressed x few months    Pt is less confused and doing better today    Objective:    BP (!) 156/77   Pulse 95   Temp 97 °F (36.1 °C) (Oral)   Resp 17   Ht 5' (1.524 m)   Wt 158 lb 14.4 oz (72.1 kg)   SpO2 94%   BMI 31.03 kg/m²   HEENT nl      Neuro exam    Alert oriented to person place not confused  eomi pupils 3 mm jo  Squeezes fingers to commands  Equivocal toes  resp on own      RADIOLOGY  -----------------    Echocardiogram complete 2D with doppler with color    Result Date: 7/16/2021  Transthoracic Echocardiography Report (TTE)  Demographics   Patient Name       Cortney MAYFIELD Date of Study       07/16/2021   Date of Birth      1955         Gender              Female   Age                60 year(s)         Race                   Patient Number     [id number]         Room Number         2128   Visit Number       [id number]   Corporate ID       [id number]   Accession Number   [id number]         [facility]   Ordering Physician Deo Mckee MD                 Physician           MD  Procedure Type of Study   TTE procedure:ECHOCARDIOGRAM COMPLETE 2D W DOPPLER W COLOR. Procedure Date Date: 07/16/2021 Start: 01:35 PM Study Location: Portable Technical Quality: Fair visualization Indications:Congestive heart failure. Patient Status: Routine Height: 60 inches Weight: 180 pounds BSA: 1.78 m2 BMI: 35.15 kg/m2 HR: 99 bpm BP: 145/83 mmHg  Conclusions   Summary  Left ventricular systolic function is abnormal.  Ejection fraction is visually estimated at 15-20%. Global hypokinesis noted. Grade III diastolic dysfunction. Mitral annular calcification is present. Moderate to severe mitral regurgitation.   Mild to moderate tricuspid regurgitation; RVSP: 38 mmHg. No evidence of any pericardial effusion. Signature   ------------------------------------------------------------------  Electronically signed by Eleanor Fuentes MD (Interpreting  physician) on 07/16/2021 at 04:31 PM  ------------------------------------------------------------------   Findings   Left Ventricle  Left ventricular systolic function is abnormal.  Ejection fraction is visually estimated at 15-20%. Global hypokinesis noted. Grade III diastolic dysfunction. Left ventricle size is normal.   Left Atrium  Essentially normal left atrium. Right Atrium  Essentially normal right atrium. Right Ventricle  Essentially normal right ventricle. Aortic Valve  Trace aortic regurgitation noted. Mitral Valve  Mitral annular calcification is present. Moderate to severe mitral regurgitation. Tricuspid Valve  Mild to moderate tricuspid regurgitation; RVSP: 38 mmHg. Pulmonic Valve  The pulmonic valve was not well visualized. Pericardial Effusion  No evidence of any pericardial effusion. Pleural Effusion  No evidence of pleural effusion. Miscellaneous  The aorta is within normal limits.   M-Mode/2D Measurements & Calculations   LV Diastolic Dimension:   LV Systolic Dimension:   LA Dimension: 3.8 cmAO  4.86 cm                   4.6 cm                   Root Dimension: 2.8 cm  LV FS:5.4 %               LV Volume Diastolic: 496  LV PW Diastolic: 3.82 cm  ml  LV PW Systolic: 6.69 cm   LV Volume Systolic: 132  Septum Diastolic: 1.63 cm ml                       RV Diastolic Dimension:  Septum Systolic: 3.51 cm  LV EDV/LV EDV Index: 116 3.13 cm  CO: 2.42 l/min            ml/65 m2LV ESV/LV ESV  CI: 1.36 l/m*m2           Index: 108 ml/61 m2      LA/Aorta: 1.36                            EF Calculated (A4C): 6.9  LV Area Diastolic: 03.4   %  cm2                       EF Calculated (2D): 65.1  LV Area Systolic: 32 cm2  %                             LV Length: 7.68 cm LVOT: 1.9 cm  Doppler Measurements & Calculations   MV Peak E-Wave: 136  AV Peak Velocity: 125 cm/s    LVOT Peak Velocity: 64.6  cm/s                 AV Peak Gradient: 6.25 mmHg   cm/s  MV Peak A-Wave: 68.5 AV Mean Velocity: 86.6 cm/s   LVOT Mean Velocity: 39.8  cm/s                 AV Mean Gradient: 3 mmHg      cm/s  MV E/A Ratio: 1.99   AV VTI: 21 cm                 LVOT Peak Gradient: 2  MV Peak Gradient:    AV Area (Continuity):1.16 cm2 mmHgLVOT Mean Gradient: 1  7.4 mmHg                                           mmHg                       LVOT VTI: 8.63 cm             Estimated RVSP: 38 mmHg  MV P1/2t: 41 msec                                  Estimated RAP:3 mmHg  MVA by PHT:5.37 cm2  Estimated PASP: 35.49 mmHg   MV E' Septal                                       TR Velocity:285 cm/s  Velocity: 4.89 cm/s                                TR Gradient:32.49 mmHg  MV E' Lateral  Velocity: 10.4 cm/s  MV E/E' septal:  27.81  MV E/E' lateral:  13.08      CT ABDOMEN PELVIS WO CONTRAST Additional Contrast? None    Result Date: 7/15/2021  EXAMINATION: CT OF THE ABDOMEN AND PELVIS WITHOUT CONTRAST 7/15/2021 7:28 pm TECHNIQUE: CT of the abdomen and pelvis was performed without the administration of intravenous contrast. Multiplanar reformatted images are provided for review. Dose modulation, iterative reconstruction, and/or weight based adjustment of the mA/kV was utilized to reduce the radiation dose to as low as reasonably achievable. COMPARISON: MRI abdomen May 24, 2019; CT abdomen May 23, 2019 HISTORY: ORDERING SYSTEM PROVIDED HISTORY: Abdominal pain TECHNOLOGIST PROVIDED HISTORY: Reason for exam:->Abdominal pain Additional Contrast?->None Reason for Exam: Abdominal pain FINDINGS: Lower Chest: Imaged lung bases demonstrate partially imaged small and associated atelectasis.   Imaged lung fields demonstrate findings suggesting mild pulmonary edema, however evaluation is limited due to respiratory motion artifact. Coronary artery atherosclerotic disease present. Calcification of the mitral valve. Partially imaged heart is enlarged. Organs: Evaluation of the solid organs is limited due to lack of intravenous contrast.  The nonenhanced liver demonstrates no acute abnormality. The gallbladder is surgically absent. Numerous calcified granulomata throughout the spleen. Atrophy of the pancreas. The nonenhanced adrenal glands demonstrate no acute findings. Redemonstration of right adrenal adenoma is noted on previous exams. The nonenhanced kidneys are grossly stable with similar atrophy of the left kidney when compared with prior exams. No hydronephrosis identified. GI/Bowel: No bowel obstruction is evident. The colon is collapsed and not well evaluated. No evidence for appendicitis. The small bowel is normal in caliber. Small hiatal hernia. Pelvis: Bladder and solid pelvic organs demonstrate no acute findings. Peritoneum/Retroperitoneum: The abdominal aorta is normal in caliber with severe calcified atherosclerotic plaque throughout. No retroperitoneal adenopathy identified. No free fluid or free air identified within the abdomen and pelvis. Bones/Soft Tissues: Soft tissues demonstrate mild anasarca. Postoperative changes of the right anterior abdominal wall. No acute osseous abnormality identified. 1. Findings at the lung bases suggesting pulmonary edema with small bilateral effusions and associated atelectasis. 2. No acute intra-abdominal process identified. 3. Severe atherosclerotic disease. CT Head WO Contrast    Result Date: 7/15/2021  EXAMINATION: CT OF THE HEAD WITHOUT CONTRAST  7/15/2021 7:28 pm TECHNIQUE: CT of the head was performed without the administration of intravenous contrast. Dose modulation, iterative reconstruction, and/or weight based adjustment of the mA/kV was utilized to reduce the radiation dose to as low as reasonably achievable.  COMPARISON: CT brain June 11, 2021 HISTORY: ORDERING SYSTEM PROVIDED HISTORY: AMS TECHNOLOGIST PROVIDED HISTORY: Reason for exam:->AMS Has a \"code stroke\" or \"stroke alert\" been called? ->No Decision Support Exception - unselect if not a suspected or confirmed emergency medical condition->Emergency Medical Condition (MA) Reason for Exam: AMS FINDINGS: BRAIN/VENTRICLES: There is no acute intracranial hemorrhage, mass effect or midline shift. No abnormal extra-axial fluid collection. The gray-white differentiation is maintained without evidence of an acute infarct. There is no evidence of hydrocephalus. Severe atherosclerotic change of the intracranial vasculature. There is moderate periventricular and subcortical white matter hypoattenuation most consistent with microvascular ischemic changes. Global cerebral atrophy noted. ORBITS: The visualized portion of the orbits demonstrate no acute abnormality. SINUSES: The visualized paranasal sinuses and mastoid air cells demonstrate no acute abnormality. SOFT TISSUES/SKULL:  No acute abnormality of the visualized skull or soft tissues. No acute intracranial abnormality. Chronic microvascular ischemic changes and global cerebral atrophy. XR CHEST PORTABLE    Result Date: 7/16/2021  EXAMINATION: ONE XRAY VIEW OF THE CHEST 7/16/2021 10:31 am COMPARISON: July 15, 2021 HISTORY: ORDERING SYSTEM PROVIDED HISTORY: s/p central line attempt TECHNOLOGIST PROVIDED HISTORY: Reason for exam:->s/p central line attempt Reason for Exam: s/p central line attempt FINDINGS: Right IJ central venous catheter terminates in the mid SVC in satisfactory position. No appreciable pneumothorax. Cardiomediastinal silhouette appears unchanged. Pulmonary vascular congestion and diffuse interstitial prominence. Trace bilateral pleural effusions cannot be excluded. Minimal bibasilar atelectasis. Right IJ central venous catheter terminates in the mid SVC in satisfactory position. No pneumothorax.  Pulmonary vascular congestion with interstitial pulmonary edema. XR CHEST PORTABLE    Result Date: 7/15/2021  EXAMINATION: ONE XRAY VIEW OF THE CHEST 7/15/2021 5:47 pm COMPARISON: 06/13/2021 HISTORY: ORDERING SYSTEM PROVIDED HISTORY: AMS TECHNOLOGIST PROVIDED HISTORY: Reason for exam:->AMS Reason for Exam: AMS Initial encounter FINDINGS: Cardiomegaly. There is increased interstitial opacities bilaterally. No pleural effusion or pneumothorax. The osseous structures otherwise stable. Cardiomegaly with pulmonary edema. IR NONTUNNELED VASCULAR CATHETER > 5 YEARS    Result Date: 7/16/2021  PROCEDURE: ULTRASOUND GUIDED VASCULAR ACCESS. PLACEMENT OF A NON-TUNNELED CATHETER. 7/16/2021. HISTORY: ORDERING SYSTEM PROVIDED HISTORY: Need central venous access. Request CVC Insertion TECHNOLOGIST PROVIDED HISTORY: Reason for exam:->CVC Insertion SEDATION: None. FLUOROSCOPY DOSE AND TYPE OR TIME AND EXPOSURES: None. TECHNIQUE: Informed consent was obtained after a detailed explanation of the procedure including risks, benefits, and alternatives. Universal protocol was observed. The right neck and chest were prepped and draped in sterile fashion using maximum sterile barrier technique. Local anesthesia was achieved with lidocaine. A micropuncture needle was used to access the right internal jugular vein using ultrasound guidance. An ultrasound image demonstrating patency of the vein with needle tip located within it. An image was obtained and stored in PACs. A 0.035 guidewire was used to place a triple lumen central venous catheter after fascial tract dilation. The catheter flushed easily and there was a good blood return. The catheter was sutured to the skin. The catheter was locked with heparinized saline. The patient tolerated the procedure well and there were no immediate complications. EBL: Less than 1 ml. FINDINGS: Chest x ray after procedure demonstrates the tip of the catheter in the SVC. The catheter is ready to use. Successful ultrasound and fluoroscopy guided non-tunneled central venous catheter placement. The catheter is ready to use. MRI BRAIN WO CONTRAST    Result Date: 7/20/2021  EXAMINATION: MRI OF THE BRAIN WITHOUT CONTRAST  7/20/2021 4:07 pm TECHNIQUE: Multiplanar multisequence MRI of the brain was performed without the administration of intravenous contrast. COMPARISON: None. HISTORY: ORDERING SYSTEM PROVIDED HISTORY: aphasic TECHNOLOGIST PROVIDED HISTORY: Reason for exam:->aphasic Reason for Exam: aphasic Acuity: Acute Type of Exam: Initial Relevant Medical/Surgical History: none FINDINGS: Motion degrades images limiting evaluation. INTRACRANIAL STRUCTURES/VENTRICLES: There is no acute infarct. No mass effect or midline shift. No evidence of an acute intracranial hemorrhage. Areas of T2 FLAIR hyperintensity are seen in the periventricular and subcortical white matter, which are nonspecific, but may represent chronic microvascular ischemic change. There is prominence of the ventricles and sulci due to global parenchymal volume loss. Evaluation of the signal voids within the major intracranial vessels is limited given patient motion. ORBITS: The visualized portion of the orbits demonstrate no acute abnormality. SINUSES: No gross abnormality seen of the paranasal sinuses. There appears to be a left mastoid effusion. 1. Patient motion limits evaluation. 2. No convincing acute intracranial abnormality. No acute infarct. 3. Mild global parenchymal volume loss with moderate chronic microvascular ischemic changes.        LAB RESULTS  --------------------    Recent Results (from the past 24 hour(s))   POCT Glucose    Collection Time: 08/01/21  2:17 AM   Result Value Ref Range    POC Glucose 260 (H) 70 - 99 MG/DL   Comprehensive Metabolic Panel    Collection Time: 08/01/21  3:44 AM   Result Value Ref Range    Sodium 137 135 - 145 MMOL/L    Potassium 4.7 3.5 - 5.1 MMOL/L    Chloride 97 (L) 99 - 110 mMol/L    CO2 30 21 - 32 MMOL/L    BUN 29 (H) 6 - 23 MG/DL    CREATININE 1.6 (H) 0.6 - 1.1 MG/DL    Glucose 218 (H) 70 - 99 MG/DL    Calcium 9.3 8.3 - 10.6 MG/DL    Albumin 3.4 3.4 - 5.0 GM/DL    Total Protein 6.0 (L) 6.4 - 8.2 GM/DL    Total Bilirubin 0.6 0.0 - 1.0 MG/DL    ALT 19 10 - 40 U/L    AST 22 15 - 37 IU/L    Alkaline Phosphatase 105 40 - 128 IU/L    GFR Non- 32 (L) >60 mL/min/1.73m2    GFR  39 (L) >60 mL/min/1.73m2    Anion Gap 10 4 - 16   Phosphorus    Collection Time: 08/01/21  3:44 AM   Result Value Ref Range    Phosphorus 3.5 2.5 - 4.9 MG/DL   Magnesium    Collection Time: 08/01/21  3:44 AM   Result Value Ref Range    Magnesium 1.9 1.8 - 2.4 mg/dl   POCT Glucose    Collection Time: 08/01/21  8:36 AM   Result Value Ref Range    POC Glucose 154 (H) 70 - 99 MG/DL   POCT Glucose    Collection Time: 08/01/21 12:19 PM   Result Value Ref Range    POC Glucose 224 (H) 70 - 99 MG/DL   POCT Glucose    Collection Time: 08/01/21  5:53 PM   Result Value Ref Range    POC Glucose 186 (H) 70 - 99 MG/DL   POCT Glucose    Collection Time: 08/01/21 10:35 PM   Result Value Ref Range    POC Glucose 273 (H) 70 - 99 MG/DL         Medical problems    Patient Active Problem List:     History of CVA (cerebrovascular accident) without residual deficits     Morbid obesity ([facility] [address]     DM (diabetes mellitus), type 2, uncontrolled ([facility] [address]     Persistent atrial fibrillation (HCC)     CAD (coronary artery disease)     CHF (congestive heart failure) ([facility] [address]     Light headed     History of MI (myocardial infarction)     Diabetes mellitus ([facility] [address]     Hypertension     TIA (transient ischemic attack)     Hyperlipidemia     SOB (shortness of breath)     H/O cardiovascular stress test     Family history of coronary artery disease     PAF (paroxysmal atrial fibrillation) ([facility])     Chest pain     Atrial thrombus without antecedent myocardial infarction     S/P ablation of atrial fibrillation     Gastroenteritis     YESIKA (acute kidney injury) ([facility] [address]     Acute cystitis     Elevated liver enzymes     CHF (congestive heart failure), NYHA class I, acute on chronic, combined ([facility] [address]     Pneumonia     Multifocal pneumonia     Atrial fibrillation with RVR ([facility])     Shoulder pain     Paroxysmal atrial fibrillation ([facility])     Diabetic hyperosmolar coma ([facility])     Difficult intravenous access     HFrEF (heart failure with reduced ejection fraction) ([facility])      ASSESSMENT:  ---------------------    Metabolic encephalopathy     TIA r/o cardio carotid embolic event     Hyperglycemia     ? Sepsis     A fib    Right MCA left Ophthalmic artery aneurysms    Multiple intracranial stenosis    Right MCA saccular aneurysm -    depression     PLAN:     CT brain neg     Mri brain neg    Mra head as above     C doppler neg     Echo EF 15 %      B 12 folate TSH  nl    High Homocysteine level     Continue eliquis      DC asa     Plavix    foltx     cymbalta.     .    Electronically signed by Thomas Horvath MD on 8/1/2021 at 10:57 PM

## 2021-08-02 NOTE — CARE COORDINATION
SERGE received a call from Cecile with [facility] and they have accepted pt. Cecile has started the precert process. Precert is pending at this time.  For [facility]

## 2021-08-02 NOTE — RT PROTOCOL NOTE
RT Inhaler-Nebulizer Bronchodilator Protocol Note    There is a bronchodilator order in the chart from a provider indicating to follow the RT Bronchodilator Protocol and there is an Initiate RT Bronchodilator Protocol order as well (see protocol at bottom of note). The findings from the last RT Protocol Assessment were as follows:  Smoking: >15 Pack years  Surgical Status: No surgery  Xray: Chronic changes  Respiratory Pattern: RR <12 or 21-30  Mental Status: Alert and Oriented  Breath Sounds: Diminished and/or crackles  Cough: Strong, productive  Activity Level: Walking with assistance  Oxygen Requirement: Room Air - 2LNC/28% or home setting  Indication for Bronchodilator Therapy: Decreased or absent breath sounds, On home bronchodilators (Alb PRN)  Bronchodilator Assessment Score: 6    Aerosolized bronchodilator medication orders have been revised according to the RT Bronchodilator Protocol. RT Inhaler-Nebulizer Bronchodilator Protocol:    Respiratory Therapist to perform RT Therapy Protocol Assessment then follow the protocol. No Indications - adjust the frequency to every 6 hours PRN wheezing or bronchospasm, if no treatments needed after 48 hours then discontinue using Per Protocol order mode. If indication present, adjust the RT bronchodilator orders based on the Bronchodilator Assessment Score as follows:    0-6 - enter or revise RT bronchodilator order to Albuterol Inhaler order with frequency of every 2 hours PRN for wheezing or increased work of breathing using Per Protocol order mode. If Albuterol Inhaler not tolerated or not effective, then discontinue the Albuterol Inhaler order and enter Albuterol Nebulizer order with same frequency and PRN reasons. Repeat RT Therapy Protocol Assessment as needed.     7-10 - discontinue any other Inpatient aerosolized bronchodilator medication orders and enter or revise two Albuterol Inhaler orders, one with BID frequency and one with frequency of every 2 hours PRN wheezing or increased work of breathing using Per Protocol order mode. Repeat RT Therapy Protocol Assessment with second treatment then BID and as needed. If Albuterol Inhaler not tolerated or not effective, then discontinue the Albuterol Inhaler orders and enter two Albuterol Nebulizer orders with same frequencies and PRN reasons. 11-13 - discontinue any other Inpatient aerosolized bronchodilator medication orders and enter DuoNeb Nebulizer orders QID frequency and an Albuterol Nebulizer order every 2 hours PRN wheezing or increased work of breathing using Per Protocol order mode. Repeat RT Therapy Protocol Assessment with second treatment then QID and as needed. Greater than 13 - discontinue any other Inpatient bronchodilator aerosolized medication orders and enter DuoNeb Nebulizer order every 4 hours frequency and Albuterol Nebulizer every 2 hours PRN wheezing or increased work of breathing using Per Protocol order mode. Repeat RT Therapy Protocol Assessment with second treatment then every 4 hours and as needed. RT to enter RT Home Evaluation for COPD & MDI Assessment order using Per Protocol order mode.     Electronically signed by Rockwell Hatchet, RCP on 8/2/2021 at 3:39 PM

## 2021-08-02 NOTE — PROGRESS NOTES
Occupational Therapy  Occupational Therapy Treatment Note    Date:  2021   Room:  [facility]    Maribell Parra :   1955   MRN: [id number] Admission Date: 7/15/2021     Diagnosis:  The primary encounter diagnosis was Hyperglycemia. A diagnosis of Septicemia ([facility] [address] was also pertinent to this visit. Restrictions/Precautions:  General precautions, fall risk, bed/chair alarm        Communication with other providers:  PT Nohemi Mcdowell. Subjective:  Patient states:  I feel better  Pain:   Location, Type, Intensity (0/10 to 10/10):  none    Objective:    Orientation: WFL, oriented to person, place  Observation: Pt received in bed. Alert and cooperative  Objective Measures:  VSS, room air    Treatment, including education:  Therapeutic Activity Training:   Therapeutic activity training was instructed today. Cues were given for safety, sequence, UE/LE placement, visual cues, and balance. Self Care Training:   Self care training was performed today. Cues were given for safety, sequence, UE/LE placement, visual cues, and balance. Supine to sit: SBA, HOB elevated ~30deg    Sit to stand: CGA 1 rep EOB to RW, CGA1 rep from low commode after toileting    Gait: CGA c RW 10ft to bathroom + ~100ft in hallway. Refer to PT note for further gait analysis. Stand to sit: CGA to low commode c cues to reach for grab bars, CGA to chair during walk d/t fatigue    Dressing   LB: Pratibha for donning of depend, assist provided for distal threading.  Pt able to hike depend from knee to hips while standing using both UE    Toileting: CGA while pt performed pericare in standing, pt able to hike depend while standing     Grooming: CGA for hand washing while standing at sink      Assessment / Impression:      Patient's tolerance of treatment: Good  Significant change in status and impact: Much improved mental status and overall activity tolerance relative to prior sessions  Barriers to improvement:  none  Recommendations:

## 2021-08-02 NOTE — PROGRESS NOTES
NEUROLOGY NOTE  DR. Reinier Nance MD.  -------------------------------------------------  Subjective:    Pt states she is feeling better and not depressed anymore    Pt states she is depressed x few months    Pt is not confused and doing better today    Objective:    BP (!) 124/52   Pulse 86   Temp 97.6 °F (36.4 °C) (Oral)   Resp 24   Ht 5' (1.524 m)   Wt 158 lb 14.4 oz (72.1 kg)   SpO2 98%   BMI 31.03 kg/m²   HEENT nl      Neuro exam    Alert oriented to person place not confused  eomi pupils 3 mm jo  Squeezes fingers to commands  Equivocal toes  resp on own      RADIOLOGY  -----------------    Echocardiogram complete 2D with doppler with color    Result Date: 7/16/2021  Transthoracic Echocardiography Report (TTE)  Demographics   Patient Name       Isreal MAYFIELD Date of Study       07/16/2021   Date of Birth      1955         Gender              Female   Age                60 year(s)         Race                   Patient Number     [id number]         Room Number         2128   Visit Number       [id number]   Corporate ID       [id number]   Accession Number   [id number]         [facility]   Ordering Physician Barbie Bee MD                 Physician           MD  Procedure Type of Study   TTE procedure:ECHOCARDIOGRAM COMPLETE 2D W DOPPLER W COLOR. Procedure Date Date: 07/16/2021 Start: 01:35 PM Study Location: Portable Technical Quality: Fair visualization Indications:Congestive heart failure. Patient Status: Routine Height: 60 inches Weight: 180 pounds BSA: 1.78 m2 BMI: 35.15 kg/m2 HR: 99 bpm BP: 145/83 mmHg  Conclusions   Summary  Left ventricular systolic function is abnormal.  Ejection fraction is visually estimated at 15-20%. Global hypokinesis noted. Grade III diastolic dysfunction.   Mitral annular calcification is present. Moderate to severe mitral regurgitation. Mild to moderate tricuspid regurgitation; RVSP: 38 mmHg. No evidence of any pericardial effusion. Signature   ------------------------------------------------------------------  Electronically signed by Stephanie Armendariz MD (Interpreting  physician) on 07/16/2021 at 04:31 PM  ------------------------------------------------------------------   Findings   Left Ventricle  Left ventricular systolic function is abnormal.  Ejection fraction is visually estimated at 15-20%. Global hypokinesis noted. Grade III diastolic dysfunction. Left ventricle size is normal.   Left Atrium  Essentially normal left atrium. Right Atrium  Essentially normal right atrium. Right Ventricle  Essentially normal right ventricle. Aortic Valve  Trace aortic regurgitation noted. Mitral Valve  Mitral annular calcification is present. Moderate to severe mitral regurgitation. Tricuspid Valve  Mild to moderate tricuspid regurgitation; RVSP: 38 mmHg. Pulmonic Valve  The pulmonic valve was not well visualized. Pericardial Effusion  No evidence of any pericardial effusion. Pleural Effusion  No evidence of pleural effusion. Miscellaneous  The aorta is within normal limits.   M-Mode/2D Measurements & Calculations   LV Diastolic Dimension:   LV Systolic Dimension:   LA Dimension: 3.8 cmAO  4.86 cm                   4.6 cm                   Root Dimension: 2.8 cm  LV FS:5.4 %               LV Volume Diastolic: 763  LV PW Diastolic: 7.90 cm  ml  LV PW Systolic: 6.53 cm   LV Volume Systolic: 447  Septum Diastolic: 3.78 cm ml                       RV Diastolic Dimension:  Septum Systolic: 6.78 cm  LV EDV/LV EDV Index: 116 3.13 cm  CO: 2.42 l/min            ml/65 m2LV ESV/LV ESV  CI: 1.36 l/m*m2           Index: 108 ml/61 m2      LA/Aorta: 1.36                            EF Calculated (A4C): 6.9  LV Area Diastolic: 52.5   %  cm2                       EF Calculated (2D): 12.3  LV Area Systolic: 32 cm2  %                             LV Length: 7.68 cm                             LVOT: 1.9 cm  Doppler Measurements & Calculations   MV Peak E-Wave: 136  AV Peak Velocity: 125 cm/s    LVOT Peak Velocity: 64.6  cm/s                 AV Peak Gradient: 6.25 mmHg   cm/s  MV Peak A-Wave: 68.5 AV Mean Velocity: 86.6 cm/s   LVOT Mean Velocity: 39.8  cm/s                 AV Mean Gradient: 3 mmHg      cm/s  MV E/A Ratio: 1.99   AV VTI: 21 cm                 LVOT Peak Gradient: 2  MV Peak Gradient:    AV Area (Continuity):1.16 cm2 mmHgLVOT Mean Gradient: 1  7.4 mmHg                                           mmHg                       LVOT VTI: 8.63 cm             Estimated RVSP: 38 mmHg  MV P1/2t: 41 msec                                  Estimated RAP:3 mmHg  MVA by PHT:5.37 cm2  Estimated PASP: 35.49 mmHg   MV E' Septal                                       TR Velocity:285 cm/s  Velocity: 4.89 cm/s                                TR Gradient:32.49 mmHg  MV E' Lateral  Velocity: 10.4 cm/s  MV E/E' septal:  27.81  MV E/E' lateral:  13.08      CT ABDOMEN PELVIS WO CONTRAST Additional Contrast? None    Result Date: 7/15/2021  EXAMINATION: CT OF THE ABDOMEN AND PELVIS WITHOUT CONTRAST 7/15/2021 7:28 pm TECHNIQUE: CT of the abdomen and pelvis was performed without the administration of intravenous contrast. Multiplanar reformatted images are provided for review. Dose modulation, iterative reconstruction, and/or weight based adjustment of the mA/kV was utilized to reduce the radiation dose to as low as reasonably achievable. COMPARISON: MRI abdomen May 24, 2019; CT abdomen May 23, 2019 HISTORY: ORDERING SYSTEM PROVIDED HISTORY: Abdominal pain TECHNOLOGIST PROVIDED HISTORY: Reason for exam:->Abdominal pain Additional Contrast?->None Reason for Exam: Abdominal pain FINDINGS: Lower Chest: Imaged lung bases demonstrate partially imaged small and associated atelectasis.   Imaged lung fields demonstrate findings suggesting mild pulmonary edema, however evaluation is limited due to respiratory motion artifact. Coronary artery atherosclerotic disease present. Calcification of the mitral valve. Partially imaged heart is enlarged. Organs: Evaluation of the solid organs is limited due to lack of intravenous contrast.  The nonenhanced liver demonstrates no acute abnormality. The gallbladder is surgically absent. Numerous calcified granulomata throughout the spleen. Atrophy of the pancreas. The nonenhanced adrenal glands demonstrate no acute findings. Redemonstration of right adrenal adenoma is noted on previous exams. The nonenhanced kidneys are grossly stable with similar atrophy of the left kidney when compared with prior exams. No hydronephrosis identified. GI/Bowel: No bowel obstruction is evident. The colon is collapsed and not well evaluated. No evidence for appendicitis. The small bowel is normal in caliber. Small hiatal hernia. Pelvis: Bladder and solid pelvic organs demonstrate no acute findings. Peritoneum/Retroperitoneum: The abdominal aorta is normal in caliber with severe calcified atherosclerotic plaque throughout. No retroperitoneal adenopathy identified. No free fluid or free air identified within the abdomen and pelvis. Bones/Soft Tissues: Soft tissues demonstrate mild anasarca. Postoperative changes of the right anterior abdominal wall. No acute osseous abnormality identified. 1. Findings at the lung bases suggesting pulmonary edema with small bilateral effusions and associated atelectasis. 2. No acute intra-abdominal process identified. 3. Severe atherosclerotic disease.      CT Head WO Contrast    Result Date: 7/15/2021  EXAMINATION: CT OF THE HEAD WITHOUT CONTRAST  7/15/2021 7:28 pm TECHNIQUE: CT of the head was performed without the administration of intravenous contrast. Dose modulation, iterative reconstruction, and/or weight based adjustment of the mA/kV was utilized to reduce the radiation dose to as low as reasonably achievable. COMPARISON: CT brain June 11, 2021 HISTORY: ORDERING SYSTEM PROVIDED HISTORY: AMS TECHNOLOGIST PROVIDED HISTORY: Reason for exam:->AMS Has a \"code stroke\" or \"stroke alert\" been called? ->No Decision Support Exception - unselect if not a suspected or confirmed emergency medical condition->Emergency Medical Condition (MA) Reason for Exam: AMS FINDINGS: BRAIN/VENTRICLES: There is no acute intracranial hemorrhage, mass effect or midline shift. No abnormal extra-axial fluid collection. The gray-white differentiation is maintained without evidence of an acute infarct. There is no evidence of hydrocephalus. Severe atherosclerotic change of the intracranial vasculature. There is moderate periventricular and subcortical white matter hypoattenuation most consistent with microvascular ischemic changes. Global cerebral atrophy noted. ORBITS: The visualized portion of the orbits demonstrate no acute abnormality. SINUSES: The visualized paranasal sinuses and mastoid air cells demonstrate no acute abnormality. SOFT TISSUES/SKULL:  No acute abnormality of the visualized skull or soft tissues. No acute intracranial abnormality. Chronic microvascular ischemic changes and global cerebral atrophy. XR CHEST PORTABLE    Result Date: 7/16/2021  EXAMINATION: ONE XRAY VIEW OF THE CHEST 7/16/2021 10:31 am COMPARISON: July 15, 2021 HISTORY: ORDERING SYSTEM PROVIDED HISTORY: s/p central line attempt TECHNOLOGIST PROVIDED HISTORY: Reason for exam:->s/p central line attempt Reason for Exam: s/p central line attempt FINDINGS: Right IJ central venous catheter terminates in the mid SVC in satisfactory position. No appreciable pneumothorax. Cardiomediastinal silhouette appears unchanged. Pulmonary vascular congestion and diffuse interstitial prominence. Trace bilateral pleural effusions cannot be excluded. Minimal bibasilar atelectasis.      Right IJ central venous catheter terminates in the mid SVC in satisfactory position. No pneumothorax. Pulmonary vascular congestion with interstitial pulmonary edema. XR CHEST PORTABLE    Result Date: 7/15/2021  EXAMINATION: ONE XRAY VIEW OF THE CHEST 7/15/2021 5:47 pm COMPARISON: 06/13/2021 HISTORY: ORDERING SYSTEM PROVIDED HISTORY: AMS TECHNOLOGIST PROVIDED HISTORY: Reason for exam:->AMS Reason for Exam: AMS Initial encounter FINDINGS: Cardiomegaly. There is increased interstitial opacities bilaterally. No pleural effusion or pneumothorax. The osseous structures otherwise stable. Cardiomegaly with pulmonary edema. IR NONTUNNELED VASCULAR CATHETER > 5 YEARS    Result Date: 7/16/2021  PROCEDURE: ULTRASOUND GUIDED VASCULAR ACCESS. PLACEMENT OF A NON-TUNNELED CATHETER. 7/16/2021. HISTORY: ORDERING SYSTEM PROVIDED HISTORY: Need central venous access. Request CVC Insertion TECHNOLOGIST PROVIDED HISTORY: Reason for exam:->CVC Insertion SEDATION: None. FLUOROSCOPY DOSE AND TYPE OR TIME AND EXPOSURES: None. TECHNIQUE: Informed consent was obtained after a detailed explanation of the procedure including risks, benefits, and alternatives. Universal protocol was observed. The right neck and chest were prepped and draped in sterile fashion using maximum sterile barrier technique. Local anesthesia was achieved with lidocaine. A micropuncture needle was used to access the right internal jugular vein using ultrasound guidance. An ultrasound image demonstrating patency of the vein with needle tip located within it. An image was obtained and stored in PACs. A 0.035 guidewire was used to place a triple lumen central venous catheter after fascial tract dilation. The catheter flushed easily and there was a good blood return. The catheter was sutured to the skin. The catheter was locked with heparinized saline. The patient tolerated the procedure well and there were no immediate complications. EBL: Less than 1 ml.  FINDINGS: Chest x ray after procedure demonstrates the tip of the catheter in the SVC. The catheter is ready to use. Successful ultrasound and fluoroscopy guided non-tunneled central venous catheter placement. The catheter is ready to use. MRI BRAIN WO CONTRAST    Result Date: 7/20/2021  EXAMINATION: MRI OF THE BRAIN WITHOUT CONTRAST  7/20/2021 4:07 pm TECHNIQUE: Multiplanar multisequence MRI of the brain was performed without the administration of intravenous contrast. COMPARISON: None. HISTORY: ORDERING SYSTEM PROVIDED HISTORY: aphasic TECHNOLOGIST PROVIDED HISTORY: Reason for exam:->aphasic Reason for Exam: aphasic Acuity: Acute Type of Exam: Initial Relevant Medical/Surgical History: none FINDINGS: Motion degrades images limiting evaluation. INTRACRANIAL STRUCTURES/VENTRICLES: There is no acute infarct. No mass effect or midline shift. No evidence of an acute intracranial hemorrhage. Areas of T2 FLAIR hyperintensity are seen in the periventricular and subcortical white matter, which are nonspecific, but may represent chronic microvascular ischemic change. There is prominence of the ventricles and sulci due to global parenchymal volume loss. Evaluation of the signal voids within the major intracranial vessels is limited given patient motion. ORBITS: The visualized portion of the orbits demonstrate no acute abnormality. SINUSES: No gross abnormality seen of the paranasal sinuses. There appears to be a left mastoid effusion. 1. Patient motion limits evaluation. 2. No convincing acute intracranial abnormality. No acute infarct. 3. Mild global parenchymal volume loss with moderate chronic microvascular ischemic changes.        LAB RESULTS  --------------------    Recent Results (from the past 24 hour(s))   POCT Glucose    Collection Time: 08/01/21  5:53 PM   Result Value Ref Range    POC Glucose 186 (H) 70 - 99 MG/DL   POCT Glucose    Collection Time: 08/01/21 10:35 PM   Result Value Ref Range    POC Glucose 273 (H) 70 - 99 MG/DL   POCT Glucose Collection Time: 08/02/21  3:20 AM   Result Value Ref Range    POC Glucose 156 (H) 70 - 99 MG/DL   POCT Glucose    Collection Time: 08/02/21  6:50 AM   Result Value Ref Range    POC Glucose 156 (H) 70 - 99 MG/DL   POCT Glucose    Collection Time: 08/02/21 12:03 PM   Result Value Ref Range    POC Glucose 294 (H) 70 - 99 MG/DL         Medical problems    Patient Active Problem List:     History of CVA (cerebrovascular accident) without residual deficits     Morbid obesity ([facility] [address]     DM (diabetes mellitus), type 2, uncontrolled ([facility])     Persistent atrial fibrillation ([facility])     CAD (coronary artery disease)     CHF (congestive heart failure) ([facility])     Light headed     History of MI (myocardial infarction)     Diabetes mellitus ([facility])     Hypertension     TIA (transient ischemic attack)     Hyperlipidemia     SOB (shortness of breath)     H/O cardiovascular stress test     Family history of coronary artery disease     PAF (paroxysmal atrial fibrillation) ([facility])     Chest pain     Atrial thrombus without antecedent myocardial infarction     S/P ablation of atrial fibrillation     Gastroenteritis     YESIKA (acute kidney injury) ([facility] [address]     Acute cystitis     Elevated liver enzymes     CHF (congestive heart failure), NYHA class I, acute on chronic, combined ([facility] [address]     Pneumonia     Multifocal pneumonia     Atrial fibrillation with RVR ([facility])     Shoulder pain     Paroxysmal atrial fibrillation ([facility])     Diabetic hyperosmolar coma ([facility])     Difficult intravenous access     HFrEF (heart failure with reduced ejection fraction) ([facility])      ASSESSMENT:  ---------------------    Metabolic encephalopathy     TIA r/o cardio carotid embolic event     Hyperglycemia     ? Sepsis     A fib    Right MCA left Ophthalmic artery aneurysms    Multiple intracranial stenosis    Right MCA saccular aneurysm -    depression     PLAN:     CT brain neg     Mri brain neg    Mra head as above     C doppler neg     Echo EF 15 %      B 12 folate TSH nl    High Homocysteine level     Continue eliquis      DC asa     Plavix    foltx     cymbalta.     .    Electronically signed by Alberto Simms MD on 8/2/2021 at 4:18 PM

## 2021-08-03 NOTE — PROGRESS NOTES
INTERNAL MEDICINE PROGRESS NOTE        Henry Talamantes Devan   1955   Primary Care Physician:  Ondina Zhang PA-C  Admit Date: 7/15/2021     Subjective:   Patient awake and alert. She is feeling fine this am. No new issues  No chest pain or shortness of breath  No nausea, vomiting    Objective:   BP (!) 160/71   Pulse 80   Temp 98.2 °F (36.8 °C) (Oral)   Resp 16   Ht 5' (1.524 m)   Wt 158 lb 14.4 oz (72.1 kg)   SpO2 98%   BMI 31.03 kg/m²    General appearance: alert, appears stated age and cooperative  Head: Normocephalic, without obvious abnormality, atraumatic  Neck: no adenopathy and supple, symmetrical, trachea midline  Lungs: clear to auscultation bilaterally  Heart: regular rate and rhythm and S1, S2 normal  Abdomen: soft, non-tender; bowel sounds normal; no masses,  no organomegaly  Extremities: no clubbing, cyanosis or edema  Neurologic: moves all 4 extremities. Oriented x 3, cranial nerve intact.  Motor system: generalized weakness      Data Review  Lab Results   Component Value Date     08/03/2021    K 4.4 08/03/2021     08/03/2021    CO2 32 08/03/2021    CREATININE 1.4 (H) 08/03/2021    BUN 25 (H) 08/03/2021    CALCIUM 9.1 08/03/2021     Lab Results   Component Value Date    WBC 10.5 07/27/2021    HGB 12.2 (L) 07/27/2021    HCT 38.7 07/27/2021    MCV 95.8 07/27/2021     07/27/2021     INR/Prothrombin Time      Meds:    metoprolol tartrate  50 mg Oral BID    amLODIPine  2.5 mg Oral Daily    busPIRone  10 mg Oral TID    DULoxetine  30 mg Oral Daily    insulin glargine  10 Units Subcutaneous Nightly    sodium chloride flush  5-40 mL Intravenous 2 times per day    insulin lispro  0-18 Units Subcutaneous 2 times per day    aspirin  81 mg Oral Daily    folic acid-pyridoxine-cyancobalamin  1 tablet Oral Daily    insulin lispro  0-18 Units Subcutaneous TID WC    insulin lispro  10 Units Subcutaneous TID WC    apixaban  5 mg Oral BID    levothyroxine  50 mcg Oral Daily    procal is wnl  Depression:   A. Fib: rate is in control now  Hypertension: monitor  Elevated LFTs: Slowly improving. CT abdomen nonacute. Monitor. Possible pneumonia: on antibiotics  Pulmonary edema: continue monitoring. Grief:   HFrEF: EF 15-20%  Cardiology following. Plan:  Discussed with case management and Dr. Victor Baumgarten.    Overall status is improved  Waiting for precert    Electronically signed by Shahram Neely MD on 8/3/2021 at 8:17 AM

## 2021-08-03 NOTE — PROGRESS NOTES
Occupational Therapy      Occupational Therapy Treatment Note      Name: Senia Rice MRN: [id number] :   1955   Date:  8/3/2021   Admission Date: 7/15/2021 Room:  3020/3020-A     Primary Problem: Acute encephalopathy, Hyperglycemia    Restrictions/Precautions: General Precautions, Fall Risk, Telemetry, Bed/Chair alarm, FILEMON monitor    Communication with other providers: JONI Corea    Subjective:  Patient states: \"I'm ready to see my three cats again! \"  Pain: Pt denied pain this date    Objective:    Observation: Pt received in supine upon OT arrival. Pleasant and agreeable to treatment. Improved mental status relative to any prior session. Objective Measures: Stable vitals throughout session, Oriented to person, place, month, year, and situation. Unaware of current president; stated current president was \"the yellow-haired nick\" but able to accurately state president when given choices    Treatment, including education:  Therapeutic Activity Training:   Therapeutic activity training was instructed today. Cues were given for safety, sequence, UE/LE placement, awareness, and balance. Self Care Training:   Cues were given for safety, sequence, UE/LE placement, visual cues, and balance. Pt received in supine OT arrival. Pt re-educated on role of OT, POC, and importance of EOB/OOB activity. Pt transferred supine to sitting EOB with supervision/good use of bed rail. Pt able to sit at EOB level with supervision/good static sitting balance. Pt completed sit to stand transfer from bed SBA with min cues for safe hand placement. Pt ambulated approximately 125 ft in hallway CGA progressing to SBA with RW. Pt with decreased speed and step length, and required intermittent cues for lengthening steps. Pt with very good tolerance throughout relative to prior sessions. Pt returned to room, and ambulated to sink SBA with RW.  Pt stood at sink, and completed grooming task of brushing hair, facial hygiene, and oral hygiene task of rinsing in standing at sink SBA with setup. Pt able to open mouthwash bottle with good fine motor skills noted. Pt ambulated to chair SBA with RW, and transferred stand to sit SBA with min cues for reaching back with arms. Pt left positioned for comfort in chair with all lines intact, all needs within reach, and chair alarm on. Assessment / Impression:     Patient's tolerance of treatment: Well  Adverse Reaction: None  Significant change in status and impact: Significantly improved physically and cognitively relative to any prior session  Barriers to improvement: Cognition      Plan for Next Session:    Continue per OT POC. Continue to recommend SNF for rehab, pt is making excellent progress throughout acute care stay.       Time in: 1021  Time out: 1046  Timed treatment minutes: 25  Total treatment time: 25      Electronically signed by:    DELIO Anders/L, [email or web address], [id number]

## 2021-08-03 NOTE — PROGRESS NOTES
Progress Note( Dr. Mirtha Ratliff)  8/2/2021  Subjective:   Admit Date: 7/15/2021  PCP: Sophie Gerard PA-C        Admitted For :   Altered mental state and severe hyperglycemia possible metabolic encephalopathy    Consulted For: Better control of blood glucose    Interval History: Patient is more alert awake responding to verbal commands and some answers seem to be appropriate and other seem to be confused   Patient did not require an attendant to watch her anymore    Patient is sitting in the chair and trying to eat breakfast this morning    CT Scan& MRI of Brain  Neg for any acute CVA     Denies any chest pains,   Denies SOB . Denies nausea or vomiting. Now eating better  No new bowel or bladder symptoms. Intake/Output Summary (Last 24 hours) at 8/2/2021 2210  Last data filed at 8/2/2021 0900  Gross per 24 hour   Intake 240 ml   Output --   Net 240 ml       DATA    CBC:   No results for input(s): WBC, HGB, PLT in the last 72 hours.  CMP:  Recent Labs     07/31/21  0359 08/01/21  0344    137   K 4.4 4.7   CL 98* 97*   CO2 33* 30   BUN 30* 29*   CREATININE 1.9* 1.6*   CALCIUM 9.2 9.3   PROT 5.8* 6.0*   LABALBU 3.3* 3.4   BILITOT 0.5 0.6   ALKPHOS 104 105   AST 17 22   ALT 19 19     Lipids:   Lab Results   Component Value Date    CHOL 153 01/30/2012    HDL 59 01/30/2012    TRIG 135 01/30/2012     Glucose:  Recent Labs     08/02/21  1203 08/02/21  1636 08/02/21 2016   POCGLU 294* 182* 226*     [id number]:  Lab Results   Component Value Date    LABA1C 10.9 07/17/2021     High Sensitivity TSH:   Lab Results   Component Value Date    TSHHS 7.150 07/20/2021     Free T3: No results found for: FT3  Free T4:  Lab Results   Component Value Date    T4FREE 1.28 06/12/2021       Echocardiogram complete 2D with doppler with color    Result Date: 7/16/2021  Transthoracic Echocardiography Report (TTE)  Demographics   Patient Name       Abhilash MAYFIELD Date of Study       07/16/2021   Date of Birth      1955 Gender              Female   Age                60 year(s)         Race                   Patient Number     [id number]         Room Number         2128   Visit Number       [id number]   Corporate ID       [id number]   Accession Number   [id number]         Peewee Vargas   Ordering Physician Ilene Ledesma MD                 Physician           MD  Procedure Type of Study   TTE procedure:ECHOCARDIOGRAM COMPLETE 2D W DOPPLER W COLOR. Procedure Date Date: 07/16/2021 Start: 01:35 PM Study Location: Portable Technical Quality: Fair visualization Indications:Congestive heart failure. Patient Status: Routine Height: 60 inches Weight: 180 pounds BSA: 1.78 m2 BMI: 35.15 kg/m2 HR: 99 bpm BP: 145/83 mmHg  Conclusions   Summary  Left ventricular systolic function is abnormal.  Ejection fraction is visually estimated at 15-20%. Global hypokinesis noted. Grade III diastolic dysfunction. Mitral annular calcification is present. Moderate to severe mitral regurgitation. Mild to moderate tricuspid regurgitation; RVSP: 38 mmHg. No evidence of any pericardial effusion. Signature   ------------------------------------------------------------------  Electronically signed by Alex Galicia MD (Interpreting  physician) on 07/16/2021 at 04:31 PM    CT ABDOMEN PELVIS WO CONTRAST Additional Contrast? None    Result Date: 7/15/2021  EXAMINATION: CT OF THE ABDOMEN AND PELVIS WITHOUT CONTRAST 7/15/2021 7:28 pm    1. Findings at the lung bases suggesting pulmonary edema with small bilateral effusions and associated atelectasis. 2. No acute intra-abdominal process identified. 3. Severe atherosclerotic disease. CT Head WO Contrast    Result Date: 7/15/2021  EXAMINATION: CT OF THE HEAD WITHOUT CONTRAST  7/15/2021 7:28 pm     No acute intracranial abnormality.  Chronic microvascular ischemic changes and global cerebral atrophy. XR CHEST PORTABLE    Result Date: 7/16/2021  EXAMINATION: ONE XRAY VIEW OF THE CHEST 7/16/2021 10:31 am COMPARISON: July 15, 2021 HISTORY: ORDERING SYSTEM PROVIDED HISTORY: s/p central line attempt    Right IJ central venous catheter terminates in the mid SVC in satisfactory position. No pneumothorax. Pulmonary vascular congestion with interstitial pulmonary edema. XR CHEST PORTABLE    Result Date: 7/15/2021  EXAMINATION: ONE XRAY VIEW OF THE CHEST 7/15/2021 5:47 pm COMPARISON: 06/13/2021 HISTORY: ORDERING SYSTEM PROVIDED HISTORY: AMS TECHNOLOGIST PROVIDED HISTORY: Reason for exam:->AMS Reason for Exam: AMS Initial encounter FINDINGS: Cardiomegaly. There is increased interstitial opacities bilaterally. No pleural effusion or pneumothorax. The osseous structures otherwise stable. Cardiomegaly with pulmonary edema. IR NONTUNNELED VASCULAR CATHETER > 5 YEARS    Result Date: 7/16/2021  PROCEDURE: ULTRASOUND GUIDED VASCULAR ACCESS. PLACEMENT OF A NON-TUNNELED CATHETER. 7/16/2021. HISTORY: ORDERING SYSTEM PROVIDED HISTORY: Need central venous access. Request CVC Insertion TECHNOLOGIST PROVIDED HISTORY: Reason for exam:->CVC Insertion SEDATION: None. Successful ultrasound and fluoroscopy guided non-tunneled central venous catheter placement. The catheter is ready to use.        Scheduled Medicines   Medications:    metoprolol tartrate  50 mg Oral BID    amLODIPine  2.5 mg Oral Daily    busPIRone  10 mg Oral TID    DULoxetine  30 mg Oral Daily    insulin glargine  10 Units Subcutaneous Nightly    sodium chloride flush  5-40 mL Intravenous 2 times per day    insulin lispro  0-18 Units Subcutaneous 2 times per day    aspirin  81 mg Oral Daily    folic acid-pyridoxine-cyancobalamin  1 tablet Oral Daily    insulin lispro  0-18 Units Subcutaneous TID WC    insulin lispro  10 Units Subcutaneous TID WC    apixaban  5 mg Oral BID    levothyroxine  50 mcg Oral Daily    pantoprazole  40 mg Oral Daily    alogliptin  6.25 mg Oral Daily    sodium chloride flush  5-40 mL Intravenous 2 times per day    sodium chloride flush  5-40 mL Intravenous 2 times per day      Infusions:    dextrose      sodium chloride           Objective:   Vitals: BP (!) 124/52   Pulse 86   Temp 97.6 °F (36.4 °C) (Oral)   Resp 24   Ht 5' (1.524 m)   Wt 158 lb 14.4 oz (72.1 kg)   SpO2 98%   BMI 31.03 kg/m²   General appearance: alert and cooperative with exam appears to be somewhat dehydrated  Neck: no JVD or bruit  Thyroid : Normal lobes   Lungs: Has Vesicular Breath sounds   Heart: Atrial fibrillation  Abdomen: soft, non-tender; bowel sounds normal; no masses,  no organomegaly  Musculoskeletal: Normal  Extremities: extremities normal, , no edema  Neurologic:  Awake, alert, oriented to name, place and time. Cranial nerves II-XII are grossly intact. Motor is  intact. Sensory is neuropathy. ,  and gait is normal.    Assessment:     Patient Active Problem List:     History of CVA (cerebrovascular accident) without residual deficits     Morbid obesity ([address]     DM (diabetes mellitus), type 2, uncontrolled ([address]     Persistent atrial fibrillation (HCC)     CAD (coronary artery disease)     CHF (congestive heart failure) ([address]     Light headed     History of MI (myocardial infarction)     Diabetes mellitus ([address]     Hypertension     TIA (transient ischemic attack)     Hyperlipidemia     SOB (shortness of breath)     H/O cardiovascular stress test     Family history of coronary artery disease     PAF (paroxysmal atrial fibrillation) ([facility])     Chest pain     Atrial thrombus without antecedent myocardial infarction     S/P ablation of atrial fibrillation     Gastroenteritis     YESIKA (acute kidney injury) ([address]     Acute cystitis     Elevated liver enzymes     CHF (congestive heart failure), NYHA class I, acute on chronic, combined (HCC)     Pneumonia     Multifocal pneumonia     Atrial fibrillation with RVR (HCC)     Shoulder pain     Paroxysmal atrial fibrillation (HCC)     Diabetic hyperosmolar coma (HCC)     Difficult intravenous access     Metabolic encephalopathy getting better      Plan:     1. Reviewed POC blood rate 14 with regular foot embolism there is good  2. . Labs and X ray results   3. Reviewed Current Medicines   4. On meal +,  correction bolus Humalog/ Basal Lantus Insulin regime does get  5. Monitor Blood glucose frequently   6. Modified  the dose of Insulin/ other medicines as needed  7. Changed CODE STATUS  8. Looking into possibility of sending to  nursing [facility] for rehab  9. Will follow     .      Brandi Damon MD, MD

## 2021-08-03 NOTE — PROGRESS NOTES
Nephrology Progress Note  8/3/2021 2:48 PM        Subjective:   Admit Date: 7/15/2021  PCP: Josefa Patel PA-C    Interval History: Seen in early morning-doing very well    Diet: Ate 40% of her breakfast     ROS: Seems alert awake and oriented  No shortness of breath    Data:     Current meds:    [START ON 8/4/2021] DULoxetine  60 mg Oral Daily    metoprolol tartrate  50 mg Oral BID    amLODIPine  2.5 mg Oral Daily    busPIRone  10 mg Oral TID    insulin glargine  10 Units Subcutaneous Nightly    sodium chloride flush  5-40 mL Intravenous 2 times per day    insulin lispro  0-18 Units Subcutaneous 2 times per day    aspirin  81 mg Oral Daily    folic acid-pyridoxine-cyancobalamin  1 tablet Oral Daily    insulin lispro  0-18 Units Subcutaneous TID WC    insulin lispro  10 Units Subcutaneous TID WC    apixaban  5 mg Oral BID    levothyroxine  50 mcg Oral Daily    pantoprazole  40 mg Oral Daily    alogliptin  6.25 mg Oral Daily    sodium chloride flush  5-40 mL Intravenous 2 times per day    sodium chloride flush  5-40 mL Intravenous 2 times per day      dextrose      sodium chloride           I/O last 3 completed shifts: In: 240 [P.O.:240]  Out: -     CBC: No results for input(s): WBC, HGB, PLT in the last 72 hours.        Recent Labs     08/01/21  0344 08/03/21  0545    141   K 4.7 4.4   CL 97* 103   CO2 30 32   BUN 29* 25*   CREATININE 1.6* 1.4*   GLUCOSE 218* 151*       Lab Results   Component Value Date    CALCIUM 9.1 08/03/2021    PHOS 3.3 08/03/2021       Objective:     Vitals: BP (!) 138/55   Pulse 74   Temp 97.3 °F (36.3 °C) (Tympanic)   Resp 14   Ht 5' (1.524 m)   Wt 158 lb 14.4 oz (72.1 kg)   SpO2 98%   BMI 31.03 kg/m²     General appearance: No distress  HEENT: No conjunctival pallor-she is edentulous  Neck: Supple  Lungs: No gross crackles-but does have coarse bronchial breath sounds on posterior exam  Heart: Seems regular rate and rhythm  Abdomen: Soft nontender  Extremities: No edema yet      Problem List :         Impression :     1. Acute kidney injury-recovering now, likely from cardiorenal syndrome type I  2. Recent CHF-seems compensated, off diuretics now  3. Recent encephalopathy, also resolved  4. Severe cardiomyopathy still compensated without diuretics-her proBNP level is down going    Recommendation/Plan  :     1. She is waiting for ECF placement  2. Continue low-salt, DASH diet  3. Daily weight, watch for pulmonary edema  4. If any evidence of fluid overload then restart long acting oral diuretics  5. I will comanage with Dr. Yulissa Olmos  6.  Also outpatient follow-up with me      Stephen Winters MD MD

## 2021-08-03 NOTE — CONSULTS
Consult completed. PIV initiated and dressing applied. Pt tolerated well. Sincere, primary RN notified.

## 2021-08-03 NOTE — PROGRESS NOTES
Progress Note( Dr. Mirtha Ratliff)  8/3/2021  Subjective:   Admit Date: 7/15/2021  PCP: Sophie Gerard PA-C        Admitted For :   Altered mental state and severe hyperglycemia possible metabolic encephalopathy    Consulted For: Better control of blood glucose    Interval History: Patient is more alert awake responding to verbal commands and some answers seem to be appropriate and other seem to be confused   Patient did not require an attendant to watch her anymore    Patient is sitting in the chair and trying to eat breakfast this morning    CT Scan& MRI of Brain  Neg for any acute CVA     Denies any chest pains,   Denies SOB . Denies nausea or vomiting. Now eating better  No new bowel or bladder symptoms. Intake/Output Summary (Last 24 hours) at 8/3/2021 0740  Last data filed at 8/2/2021 0900  Gross per 24 hour   Intake 240 ml   Output --   Net 240 ml       DATA    CBC:   No results for input(s): WBC, HGB, PLT in the last 72 hours.  CMP:  Recent Labs     08/01/21  0344 08/03/21  0545    141   K 4.7 4.4   CL 97* 103   CO2 30 32   BUN 29* 25*   CREATININE 1.6* 1.4*   CALCIUM 9.3 9.1   PROT 6.0* 5.8*   LABALBU 3.4 3.4   BILITOT 0.6 0.4   ALKPHOS 105 99   AST 22 16   ALT 19 16     Lipids:   Lab Results   Component Value Date    CHOL 153 01/30/2012    HDL 59 01/30/2012    TRIG 135 01/30/2012     Glucose:  Recent Labs     08/02/21  2016 08/03/21  0417 08/03/21  0641   POCGLU 226* 177* 146*     [id number]:  Lab Results   Component Value Date    LABA1C 10.9 07/17/2021     High Sensitivity TSH:   Lab Results   Component Value Date    TSHHS 7.150 07/20/2021     Free T3: No results found for: FT3  Free T4:  Lab Results   Component Value Date    T4FREE 1.28 06/12/2021       Echocardiogram complete 2D with doppler with color    Result Date: 7/16/2021  Transthoracic Echocardiography Report (TTE)  Demographics   Patient Name       Abhilash MAYFIELD Date of Study       07/16/2021   Date of Birth      1955 Gender              Female   Age                60 year(s)         Race                   Patient Number     [id number]         Room Number         2128   Visit Number       [id number]   Corporate ID       [id number]   Accession Number   [id number]         Elizabeth Oleary   Ordering Physician Mazin Jo MD                 Physician           MD  Procedure Type of Study   TTE procedure:ECHOCARDIOGRAM COMPLETE 2D W DOPPLER W COLOR. Procedure Date Date: 07/16/2021 Start: 01:35 PM Study Location: Portable Technical Quality: Fair visualization Indications:Congestive heart failure. Patient Status: Routine Height: 60 inches Weight: 180 pounds BSA: 1.78 m2 BMI: 35.15 kg/m2 HR: 99 bpm BP: 145/83 mmHg  Conclusions   Summary  Left ventricular systolic function is abnormal.  Ejection fraction is visually estimated at 15-20%. Global hypokinesis noted. Grade III diastolic dysfunction. Mitral annular calcification is present. Moderate to severe mitral regurgitation. Mild to moderate tricuspid regurgitation; RVSP: 38 mmHg. No evidence of any pericardial effusion. Signature   ------------------------------------------------------------------  Electronically signed by Mey Cardoso MD (Interpreting  physician) on 07/16/2021 at 04:31 PM    CT ABDOMEN PELVIS WO CONTRAST Additional Contrast? None    Result Date: 7/15/2021  EXAMINATION: CT OF THE ABDOMEN AND PELVIS WITHOUT CONTRAST 7/15/2021 7:28 pm    1. Findings at the lung bases suggesting pulmonary edema with small bilateral effusions and associated atelectasis. 2. No acute intra-abdominal process identified. 3. Severe atherosclerotic disease. CT Head WO Contrast    Result Date: 7/15/2021  EXAMINATION: CT OF THE HEAD WITHOUT CONTRAST  7/15/2021 7:28 pm     No acute intracranial abnormality.  Chronic levothyroxine  50 mcg Oral Daily    pantoprazole  40 mg Oral Daily    alogliptin  6.25 mg Oral Daily    sodium chloride flush  5-40 mL Intravenous 2 times per day    sodium chloride flush  5-40 mL Intravenous 2 times per day      Infusions:    dextrose      sodium chloride           Objective:   Vitals: BP (!) 160/71   Pulse 80   Temp 98.2 °F (36.8 °C) (Oral)   Resp 16   Ht 5' (1.524 m)   Wt 158 lb 14.4 oz (72.1 kg)   SpO2 98%   BMI 31.03 kg/m²   General appearance: alert and cooperative with exam appears to be somewhat dehydrated  Neck: no JVD or bruit  Thyroid : Normal lobes   Lungs: Has Vesicular Breath sounds   Heart: Atrial fibrillation  Abdomen: soft, non-tender; bowel sounds normal; no masses,  no organomegaly  Musculoskeletal: Normal  Extremities: extremities normal, , no edema  Neurologic:  Awake, alert, oriented to name, place and time. Cranial nerves II-XII are grossly intact. Motor is  intact. Sensory is neuropathy. ,  and gait is normal.    Assessment:     Patient Active Problem List:     History of CVA (cerebrovascular accident) without residual deficits     Morbid obesity ([address]     DM (diabetes mellitus), type 2, uncontrolled ([address]     Persistent atrial fibrillation (HCC)     CAD (coronary artery disease)     CHF (congestive heart failure) ([address]     Light headed     History of MI (myocardial infarction)     Diabetes mellitus ([address]     Hypertension     TIA (transient ischemic attack)     Hyperlipidemia     SOB (shortness of breath)     H/O cardiovascular stress test     Family history of coronary artery disease     PAF (paroxysmal atrial fibrillation) ([facility])     Chest pain     Atrial thrombus without antecedent myocardial infarction     S/P ablation of atrial fibrillation     Gastroenteritis     YESIKA (acute kidney injury) ([address]     Acute cystitis     Elevated liver enzymes     CHF (congestive heart failure), NYHA class I, acute on chronic, combined (HCC)     Pneumonia     Multifocal pneumonia     Atrial fibrillation with RVR (HCC)     Shoulder pain     Paroxysmal atrial fibrillation (HCC)     Diabetic hyperosmolar coma (HCC)     Difficult intravenous access     Metabolic encephalopathy getting better      Plan:     1. Reviewed POC blood rate 14 with regular foot embolism there is good  2. . Labs and X ray results   3. Reviewed Current Medicines   4. On meal +,  correction bolus Humalog/ Basal Lantus Insulin regime does get  5. Monitor Blood glucose frequently   6. Modified  the dose of Insulin/ other medicines as needed  7. Changed CODE STATUS  8. Looking into possibility of sending to  nursing [place] for rehab  9. Will follow     .      Delma Castro MD, MD

## 2021-08-04 NOTE — FLOWSHEET NOTE
Pt bed alarm going off. Pt found shutting bed alarm off and walking alone to the bathroom. Discussed with pt earlier to not get up on her own. Pt mouths back at staff and states she will get up on her own.

## 2021-08-04 NOTE — PROGRESS NOTES
Occupational Therapy      Occupational Therapy Treatment Note      Name: Elena Kinney MRN: [id number] :   1955   Date:  2021   Admission Date: 7/15/2021 Room:  [facility]-A     Primary Problem: Acute encephalopathy, Hyperglycemia    Restrictions/Precautions: General Precautions, Fall Risk, Telemetry, Bed/Chair alarm, FILEMON monitor    Communication with other providers: JONI Kim    Subjective:  Patient states: \"I'm so bored in here! I wish I had my Lotus! \"  Pain: Pt denied pain this date    Objective:    Observation: Pt received in supine upon OT arrival. Pleasant and agreeable to treatment. Eager to get out of bed. Objective Measures: Stable HR throughout session, Oriented to person, place, month, year, and situation    Treatment, including education:  Therapeutic Activity Training:   Therapeutic activity training was instructed today. Cues were given for safety, sequence, UE/LE placement, awareness, and balance. Therapeutic Exercise:  Cues were given for technique, safety, recruitment, and rationale. Cues were verbal and/or tactile. Pt received in supine upon OT arrival. Pt re-educated on role of OT, POC, and importance of OOB activity. Pt transferred supine to sitting EOB mod I with use of bed rail. Pt able to don socks at EOB level with supervision/good static and dynamic sitting balance. Pt donned BL socks seated EOB with supervision by crossing legs into \"figure 4 position. \" Pt completed sit to stand transfer from bed with supervision. Pt requesting to ambulate in hallway, and ambulated approximately 200 ft SBA with RW. Pt with significantly decreased speed and step-length, and required mod cues for lengthening steps as well as for safety. Pt noted to have impaired divided attention, and frequently bumps into environmental objects unless therapist intervenes with cueing. Pt returned to room, and transferred stand to sit to bed with supervision.  Pt reported she had completed all ADLs in bathroom earlier. Pt took brief seated rest, and completed sit to stand with supervision. Pt ambulated to countertop CGA with no AD. Pt engaged in 2 sets x 10 reps of mini-squats with BL UE support on countertop to address functional LE strength for transfers and mobility. Pt required CGA for balance throughout, and mod coaching for technique. Pt ambulated back to bed CGA with no AD, and transferred to bed with supervision. Pt transferred sitting EOB to supine mod I with use of bed rail. Pt left positioned for comfort in bed with all lines intact, all needs within reach, and bed alarm on. Assessment / Impression:    Patient's tolerance of treatment: Well  Adverse Reaction: None  Significant change in status and impact: Continuing to improve daily  Barriers to improvement: Cognitive impairments (improving daily)      Plan for Next Session:    Continue per OT POC.        Time in: 1128  Time out: 1152  Timed treatment minutes: 24  Total treatment time: 24      Electronically signed by:    DELIO Cardoza/L, [place], [id number]

## 2021-08-04 NOTE — PROGRESS NOTES
NEUROLOGY NOTE  DR. Lor Camp MD.  -------------------------------------------------  Subjective:    Pt continues to be depressed-senies suicidal ideation or tendency    Pt states she is depressed x few months    Pt is not confused and doing better today    Objective:    BP (!) 138/55   Pulse 74   Temp 97.3 °F (36.3 °C) (Tympanic)   Resp 14   Ht 5' (1.524 m)   Wt 158 lb 14.4 oz (72.1 kg)   SpO2 98%   BMI 31.03 kg/m²   HEENT nl      Neuro exam    Alert oriented to person place not confused  eomi pupils 3 mm jo  Squeezes fingers to commands  Equivocal toes  resp on own      RADIOLOGY  -----------------    Echocardiogram complete 2D with doppler with color    Result Date: 7/16/2021  Transthoracic Echocardiography Report (TTE)  Demographics   Patient Name       Eugene MAYFIELD Date of Study       07/16/2021   Date of Birth      1955         Gender              Female   Age                60 year(s)         Race                   Patient Number     [id number]         Room Number         2128   Visit Number       [id number]   Corporate ID       [id number]   Accession Number   [id number]         Erna Lopez RDMS   Ordering Physician Anayeli Martins MD                 Physician           MD  Procedure Type of Study   TTE procedure:ECHOCARDIOGRAM COMPLETE 2D W DOPPLER W COLOR. Procedure Date Date: 07/16/2021 Start: 01:35 PM Study Location: Portable Technical Quality: Fair visualization Indications:Congestive heart failure. Patient Status: Routine Height: 60 inches Weight: 180 pounds BSA: 1.78 m2 BMI: 35.15 kg/m2 HR: 99 bpm BP: 145/83 mmHg  Conclusions   Summary  Left ventricular systolic function is abnormal.  Ejection fraction is visually estimated at 15-20%. Global hypokinesis noted. Grade III diastolic dysfunction.   Mitral annular 31.8  LV Area Systolic: 32 cm2  %                             LV Length: 7.68 cm                             LVOT: 1.9 cm  Doppler Measurements & Calculations   MV Peak E-Wave: 136  AV Peak Velocity: 125 cm/s    LVOT Peak Velocity: 64.6  cm/s                 AV Peak Gradient: 6.25 mmHg   cm/s  MV Peak A-Wave: 68.5 AV Mean Velocity: 86.6 cm/s   LVOT Mean Velocity: 39.8  cm/s                 AV Mean Gradient: 3 mmHg      cm/s  MV E/A Ratio: 1.99   AV VTI: 21 cm                 LVOT Peak Gradient: 2  MV Peak Gradient:    AV Area (Continuity):1.16 cm2 mmHgLVOT Mean Gradient: 1  7.4 mmHg                                           mmHg                       LVOT VTI: 8.63 cm             Estimated RVSP: 38 mmHg  MV P1/2t: 41 msec                                  Estimated RAP:3 mmHg  MVA by PHT:5.37 cm2  Estimated PASP: 35.49 mmHg   MV E' Septal                                       TR Velocity:285 cm/s  Velocity: 4.89 cm/s                                TR Gradient:32.49 mmHg  MV E' Lateral  Velocity: 10.4 cm/s  MV E/E' septal:  27.81  MV E/E' lateral:  13.08      CT ABDOMEN PELVIS WO CONTRAST Additional Contrast? None    Result Date: 7/15/2021  EXAMINATION: CT OF THE ABDOMEN AND PELVIS WITHOUT CONTRAST 7/15/2021 7:28 pm TECHNIQUE: CT of the abdomen and pelvis was performed without the administration of intravenous contrast. Multiplanar reformatted images are provided for review. Dose modulation, iterative reconstruction, and/or weight based adjustment of the mA/kV was utilized to reduce the radiation dose to as low as reasonably achievable. COMPARISON: MRI abdomen May 24, 2019; CT abdomen May 23, 2019 HISTORY: ORDERING SYSTEM PROVIDED HISTORY: Abdominal pain TECHNOLOGIST PROVIDED HISTORY: Reason for exam:->Abdominal pain Additional Contrast?->None Reason for Exam: Abdominal pain FINDINGS: Lower Chest: Imaged lung bases demonstrate partially imaged small and associated atelectasis.   Imaged lung fields demonstrate findings suggesting mild pulmonary edema, however evaluation is limited due to respiratory motion artifact. Coronary artery atherosclerotic disease present. Calcification of the mitral valve. Partially imaged heart is enlarged. Organs: Evaluation of the solid organs is limited due to lack of intravenous contrast.  The nonenhanced liver demonstrates no acute abnormality. The gallbladder is surgically absent. Numerous calcified granulomata throughout the spleen. Atrophy of the pancreas. The nonenhanced adrenal glands demonstrate no acute findings. Redemonstration of right adrenal adenoma is noted on previous exams. The nonenhanced kidneys are grossly stable with similar atrophy of the left kidney when compared with prior exams. No hydronephrosis identified. GI/Bowel: No bowel obstruction is evident. The colon is collapsed and not well evaluated. No evidence for appendicitis. The small bowel is normal in caliber. Small hiatal hernia. Pelvis: Bladder and solid pelvic organs demonstrate no acute findings. Peritoneum/Retroperitoneum: The abdominal aorta is normal in caliber with severe calcified atherosclerotic plaque throughout. No retroperitoneal adenopathy identified. No free fluid or free air identified within the abdomen and pelvis. Bones/Soft Tissues: Soft tissues demonstrate mild anasarca. Postoperative changes of the right anterior abdominal wall. No acute osseous abnormality identified. 1. Findings at the lung bases suggesting pulmonary edema with small bilateral effusions and associated atelectasis. 2. No acute intra-abdominal process identified. 3. Severe atherosclerotic disease.      CT Head WO Contrast    Result Date: 7/15/2021  EXAMINATION: CT OF THE HEAD WITHOUT CONTRAST  7/15/2021 7:28 pm TECHNIQUE: CT of the head was performed without the administration of intravenous contrast. Dose modulation, iterative reconstruction, and/or weight based adjustment of the mA/kV was utilized to reduce the radiation dose to as low as reasonably achievable. COMPARISON: CT brain June 11, 2021 HISTORY: ORDERING SYSTEM PROVIDED HISTORY: AMS TECHNOLOGIST PROVIDED HISTORY: Reason for exam:->AMS Has a \"code stroke\" or \"stroke alert\" been called? ->No Decision Support Exception - unselect if not a suspected or confirmed emergency medical condition->Emergency Medical Condition (MA) Reason for Exam: AMS FINDINGS: BRAIN/VENTRICLES: There is no acute intracranial hemorrhage, mass effect or midline shift. No abnormal extra-axial fluid collection. The gray-white differentiation is maintained without evidence of an acute infarct. There is no evidence of hydrocephalus. Severe atherosclerotic change of the intracranial vasculature. There is moderate periventricular and subcortical white matter hypoattenuation most consistent with microvascular ischemic changes. Global cerebral atrophy noted. ORBITS: The visualized portion of the orbits demonstrate no acute abnormality. SINUSES: The visualized paranasal sinuses and mastoid air cells demonstrate no acute abnormality. SOFT TISSUES/SKULL:  No acute abnormality of the visualized skull or soft tissues. No acute intracranial abnormality. Chronic microvascular ischemic changes and global cerebral atrophy. XR CHEST PORTABLE    Result Date: 7/16/2021  EXAMINATION: ONE XRAY VIEW OF THE CHEST 7/16/2021 10:31 am COMPARISON: July 15, 2021 HISTORY: ORDERING SYSTEM PROVIDED HISTORY: s/p central line attempt TECHNOLOGIST PROVIDED HISTORY: Reason for exam:->s/p central line attempt Reason for Exam: s/p central line attempt FINDINGS: Right IJ central venous catheter terminates in the mid SVC in satisfactory position. No appreciable pneumothorax. Cardiomediastinal silhouette appears unchanged. Pulmonary vascular congestion and diffuse interstitial prominence. Trace bilateral pleural effusions cannot be excluded. Minimal bibasilar atelectasis.      Right IJ central venous catheter terminates in the mid SVC in satisfactory position. No pneumothorax. Pulmonary vascular congestion with interstitial pulmonary edema. XR CHEST PORTABLE    Result Date: 7/15/2021  EXAMINATION: ONE XRAY VIEW OF THE CHEST 7/15/2021 5:47 pm COMPARISON: 06/13/2021 HISTORY: ORDERING SYSTEM PROVIDED HISTORY: AMS TECHNOLOGIST PROVIDED HISTORY: Reason for exam:->AMS Reason for Exam: AMS Initial encounter FINDINGS: Cardiomegaly. There is increased interstitial opacities bilaterally. No pleural effusion or pneumothorax. The osseous structures otherwise stable. Cardiomegaly with pulmonary edema. IR NONTUNNELED VASCULAR CATHETER > 5 YEARS    Result Date: 7/16/2021  PROCEDURE: ULTRASOUND GUIDED VASCULAR ACCESS. PLACEMENT OF A NON-TUNNELED CATHETER. 7/16/2021. HISTORY: ORDERING SYSTEM PROVIDED HISTORY: Need central venous access. Request CVC Insertion TECHNOLOGIST PROVIDED HISTORY: Reason for exam:->CVC Insertion SEDATION: None. FLUOROSCOPY DOSE AND TYPE OR TIME AND EXPOSURES: None. TECHNIQUE: Informed consent was obtained after a detailed explanation of the procedure including risks, benefits, and alternatives. Universal protocol was observed. The right neck and chest were prepped and draped in sterile fashion using maximum sterile barrier technique. Local anesthesia was achieved with lidocaine. A micropuncture needle was used to access the right internal jugular vein using ultrasound guidance. An ultrasound image demonstrating patency of the vein with needle tip located within it. An image was obtained and stored in PACs. A 0.035 guidewire was used to place a triple lumen central venous catheter after fascial tract dilation. The catheter flushed easily and there was a good blood return. The catheter was sutured to the skin. The catheter was locked with heparinized saline. The patient tolerated the procedure well and there were no immediate complications. EBL: Less than 1 ml.  FINDINGS: Chest x ray after procedure demonstrates the tip of the catheter in the SVC. The catheter is ready to use. Successful ultrasound and fluoroscopy guided non-tunneled central venous catheter placement. The catheter is ready to use. MRI BRAIN WO CONTRAST    Result Date: 7/20/2021  EXAMINATION: MRI OF THE BRAIN WITHOUT CONTRAST  7/20/2021 4:07 pm TECHNIQUE: Multiplanar multisequence MRI of the brain was performed without the administration of intravenous contrast. COMPARISON: None. HISTORY: ORDERING SYSTEM PROVIDED HISTORY: aphasic TECHNOLOGIST PROVIDED HISTORY: Reason for exam:->aphasic Reason for Exam: aphasic Acuity: Acute Type of Exam: Initial Relevant Medical/Surgical History: none FINDINGS: Motion degrades images limiting evaluation. INTRACRANIAL STRUCTURES/VENTRICLES: There is no acute infarct. No mass effect or midline shift. No evidence of an acute intracranial hemorrhage. Areas of T2 FLAIR hyperintensity are seen in the periventricular and subcortical white matter, which are nonspecific, but may represent chronic microvascular ischemic change. There is prominence of the ventricles and sulci due to global parenchymal volume loss. Evaluation of the signal voids within the major intracranial vessels is limited given patient motion. ORBITS: The visualized portion of the orbits demonstrate no acute abnormality. SINUSES: No gross abnormality seen of the paranasal sinuses. There appears to be a left mastoid effusion. 1. Patient motion limits evaluation. 2. No convincing acute intracranial abnormality. No acute infarct. 3. Mild global parenchymal volume loss with moderate chronic microvascular ischemic changes.        LAB RESULTS  --------------------    Recent Results (from the past 24 hour(s))   POCT Glucose    Collection Time: 08/03/21  4:17 AM   Result Value Ref Range    POC Glucose 177 (H) 70 - 99 MG/DL   Comprehensive Metabolic Panel    Collection Time: 08/03/21  5:45 AM   Result Value Ref Range    Sodium 141 135 - 145 MMOL/L    Potassium 4.4 3.5 - 5.1 MMOL/L    Chloride 103 99 - 110 mMol/L    CO2 32 21 - 32 MMOL/L    BUN 25 (H) 6 - 23 MG/DL    CREATININE 1.4 (H) 0.6 - 1.1 MG/DL    Glucose 151 (H) 70 - 99 MG/DL    Calcium 9.1 8.3 - 10.6 MG/DL    Albumin 3.4 3.4 - 5.0 GM/DL    Total Protein 5.8 (L) 6.4 - 8.2 GM/DL    Total Bilirubin 0.4 0.0 - 1.0 MG/DL    ALT 16 10 - 40 U/L    AST 16 15 - 37 IU/L    Alkaline Phosphatase 99 40 - 128 IU/L    GFR Non- 38 (L) >60 mL/min/1.73m2    GFR  46 (L) >60 mL/min/1.73m2    Anion Gap 6 4 - 16   Brain Natriuretic Peptide    Collection Time: 08/03/21  5:45 AM   Result Value Ref Range    Pro-BNP 2,797 (H) <300 PG/ML   Phosphorus    Collection Time: 08/03/21  5:45 AM   Result Value Ref Range    Phosphorus 3.3 2.5 - 4.9 MG/DL   Magnesium    Collection Time: 08/03/21  5:45 AM   Result Value Ref Range    Magnesium 2.1 1.8 - 2.4 mg/dl   POCT Glucose    Collection Time: 08/03/21  6:41 AM   Result Value Ref Range    POC Glucose 146 (H) 70 - 99 MG/DL   POCT Glucose    Collection Time: 08/03/21 11:29 AM   Result Value Ref Range    POC Glucose 253 (H) 70 - 99 MG/DL   POCT Glucose    Collection Time: 08/03/21  5:08 PM   Result Value Ref Range    POC Glucose 108 (H) 70 - 99 MG/DL   POCT Glucose    Collection Time: 08/03/21  7:33 PM   Result Value Ref Range    POC Glucose 203 (H) 70 - 99 MG/DL         Medical problems    Patient Active Problem List:     History of CVA (cerebrovascular accident) without residual deficits     Morbid obesity (HCC)     DM (diabetes mellitus), type 2, uncontrolled (HCC)     Persistent atrial fibrillation (HCC)     CAD (coronary artery disease)     CHF (congestive heart failure) (HCC)     Light headed     History of MI (myocardial infarction)     Diabetes mellitus (HCC)     Hypertension     TIA (transient ischemic attack)     Hyperlipidemia     SOB (shortness of breath)     H/O cardiovascular stress test     Family history of coronary artery disease     PAF (paroxysmal atrial fibrillation) ([facility])     Chest pain     Atrial thrombus without antecedent myocardial infarction     S/P ablation of atrial fibrillation     Gastroenteritis     YESIKA (acute kidney injury) ([facility] [address]     Acute cystitis     Elevated liver enzymes     CHF (congestive heart failure), NYHA class I, acute on chronic, combined ([facility] [address]     Pneumonia     Multifocal pneumonia     Atrial fibrillation with RVR ([facility])     Shoulder pain     Paroxysmal atrial fibrillation ([facility])     Diabetic hyperosmolar coma ([facility])     Difficult intravenous access     HFrEF (heart failure with reduced ejection fraction) ([facility])      ASSESSMENT:  ---------------------    Metabolic encephalopathy     TIA r/o cardio carotid embolic event     Hyperglycemia     ? Sepsis     A fib    Right MCA left Ophthalmic artery aneurysms    Multiple intracranial stenosis    Right MCA saccular aneurysm -    depression     PLAN:     CT brain neg     Mri brain neg    Mra head as above     C doppler neg     Echo EF 15 %      B 12 folate TSH  nl    High Homocysteine level     Continue eliquis      DC asa     Plavix    foltx     Increase cymbalta.     .    Electronically signed by Cal Rascon MD on 8/3/2021 at 10:35 PM

## 2021-08-04 NOTE — PROGRESS NOTES
Nephrology Progress Note  8/4/2021 3:32 PM        Subjective:   Admit Date: 7/15/2021  PCP: Dirk Regalado PA-C    Interval History: Patient seen in early a.m., no acute event overnight that I am aware of    Diet: Reasonable    ROS: No shortness of breath, no confusion  No weight gain yet  PND orthopnea    Data:     Current meds:    DULoxetine  60 mg Oral Daily    metoprolol tartrate  50 mg Oral BID    amLODIPine  2.5 mg Oral Daily    busPIRone  10 mg Oral TID    insulin glargine  10 Units Subcutaneous Nightly    sodium chloride flush  5-40 mL Intravenous 2 times per day    insulin lispro  0-18 Units Subcutaneous 2 times per day    aspirin  81 mg Oral Daily    folic acid-pyridoxine-cyancobalamin  1 tablet Oral Daily    insulin lispro  0-18 Units Subcutaneous TID WC    insulin lispro  10 Units Subcutaneous TID WC    apixaban  5 mg Oral BID    levothyroxine  50 mcg Oral Daily    pantoprazole  40 mg Oral Daily    alogliptin  6.25 mg Oral Daily    sodium chloride flush  5-40 mL Intravenous 2 times per day    sodium chloride flush  5-40 mL Intravenous 2 times per day      dextrose      sodium chloride           No intake/output data recorded. CBC: No results for input(s): WBC, HGB, PLT in the last 72 hours. Recent Labs     08/03/21  0545      K 4.4      CO2 32   BUN 25*   CREATININE 1.4*   GLUCOSE 151*       Lab Results   Component Value Date    CALCIUM 9.1 08/03/2021    PHOS 3.3 08/03/2021       Objective:     Vitals: /80   Pulse 75   Temp 97.8 °F (36.6 °C) (Oral)   Resp 17   Ht 5' (1.524 m)   Wt 157 lb 9.6 oz (71.5 kg)   SpO2 96%   BMI 30.78 kg/m²     General appearance: No acute distress this morning  HEENT: Decreased conjunctival pallor now  Neck: Supple  Lungs: I did not hear any crackles  Heart: Seems regular rhythm  Abdomen: Soft, nontender  Extremities: No overt edema yet      Problem List :         Impression :     1.  Acute kidney injury-secondary to cardiorenal

## 2021-08-04 NOTE — PROGRESS NOTES
INTERNAL MEDICINE PROGRESS NOTE        Ambrosio Sánchez Devan   1955   Primary Care Physician:  Ebony Olsen PA-C  Admit Date: 7/15/2021     Subjective:   Patient awake and alert. Doing better. No chest pain or shortness of breath. No nausea, vomiting or diarrhea. Objective:   /80   Pulse 75   Temp 97.8 °F (36.6 °C) (Oral)   Resp 17   Ht 5' (1.524 m)   Wt 157 lb 9.6 oz (71.5 kg)   SpO2 96%   BMI 30.78 kg/m²    General appearance: alert, appears stated age and cooperative  Head: Normocephalic, without obvious abnormality, atraumatic  Neck: no adenopathy and supple, symmetrical, trachea midline  Lungs: clear to auscultation bilaterally  Heart: regular rate and rhythm and S1, S2 normal  Abdomen: soft, non-tender; bowel sounds normal; no masses,  no organomegaly  Extremities: no clubbing, cyanosis or edema  Neurologic: moves all 4 extremities. Oriented x 3, cranial nerve intact.  Motor system: generalized weakness      Data Review  Lab Results   Component Value Date     08/03/2021    K 4.4 08/03/2021     08/03/2021    CO2 32 08/03/2021    CREATININE 1.4 (H) 08/03/2021    BUN 25 (H) 08/03/2021    CALCIUM 9.1 08/03/2021     Lab Results   Component Value Date    WBC 10.5 07/27/2021    HGB 12.2 (L) 07/27/2021    HCT 38.7 07/27/2021    MCV 95.8 07/27/2021     07/27/2021     INR/Prothrombin Time      Meds:    DULoxetine  60 mg Oral Daily    metoprolol tartrate  50 mg Oral BID    amLODIPine  2.5 mg Oral Daily    busPIRone  10 mg Oral TID    insulin glargine  10 Units Subcutaneous Nightly    sodium chloride flush  5-40 mL Intravenous 2 times per day    insulin lispro  0-18 Units Subcutaneous 2 times per day    aspirin  81 mg Oral Daily    folic acid-pyridoxine-cyancobalamin  1 tablet Oral Daily    insulin lispro  0-18 Units Subcutaneous TID WC    insulin lispro  10 Units Subcutaneous TID WC    apixaban  5 mg Oral BID    levothyroxine  50 mcg Oral Daily    pantoprazole  40 mg Oral Daily    alogliptin  6.25 mg Oral Daily    sodium chloride flush  5-40 mL Intravenous 2 times per day    sodium chloride flush  5-40 mL Intravenous 2 times per day     PRN Meds: dextrose, diphenhydrAMINE, hydrOXYzine, LORazepam, potassium chloride, albuterol sulfate HFA, nitroGLYCERIN, traZODone, glucose, dextrose, glucagon (rDNA), dextrose, sodium chloride flush, ondansetron **OR** ondansetron, polyethylene glycol, acetaminophen **OR** acetaminophen, sodium chloride flush, sodium chloride    Assessment/Plan:   Patient Active Hospital Problem List:  Patient Active Problem List   Diagnosis    History of CVA (cerebrovascular accident) without residual deficits    [facility]bid obesity ([facility]    DM (diabetes mellitus), type 2, uncontrolled [email or web address]    Persistent atrial fibrillation ([email or web address]    CAD (coronary artery disease)    CHF (congestive heart failure) ([email or web address]    Light headed    History of MI (myocardial infarction)    Diabetes mellitus ([email or web address]    Hypertension    TIA (transient ischemic attack)    Hyperlipidemia    SOB (shortness of breath)    H/O cardiovascular stress test    Family history of coronary artery disease    [facility]ox[facility]    Chest pain    Atrial thrombus without antecedent myocardial infarction    S/P ablation of atrial fibrillation    Gastroenteritis    YESIKA (acute kidney injury) ([email or web address]    Acute cystitis    Elevated liver enzymes    CHF (congestive heart failure), NYHA class I, acute on chronic, combined ([email or web address]    Pneumonia    Multifocal pneumonia    Atrial fibrillation with RVR ([facility])    Shoulder pain    Paroxysmal atrial fibrillation ([email or web address]    Diabetic hyperosmolar coma ([email or web address]    Difficult intravenous access    HFrEF (heart failure with reduced ejection fraction) ([facility])    Delirium    Bradycardia     Assessment:  Diabetic hyperosmolar state: resolved. Metabolic encephalopathy: improved  Leukocytosis and possible sepsis: resolved.  procal is wnl  Depression: Cymbalta dose is increased. A. Fib: rate is in control now  Hypertension: monitor  Elevated LFTs: Slowly improving. CT abdomen nonacute. Monitor. Possible pneumonia: on antibiotics  Pulmonary edema: continue monitoring. Grief:   HFrEF: EF 15-20%    Non sustained VT: re consult cardio    Plan:  Discussed with case management and Dr. Isabel Alcantar. Clinically stable. Check magnesium tomorrow  Waiting for Pre-cert. Monitor for now.      Electronically signed by Tho Reed MD on 8/4/2021 at 5:29 PM

## 2021-08-04 NOTE — PROGRESS NOTES
[id number]   Visit Number       [id number]   Corporate ID       [id number]   Accession Number   [id number]         Peewee Vargas   Ordering Physician Ilene Ledesma MD                 Physician           MD  Procedure Type of Study   TTE procedure:ECHOCARDIOGRAM COMPLETE 2D W DOPPLER W COLOR. Procedure Date Date: 07/16/2021 Start: 01:35 PM Study Location: Portable Technical Quality: Fair visualization Indications:Congestive heart failure. Patient Status: Routine Height: 60 inches Weight: 180 pounds BSA: 1.78 m2 BMI: 35.15 kg/m2 HR: 99 bpm BP: 145/83 mmHg  Conclusions   Summary  Left ventricular systolic function is abnormal.  Ejection fraction is visually estimated at 15-20%. Global hypokinesis noted. Grade III diastolic dysfunction. Mitral annular calcification is present. Moderate to severe mitral regurgitation. Mild to moderate tricuspid regurgitation; RVSP: 38 mmHg. No evidence of any pericardial effusion. Signature   ------------------------------------------------------------------  Electronically signed by Alex Galicia MD (Interpreting  physician) on 07/16/2021 at 04:31 PM    CT ABDOMEN PELVIS WO CONTRAST Additional Contrast? None    Result Date: 7/15/2021  EXAMINATION: CT OF THE ABDOMEN AND PELVIS WITHOUT CONTRAST 7/15/2021 7:28 pm    1. Findings at the lung bases suggesting pulmonary edema with small bilateral effusions and associated atelectasis. 2. No acute intra-abdominal process identified. 3. Severe atherosclerotic disease. CT Head WO Contrast    Result Date: 7/15/2021  EXAMINATION: CT OF THE HEAD WITHOUT CONTRAST  7/15/2021 7:28 pm     No acute intracranial abnormality. Chronic microvascular ischemic changes and global cerebral atrophy.      XR CHEST PORTABLE    Result Date: 7/16/2021  EXAMINATION: ONE XRAY VIEW OF THE CHEST 7/16/2021 10:31 am sodium chloride flush  5-40 mL Intravenous 2 times per day      Infusions:    dextrose      sodium chloride           Objective:   Vitals: BP (!) 159/77   Pulse 83   Temp 97.9 °F (36.6 °C)   Resp 13   Ht 5' (1.524 m)   Wt 157 lb 9.6 oz (71.5 kg)   SpO2 95%   BMI 30.78 kg/m²   General appearance: alert and cooperative with exam appears to be somewhat dehydrated  Neck: no JVD or bruit  Thyroid : Normal lobes   Lungs: Has Vesicular Breath sounds   Heart: Atrial fibrillation  Abdomen: soft, non-tender; bowel sounds normal; no masses,  no organomegaly  Musculoskeletal: Normal  Extremities: extremities normal, , no edema  Neurologic:  Awake, alert, oriented to name, place and time. Cranial nerves II-XII are grossly intact. Motor is  intact. Sensory is neuropathy. ,  and gait is normal.    Assessment:     Patient Active Problem List:     History of CVA (cerebrovascular accident) without residual deficits     Morbid obesity ([address]     DM (diabetes mellitus), type 2, uncontrolled ([address]     Persistent atrial fibrillation (HCC)     CAD (coronary artery disease)     CHF (congestive heart failure) ([address]     Light headed     History of MI (myocardial infarction)     Diabetes mellitus ([address]     Hypertension     TIA (transient ischemic attack)     Hyperlipidemia     SOB (shortness of breath)     H/O cardiovascular stress test     Family history of coronary artery disease     PAF (paroxysmal atrial fibrillation) ([facility])     Chest pain     Atrial thrombus without antecedent myocardial infarction     S/P ablation of atrial fibrillation     Gastroenteritis     YESIKA (acute kidney injury) ([address]     Acute cystitis     Elevated liver enzymes     CHF (congestive heart failure), NYHA class I, acute on chronic, combined (HCC)     Pneumonia     Multifocal pneumonia     Atrial fibrillation with RVR ([facility])     Shoulder pain     Paroxysmal atrial fibrillation ([address]     Diabetic hyperosmolar coma ([address]     Difficult intravenous access Metabolic encephalopathy getting better      Plan:     1. Reviewed POC blood rate 14 with regular foot embolism there is good  2. . Labs and X ray results   3. Reviewed Current Medicines   4. On meal +,  correction bolus Humalog/ Basal Lantus Insulin regime does get  5. Monitor Blood glucose frequently   6. Modified  the dose of Insulin/ other medicines as needed  7. Changed CODE STATUS  8. Looking into possibility of sending to  nursing home for rehab  9. Will follow     .      Brandi Damon MD, MD

## 2021-08-04 NOTE — CARE COORDINATION
LSW spoke with Cecile with [facility]. Cecile stated the precert is still pending. LSW saw that OT has seen pt multiple times. Pt last saw pt on 7/30. Need updated notes from PT.  OT has seen pt the last two days. LSW placed a WB asking for updated PT notes. Precert is pending. LSW completed PASS and placed copy in NH packet.

## 2021-08-05 NOTE — PROGRESS NOTES
INTERNAL MEDICINE PROGRESS NOTE        Char Cruzdevon   1955   Primary Care Physician:  Michael Melara PA-C  Admit Date: 7/15/2021     Subjective:   Patient awake and alert. Doing better. No chest pain or shortness of breath. No nausea, vomiting or diarrhea. Objective:   BP (!) 154/64   Pulse 80   Temp 97.8 °F (36.6 °C) (Oral)   Resp 18   Ht 5' (1.524 m)   Wt 157 lb 9.6 oz (71.5 kg)   SpO2 97%   BMI 30.78 kg/m²    General appearance: alert, appears stated age and cooperative  Head: Normocephalic, without obvious abnormality, atraumatic  Neck: no adenopathy and supple, symmetrical, trachea midline  Lungs: clear to auscultation bilaterally  Heart: regular rate and rhythm and S1, S2 normal  Abdomen: soft, non-tender; bowel sounds normal; no masses,  no organomegaly  Extremities: no clubbing, cyanosis or edema  Neurologic: moves all 4 extremities. Oriented x 3, cranial nerve intact.  Motor system: generalized weakness      Data Review  Lab Results   Component Value Date     2021    K 4.8 2021     2021    CO2 32 2021    CREATININE 1.2 (H) 2021    BUN 19 2021    CALCIUM 8.8 2021     Lab Results   Component Value Date    WBC 10.5 2021    HGB 12.2 (L) 2021    HCT 38.7 2021    MCV 95.8 2021     2021     INR/Prothrombin Time      Meds:    DULoxetine  60 mg Oral Daily    metoprolol tartrate  50 mg Oral BID    amLODIPine  2.5 mg Oral Daily    busPIRone  10 mg Oral TID    insulin glargine  10 Units Subcutaneous Nightly    sodium chloride flush  5-40 mL Intravenous 2 times per day    insulin lispro  0-18 Units Subcutaneous 2 times per day    aspirin  81 mg Oral Daily    folic acid-pyridoxine-cyancobalamin  1 tablet Oral Daily    insulin lispro  0-18 Units Subcutaneous TID WC    insulin lispro  10 Units Subcutaneous TID WC    apixaban  5 mg Oral BID    levothyroxine  50 mcg Oral Daily    pantoprazole  40 mg Oral Daily    alogliptin  6.25 mg Oral Daily    sodium chloride flush  5-40 mL Intravenous 2 times per day    sodium chloride flush  5-40 mL Intravenous 2 times per day     PRN Meds: dextrose, diphenhydrAMINE, hydrOXYzine, LORazepam, potassium chloride, albuterol sulfate HFA, nitroGLYCERIN, traZODone, glucose, dextrose, glucagon (rDNA), dextrose, sodium chloride flush, ondansetron **OR** ondansetron, polyethylene glycol, acetaminophen **OR** acetaminophen, sodium chloride flush, sodium chloride    Assessment/Plan:   Patient Active Hospital Problem List:  Patient Active Problem List   Diagnosis    History of CVA (cerebrovascular accident) without residual deficits    [facility]bid obesity ([facility]    DM [facility]diabetes mellitus), type 2, uncontrolled [facility]    Persistent [facility] fibrillation [facility]    CAD (coronary artery disease)    CHF (congestive heart failure) [facility]    Light headed    History of MI (myocardial infarction)    Diabetes mellitus ([facility]    Hypertension    TIA (transient ischemic attack)    Hyperlipidemia    SOB (shortness of breath)    H/O cardiovascular stress test    Family history of coronary artery disease    [facility]    Chest pain    Atrial thrombus without antecedent myocardial infarction    S/P ablation of atrial fibrillation    Gastroenteritis    YESIKA (acute kidney injury) ([facility]    Acute cystitis    Elevated liver enzymes    CHF (congestive heart failure), NYHA class I, acute on chronic, combined ([facility]    Pneumonia    Multifocal pneumonia    Atrial fibrillation with RVR ([facility]    Shoulder pain    Paroxysmal atrial fibrillation ([facility]    Diabetic hyperosmolar coma ([facility]    Difficult intravenous access    HFrEF (heart failure with reduced ejection fraction) ([facility]    Delirium    Bradycardia     Assessment:  Diabetic hyperosmolar state: resolved. Metabolic encephalopathy: improved  Leukocytosis and possible sepsis: resolved.  procal is wnl  Depression: Cymbalta dose is increased. A. Fib: rate is in control now  Hypertension: monitor  Elevated LFTs: Slowly improving. CT abdomen nonacute. Monitor. Possible pneumonia: on antibiotics  Pulmonary edema: continue monitoring. Grief:   HFrEF: EF 15-20%    Wide complex tachycardia: discussed with Dr. Chandan Mckinney, he thinks that it is mostly a. Fib with abbarancy. I requested him to double check.     Plan:    Waiting for percert  Discussed with Dr. Chandan Mckinney    Electronically signed by Yara Gallardo MD on 8/5/2021 at 6:42 PM

## 2021-08-05 NOTE — CARE COORDINATION
LSW spoke with Cecile with [facility] and the pt precert is still pending. OT has updated their notes. Still waiting on PT updated notes. PT notes are most needed since they have not seen pt since 7/30/2021. WB note was placed.

## 2021-08-05 NOTE — PROGRESS NOTES
Progress Note( Dr. Che Rutledge)  8/5/2021  Subjective:   Admit Date: 7/15/2021  PCP: Rafi Kinney PA-C        Admitted For :   Altered mental state and severe hyperglycemia possible metabolic encephalopathy    Consulted For: Better control of blood glucose    Interval History: Patient is more alert awake responding to verbal commands and some answers seem to be appropriate and other seem to be confused       Patient is sitting in the chair and trying to eat breakfast this morning    CT Scan& MRI of Brain  Neg for any acute CVA     Denies any chest pains,   Denies SOB . Denies nausea or vomiting. Now eating better  No new bowel or bladder symptoms. No intake or output data in the 24 hours ending 08/05/21 0736    DATA    CBC:   No results for input(s): WBC, HGB, PLT in the last 72 hours.  CMP:  Recent Labs     08/03/21  0545 08/05/21  0351    141   K 4.4 4.8    105   CO2 32 32   BUN 25* 19   CREATININE 1.4* 1.2*   CALCIUM 9.1 8.8   PROT 5.8* 5.4*   LABALBU 3.4 3.2*   BILITOT 0.4 0.6   ALKPHOS 99 94   AST 16 17   ALT 16 15     Lipids:   Lab Results   Component Value Date    CHOL 153 01/30/2012    HDL 59 01/30/2012    TRIG 135 01/30/2012     Glucose:  Recent Labs     08/04/21  1947 08/05/21  0344 08/05/21  0639   POCGLU 257* 118* 135*     [id number]:  Lab Results   Component Value Date    LABA1C 10.9 07/17/2021     High Sensitivity TSH:   Lab Results   Component Value Date    TSHHS 7.150 07/20/2021     Free T3: No results found for: FT3  Free T4:  Lab Results   Component Value Date    T4FREE 1.28 06/12/2021       Echocardiogram complete 2D with doppler with color    Result Date: 7/16/2021  Transthoracic Echocardiography Report (TTE)  Demographics   Patient Name       Erika MAYFIELD Date of Study       07/16/2021   Date of Birth      1955         Gender              Female   Age                60 year(s)         Race                   Patient Number     [id number]         Room Number 2128   Visit Number       [id number]   Corporate ID       [id number]   Accession Number   [id number]         Melvina Laguna   Ordering Physician Rylee Roberts MD                 Physician           MD  Procedure Type of Study   TTE procedure:ECHOCARDIOGRAM COMPLETE 2D W DOPPLER W COLOR. Procedure Date Date: 07/16/2021 Start: 01:35 PM Study Location: Portable Technical Quality: Fair visualization Indications:Congestive heart failure. Patient Status: Routine Height: 60 inches Weight: 180 pounds BSA: 1.78 m2 BMI: 35.15 kg/m2 HR: 99 bpm BP: 145/83 mmHg  Conclusions   Summary  Left ventricular systolic function is abnormal.  Ejection fraction is visually estimated at 15-20%. Global hypokinesis noted. Grade III diastolic dysfunction. Mitral annular calcification is present. Moderate to severe mitral regurgitation. Mild to moderate tricuspid regurgitation; RVSP: 38 mmHg. No evidence of any pericardial effusion. Signature   ------------------------------------------------------------------  Electronically signed by Dandy Marsh MD (Interpreting  physician) on 07/16/2021 at 04:31 PM    CT ABDOMEN PELVIS WO CONTRAST Additional Contrast? None    Result Date: 7/15/2021  EXAMINATION: CT OF THE ABDOMEN AND PELVIS WITHOUT CONTRAST 7/15/2021 7:28 pm    1. Findings at the lung bases suggesting pulmonary edema with small bilateral effusions and associated atelectasis. 2. No acute intra-abdominal process identified. 3. Severe atherosclerotic disease. CT Head WO Contrast    Result Date: 7/15/2021  EXAMINATION: CT OF THE HEAD WITHOUT CONTRAST  7/15/2021 7:28 pm     No acute intracranial abnormality. Chronic microvascular ischemic changes and global cerebral atrophy.      XR CHEST PORTABLE    Result Date: 7/16/2021  EXAMINATION: ONE XRAY VIEW OF THE CHEST 7/16/2021 10:31 am COMPARISON: July 15, 2021 HISTORY: ORDERING SYSTEM PROVIDED HISTORY: s/p central line attempt    Right IJ central venous catheter terminates in the mid SVC in satisfactory position. No pneumothorax. Pulmonary vascular congestion with interstitial pulmonary edema. XR CHEST PORTABLE    Result Date: 7/15/2021  EXAMINATION: ONE XRAY VIEW OF THE CHEST 7/15/2021 5:47 pm COMPARISON: 06/13/2021 HISTORY: ORDERING SYSTEM PROVIDED HISTORY: AMS TECHNOLOGIST PROVIDED HISTORY: Reason for exam:->AMS Reason for Exam: AMS Initial encounter FINDINGS: Cardiomegaly. There is increased interstitial opacities bilaterally. No pleural effusion or pneumothorax. The osseous structures otherwise stable. Cardiomegaly with pulmonary edema. IR NONTUNNELED VASCULAR CATHETER > 5 YEARS    Result Date: 7/16/2021  PROCEDURE: ULTRASOUND GUIDED VASCULAR ACCESS. PLACEMENT OF A NON-TUNNELED CATHETER. 7/16/2021. HISTORY: ORDERING SYSTEM PROVIDED HISTORY: Need central venous access. Request CVC Insertion TECHNOLOGIST PROVIDED HISTORY: Reason for exam:->CVC Insertion SEDATION: None. Successful ultrasound and fluoroscopy guided non-tunneled central venous catheter placement. The catheter is ready to use.        Scheduled Medicines   Medications:    DULoxetine  60 mg Oral Daily    metoprolol tartrate  50 mg Oral BID    amLODIPine  2.5 mg Oral Daily    busPIRone  10 mg Oral TID    insulin glargine  10 Units Subcutaneous Nightly    sodium chloride flush  5-40 mL Intravenous 2 times per day    insulin lispro  0-18 Units Subcutaneous 2 times per day    aspirin  81 mg Oral Daily    folic acid-pyridoxine-cyancobalamin  1 tablet Oral Daily    insulin lispro  0-18 Units Subcutaneous TID WC    insulin lispro  10 Units Subcutaneous TID     apixaban  5 mg Oral BID    levothyroxine  50 mcg Oral Daily    pantoprazole  40 mg Oral Daily    alogliptin  6.25 mg Oral Daily    sodium chloride flush  5-40 mL Intravenous 2 times per day    sodium chloride flush  5-40 mL Intravenous 2 times per day      Infusions:    dextrose      sodium chloride           Objective:   Vitals: BP (!) 145/64   Pulse 72   Temp 98.1 °F (36.7 °C) (Oral)   Resp 11   Ht 5' (1.524 m)   Wt 157 lb 9.6 oz (71.5 kg)   SpO2 96%   BMI 30.78 kg/m²   General appearance: alert and cooperative with exam appears to be somewhat dehydrated  Neck: no JVD or bruit  Thyroid : Normal lobes   Lungs: Has Vesicular Breath sounds   Heart: Atrial fibrillation  Abdomen: soft, non-tender; bowel sounds normal; no masses,  no organomegaly  Musculoskeletal: Normal  Extremities: extremities normal, , no edema  Neurologic:  Awake, alert, oriented to name, place and time. Cranial nerves II-XII are grossly intact. Motor is  intact. Sensory is neuropathy. ,  and gait is normal.    Assessment:     Patient Active Problem List:     History of CVA (cerebrovascular accident) without residual deficits     Morbid obesity ([address]     DM (diabetes mellitus), type 2, uncontrolled ([address]     Persistent atrial fibrillation (HCC)     CAD (coronary artery disease)     CHF (congestive heart failure) ([address]     Light headed     History of MI (myocardial infarction)     Diabetes mellitus ([address]     Hypertension     TIA (transient ischemic attack)     Hyperlipidemia     SOB (shortness of breath)     H/O cardiovascular stress test     Family history of coronary artery disease     PAF (paroxysmal atrial fibrillation) ([facility]     Chest pain     Atrial thrombus without antecedent myocardial infarction     S/P ablation of atrial fibrillation     Gastroenteritis     YESIKA (acute kidney injury) ([address]     Acute cystitis     Elevated liver enzymes     CHF (congestive heart failure), NYHA class I, acute on chronic, combined (HCC)     Pneumonia     Multifocal pneumonia     Atrial fibrillation with RVR ([facility])     Shoulder pain     Paroxysmal atrial fibrillation ([address]     Diabetic hyperosmolar coma ([address]     Difficult intravenous access     Metabolic encephalopathy getting better      Plan:     1. Reviewed POC blood rate 14 with regular foot embolism there is good  2. . Labs and X ray results   3. Reviewed Current Medicines   4. On meal +,  correction bolus Humalog/ Basal Lantus Insulin regime does get  5. Monitor Blood glucose frequently   6. Modified  the dose of Insulin/ other medicines as needed  7. Changed CODE STATUS  8. Looking into possibility of sending to  nursing home for rehab  9. Will follow     .      Ruiz Murillo MD, MD

## 2021-08-05 NOTE — PROGRESS NOTES
Nephrology Progress Note  8/5/2021 10:36 AM        Subjective:   Admit Date: 7/15/2021  PCP: Jennifer Carlson PA-C    Interval History: Waiting for placement-no major event overnight    Diet: Better    ROS: Alert awake and oriented no confusion  No shortness of breath, PND or orthopnea    Data:     Current meds:    DULoxetine  60 mg Oral Daily    metoprolol tartrate  50 mg Oral BID    amLODIPine  2.5 mg Oral Daily    busPIRone  10 mg Oral TID    insulin glargine  10 Units Subcutaneous Nightly    sodium chloride flush  5-40 mL Intravenous 2 times per day    insulin lispro  0-18 Units Subcutaneous 2 times per day    aspirin  81 mg Oral Daily    folic acid-pyridoxine-cyancobalamin  1 tablet Oral Daily    insulin lispro  0-18 Units Subcutaneous TID WC    insulin lispro  10 Units Subcutaneous TID WC    apixaban  5 mg Oral BID    levothyroxine  50 mcg Oral Daily    pantoprazole  40 mg Oral Daily    alogliptin  6.25 mg Oral Daily    sodium chloride flush  5-40 mL Intravenous 2 times per day    sodium chloride flush  5-40 mL Intravenous 2 times per day      dextrose      sodium chloride           No intake/output data recorded. CBC: No results for input(s): WBC, HGB, PLT in the last 72 hours. Recent Labs     08/03/21  0545 08/05/21  0351    141   K 4.4 4.8    105   CO2 32 32   BUN 25* 19   CREATININE 1.4* 1.2*   GLUCOSE 151* 111*       Lab Results   Component Value Date    CALCIUM 8.8 08/05/2021    PHOS 4.1 08/05/2021       Objective:     Vitals: BP (!) 168/81   Pulse 80   Temp 98 °F (36.7 °C)   Resp 18   Ht 5' (1.524 m)   Wt 157 lb 9.6 oz (71.5 kg)   SpO2 98%   BMI 30.78 kg/m²     General appearance: No acute distress-  HEENT: She is edentulous, 1+ conjunctival pallor  Neck: Supple  Lungs: Bronchial breath sound  Heart: Irregularly irregular  Abdomen: Soft, nontender  Extremities: No overt edema      Problem List :         Impression :     1.  Acute kidney injury-recovering due to cardiorenal syndrome type I  2. Recent acute decompensated heart failure with underlying cardiomyopathy-somehow someway she is well compensated without any diuretics  3. Recent encephalopathy which is multifactorial but resolved  4. Chronic diabetes, thyroid disease and atrial fibrillation    Recommendation/Plan  :     1. So she has been doing remarkably well  2. We will keep her off diuretics now  3. Mainly-low-salt, DASH diet  4. Good glycemic control  5. She might need ACE or ARB as an outpatient  6. When she is discharged need CMP, magnesium, phosphorus in 1 to 2 weeks  7.  Follow-up with me in 2 to 3 weeks      Khushbu Mohamud MD MD

## 2021-08-05 NOTE — PROGRESS NOTES
NEUROLOGY NOTE  DR. Huong López MD.  -------------------------------------------------  Subjective:    Pt denies depression-states feels better    Pt states she is depressed x few months    Pt is not confused and doing better today    Objective:    BP (!) 149/63   Pulse 80   Temp 98.4 °F (36.9 °C) (Oral)   Resp 16   Ht 5' (1.524 m)   Wt 157 lb 9.6 oz (71.5 kg)   SpO2 96%   BMI 30.78 kg/m²   HEENT nl      Neuro exam    Alert oriented to person place not confused  eomi pupils 3 mm jo  Squeezes fingers to commands  Equivocal toes  resp on own      RADIOLOGY  -----------------    Echocardiogram complete 2D with doppler with color    Result Date: 7/16/2021  Transthoracic Echocardiography Report (TTE)  Demographics   Patient Name       Jennifer MAYFIELD Date of Study       07/16/2021   Date of Birth      1955         Gender              Female   Age                60 year(s)         Race                   Patient Number     [id number]         Room Number         2128   Visit Number       [id number]   Corporate ID       [id number]   Accession Number   [id number]         Angel Valiente RDMS   Ordering Physician Alethea Lanier MD                 Physician           MD  Procedure Type of Study   TTE procedure:ECHOCARDIOGRAM COMPLETE 2D W DOPPLER W COLOR. Procedure Date Date: 07/16/2021 Start: 01:35 PM Study Location: Portable Technical Quality: Fair visualization Indications:Congestive heart failure. Patient Status: Routine Height: 60 inches Weight: 180 pounds BSA: 1.78 m2 BMI: 35.15 kg/m2 HR: 99 bpm BP: 145/83 mmHg  Conclusions   Summary  Left ventricular systolic function is abnormal.  Ejection fraction is visually estimated at 15-20%. Global hypokinesis noted. Grade III diastolic dysfunction. Mitral annular calcification is present.   Moderate to severe mitral regurgitation. Mild to moderate tricuspid regurgitation; RVSP: 38 mmHg. No evidence of any pericardial effusion. Signature   ------------------------------------------------------------------  Electronically signed by Shalonda Dawn MD (Interpreting  physician) on 07/16/2021 at 04:31 PM  ------------------------------------------------------------------   Findings   Left Ventricle  Left ventricular systolic function is abnormal.  Ejection fraction is visually estimated at 15-20%. Global hypokinesis noted. Grade III diastolic dysfunction. Left ventricle size is normal.   Left Atrium  Essentially normal left atrium. Right Atrium  Essentially normal right atrium. Right Ventricle  Essentially normal right ventricle. Aortic Valve  Trace aortic regurgitation noted. Mitral Valve  Mitral annular calcification is present. Moderate to severe mitral regurgitation. Tricuspid Valve  Mild to moderate tricuspid regurgitation; RVSP: 38 mmHg. Pulmonic Valve  The pulmonic valve was not well visualized. Pericardial Effusion  No evidence of any pericardial effusion. Pleural Effusion  No evidence of pleural effusion. Miscellaneous  The aorta is within normal limits.   M-Mode/2D Measurements & Calculations   LV Diastolic Dimension:   LV Systolic Dimension:   LA Dimension: 3.8 cmAO  4.86 cm                   4.6 cm                   Root Dimension: 2.8 cm  LV FS:5.4 %               LV Volume Diastolic: 433  LV PW Diastolic: 4.73 cm  ml  LV PW Systolic: 4.01 cm   LV Volume Systolic: 363  Septum Diastolic: 3.03 cm ml                       RV Diastolic Dimension:  Septum Systolic: 9.79 cm  LV EDV/LV EDV Index: 116 3.13 cm  CO: 2.42 l/min            ml/65 m2LV ESV/LV ESV  CI: 1.36 l/m*m2           Index: 108 ml/61 m2      LA/Aorta: 1.36                            EF Calculated (A4C): 6.9  LV Area Diastolic: 90.9   %  cm2                       EF Calculated (2D): 83.4  LV Area Systolic: 32 cm2  % LV Length: 7.68 cm                             LVOT: 1.9 cm  Doppler Measurements & Calculations   MV Peak E-Wave: 136  AV Peak Velocity: 125 cm/s    LVOT Peak Velocity: 64.6  cm/s                 AV Peak Gradient: 6.25 mmHg   cm/s  MV Peak A-Wave: 68.5 AV Mean Velocity: 86.6 cm/s   LVOT Mean Velocity: 39.8  cm/s                 AV Mean Gradient: 3 mmHg      cm/s  MV E/A Ratio: 1.99   AV VTI: 21 cm                 LVOT Peak Gradient: 2  MV Peak Gradient:    AV Area (Continuity):1.16 cm2 mmHgLVOT Mean Gradient: 1  7.4 mmHg                                           mmHg                       LVOT VTI: 8.63 cm             Estimated RVSP: 38 mmHg  MV P1/2t: 41 msec                                  Estimated RAP:3 mmHg  MVA by PHT:5.37 cm2  Estimated PASP: 35.49 mmHg   MV E' Septal                                       TR Velocity:285 cm/s  Velocity: 4.89 cm/s                                TR Gradient:32.49 mmHg  MV E' Lateral  Velocity: 10.4 cm/s  MV E/E' septal:  27.81  MV E/E' lateral:  13.08      CT ABDOMEN PELVIS WO CONTRAST Additional Contrast? None    Result Date: 7/15/2021  EXAMINATION: CT OF THE ABDOMEN AND PELVIS WITHOUT CONTRAST 7/15/2021 7:28 pm TECHNIQUE: CT of the abdomen and pelvis was performed without the administration of intravenous contrast. Multiplanar reformatted images are provided for review. Dose modulation, iterative reconstruction, and/or weight based adjustment of the mA/kV was utilized to reduce the radiation dose to as low as reasonably achievable. COMPARISON: MRI abdomen May 24, 2019; CT abdomen May 23, 2019 HISTORY: ORDERING SYSTEM PROVIDED HISTORY: Abdominal pain TECHNOLOGIST PROVIDED HISTORY: Reason for exam:->Abdominal pain Additional Contrast?->None Reason for Exam: Abdominal pain FINDINGS: Lower Chest: Imaged lung bases demonstrate partially imaged small and associated atelectasis.   Imaged lung fields demonstrate findings suggesting mild pulmonary edema, however evaluation is limited due to respiratory motion artifact. Coronary artery atherosclerotic disease present. Calcification of the mitral valve. Partially imaged heart is enlarged. Organs: Evaluation of the solid organs is limited due to lack of intravenous contrast.  The nonenhanced liver demonstrates no acute abnormality. The gallbladder is surgically absent. Numerous calcified granulomata throughout the spleen. Atrophy of the pancreas. The nonenhanced adrenal glands demonstrate no acute findings. Redemonstration of right adrenal adenoma is noted on previous exams. The nonenhanced kidneys are grossly stable with similar atrophy of the left kidney when compared with prior exams. No hydronephrosis identified. GI/Bowel: No bowel obstruction is evident. The colon is collapsed and not well evaluated. No evidence for appendicitis. The small bowel is normal in caliber. Small hiatal hernia. Pelvis: Bladder and solid pelvic organs demonstrate no acute findings. Peritoneum/Retroperitoneum: The abdominal aorta is normal in caliber with severe calcified atherosclerotic plaque throughout. No retroperitoneal adenopathy identified. No free fluid or free air identified within the abdomen and pelvis. Bones/Soft Tissues: Soft tissues demonstrate mild anasarca. Postoperative changes of the right anterior abdominal wall. No acute osseous abnormality identified. 1. Findings at the lung bases suggesting pulmonary edema with small bilateral effusions and associated atelectasis. 2. No acute intra-abdominal process identified. 3. Severe atherosclerotic disease.      CT Head WO Contrast    Result Date: 7/15/2021  EXAMINATION: CT OF THE HEAD WITHOUT CONTRAST  7/15/2021 7:28 pm TECHNIQUE: CT of the head was performed without the administration of intravenous contrast. Dose modulation, iterative reconstruction, and/or weight based adjustment of the mA/kV was utilized to reduce the radiation dose to as low as reasonably achievable. COMPARISON: CT brain June 11, 2021 HISTORY: ORDERING SYSTEM PROVIDED HISTORY: AMS TECHNOLOGIST PROVIDED HISTORY: Reason for exam:->AMS Has a \"code stroke\" or \"stroke alert\" been called? ->No Decision Support Exception - unselect if not a suspected or confirmed emergency medical condition->Emergency Medical Condition (MA) Reason for Exam: AMS FINDINGS: BRAIN/VENTRICLES: There is no acute intracranial hemorrhage, mass effect or midline shift. No abnormal extra-axial fluid collection. The gray-white differentiation is maintained without evidence of an acute infarct. There is no evidence of hydrocephalus. Severe atherosclerotic change of the intracranial vasculature. There is moderate periventricular and subcortical white matter hypoattenuation most consistent with microvascular ischemic changes. Global cerebral atrophy noted. ORBITS: The visualized portion of the orbits demonstrate no acute abnormality. SINUSES: The visualized paranasal sinuses and mastoid air cells demonstrate no acute abnormality. SOFT TISSUES/SKULL:  No acute abnormality of the visualized skull or soft tissues. No acute intracranial abnormality. Chronic microvascular ischemic changes and global cerebral atrophy. XR CHEST PORTABLE    Result Date: 7/16/2021  EXAMINATION: ONE XRAY VIEW OF THE CHEST 7/16/2021 10:31 am COMPARISON: July 15, 2021 HISTORY: ORDERING SYSTEM PROVIDED HISTORY: s/p central line attempt TECHNOLOGIST PROVIDED HISTORY: Reason for exam:->s/p central line attempt Reason for Exam: s/p central line attempt FINDINGS: Right IJ central venous catheter terminates in the mid SVC in satisfactory position. No appreciable pneumothorax. Cardiomediastinal silhouette appears unchanged. Pulmonary vascular congestion and diffuse interstitial prominence. Trace bilateral pleural effusions cannot be excluded. Minimal bibasilar atelectasis.      Right IJ central venous catheter terminates in the mid SVC in satisfactory position. No pneumothorax. Pulmonary vascular congestion with interstitial pulmonary edema. XR CHEST PORTABLE    Result Date: 7/15/2021  EXAMINATION: ONE XRAY VIEW OF THE CHEST 7/15/2021 5:47 pm COMPARISON: 06/13/2021 HISTORY: ORDERING SYSTEM PROVIDED HISTORY: AMS TECHNOLOGIST PROVIDED HISTORY: Reason for exam:->AMS Reason for Exam: AMS Initial encounter FINDINGS: Cardiomegaly. There is increased interstitial opacities bilaterally. No pleural effusion or pneumothorax. The osseous structures otherwise stable. Cardiomegaly with pulmonary edema. IR NONTUNNELED VASCULAR CATHETER > 5 YEARS    Result Date: 7/16/2021  PROCEDURE: ULTRASOUND GUIDED VASCULAR ACCESS. PLACEMENT OF A NON-TUNNELED CATHETER. 7/16/2021. HISTORY: ORDERING SYSTEM PROVIDED HISTORY: Need central venous access. Request CVC Insertion TECHNOLOGIST PROVIDED HISTORY: Reason for exam:->CVC Insertion SEDATION: None. FLUOROSCOPY DOSE AND TYPE OR TIME AND EXPOSURES: None. TECHNIQUE: Informed consent was obtained after a detailed explanation of the procedure including risks, benefits, and alternatives. Universal protocol was observed. The right neck and chest were prepped and draped in sterile fashion using maximum sterile barrier technique. Local anesthesia was achieved with lidocaine. A micropuncture needle was used to access the right internal jugular vein using ultrasound guidance. An ultrasound image demonstrating patency of the vein with needle tip located within it. An image was obtained and stored in PACs. A 0.035 guidewire was used to place a triple lumen central venous catheter after fascial tract dilation. The catheter flushed easily and there was a good blood return. The catheter was sutured to the skin. The catheter was locked with heparinized saline. The patient tolerated the procedure well and there were no immediate complications. EBL: Less than 1 ml.  FINDINGS: Chest x ray after procedure demonstrates the tip of the catheter in the SVC. The catheter is ready to use. Successful ultrasound and fluoroscopy guided non-tunneled central venous catheter placement. The catheter is ready to use. MRI BRAIN WO CONTRAST    Result Date: 7/20/2021  EXAMINATION: MRI OF THE BRAIN WITHOUT CONTRAST  7/20/2021 4:07 pm TECHNIQUE: Multiplanar multisequence MRI of the brain was performed without the administration of intravenous contrast. COMPARISON: None. HISTORY: ORDERING SYSTEM PROVIDED HISTORY: aphasic TECHNOLOGIST PROVIDED HISTORY: Reason for exam:->aphasic Reason for Exam: aphasic Acuity: Acute Type of Exam: Initial Relevant Medical/Surgical History: none FINDINGS: Motion degrades images limiting evaluation. INTRACRANIAL STRUCTURES/VENTRICLES: There is no acute infarct. No mass effect or midline shift. No evidence of an acute intracranial hemorrhage. Areas of T2 FLAIR hyperintensity are seen in the periventricular and subcortical white matter, which are nonspecific, but may represent chronic microvascular ischemic change. There is prominence of the ventricles and sulci due to global parenchymal volume loss. Evaluation of the signal voids within the major intracranial vessels is limited given patient motion. ORBITS: The visualized portion of the orbits demonstrate no acute abnormality. SINUSES: No gross abnormality seen of the paranasal sinuses. There appears to be a left mastoid effusion. 1. Patient motion limits evaluation. 2. No convincing acute intracranial abnormality. No acute infarct. 3. Mild global parenchymal volume loss with moderate chronic microvascular ischemic changes.        LAB RESULTS  --------------------    Recent Results (from the past 24 hour(s))   POCT Glucose    Collection Time: 08/04/21  2:42 AM   Result Value Ref Range    POC Glucose 129 (H) 70 - 99 MG/DL   POCT Glucose    Collection Time: 08/04/21  7:49 AM   Result Value Ref Range    POC Glucose 185 (H) 70 - 99 MG/DL   POCT Glucose    Collection Time: 08/04/21 11:46 AM   Result Value Ref Range    POC Glucose 251 (H) 70 - 99 MG/DL   POCT Glucose    Collection Time: 08/04/21  4:45 PM   Result Value Ref Range    POC Glucose 98 70 - 99 MG/DL   POCT Glucose    Collection Time: 08/04/21  7:47 PM   Result Value Ref Range    POC Glucose 257 (H) 70 - 99 MG/DL         Medical problems    Patient Active Problem List:     History of CVA (cerebrovascular accident) without residual deficits     Morbid obesity ([facility] [address]     DM (diabetes mellitus), type 2, uncontrolled ([facility])     Persistent atrial fibrillation (H[facility])     CAD (coronary artery disease)     CHF (congestive heart failure) ([facility] [address]     Light headed     History of MI (myocardial infarction)     Diabetes mellitus (formerly [facility])     Hypertension     TIA (transient ischemic attack)     Hyperlipidemia     SOB (shortness of breath)     H/O cardiovascular stress test     Family history of coronary artery disease     PAF (paroxysmal atrial fibrillation) (formerly [facility])     Chest pain     Atrial thrombus without antecedent myocardial infarction     S/P ablation of atrial fibrillation     Gastroenteritis     YESIKA (acute kidney injury) ([facility] [address]     Acute cystitis     Elevated liver enzymes     CHF (congestive heart failure), NYHA class I, acute on chronic, combined ([facility] [address]     Pneumonia     Multifocal pneumonia     Atrial fibrillation with RVR ([facility])     Shoulder pain     Paroxysmal atrial fibrillation ([facility])     Diabetic hyperosmolar coma ([facility])     Difficult intravenous access     HFrEF (heart failure with reduced ejection fraction) (formerly [facility])      ASSESSMENT:  ---------------------    Metabolic encephalopathy     TIA r/o cardio carotid embolic event     Hyperglycemia     ? Sepsis     A fib    Right MCA left Ophthalmic artery aneurysms    Multiple intracranial stenosis    Right MCA saccular aneurysm -    depression     PLAN:     CT brain neg     Mri brain neg    Mra head as above     C doppler neg     Echo EF 15 %      B 12 folate TSH  nl    High Homocysteine level     Continue eliquis      DC asa     Plavix    foltx     Increase cymbalta.     .    Electronically signed by Gricelda Waters MD on 8/4/2021 at 9:33 PM

## 2021-08-06 NOTE — PROGRESS NOTES
Comprehensive Nutrition Assessment    Type and Reason for Visit:  Reassess    Nutrition Recommendations/Plan:   · Continue current diet and between meal snacks as desired    Nutrition Assessment:  Adequate po intake, feeding self and consuming greater than half to all of meals. Pt states her appetite is good and she is hungry. Does not want oral supplements. No questions re diet for home at this time. Will continue to offer between meal snacks and follow as moderate nutrition risk. Malnutrition Assessment:  Malnutrition Status: Moderate malnutrition    Context:  Chronic Illness     Findings of the 6 clinical characteristics of malnutrition:  Energy Intake:  75% or less estimated energy requirements for 1 month or longer  Weight Loss:  20% over 1 year (18.2%)     Body Fat Loss:  Unable to assess     Muscle Mass Loss:  Unable to assess    Fluid Accumulation:  Severe (+2, non-pittin edema ntoed) Extremities, Generalized   Strength:  Not Performed    Estimated Daily Nutrient Needs:  Energy (kcal):  7325-4718 (1.1-1.3 stress factor); Weight Used for Energy Requirements:  Current     Protein (g):  54-68 (1.2-1.5 g/kg IBW); Weight Used for Protein Requirements:  Ideal        Fluid (ml/day):  ~1500; Method Used for Fluid Requirements:  1 ml/kcal      Nutrition Related Findings:  Glu 177      Wounds:  None       Current Nutrition Therapies:    Adult Oral Nutrition Supplement; Snack; Peaches and cottage cheese; 10AM, 2PM  ADULT DIET; Easy to Chew; 4 carb choices (60 gm/meal); No Added Salt (3-4 gm)    Anthropometric Measures:  · Height: 5' (152.4 cm)  · Current Body Weight: 157 lb 9.6 oz (71.5 kg)   · Admission Body Weight: 156 lb 8.4 oz (71 kg)    · Usual Body Weight: 192 lb 6.4 oz (87.3 kg) (7/27/20, UBW of 180# per daughter's report)     · Ideal Body Weight: 100 lbs; % Ideal Body Weight 153.9 %   · BMI: 30.8  · BMI Categories: Obese Class 1 (BMI 30.0-34. 9)       Nutrition Diagnosis:   · Moderate malnutrition, In context of chronic illness related to inadequate protein-energy intake, psychological cause or life stress as evidenced by poor intake prior to admission, weight loss (18% loss over past yr)    Nutrition Interventions:   Food and/or Nutrient Delivery:  Continue Current Diet, Snacks (Comment)  Nutrition Education/Counseling:  Education declined   Coordination of Nutrition Care:  Continue to monitor while inpatient, Coordination of Community Care, Feeding Assistance/Environment Change, Speech Therapy, Swallow Evaluation    Goals:  Pt will consume at least 50% of meals on diet order during stay       Nutrition Monitoring and Evaluation:   Behavioral-Environmental Outcomes:  None Identified   Food/Nutrient Intake Outcomes:  Food and Nutrient Intake, Diet Advancement/Tolerance  Physical Signs/Symptoms Outcomes:  Biochemical Data, Chewing or Swallowing, GI Status, Fluid Status or Edema, Nutrition Focused Physical Findings, Weight     Discharge Planning:    Recommend pursue outpatient diabetes education     Electronically signed by Marylen Ober, RD, LD on 8/6/21 at 11:45 AM EDT    Contact: [phone]

## 2021-08-06 NOTE — PROGRESS NOTES
Physical Therapy    Physical Therapy Treatment Note  Name: Chantale Powell MRN: [id number] :   1955   Date:  2021   Admission Date: 7/15/2021 [facility]:  [facility]   Restrictions/Precautions:        Decreased safety awareness, fall risk, telemetry  Communication with other providers:  Handoff to RN   Subjective:  Patient states:  \"Well I could go home with my daughter, she works though\", \"I don't need that (walker) \". Pain:   Location, Type, Intensity (0/10 to 10/10):  No c/o  Objective:    Observation:  Pt supine in bed watching TV upon arrival, Daughter present in room later. Treatment, including education/measures:  Bed mobility: Pt performs supine>sit with SBA only, adequate strength and ROM. Sitting balance: Pt demonstrates good static and dynamic sitting balance x2 min at EOB, x4 minutes on commode, pt performs reaching/donning and doffing of depends while seated on commode, no LOB SBA, additional x6 minutes seated EOB during LE TherEx. STS: Pt performs x2 STS from EOB to RW with v/c for proper UE placement and CGA for safety. Additional x1 STS to/from commode with CGA for safety and v/c for use of grab bar with good carryover. Min increased sway during transition to upright, no LOB. Standing balance: Pt performs dynamic unsupported standing balance x3 minutes at sink during self-hygiene tasks, CGA, supported standing during mini-squat exs x1 min. TherEX: Seated EOB:    x12 LAQ x10 marching       Standing:    x10 mini-squats  Gait: Pt AMB x10 ft/x10 ft to/from BR to bed and additional x5 ft stand-step transfer from EOB to recliner at end of session with RW, CGA, decreased saige and foot clearance. Pt wishes to attempt gait w/o RW, PT advises caution provides close CGA- w//o RW pt attempts stepping and had posterior LOB- Min A to recover balance and PT returns RW to pt and educates pt on need for use of RW. Pt confirms understanding.     EDU: PT provided education on d/c planning, safety concerns for d/c home, benefits of short-term rehab stay at SNF for safety and improved function, pt confirms understanding and is agreeable. Later with Daughter present PT confirms d/c plans with pt and daughter, both agreeable to pt d/c SNF. *Pt left in recliner with call button and all needs w/in reach, provided pt with ice water, chair alarm in place, handoff to RN. Assessment / Impression:    Pt continues to improve with cognition, balance, and overall mobility. Pt is agreeable and appropriate for SNF rehab. Patient's tolerance of treatment:  Good    Adverse Reaction: none  Significant change in status and impact:  none  Barriers to improvement:  none  Plan for Next Session:    Continue gait and balance  Time in:  15:28  Time out:  16:10  Timed treatment minutes:  42  Total treatment time:  42    Previously filed items:  Social/Functional History  Lives With: Alone  Type of Home: House  Home Layout: One level  Home Access: Ramped entrance  Bathroom Shower/Tub: Tub/Shower unit  Bathroom Toilet: Handicap height  Bathroom Equipment: Shower chair, Grab bars in shower  ADL Assistance: [address] [place] [address] Avenue: Independent  Homemaking Responsibilities: Yes  Ambulation Assistance: Independent  Transfer Assistance: Independent  Active : No (Daughter drives)  Occupation: Retired  Type of occupation:   Short term goals  Time Frame for Short term goals: 1 week  Short term goal 1: Pt will perform supine>sit mobility with Mod A of 1 with improved command following. Short term goal 2: Pt will perform STS from standard surface with Min a and v/c for sequencing  Short term goal 3: Pt will AMB x10 ft in room to BR with LRAD and CGA-Min for safety.        Electronically signed by:    Chantell De Jesus PT  8/6/2021, 8:14 AM

## 2021-08-06 NOTE — PROGRESS NOTES
INTERNAL MEDICINE PROGRESS NOTE        [facility]   1955   Primary Care Physician:  Erick Powell PA-C  Admit Date: 7/15/2021     Subjective:   Patient awake and alert. No new complaints. Blood sugars are being monitored. She is getting anxious about getting out of hospital.     Objective:   BP (!) 147/71   Pulse 67   Temp 98.6 °F (37 °C) (Oral)   Resp 18   Ht 5' (1.524 m)   Wt 157 lb 9.6 oz (71.5 kg)   SpO2 97%   BMI 30.78 kg/m²    General appearance: alert, appears stated age and cooperative  Head: Normocephalic, without obvious abnormality, atraumatic  Neck: no adenopathy and supple, symmetrical, trachea midline  Lungs: clear to auscultation bilaterally  Heart: regular rate and rhythm and S1, S2 normal  Abdomen: soft, non-tender; bowel sounds normal; no masses,  no organomegaly  Extremities: no clubbing, cyanosis or edema  Neurologic: moves all 4 extremities. Oriented x 3, cranial nerve intact.  Motor system: generalized weakness      Data Review  Lab Results   Component Value Date     08/05/2021    K 4.8 08/05/2021     08/05/2021    CO2 32 08/05/2021    CREATININE 1.2 (H) 08/05/2021    BUN 19 08/05/2021    CALCIUM 8.8 08/05/2021     Lab Results   Component Value Date    WBC 10.5 07/27/2021    HGB 12.2 (L) 07/27/2021    HCT 38.7 07/27/2021    MCV 95.8 07/27/2021     07/27/2021     INR/Prothrombin Time      Meds:    [START ON 8/7/2021] losartan  100 mg Oral Daily    DULoxetine  60 mg Oral Daily    metoprolol tartrate  50 mg Oral BID    busPIRone  10 mg Oral TID    insulin glargine  10 Units Subcutaneous Nightly    sodium chloride flush  5-40 mL Intravenous 2 times per day    insulin lispro  0-18 Units Subcutaneous 2 times per day    aspirin  81 mg Oral Daily    folic acid-pyridoxine-cyancobalamin  1 tablet Oral Daily    insulin lispro  0-18 Units Subcutaneous TID WC    insulin lispro  10 Units Subcutaneous TID WC    apixaban  5 mg Oral BID    levothyroxine  50 mcg Oral Daily    pantoprazole  40 mg Oral Daily    alogliptin  6.25 mg Oral Daily    sodium chloride flush  5-40 mL Intravenous 2 times per day    sodium chloride flush  5-40 mL Intravenous 2 times per day     PRN Meds: dextrose, diphenhydrAMINE, hydrOXYzine, LORazepam, potassium chloride, albuterol sulfate HFA, nitroGLYCERIN, traZODone, glucose, dextrose, glucagon (rDNA), dextrose, sodium chloride flush, ondansetron **OR** ondansetron, polyethylene glycol, acetaminophen **OR** acetaminophen, sodium chloride flush, sodium chloride    Assessment/Plan:   Patient Active Hospital Problem List:  Patient Active Problem List   Diagnosis    History of CVA (cerebrovascular accident) without residual deficits    Morbid obesity ([facility]    DM (diabetes mellitus), type 2, uncontrolled ([facility]    Persistent atrial fibrillation ([facility]    CAD (coronary artery disease)    CHF (congestive heart failure) ([facility]    Light headed    History of MI (myocardial infarction)    Diabetes mellitus ([facility]    Hypertension    TIA (transient ischemic attack)    Hyperlipidemia    SOB (shortness of breath)    H/O cardiovascular stress test    Family history of coronary artery disease    [facility]oxysmal [facility] fibrillation) ([facility]    Chest pain    Atrial thrombus without antecedent myocardial infarction    S/P ablation of atrial fibrillation    Gastroenteritis    YESIKA (acute kidney injury) ([facility]    Acute cystitis    Elevated liver enzymes    CHF (congestive heart failure), NYHA class I, acute on chronic, combined ([facility]    Pneumonia    Multifocal pneumonia    Atrial fibrillation with RVR ([facility])    Shoulder pain    Paroxysmal atrial fibrillation ([facility]    Diabetic hyperosmolar coma (HCC)    Difficult intravenous access    HFrEF (heart failure with reduced ejection fraction) ([facility])    Delirium    Bradycardia     Assessment:  Diabetic hyperosmolar state: resolved.    Metabolic encephalopathy: improved  Leukocytosis and possible sepsis: resolved. procal is wnl  Depression: Cymbalta dose is increased. A. Fib: rate is in control now  Hypertension: monitor  Elevated LFTs: Slowly improving. CT abdomen nonacute. Monitor. Possible pneumonia: on antibiotics  Pulmonary edema: continue monitoring. Grief:   HFrEF: EF 15-20%    Wide complex tachycardia: no more episodes. Plan:    Waiting for percert  Continue supportive care  Labs reviewed  Continue pt/ot  Notes reviewed.      Electronically signed by Izzy Gutiérrez MD on 8/6/2021 at 6:34 PM

## 2021-08-06 NOTE — PROGRESS NOTES
Occupational Therapy      Occupational Therapy Treatment Note      Name: Mia Morales MRN: [id number] :   1955   Date:  2021   Admission Date: 7/15/2021 Room:  [facility]     Primary Problem: Acute encephalopathy, Hyperglycemia    Restrictions/Precautions: General Precautions, Fall Risk, Telemetry, Bed/Chair alarm, FILEMON monitor     Communication with other providers: RN    Subjective:  Patient states: \"I'm feeling a little bit nauseous today. \"  Pain: Pt denied pain this date, just reported nausea    Objective:    Observation: Pt received sitting in chair upon OT arrival. Pleasant and agreeable to treatment. Objective Measures: Stable HR throughout session, Pt fully oriented this date to person, place, time, situation, and current president    Treatment, including education:  Therapeutic Activity Training:   Therapeutic activity training was instructed today. Cues were given for safety, sequence, UE/LE placement, awareness, and balance. Pt received sitting in chair upon OT arrival. Pt re-educated on role of OT, POC, and importance of OOB activity. Pt donned BL socks at chair level with supervision by crossing legs into \"figure 4 position. \" Pt completed sit to stand transfer from chair with supervision. Pt requesting to ambulate, and without use of AD this date. Gait belt applied, and pt ambulated approximately 250 in hallway CGA with no AD. Pt very hesitant, and demonstrated short, shuffling steps without use of AD. Pt with initial reaching for walls, and therapist encouraged pt to avoid this and stay in center of hallway. Pt with improved confidence as ambulation progressed. Pt returned to room, and voiced increasing nausea. Pt declined any ADLs in bathroom or any further therapy. Pt transferred to chair with supervision, and left positioned for comfort in chair with all lines intact, all needs within reach, and chair alarm on.       Assessment / Impression:    Patient's tolerance of treatment: Well  Adverse Reaction: None  Significant change in status and impact: Continuing to improve each session  Barriers to improvement: Cognition which is continuing to improve      Plan for Next Session:    Continue per OT POC.        Time in: 1100  Time out: 1115  Timed treatment minutes: 15  Total treatment time: 15      Electronically signed by:    EDLIO Adrian/L, [place] .[id number]

## 2021-08-06 NOTE — PROGRESS NOTES
Nephrology Progress Note  8/6/2021 3:57 PM        Subjective:   Admit Date: 7/15/2021  PCP: Tiffany Navarrete PA-C    Interval History: Patient seen in early morning, this is a late entry    Diet: Better food intake now and drinking water    ROS: No PND, orthopnea or shortness of breath  She is alert awake and oriented no confusion now    Data:     Current meds:    DULoxetine  60 mg Oral Daily    metoprolol tartrate  50 mg Oral BID    amLODIPine  2.5 mg Oral Daily    busPIRone  10 mg Oral TID    insulin glargine  10 Units Subcutaneous Nightly    sodium chloride flush  5-40 mL Intravenous 2 times per day    insulin lispro  0-18 Units Subcutaneous 2 times per day    aspirin  81 mg Oral Daily    folic acid-pyridoxine-cyancobalamin  1 tablet Oral Daily    insulin lispro  0-18 Units Subcutaneous TID WC    insulin lispro  10 Units Subcutaneous TID WC    apixaban  5 mg Oral BID    levothyroxine  50 mcg Oral Daily    pantoprazole  40 mg Oral Daily    alogliptin  6.25 mg Oral Daily    sodium chloride flush  5-40 mL Intravenous 2 times per day    sodium chloride flush  5-40 mL Intravenous 2 times per day      dextrose      sodium chloride           No intake/output data recorded. CBC: No results for input(s): WBC, HGB, PLT in the last 72 hours.        Recent Labs     08/05/21  0351      K 4.8      CO2 32   BUN 19   CREATININE 1.2*   GLUCOSE 111*       Lab Results   Component Value Date    CALCIUM 8.8 08/05/2021    PHOS 4.1 08/05/2021       Objective:     Vitals: BP (!) 147/71   Pulse 67   Temp 98.6 °F (37 °C) (Oral)   Resp 18   Ht 5' (1.524 m)   Wt 157 lb 9.6 oz (71.5 kg)   SpO2 97%   BMI 30.78 kg/m²     General appearance: No acute distress this morning  HEENT: 1+ conjunctival pallor  Neck: Supple  Lungs: I did not appreciate any crackles, she does have bronchial breath sound  Heart: Seems regular rate and rhythm this morning-she does have intermittent A. fib  Abdomen: Soft, nontender positive bowel sounds  Extremities: I did not appreciate any edema of lower extremities this morning      Problem List :         Impression :     1. Acute kidney injury-recovering now mainly from cardiorenal syndrome type I  2. Recent acute decompensated heart failure-seems resolving remarkably doing well without any diuretics  3. She does have cardiomyopathy-but I suspect she has good chance of recovery  4. Recent encephalopathy, underlying diabetes, thyroid disease and dysrhythmia    Recommendation/Plan  :     1. I will not start any diuretics now  2. Low-salt, DASH diet good glycemic control  3. Mainly follow clinically  4. I would confirm couple of days manual blood pressure if good then will restart angiotensin receptor blocker or ACE inhibitor's  5. She would need sequential optimization of cardiorenal protective medication  6. Can be done as an outpatient  7.  Follow clinically      Celso Arana MD MD

## 2021-08-06 NOTE — PROGRESS NOTES
Physical Therapy    Physical Therapy Treatment Note  Name: Andrew Brown MRN: [id number] :   1955   Date:  2021   Admission Date: 7/15/2021 Room:  [address] [place] [address]   Restrictions/Precautions:        General Precautions, Fall Risk, Telemetry, Bed/Chair alarm,  monitor, Acute encephalopathy, Hyperglycemia  Communication with other providers:    Subjective:  Patient states:  Agreeable to tx but declined sitting up in chair at end of tx session. Pain:   Location, Type, Intensity (0/10 to 10/10): Denies having pain  Objective:    Observation:  Alert and oriented to self  Treatment, including education/measures:  Sup<=>sit supervision  Sit<=>stand from bed and chair sba  amb in room without assistive device sba/cga and unsafe reaching for table and wall.   amb sba with rw 200' x 1, 60' x 1 sba. Pt has short steps with wide DONNY but appeared more steady and safe when using RW. Safety  Patient left safely in the bed, with call light/phone in reach with alarm applied. Gait belt and mask were used for transfers and gait. Assessment / Impression:       Patient's tolerance of treatment:  good  Adverse Reaction: na  Significant change in status and impact:  na  Barriers to improvement:  safety  Plan for Next Session:    Cont.  POC  Time in:  1410  Time out:  1435  Timed treatment minutes:  25  Total treatment time:  25    Previously filed items:  Social/Functional History  Lives With: Alone  Type of Home: House  Home Layout: One level  Home Access: Ramped entrance  Bathroom Shower/Tub: Tub/Shower unit  Bathroom Toilet: Handicap height  Bathroom Equipment: Shower chair, Grab bars in shower  ADL Assistance: [address] [place] [address] Avenue: [address] [place] [address] Responsibilities: Yes  Ambulation Assistance: Independent  Transfer Assistance: Independent  Active : No (Daughter drives)  Occupation: Retired  Type of occupation:   Short term goals  Time Frame for Short term goals: 1 week  Short term goal 1: Pt will perform supine>sit mobility with Mod A of 1 with improved command following. Short term goal 2: Pt will perform STS from standard surface with Min a and v/c for sequencing  Short term goal 3: Pt will AMB x10 ft in room to BR with LRAD and CGA-Min for safety.        Electronically signed by:    Gus Miller PTA  8/6/2021, 12:07 PM

## 2021-08-06 NOTE — PROGRESS NOTES
NEUROLOGY NOTE  DR. Bernie Locke MD.  -------------------------------------------------  Subjective:    Pt denies depression-states feels better    Pt states she is depressed x few months    Pt is not confused and doing better today    Objective:    BP (!) 146/68   Pulse 73   Temp 98 °F (36.7 °C) (Oral)   Resp 16   Ht 5' (1.524 m)   Wt 157 lb 9.6 oz (71.5 kg)   SpO2 97%   BMI 30.78 kg/m²   HEENT nl      Neuro exam    Alert oriented to person place not confused  eomi pupils 3 mm jo  Squeezes fingers to commands  Equivocal toes  resp on own      RADIOLOGY  -----------------    Echocardiogram complete 2D with doppler with color    Result Date: 7/16/2021  Transthoracic Echocardiography Report (TTE)  Demographics   Patient Name       Abhilash MAYFIELD Date of Study       07/16/2021   Date of Birth      1955         Gender              Female   Age                60 year(s)         Race                   Patient Number     [id number]         Room Number         2128   Visit Number       [id number]   Corporate ID       [id number]   Accession Number   [id number]         [facility]   Ordering Physician Manisha Shelley MD                 Physician           MD  Procedure Type of Study   TTE procedure:ECHOCARDIOGRAM COMPLETE 2D W DOPPLER W COLOR. Procedure Date Date: 07/16/2021 Start: 01:35 PM Study Location: Portable Technical Quality: Fair visualization Indications:Congestive heart failure. Patient Status: Routine Height: 60 inches Weight: 180 pounds BSA: 1.78 m2 BMI: 35.15 kg/m2 HR: 99 bpm BP: 145/83 mmHg  Conclusions   Summary  Left ventricular systolic function is abnormal.  Ejection fraction is visually estimated at 15-20%. Global hypokinesis noted. Grade III diastolic dysfunction. Mitral annular calcification is present.   Moderate to severe mitral regurgitation. Mild to moderate tricuspid regurgitation; RVSP: 38 mmHg. No evidence of any pericardial effusion. Signature   ------------------------------------------------------------------  Electronically signed by Asia Weir MD (Interpreting  physician) on 07/16/2021 at 04:31 PM  ------------------------------------------------------------------   Findings   Left Ventricle  Left ventricular systolic function is abnormal.  Ejection fraction is visually estimated at 15-20%. Global hypokinesis noted. Grade III diastolic dysfunction. Left ventricle size is normal.   Left Atrium  Essentially normal left atrium. Right Atrium  Essentially normal right atrium. Right Ventricle  Essentially normal right ventricle. Aortic Valve  Trace aortic regurgitation noted. Mitral Valve  Mitral annular calcification is present. Moderate to severe mitral regurgitation. Tricuspid Valve  Mild to moderate tricuspid regurgitation; RVSP: 38 mmHg. Pulmonic Valve  The pulmonic valve was not well visualized. Pericardial Effusion  No evidence of any pericardial effusion. Pleural Effusion  No evidence of pleural effusion. Miscellaneous  The aorta is within normal limits.   M-Mode/2D Measurements & Calculations   LV Diastolic Dimension:   LV Systolic Dimension:   LA Dimension: 3.8 cmAO  4.86 cm                   4.6 cm                   Root Dimension: 2.8 cm  LV FS:5.4 %               LV Volume Diastolic: 964  LV PW Diastolic: 9.92 cm  ml  LV PW Systolic: 5.44 cm   LV Volume Systolic: 791  Septum Diastolic: 6.04 cm ml                       RV Diastolic Dimension:  Septum Systolic: 9.58 cm  LV EDV/LV EDV Index: 116 3.13 cm  CO: 2.42 l/min            ml/65 m2LV ESV/LV ESV  CI: 1.36 l/m*m2           Index: 108 ml/61 m2      LA/Aorta: 1.36                            EF Calculated (A4C): 6.9  LV Area Diastolic: 47.3   %  cm2                       EF Calculated (2D): 57.2  LV Area Systolic: 32 cm2  % evaluation is limited due to respiratory motion artifact. Coronary artery atherosclerotic disease present. Calcification of the mitral valve. Partially imaged heart is enlarged. Organs: Evaluation of the solid organs is limited due to lack of intravenous contrast.  The nonenhanced liver demonstrates no acute abnormality. The gallbladder is surgically absent. Numerous calcified granulomata throughout the spleen. Atrophy of the pancreas. The nonenhanced adrenal glands demonstrate no acute findings. Redemonstration of right adrenal adenoma is noted on previous exams. The nonenhanced kidneys are grossly stable with similar atrophy of the left kidney when compared with prior exams. No hydronephrosis identified. GI/Bowel: No bowel obstruction is evident. The colon is collapsed and not well evaluated. No evidence for appendicitis. The small bowel is normal in caliber. Small hiatal hernia. Pelvis: Bladder and solid pelvic organs demonstrate no acute findings. Peritoneum/Retroperitoneum: The abdominal aorta is normal in caliber with severe calcified atherosclerotic plaque throughout. No retroperitoneal adenopathy identified. No free fluid or free air identified within the abdomen and pelvis. Bones/Soft Tissues: Soft tissues demonstrate mild anasarca. Postoperative changes of the right anterior abdominal wall. No acute osseous abnormality identified. 1. Findings at the lung bases suggesting pulmonary edema with small bilateral effusions and associated atelectasis. 2. No acute intra-abdominal process identified. 3. Severe atherosclerotic disease.      CT Head WO Contrast    Result Date: 7/15/2021  EXAMINATION: CT OF THE HEAD WITHOUT CONTRAST  7/15/2021 7:28 pm TECHNIQUE: CT of the head was performed without the administration of intravenous contrast. Dose modulation, iterative reconstruction, and/or weight based adjustment of the mA/kV was utilized to reduce the radiation dose to as low as reasonably achievable. COMPARISON: CT brain June 11, 2021 HISTORY: ORDERING SYSTEM PROVIDED HISTORY: AMS TECHNOLOGIST PROVIDED HISTORY: Reason for exam:->AMS Has a \"code stroke\" or \"stroke alert\" been called? ->No Decision Support Exception - unselect if not a suspected or confirmed emergency medical condition->Emergency Medical Condition (MA) Reason for Exam: AMS FINDINGS: BRAIN/VENTRICLES: There is no acute intracranial hemorrhage, mass effect or midline shift. No abnormal extra-axial fluid collection. The gray-white differentiation is maintained without evidence of an acute infarct. There is no evidence of hydrocephalus. Severe atherosclerotic change of the intracranial vasculature. There is moderate periventricular and subcortical white matter hypoattenuation most consistent with microvascular ischemic changes. Global cerebral atrophy noted. ORBITS: The visualized portion of the orbits demonstrate no acute abnormality. SINUSES: The visualized paranasal sinuses and mastoid air cells demonstrate no acute abnormality. SOFT TISSUES/SKULL:  No acute abnormality of the visualized skull or soft tissues. No acute intracranial abnormality. Chronic microvascular ischemic changes and global cerebral atrophy. XR CHEST PORTABLE    Result Date: 7/16/2021  EXAMINATION: ONE XRAY VIEW OF THE CHEST 7/16/2021 10:31 am COMPARISON: July 15, 2021 HISTORY: ORDERING SYSTEM PROVIDED HISTORY: s/p central line attempt TECHNOLOGIST PROVIDED HISTORY: Reason for exam:->s/p central line attempt Reason for Exam: s/p central line attempt FINDINGS: Right IJ central venous catheter terminates in the mid SVC in satisfactory position. No appreciable pneumothorax. Cardiomediastinal silhouette appears unchanged. Pulmonary vascular congestion and diffuse interstitial prominence. Trace bilateral pleural effusions cannot be excluded. Minimal bibasilar atelectasis.      Right IJ central venous catheter terminates in the mid SVC in satisfactory the SVC. The catheter is ready to use. Successful ultrasound and fluoroscopy guided non-tunneled central venous catheter placement. The catheter is ready to use. MRI BRAIN WO CONTRAST    Result Date: 7/20/2021  EXAMINATION: MRI OF THE BRAIN WITHOUT CONTRAST  7/20/2021 4:07 pm TECHNIQUE: Multiplanar multisequence MRI of the brain was performed without the administration of intravenous contrast. COMPARISON: None. HISTORY: ORDERING SYSTEM PROVIDED HISTORY: aphasic TECHNOLOGIST PROVIDED HISTORY: Reason for exam:->aphasic Reason for Exam: aphasic Acuity: Acute Type of Exam: Initial Relevant Medical/Surgical History: none FINDINGS: Motion degrades images limiting evaluation. INTRACRANIAL STRUCTURES/VENTRICLES: There is no acute infarct. No mass effect or midline shift. No evidence of an acute intracranial hemorrhage. Areas of T2 FLAIR hyperintensity are seen in the periventricular and subcortical white matter, which are nonspecific, but may represent chronic microvascular ischemic change. There is prominence of the ventricles and sulci due to global parenchymal volume loss. Evaluation of the signal voids within the major intracranial vessels is limited given patient motion. ORBITS: The visualized portion of the orbits demonstrate no acute abnormality. SINUSES: No gross abnormality seen of the paranasal sinuses. There appears to be a left mastoid effusion. 1. Patient motion limits evaluation. 2. No convincing acute intracranial abnormality. No acute infarct. 3. Mild global parenchymal volume loss with moderate chronic microvascular ischemic changes.        LAB RESULTS  --------------------    Recent Results (from the past 24 hour(s))   POCT Glucose    Collection Time: 08/05/21  3:44 AM   Result Value Ref Range    POC Glucose 118 (H) 70 - 99 MG/DL   Comprehensive Metabolic Panel    Collection Time: 08/05/21  3:51 AM   Result Value Ref Range    Sodium 141 135 - 145 MMOL/L    Potassium 4.8 3.5 - 5.1 MMOL/L Chloride 105 99 - 110 mMol/L    CO2 32 21 - 32 MMOL/L    BUN 19 6 - 23 MG/DL    CREATININE 1.2 (H) 0.6 - 1.1 MG/DL    Glucose 111 (H) 70 - 99 MG/DL    Calcium 8.8 8.3 - 10.6 MG/DL    Albumin 3.2 (L) 3.4 - 5.0 GM/DL    Total Protein 5.4 (L) 6.4 - 8.2 GM/DL    Total Bilirubin 0.6 0.0 - 1.0 MG/DL    ALT 15 10 - 40 U/L    AST 17 15 - 37 IU/L    Alkaline Phosphatase 94 40 - 128 IU/L    GFR Non- 45 (L) >60 mL/min/1.73m2    GFR  55 (L) >60 mL/min/1.73m2    Anion Gap 4 4 - 16   Phosphorus    Collection Time: 08/05/21  3:51 AM   Result Value Ref Range    Phosphorus 4.1 2.5 - 4.9 MG/DL   Magnesium    Collection Time: 08/05/21  3:51 AM   Result Value Ref Range    Magnesium 2.0 1.8 - 2.4 mg/dl   POCT Glucose    Collection Time: 08/05/21  6:39 AM   Result Value Ref Range    POC Glucose 135 (H) 70 - 99 MG/DL   POCT Glucose    Collection Time: 08/05/21  4:19 PM   Result Value Ref Range    POC Glucose 297 (H) 70 - 99 MG/DL   POCT Glucose    Collection Time: 08/05/21  7:19 PM   Result Value Ref Range    POC Glucose 104 (H) 70 - 99 MG/DL         Medical problems    Patient Active Problem List:     History of CVA (cerebrovascular accident) without residual deficits     Morbid obesity (HCC)     DM (diabetes mellitus), type 2, uncontrolled (HCC)     Persistent atrial fibrillation (HCC)     CAD (coronary artery disease)     CHF (congestive heart failure) ([facility])     Light headed     History of MI (myocardial infarction)     Diabetes mellitus ([facility] [address]     Hypertension     TIA (transient ischemic attack)     Hyperlipidemia     SOB (shortness of breath)     H/O cardiovascular stress test     Family history of coronary artery disease     PAF (paroxysmal atrial fibrillation) ([facility])     Chest pain     Atrial thrombus without antecedent myocardial infarction     S/P ablation of atrial fibrillation     Gastroenteritis     YESIKA (acute kidney injury) ([facility] [address]     Acute cystitis     Elevated liver enzymes     CHF (congestive heart failure), NYHA class I, acute on chronic, combined ([facility] [address]     Pneumonia     Multifocal pneumonia     Atrial fibrillation with RVR ([facility])     Shoulder pain     Paroxysmal atrial fibrillation ([facility])     Diabetic hyperosmolar coma ([facility])     Difficult intravenous access     HFrEF (heart failure with reduced ejection fraction) ([facility])      ASSESSMENT:  ---------------------    Metabolic encephalopathy     TIA r/o cardio carotid embolic event     Hyperglycemia     ? Sepsis     A fib    Right MCA left Ophthalmic artery aneurysms    Multiple intracranial stenosis    Right MCA saccular aneurysm -    depression     PLAN:     CT brain neg     Mri brain neg    Mra head as above     C doppler neg     Echo EF 15 %      B 12 folate TSH  nl    High Homocysteine level     Continue eliquis      DC asa     Plavix    foltx     Increase cymbalta.     .    Electronically signed by Kim Mantilla MD on 8/5/2021 at 11:54 PM

## 2021-08-06 NOTE — PROGRESS NOTES
[id number]   Accession Number   [id number]         [facility]   Ordering Physician Corinne Rush MD                 Physician           MD  Procedure Type of Study   TTE procedure:ECHOCARDIOGRAM COMPLETE 2D W DOPPLER W COLOR. Procedure Date Date: 07/16/2021 Start: 01:35 PM Study Location: Portable Technical Quality: Fair visualization Indications:Congestive heart failure. Patient Status: Routine Height: 60 inches Weight: 180 pounds BSA: 1.78 m2 BMI: 35.15 kg/m2 HR: 99 bpm BP: 145/83 mmHg  Conclusions   Summary  Left ventricular systolic function is abnormal.  Ejection fraction is visually estimated at 15-20%. Global hypokinesis noted. Grade III diastolic dysfunction. Mitral annular calcification is present. Moderate to severe mitral regurgitation. Mild to moderate tricuspid regurgitation; RVSP: 38 mmHg. No evidence of any pericardial effusion. Signature   ------------------------------------------------------------------  Electronically signed by Oneida Joshi MD (Interpreting  physician) on 07/16/2021 at 04:31 PM    CT ABDOMEN PELVIS WO CONTRAST Additional Contrast? None    Result Date: 7/15/2021  EXAMINATION: CT OF THE ABDOMEN AND PELVIS WITHOUT CONTRAST 7/15/2021 7:28 pm    1. Findings at the lung bases suggesting pulmonary edema with small bilateral effusions and associated atelectasis. 2. No acute intra-abdominal process identified. 3. Severe atherosclerotic disease. CT Head WO Contrast    Result Date: 7/15/2021  EXAMINATION: CT OF THE HEAD WITHOUT CONTRAST  7/15/2021 7:28 pm     No acute intracranial abnormality. Chronic microvascular ischemic changes and global cerebral atrophy.      XR CHEST PORTABLE    Result Date: 7/16/2021  EXAMINATION: ONE XRAY VIEW OF THE CHEST 7/16/2021 10:31 am COMPARISON: July 15, 2021 HISTORY: [address] PROVIDED HISTORY: s/p central line attempt    Right IJ central venous catheter terminates in the mid SVC in satisfactory position. No pneumothorax. Pulmonary vascular congestion with interstitial pulmonary edema. XR CHEST PORTABLE    Result Date: 7/15/2021  EXAMINATION: ONE XRAY VIEW OF THE CHEST 7/15/2021 5:47 pm COMPARISON: 06/13/2021 HISTORY: ORDERING SYSTEM PROVIDED HISTORY: AMS TECHNOLOGIST PROVIDED HISTORY: Reason for exam:->AMS Reason for Exam: AMS Initial encounter FINDINGS: Cardiomegaly. There is increased interstitial opacities bilaterally. No pleural effusion or pneumothorax. The osseous structures otherwise stable. Cardiomegaly with pulmonary edema. IR NONTUNNELED VASCULAR CATHETER > 5 YEARS    Result Date: 7/16/2021  PROCEDURE: ULTRASOUND GUIDED VASCULAR ACCESS. PLACEMENT OF A NON-TUNNELED CATHETER. 7/16/2021. HISTORY: ORDERING SYSTEM PROVIDED HISTORY: Need central venous access. Request CVC Insertion TECHNOLOGIST PROVIDED HISTORY: Reason for exam:->CVC Insertion SEDATION: None. Successful ultrasound and fluoroscopy guided non-tunneled central venous catheter placement. The catheter is ready to use.        Scheduled Medicines   Medications:    DULoxetine  60 mg Oral Daily    metoprolol tartrate  50 mg Oral BID    amLODIPine  2.5 mg Oral Daily    busPIRone  10 mg Oral TID    insulin glargine  10 Units Subcutaneous Nightly    sodium chloride flush  5-40 mL Intravenous 2 times per day    insulin lispro  0-18 Units Subcutaneous 2 times per day    aspirin  81 mg Oral Daily    folic acid-pyridoxine-cyancobalamin  1 tablet Oral Daily    insulin lispro  0-18 Units Subcutaneous TID WC    insulin lispro  10 Units Subcutaneous TID WC    apixaban  5 mg Oral BID    levothyroxine  50 mcg Oral Daily    pantoprazole  40 mg Oral Daily    alogliptin  6.25 mg Oral Daily    sodium chloride flush  5-40 mL Intravenous 2 times per day    sodium chloride flush  5-40 mL Intravenous 2 times per day      Infusions:    dextrose      sodium chloride           Objective:   Vitals: /66   Pulse 86   Temp 98.1 °F (36.7 °C) (Oral)   Resp 20   Ht 5' (1.524 m)   Wt 157 lb 9.6 oz (71.5 kg)   SpO2 97%   BMI 30.78 kg/m²   General appearance: alert and cooperative with exam appears to be somewhat dehydrated  Neck: no JVD or bruit  Thyroid : Normal lobes   Lungs: Has Vesicular Breath sounds   Heart: Atrial fibrillation  Abdomen: soft, non-tender; bowel sounds normal; no masses,  no organomegaly  Musculoskeletal: Normal  Extremities: extremities normal, , no edema  Neurologic:  Awake, alert, oriented to name, place and time. Cranial nerves II-XII are grossly intact. Motor is  intact. Sensory is neuropathy. ,  and gait is normal.    Assessment:     Patient Active Problem List:     History of CVA (cerebrovascular accident) without residual deficits     Morbid obesity ([address]     DM (diabetes mellitus), type 2, uncontrolled ([address]     Persistent atrial fibrillation (HCC)     CAD (coronary artery disease)     CHF (congestive heart failure) ([address]     Light headed     History of MI (myocardial infarction)     Diabetes mellitus ([address]     Hypertension     TIA (transient ischemic attack)     Hyperlipidemia     SOB (shortness of breath)     H/O cardiovascular stress test     Family history of coronary artery disease     PAF (paroxysmal atrial fibrillation) ([facility])     Chest pain     Atrial thrombus without antecedent myocardial infarction     S/P ablation of atrial fibrillation     Gastroenteritis     YESIKA (acute kidney injury) ([address]     Acute cystitis     Elevated liver enzymes     CHF (congestive heart failure), NYHA class I, acute on chronic, combined ([facility])     Pneumonia     Multifocal pneumonia     Atrial fibrillation with RVR ([facility])     Shoulder pain     Paroxysmal atrial fibrillation ([address]     Diabetic hyperosmolar coma ([address]     Difficult intravenous access     Metabolic encephalopathy getting better      Plan: 1. Reviewed POC blood rate 14 with regular foot embolism there is good  2. . Labs and X ray results   3. Reviewed Current Medicines   4. On meal +,  correction bolus Humalog/ Basal Lantus Insulin regime does get  5. Monitor Blood glucose frequently   6. Modified  the dose of Insulin/ other medicines as needed  7. Changed CODE STATUS  8. Waiting for pre-CERT for skilled nursing home care   9. Will follow     .      Cody Friedman MD, MD

## 2021-08-07 NOTE — PLAN OF CARE
Problem: Discharge Planning:  Goal: Discharged to appropriate level of care  Description: Discharged to appropriate level of care  Outcome: Ongoing  Goal: Ability to perform activities of daily living will improve  Description: Ability to perform activities of daily living will improve  Outcome: Ongoing  Goal: Participates in care planning  Description: Participates in care planning  Outcome: Ongoing     Problem: Serum Glucose Level - Abnormal:  Goal: Ability to maintain appropriate glucose levels will improve  Description: Ability to maintain appropriate glucose levels will improve  Outcome: Ongoing     Problem: Sensory Perception - Impaired:  Goal: Ability to maintain a stable neurologic state will improve  Description: Ability to maintain a stable neurologic state will improve  Outcome: Ongoing  Goal: Demonstrations of improved sensory functioning will increase  Description: Demonstrations of improved sensory functioning will increase  Outcome: Ongoing  Goal: Decrease in sensory misperception frequency  Description: Decrease in sensory misperception frequency  Outcome: Ongoing  Goal: Able to refrain from responding to false sensory perceptions  Description: Able to refrain from responding to false sensory perceptions  Outcome: Ongoing  Goal: Demonstrates accurate environmental perceptions  Description: Demonstrates accurate environmental perceptions  Outcome: Ongoing  Goal: Able to distinguish between reality-based and nonreality-based thinking  Description: Able to distinguish between reality-based and nonreality-based thinking  Outcome: Ongoing  Goal: Able to interrupt nonreality-based thinking  Description: Able to interrupt nonreality-based thinking  Outcome: Ongoing     Problem: Gas Exchange - Impaired:  Goal: Levels of oxygenation will improve  Description: Levels of oxygenation will improve  Outcome: Ongoing     Problem: Infection, Septic Shock:  Goal: Will show no infection signs and symptoms  Description: Will show no infection signs and symptoms  Outcome: Ongoing     Problem: Infection - Ventilator-Associated Pneumonia:  Goal: Absence of pulmonary infection  Description: Absence of pulmonary infection  Outcome: Ongoing     Problem: Tissue Perfusion, Altered:  Goal: Circulatory function within specified parameters  Description: Circulatory function within specified parameters  Outcome: Ongoing     Problem: Venous Thromboembolism:  Goal: Will show no signs or symptoms of venous thromboembolism  Description: Will show no signs or symptoms of venous thromboembolism  Outcome: Ongoing  Goal: Absence of signs or symptoms of impaired coagulation  Description: Absence of signs or symptoms of impaired coagulation  Outcome: Ongoing     Problem: Falls - Risk of:  Goal: Will remain free from falls  Description: Will remain free from falls  Outcome: Ongoing  Goal: Absence of physical injury  Description: Absence of physical injury  Outcome: Ongoing     Problem: Pain:  Description: Pain management should include both nonpharmacologic and pharmacologic interventions.   Goal: Pain level will decrease  Description: Pain level will decrease  Outcome: Ongoing  Goal: Control of acute pain  Description: Control of acute pain  Outcome: Ongoing  Goal: Control of chronic pain  Description: Control of chronic pain  Outcome: Ongoing     Problem: Skin Integrity:  Goal: Will show no infection signs and symptoms  Description: Will show no infection signs and symptoms  Outcome: Ongoing  Goal: Absence of new skin breakdown  Description: Absence of new skin breakdown  Outcome: Ongoing     Problem: Nutrition  Goal: Optimal nutrition therapy  Outcome: Ongoing     Problem: Confusion - Acute:  Goal: Absence of continued neurological deterioration signs and symptoms  Description: Absence of continued neurological deterioration signs and symptoms  Outcome: Ongoing  Goal: Mental status will be restored to baseline  Description: Mental status will be restored to baseline  Outcome: Ongoing     Problem: Injury - Risk of, Physical Injury:  Goal: Will remain free from falls  Description: Will remain free from falls  Outcome: Ongoing  Goal: Absence of physical injury  Description: Absence of physical injury  Outcome: Ongoing     Problem: Mood - Altered:  Goal: Mood stable  Description: Mood stable  Outcome: Ongoing  Goal: Absence of abusive behavior  Description: Absence of abusive behavior  Outcome: Ongoing  Goal: Verbalizations of feeling emotionally comfortable while being cared for will increase  Description: Verbalizations of feeling emotionally comfortable while being cared for will increase  Outcome: Ongoing     Problem: Psychomotor Activity - Altered:  Goal: Absence of psychomotor disturbance signs and symptoms  Description: Absence of psychomotor disturbance signs and symptoms  Outcome: Ongoing     Problem: Sleep Pattern Disturbance:  Goal: Appears well-rested  Description: Appears well-rested  Outcome: Ongoing

## 2021-08-07 NOTE — PROGRESS NOTES
NEUROLOGY NOTE  DR. Merrill Phoenix MD.  -------------------------------------------------  Subjective:    Pt denies depression-states feels better    Pt states she is depressed x few months    Pt is not confused and doing better today    Objective:    BP (!) 147/71   Pulse 67   Temp 98.6 °F (37 °C) (Oral)   Resp 18   Ht 5' (1.524 m)   Wt 157 lb 9.6 oz (71.5 kg)   SpO2 97%   BMI 30.78 kg/m²   HEENT nl      Neuro exam    Alert oriented to person place not confused  eomi pupils 3 mm jo  Squeezes fingers to commands  Equivocal toes  resp on own      RADIOLOGY  -----------------    Echocardiogram complete 2D with doppler with color    Result Date: 7/16/2021  Transthoracic Echocardiography Report (TTE)  Demographics   Patient Name       Dolores MAYFIELD Date of Study       07/16/2021   Date of Birth      1955         Gender              Female   Age                60 year(s)         Race                   Patient Number     [id number]         Room Number         2128   Visit Number       [id number]   Corporate ID       [id number]   Accession Number   [id number]         Farhad Lepe RDMS   Ordering Physician Louann Mueller MD                 Physician           MD  Procedure Type of Study   TTE procedure:ECHOCARDIOGRAM COMPLETE 2D W DOPPLER W COLOR. Procedure Date Date: 07/16/2021 Start: 01:35 PM Study Location: Portable Technical Quality: Fair visualization Indications:Congestive heart failure. Patient Status: Routine Height: 60 inches Weight: 180 pounds BSA: 1.78 m2 BMI: 35.15 kg/m2 HR: 99 bpm BP: 145/83 mmHg  Conclusions   Summary  Left ventricular systolic function is abnormal.  Ejection fraction is visually estimated at 15-20%. Global hypokinesis noted. Grade III diastolic dysfunction. Mitral annular calcification is present.   Moderate to severe mitral regurgitation. Mild to moderate tricuspid regurgitation; RVSP: 38 mmHg. No evidence of any pericardial effusion. Signature   ------------------------------------------------------------------  Electronically signed by Stephanie Armendariz MD (Interpreting  physician) on 07/16/2021 at 04:31 PM  ------------------------------------------------------------------   Findings   Left Ventricle  Left ventricular systolic function is abnormal.  Ejection fraction is visually estimated at 15-20%. Global hypokinesis noted. Grade III diastolic dysfunction. Left ventricle size is normal.   Left Atrium  Essentially normal left atrium. Right Atrium  Essentially normal right atrium. Right Ventricle  Essentially normal right ventricle. Aortic Valve  Trace aortic regurgitation noted. Mitral Valve  Mitral annular calcification is present. Moderate to severe mitral regurgitation. Tricuspid Valve  Mild to moderate tricuspid regurgitation; RVSP: 38 mmHg. Pulmonic Valve  The pulmonic valve was not well visualized. Pericardial Effusion  No evidence of any pericardial effusion. Pleural Effusion  No evidence of pleural effusion. Miscellaneous  The aorta is within normal limits.   M-Mode/2D Measurements & Calculations   LV Diastolic Dimension:   LV Systolic Dimension:   LA Dimension: 3.8 cmAO  4.86 cm                   4.6 cm                   Root Dimension: 2.8 cm  LV FS:5.4 %               LV Volume Diastolic: 784  LV PW Diastolic: 6.28 cm  ml  LV PW Systolic: 5.61 cm   LV Volume Systolic: 696  Septum Diastolic: 0.49 cm ml                       RV Diastolic Dimension:  Septum Systolic: 3.02 cm  LV EDV/LV EDV Index: 116 3.13 cm  CO: 2.42 l/min            ml/65 m2LV ESV/LV ESV  CI: 1.36 l/m*m2           Index: 108 ml/61 m2      LA/Aorta: 1.36                            EF Calculated (A4C): 6.9  LV Area Diastolic: 80.4   %  cm2                       EF Calculated (2D): 56.0  LV Area Systolic: 32 cm2  % LV Length: 7.68 cm                             LVOT: 1.9 cm  Doppler Measurements & Calculations   MV Peak E-Wave: 136  AV Peak Velocity: 125 cm/s    LVOT Peak Velocity: 64.6  cm/s                 AV Peak Gradient: 6.25 mmHg   cm/s  MV Peak A-Wave: 68.5 AV Mean Velocity: 86.6 cm/s   LVOT Mean Velocity: 39.8  cm/s                 AV Mean Gradient: 3 mmHg      cm/s  MV E/A Ratio: 1.99   AV VTI: 21 cm                 LVOT Peak Gradient: 2  MV Peak Gradient:    AV Area (Continuity):1.16 cm2 mmHgLVOT Mean Gradient: 1  7.4 mmHg                                           mmHg                       LVOT VTI: 8.63 cm             Estimated RVSP: 38 mmHg  MV P1/2t: 41 msec                                  Estimated RAP:3 mmHg  MVA by PHT:5.37 cm2  Estimated PASP: 35.49 mmHg   MV E' Septal                                       TR Velocity:285 cm/s  Velocity: 4.89 cm/s                                TR Gradient:32.49 mmHg  MV E' Lateral  Velocity: 10.4 cm/s  MV E/E' septal:  27.81  MV E/E' lateral:  13.08      CT ABDOMEN PELVIS WO CONTRAST Additional Contrast? None    Result Date: 7/15/2021  EXAMINATION: CT OF THE ABDOMEN AND PELVIS WITHOUT CONTRAST 7/15/2021 7:28 pm TECHNIQUE: CT of the abdomen and pelvis was performed without the administration of intravenous contrast. Multiplanar reformatted images are provided for review. Dose modulation, iterative reconstruction, and/or weight based adjustment of the mA/kV was utilized to reduce the radiation dose to as low as reasonably achievable. COMPARISON: MRI abdomen May 24, 2019; CT abdomen May 23, 2019 HISTORY: ORDERING SYSTEM PROVIDED HISTORY: Abdominal pain TECHNOLOGIST PROVIDED HISTORY: Reason for exam:->Abdominal pain Additional Contrast?->None Reason for Exam: Abdominal pain FINDINGS: Lower Chest: Imaged lung bases demonstrate partially imaged small and associated atelectasis.   Imaged lung fields demonstrate findings suggesting mild pulmonary edema, however achievable. COMPARISON: CT brain June 11, 2021 HISTORY: ORDERING SYSTEM PROVIDED HISTORY: AMS TECHNOLOGIST PROVIDED HISTORY: Reason for exam:->AMS Has a \"code stroke\" or \"stroke alert\" been called? ->No Decision Support Exception - unselect if not a suspected or confirmed emergency medical condition->Emergency Medical Condition (MA) Reason for Exam: AMS FINDINGS: BRAIN/VENTRICLES: There is no acute intracranial hemorrhage, mass effect or midline shift. No abnormal extra-axial fluid collection. The gray-white differentiation is maintained without evidence of an acute infarct. There is no evidence of hydrocephalus. Severe atherosclerotic change of the intracranial vasculature. There is moderate periventricular and subcortical white matter hypoattenuation most consistent with microvascular ischemic changes. Global cerebral atrophy noted. ORBITS: The visualized portion of the orbits demonstrate no acute abnormality. SINUSES: The visualized paranasal sinuses and mastoid air cells demonstrate no acute abnormality. SOFT TISSUES/SKULL:  No acute abnormality of the visualized skull or soft tissues. No acute intracranial abnormality. Chronic microvascular ischemic changes and global cerebral atrophy. XR CHEST PORTABLE    Result Date: 7/16/2021  EXAMINATION: ONE XRAY VIEW OF THE CHEST 7/16/2021 10:31 am COMPARISON: July 15, 2021 HISTORY: ORDERING SYSTEM PROVIDED HISTORY: s/p central line attempt TECHNOLOGIST PROVIDED HISTORY: Reason for exam:->s/p central line attempt Reason for Exam: s/p central line attempt FINDINGS: Right IJ central venous catheter terminates in the mid SVC in satisfactory position. No appreciable pneumothorax. Cardiomediastinal silhouette appears unchanged. Pulmonary vascular congestion and diffuse interstitial prominence. Trace bilateral pleural effusions cannot be excluded. Minimal bibasilar atelectasis.      Right IJ central venous catheter terminates in the mid SVC in satisfactory position. No pneumothorax. Pulmonary vascular congestion with interstitial pulmonary edema. XR CHEST PORTABLE    Result Date: 7/15/2021  EXAMINATION: ONE XRAY VIEW OF THE CHEST 7/15/2021 5:47 pm COMPARISON: 06/13/2021 HISTORY: ORDERING SYSTEM PROVIDED HISTORY: AMS TECHNOLOGIST PROVIDED HISTORY: Reason for exam:->AMS Reason for Exam: AMS Initial encounter FINDINGS: Cardiomegaly. There is increased interstitial opacities bilaterally. No pleural effusion or pneumothorax. The osseous structures otherwise stable. Cardiomegaly with pulmonary edema. IR NONTUNNELED VASCULAR CATHETER > 5 YEARS    Result Date: 7/16/2021  PROCEDURE: ULTRASOUND GUIDED VASCULAR ACCESS. PLACEMENT OF A NON-TUNNELED CATHETER. 7/16/2021. HISTORY: ORDERING SYSTEM PROVIDED HISTORY: Need central venous access. Request CVC Insertion TECHNOLOGIST PROVIDED HISTORY: Reason for exam:->CVC Insertion SEDATION: None. FLUOROSCOPY DOSE AND TYPE OR TIME AND EXPOSURES: None. TECHNIQUE: Informed consent was obtained after a detailed explanation of the procedure including risks, benefits, and alternatives. Universal protocol was observed. The right neck and chest were prepped and draped in sterile fashion using maximum sterile barrier technique. Local anesthesia was achieved with lidocaine. A micropuncture needle was used to access the right internal jugular vein using ultrasound guidance. An ultrasound image demonstrating patency of the vein with needle tip located within it. An image was obtained and stored in PACs. A 0.035 guidewire was used to place a triple lumen central venous catheter after fascial tract dilation. The catheter flushed easily and there was a good blood return. The catheter was sutured to the skin. The catheter was locked with heparinized saline. The patient tolerated the procedure well and there were no immediate complications. EBL: Less than 1 ml.  FINDINGS: Chest x ray after procedure demonstrates the tip of the catheter in 7:56 AM   Result Value Ref Range    POC Glucose 177 (H) 70 - 99 MG/DL   POCT Glucose    Collection Time: 08/06/21 12:24 PM   Result Value Ref Range    POC Glucose 197 (H) 70 - 99 MG/DL   POCT Glucose    Collection Time: 08/06/21  5:09 PM   Result Value Ref Range    POC Glucose 124 (H) 70 - 99 MG/DL   POCT Glucose    Collection Time: 08/06/21  7:40 PM   Result Value Ref Range    POC Glucose 200 (H) 70 - 99 MG/DL         Medical problems    Patient Active Problem List:     History of CVA (cerebrovascular accident) without residual deficits     Morbid obesity ([facility]     DM (diabetes mellitus), type 2, uncontrolled ([facility])     Persistent atrial fibrillation ([facility])     CAD (coronary artery disease)     CHF (congestive heart failure) ([facility]     Light headed     History of MI (myocardial infarction)     Diabetes mellitus ([facility])     Hypertension     TIA (transient ischemic attack)     Hyperlipidemia     SOB (shortness of breath)     H/O cardiovascular stress test     Family history of coronary artery disease     PAF (paroxysmal atrial fibrillation) ([facility])     Chest pain     Atrial thrombus without antecedent myocardial infarction     S/P ablation of atrial fibrillation     Gastroenteritis     YESIKA (acute kidney injury) ([facility]     Acute cystitis     Elevated liver enzymes     CHF (congestive heart failure), NYHA class I, acute on chronic, combined ([facility]     Pneumonia     Multifocal pneumonia     Atrial fibrillation with RVR ([facility])     Shoulder pain     Paroxysmal atrial fibrillation ([facility])     Diabetic hyperosmolar coma ([facility])     Difficult intravenous access     HFrEF (heart failure with reduced ejection fraction) ([facility])      ASSESSMENT:  ---------------------    Metabolic encephalopathy     TIA r/o cardio carotid embolic event     Hyperglycemia     ? Sepsis     A fib    Right MCA left Ophthalmic artery aneurysms    Multiple intracranial stenosis    Right MCA saccular aneurysm -    depression     PLAN:     CT brain neg     Mri brain

## 2021-08-07 NOTE — PROGRESS NOTES
Nephrology Progress Note  8/7/2021 1:11 PM        Subjective:   Admit Date: 7/15/2021  PCP: Esa Schultz PA-C    Interval History: Seen in early morning, no event overnight that I am aware of    Diet: Reasonable    ROS: No PND, orthopnea or shortness of breath-no confusion    Data:     Current meds:    losartan  100 mg Oral Daily    DULoxetine  60 mg Oral Daily    metoprolol tartrate  50 mg Oral BID    busPIRone  10 mg Oral TID    insulin glargine  10 Units Subcutaneous Nightly    sodium chloride flush  5-40 mL Intravenous 2 times per day    insulin lispro  0-18 Units Subcutaneous 2 times per day    aspirin  81 mg Oral Daily    folic acid-pyridoxine-cyancobalamin  1 tablet Oral Daily    insulin lispro  0-18 Units Subcutaneous TID WC    insulin lispro  10 Units Subcutaneous TID WC    apixaban  5 mg Oral BID    levothyroxine  50 mcg Oral Daily    pantoprazole  40 mg Oral Daily    alogliptin  6.25 mg Oral Daily    sodium chloride flush  5-40 mL Intravenous 2 times per day    sodium chloride flush  5-40 mL Intravenous 2 times per day      dextrose      sodium chloride           No intake/output data recorded. CBC: No results for input(s): WBC, HGB, PLT in the last 72 hours.        Recent Labs     08/05/21  0351      K 4.8      CO2 32   BUN 19   CREATININE 1.2*   GLUCOSE 111*       Lab Results   Component Value Date    CALCIUM 8.8 08/05/2021    PHOS 4.1 08/05/2021       Objective:     Vitals: BP (!) 159/75   Pulse 83   Temp 98 °F (36.7 °C) (Oral)   Resp 19   Ht 5' (1.524 m)   Wt 157 lb (71.2 kg)   SpO2 94%   BMI 30.66 kg/m²     General appearance: Alert awake and oriented, no distress she is in bed lying down-finished her breakfast-roughly 75%  HEENT: 2+ conjunctival pallor  Neck: Supple  Lungs: I do not appreciate any crackles  Heart: Seems regular rate and rhythm this morning  Abdomen: Soft, nontender, positive bowel sounds  Extremities: No lower extremity edema      Problem List :         Impression :     1. Acute kidney injury-recovering, had acute tubular injury, mainly from cardiorenal syndrome type I  2. Recent acute decompensated heart failure with underlying severe cardiomyopathy-fortunately and surprisingly well compensated without any diuretics-several things can happen in my mind, either she had transient cardiomyopathy and she was able to recover it or somehow she has healthy remodeling of left ventricle-  3. Recent prolonged encephalopathy fortunately resolved  4. Underlying hypertension-diabetes    Recommendation/Plan  :     1. I have started angiotensin receptor blocker today  2. The goal is to maximize cardio renal protective medication  3. Perhaps as an outpatient when she establishes homeostasis-redo echocardiogram  4. She is waiting for the nursing home placement  5. Meanwhile low-salt and DASH diet  6. Also excellent glycemic control  7.  Follow clinically with close outpatient follow-up      Kym Mcconnell MD MD

## 2021-08-07 NOTE — PROGRESS NOTES
Progress Note( Dr. Deep Post)  8/7/2021  Subjective:   Admit Date: 7/15/2021  PCP: Destini Kellogg PA-C        Admitted For :   Altered mental state and severe hyperglycemia possible metabolic encephalopathy    Consulted For: Better control of blood glucose    Interval History: Patient is more alert awake responding to verbal commands and some answers seem to be appropriate and other seem to be confused       Patient is sitting in the chair and trying to eat breakfast this morning    CT Scan& MRI of Brain  Neg for any acute CVA     Denies any chest pains,   Denies SOB . Denies nausea or vomiting. Now eating better  No new bowel or bladder symptoms. No intake or output data in the 24 hours ending 08/07/21 0730    DATA    CBC:   No results for input(s): WBC, HGB, PLT in the last 72 hours.  CMP:  Recent Labs     08/05/21  0351      K 4.8      CO2 32   BUN 19   CREATININE 1.2*   CALCIUM 8.8   PROT 5.4*   LABALBU 3.2*   BILITOT 0.6   ALKPHOS 94   AST 17   ALT 15     Lipids:   Lab Results   Component Value Date    CHOL 153 01/30/2012    HDL 59 01/30/2012    TRIG 135 01/30/2012     Glucose:  Recent Labs     08/06/21  1709 08/06/21  1940 08/07/21  0351   POCGLU 124* 200* 164*     [id number]:  Lab Results   Component Value Date    LABA1C 10.9 07/17/2021     High Sensitivity TSH:   Lab Results   Component Value Date    TSHHS 7.150 07/20/2021     Free T3: No results found for: FT3  Free T4:  Lab Results   Component Value Date    T4FREE 1.28 06/12/2021       Echocardiogram complete 2D with doppler with color    Result Date: 7/16/2021  Transthoracic Echocardiography Report (TTE)  Demographics   Patient Name       Zi MAYFIELD Date of Study       07/16/2021   Date of Birth      1955         Gender              Female   Age                60 year(s)         Race                   Patient Number     [id number]         Room Number         2128   Visit Number       [id number]   Corporate ID [id number]   Accession Number   [id number]         [facility]   Ordering Physician Mehdi Rivera MD                 Physician           MD  Procedure Type of Study   TTE procedure:ECHOCARDIOGRAM COMPLETE 2D W DOPPLER W COLOR. Procedure Date Date: 07/16/2021 Start: 01:35 PM Study Location: Portable Technical Quality: Fair visualization Indications:Congestive heart failure. Patient Status: Routine Height: 60 inches Weight: 180 pounds BSA: 1.78 m2 BMI: 35.15 kg/m2 HR: 99 bpm BP: 145/83 mmHg  Conclusions   Summary  Left ventricular systolic function is abnormal.  Ejection fraction is visually estimated at 15-20%. Global hypokinesis noted. Grade III diastolic dysfunction. Mitral annular calcification is present. Moderate to severe mitral regurgitation. Mild to moderate tricuspid regurgitation; RVSP: 38 mmHg. No evidence of any pericardial effusion. Signature   ------------------------------------------------------------------  Electronically signed by Margarito Rincon MD (Interpreting  physician) on 07/16/2021 at 04:31 PM    CT ABDOMEN PELVIS WO CONTRAST Additional Contrast? None    Result Date: 7/15/2021  EXAMINATION: CT OF THE ABDOMEN AND PELVIS WITHOUT CONTRAST 7/15/2021 7:28 pm    1. Findings at the lung bases suggesting pulmonary edema with small bilateral effusions and associated atelectasis. 2. No acute intra-abdominal process identified. 3. Severe atherosclerotic disease. CT Head WO Contrast    Result Date: 7/15/2021  EXAMINATION: CT OF THE HEAD WITHOUT CONTRAST  7/15/2021 7:28 pm     No acute intracranial abnormality. Chronic microvascular ischemic changes and global cerebral atrophy.      XR CHEST PORTABLE    Result Date: 7/16/2021  EXAMINATION: ONE XRAY VIEW OF THE CHEST 7/16/2021 10:31 am COMPARISON: July 15, 2021 HISTORY: [address] per day      Infusions:    dextrose      sodium chloride           Objective:   Vitals: BP (!) 143/83   Pulse 78   Temp 98.1 °F (36.7 °C) (Oral)   Resp 20   Ht 5' (1.524 m)   Wt 157 lb 9.6 oz (71.5 kg)   SpO2 94%   BMI 30.78 kg/m²   General appearance: alert and cooperative with exam appears to be somewhat dehydrated  Neck: no JVD or bruit  Thyroid : Normal lobes   Lungs: Has Vesicular Breath sounds   Heart: Atrial fibrillation  Abdomen: soft, non-tender; bowel sounds normal; no masses,  no organomegaly  Musculoskeletal: Normal  Extremities: extremities normal, , no edema  Neurologic:  Awake, alert, oriented to name, place and time. Cranial nerves II-XII are grossly intact. Motor is  intact. Sensory is neuropathy. ,  and gait is normal.    Assessment:     Patient Active Problem List:     History of CVA (cerebrovascular accident) without residual deficits     Morbid obesity ([address]     DM (diabetes mellitus), type 2, uncontrolled ([address]     Persistent atrial fibrillation ([facility])     CAD (coronary artery disease)     CHF (congestive heart failure) ([address]     Light headed     History of MI (myocardial infarction)     Diabetes mellitus ([address]     Hypertension     TIA (transient ischemic attack)     Hyperlipidemia     SOB (shortness of breath)     H/O cardiovascular stress test     Family history of coronary artery disease     PAF (paroxysmal atrial fibrillation) ([facility]     Chest pain     Atrial thrombus without antecedent myocardial infarction     S/P ablation of atrial fibrillation     Gastroenteritis     YESIKA (acute kidney injury) ([address]     Acute cystitis     Elevated liver enzymes     CHF (congestive heart failure), NYHA class I, acute on chronic, combined ([facility])     Pneumonia     Multifocal pneumonia     Atrial fibrillation with RVR ([facility])     Shoulder pain     Paroxysmal atrial fibrillation ([address]     Diabetic hyperosmolar coma ([address]     Difficult intravenous access     Metabolic encephalopathy getting better      Plan:     1. Reviewed POC blood rate 14 with regular foot embolism there is good  2. . Labs and X ray results   3. Reviewed Current Medicines   4. On meal +,  correction bolus Humalog/ Basal Lantus Insulin regime does get  5. Monitor Blood glucose frequently   6. Modified  the dose of Insulin/ other medicines as needed  7. Changed CODE STATUS  8. Waiting for pre-CERT for skilled nursing home care   9. Will follow     .      Cesar Villalba MD, MD

## 2021-08-07 NOTE — PROGRESS NOTES
in shower  ADL Assistance: Independent  Homemaking Assistance: Independent  Homemaking Responsibilities: Yes  Ambulation Assistance: Independent  Transfer Assistance: Independent  Active : No (Daughter drives)  Occupation: Retired  Type of occupation:   Short term goals  Time Frame for Short term goals: 1 week  Short term goal 1: Pt will perform supine>sit mobility with Mod A of 1 with improved command following. Short term goal 2: Pt will perform STS from standard surface with Min a and v/c for sequencing  Short term goal 3: Pt will AMB x10 ft in room to BR with LRAD and CGA-Min for safety.        Electronically signed by:    Alicia Lind PTA [id number]  8/7/2021, 11:19 AM

## 2021-08-07 NOTE — PROGRESS NOTES
Physician Progress Note      Melecio Juilen  CSN #:                  [id number]  :                       1955  ADMIT DATE:       7/15/2021 5:07 PM  DISCH DATE:  RESPONDING  PROVIDER #:        Faheem Matute MD          QUERY TEXT:    Pt admitted with Sepsis. Noted documentation of Moderate malnutrition noted   in the  Nutrition consultant Notes. If possible, please document in   progress notes and discharge summary:    The medical record reflects the following:  Risk Factors: Chronic illness, Diabetes, CKD CHF  Clinical Indicators: Nutrition Diagnosis: Moderate malnutrition, In context of   chronic illness related to endocrine dysfuntion, food and nutrition related   knowledge deficit, psychological cause or life stress as evidenced by lab   values, poor intake prior to admission (18% weight loss over 1 year),Per chart   review, pt with unintentional 18.2 % wt loss over 1 year. Pt meets criteria   for chronic moderate malnutrition. Daughter agreed to sending supplements. High nutrition risk.   Context:  Chronic Illness  Findings of the 6 clinical characteristics of malnutrition: Energy Intake:  7   - 75% or less estimated energy requirements for 1 month or longer Weight Loss:    7 - 20% over 1 year (18.2%) Fluid Accumulation:  7 - Severe (+2, non-pittin   edema ntoed) Extremities, Generalized  Treatment: Modification per diet texture per SLP  Modify diet to Dysphagia   minced/moist, Carb Control,  D/C diabetic supplements Add snacks at meal times    Please continue to assist feeding for po intake increase    Thank you Jack Brandon RN, CDS ([phone])  Options provided:  -- Moderate malnutrition confirmed present on admission  -- Moderate malnutrition  ruled out  -- Defer to Nutrition consultant documentation regarding Moderate malnutrition  -- Other - I will add my own diagnosis  -- Disagree - Not applicable / Not valid  -- Disagree - Clinically unable to determine / Unknown  -- Refer to Clinical Documentation Reviewer    PROVIDER RESPONSE TEXT:    I defer to Nutririon consultant regarding documentation of Moderate   malnutrition .     Query created by: Yana Victor on 8/4/2021 7:35 PM      Electronically signed by:  Angeles Cantu MD 8/7/2021 7:44 PM

## 2021-08-07 NOTE — CARE COORDINATION
CM call to [facility] to follow up on discharge planning, spoke to Bill/admissions. Precert pending at this time.   CM following

## 2021-08-08 NOTE — PLAN OF CARE
Problem: Discharge Planning:  Goal: Discharged to appropriate level of care  Description: Discharged to appropriate level of care  8/8/2021 1044 by Ascencion Dorado RN  Outcome: Ongoing  8/8/2021 0011 by Fahad Garcia RN  Outcome: Ongoing  Goal: Ability to perform activities of daily living will improve  Description: Ability to perform activities of daily living will improve  8/8/2021 1044 by Ascencion Dorado RN  Outcome: Ongoing  8/8/2021 0011 by Fahad Garcia RN  Outcome: Ongoing  Goal: Participates in care planning  Description: Participates in care planning  8/8/2021 1044 by Ascencion Dorado RN  Outcome: Ongoing  8/8/2021 0011 by Fahad Garcia RN  Outcome: Ongoing     Problem: Serum Glucose Level - Abnormal:  Goal: Ability to maintain appropriate glucose levels will improve  Description: Ability to maintain appropriate glucose levels will improve  8/8/2021 1044 by Ascencion Dorado RN  Outcome: Ongoing  8/8/2021 0011 by Fahad Garcia RN  Outcome: Ongoing     Problem: Sensory Perception - Impaired:  Goal: Ability to maintain a stable neurologic state will improve  Description: Ability to maintain a stable neurologic state will improve  8/8/2021 1044 by Ascencion Dorado RN  Outcome: Ongoing  8/8/2021 0011 by Fahad Garcia RN  Outcome: Ongoing  Goal: Demonstrations of improved sensory functioning will increase  Description: Demonstrations of improved sensory functioning will increase  8/8/2021 1044 by Ascencion Dorado RN  Outcome: Ongoing  8/8/2021 0011 by Fahad Garcia RN  Outcome: Ongoing  Goal: Decrease in sensory misperception frequency  Description: Decrease in sensory misperception frequency  8/8/2021 1044 by Ascencion Dorado RN  Outcome: Ongoing  8/8/2021 0011 by Fahad Garcia RN  Outcome: Ongoing  Goal: Able to refrain from responding to false sensory perceptions  Description: Able to refrain from responding to false sensory perceptions  8/8/2021 1044 by Ascencion Dorado RN  Outcome: Ongoing  8/8/2021 0011 by Isabel Acuna RN  Outcome: Ongoing  Goal: Demonstrates accurate environmental perceptions  Description: Demonstrates accurate environmental perceptions  8/8/2021 1044 by Lisette Rutherford RN  Outcome: Ongoing  8/8/2021 0011 by Isabel Acuna RN  Outcome: Ongoing  Goal: Able to distinguish between reality-based and nonreality-based thinking  Description: Able to distinguish between reality-based and nonreality-based thinking  8/8/2021 1044 by Lisette Rutherford RN  Outcome: Ongoing  8/8/2021 0011 by Isabel Acuna RN  Outcome: Ongoing  Goal: Able to interrupt nonreality-based thinking  Description: Able to interrupt nonreality-based thinking  8/8/2021 1044 by Lisette Rutherford RN  Outcome: Ongoing  8/8/2021 0011 by Isabel Acuna RN  Outcome: Ongoing     Problem: Gas Exchange - Impaired:  Goal: Levels of oxygenation will improve  Description: Levels of oxygenation will improve  8/8/2021 1044 by Lisette Rutherford RN  Outcome: Ongoing  8/8/2021 0011 by Isabel Acuna RN  Outcome: Ongoing     Problem: Infection, Septic Shock:  Goal: Will show no infection signs and symptoms  Description: Will show no infection signs and symptoms  8/8/2021 1044 by Lisette Rutherford RN  Outcome: Ongoing  8/8/2021 0011 by Isabel Acuna RN  Outcome: Ongoing     Problem: Infection - Ventilator-Associated Pneumonia:  Goal: Absence of pulmonary infection  Description: Absence of pulmonary infection  8/8/2021 1044 by Lisette Rutherford RN  Outcome: Ongoing  8/8/2021 0011 by Isabel Acuna RN  Outcome: Ongoing     Problem: Tissue Perfusion, Altered:  Goal: Circulatory function within specified parameters  Description: Circulatory function within specified parameters  8/8/2021 1044 by Lisette Rutherford RN  Outcome: Ongoing  8/8/2021 0011 by Isabel Acuna RN  Outcome: Ongoing     Problem: Venous Thromboembolism:  Goal: Will show no signs or symptoms of venous thromboembolism  Description: Will show no signs or symptoms of venous thromboembolism  8/8/2021 1044 by Dali Herman RN  Outcome: Ongoing  8/8/2021 0011 by Danielle Bass RN  Outcome: Ongoing  Goal: Absence of signs or symptoms of impaired coagulation  Description: Absence of signs or symptoms of impaired coagulation  8/8/2021 1044 by Dali Herman RN  Outcome: Ongoing  8/8/2021 0011 by Danielle Bass RN  Outcome: Ongoing     Problem: Falls - Risk of:  Goal: Will remain free from falls  Description: Will remain free from falls  8/8/2021 1044 by Dali Herman RN  Outcome: Ongoing  8/8/2021 0011 by Danielle Bass RN  Outcome: Ongoing  Goal: Absence of physical injury  Description: Absence of physical injury  8/8/2021 1044 by Dali Herman RN  Outcome: Ongoing  8/8/2021 0011 by Danielle Bass RN  Outcome: Ongoing     Problem: Pain:  Description: Pain management should include both nonpharmacologic and pharmacologic interventions.   Goal: Pain level will decrease  Description: Pain level will decrease  8/8/2021 1044 by Dali Herman RN  Outcome: Ongoing  8/8/2021 0011 by Danielle Bass RN  Outcome: Ongoing  Goal: Control of acute pain  Description: Control of acute pain  8/8/2021 1044 by Dali Herman RN  Outcome: Ongoing  8/8/2021 0011 by Danielle Bass RN  Outcome: Ongoing  Goal: Control of chronic pain  Description: Control of chronic pain  8/8/2021 1044 by Dali Herman RN  Outcome: Ongoing  8/8/2021 0011 by Danielle Bass RN  Outcome: Ongoing     Problem: Skin Integrity:  Goal: Will show no infection signs and symptoms  Description: Will show no infection signs and symptoms  8/8/2021 1044 by Dali Herman RN  Outcome: Ongoing  8/8/2021 0011 by Danielle Bass RN  Outcome: Ongoing  Goal: Absence of new skin breakdown  Description: Absence of new skin breakdown  8/8/2021 1044 by Dali Herman RN  Outcome: Ongoing  8/8/2021 0011 by Danielle Bass RN  Outcome: Ongoing     Problem: Nutrition  Goal: Ongoing  Goal: Absence of abusive behavior  Description: Absence of abusive behavior  8/8/2021 1044 by Neema Helms RN  Outcome: Ongoing  8/8/2021 0011 by Josseline Miguel RN  Outcome: Ongoing  Goal: Verbalizations of feeling emotionally comfortable while being cared for will increase  Description: Verbalizations of feeling emotionally comfortable while being cared for will increase  8/8/2021 1044 by Neema Helms RN  Outcome: Ongoing  8/8/2021 0011 by Josseline Miguel RN  Outcome: Ongoing     Problem: Psychomotor Activity - Altered:  Goal: Absence of psychomotor disturbance signs and symptoms  Description: Absence of psychomotor disturbance signs and symptoms  8/8/2021 1044 by Neema Helms RN  Outcome: Ongoing  8/8/2021 0011 by Josseline Miguel RN  Outcome: Ongoing     Problem: Sleep Pattern Disturbance:  Goal: Appears well-rested  Description: Appears well-rested  8/8/2021 1044 by Neema Helms RN  Outcome: Ongoing  8/8/2021 0011 by Josseline Miguel RN  Outcome: Ongoing

## 2021-08-08 NOTE — PROGRESS NOTES
Progress Note( Dr. Joann Silveira)  8/8/2021  Subjective:   Admit Date: 7/15/2021  PCP: Derrell Duncan PA-C        Admitted For :   Altered mental state and severe hyperglycemia possible metabolic encephalopathy    Consulted For: Better control of blood glucose    Interval History: Patient is more alert awake responding to verbal commands and some answers seem to be appropriate and other seem to be confused       Patient is sitting in the chair and trying to eat breakfast this morning    CT Scan& MRI of Brain  Neg for any acute CVA     Denies any chest pains,   Denies SOB . Denies nausea or vomiting. Now eating better  No new bowel or bladder symptoms. No intake or output data in the 24 hours ending 08/08/21 0847    DATA    CBC:   No results for input(s): WBC, HGB, PLT in the last 72 hours.  CMP:  Recent Labs     08/08/21  0300      K 4.3      CO2 28   BUN 16   CREATININE 1.1   CALCIUM 8.6   PROT 5.4*   LABALBU 3.5   BILITOT 0.5   ALKPHOS 88   AST 13*   ALT 12     Lipids:   Lab Results   Component Value Date    CHOL 153 01/30/2012    HDL 59 01/30/2012    TRIG 135 01/30/2012     Glucose:  Recent Labs     08/07/21  1703 08/07/21  2039 08/08/21  0159   POCGLU 182* 92 170*     YddadfnlxyO5G:  Lab Results   Component Value Date    LABA1C 10.9 07/17/2021     High Sensitivity TSH:   Lab Results   Component Value Date    TSHHS 7.150 07/20/2021     Free T3: No results found for: FT3  Free T4:  Lab Results   Component Value Date    T4FREE 1.28 06/12/2021       Echocardiogram complete 2D with doppler with color    Result Date: 7/16/2021  Transthoracic Echocardiography Report (TTE)  Demographics   Patient Name       Cortney MAYFIELD Date of Study       07/16/2021   Date of Birth      1955         Gender              Female   Age                60 year(s)         Race                   Patient Number     [id number]         Room Number         2128   Visit Number       [id number]   Corporate ID PROVIDED HISTORY: s/p central line attempt    Right IJ central venous catheter terminates in the mid SVC in satisfactory position. No pneumothorax. Pulmonary vascular congestion with interstitial pulmonary edema. XR CHEST PORTABLE    Result Date: 7/15/2021  EXAMINATION: ONE XRAY VIEW OF THE CHEST 7/15/2021 5:47 pm COMPARISON: 06/13/2021 HISTORY: ORDERING SYSTEM PROVIDED HISTORY: AMS TECHNOLOGIST PROVIDED HISTORY: Reason for exam:->AMS Reason for Exam: AMS Initial encounter FINDINGS: Cardiomegaly. There is increased interstitial opacities bilaterally. No pleural effusion or pneumothorax. The osseous structures otherwise stable. Cardiomegaly with pulmonary edema. IR NONTUNNELED VASCULAR CATHETER > 5 YEARS    Result Date: 7/16/2021  PROCEDURE: ULTRASOUND GUIDED VASCULAR ACCESS. PLACEMENT OF A NON-TUNNELED CATHETER. 7/16/2021. HISTORY: ORDERING SYSTEM PROVIDED HISTORY: Need central venous access. Request CVC Insertion TECHNOLOGIST PROVIDED HISTORY: Reason for exam:->CVC Insertion SEDATION: None. Successful ultrasound and fluoroscopy guided non-tunneled central venous catheter placement. The catheter is ready to use.        Scheduled Medicines   Medications:    losartan  100 mg Oral Daily    DULoxetine  60 mg Oral Daily    metoprolol tartrate  50 mg Oral BID    busPIRone  10 mg Oral TID    insulin glargine  10 Units Subcutaneous Nightly    sodium chloride flush  5-40 mL Intravenous 2 times per day    insulin lispro  0-18 Units Subcutaneous 2 times per day    aspirin  81 mg Oral Daily    folic acid-pyridoxine-cyancobalamin  1 tablet Oral Daily    insulin lispro  0-18 Units Subcutaneous TID WC    insulin lispro  10 Units Subcutaneous TID WC    apixaban  5 mg Oral BID    levothyroxine  50 mcg Oral Daily    pantoprazole  40 mg Oral Daily    alogliptin  6.25 mg Oral Daily    sodium chloride flush  5-40 mL Intravenous 2 times per day    sodium chloride flush  5-40 mL Intravenous 2 times per day      Infusions:    dextrose      sodium chloride           Objective:   Vitals: BP (!) 155/83   Pulse 76   Temp 97.9 °F (36.6 °C) (Oral)   Resp 16   Ht 5' (1.524 m)   Wt 158 lb 1.6 oz (71.7 kg)   SpO2 95%   BMI 30.88 kg/m²   General appearance: alert and cooperative with exam appears to be somewhat dehydrated  Neck: no JVD or bruit  Thyroid : Normal lobes   Lungs: Has Vesicular Breath sounds   Heart: Atrial fibrillation  Abdomen: soft, non-tender; bowel sounds normal; no masses,  no organomegaly  Musculoskeletal: Normal  Extremities: extremities normal, , no edema  Neurologic:  Awake, alert, oriented to name, place and time. Cranial nerves II-XII are grossly intact. Motor is  intact. Sensory is neuropathy. ,  and gait is normal.    Assessment:     Patient Active Problem List:     History of CVA (cerebrovascular accident) without residual deficits     Morbid obesity ([address]     DM (diabetes mellitus), type 2, uncontrolled ([address]     Persistent atrial fibrillation (HCC)     CAD (coronary artery disease)     CHF (congestive heart failure) ([address]     Light headed     History of MI (myocardial infarction)     Diabetes mellitus ([address]     Hypertension     TIA (transient ischemic attack)     Hyperlipidemia     SOB (shortness of breath)     H/O cardiovascular stress test     Family history of coronary artery disease     PAF (paroxysmal atrial fibrillation) ([facility])     Chest pain     Atrial thrombus without antecedent myocardial infarction     S/P ablation of atrial fibrillation     Gastroenteritis     YESIKA (acute kidney injury) ([address]     Acute cystitis     Elevated liver enzymes     CHF (congestive heart failure), NYHA class I, acute on chronic, combined (HCC)     Pneumonia     Multifocal pneumonia     Atrial fibrillation with RVR (HCC)     Shoulder pain     Paroxysmal atrial fibrillation ([address]     Diabetic hyperosmolar coma ([address]     Difficult intravenous access     Metabolic encephalopathy getting better      Plan:     1. Reviewed POC blood rate 14 with regular foot embolism there is good  2. . Labs and X ray results   3. Reviewed Current Medicines   4. On meal +,  correction bolus Humalog/ Basal Lantus Insulin regime does get  5. Monitor Blood glucose frequently   6. Modified  the dose of Insulin/ other medicines as needed  7. Changed CODE STATUS  8. Waiting for pre-CERT for skilled nursing home care   9. Will follow     .      Vashti Wade MD, MD

## 2021-08-08 NOTE — PROGRESS NOTES
INTERNAL MEDICINE PROGRESS NOTE        [facility]   1955   Primary Care Physician:  Tiffany Navarrete PA-C  Admit Date: 7/15/2021     Subjective:   Patient awake and alert. No new complaints. Blood sugars are being monitored. No new problems. Objective:   BP (!) 153/66   Pulse 79   Temp 98.1 °F (36.7 °C) (Oral)   Resp 17   Ht 5' (1.524 m)   Wt 158 lb 1.6 oz (71.7 kg)   SpO2 94%   BMI 30.88 kg/m²    General appearance: alert, appears stated age and cooperative  Head: Normocephalic, without obvious abnormality, atraumatic  Neck: no adenopathy and supple, symmetrical, trachea midline  Lungs: clear to auscultation bilaterally  Heart: regular rate and rhythm and S1, S2 normal  Abdomen: soft, non-tender; bowel sounds normal; no masses,  no organomegaly  Extremities: no clubbing, cyanosis or edema  Neurologic: moves all 4 extremities. Oriented x 3, cranial nerve intact.  Motor system: generalized weakness      Data Review  Lab Results   Component Value Date     08/08/2021    K 4.3 08/08/2021     08/08/2021    CO2 28 08/08/2021    CREATININE 1.1 08/08/2021    BUN 16 08/08/2021    CALCIUM 8.6 08/08/2021     Lab Results   Component Value Date    WBC 10.5 07/27/2021    HGB 12.2 (L) 07/27/2021    HCT 38.7 07/27/2021    MCV 95.8 07/27/2021     07/27/2021     INR/Prothrombin Time      Meds:    losartan  100 mg Oral Daily    DULoxetine  60 mg Oral Daily    metoprolol tartrate  50 mg Oral BID    busPIRone  10 mg Oral TID    insulin glargine  10 Units Subcutaneous Nightly    sodium chloride flush  5-40 mL Intravenous 2 times per day    insulin lispro  0-18 Units Subcutaneous 2 times per day    aspirin  81 mg Oral Daily    folic acid-pyridoxine-cyancobalamin  1 tablet Oral Daily    insulin lispro  0-18 Units Subcutaneous TID WC    insulin lispro  10 Units Subcutaneous TID WC    apixaban  5 mg Oral BID    levothyroxine  50 mcg Oral Daily    pantoprazole  40 mg Oral Daily    alogliptin  6.25 mg Oral Daily    sodium chloride flush  5-40 mL Intravenous 2 times per day    sodium chloride flush  5-40 mL Intravenous 2 times per day     PRN Meds: dextrose, diphenhydrAMINE, hydrOXYzine, LORazepam, potassium chloride, albuterol sulfate HFA, nitroGLYCERIN, traZODone, glucose, dextrose, glucagon (rDNA), dextrose, sodium chloride flush, ondansetron **OR** ondansetron, polyethylene glycol, acetaminophen **OR** acetaminophen, sodium chloride flush, sodium chloride    Assessment/Plan:   Patient Active Hospital Problem List:  Patient Active Problem List   Diagnosis    History of CVA (cerebrovascular accident) without residual deficits    Morbid obesity ([facility]    DM [facility]di[facility] mellitus[facility], [facility]    Persistent [facility] fibrillation ([facility]    CAD (coronary artery disease)    CHF (congestive heart failure) ([facility]    Light headed    History of MI (myocardial infarction)    Diabetes mellitus ([facility]    Hypertension    TIA (transient ischemic attack)    Hyperlipidemia    SOB (shortness of breath)    H/O cardiovascular stress test    Family history of coronary artery disease    PA[facility]oxysmal [facility] fibrillation) ([facility])    Chest pain    Atrial thrombus without antecedent myocardial infarction    S/P ablation of atrial fibrillation    Gastroenteritis    YESIKA (acute kidney injury) ([facility]    Acute cystitis    Elevated liver enzymes    CHF (congestive heart failure), NYHA class I, acute on chronic, combined ([facility]    Pneumonia    Multifocal pneumonia    Atrial fibrillation with RVR ([facility])    Shoulder pain    Paroxysmal atrial fibrillation ([facility]    Diabetic hyperosmolar coma (HCC)    Difficult intravenous access    HFrEF (heart failure with reduced ejection fraction) ([facility])    Delirium    Bradycardia     Assessment:  Diabetic hyperosmolar state: resolved. Metabolic encephalopathy: improved  Leukocytosis and possible sepsis: resolved.  procal is wnl  Depression: improving

## 2021-08-08 NOTE — PROGRESS NOTES
Nephrology Progress Note  8/8/2021 1:56 PM        Subjective:   Admit Date: 7/15/2021  PCP: Mandie Colby PA-C    Interval History: Patient seen in early morning, doing very well no acute event that I am aware of    Diet: Reasonable-she finished almost 75% of her breakfast    ROS: No shortness of breath, no PND orthopnea  No confusion    Data:     Current meds:    losartan  100 mg Oral Daily    DULoxetine  60 mg Oral Daily    metoprolol tartrate  50 mg Oral BID    busPIRone  10 mg Oral TID    insulin glargine  10 Units Subcutaneous Nightly    sodium chloride flush  5-40 mL Intravenous 2 times per day    insulin lispro  0-18 Units Subcutaneous 2 times per day    aspirin  81 mg Oral Daily    folic acid-pyridoxine-cyancobalamin  1 tablet Oral Daily    insulin lispro  0-18 Units Subcutaneous TID WC    insulin lispro  10 Units Subcutaneous TID WC    apixaban  5 mg Oral BID    levothyroxine  50 mcg Oral Daily    pantoprazole  40 mg Oral Daily    alogliptin  6.25 mg Oral Daily    sodium chloride flush  5-40 mL Intravenous 2 times per day    sodium chloride flush  5-40 mL Intravenous 2 times per day      dextrose      sodium chloride           No intake/output data recorded. CBC: No results for input(s): WBC, HGB, PLT in the last 72 hours.        Recent Labs     08/08/21  0300      K 4.3      CO2 28   BUN 16   CREATININE 1.1   GLUCOSE 155*       Lab Results   Component Value Date    CALCIUM 8.6 08/08/2021    PHOS 3.7 08/08/2021       Objective:     Vitals: BP (!) 153/66   Pulse 79   Temp 98.1 °F (36.7 °C) (Oral)   Resp 17   Ht 5' (1.524 m)   Wt 158 lb 1.6 oz (71.7 kg)   SpO2 94%   BMI 30.88 kg/m²     General appearance: No acute distress in bed-she does ambulate according to her  HEENT: 1+ conjunctival pallor  Neck: Supple  Lungs: No crackles on posterior exam  Heart: Seems regular rate and rhythm this morning  Abdomen: Soft, nontender  Extremities: No lower extremity edema      Problem List :         Impression :     1. Acute kidney injury-creatinine was 1.1  2. Recent decompensated heart failure with underlying severe audio myopathy-thought to be nonischemic-but she does have diabetes of course-somehow compensated without any diuretics  3. Underlying diabetes, hypertension    Recommendation/Plan  :     1. She is on losartan  2. Blood pressure is acceptable all things considered-at least as an inpatient-can be addressed as an outpatient-outpatient goal 130/80  3. We will optimize her cardiorenal protective medication as an outpatient  4. Low-salt, DASH diet and good glycemic control  5. So far so we will keep her off diuretics for now  6.  She is waiting for ECF placement      Anusha Pinto MD MD

## 2021-08-09 NOTE — PROGRESS NOTES
Nephrology Progress Note  8/9/2021 2:33 PM        Subjective:   Admit Date: 7/15/2021  PCP: Ebb Bence, PA-C    Interval History: Patient seen in early morning-no event overnight    Diet: Good oral intake    ROS: No shortness of breath, PND or orthopnea  Confusion    Data:     Current meds:    losartan  100 mg Oral Daily    DULoxetine  60 mg Oral Daily    metoprolol tartrate  50 mg Oral BID    busPIRone  10 mg Oral TID    insulin glargine  10 Units Subcutaneous Nightly    sodium chloride flush  5-40 mL Intravenous 2 times per day    insulin lispro  0-18 Units Subcutaneous 2 times per day    aspirin  81 mg Oral Daily    folic acid-pyridoxine-cyancobalamin  1 tablet Oral Daily    insulin lispro  0-18 Units Subcutaneous TID WC    insulin lispro  10 Units Subcutaneous TID WC    apixaban  5 mg Oral BID    levothyroxine  50 mcg Oral Daily    pantoprazole  40 mg Oral Daily    alogliptin  6.25 mg Oral Daily    sodium chloride flush  5-40 mL Intravenous 2 times per day    sodium chloride flush  5-40 mL Intravenous 2 times per day      dextrose      sodium chloride           I/O last 3 completed shifts: In: 120 [P.O.:120]  Out: -     CBC: No results for input(s): WBC, HGB, PLT in the last 72 hours. Recent Labs     08/08/21  0300      K 4.3      CO2 28   BUN 16   CREATININE 1.1   GLUCOSE 155*       Lab Results   Component Value Date    CALCIUM 8.6 08/08/2021    PHOS 3.7 08/08/2021       Objective:     Vitals: BP (!) 151/63   Pulse 71   Temp 98 °F (36.7 °C) (Oral)   Resp 20   Ht 5' (1.524 m)   Wt 159 lb 6 oz (72.3 kg)   SpO2 96%   BMI 31.13 kg/m²     General appearance: In bed, no acute distress  HEENT: 1+ conjunctival pallor  Neck: Supple  Lungs: No crackles on auscultation on posterior lung  Heart: Seems regular rate and rhythm this morning  Abdomen: Soft, nontender  Extremities: No edema      Problem List :         Impression :     1.  Acute kidney injury-recovering-able  2. Recent acute decompensated heart failure-May had transient cardiomyopathy-she is well compensated diuretics now  3. Underlying cardiomyopathy-might have to do a outpatient echo to see if her myocardium is working better now-  4. Underlying diabetes and hypertension    Recommendation/Plan  :     1. She is at [facility] therapy-can be adjusted as an outpatient-I will probably add thiazide first  2. Low-salt, DASH diet  3. Will watch for any fluid overload  4. She still waiting for the placement  5. Good glycemic control  6.  Follow clinically and biochemically      Andreina Gilbert MD MD

## 2021-08-09 NOTE — CARE COORDINATION
LSW called Mick and left message on the UR VM. LSW enquired about the precert to [facility] and questioned if the precert will be given soon. BUSTERW informed them on Vm that pt has been in hospital for 25 days and we are ready for discharge. LSW stated that the precert has been pending for a week and we would like an answer soon. Waiting on return call. Wild updated PT/OT notes WB note placed.

## 2021-08-09 NOTE — PROGRESS NOTES
Progress Note( Dr. Adelina Shah)  8/9/2021  Subjective:   Admit Date: 7/15/2021  PCP: Dalia Suarez PA-C        Admitted For :   Altered mental state and severe hyperglycemia possible metabolic encephalopathy    Consulted For: Better control of blood glucose    Interval History: Patient is more alert awake responding to verbal commands and some answers seem to be appropriate and other seem to be confused       Patient is sitting in the chair and trying to eat breakfast this morning    CT Scan& MRI of Brain  Neg for any acute CVA     Denies any chest pains,   Denies SOB . Denies nausea or vomiting. Now eating better  No new bowel or bladder symptoms. Intake/Output Summary (Last 24 hours) at 8/9/2021 0737  Last data filed at 8/8/2021 0911  Gross per 24 hour   Intake 120 ml   Output --   Net 120 ml       DATA    CBC:   No results for input(s): WBC, HGB, PLT in the last 72 hours.  CMP:  Recent Labs     08/08/21  0300      K 4.3      CO2 28   BUN 16   CREATININE 1.1   CALCIUM 8.6   PROT 5.4*   LABALBU 3.5   BILITOT 0.5   ALKPHOS 88   AST 13*   ALT 12     Lipids:   Lab Results   Component Value Date    CHOL 153 01/30/2012    HDL 59 01/30/2012    TRIG 135 01/30/2012     Glucose:  Recent Labs     08/08/21  1724 08/08/21 2028 08/09/21  0247   POCGLU 151* 142* 200*     HinknnsnzgO1P:  Lab Results   Component Value Date    LABA1C 10.9 07/17/2021     High Sensitivity TSH:   Lab Results   Component Value Date    TSHHS 7.150 07/20/2021     Free T3: No results found for: FT3  Free T4:  Lab Results   Component Value Date    T4FREE 1.28 06/12/2021       Echocardiogram complete 2D with doppler with color    Result Date: 7/16/2021  Transthoracic Echocardiography Report (TTE)  Demographics   Patient Name       Effie MAYFIELD Date of Study       07/16/2021   Date of Birth      1955         Gender              Female   Age                60 year(s)         Race                   Patient Number [id number]         Room Number         2128   Visit Number       [id number]   Corporate ID       [id number]   Accession Number   [id number]         Lali Holter   Ordering Physician Deo Mckee MD                 Physician           MD  Procedure Type of Study   TTE procedure:ECHOCARDIOGRAM COMPLETE 2D W DOPPLER W COLOR. Procedure Date Date: 07/16/2021 Start: 01:35 PM Study Location: Portable Technical Quality: Fair visualization Indications:Congestive heart failure. Patient Status: Routine Height: 60 inches Weight: 180 pounds BSA: 1.78 m2 BMI: 35.15 kg/m2 HR: 99 bpm BP: 145/83 mmHg  Conclusions   Summary  Left ventricular systolic function is abnormal.  Ejection fraction is visually estimated at 15-20%. Global hypokinesis noted. Grade III diastolic dysfunction. Mitral annular calcification is present. Moderate to severe mitral regurgitation. Mild to moderate tricuspid regurgitation; RVSP: 38 mmHg. No evidence of any pericardial effusion. Signature   ------------------------------------------------------------------  Electronically signed by Rosa Isela Wright MD (Interpreting  physician) on 07/16/2021 at 04:31 PM    CT ABDOMEN PELVIS WO CONTRAST Additional Contrast? None    Result Date: 7/15/2021  EXAMINATION: CT OF THE ABDOMEN AND PELVIS WITHOUT CONTRAST 7/15/2021 7:28 pm    1. Findings at the lung bases suggesting pulmonary edema with small bilateral effusions and associated atelectasis. 2. No acute intra-abdominal process identified. 3. Severe atherosclerotic disease. CT Head WO Contrast    Result Date: 7/15/2021  EXAMINATION: CT OF THE HEAD WITHOUT CONTRAST  7/15/2021 7:28 pm     No acute intracranial abnormality. Chronic microvascular ischemic changes and global cerebral atrophy.      XR CHEST PORTABLE    Result Date: 7/16/2021  EXAMINATION: ONE XRAY VIEW OF THE CHEST 7/16/2021 10:31 am COMPARISON: July 15, 2021 HISTORY: ORDERING SYSTEM PROVIDED HISTORY: s/p central line attempt    Right IJ central venous catheter terminates in the mid SVC in satisfactory position. No pneumothorax. Pulmonary vascular congestion with interstitial pulmonary edema. XR CHEST PORTABLE    Result Date: 7/15/2021  EXAMINATION: ONE XRAY VIEW OF THE CHEST 7/15/2021 5:47 pm COMPARISON: 06/13/2021 HISTORY: ORDERING SYSTEM PROVIDED HISTORY: AMS TECHNOLOGIST PROVIDED HISTORY: Reason for exam:->AMS Reason for Exam: AMS Initial encounter FINDINGS: Cardiomegaly. There is increased interstitial opacities bilaterally. No pleural effusion or pneumothorax. The osseous structures otherwise stable. Cardiomegaly with pulmonary edema. IR NONTUNNELED VASCULAR CATHETER > 5 YEARS    Result Date: 7/16/2021  PROCEDURE: ULTRASOUND GUIDED VASCULAR ACCESS. PLACEMENT OF A NON-TUNNELED CATHETER. 7/16/2021. HISTORY: ORDERING SYSTEM PROVIDED HISTORY: Need central venous access. Request CVC Insertion TECHNOLOGIST PROVIDED HISTORY: Reason for exam:->CVC Insertion SEDATION: None. Successful ultrasound and fluoroscopy guided non-tunneled central venous catheter placement. The catheter is ready to use.        Scheduled Medicines   Medications:    losartan  100 mg Oral Daily    DULoxetine  60 mg Oral Daily    metoprolol tartrate  50 mg Oral BID    busPIRone  10 mg Oral TID    insulin glargine  10 Units Subcutaneous Nightly    sodium chloride flush  5-40 mL Intravenous 2 times per day    insulin lispro  0-18 Units Subcutaneous 2 times per day    aspirin  81 mg Oral Daily    folic acid-pyridoxine-cyancobalamin  1 tablet Oral Daily    insulin lispro  0-18 Units Subcutaneous TID     insulin lispro  10 Units Subcutaneous TID     apixaban  5 mg Oral BID    levothyroxine  50 mcg Oral Daily    pantoprazole  40 mg Oral Daily    alogliptin  6.25 mg Oral Daily    sodium chloride flush  5-40 mL Intravenous 2 times per day    sodium chloride flush  5-40 mL Intravenous 2 times per day      Infusions:    dextrose      sodium chloride           Objective:   Vitals: BP (!) 158/73   Pulse 78   Temp 97.9 °F (36.6 °C) (Oral)   Resp 18   Ht 5' (1.524 m)   Wt 158 lb 1.6 oz (71.7 kg)   SpO2 93%   BMI 30.88 kg/m²   General appearance: alert and cooperative with exam appears to be somewhat dehydrated  Neck: no JVD or bruit  Thyroid : Normal lobes   Lungs: Has Vesicular Breath sounds   Heart: Atrial fibrillation  Abdomen: soft, non-tender; bowel sounds normal; no masses,  no organomegaly  Musculoskeletal: Normal  Extremities: extremities normal, , no edema  Neurologic:  Awake, alert, oriented to name, place and time. Cranial nerves II-XII are grossly intact. Motor is  intact. Sensory is neuropathy. ,  and gait is normal.    Assessment:     Patient Active Problem List:     History of CVA (cerebrovascular accident) without residual deficits     Morbid obesity ([address]     DM (diabetes mellitus), type 2, uncontrolled ([address]     Persistent atrial fibrillation (HCC)     CAD (coronary artery disease)     CHF (congestive heart failure) ([address]     Light headed     History of MI (myocardial infarction)     Diabetes mellitus ([address]     Hypertension     TIA (transient ischemic attack)     Hyperlipidemia     SOB (shortness of breath)     H/O cardiovascular stress test     Family history of coronary artery disease     PAF (paroxysmal atrial fibrillation) ([facility]     Chest pain     Atrial thrombus without antecedent myocardial infarction     S/P ablation of atrial fibrillation     Gastroenteritis     YESIKA (acute kidney injury) ([address]     Acute cystitis     Elevated liver enzymes     CHF (congestive heart failure), NYHA class I, acute on chronic, combined (HCC)     Pneumonia     Multifocal pneumonia     Atrial fibrillation with RVR ([facility])     Shoulder pain     Paroxysmal atrial fibrillation ([address]     Diabetic hyperosmolar coma ([facility]     Difficult intravenous access     Metabolic encephalopathy getting better      Plan:     1. Reviewed POC blood rate 14 with regular foot embolism there is good  2. . Labs and X ray results   3. Reviewed Current Medicines   4. On meal +,  correction bolus Humalog/ Basal Lantus Insulin regime does get  5. Monitor Blood glucose frequently   6. Modified  the dose of Insulin/ other medicines as needed  7. Changed CODE STATUS  8. Waiting for pre-CERT for skilled nursing home care   9. Will follow     .      Paxton Figueroa MD, MD

## 2021-08-09 NOTE — PROGRESS NOTES
Occupational Therapy  . Occupational Therapy Treatment Note      Name: Moi Menard MRN: [id number] :   1955   Date:  2021   Admission Date: 7/15/2021 Room:  3020/3020-A     Attempted to see patient this date. Patient awake and supine. Patient refused OT services this date including exercise, ADLs, ambulating and transferring to recliner. Will re attempt as schedule allows.      Electronically signed by:    DIEGO March COTA/L [id number]  2021, 9:50 AM

## 2021-08-09 NOTE — PROGRESS NOTES
INTERNAL MEDICINE PROGRESS NOTE        [facility]   1955   Primary Care Physician:  Tim Daly PA-C  Admit Date: 7/15/2021     Subjective:   Patient awake, alert, voices no acute concerns. She is oriented x3. Objective:   BP (!) 158/73   Pulse 78   Temp 97.9 °F (36.6 °C) (Oral)   Resp 18   Ht 5' (1.524 m)   Wt 158 lb 1.6 oz (71.7 kg)   SpO2 93%   BMI 30.88 kg/m²    General appearance: alert, appears stated age and cooperative  Head: Normocephalic, without obvious abnormality, atraumatic  Neck: no adenopathy and supple, symmetrical, trachea midline  Lungs: clear to auscultation bilaterally  Heart: regular rate and rhythm and S1, S2 normal  Abdomen: soft, non-tender; bowel sounds normal; no masses,  no organomegaly  Extremities: no clubbing, cyanosis or edema  Neurologic: moves all 4 extremities. Oriented x 3, cranial nerve intact.  Motor system: generalized weakness      Data Review  Lab Results   Component Value Date     08/08/2021    K 4.3 08/08/2021     08/08/2021    CO2 28 08/08/2021    CREATININE 1.1 08/08/2021    BUN 16 08/08/2021    CALCIUM 8.6 08/08/2021     Lab Results   Component Value Date    WBC 10.5 07/27/2021    HGB 12.2 (L) 07/27/2021    HCT 38.7 07/27/2021    MCV 95.8 07/27/2021     07/27/2021     INR/Prothrombin Time      Meds:    losartan  100 mg Oral Daily    DULoxetine  60 mg Oral Daily    metoprolol tartrate  50 mg Oral BID    busPIRone  10 mg Oral TID    insulin glargine  10 Units Subcutaneous Nightly    sodium chloride flush  5-40 mL Intravenous 2 times per day    insulin lispro  0-18 Units Subcutaneous 2 times per day    aspirin  81 mg Oral Daily    folic acid-pyridoxine-cyancobalamin  1 tablet Oral Daily    insulin lispro  0-18 Units Subcutaneous TID WC    insulin lispro  10 Units Subcutaneous TID WC    apixaban  5 mg Oral BID    levothyroxine  50 mcg Oral Daily    pantoprazole  40 mg Oral Daily    alogliptin  6.25 mg Oral Daily    sodium chloride flush  5-40 mL Intravenous 2 times per day    sodium chloride flush  5-40 mL Intravenous 2 times per day     PRN Meds: dextrose, diphenhydrAMINE, hydrOXYzine, LORazepam, potassium chloride, albuterol sulfate HFA, nitroGLYCERIN, traZODone, glucose, dextrose, glucagon (rDNA), dextrose, sodium chloride flush, ondansetron **OR** ondansetron, polyethylene glycol, acetaminophen **OR** acetaminophen, sodium chloride flush, sodium chloride    Assessment/Plan:   Patient Active Hospital Problem List:  Patient Active Problem List   Diagnosis    History of CVA (cerebrovascular accident) without residual deficits    [facility]    DM (diabetes mellitus), type 2, uncontrolled ([facility]    Persistent atrial fibrillation ([facility]    CAD (coronary artery disease)    CHF (congestive heart failure) ([facility]    Light headed    History of MI (myocardial infarction)    Diabetes mellitus ([facility]    Hypertension    TIA (transient ischemic attack)    Hyperlipidemia    SOB (shortness of breath)    H/O cardiovascular stress test    Family history of coronary artery disease    [facility]ox[facility]) [facility]    Chest pain    Atrial thrombus without antecedent myocardial infarction    S/P ablation of atrial fibrillation    Gastroenteritis    YESIKA (acute kidney injury) ([facility]    Acute cystitis    Elevated liver enzymes    CHF (congestive heart failure), NYHA class I, acute on chronic, combined ([facility]    Pneumonia    Multifocal pneumonia    Atrial fibrillation with RVR ([facility])    Shoulder pain    Paroxysmal atrial fibrillation ([facility]    Diabetic hyperosmolar coma ([facility]    Difficult intravenous access    HFrEF (heart failure with reduced ejection fraction) ([facility])    Delirium    Bradycardia     Assessment:  Diabetic hyperosmolar state: resolved. Metabolic encephalopathy: improved  Leukocytosis and possible sepsis: resolved. procal is wnl  Depression: improving slowly.    A. Fib: rate is in control now  Hypertension: monitor  Elevated LFTs: back to normal.   Possible pneumonia: on antibiotics  HFrEF: EF 15-20%    Wide complex tachycardia: resolved. Plan:  Waiting for percert  Continue supportive care  Labs reviewed  Continue pt/ot  Notes reviewed. Electronically signed by Annia Huggins PA-C on 8/9/2021 at 7:58 AM   I have independently evaluated and examined this patient today. I have reviewed radiologic and biochemical tests on this patient. Management Plan is developed mutually with APRIL Payan. I have reviewed above note and agree with assessment and plan.

## 2021-08-10 NOTE — PROGRESS NOTES
Nephrology Progress Note  8/10/2021 2:52 PM        Subjective:   Admit Date: 7/15/2021  PCP: Ana Riggins PA-C    Interval History: Patient seen in early morning-this is a late entry    Diet: Reasonable    ROS: No shortness of breath or confusion    Data:     Current meds:    chlorthalidone  12.5 mg Oral Daily    losartan  100 mg Oral Daily    DULoxetine  60 mg Oral Daily    metoprolol tartrate  50 mg Oral BID    busPIRone  10 mg Oral TID    insulin glargine  10 Units Subcutaneous Nightly    sodium chloride flush  5-40 mL Intravenous 2 times per day    insulin lispro  0-18 Units Subcutaneous 2 times per day    aspirin  81 mg Oral Daily    folic acid-pyridoxine-cyancobalamin  1 tablet Oral Daily    insulin lispro  0-18 Units Subcutaneous TID WC    insulin lispro  10 Units Subcutaneous TID WC    apixaban  5 mg Oral BID    levothyroxine  50 mcg Oral Daily    pantoprazole  40 mg Oral Daily    alogliptin  6.25 mg Oral Daily    sodium chloride flush  5-40 mL Intravenous 2 times per day    sodium chloride flush  5-40 mL Intravenous 2 times per day      dextrose      sodium chloride           I/O last 3 completed shifts: In: 100 [P.O.:100]  Out: -     CBC: No results for input(s): WBC, HGB, PLT in the last 72 hours.        Recent Labs     08/08/21  0300      K 4.3      CO2 28   BUN 16   CREATININE 1.1   GLUCOSE 155*       Lab Results   Component Value Date    CALCIUM 8.6 08/08/2021    PHOS 3.7 08/08/2021       Objective:     Vitals: BP (!) 150/76   Pulse 82   Temp 97.8 °F (36.6 °C) (Oral)   Resp 19   Ht 5' (1.524 m)   Wt 159 lb 6 oz (72.3 kg)   SpO2 95%   BMI 31.13 kg/m²     General appearance: No acute distress-alert airgun oriented  HEENT: Mild conjunctival pallor  Neck: Supple  Lungs: No crackles on auscultation  Heart: Regular rate and rhythm this morning  Abdomen: Soft, nontender positive bowel sounds  Extremities: No overt edema of lower extremities      Problem List : Impression :     1. Acute kidney injury-recovering  2. Hypertension-better with ARB and thiazide therapy  3. Underlying cardiomyopathy, diabetes-good control  4. Recent encephalopathy and DKA are resolved    Recommendation/Plan  :     1. Continue chlorthalidone and ARB therapy  2. Will adjust blood pressure as an outpatient-her goal is 130/80  3. Low-salt DASH diet, excellent glycemic control  4. She is off diuretics now so far so we will keep a close eye on-status  5. She is waiting for placement  6.  Outpatient follow-up, I will comanage with Dr. Jearline Fleischer, MD MD

## 2021-08-10 NOTE — CARE COORDINATION
CM call to [facility] to follow up on precert. Pt was denied by her insurance due to not meeting guidelines. Peer to peer available [phone] which needs to be completed within 5 days. Updated APRIL Sheikh via PS. Dr. Yulissa Olmos has spoken with the facility and family. Discharge plan is now home with home care. 11:32 AM   CM call to Pt daughter Elisa Hampton, who is agreeable with home care at discharge first choice [facility]. Elisa Hampton also requesting a walker    HC order requested via PS. Facesheet sent to [facility] fax [phone], spoke to [address] [place] [address] for referral.    Ricarda Mcclain was evaluated today and a DME order was entered for a wheeled walker with seat because she requires this to successfully complete daily living tasks of personal cares and ambulating. A wheeled walker with seat is necessary due to the patient's unsteady gait, upper body weakness, inability to  and ambulation device, ambulating only short distances by pushing a walker, and the need to sit for a short time before resuming ambulation. These tasks cannot be completed with a lesser ambulation device such as a cane, crutch, or standard walker. The need for this equipment was discussed with the patient and she understands and is in agreement.

## 2021-08-10 NOTE — CARE COORDINATION
CM contacted by [facility] , they are not in network with the Pt's insurance. CM call to [facility], [facility] are in network. Faxed HC order and Pt information to [phone]. CM updated APRIL Carranza via PS of most recent therapy note by PT stating the Pt is still appropriate for SNF. CM following.

## 2021-08-10 NOTE — PLAN OF CARE
Problem: Discharge Planning:  Goal: Discharged to appropriate level of care  Description: Discharged to appropriate level of care  Outcome: Ongoing  Goal: Ability to perform activities of daily living will improve  Description: Ability to perform activities of daily living will improve  Outcome: Ongoing  Goal: Participates in care planning  Description: Participates in care planning  Outcome: Ongoing     Problem: Serum Glucose Level - Abnormal:  Goal: Ability to maintain appropriate glucose levels will improve  Description: Ability to maintain appropriate glucose levels will improve  Outcome: Ongoing     Problem: Sensory Perception - Impaired:  Goal: Ability to maintain a stable neurologic state will improve  Description: Ability to maintain a stable neurologic state will improve  Outcome: Ongoing  Goal: Demonstrations of improved sensory functioning will increase  Description: Demonstrations of improved sensory functioning will increase  Outcome: Ongoing  Goal: Decrease in sensory misperception frequency  Description: Decrease in sensory misperception frequency  Outcome: Ongoing  Goal: Able to refrain from responding to false sensory perceptions  Description: Able to refrain from responding to false sensory perceptions  Outcome: Ongoing  Goal: Demonstrates accurate environmental perceptions  Description: Demonstrates accurate environmental perceptions  Outcome: Ongoing  Goal: Able to distinguish between reality-based and nonreality-based thinking  Description: Able to distinguish between reality-based and nonreality-based thinking  Outcome: Ongoing  Goal: Able to interrupt nonreality-based thinking  Description: Able to interrupt nonreality-based thinking  Outcome: Ongoing     Problem: Gas Exchange - Impaired:  Goal: Levels of oxygenation will improve  Description: Levels of oxygenation will improve  Outcome: Ongoing     Problem: Infection, Septic Shock:  Goal: Will show no infection signs and symptoms  Description: Will show no infection signs and symptoms  Outcome: Ongoing     Problem: Infection - Ventilator-Associated Pneumonia:  Goal: Absence of pulmonary infection  Description: Absence of pulmonary infection  Outcome: Ongoing     Problem: Tissue Perfusion, Altered:  Goal: Circulatory function within specified parameters  Description: Circulatory function within specified parameters  Outcome: Ongoing     Problem: Venous Thromboembolism:  Goal: Will show no signs or symptoms of venous thromboembolism  Description: Will show no signs or symptoms of venous thromboembolism  Outcome: Ongoing  Goal: Absence of signs or symptoms of impaired coagulation  Description: Absence of signs or symptoms of impaired coagulation  Outcome: Ongoing     Problem: Falls - Risk of:  Goal: Will remain free from falls  Description: Will remain free from falls  Outcome: Ongoing  Goal: Absence of physical injury  Description: Absence of physical injury  Outcome: Ongoing     Problem: Pain:  Goal: Pain level will decrease  Description: Pain level will decrease  Outcome: Ongoing  Goal: Control of acute pain  Description: Control of acute pain  Outcome: Ongoing  Goal: Control of chronic pain  Description: Control of chronic pain  Outcome: Ongoing     Problem: Skin Integrity:  Goal: Will show no infection signs and symptoms  Description: Will show no infection signs and symptoms  Outcome: Ongoing  Goal: Absence of new skin breakdown  Description: Absence of new skin breakdown  Outcome: Ongoing     Problem: Nutrition  Goal: Optimal nutrition therapy  Outcome: Ongoing     Problem: Confusion - Acute:  Goal: Absence of continued neurological deterioration signs and symptoms  Description: Absence of continued neurological deterioration signs and symptoms  Outcome: Ongoing  Goal: Mental status will be restored to baseline  Description: Mental status will be restored to baseline  Outcome: Ongoing     Problem: Injury - Risk of, Physical Injury:  Goal: Will remain free from falls  Description: Will remain free from falls  Outcome: Ongoing  Goal: Absence of physical injury  Description: Absence of physical injury  Outcome: Ongoing     Problem: Mood - Altered:  Goal: Mood stable  Description: Mood stable  Outcome: Ongoing  Goal: Absence of abusive behavior  Description: Absence of abusive behavior  Outcome: Ongoing  Goal: Verbalizations of feeling emotionally comfortable while being cared for will increase  Description: Verbalizations of feeling emotionally comfortable while being cared for will increase  Outcome: Ongoing     Problem: Psychomotor Activity - Altered:  Goal: Absence of psychomotor disturbance signs and symptoms  Description: Absence of psychomotor disturbance signs and symptoms  Outcome: Ongoing     Problem: Sleep Pattern Disturbance:  Goal: Appears well-rested  Description: Appears well-rested  Outcome: Ongoing

## 2021-08-10 NOTE — PROGRESS NOTES
INTERNAL MEDICINE PROGRESS NOTE        Char Bernal [address] [facility]   1955   Primary Care Physician:  Michael Melara PA-C  Admit Date: 7/15/2021     Subjective:   Patient awake, doing okay, voices no acute concerns. Pending pre-CERT to ECF. Objective:   BP (!) 154/61   Pulse 75   Temp 97.8 °F (36.6 °C) (Oral)   Resp 19   Ht 5' (1.524 m)   Wt 159 lb 6 oz (72.3 kg)   SpO2 92%   BMI 31.13 kg/m²    General appearance: alert, appears stated age and cooperative  Head: Normocephalic, without obvious abnormality, atraumatic  Neck: no adenopathy and supple, symmetrical, trachea midline  Lungs: clear to auscultation bilaterally  Heart: regular rate and rhythm and S1, S2 normal  Abdomen: soft, non-tender; bowel sounds normal; no masses,  no organomegaly  Extremities: no clubbing, cyanosis or edema  Neurologic: moves all 4 extremities. Oriented x 3, cranial nerve intact.  Motor system: generalized weakness      Data Review  Lab Results   Component Value Date     2021    K 4.3 2021     2021    CO2 28 2021    CREATININE 1.1 2021    BUN 16 2021    CALCIUM 8.6 2021     Lab Results   Component Value Date    WBC 10.5 2021    HGB 12.2 (L) 2021    HCT 38.7 2021    MCV 95.8 2021     2021     INR/Prothrombin Time      Meds:    chlorthalidone  12.5 mg Oral Daily    losartan  100 mg Oral Daily    DULoxetine  60 mg Oral Daily    metoprolol tartrate  50 mg Oral BID    busPIRone  10 mg Oral TID    insulin glargine  10 Units Subcutaneous Nightly    sodium chloride flush  5-40 mL Intravenous 2 times per day    insulin lispro  0-18 Units Subcutaneous 2 times per day    aspirin  81 mg Oral Daily    folic acid-pyridoxine-cyancobalamin  1 tablet Oral Daily    insulin lispro  0-18 Units Subcutaneous TID WC    insulin lispro  10 Units Subcutaneous TID WC    apixaban  5 mg Oral BID    levothyroxine  50 mcg Oral Daily    pantoprazole  40 mg wnl  Depression: improving slowly. A. Fib: rate is in control now  Hypertension: monitor  Elevated LFTs: back to normal.   Possible pneumonia: on antibiotics  HFrEF: EF 15-20%    Wide complex tachycardia: resolved. Plan:  Precert denied. D/w daughter. Plan is home with [facility] tmrw. Continue supportive care  Labs reviewed  Continue pt/ot  Notes reviewed. Maribell Parra was evaluated today and a DME order was entered for a wheeled walker with seat because she requires this to successfully complete daily living tasks of personal cares and ambulating. A wheeled walker with seat is necessary due to the patient's unsteady gait, upper body weakness, inability to  and ambulation device, ambulating only short distances by pushing a walker, and the need to sit for a short time before resuming ambulation. These tasks cannot be completed with a lesser ambulation device such as a cane, crutch, or standard walker. The need for this equipment was discussed with the patient and she understands and is in agreement. Electronically signed by Allison Chavis PA-C on 8/10/2021 at 8:56 AM   I have independently evaluated and examined this patient today. I have reviewed radiologic and biochemical tests on this patient. Management Plan is developed mutually with APRIL Espinosa. I have reviewed above note and agree with assessment and plan. Monitor condition for now. Discussed with NH Physician.

## 2021-08-10 NOTE — PROGRESS NOTES
Physical Therapy    Physical Therapy Treatment Note  Name: Imer Naidu MRN: [id number] :   1955   Date:  8/10/2021   Admission Date: 7/15/2021 Room:  [address] [place], [place] [address]   Restrictions/Precautions:        Fall risk, nausea, telemetry  Communication with other providers:  Discussed with ALLAN Villarreal, handoff to RN- informed RN of pt nausea. Subjective:  Patient states:  \"I've been nauseous since last night\", \"Just waiting on insurance now for me to leave\"  Pain:   Location, Type, Intensity (0/10 to 10/10):  No c/o  Objective:    Observation:  Upon entry pt in supine with HOB 45*, asleep, easily aroused. Treatment, including education/measures:  Bed mobility: With increased encouragement, pt agreeable to supine>sit with SBA for safety. Pt utilizes bed HR for support in progression to EOB. Sitting balance: Pt demonstrates good static and dynamic sitting balance at EOB x5 minutes during PT management of lines, monitoring of vitals, and preparation for gait, increased time required in sitting d/t pt nausea. STS: Pt performs STS from EOB to RW with CGA x1. Return to seated pt demonstrates poor placement of RW despite PT v/c for RW management, v/c for reach back for chair with good carryover, fair eccentric return to seated. Standing: Pt demonstrates fair static standing balance at EOB x20 seconds with RW in preparation for transfer. Stand-step transfer: With CGA, increased UE support on RW, increased sway, sequential turning and increased time required, pt performs stand-step transfer from bed>chair. Pt nausea limits further mobility this session. EDU: discussed d/c plan with pt this session. Safety: Pt left in recliner with chair alarm, call button, all needs in reach. Assessment / Impression:    Pt remains appropriate for d/c to SNF for skilled rehab services.    Patient's tolerance of treatment:  fair   Adverse Reaction: nausea  Significant change in status and impact:  none  Barriers to improvement:  none  Plan for Next Session:    Pt likely to d/c   Time in:  09:47  Time out:  10:02  Timed treatment minutes:  15  Total treatment time:  15    Previously filed items:  Social/Functional History  Lives With: Alone  Type of Home: House  Home Layout: One level  Home Access: Ramped entrance  Bathroom Shower/Tub: Tub/Shower unit  Bathroom Toilet: Handicap height  Bathroom Equipment: Shower chair, Grab bars in shower  ADL Assistance: [address] [place] [address] Avenue: [address] [place] [address] Responsibilities: Yes  Ambulation Assistance: Independent  Transfer Assistance: Independent  Active : No (Daughter drives)  Occupation: Retired  Type of occupation:   Short term goals  Time Frame for Short term goals: 1 week  Short term goal 1: Pt will perform supine>sit mobility with Mod A of 1 with improved command following. Short term goal 2: Pt will perform STS from standard surface with Min a and v/c for sequencing  Short term goal 3: Pt will AMB x10 ft in room to BR with LRAD and CGA-Min for safety.        Electronically signed by:    Brice Bence, PT  8/10/2021, 3:24 PM

## 2021-08-11 NOTE — PROGRESS NOTES
Progress Note( Dr. Ildefonso Coles)  8/10/2021  Subjective:   Admit Date: 7/15/2021  PCP: Pedro Pablo Angel PA-C        Admitted For :   Altered mental state and severe hyperglycemia possible metabolic encephalopathy    Consulted For: Better control of blood glucose    Interval History: Patient is more alert awake responding to verbal commands and some answers seem to be appropriate and other seem to be confused       Patient is sitting in the chair and trying to eat breakfast this morning    CT Scan& MRI of Brain  Neg for any acute CVA     Denies any chest pains,   Denies SOB . Denies nausea or vomiting. Now eating better  No new bowel or bladder symptoms. No intake or output data in the 24 hours ending 08/10/21 2307    DATA    CBC:   No results for input(s): WBC, HGB, PLT in the last 72 hours.  CMP:  Recent Labs     08/08/21  0300      K 4.3      CO2 28   BUN 16   CREATININE 1.1   CALCIUM 8.6   PROT 5.4*   LABALBU 3.5   BILITOT 0.5   ALKPHOS 88   AST 13*   ALT 12     Lipids:   Lab Results   Component Value Date    CHOL 153 01/30/2012    HDL 59 01/30/2012    TRIG 135 01/30/2012     Glucose:  Recent Labs     08/10/21  1149 08/10/21  1641 08/10/21  2150   POCGLU 133* 182* 214*     MvasalnscaV1W:  Lab Results   Component Value Date    LABA1C 10.9 07/17/2021     High Sensitivity TSH:   Lab Results   Component Value Date    TSHHS 7.150 07/20/2021     Free T3: No results found for: FT3  Free T4:  Lab Results   Component Value Date    T4FREE 1.28 06/12/2021       Echocardiogram complete 2D with doppler with color    Result Date: 7/16/2021  Transthoracic Echocardiography Report (TTE)  Demographics   Patient Name       Aung MAYFIELD Date of Study       07/16/2021   Date of Birth      1955         Gender              Female   Age                60 year(s)         Race                   Patient Number     [id number]         Room Number         2128   Visit Number       [id number]   Corporate ID PROVIDED HISTORY: s/p central line attempt    Right IJ central venous catheter terminates in the mid SVC in satisfactory position. No pneumothorax. Pulmonary vascular congestion with interstitial pulmonary edema. XR CHEST PORTABLE    Result Date: 7/15/2021  EXAMINATION: ONE XRAY VIEW OF THE CHEST 7/15/2021 5:47 pm COMPARISON: 06/13/2021 HISTORY: ORDERING SYSTEM PROVIDED HISTORY: AMS TECHNOLOGIST PROVIDED HISTORY: Reason for exam:->AMS Reason for Exam: AMS Initial encounter FINDINGS: Cardiomegaly. There is increased interstitial opacities bilaterally. No pleural effusion or pneumothorax. The osseous structures otherwise stable. Cardiomegaly with pulmonary edema. IR NONTUNNELED VASCULAR CATHETER > 5 YEARS    Result Date: 7/16/2021  PROCEDURE: ULTRASOUND GUIDED VASCULAR ACCESS. PLACEMENT OF A NON-TUNNELED CATHETER. 7/16/2021. HISTORY: ORDERING SYSTEM PROVIDED HISTORY: Need central venous access. Request CVC Insertion TECHNOLOGIST PROVIDED HISTORY: Reason for exam:->CVC Insertion SEDATION: None. Successful ultrasound and fluoroscopy guided non-tunneled central venous catheter placement. The catheter is ready to use.        Scheduled Medicines   Medications:    chlorthalidone  12.5 mg Oral Daily    losartan  100 mg Oral Daily    DULoxetine  60 mg Oral Daily    metoprolol tartrate  50 mg Oral BID    busPIRone  10 mg Oral TID    insulin glargine  10 Units Subcutaneous Nightly    sodium chloride flush  5-40 mL Intravenous 2 times per day    insulin lispro  0-18 Units Subcutaneous 2 times per day    aspirin  81 mg Oral Daily    folic acid-pyridoxine-cyancobalamin  1 tablet Oral Daily    insulin lispro  0-18 Units Subcutaneous TID WC    insulin lispro  10 Units Subcutaneous TID WC    apixaban  5 mg Oral BID    levothyroxine  50 mcg Oral Daily    pantoprazole  40 mg Oral Daily    alogliptin  6.25 mg Oral Daily    sodium chloride flush  5-40 mL Intravenous 2 times per day    sodium chloride flush  5-40 mL Intravenous 2 times per day      Infusions:    dextrose      sodium chloride           Objective:   Vitals: BP (!) 120/92   Pulse 84   Temp 98.1 °F (36.7 °C) (Oral)   Resp 20   Ht 5' (1.524 m)   Wt 159 lb 6 oz (72.3 kg)   SpO2 97%   BMI 31.13 kg/m²   General appearance: alert and cooperative with exam appears to be somewhat dehydrated  Neck: no JVD or bruit  Thyroid : Normal lobes   Lungs: Has Vesicular Breath sounds   Heart: Atrial fibrillation  Abdomen: soft, non-tender; bowel sounds normal; no masses,  no organomegaly  Musculoskeletal: Normal  Extremities: extremities normal, , no edema  Neurologic:  Awake, alert, oriented to name, place and time. Cranial nerves II-XII are grossly intact. Motor is  intact. Sensory is neuropathy. ,  and gait is normal.    Assessment:     Patient Active Problem List:     History of CVA (cerebrovascular accident) without residual deficits     Morbid obesity ([address]     DM (diabetes mellitus), type 2, uncontrolled ([address]     Persistent atrial fibrillation (HCC)     CAD (coronary artery disease)     CHF (congestive heart failure) ([address]     Light headed     History of MI (myocardial infarction)     Diabetes mellitus ([address]     Hypertension     TIA (transient ischemic attack)     Hyperlipidemia     SOB (shortness of breath)     H/O cardiovascular stress test     Family history of coronary artery disease     PAF (paroxysmal atrial fibrillation) ([facility])     Chest pain     Atrial thrombus without antecedent myocardial infarction     S/P ablation of atrial fibrillation     Gastroenteritis     YESIKA (acute kidney injury) ([address]     Acute cystitis     Elevated liver enzymes     CHF (congestive heart failure), NYHA class I, acute on chronic, combined (HCC)     Pneumonia     Multifocal pneumonia     Atrial fibrillation with RVR ([facility])     Shoulder pain     Paroxysmal atrial fibrillation ([address]     Diabetic hyperosmolar coma ([address]     Difficult intravenous access Metabolic encephalopathy getting better      Plan:     1. Reviewed POC blood rate 14 with regular foot embolism there is good  2. . Labs and X ray results   3. Reviewed Current Medicines   4. On meal +,  correction bolus Humalog/ Basal Lantus Insulin regime does get  5. Monitor Blood glucose frequently   6. Modified  the dose of Insulin/ other medicines as needed  7. Changed CODE STATUS  8. Waiting for pre-CERT for skilled nursing home care   9. Will follow     .      Ethan Brown MD, MD

## 2021-08-11 NOTE — PROGRESS NOTES
Occupational Therapy  . Occupational Therapy Treatment Note      Name: Shaneka Marcelo MRN: [id number] :   1955   Date:  2021   Admission Date: 7/15/2021 Room:  3020/3020-A     Primary Problem:  The primary encounter diagnosis was Hyperglycemia. A diagnosis of Septicemia ([facility] was also pertinent to this visit. Restrictions/Precautions:    General precautions; Fall risk    Communication with other providers:  Per chart review, patient is appropriate for therapeutic intervention. JAME Becerril regarding pt's active participation this date. Donavon Hinton. Subjective:  Patient states:  \"I'm wanting to go home. I'm going to live with my daughter. \"   Pain: 0/10, denies pain (location, type, intensity)    Objective:    Observation:    Pt seated in bedside chair, fully dressed, identifying having dressed self in anticipation of discharge home. Pt followed all cues promptly, requiring very few during Tx session. Objective Measures:  N/A    Treatment, including education:  Self Care Training:   Cues were given for safety, sequence, UE/LE placement, visual cues, and balance. Activities performed today included toileting, hand hygiene, and grooming. Toilet Transfer: Indep w/o AD, good safety awareness  Toileting: Indep to manage clothing up / down and hygiene, good safety awareness, no loss of balance. Grooming: Indep in stance at sink for hand hygiene. Required no cues for these tasks. Pt Sup + cues to  gown from floor and transport to hamper in closet c emphasis on neuromuscular re-education, dynamic balance / safety awareness during ADL tasks. Pt demonstrated good balance, reaching to floor w/o any self-support, and performed w/o AD to transport item to closet, open closet and deposit gown w/o any additional vc's. Standing Balance / Tolerance: Indep / Sup with and w/o AD, tolerating >20 minutes.      Therapeutic Activity Training:   Therapeutic activity training was instructed today.  Cues were given for safety, sequence, UE/LE placement, awareness, and balance. Activities performed today included sit-stand, SPT and education for self-pacing activity tolerance c rest breaks PRN, use of AD if needed during functional mobility (see PT note for details)    Sit<>stands: Indep / Mod I w/o and with AD (see PT note for additional details)    Functional Mobility: Sup with and w/o AD, utilizing AD outside of room for longer distances (>200 ft), including use of personal RW c seat c this therapist again reviewing self-pacing strategies. See PT note for additional details. All therapeutic intervention performed c emphasis on safety during ADL tasks, dynamic balance / standing tolerance to inc strength, endurance and act tolerance for inc Indep c ADL tasks, func transfers / mobility. Safety  Patient safely in bedside chair at end of session, with call light/phone in reach, and nursing aware. Gait belt was used for func transfers / mobility. Pt w/o alarm on approach and up in room w/o assist.    Assessment / Impression:    Patient's tolerance of treatment: Well  Adverse Reaction: None  Significant change in status and impact:  Pt appears to have made significant progress, performing at Supervision or above for all tasks / transfers / mobility  Barriers to improvement: None    Plan for Next Session:    Pt appears to have met OT POC. Time in:  1450  Time out:  1620  Timed treatment minutes:  30  Total treatment time:  30      Electronically signed by:    IGNACIO Alvarez  8/11/2021, 3:51 PM    Goals:  1. Pt will follow all basic one step commands 75+% of the time  2. Pt will complete log rolling at bed level for positioning needs min A  3. Pt will transfer supine to sitting EOB for EOB/OOB ADLs mod A  4. Pt will complete basic grooming/facial hygiene tasks seated EOB min A with setup  5.  Pt will engage in 10 minutes of dynamic reaching/neuro re-ed tasks unsupported sitting EOB to improve postural control for ADLs  6. Pt will complete functional sit to stand and stand pivot transfers to chair and BSC min A x 2  7. Pt will complete toileting task on BSC max A  8.  Pt will tolerate sitting in chair for 1 meal/day to increase alertness/OOB activity tolerance

## 2021-08-11 NOTE — PROGRESS NOTES
INTERNAL MEDICINE PROGRESS NOTE        [facility] [address] [facility]   1955   Primary Care Physician:  Jos Damian PA-C  Admit Date: 7/15/2021     Subjective:   Patient awake, doing okay, voices no acute concerns. Precert was denied but will try peer to peer, scheduled for tmrw AM.    Objective:   BP (!) 151/74   Pulse 75   Temp 98.1 °F (36.7 °C) (Oral)   Resp 15   Ht 5' (1.524 m)   Wt 162 lb (73.5 kg)   SpO2 94%   BMI 31.64 kg/m²    General appearance: alert, appears stated age and cooperative  Head: Normocephalic, without obvious abnormality, atraumatic  Neck: no adenopathy and supple, symmetrical, trachea midline  Lungs: clear to auscultation bilaterally  Heart: regular rate and rhythm and S1, S2 normal  Abdomen: soft, non-tender; bowel sounds normal; no masses,  no organomegaly  Extremities: no clubbing, cyanosis or edema  Neurologic: moves all 4 extremities. Oriented x 3, cranial nerve intact.  Motor system: generalized weakness      Data Review  Lab Results   Component Value Date     08/08/2021    K 4.3 08/08/2021     08/08/2021    CO2 28 08/08/2021    CREATININE 1.1 08/08/2021    BUN 16 08/08/2021    CALCIUM 8.6 08/08/2021     Lab Results   Component Value Date    WBC 10.5 07/27/2021    HGB 12.2 (L) 07/27/2021    HCT 38.7 07/27/2021    MCV 95.8 07/27/2021     07/27/2021     INR/Prothrombin Time      Meds:    chlorthalidone  12.5 mg Oral Daily    losartan  100 mg Oral Daily    DULoxetine  60 mg Oral Daily    metoprolol tartrate  50 mg Oral BID    busPIRone  10 mg Oral TID    insulin glargine  10 Units Subcutaneous Nightly    sodium chloride flush  5-40 mL Intravenous 2 times per day    insulin lispro  0-18 Units Subcutaneous 2 times per day    aspirin  81 mg Oral Daily    folic acid-pyridoxine-cyancobalamin  1 tablet Oral Daily    insulin lispro  0-18 Units Subcutaneous TID WC    insulin lispro  10 Units Subcutaneous TID WC    apixaban  5 mg Oral BID    levothyroxine  50 mcg Oral Daily    pantoprazole  40 mg Oral Daily    alogliptin  6.25 mg Oral Daily    sodium chloride flush  5-40 mL Intravenous 2 times per day    sodium chloride flush  5-40 mL Intravenous 2 times per day     PRN Meds: dextrose, diphenhydrAMINE, hydrOXYzine, LORazepam, potassium chloride, albuterol sulfate HFA, nitroGLYCERIN, traZODone, glucose, dextrose, glucagon (rDNA), dextrose, sodium chloride flush, ondansetron **OR** ondansetron, polyethylene glycol, acetaminophen **OR** acetaminophen, sodium chloride flush, sodium chloride    Assessment/Plan:   Patient Active Hospital Problem List:  Patient Active Problem List   Diagnosis    History of CVA (cerebrovascular accident) without residual deficits    Morbid obesity ([facility]    DM (diabetes mellitus), type 2, uncontrolled ([facility]    Persistent atrial fibrillation ([facility]    CAD (coronary artery disease)    CHF (congestive heart failure) ([facility]    Light headed    History of MI (myocardial infarction)    Diabetes mellitus ([facility]    Hypertension    TIA (transient ischemic attack)    Hyperlipidemia    SOB (shortness of breath)    H/O cardiovascular stress test    Family history of coronary artery disease    PAF ([facility]oxysmal [facility] fibrillation) [facility]    Chest pain    Atrial thrombus without antecedent myocardial infarction    S/P ablation of atrial fibrillation    Gastroenteritis    YESIKA (acute kidney injury) ([facility]    Acute cystitis    Elevated liver enzymes    CHF (congestive heart failure), NYHA class I, acute on chronic, combined ([facility]    Pneumonia    Multifocal pneumonia    Atrial fibrillation with RVR ([facility])    Shoulder pain    Paroxysmal atrial fibrillation ([facility]    Diabetic hyperosmolar coma (HCC)    Difficult intravenous access    HFrEF (heart failure with reduced ejection fraction) ([facility])    Delirium    Bradycardia     Assessment:  Diabetic hyperosmolar state: resolved.    Metabolic encephalopathy: improved  Leukocytosis and possible sepsis: resolved. procal is wnl  Depression: improving slowly. A. Fib: rate is in control now  Hypertension: monitor  Elevated LFTs: back to normal.   Possible pneumonia: on antibiotics  HFrEF: EF 15-20%    Wide complex tachycardia: resolved. Plan:  Precert denied. Will try peer to peer tmrw AM.  Hold off on discharge right now. Labs reviewed. Continue pt/ot. Notes reviewed. Electronically signed by Iris Oliver PA-C on 8/11/2021 at 12:41 PM   I have independently evaluated and examined this patient today. I have reviewed radiologic and biochemical tests on this patient. Management Plan is developed mutually with APRIL Valdes. I have reviewed above note and agree with assessment and plan. Talked to patient's insurance. We have peer to peer review scheduled for tomorrow between 9 to 10. Discussed with case management about update on OT notes.

## 2021-08-11 NOTE — DISCHARGE SUMMARY
Christelle Mcghee MD, Internal Medicine    [address]    [phone]   Patient ID  Priya Valdivia   1955  [id number]          Admit date: 7/15/2021   Discharge date: 8/11/2021      Admitting Physician: Elijah Pickering MD   Discharge Physician: Iris Oliver PA-C    Discharge Diagnoses:  Diabetic hyperosmolar state: resolved. Metabolic encephalopathy: improved  Leukocytosis and possible sepsis: resolved. procal is wnl  Depression: improving slowly. A. Fib: rate is in control now  Hypertension: monitor  Elevated LFTs: back to normal.   Possible pneumonia: Given antibiotics  HFrEF: EF 15-20%    Wide complex tachycardia: resolved. Weakness: improved with PT/OT. Continue with therapy.      Patient Active Problem List   Diagnosis    History of CVA (cerebrovascular accident) without residual deficits    Morbid obesity ([address]    DM (diabetes mellitus), type 2, uncontrolled ([address]    Persistent atrial fibrillation (HCC)    CAD (coronary artery disease)    CHF (congestive heart failure) ([address]    Light headed    History of MI (myocardial infarction)    Diabetes mellitus ([address]    Hypertension    TIA (transient ischemic attack)    Hyperlipidemia    SOB (shortness of breath)    H/O cardiovascular stress test    Family history of coronary artery disease    PAF (paroxysmal atrial fibrillation) (HCC)    Chest pain    Atrial thrombus without antecedent myocardial infarction    S/P ablation of atrial fibrillation    Gastroenteritis    YESIKA (acute kidney injury) ([address]    Acute cystitis    Elevated liver enzymes    CHF (congestive heart failure), NYHA class I, acute on chronic, combined ([address]    Pneumonia    Multifocal pneumonia    Atrial fibrillation with RVR (HCC)    Shoulder pain    Paroxysmal atrial fibrillation (HCC)    Diabetic hyperosmolar coma (HCC)    Difficult intravenous access    HFrEF (heart failure with reduced ejection fraction) ([facility])    Delirium    Bradycardia Discharged Condition: fair    Hospital Course: The patient is a 78-year-old female who presented to ER with hyperglycemia and altered mental status. She has history of diabetes mellitus type 2, HFrEF, CAD, paroxysmal intrafibrillation, CKD, hypothyroidism, history of cerebral aneurysms. Patient found by EMS on her front porch passing out and blood glucose was above the highest detectable level. In the ER, CT head is nonacute. CT abdomen was nonacute but did show small bilateral pleural effusions. Blood sugars initially in the 700 range. Lactate and beta-hydroxybutyrate initially elevated. LFTs also found to be elevated thought to be due to shock. Patient was admitted to ICU and endocrinology, cardiology, neurology, nephrology were consulted. She was placed on insulin drip and blood glucose improved. Lactate and beta hydroxybutyrate improved. Amiodarone and statin were held with LFTs elevated, these have returned to normal levels. Patient was also in A. fib RVR and this has become under control. After resolution of elevated blood sugars, patient remained encephalopathic. MRI brain obtained and negative. Carotid Doppler negative. EEG nonspecific. Patient also found to be depressed/grieving as she lost her  and grandson 1 month prior to this admission. Patient's mental status has improved during this hospitalization. Her depression is slowly improving. She is now alert and oriented x3. Blood glucose is better controlled. She was denied pre-CERT to ECF. [id number] was initiated. Relevant Investigations  See HPI.     Disposition: SNF    Patient Instructions:    Sasha Valencia   Home Medication Instructions [facility]:[id number]    Printed on:08/12/21 3051     Medication Information                        albuterol sulfate HFA (VENTOLIN HFA) 108 (90 Base) MCG/ACT inhaler  Inhale 2 puffs into the lungs 4 times daily as needed for Wheezing             apixaban (ELIQUIS) 5 MG TABS tablet  Take 1 tablet by mouth 2 times daily             aspirin 81 MG EC tablet  Take 81 mg by mouth daily             atorvastatin (LIPITOR) 40 MG tablet  Take 40 mg by mouth nightly             busPIRone (BUSPAR) 10 MG tablet  Take 1 tablet by mouth 3 times daily             chlorthalidone (HYGROTON) 25 MG tablet  Take 0.5 tablets by mouth daily             DULoxetine (CYMBALTA) 60 MG extended release capsule  Take 1 capsule by mouth daily             folic acid-pyridoxine-cyancobalamin (FOLTX) 1.13-25-2 MG TABS  Take 1 tablet by mouth daily             hydrOXYzine (VISTARIL) 25 MG capsule  Take 1 capsule by mouth every 8 hours as needed for Itching             insulin glargine (LANTUS) 100 UNIT/ML injection vial  Inject 10 Units into the skin nightly Hold Lantus Insulin if H. S.blood glucose < 150,  and call Dr. Zelalem Purcell for a lower dose             insulin lispro (HUMALOG) 100 UNIT/ML injection vial  Inject 10 Units into the skin 3 times daily (with meals) Please give Humalog Insulin soon after the meal. HOLD Humalog If Pt. Eats < 50% of the meal. OR Pre meal blood glucose < 120. levothyroxine (SYNTHROID) 50 MCG tablet  Take 50 mcg by mouth Daily             LORazepam (ATIVAN) 0.5 MG tablet  Take 1 tablet by mouth every 4 hours as needed for Anxiety for up to 30 days. losartan (COZAAR) 100 MG tablet  Take 1 tablet by mouth daily             metoprolol tartrate (LOPRESSOR) 50 MG tablet  Take 1 tablet by mouth 2 times daily             nitroGLYCERIN (NITROSTAT) 0.4 MG SL tablet  up to max of 3 total doses. If no relief after 1 dose, call 911.              pantoprazole (PROTONIX) 40 MG tablet  Take 40 mg by mouth daily             SITagliptin (JANUVIA) 25 MG tablet  Take 1 tablet by mouth daily             traZODone (DESYREL) 50 MG tablet  Take 50 mg by mouth nightly                    Activity: activity as tolerated  Diet: cardiac diet    Follow-up with Destini Gonzales PA-C in 5 days    Signed: Electronically signed by Ricardo Moulton PA-C on 8/11/2021 at 8:24 AM  I have independently evaluated and examined this patient today. I have reviewed radiologic and biochemical tests on this patient. Management Plan is developed mutually with APRIL Vasquez. I have reviewed above note and agree with assessment and plan. Discussed with Care source peer review physician. Patient is approved for ECF. We will monitor her closely in ECF also.    Time spent on discharge 35 minutes

## 2021-08-11 NOTE — PROGRESS NOTES
Nephrology Progress Note  8/11/2021 12:16 PM        Subjective:   Admit Date: 7/15/2021  PCP: Ondina Zhang PA-C    Interval History: Patient reported that she is going home    Diet: Reasonable    ROS: No shortness of breath, no PND orthopnea  Per epic no significant weight gain    Data:     Current meds:    chlorthalidone  12.5 mg Oral Daily    losartan  100 mg Oral Daily    DULoxetine  60 mg Oral Daily    metoprolol tartrate  50 mg Oral BID    busPIRone  10 mg Oral TID    insulin glargine  10 Units Subcutaneous Nightly    sodium chloride flush  5-40 mL Intravenous 2 times per day    insulin lispro  0-18 Units Subcutaneous 2 times per day    aspirin  81 mg Oral Daily    folic acid-pyridoxine-cyancobalamin  1 tablet Oral Daily    insulin lispro  0-18 Units Subcutaneous TID WC    insulin lispro  10 Units Subcutaneous TID WC    apixaban  5 mg Oral BID    levothyroxine  50 mcg Oral Daily    pantoprazole  40 mg Oral Daily    alogliptin  6.25 mg Oral Daily    sodium chloride flush  5-40 mL Intravenous 2 times per day    sodium chloride flush  5-40 mL Intravenous 2 times per day      dextrose      sodium chloride           No intake/output data recorded. CBC: No results for input(s): WBC, HGB, PLT in the last 72 hours. No results for input(s): NA, K, CL, CO2, BUN, CREATININE, GLUCOSE in the last 72 hours. Lab Results   Component Value Date    CALCIUM 8.6 08/08/2021    PHOS 3.7 08/08/2021       Objective:     Vitals: BP (!) 151/74   Pulse 75   Temp 98.1 °F (36.7 °C) (Oral)   Resp 15   Ht 5' (1.524 m)   Wt 162 lb (73.5 kg)   SpO2 94%   BMI 31.64 kg/m²     General appearance: No acute distress  HEENT: Mild conjunctival pallor-she is edentulous  Neck: Supple  Lungs: No crackles on exam  Heart: Seems regular rate and rhythm  Abdomen: Soft nontender  Extremities: No lower extremity edema      Problem List :         Impression :     1.  Acute kidney injury-likely had acute tubular injury from

## 2021-08-11 NOTE — PROGRESS NOTES
Physical Therapy    Physical Therapy Treatment Note  Name: Elena Kinney MRN: [id number] :   1955   Date:  2021   Admission Date: 7/15/2021 Room:  [address] [place] [address]-A     Tx session attempted this date; pt politely refuses stating that she is headed home shortly and has no questions for PT at this time.       Electronically signed by:    Jordan Krabbe, PTA  2021, 2:48 PM

## 2021-08-11 NOTE — PROGRESS NOTES
Progress Note( Dr. Adelina Shah)  8/11/2021  Subjective:   Admit Date: 7/15/2021  PCP: Dalia Suarez PA-C        Admitted For :   Altered mental state and severe hyperglycemia possible metabolic encephalopathy    Consulted For: Better control of blood glucose    Interval History: Patient is more alert awake responding to verbal commands and some answers seem to be appropriate and other seem to be confused       Patient is sitting in the chair and trying to eat breakfast this morning    CT Scan& MRI of Brain  Neg for any acute CVA     Denies any chest pains,   Denies SOB . Denies nausea or vomiting. Now eating better  No new bowel or bladder symptoms. No intake or output data in the 24 hours ending 08/11/21 0810    DATA    CBC:   No results for input(s): WBC, HGB, PLT in the last 72 hours. CMP:  No results for input(s): NA, K, CL, CO2, BUN, CREATININE, CALCIUM, PROT, LABALBU, BILITOT, ALKPHOS, AST, ALT in the last 72 hours.     Invalid input(s): GLU  Lipids:   Lab Results   Component Value Date    CHOL 153 01/30/2012    HDL 59 01/30/2012    TRIG 135 01/30/2012     Glucose:  Recent Labs     08/10/21  2150 08/11/21  0309 08/11/21  0802   POCGLU 214* 170* 182*     [id number]:  Lab Results   Component Value Date    LABA1C 10.9 07/17/2021     High Sensitivity TSH:   Lab Results   Component Value Date    TSHHS 7.150 07/20/2021     Free T3: No results found for: FT3  Free T4:  Lab Results   Component Value Date    T4FREE 1.28 06/12/2021       Echocardiogram complete 2D with doppler with color    Result Date: 7/16/2021  Transthoracic Echocardiography Report (TTE)  Demographics   Patient Name       Effie MAYFIELD Date of Study       07/16/2021   Date of Birth      1955         Gender              Female   Age                60 year(s)         Race                   Patient Number     [id number]         Room Number         2128   Visit Number       [id number]   Corporate ID       [id number]   Accession central line attempt    Right IJ central venous catheter terminates in the mid SVC in satisfactory position. No pneumothorax. Pulmonary vascular congestion with interstitial pulmonary edema. XR CHEST PORTABLE    Result Date: 7/15/2021  EXAMINATION: ONE XRAY VIEW OF THE CHEST 7/15/2021 5:47 pm COMPARISON: 06/13/2021 HISTORY: ORDERING SYSTEM PROVIDED HISTORY: AMS TECHNOLOGIST PROVIDED HISTORY: Reason for exam:->AMS Reason for Exam: AMS Initial encounter FINDINGS: Cardiomegaly. There is increased interstitial opacities bilaterally. No pleural effusion or pneumothorax. The osseous structures otherwise stable. Cardiomegaly with pulmonary edema. IR NONTUNNELED VASCULAR CATHETER > 5 YEARS    Result Date: 7/16/2021  PROCEDURE: ULTRASOUND GUIDED VASCULAR ACCESS. PLACEMENT OF A NON-TUNNELED CATHETER. 7/16/2021. HISTORY: ORDERING SYSTEM PROVIDED HISTORY: Need central venous access. Request CVC Insertion TECHNOLOGIST PROVIDED HISTORY: Reason for exam:->CVC Insertion SEDATION: None. Successful ultrasound and fluoroscopy guided non-tunneled central venous catheter placement. The catheter is ready to use.        Scheduled Medicines   Medications:    chlorthalidone  12.5 mg Oral Daily    losartan  100 mg Oral Daily    DULoxetine  60 mg Oral Daily    metoprolol tartrate  50 mg Oral BID    busPIRone  10 mg Oral TID    insulin glargine  10 Units Subcutaneous Nightly    sodium chloride flush  5-40 mL Intravenous 2 times per day    insulin lispro  0-18 Units Subcutaneous 2 times per day    aspirin  81 mg Oral Daily    folic acid-pyridoxine-cyancobalamin  1 tablet Oral Daily    insulin lispro  0-18 Units Subcutaneous TID WC    insulin lispro  10 Units Subcutaneous TID WC    apixaban  5 mg Oral BID    levothyroxine  50 mcg Oral Daily    pantoprazole  40 mg Oral Daily    alogliptin  6.25 mg Oral Daily    sodium chloride flush  5-40 mL Intravenous 2 times per day    sodium chloride flush  5-40 mL Intravenous 2 times per day      Infusions:    dextrose      sodium chloride           Objective:   Vitals: /67   Pulse 64   Temp 98.1 °F (36.7 °C) (Oral)   Resp 16   Ht 5' (1.524 m)   Wt 162 lb (73.5 kg)   SpO2 97%   BMI 31.64 kg/m²   General appearance: alert and cooperative with exam appears to be somewhat dehydrated  Neck: no JVD or bruit  Thyroid : Normal lobes   Lungs: Has Vesicular Breath sounds   Heart: Atrial fibrillation  Abdomen: soft, non-tender; bowel sounds normal; no masses,  no organomegaly  Musculoskeletal: Normal  Extremities: extremities normal, , no edema  Neurologic:  Awake, alert, oriented to name, place and time. Cranial nerves II-XII are grossly intact. Motor is  intact. Sensory is neuropathy. ,  and gait is normal.    Assessment:     Patient Active Problem List:     History of CVA (cerebrovascular accident) without residual deficits     Morbid obesity ([address]     DM (diabetes mellitus), type 2, uncontrolled ([address]     Persistent atrial fibrillation (HCC)     CAD (coronary artery disease)     CHF (congestive heart failure) ([address]     Light headed     History of MI (myocardial infarction)     Diabetes mellitus ([address]     Hypertension     TIA (transient ischemic attack)     Hyperlipidemia     SOB (shortness of breath)     H/O cardiovascular stress test     Family history of coronary artery disease     PAF (paroxysmal atrial fibrillation) ([facility])     Chest pain     Atrial thrombus without antecedent myocardial infarction     S/P ablation of atrial fibrillation     Gastroenteritis     YESIKA (acute kidney injury) ([address]     Acute cystitis     Elevated liver enzymes     CHF (congestive heart failure), NYHA class I, acute on chronic, combined (HCC)     Pneumonia     Multifocal pneumonia     Atrial fibrillation with RVR ([facility])     Shoulder pain     Paroxysmal atrial fibrillation ([address]     Diabetic hyperosmolar coma ([address]     Difficult intravenous access     Metabolic encephalopathy getting better      Plan:     1. Reviewed POC blood rate 14 with regular foot embolism there is good  2. . Labs and X ray results   3. Reviewed Current Medicines   4. On meal +,  correction bolus Humalog/ Basal Lantus Insulin regime does get  5. Monitor Blood glucose frequently   6. Modified  the dose of Insulin/ other medicines as needed  7. Changed CODE STATUS  8. Waiting for pre-CERT for skilled nursing home care   9. Will follow     .      Alanna Ramirez MD, MD

## 2021-08-11 NOTE — PROGRESS NOTES
Physical Therapy    Physical Therapy Treatment Note  Name: Jan Key MRN: [id number] :   1955   Date:  2021   Admission Date: 7/15/2021 Room:  [address] [place] [address]   Restrictions/Precautions:        General, fall risk  Communication with other providers:  RN approves tx session. Co-tx with Maura Vicente. Subjective:  Patient states:  Agreeable to tx session;   Pain:   Location, Type, Intensity (0/10 to 10/10): Denies at this time    Objective:    Observation:  Pt in chair upon arrival.    Treatment, including education/measures:  Transfers:  Scooting :sup  Sit to stand :sup  Stand to sit :sup  SPT:sup  Pt displays good dynamic standing balance and safety awareness throughout transfers. Gait:  Pt amb without AD for 50 ft with sup; pt amb with widen DONNY without AD. Pt agreeable to trial RW. Pt amb with RW for 50 ft with sup; returns to more normal DONNY when utilizing RW. Pt completes additional amb trial with personal RW with seat. Pt amb with personal RW for 100 ft with sup. Pt amb with decreased saige and step length bilat throughout amb trial. Pt completes amb in tight spaces with ease without VC/TC's. Pt needed no VC's during tx. Safety  Patient left safely in the chair, with call light/phone in reach with alarm applied. Gait belt and mask were used for transfers and gait.     Assessment / Impression:       Patient's tolerance of treatment:  good   Adverse Reaction: none  Significant change in status and impact:  none  Barriers to improvement:  None    Plan for Next Session:    Will cont to work towards pt's goals per her tolerance    Time in:  1550  Time out:  1620  Timed treatment minutes:  30  Total treatment time:  30    Previously filed items:  Social/Functional History  Lives With: Alone  Type of Home: House  Home Layout: One level  Home Access: Ramped entrance  Bathroom Shower/Tub: Tub/Shower unit  Bathroom Toilet: Handicap height  Bathroom Equipment: Shower chair, Grab bars in shower  ADL Assistance: [address]: Independent  Homemaking Responsibilities: Yes  Ambulation Assistance: Independent  Transfer Assistance: Independent  Active : No (Daughter drives)  Occupation: Retired  Type of occupation:   Short term goals  Time Frame for Short term goals: 1 week  Short term goal 1: Pt will perform supine>sit mobility with Mod A of 1 with improved command following. Short term goal 2: Pt will perform STS from standard surface with Min a and v/c for sequencing  Short term goal 3: Pt will AMB x10 ft in room to BR with LRAD and CGA-Min for safety.        Electronically signed by:    Antoinette Cedillo, PTA  8/11/2021, 4:26 PM

## 2021-08-11 NOTE — CARE COORDINATION
CM call to Pt daughter Prince Quintero. Pt will be going to the daughters house at discharge and will need transportation. There are 4 steps into the residence    [address], [place] [facility] [facility], [address]. CM updated Jessica/POLO, Pt walker will be delivered this morning to her room. CM call to [facility], spoke to Timi Skinner, updated on Pt discharge and address change.

## 2021-08-12 NOTE — CARE COORDINATION
CM updated by Dr. Maricruz Wilder, peer to peer approved. Rapid covid requested    Pt JONI Gilmore updated. CM call to Cecile/Migue to update    CM call to Pt kym Mccullough to update. 4:32 PM   CM contacted by Cecile, approval has not been received from [address] [place] [address]. CM call to [facility], spoke with Michael Marlow who stated they have 24-48hrs to notify the facility. Auth number [id number] given for approval.    CM call to Cecile, they are willing to accept the Pt with auth number. Transport set with Akiko Acevedo for 2000hrs. Pt JONI Gilmore updated  Pt kym Mccullough updated.

## 2021-08-12 NOTE — PROGRESS NOTES
Progress Note( Dr. Judah Cruz)  8/12/2021  Subjective:   Admit Date: 7/15/2021  PCP: Ana Riggins PA-C        Admitted For :   Altered mental state and severe hyperglycemia possible metabolic encephalopathy    Consulted For: Better control of blood glucose    Interval History: Patient is more alert awake responding to verbal commands and some answers seem to be appropriate and other seem to be confused       Patient is sitting in the chair and trying to eat breakfast this morning    CT Scan& MRI of Brain  Neg for any acute CVA     Denies any chest pains,   Denies SOB . Denies nausea or vomiting. Now eating better  No new bowel or bladder symptoms. No intake or output data in the 24 hours ending 08/12/21 0734    DATA    CBC:   No results for input(s): WBC, HGB, PLT in the last 72 hours. CMP:  No results for input(s): NA, K, CL, CO2, BUN, CREATININE, CALCIUM, PROT, LABALBU, BILITOT, ALKPHOS, AST, ALT in the last 72 hours.     Invalid input(s): GLU  Lipids:   Lab Results   Component Value Date    CHOL 153 01/30/2012    HDL 59 01/30/2012    TRIG 135 01/30/2012     Glucose:  Recent Labs     08/11/21  1720 08/11/21  2040 08/12/21  0232   POCGLU 111* 226* 191*     [id number]:  Lab Results   Component Value Date    LABA1C 10.9 07/17/2021     High Sensitivity TSH:   Lab Results   Component Value Date    TSHHS 7.150 07/20/2021     Free T3: No results found for: FT3  Free T4:  Lab Results   Component Value Date    T4FREE 1.28 06/12/2021       Echocardiogram complete 2D with doppler with color    Result Date: 7/16/2021  Transthoracic Echocardiography Report (TTE)  Demographics   Patient Name       Philippe Payer TRAN Date of Study       07/16/2021   Date of Birth      1955         Gender              Female   Age                60 year(s)         Race                   Patient Number     [id number]         Room Number         2128   Visit Number       [id number]   Corporate ID       [id number]   Accession Number   [id number]         Alejandro Howell   Ordering Physician Harjeet Lagos MD                 Physician           MD  Procedure Type of Study   TTE procedure:ECHOCARDIOGRAM COMPLETE 2D W DOPPLER W COLOR. Procedure Date Date: 07/16/2021 Start: 01:35 PM Study Location: Portable Technical Quality: Fair visualization Indications:Congestive heart failure. Patient Status: Routine Height: 60 inches Weight: 180 pounds BSA: 1.78 m2 BMI: 35.15 kg/m2 HR: 99 bpm BP: 145/83 mmHg  Conclusions   Summary  Left ventricular systolic function is abnormal.  Ejection fraction is visually estimated at 15-20%. Global hypokinesis noted. Grade III diastolic dysfunction. Mitral annular calcification is present. Moderate to severe mitral regurgitation. Mild to moderate tricuspid regurgitation; RVSP: 38 mmHg. No evidence of any pericardial effusion. Signature   ------------------------------------------------------------------  Electronically signed by Ca Altman MD (Interpreting  physician) on 07/16/2021 at 04:31 PM    CT ABDOMEN PELVIS WO CONTRAST Additional Contrast? None    Result Date: 7/15/2021  EXAMINATION: CT OF THE ABDOMEN AND PELVIS WITHOUT CONTRAST 7/15/2021 7:28 pm    1. Findings at the lung bases suggesting pulmonary edema with small bilateral effusions and associated atelectasis. 2. No acute intra-abdominal process identified. 3. Severe atherosclerotic disease. CT Head WO Contrast    Result Date: 7/15/2021  EXAMINATION: CT OF THE HEAD WITHOUT CONTRAST  7/15/2021 7:28 pm     No acute intracranial abnormality. Chronic microvascular ischemic changes and global cerebral atrophy.      XR CHEST PORTABLE    Result Date: 7/16/2021  EXAMINATION: ONE XRAY VIEW OF THE CHEST 7/16/2021 10:31 am COMPARISON: July 15, 2021 HISTORY: ORDERING SYSTEM PROVIDED HISTORY: s/p central line attempt    Right IJ central venous catheter terminates in the mid SVC in satisfactory position. No pneumothorax. Pulmonary vascular congestion with interstitial pulmonary edema. XR CHEST PORTABLE    Result Date: 7/15/2021  EXAMINATION: ONE XRAY VIEW OF THE CHEST 7/15/2021 5:47 pm COMPARISON: 06/13/2021 HISTORY: ORDERING SYSTEM PROVIDED HISTORY: AMS TECHNOLOGIST PROVIDED HISTORY: Reason for exam:->AMS Reason for Exam: AMS Initial encounter FINDINGS: Cardiomegaly. There is increased interstitial opacities bilaterally. No pleural effusion or pneumothorax. The osseous structures otherwise stable. Cardiomegaly with pulmonary edema. IR NONTUNNELED VASCULAR CATHETER > 5 YEARS    Result Date: 7/16/2021  PROCEDURE: ULTRASOUND GUIDED VASCULAR ACCESS. PLACEMENT OF A NON-TUNNELED CATHETER. 7/16/2021. HISTORY: ORDERING SYSTEM PROVIDED HISTORY: Need central venous access. Request CVC Insertion TECHNOLOGIST PROVIDED HISTORY: Reason for exam:->CVC Insertion SEDATION: None. Successful ultrasound and fluoroscopy guided non-tunneled central venous catheter placement. The catheter is ready to use.        Scheduled Medicines   Medications:    chlorthalidone  12.5 mg Oral Daily    losartan  100 mg Oral Daily    DULoxetine  60 mg Oral Daily    metoprolol tartrate  50 mg Oral BID    busPIRone  10 mg Oral TID    insulin glargine  10 Units Subcutaneous Nightly    sodium chloride flush  5-40 mL Intravenous 2 times per day    insulin lispro  0-18 Units Subcutaneous 2 times per day    aspirin  81 mg Oral Daily    folic acid-pyridoxine-cyancobalamin  1 tablet Oral Daily    insulin lispro  0-18 Units Subcutaneous TID WC    insulin lispro  10 Units Subcutaneous TID WC    apixaban  5 mg Oral BID    levothyroxine  50 mcg Oral Daily    pantoprazole  40 mg Oral Daily    alogliptin  6.25 mg Oral Daily    sodium chloride flush  5-40 mL Intravenous 2 times per day    sodium chloride flush  5-40 mL Intravenous 2 times per day      Infusions:    dextrose      sodium chloride           Objective:   Vitals: /65   Pulse 70   Temp 97.8 °F (36.6 °C) (Oral)   Resp 18   Ht 5' (1.524 m)   Wt 162 lb (73.5 kg)   SpO2 96%   BMI 31.64 kg/m²   General appearance: alert and cooperative with exam appears to be somewhat dehydrated  Neck: no JVD or bruit  Thyroid : Normal lobes   Lungs: Has Vesicular Breath sounds   Heart: Atrial fibrillation  Abdomen: soft, non-tender; bowel sounds normal; no masses,  no organomegaly  Musculoskeletal: Normal  Extremities: extremities normal, , no edema  Neurologic:  Awake, alert, oriented to name, place and time. Cranial nerves II-XII are grossly intact. Motor is  intact. Sensory is neuropathy. ,  and gait is normal.    Assessment:     Patient Active Problem List:     History of CVA (cerebrovascular accident) without residual deficits     Morbid obesity ([address]     DM (diabetes mellitus), type 2, uncontrolled ([address]     Persistent atrial fibrillation (HCC)     CAD (coronary artery disease)     CHF (congestive heart failure) ([address]     Light headed     History of MI (myocardial infarction)     Diabetes mellitus ([address]     Hypertension     TIA (transient ischemic attack)     Hyperlipidemia     SOB (shortness of breath)     H/O cardiovascular stress test     Family history of coronary artery disease     PAF (paroxysmal atrial fibrillation) ([facility])     Chest pain     Atrial thrombus without antecedent myocardial infarction     S/P ablation of atrial fibrillation     Gastroenteritis     YESIKA (acute kidney injury) ([address]     Acute cystitis     Elevated liver enzymes     CHF (congestive heart failure), NYHA class I, acute on chronic, combined (HCC)     Pneumonia     Multifocal pneumonia     Atrial fibrillation with RVR ([facility])     Shoulder pain     Paroxysmal atrial fibrillation ([address]     Diabetic hyperosmolar coma ([address]     Difficult intravenous access     Metabolic encephalopathy getting better      Plan:     1. Reviewed POC blood rate 14 with regular foot embolism there is good  2. . Labs and X ray results   3. Reviewed Current Medicines   4. On meal +,  correction bolus Humalog/ Basal Lantus Insulin regime does get  5. Monitor Blood glucose frequently   6. Modified  the dose of Insulin/ other medicines as needed  7. Changed CODE STATUS  8. Waiting for pre-CERT for skilled nursing home care  9. If unable to go to skilled nursing unit patient will be discharged home with home health care  10. Will follow     .      Mekhi Oates MD, MD

## 2021-08-12 NOTE — PROGRESS NOTES
NEUROLOGY NOTE  DR. Madalyn Harrington MD.  -------------------------------------------------  Subjective:    Pt denies depression-states feels better    Pt states she is depressed x few months    Pt is not confused and doing better today    Objective:    BP (!) 142/56   Pulse 66   Temp 97.6 °F (36.4 °C) (Oral)   Resp 18   Ht 5' (1.524 m)   Wt 162 lb (73.5 kg)   SpO2 95%   BMI 31.64 kg/m²   HEENT nl      Neuro exam    Alert oriented to person place not confused  eomi pupils 3 mm jo  Squeezes fingers to commands  Equivocal toes  resp on own      RADIOLOGY  -----------------    Echocardiogram complete 2D with doppler with color    Result Date: 7/16/2021  Transthoracic Echocardiography Report (TTE)  Demographics   Patient Name       Aung MAYFIELD Date of Study       07/16/2021   Date of Birth      1955         Gender              Female   Age                60 year(s)         Race                   Patient Number     [id number]         Room Number         2128   Visit Number       [id number]   Corporate ID       [id number]   Accession Number   [id number]         Marlene Tyson RDMS   Ordering Physician Pat Gupta MD                 Physician           MD  Procedure Type of Study   TTE procedure:ECHOCARDIOGRAM COMPLETE 2D W DOPPLER W COLOR. Procedure Date Date: 07/16/2021 Start: 01:35 PM Study Location: Portable Technical Quality: Fair visualization Indications:Congestive heart failure. Patient Status: Routine Height: 60 inches Weight: 180 pounds BSA: 1.78 m2 BMI: 35.15 kg/m2 HR: 99 bpm BP: 145/83 mmHg  Conclusions   Summary  Left ventricular systolic function is abnormal.  Ejection fraction is visually estimated at 15-20%. Global hypokinesis noted. Grade III diastolic dysfunction. Mitral annular calcification is present.   Moderate to severe mitral regurgitation. Mild to moderate tricuspid regurgitation; RVSP: 38 mmHg. No evidence of any pericardial effusion. Signature   ------------------------------------------------------------------  Electronically signed by Shalonda Dawn MD (Interpreting  physician) on 07/16/2021 at 04:31 PM  ------------------------------------------------------------------   Findings   Left Ventricle  Left ventricular systolic function is abnormal.  Ejection fraction is visually estimated at 15-20%. Global hypokinesis noted. Grade III diastolic dysfunction. Left ventricle size is normal.   Left Atrium  Essentially normal left atrium. Right Atrium  Essentially normal right atrium. Right Ventricle  Essentially normal right ventricle. Aortic Valve  Trace aortic regurgitation noted. Mitral Valve  Mitral annular calcification is present. Moderate to severe mitral regurgitation. Tricuspid Valve  Mild to moderate tricuspid regurgitation; RVSP: 38 mmHg. Pulmonic Valve  The pulmonic valve was not well visualized. Pericardial Effusion  No evidence of any pericardial effusion. Pleural Effusion  No evidence of pleural effusion. Miscellaneous  The aorta is within normal limits.   M-Mode/2D Measurements & Calculations   LV Diastolic Dimension:   LV Systolic Dimension:   LA Dimension: 3.8 cmAO  4.86 cm                   4.6 cm                   Root Dimension: 2.8 cm  LV FS:5.4 %               LV Volume Diastolic: 379  LV PW Diastolic: 6.83 cm  ml  LV PW Systolic: 2.41 cm   LV Volume Systolic: 612  Septum Diastolic: 6.63 cm ml                       RV Diastolic Dimension:  Septum Systolic: 8.35 cm  LV EDV/LV EDV Index: 116 3.13 cm  CO: 2.42 l/min            ml/65 m2LV ESV/LV ESV  CI: 1.36 l/m*m2           Index: 108 ml/61 m2      LA/Aorta: 1.36                            EF Calculated (A4C): 6.9  LV Area Diastolic: 79.3   %  cm2                       EF Calculated (2D): 67.8  LV Area Systolic: 32 cm2  % LV Length: 7.68 cm                             LVOT: 1.9 cm  Doppler Measurements & Calculations   MV Peak E-Wave: 136  AV Peak Velocity: 125 cm/s    LVOT Peak Velocity: 64.6  cm/s                 AV Peak Gradient: 6.25 mmHg   cm/s  MV Peak A-Wave: 68.5 AV Mean Velocity: 86.6 cm/s   LVOT Mean Velocity: 39.8  cm/s                 AV Mean Gradient: 3 mmHg      cm/s  MV E/A Ratio: 1.99   AV VTI: 21 cm                 LVOT Peak Gradient: 2  MV Peak Gradient:    AV Area (Continuity):1.16 cm2 mmHgLVOT Mean Gradient: 1  7.4 mmHg                                           mmHg                       LVOT VTI: 8.63 cm             Estimated RVSP: 38 mmHg  MV P1/2t: 41 msec                                  Estimated RAP:3 mmHg  MVA by PHT:5.37 cm2  Estimated PASP: 35.49 mmHg   MV E' Septal                                       TR Velocity:285 cm/s  Velocity: 4.89 cm/s                                TR Gradient:32.49 mmHg  MV E' Lateral  Velocity: 10.4 cm/s  MV E/E' septal:  27.81  MV E/E' lateral:  13.08      CT ABDOMEN PELVIS WO CONTRAST Additional Contrast? None    Result Date: 7/15/2021  EXAMINATION: CT OF THE ABDOMEN AND PELVIS WITHOUT CONTRAST 7/15/2021 7:28 pm TECHNIQUE: CT of the abdomen and pelvis was performed without the administration of intravenous contrast. Multiplanar reformatted images are provided for review. Dose modulation, iterative reconstruction, and/or weight based adjustment of the mA/kV was utilized to reduce the radiation dose to as low as reasonably achievable. COMPARISON: MRI abdomen May 24, 2019; CT abdomen May 23, 2019 HISTORY: ORDERING SYSTEM PROVIDED HISTORY: Abdominal pain TECHNOLOGIST PROVIDED HISTORY: Reason for exam:->Abdominal pain Additional Contrast?->None Reason for Exam: Abdominal pain FINDINGS: Lower Chest: Imaged lung bases demonstrate partially imaged small and associated atelectasis.   Imaged lung fields demonstrate findings suggesting mild pulmonary edema, however achievable. COMPARISON: CT brain June 11, 2021 HISTORY: ORDERING SYSTEM PROVIDED HISTORY: AMS TECHNOLOGIST PROVIDED HISTORY: Reason for exam:->AMS Has a \"code stroke\" or \"stroke alert\" been called? ->No Decision Support Exception - unselect if not a suspected or confirmed emergency medical condition->Emergency Medical Condition (MA) Reason for Exam: AMS FINDINGS: BRAIN/VENTRICLES: There is no acute intracranial hemorrhage, mass effect or midline shift. No abnormal extra-axial fluid collection. The gray-white differentiation is maintained without evidence of an acute infarct. There is no evidence of hydrocephalus. Severe atherosclerotic change of the intracranial vasculature. There is moderate periventricular and subcortical white matter hypoattenuation most consistent with microvascular ischemic changes. Global cerebral atrophy noted. ORBITS: The visualized portion of the orbits demonstrate no acute abnormality. SINUSES: The visualized paranasal sinuses and mastoid air cells demonstrate no acute abnormality. SOFT TISSUES/SKULL:  No acute abnormality of the visualized skull or soft tissues. No acute intracranial abnormality. Chronic microvascular ischemic changes and global cerebral atrophy. XR CHEST PORTABLE    Result Date: 7/16/2021  EXAMINATION: ONE XRAY VIEW OF THE CHEST 7/16/2021 10:31 am COMPARISON: July 15, 2021 HISTORY: ORDERING SYSTEM PROVIDED HISTORY: s/p central line attempt TECHNOLOGIST PROVIDED HISTORY: Reason for exam:->s/p central line attempt Reason for Exam: s/p central line attempt FINDINGS: Right IJ central venous catheter terminates in the mid SVC in satisfactory position. No appreciable pneumothorax. Cardiomediastinal silhouette appears unchanged. Pulmonary vascular congestion and diffuse interstitial prominence. Trace bilateral pleural effusions cannot be excluded. Minimal bibasilar atelectasis.      Right IJ central venous catheter terminates in the mid SVC in satisfactory position. No pneumothorax. Pulmonary vascular congestion with interstitial pulmonary edema. XR CHEST PORTABLE    Result Date: 7/15/2021  EXAMINATION: ONE XRAY VIEW OF THE CHEST 7/15/2021 5:47 pm COMPARISON: 06/13/2021 HISTORY: ORDERING SYSTEM PROVIDED HISTORY: AMS TECHNOLOGIST PROVIDED HISTORY: Reason for exam:->AMS Reason for Exam: AMS Initial encounter FINDINGS: Cardiomegaly. There is increased interstitial opacities bilaterally. No pleural effusion or pneumothorax. The osseous structures otherwise stable. Cardiomegaly with pulmonary edema. IR NONTUNNELED VASCULAR CATHETER > 5 YEARS    Result Date: 7/16/2021  PROCEDURE: ULTRASOUND GUIDED VASCULAR ACCESS. PLACEMENT OF A NON-TUNNELED CATHETER. 7/16/2021. HISTORY: ORDERING SYSTEM PROVIDED HISTORY: Need central venous access. Request CVC Insertion TECHNOLOGIST PROVIDED HISTORY: Reason for exam:->CVC Insertion SEDATION: None. FLUOROSCOPY DOSE AND TYPE OR TIME AND EXPOSURES: None. TECHNIQUE: Informed consent was obtained after a detailed explanation of the procedure including risks, benefits, and alternatives. Universal protocol was observed. The right neck and chest were prepped and draped in sterile fashion using maximum sterile barrier technique. Local anesthesia was achieved with lidocaine. A micropuncture needle was used to access the right internal jugular vein using ultrasound guidance. An ultrasound image demonstrating patency of the vein with needle tip located within it. An image was obtained and stored in PACs. A 0.035 guidewire was used to place a triple lumen central venous catheter after fascial tract dilation. The catheter flushed easily and there was a good blood return. The catheter was sutured to the skin. The catheter was locked with heparinized saline. The patient tolerated the procedure well and there were no immediate complications. EBL: Less than 1 ml.  FINDINGS: Chest x ray after procedure demonstrates the tip of the catheter in the SVC. The catheter is ready to use. Successful ultrasound and fluoroscopy guided non-tunneled central venous catheter placement. The catheter is ready to use. MRI BRAIN WO CONTRAST    Result Date: 7/20/2021  EXAMINATION: MRI OF THE BRAIN WITHOUT CONTRAST  7/20/2021 4:07 pm TECHNIQUE: Multiplanar multisequence MRI of the brain was performed without the administration of intravenous contrast. COMPARISON: None. HISTORY: ORDERING SYSTEM PROVIDED HISTORY: aphasic TECHNOLOGIST PROVIDED HISTORY: Reason for exam:->aphasic Reason for Exam: aphasic Acuity: Acute Type of Exam: Initial Relevant Medical/Surgical History: none FINDINGS: Motion degrades images limiting evaluation. INTRACRANIAL STRUCTURES/VENTRICLES: There is no acute infarct. No mass effect or midline shift. No evidence of an acute intracranial hemorrhage. Areas of T2 FLAIR hyperintensity are seen in the periventricular and subcortical white matter, which are nonspecific, but may represent chronic microvascular ischemic change. There is prominence of the ventricles and sulci due to global parenchymal volume loss. Evaluation of the signal voids within the major intracranial vessels is limited given patient motion. ORBITS: The visualized portion of the orbits demonstrate no acute abnormality. SINUSES: No gross abnormality seen of the paranasal sinuses. There appears to be a left mastoid effusion. 1. Patient motion limits evaluation. 2. No convincing acute intracranial abnormality. No acute infarct. 3. Mild global parenchymal volume loss with moderate chronic microvascular ischemic changes.        LAB RESULTS  --------------------    Recent Results (from the past 24 hour(s))   POCT Glucose    Collection Time: 08/11/21  3:09 AM   Result Value Ref Range    POC Glucose 170 (H) 70 - 99 MG/DL   POCT Glucose    Collection Time: 08/11/21  8:02 AM   Result Value Ref Range    POC Glucose 182 (H) 70 - 99 MG/DL   POCT Glucose    Collection Time: 08/11/21 level     Continue eliquis      DC asa     Plavix    foltx      cymbalta. .I will sign off. As I do not take her insurance pt to follow up with her PCP and be referred to a neurologist that takes her insurance.     Electronically signed by Alfred Fletcher MD on 8/11/2021 at 11:08 PM

## 2021-08-12 NOTE — PROGRESS NOTES
Nephrology Progress Note  8/12/2021 2:23 PM        Subjective:   Admit Date: 7/15/2021  PCP: Rashmi Martínez PA-C    Interval History: Patient seen in early morning-finally approved for ECF and she will go today    Diet: Reasonable    ROS: No shortness of breath, no confusion    Data:     Current meds:    chlorthalidone  12.5 mg Oral Daily    losartan  100 mg Oral Daily    DULoxetine  60 mg Oral Daily    metoprolol tartrate  50 mg Oral BID    busPIRone  10 mg Oral TID    insulin glargine  10 Units Subcutaneous Nightly    sodium chloride flush  5-40 mL Intravenous 2 times per day    insulin lispro  0-18 Units Subcutaneous 2 times per day    aspirin  81 mg Oral Daily    folic acid-pyridoxine-cyancobalamin  1 tablet Oral Daily    insulin lispro  0-18 Units Subcutaneous TID WC    insulin lispro  10 Units Subcutaneous TID WC    apixaban  5 mg Oral BID    levothyroxine  50 mcg Oral Daily    pantoprazole  40 mg Oral Daily    alogliptin  6.25 mg Oral Daily    sodium chloride flush  5-40 mL Intravenous 2 times per day    sodium chloride flush  5-40 mL Intravenous 2 times per day      dextrose      sodium chloride           No intake/output data recorded. CBC: No results for input(s): WBC, HGB, PLT in the last 72 hours. No results for input(s): NA, K, CL, CO2, BUN, CREATININE, GLUCOSE in the last 72 hours. Lab Results   Component Value Date    CALCIUM 8.6 08/08/2021    PHOS 3.7 08/08/2021       Objective:     Vitals: BP (!) 168/78   Pulse 84   Temp 97.9 °F (36.6 °C) (Oral)   Resp 16   Ht 5' (1.524 m)   Wt 162 lb (73.5 kg)   SpO2 94%   BMI 31.64 kg/m²     General appearance: She is excited finally to leave the hospital-she had been here for a while  HEENT: Mild conjunctival pallor-she is edentulous  Neck: Supple  Lungs: Coarse breath sounds  Heart: Regular rate and rhythm this morning  Abdomen: Soft, nontender  Extremities: No edema      Problem List :         Impression :     1.  Acute kidney injury-covering  2. Recent acute decompensated heart failure-underlying cardiomyopathy-no overt pulmonary edema at this time without diuretics-and she has a good chance of recovery of her cardiomyopathy  3. Also recent DKA, encephalopathy etc.-all had been resolved  4. Hypertension-acceptable at this time    Recommendation/Plan  :     1. She is going to ECF  2. Continue low-salt, DASH diet, excellent glycemic control  3. Daily weight,  4. Watch for pulmonary edema clinically and if necessary radiologically  5. She will need a CMP, phosphorus, magnesium in 1 to 2 weeks  6. Also follow-up with me in 2 to 3 weeks  7.  I will coordinate with Dr. Marlyn Peralta well as nursing home staff      Paco Bautista MD MD

## 2021-08-12 NOTE — PROGRESS NOTES
Physical Therapy    Physical Therapy Treatment Note  Name: Michelet Reeves MRN: [id number] :   1955   Date:  2021   Admission Date: 7/15/2021 Room:  [address] [place], [place] [address]A   Restrictions/Precautions:        General, fall risk  Communication with other providers:  Discussed pt performance with Mecca Rm  Subjective:  Patient states:  \"I'm just wanting to get outta here today\"  Pain:   Location, Type, Intensity (0/10 to 10/10):  No c/o  Objective:    Observation:  Pt up in recliner with tray table, awake watching T.V, in own clothing and shoes. Treatment, including education/measures:  STS: Pt performs x2 STS from recliner to rollator with SBA for safety only, pt uses chair arm to push up to standing. Min increased time to upright. Return to seated good eccentric control with min v/c to reach back. Standing balance: Pt demonstrates fair- standing balance at chair x1 minute in preparation for gait, with v/c for unsupported stance pt able to stand with increased postural sway and CGA, no LOB. In standing PT assesses height of rollator for pt, appears to be appropriate height. PT demonstrates pressure-brake mechanism of rollator and instructs pt in proper use of AD. Gait training: Pt AMB x10 ft in room w/o AD, demonstrates decreased DONNY, increased sway and limited trunk rotation, pt holds onto furniture. Pt AMB x30 ft into hallway with the rollator small stride length, guarded motion with decreased trunk rotation, decreased foot clearance KIARA, increased RW excursion. PT provides v/c and demo for closer placement of RW, v/c for larger steps, pt able to improve for ~30 seconds before requiring additional v/c. Pt expresses fear of falling with gait resulting in slow saige and step length. PT instructs pt in safe use of rollator seat by placing AD bar against wall, pt performs this with sequential turning to sit with slow saige and UE support throughout, CGA required, no LOB.     Education: PT discussed d/c plan and answered pt questions as able. Demonstrates appropriate use of pt rollator. Pt left in recliner with chair alarm, tray table, call light, all needs in reach. Assessment / Impression:       Patient's tolerance of treatment:  good   Adverse Reaction: none  Significant change in status and impact:  none  Barriers to improvement:  none  Plan for Next Session:    Pt is appropriate for d/c to SNF for rehab  Time in:  13:57/ 15:36  Time out:  14:00/ 15:50  Timed treatment minutes:  17  Total treatment time:  17    Previously filed items:  Social/Functional History  Lives With: Alone  Type of Home: House  Home Layout: One level  Home Access: Ramped entrance  Bathroom Shower/Tub: Tub/Shower unit  Bathroom Toilet: Handicap height  Bathroom Equipment: Shower chair, Grab bars in shower  ADL Assistance: [address] [place] [address] Avenue: Independent  Homemaking Responsibilities: Yes  Ambulation Assistance: Independent  Transfer Assistance: Independent  Active : No (Daughter drives)  Occupation: Retired  Type of occupation:   Short term goals  Time Frame for Short term goals: 1 week  Short term goal 1: Pt will perform supine>sit mobility with Mod A of 1 with improved command following. Short term goal 2: Pt will perform STS from standard surface with Min a and v/c for sequencing  Short term goal 3: Pt will AMB x10 ft in room to BR with LRAD and CGA-Min for safety.        Electronically signed by:    Aarti Lara PT  8/12/2021, 4:54 PM

## 2021-09-03 NOTE — ED NOTES
Bed: ED-10  Expected date:   Expected time:   Means of arrival:   Comments:  EMS     Dena Stern RN  09/03/21 [id number]

## 2021-09-03 NOTE — CARE COORDINATION
CM consult per Esha Levine to initiate discharge planning with pt who is from home, chief complaint is multi falls today, states she recently came home from [facility]. Pt is indicating she is not ready to be home. CM attempted to call Prema Garcia at [facility] [phone], no answer left detailed message for call back in regards to this patient. CM visited pt who is alert and oriented. Introduced self and reason for visit. Pt tells me she was discharged from [facility]. Pt states they told her she had to be discharged so she is not sure she can return. Pt states she would like to return for additional rehab with the goal of better strength and balance. CM explained that Admission coordinator from [facility] would have review insurance to see if Floresita Clay will cover additional rehab days. Update to Esha Levine that, waiting on a call back from [facility]. Informed that pt will probably have to be and obs admission for possible placement due to her frequent falls. CM will continue to follow for discharge plan. VIETRN/JAME    2030 [facility] [facility] states pt does have additional skilled days available however [facility] has no available beds at this time, due to [address] and reconstruction of 28 beds. Pt would be welcome back to their facility if beds were available. Update to pt SNF facility list given to pt to pick another facility.  JONI LLANOS/JAME

## 2021-09-03 NOTE — ED PROVIDER NOTES
EMERGENCY DEPARTMENT ENCOUNTER        PCP: Orestes Laguna PA-C    CHIEF COMPLAINT    Chief Complaint   Patient presents with    Fatigue     recently released from nursing home, unable to take care of self at home w/ family. This patient was not evaluated by the attending physician. I have independently evaluated this patient. My supervising physician was available for consultation. KING Mcclain is a 60 y.o. female with PMH of diabetes mellitus type 2, CAD, paroxysmal afibrillation, CKD, hypothyroidism, cerebral aneurysms who presents with generalized weakness and fatigue as well as multiple falls. Patient reports that she was discharged from [facility] 1 week ago and since going home she has had difficulty getting around due to her generalized weakness. Patient reports that she has had 3 falls today because her legs get weak causing her to fall despite her using her walker with walking. Patient denies any head and denies having loss of consciousness with falls today. She denies any injuries from her falls. Patient reports that she is concerned that her generalized weakness and frequent falls will lead to an injury which prompted her to return to the ED today. Patient reports that she lives at home with her daughter. She does use a walker to ambulate. She lives in a single-story residence. Patient denies any focal weakness, numbness, tingling, chest pain, back pain, neck pain, extremity pain, abdominal pain, cough, urinary symptoms, nausea, vomiting. REVIEW OF SYSTEMS    Constitutional:  Denies fever, chills. HENT:  Denies sore throat or ear pain   Cardiovascular:  Denies chest pain, palpitations, syncope, extremity edema.   Respiratory: Denies cough, shortness of breath, or hemoptysis    GI:  Denies abdominal pain, nausea, vomiting, or diarrhea  :  Denies any urinary symptoms  Musculoskeletal:  Denies back pain, extremity swelling  Skin:  Denies rash  Neurologic:  + generalized weakness; Denies lightheadedness, dizziness, headache, vision changes, hearing changes, speech changes, focal weakness or sensory changes   Endocrine:  Denies polyuria or polydypsia   Lymphatic:  Denies swollen glands     All other review of systems are negative  See HPI and nursing notes for additional information         PAST MEDICAL & SURGICAL HISTORY    Past Medical History:   Diagnosis Date    Abnormal PFTs (pulmonary function tests) 04/2014 4/14    Atrial fibrillation ([address]     CAD (coronary artery disease)     Carotid aneurysm, right ([address] 11/09/2020    s/p fall. referred to [facility] Neurosurgery by Dr Ortega  office    CHF (congestive heart failure) ([address]     Diabetes mellitus ([address]     Dizziness - light-headed     Family history of coronary artery disease     H/O 24 hour EKG monitoring 02/09/2012,12/10/2010    02/09/2012 predominant rhythm is sinus with short runs of SVT no episode of atrial fib. pt has left bundle branch morphology, max heart is 132, min is 48 with an average of 74 ,infrequent PVCs are seen    H/O cardiovascular stress test 4/1/2013, 1/31/2012 4/1/2013-    H/O cardiovascular stress test 4/16/14,02/09/2012 4/16/14 EF 52% No ischemia, normal study. 02/09/2012 EF 58% tehnique-technetium LVEF is normal globally    H/O echocardiogram 03/27/2013 4/14 EF 50-55% mild MR, TR 3/13EF 35-45% decreasedr L ventrical systolic function.     H/O echocardiogram 04/2014 4/14-EF50-55% Mild MR/TR. 10/11 EF > 55% LVS function is normal, calcified mitral apparatus, impaired LV relaxation, no PE    Heart imaging 02/09/2012 02/09/2012 MUGA rest EF 58%LVEF is normal globally    Heartburn     History of cardiac cath 05/08/2009, 06/14/2006 05/08/2009,moderate stenosis  small diagonal    History of cardiac monitoring 02/09/2015    Event - sinus rhythm    History of cardioversion 03/14/2014    History of MI (myocardial infarction)     History of nuclear MUGA 12/03/2020 EF 41%    History of nuclear Muga stress test 2020    EF 41%    History of PTCA 10/06/2010    10/06/2010 stent 3.0x24 taxus stent left main 90% stenosis mid seg reduced to 0% second area of [id number] to 40% not flow limiting EF 40%    Hx of cardiovascular stress test 2015    lexiscan-normal,EF70%    Hx of transesophageal echocardiography (LUKE) for monitoring 2017    Noted to have MARY GRACE Blood Clot.  Hyperlipidemia     Hypertension     Insomnia     SOB (shortness of breath)     Tachycardia 2012    report in epic notes-Dr Nunn hosp. consult    TIA (transient ischemic attack)      Past Surgical History:   Procedure Laterality Date    ABLATION OF DYSRHYTHMIC FOCUS       SECTION      CHOLECYSTECTOMY      DIAGNOSTIC CARDIAC CATH LAB PROCEDURE  2006, 2009 moderate stenosis small diagonal    IR NONTUNNELED VASCULAR CATHETER  2021    IR NONTUNNELED VASCULAR CATHETER 2021 [facility] SPECIAL PROCEDURES    OTHER SURGICAL HISTORY  2017    afib ablation LUKE    PTCA  10/06/2010    10/06/2010 stent 3.0x24 taxus stent 90% stenosis mid segment reduced to 0% second area of stenosis 30-40% not flow limiting EF 40%    TUBAL LIGATION         CURRENT MEDICATIONS    Current Outpatient Rx   Medication Sig Dispense Refill    chlorthalidone (HYGROTON) 25 MG tablet Take 0.5 tablets by mouth daily 30 tablet 3    hydrOXYzine (VISTARIL) 25 MG capsule Take 1 capsule by mouth every 8 hours as needed for Itching 90 capsule 0    busPIRone (BUSPAR) 10 MG tablet Take 1 tablet by mouth 3 times daily 90 tablet 0    LORazepam (ATIVAN) 0.5 MG tablet Take 1 tablet by mouth every 4 hours as needed for Anxiety for up to 30 days. 12 tablet 0    DULoxetine (CYMBALTA) 60 MG extended release capsule Take 1 capsule by mouth daily 30 capsule 3    insulin glargine (LANTUS) 100 UNIT/ML injection vial Inject 10 Units into the skin nightly Hold Lantus Insulin if H. S.blood glucose < 150,  and call Dr. Butch Maher for a lower dose 1 vial 3    insulin lispro (HUMALOG) 100 UNIT/ML injection vial Inject 10 Units into the skin 3 times daily (with meals) Please give Humalog Insulin soon after the meal. HOLD Humalog If Pt. Eats < 50% of the meal. OR Pre meal blood glucose < 120. 1 vial 3    losartan (COZAAR) 100 MG tablet Take 1 tablet by mouth daily 30 tablet 3    metoprolol tartrate (LOPRESSOR) 50 MG tablet Take 1 tablet by mouth 2 times daily 60 tablet 3    folic acid-pyridoxine-cyancobalamin (FOLTX) 1.13-25-2 MG TABS Take 1 tablet by mouth daily 30 tablet 0    SITagliptin (JANUVIA) 25 MG tablet Take 1 tablet by mouth daily 30 tablet 3    traZODone (DESYREL) 50 MG tablet Take 50 mg by mouth nightly      nitroGLYCERIN (NITROSTAT) 0.4 MG SL tablet up to max of 3 total doses.  If no relief after 1 dose, call 911. 25 tablet 3    albuterol sulfate HFA (VENTOLIN HFA) 108 (90 Base) MCG/ACT inhaler Inhale 2 puffs into the lungs 4 times daily as needed for Wheezing 1 Inhaler 5    atorvastatin (LIPITOR) 40 MG tablet Take 40 mg by mouth nightly      pantoprazole (PROTONIX) 40 MG tablet Take 40 mg by mouth daily      levothyroxine (SYNTHROID) 50 MCG tablet Take 50 mcg by mouth Daily      apixaban (ELIQUIS) 5 MG TABS tablet Take 1 tablet by mouth 2 times daily 60 tablet 2    aspirin 81 MG EC tablet Take 81 mg by mouth daily         ALLERGIES    Allergies   Allergen Reactions    Amiodarone Other (See Comments)     dizziness    Iv Dye [Iodides] Nausea And Vomiting    Vicodin [Hydrocodone-Acetaminophen] Nausea And Vomiting       SOCIAL & FAMILY HISTORY    Social History     Socioeconomic History    Marital status:      Spouse name: Not on file    Number of children: Not on file    Years of education: Not on file    Highest education level: Not on file   Occupational History    Not on file   Tobacco Use    Smoking status: Former Smoker     Packs/day: 3.00     Years: 20.00 Pack years: 60.00     Types: Cigarettes     Quit date: 2007     Years since quittin.5    Smokeless tobacco: Never Used    Tobacco comment: reviewed 8/12/15   Vaping Use    Vaping Use: Never used   Substance and Sexual Activity    Alcohol use: No     Comment: occas. caffeine 3 mtn. dew a day    Drug use: No    Sexual activity: Not Currently   Other Topics Concern    Not on file   Social History Narrative    Not on file     Social Determinants of Health     Financial Resource Strain:     Difficulty of Paying Living Expenses:    Food Insecurity:     Worried About Running Out of Food in the Last Year:     [address] in the Last Year:    Transportation Needs:     Lack of Transportation (Medical):      Lack of Transportation (Non-Medical):    Physical Activity:     Days of Exercise per Week:     Minutes of Exercise per Session:    Stress:     Feeling of Stress :    Social Connections:     Frequency of Communication with Friends and Family:     Frequency of Social Gatherings with Friends and Family:     Attends [employer] Services:     Active Member of Clubs or Organizations:     Attends Club or Organization Meetings:     Marital Status:    Intimate Partner Violence:     Fear of Current or Ex-Partner:     Emotionally Abused:     Physically Abused:     Sexually Abused:      Family History   Problem Relation Age of Onset    Diabetes Father     Heart Disease Father        PHYSICAL EXAM    VITAL SIGNS: BP (!) 182/97   Pulse 98   Temp 99.1 °F (37.3 °C) (Oral)   Resp 20   Ht 5' 1\" (1.549 m)   Wt 164 lb (74.4 kg)   SpO2 99%   BMI 30.99 kg/m²    Constitutional:  Well developed, well nourished, no acute distress   HENT:  Atraumatic, moist mucus membranes  Neck/Lymphatics: supple, no JVD, no swollen nodes  Respiratory:  Lungs Clear, no retractions, no wheezing, rhonchi, rales  Cardiovascular:  Rate mildly tachycardic at 105 bpm, regular Rhythm, no murmurs/rubs/gallops  Vascular: Radial pulses and DP pulses 2+ equal bilaterally  GI:  Soft, nontender, normal bowel sounds  Musculoskeletal:  No edema, no deformities. No thigh/calf tenderness to palpation bilaterally. Compartments are soft in bilateral lower extremities. No midline cervical spine tenderness to palpation is present on exam.  No paraspinal tenderness to palpation of the cervical region. Full active range of motion of neck without pain.   Integument:  Skin warm and dry, no petechiae   Neurologic:    - Alert & oriented person, place, time, and situation, no speech difficulties or slurring.  - No obvious gross motor deficits  - Cranial nerves 2-12 grossly intact  - Negative meningeal signs.  - Sensation intact to light touch  - Strength 5/5 in upper and lower extremities bilaterally  - No truncal ataxia  Psych: Pleasant affect, no hallucinations        LABS:  Results for orders placed or performed during the hospital encounter of 09/03/21   CBC Auto Differential   Result Value Ref Range    WBC 13.5 (H) 4.0 - 10.5 K/CU MM    RBC 3.83 (L) 4.2 - 5.4 M/CU MM    Hemoglobin 12.0 (L) 12.5 - 16.0 GM/DL    Hematocrit 35.3 (L) 37 - 47 %    MCV 92.2 78 - 100 FL    MCH 31.3 (H) 27 - 31 PG    MCHC 34.0 32.0 - 36.0 %    RDW 14.5 11.7 - 14.9 %    Platelets 386 842 - 484 K/CU MM    MPV 10.5 7.5 - 11.1 FL    Differential Type AUTOMATED DIFFERENTIAL     Segs Relative 77.7 (H) 36 - 66 %    Lymphocytes % 12.6 (L) 24 - 44 %    Monocytes % 7.6 (H) 0 - 4 %    Eosinophils % 1.3 0 - 3 %    Basophils % 0.4 0 - 1 %    Segs Absolute 10.5 K/CU MM    Lymphocytes Absolute 1.7 K/CU MM    Monocytes Absolute 1.0 K/CU MM    Eosinophils Absolute 0.2 K/CU MM    Basophils Absolute 0.1 K/CU MM    Nucleated RBC % 0.0 %    Total Nucleated RBC 0.0 K/CU MM    Total Immature Neutrophil 0.06 K/CU MM    Immature Neutrophil % 0.4 0 - 0.43 %   Comprehensive Metabolic Panel   Result Value Ref Range    Sodium 139 135 - 145 MMOL/L    Potassium 3.4 (L) 3.5 - 5.1 MMOL/L    Chloride 99 99 - 110 mMol/L    CO2 27 21 - 32 MMOL/L    BUN 19 6 - 23 MG/DL    CREATININE 1.3 (H) 0.6 - 1.1 MG/DL    Glucose 56 (L) 70 - 99 MG/DL    Calcium 9.7 8.3 - 10.6 MG/DL    Albumin 4.0 3.4 - 5.0 GM/DL    Total Protein 6.3 (L) 6.4 - 8.2 GM/DL    Total Bilirubin 0.6 0.0 - 1.0 MG/DL    ALT 14 10 - 40 U/L    AST 23 15 - 37 IU/L    Alkaline Phosphatase 125 40 - 128 IU/L    GFR Non- 41 (L) >60 mL/min/1.73m2    GFR  50 (L) >60 mL/min/1.73m2    Anion Gap 13 4 - 16   Troponin   Result Value Ref Range    Troponin T <0.010 <0.01 NG/ML   Urinalysis   Result Value Ref Range    Color, UA YELLOW YELLOW    Clarity, UA CLEAR CLEAR    Glucose, Urine NEGATIVE NEGATIVE MG/DL    Bilirubin Urine NEGATIVE NEGATIVE MG/DL    Ketones, Urine NEGATIVE NEGATIVE MG/DL    Specific Gravity, UA 1.015 1.001 - 1.035    Blood, Urine NEGATIVE NEGATIVE    pH, Urine 6.0 5.0 - 8.0    Protein, UA 30 (A) NEGATIVE MG/DL    Urobilinogen, Urine 2.0 (H) 0.2 - 1.0 MG/DL    Nitrite Urine, Quantitative NEGATIVE NEGATIVE    Leukocyte Esterase, Urine TRACE (A) NEGATIVE    RBC, UA NONE SEEN 0 - 6 /HPF    WBC, UA 7 (H) 0 - 5 /HPF    Bacteria, UA RARE (A) NEGATIVE /HPF    Squam Epithel, UA 1 /HPF    Trans Epithel, UA 1 /HPF    Mucus, UA RARE (A) NEGATIVE HPF    Trichomonas, UA NONE SEEN NONE SEEN /HPF    Hyaline Casts, UA 10 /LPF   Magnesium   Result Value Ref Range    Magnesium 1.8 1.8 - 2.4 mg/dl   Lactic Acid, Plasma   Result Value Ref Range    Lactate 1.0 0.4 - 2.0 mMOL/L   POCT Glucose   Result Value Ref Range    POC Glucose 140 (H) 70 - 99 MG/DL   EKG 12 Lead   Result Value Ref Range    Ventricular Rate 105 BPM    Atrial Rate 105 BPM    P-R Interval 186 ms    QRS Duration 146 ms    Q-T Interval 390 ms    QTc Calculation (Bazett) 515 ms    P Axis 75 degrees    R Axis -19 degrees    T Axis 118 degrees    Diagnosis       Sinus tachycardia  Left bundle branch block  Abnormal ECG  When compared with ECG of 07-AUG-2021 18:22,  No significant change was found       Urine culture in process    EKG: EKG Interpretation  Please see ED physician's note for EKG interpretation- Dr. Anjali Gonzalez  ED Course as of Sep 03 2122   Fri Sep 03, 2021   1844 EKG showed STach with <5mm NYLA discordant with the LBBB, not meeting sgarbossa criteria for STEMI    [YQ]      ED Course User Index  [YQ] Josie Fairbanks MD         RADIOLOGY/PROCEDURES    CT HEAD WO CONTRAST   Final Result   1. No acute intracranial abnormality. 2. Chronic microvascular ischemic disease. ED COURSE & MEDICAL DECISION MAKING       Vital signs and nursing notes reviewed during ED course. I have independently evaluated this patient. Supervising physician present in the Emergency Department, available for consultation, throughout entirety of patient care. Patient presents as above with fatigue and generalized weakness which prompted work-up today. Patient neurologically intact on exam.  Patient placed on telemetry monitoring as well as continuous pulse oximetry monitoring. CT head obtained today due to multiple falls reveals no acute intracranial abnormality. Patient has no cervical spine pain or tenderness to palpation and Lucas C-spine rules are negative. While in the ED today, labs, imaging, and EKG were obtained. For EKG interpretation- please see ED physician's note. Labs reveal leukocytosis of 13.5 and mild anemia with hemoglobin of 12. CMP reveals very mildly elevated creatinine of 1.3, hypoglycemia of 56, mild hypokalemia of 3.4. Patient treated for hypoglycemia with 8 ounces of orange juice and blood sugar improved to 140. Troponin is negative. Urinalysis reveals 7 white blood cells with rare bacteria and trace leukocytes but no nitrates and no red blood cells are seen. This is a good urine sample with only 1 squamous epithelial cell. Urine culture in process.   Will treat patient with IV ceftriaxone for UTI as patient reports feeling fatigued and generally weak and UTI could be the source of this. Patient is mildly tachycardic at 105 bpm and was therefore treated with a total of 50 cc normal saline IV fluid bolus given that her EF is only 15 to 20% I do not want to fluid overload patient. Tachycardia resolved with IV fluids. I consulted case management as patient reports difficulties caring for self at home with frequent falls since her discharge from [facility] 1 week ago. See case management's note for details. Case management recommends admission and placement. I consulted patient's PCP and on-call PCP, Dr. Jacky Spaulding, returned the call and we discussed the patient's case. She agrees to admit the patient at this time for further evaluation and care. All pertinent Lab data and radiographic results reviewed with patient at bedside. The patient and/or the family were informed of the results of any tests/labs/imaging, the treatment plan, and time was allotted to answer questions. Diagnosis, disposition, and treatment plan reviewed in detail with patient who understands and agrees to admission at this time. See chart for details of medications given during the ED stay. Clinical  IMPRESSION    1. Acute cystitis without hematuria    2. Generalized weakness    3. Leukocytosis, unspecified type    4. Hypoglycemia        In consideration of current COVID19 pandemic, with effort to minimize unnecessary provider exposure, this patient was seen at bedside by me independently. However, in compliance with current hospital ALEXANDRA/ED protocol, prior to admission I did discuss this patient case with emergency department physician, Dr. Abe Carias. PLAN:  Admission to the hospital    Comment: Please note this report has been produced using speech recognition software and may contain errors related to that system including errors in grammar, punctuation, and spelling, as well as words and phrases that may be inappropriate.  If there are any questions or concerns please feel free to contact the dictating provider for clarification.            Adilson Kaufman PA-C  09/03/21 0140

## 2021-09-04 NOTE — H&P
Department of Internal Medicine  General Internal Medicine  Attending History and Physical      CHIEF COMPLAINT:  Repeated falls    Reason for Admission:  Repeated falls in nursing home      History Obtained From:  patient    HISTORY OF PRESENT ILLNESS:       The patient is a 60 y.o. female with significant past medical history of CVA, A. Fib, HTN, CAD, DM-II, CKD, Hypothyroidism and CHF who presented to ER because of falls and generalised weakness and fatigue. She was recently discharged from SNF to home and is unable to take care of herself. Patient reports that she was discharged from [facility] 1 week ago and since going home she has had difficulty getting around due to her generalized weakness. Patient reports that she has had 3 falls yesterday because her legs got weak causing her to fall despite her using her walker. Patient denies any head injury and denies having loss of consciousness with falls today. Patient reports that she is concerned that her generalized weakness and frequent falls will lead to an injury which prompted her to return to the ED yesterday    Past Medical History:        Diagnosis Date    Abnormal PFTs (pulmonary function tests) 04/2014 4/14    Atrial fibrillation ([address]     CAD (coronary artery disease)     Carotid aneurysm, right ([address] 11/09/2020    s/p fall. referred to [facility]surgery by Dr Erasto Burch office    CHF (congestive heart failure) ([address]     Diabetes mellitus ([address]     Dizziness - light-headed     Family history of coronary artery disease     H/O 24 hour EKG monitoring 02/09/2012,12/10/2010    02/09/2012 predominant rhythm is sinus with short runs of SVT no episode of atrial fib. pt has left bundle branch morphology, max heart is 132, min is 48 with an average of 74 ,infrequent PVCs are seen    H/O cardiovascular stress test 4/1/2013, 1/31/2012 4/1/2013-    H/O cardiovascular stress test 4/16/14,02/09/2012 4/16/14 EF 52% No ischemia, normal study. 02/09/2012 EF 58% tehnique-[employer]et[employer] LVEF is normal globally    H/O echocardiogram 2013 EF 50-55% mild MR, TR 3/13EF 35-45% decreasedr L ventrical systolic function.  H/O echocardiogram 2014-EF50-55% Mild MR/TR. 10/11 EF > 55% LVS function is normal, calcified mitral apparatus, impaired LV relaxation, no PE    Heart imaging 2012 MUGA rest EF 58%LVEF is normal globally    Heartburn     History of cardiac cath 2009, 2006,moderate stenosis  small diagonal    History of cardiac monitoring 2015    Event - sinus rhythm    History of cardioversion 2014    History of MI (myocardial infarction)     History of nuclear MUGA 2020    EF 41%    History of nuclear Muga stress test 2020    EF 41%    History of PTCA 10/06/2010    10/06/2010 stent 3.0x24 taxus stent left main 90% stenosis mid seg reduced to 0% second area of [id number] to 40% not flow limiting EF 40%    Hx of cardiovascular stress test 2015    lexiscan-normal,EF70%    Hx of transesophageal echocardiography (LUKE) for monitoring 2017    Noted to have MARY GRACE Blood Clot.  Hyperlipidemia     Hypertension     Insomnia     SOB (shortness of breath)     Tachycardia 2012    report in epic notes-Dr Nunn hosp. consult    TIA (transient ischemic attack)      Past Surgical History:        Procedure Laterality Date    ABLATION OF DYSRHYTHMIC FOCUS       SECTION      CHOLECYSTECTOMY      DIAGNOSTIC CARDIAC CATH LAB PROCEDURE  2006, 2009 moderate stenosis small diagonal    IR NONTUNNELED VASCULAR CATHETER  2021    IR NONTUNNELED VASCULAR CATHETER 2021 [address] [facility] SPECIAL PROCEDURES    OTHER SURGICAL HISTORY  2017    afib ablation LUKE    PTCA  10/06/2010    10/06/2010 stent 3.0x24 taxus stent 90% stenosis mid segment reduced to 0% second area of stenosis 30-40% not flow limiting EF 40%    TUBAL LIGATION Immunizations:                Influenza: Indicated for current flu vaccination season Oct. to Feb.                Medications Prior to Admission:    Medications Prior to Admission: chlorthalidone (HYGROTON) 25 MG tablet, Take 0.5 tablets by mouth daily  busPIRone (BUSPAR) 10 MG tablet, Take 1 tablet by mouth 3 times daily  LORazepam (ATIVAN) 0.5 MG tablet, Take 1 tablet by mouth every 4 hours as needed for Anxiety for up to 30 days. DULoxetine (CYMBALTA) 60 MG extended release capsule, Take 1 capsule by mouth daily  insulin glargine (LANTUS) 100 UNIT/ML injection vial, Inject 10 Units into the skin nightly Hold Lantus Insulin if H. S.blood glucose < 150,  and call Dr. Thong Henry for a lower dose  losartan (COZAAR) 100 MG tablet, Take 1 tablet by mouth daily  metoprolol tartrate (LOPRESSOR) 50 MG tablet, Take 1 tablet by mouth 2 times daily  folic acid-pyridoxine-cyancobalamin (FOLTX) 1.13-25-2 MG TABS, Take 1 tablet by mouth daily  SITagliptin (JANUVIA) 25 MG tablet, Take 1 tablet by mouth daily  traZODone (DESYREL) 50 MG tablet, Take 50 mg by mouth nightly  nitroGLYCERIN (NITROSTAT) 0.4 MG SL tablet, up to max of 3 total doses. If no relief after 1 dose, call 911.  albuterol sulfate HFA (VENTOLIN HFA) 108 (90 Base) MCG/ACT inhaler, Inhale 2 puffs into the lungs 4 times daily as needed for Wheezing  atorvastatin (LIPITOR) 40 MG tablet, Take 40 mg by mouth nightly  pantoprazole (PROTONIX) 40 MG tablet, Take 40 mg by mouth daily  levothyroxine (SYNTHROID) 50 MCG tablet, Take 50 mcg by mouth Daily  apixaban (ELIQUIS) 5 MG TABS tablet, Take 1 tablet by mouth 2 times daily  aspirin 81 MG EC tablet, Take 81 mg by mouth daily  hydrOXYzine (VISTARIL) 25 MG capsule, Take 1 capsule by mouth every 8 hours as needed for Itching  insulin lispro (HUMALOG) 100 UNIT/ML injection vial, Inject 10 Units into the skin 3 times daily (with meals) Please give Humalog Insulin soon after the meal. HOLD Humalog If Pt.  Eats < 50% of the meal. OR Pre meal blood glucose < 120. Allergies:  Amiodarone, Iv dye [iodides], and Vicodin [hydrocodone-acetaminophen]    Social History:   TOBACCO:   reports that she quit smoking about 14 years ago. Her smoking use included cigarettes. She has a 60.00 pack-year smoking history. She has never used smokeless tobacco.  ETOH:   reports no history of alcohol use. DRUGS:   reports no history of drug use. Family History:       Problem Relation Age of Onset    Diabetes Father     Heart Disease Father      REVIEW OF SYSTEMS:  CONSTITUTIONAL:  negative  EYES:  negative  HEENT:  negative  RESPIRATORY:  negative  CARDIOVASCULAR:  negative  GASTROINTESTINAL:  negative  GENITOURINARY:  negative  ALLERGIC/IMMUNOLOGIC:  negative  ENDOCRINE:  negative  MUSCULOSKELETAL:  Complains of lower extremity weakness  NEUROLOGICAL:  History of TIA in the past  BEHAVIOR/PSYCH:  negative  PHYSICAL EXAM:    Vitals:  BP (!) 125/114   Pulse 88   Temp 98.1 °F (36.7 °C) (Oral)   Resp 17   Ht 5' 1\" (1.549 m)   Wt 157 lb 6.4 oz (71.4 kg)   SpO2 94%   BMI 29.74 kg/m²     CONSTITUTIONAL:  awake, alert, cooperative, no apparent distress, and appears stated age  EYES:  Lids and lashes normal, pupils equal, round and reactive to light, extra ocular muscles intact, sclera clear, conjunctiva normal  ENT:  Normocephalic, without obvious abnormality, atraumatic, sinuses nontender on palpation, external ears without lesions, oral pharynx with moist mucus membranes, tonsils without erythema or exudates, gums normal and good dentition.   NECK:  Supple, symmetrical, trachea midline, no adenopathy, thyroid symmetric, not enlarged and no tenderness, skin normal  HEMATOLOGIC/LYMPHATICS:  no cervical lymphadenopathy  BACK:  Symmetric, no curvature, spinous processes are non-tender on palpation, paraspinous muscles are non-tender on palpation, no costal vertebral tenderness  LUNGS:  No increased work of breathing, good air exchange, clear to auscultation bilaterally, no crackles or wheezing  CARDIOVASCULAR:  Normal apical impulse, regular rate and rhythm, normal S1 and S2, no S3 or S4, and no murmur noted  ABDOMEN:  No scars, normal bowel sounds, soft, non-distended, non-tender, no masses palpated, no hepatosplenomegally  MUSCULOSKELETAL:  There is no redness, warmth, or swelling of the joints. Full range of motion noted. Motor strength is 5 out of 5 all extremities bilaterally. Tone is normal.  NEUROLOGIC:  Awake, alert, oriented to name, place and time. nonfocal  SKIN:  no bruising or bleeding    DATA:  Urine Culture:  No components found for: CURINE   CT head:    1. No acute intracranial abnormality. 2. Chronic microvascular ischemic disease.           ASSESSMENT AND PLAN:    Repeated Falls  Mild hypokalemia. PO KCL    Active Problems:    S/P ablation of atrial fibrillation  Plan: CPM    History of CVA (cerebrovascular accident) without residual deficits  Plan: CPM    Morbid obesity ([address]  Plan: Counselled to loose weight    DM (diabetes mellitus), type 2, uncontrolled ([address]  Plan: Home medications, SSI. Monitor BS    CAD (coronary artery disease)  Plan: No chest pain stable, CPM    History of MI (myocardial infarction)  Plan: stable, no new chest pain    Hypertension  Plan: CPM    TIA (transient ischemic attack)  Plan: CPM    Hyperlipidemia  Plan: CPM    Atrial thrombus without antecedent myocardial infarction  Plan: CPM    Acute cystitis  Plan: IV Rocephin until C&S results available    Paroxysmal atrial fibrillation ([address]  Plan:  In NSR    HFrEF (heart failure with reduced ejection fraction) ([address]  Plan: CPM  Reason for fall could be from her previous CVA and lower extremity weakness

## 2021-09-04 NOTE — ED NOTES
Admitting Dr wants the floor nurse to call the PCP when they get to the floor.       Lestine Cushing, RN  09/03/21 [id number]

## 2021-09-04 NOTE — ED NOTES
Talisha Vicente requested to call back for report, stated she would have the nurse call back.      Roxann Crenshaw RN  09/04/21 [id number]

## 2021-09-04 NOTE — PROGRESS NOTES
Perfect[employer] with Dr. Kya Rodriguez regarding Pt's Tachycardia.  Dr. Kya Rodriguez ordered Cardiology Consult

## 2021-09-04 NOTE — ED NOTES
Blood sugar was 56 mg/dl by lab draw. Patient was given 2 four oz orange juices and her blood sugar increased to 140 mg/dl. The patient states feeling well.      Rachel Negrete RN  09/03/21 [id number]

## 2021-09-04 NOTE — CARE COORDINATION
Pt and Dtr would prefer:  · [facility]  · [place] ([facility])    Dtr called writer to inform us that she has written \"yes\" and \"no\" next to facilities list from CM. 2 options noted to have \"yes\" next to them, as above.

## 2021-09-04 NOTE — PROGRESS NOTES
Patient notes indicated to contact patient primary care provider upon floor admission. pcp shelley guaman md. Covered via md daniella sarah md attending md yelena is aware. Will paged upon orders.

## 2021-09-05 NOTE — PROGRESS NOTES
1245 Pt received to unit two person skin assessment completed. Pt in no  Distress. Oriented to room. Monitor placed on pt and vital signs taken. Educated on [address] . Bolus started.  [place]tart[place] -

## 2021-09-05 NOTE — CONSULTS
INPATIENT CARDIOLOGY CONSULT NOTE       Reason for consultation: A. fib    Referring physician:  Justin Rogers MD     Primary care physician: Mandie Colby PA-C      Dear Justin Rogers MD Thank you for the consult    Chief Complaint   Patient presents with    Fatigue     recently released from nursing home, unable to take care of self at home w/ family. History of present illness:Danae is a 60 y. o.year old who  presents with  Chief Complaint   Patient presents with    Fatigue     recently released from nursing home, unable to take care of self at home w/ family. Patient is a 60 old female with prior medical history significant for stroke, atrial fibrillation, hypertension, CAD, CKD who presents to the hospital s/p generalized weakness fatigue and fall -from the nursing home. Patient mention she cannot take care of herself at home and has been feeling lethargic.   Patient denies any head trauma from the fall or loss of consciousness  Cardiology is consulted to evaluate patient for A. fib RVR    EKG shows A. fib RVR  Prior EKGs were reviewed which reveals sinus tachycardia, history of multifocal atrial tachycardia    Past medical history:    has a past medical history of Abnormal PFTs (pulmonary function tests), Atrial fibrillation ([address] CAD (coronary artery disease), Carotid aneurysm, right ([address] CHF (congestive heart failure) ([address] Diabetes mellitus ([address] Dizziness - light-headed, Family history of coronary artery disease, H/O 24 hour EKG monitoring, H/O cardiovascular stress test, H/O cardiovascular stress test, H/O echocardiogram, H/O echocardiogram, Heart imaging, Heartburn, History of cardiac cath, History of cardiac monitoring, History of cardioversion, History of MI (myocardial infarction), History of nuclear MUGA, History of nuclear Muga stress test, History of PTCA, Hx of cardiovascular stress test, Hx of transesophageal echocardiography (LUKE) for monitoring, Hyperlipidemia, Hypertension, Insomnia, SOB (shortness of breath), Tachycardia, and TIA (transient ischemic attack). Past surgical history:   has a past surgical history that includes  section; Tubal ligation; Cholecystectomy; Diagnostic Cardiac Cath Lab Procedure (2006, 2009); Percutaneous Transluminal Coronary Angio (10/06/2010); other surgical history (2017); ablation of dysrhythmic focus; and IR NONTUNNELED VASCULAR CATHETER > 5 YEARS (2021). Social History:   reports that she quit smoking about 14 years ago. Her smoking use included cigarettes. She has a 60.00 pack-year smoking history. She has never used smokeless tobacco. She reports that she does not drink alcohol and does not use drugs.   Family history:   no family history of CAD, STROKE of DM    Allergies   Allergen Reactions    Amiodarone Other (See Comments)     dizziness    Iv Dye [Iodides] Nausea And Vomiting    Vicodin [Hydrocodone-Acetaminophen] Nausea And Vomiting       apixaban (ELIQUIS) tablet 5 mg, BID  metoprolol succinate (TOPROL XL) extended release tablet 25 mg, Daily  amiodarone (CORDARONE) 450 mg in dextrose 5 % 250 mL infusion, Continuous   Followed by  amiodarone (CORDARONE) 450 mg in dextrose 5 % 250 mL infusion, Continuous  potassium chloride (KLOR-CON) extended release tablet 10 mEq, BID  glucose (GLUTOSE) 40 % oral gel 15 g, PRN  dextrose 50 % IV solution, PRN  glucagon (rDNA) injection 1 mg, PRN  dextrose 5 % solution, PRN  sodium chloride flush 0.9 % injection 5-40 mL, 2 times per day  sodium chloride flush 0.9 % injection 5-40 mL, PRN  ondansetron (ZOFRAN-ODT) disintegrating tablet 4 mg, Q8H PRN   Or  ondansetron (ZOFRAN) injection 4 mg, Q6H PRN  polyethylene glycol (GLYCOLAX) packet 17 g, Daily PRN  acetaminophen (TYLENOL) tablet 650 mg, Q6H PRN   Or  acetaminophen (TYLENOL) suppository 650 mg, Q6H PRN  0.9 % sodium chloride infusion, PRN  insulin lispro (HUMALOG) injection vial 0-12 Units, TID WC  insulin lispro (HUMALOG) injection vial 0-6 Units, Nightly      Current Facility-Administered Medications   Medication Dose Route Frequency Provider Last Rate Last Admin    apixaban (ELIQUIS) tablet 5 mg  5 mg Oral BID Willy Ohara MD        metoprolol succinate (TOPROL XL) extended release tablet 25 mg  25 mg Oral Daily Willy Ohara MD        amiodarone (CORDARONE) 450 mg in dextrose 5 % 250 mL infusion  1 mg/min IntraVENous Continuous Willy Ohara MD 33.3 mL/hr at 09/05/21 1318 1 mg/min at 09/05/21 1318    Followed by   Jase Mccormick amiodarone (CORDARONE) 450 mg in dextrose 5 % 250 mL infusion  0.5 mg/min IntraVENous Continuous Willy Ohara MD        potassium chloride (KLOR-CON) extended release tablet 10 mEq  10 mEq Oral BID Charity Bowles MD   10 mEq at 09/05/21 0935    glucose (GLUTOSE) 40 % oral gel 15 g  15 g Oral PRN APRIL Alonzo-ARINA        dextrose 50 % IV solution  12.5 g IntraVENous PRN Audra Robertson PA-C        glucagon (rDNA) injection 1 mg  1 mg IntraMUSCular PRN APRIL Alonzo-ARINA        dextrose 5 % solution  100 mL/hr IntraVENous PRN Audra Robertson PA-C        sodium chloride flush 0.9 % injection 5-40 mL  5-40 mL IntraVENous 2 times per day Charity Bowles MD   10 mL at 09/05/21 0935    sodium chloride flush 0.9 % injection 5-40 mL  5-40 mL IntraVENous PRN Charity Bowles MD        ondansetron (ZOFRAN-ODT) disintegrating tablet 4 mg  4 mg Oral Q8H PRN Charity Bowles MD        Or    ondansetron (ZOFRAN) injection 4 mg  4 mg IntraVENous Q6H PRN Charity Bowles MD        polyethylene glycol (GLYCOLAX) packet 17 g  17 g Oral Daily PRN Charity Bowles MD        acetaminophen (TYLENOL) tablet 650 mg  650 mg Oral Q6H PRN Charity Bowles MD   650 mg at 09/04/21 0845    Or    acetaminophen (TYLENOL) suppository 650 mg  650 mg Rectal Q6H PRN Charity Bowles MD        0.9 % sodium chloride infusion  25 mL IntraVENous PRN Charity Bowles MD        insulin lispro (HUMALOG) injection vial 0-12 Units  0-12 Units SubCUTAneous TID WC Delilah Parnell MD   6 Units at 09/04/21 1807    insulin lispro (HUMALOG) injection vial 0-6 Units  0-6 Units SubCUTAneous Nightly Delilah Parnell MD   2 Units at 09/04/21 6207         Review of Systems:     · Constitutional: No Fever or Weight Loss   · Eyes: No Decreased Vision  · ENT: No Headaches, Hearing Loss or Vertigo  · Cardiovascular: No chest pain, dyspnea on exertion, palpitations or loss of consciousness  · Respiratory: No cough or wheezing    · Gastrointestinal: No abdominal pain, appetite loss, blood in stools, constipation, diarrhea or heartburn  · Genitourinary: No dysuria, trouble voiding, or hematuria  · Musculoskeletal:  No gait disturbance, weakness or joint complaints  · Integumentary: No rash or pruritis  · Neurological: No TIA or stroke symptoms  · Psychiatric: No anxiety or depression  · Endocrine: No malaise, fatigue or temperature intolerance  · Hematologic/Lymphatic: No bleeding problems, blood clots or swollen lymph nodes  · Allergic/Immunologic: No nasal congestion or hives    All other systems were reviewed and were negative otherwise. Physical Examination:      Vitals:    09/05/21 1251   BP:    Pulse:    Resp:    Temp:    SpO2: 98%      Wt Readings from Last 3 Encounters:   09/05/21 160 lb 8 oz (72.8 kg)   08/11/21 162 lb (73.5 kg)   06/15/21 178 lb 12.8 oz (81.1 kg)     Body mass index is 30.33 kg/m². General Appearance:  No distress, conversant  Constitutional:  Well developed, Well nourished  HEENT:  Normocephalic, Atraumatic, Oropharynx moist, No oral exudates,   Nose normal. Neck Supple Carotid: no carotid bruit  Eyes:  Conjunctiva normal, No discharge. Respiratory:    Normal breath sounds, No respiratory distress, No wheezing, no use of accessory muscles, diaphragm movement is normal  No chest Tenderness  Cardiovascular: S1-S2 No murmurs auscultated. No rubs, thrills or gallops. Normal  rhythm.  Pedal pulses are normal. No pedal edema  GI:  Soft Non tender, non distended. :  No CVA tenderness. Musculoskeletal:   No tenderness, No cyanosis, No clubbing. Integument:  Warm, Dry, No erythema, No rash. Lymphatic:  No lymphadenopathy noted. Neurologic:  Alert & oriented x 3  No focal deficits noted. Psychiatric:  Affect normal, Judgment normal, Mood normal.       Lab Review     Recent Labs     09/05/21  1002   WBC 9.6   HGB 11.4*   HCT 34.6*         Recent Labs     09/05/21  1002      K 4.1      CO2 27   BUN 23   CREATININE 1.5*     Recent Labs     09/05/21  1002   AST 16   ALT 11   BILITOT 0.4   ALKPHOS 110     No results for input(s): TROPONINI in the last 72 hours. Lab Results   Component Value Date     (H) 04/17/2013    BNP 61 05/07/2012    BNP 86 01/30/2012     Lab Results   Component Value Date    INR 1.17 07/15/2021    PROTIME 15.1 (H) 07/15/2021         All labs, images, EKGs were personally reviewed      Assessment: 60 y. o.year old with PMH of  has a past medical history of Abnormal PFTs (pulmonary function tests), Atrial fibrillation ([address] CAD (coronary artery disease), Carotid aneurysm, right ([address] CHF (congestive heart failure) ([address] Diabetes mellitus ([address] Dizziness - light-headed, Family history of coronary artery disease, H/O 24 hour EKG monitoring, H/O cardiovascular stress test, H/O cardiovascular stress test, H/O echocardiogram, H/O echocardiogram, Heart imaging, Heartburn, History of cardiac cath, History of cardiac monitoring, History of cardioversion, History of MI (myocardial infarction), History of nuclear MUGA, History of nuclear Muga stress test, History of PTCA, Hx of cardiovascular stress test, Hx of transesophageal echocardiography (LUKE) for monitoring, Hyperlipidemia, Hypertension, Insomnia, SOB (shortness of breath), Tachycardia, and TIA (transient ischemic attack). Recommendations:      1. A. fib RVR  2. Prior history of A. fib s/p ablation in September 2017  3.  History of stroke with

## 2021-09-05 NOTE — PROGRESS NOTES
Admit Date: 9/3/2021      Subjective:     Patient has no new complaints. .   Medication side effects: none    Scheduled Meds:   apixaban  5 mg Oral BID    metoprolol succinate  25 mg Oral Daily    aspirin  81 mg Oral Daily    atorvastatin  40 mg Oral Nightly    lisinopril  2.5 mg Oral Daily    potassium chloride  10 mEq Oral BID    sodium chloride flush  5-40 mL IntraVENous 2 times per day    insulin lispro  0-12 Units SubCUTAneous TID WC    insulin lispro  0-6 Units SubCUTAneous Nightly     Continuous Infusions:   amiodarone 0.5 mg/min (09/05/21 1650)    dextrose      sodium chloride       PRN Meds:glucose, dextrose, glucagon (rDNA), dextrose, sodium chloride flush, ondansetron **OR** ondansetron, polyethylene glycol, acetaminophen **OR** acetaminophen, sodium chloride    Review of Systems  Pertinent items are noted in HPI. Objective:     Patient Vitals for the past 8 hrs:   BP Pulse Resp SpO2   09/05/21 1500 123/62 91 12 --   09/05/21 1251 -- -- -- 98 %     I/O last 3 completed shifts: In: 180 [P.O.:120; I.V.:10; IV Piggyback:50]  Out: -   No intake/output data recorded.     /62   Pulse 91   Temp 97.7 °F (36.5 °C) (Oral)   Resp 12   Ht 5' 1\" (1.549 m)   Wt 160 lb 8 oz (72.8 kg)   SpO2 98%   BMI 30.33 kg/m²     General Appearance:    Alert, cooperative, no distress, appears stated age   Head:    Normocephalic, without obvious abnormality, atraumatic   Eyes:    PERRL, conjunctiva/corneas clear, EOM's intact, fundi     benign, both eyes   Ears:    Normal TM's and external ear canals, both ears   Nose:   Nares normal, septum midline, mucosa normal, no drainage    or sinus tenderness   Throat:   Lips, mucosa, and tongue normal; teeth and gums normal   Neck:   Supple, symmetrical, trachea midline, no adenopathy;     thyroid:  no enlargement/tenderness/nodules; no carotid    bruit or JVD   Back:     Symmetric, no curvature, ROM normal, no CVA tenderness   Lungs:     Clear to auscultation bilaterally, respirations unlabored   Chest Wall:    No tenderness or deformity    Heart:    irregular rate and rhythm, S1 and S2 normal, no murmur, rub   or gallop       Abdomen:     Soft, non-tender, bowel sounds active all four quadrants,     no masses, no organomegaly   Genitalia:    Normal female without lesion, discharge or tenderness   Rectal:    Normal tone ;guaiac negative stool   Extremities:   Extremities normal, atraumatic, no cyanosis or edema   Pulses:   2+ and symmetric all extremities   Skin:   Skin color, texture, turgor normal, no rashes or lesions   Lymph nodes:   Cervical, supraclavicular, and axillary nodes normal   Neurologic:   CNII-XII intact, normal strength, sensation and reflexes     throughout           Data Review  CBC:   Lab Results   Component Value Date    WBC 9.6 09/05/2021    RBC 3.68 09/05/2021     BMP:   Lab Results   Component Value Date    GLUCOSE 291 09/05/2021    CO2 27 09/05/2021    BUN 23 09/05/2021    CREATININE 1.5 09/05/2021    CALCIUM 8.7 09/05/2021     ABG:   Lab Results   Component Value Date    HDN0DFC 24.4 07/16/2021    BEART 2.1 11/01/2011    TFG0GDW 32.0 07/16/2021    PO2ART 80 07/16/2021       Assessment:     Active Problems:    S/P ablation of atrial fibrillation    History of CVA (cerebrovascular accident) without residual deficits    Morbid obesity (HCC)    DM (diabetes mellitus), type 2, uncontrolled (HCC)    CAD (coronary artery disease)    History of MI (myocardial infarction)    Hypertension    TIA (transient ischemic attack)    Hyperlipidemia    Atrial thrombus without antecedent myocardial infarction    Acute cystitis    Paroxysmal atrial fibrillation (HCC)    HFrEF (heart failure with reduced ejection fraction) ([address]  Resolved Problems:    * No resolved hospital problems. *      Plan:     Currently in A. Fib and RVR, cardiology consulted- started on cordarone IV  Toprol XL 25 mg and Lisinopril 2.5 mg.  Home metoprolol stopped  Elquis 5 mg BID  Plan for [facility] on Tuesday  EP consult  Urine culture positive for E. Coli Sensitivity pending.   Continue IV Rocephin for now    Reynold Amezcua MD FACP  9/5/2021

## 2021-09-06 NOTE — PROGRESS NOTES
Cardiology Progress Note     Today's Plan: Change amiodarone to oral    Admit Date:  9/3/2021    Consult reason/ Seen today for: A. fib    Subjective and  Overnight Events: Patient denies any chest pain, shortness of breath, dizziness, or palpitations. Telemetry-NSR  Oxygen-RA    Assessment / Plan / Recommendation:     1. A. fib RVR: H/O ablation of atrial fibrillation in 2017. Stop amiodarone gtt start oral 400 mg BID x 7 days then 200 mg BID. ? Elevated QTc will get EKG to verify. Continue with Toprol XL 25 mg. Will check magnesium and TSH level. 2. History of stroke with [id number] over 5, continue with Eliquis 5 mg BID. 3. History of multifocal atrial tachycardia  4. Left bundle branch block  5. History of moderate mitral regurgitation: Diuresis as needed  6. History of coronary disease s/p PCI -left heart cath in November 2020 showed multivessel CAD with no high-grade stenosis requiring PCI. Continue with statin, aspirin, Toprol  7. History of cardiomyopathy ? Ischemic versus tachycardia induced. Last LVEF is around 20%. Patient is tolerating Toprol and lisinopril. Does not appear to be in overload. 8. Essential hypertension: Toprol-XL and lisinopril   9. Hyperlipidemia: continue with Lipitor 40 mg daily  10. Acute cystitis: On antibiotics per primary team         History of Presenting Illness:    Chief complain on admission : 60 y. o.year old who is admitted for  Chief Complaint   Patient presents with    Fatigue     recently released from nursing home, unable to take care of self at home w/ family.         Past medical history:    has a past medical history of Abnormal PFTs (pulmonary function tests), Atrial fibrillation ([address] CAD (coronary artery disease), Carotid aneurysm, right ([address] CHF (congestive heart failure) ([address] Diabetes mellitus ([address] Dizziness - light-headed, Family history of coronary artery disease, H/O 24 hour EKG monitoring, H/O cardiovascular stress test, H/O cardiovascular stress test, H/O echocardiogram, H/O echocardiogram, Heart imaging, Heartburn, History of cardiac cath, History of cardiac monitoring, History of cardioversion, History of MI (myocardial infarction), History of nuclear MUGA, History of nuclear Muga stress test, History of PTCA, Hx of cardiovascular stress test, Hx of transesophageal echocardiography ([facility]) for monitoring, Hyperlipidemia, Hypertension, Insomnia, SOB (shortness of breath), Tachycardia, and TIA (transient ischemic attack). Past surgical history:   has a past surgical history that includes  section; Tubal ligation; Cholecystectomy; Diagnostic Cardiac Cath Lab Procedure (2006, 2009); Percutaneous Transluminal Coronary Angio (10/06/2010); other surgical history (2017); ablation of dysrhythmic focus; and IR NONTUNNELED VASCULAR CATHETER > 5 YEARS (2021). Social History:   reports that she quit smoking about 14 years ago. Her smoking use included cigarettes. She has a 60.00 pack-year smoking history. She has never used smokeless tobacco. She reports that she does not drink alcohol and does not use drugs. Family history:  family history includes Diabetes in her father; Heart Disease in her father. Allergies   Allergen Reactions    Amiodarone Other (See Comments)     dizziness    Iv Dye [Iodides] Nausea And Vomiting    Vicodin [Hydrocodone-Acetaminophen] Nausea And Vomiting       Review of Systems:  Review of Systems   Cardiovascular: Negative for chest pain, palpitations and leg swelling. Musculoskeletal: Negative. Skin: Negative. Neurological: Negative for dizziness and weakness. All other systems reviewed and are negative.        BP (!) 161/76   Pulse 86   Temp 98.1 °F (36.7 °C) (Oral)   Resp 10   Ht 5' 1\" (1.549 m)   Wt 160 lb 8 oz (72.8 kg)   SpO2 98%   BMI 30.33 kg/m²       Intake/Output Summary (Last 24 hours) at 9/6/2021 1034  Last data filed at 9/5/2021 1947  Gross per 24 hour   Intake 180 ml   Output --   Net 180 ml       Physical Exam:  Physical Exam  Constitutional:       Appearance: She is well-developed. Cardiovascular:      Rate and Rhythm: Normal rate and regular rhythm. Pulses: Intact distal pulses. Dorsalis pedis pulses are 2+ on the right side and 2+ on the left side. Posterior tibial pulses are 2+ on the right side and 2+ on the left side. Heart sounds: Normal heart sounds, S1 normal and S2 normal.   Pulmonary:      Effort: Pulmonary effort is normal.      Breath sounds: Normal breath sounds. Musculoskeletal:         General: Normal range of motion. Skin:     General: Skin is warm and dry. Neurological:      Mental Status: She is alert and oriented to person, place, and time. Medications:    apixaban  5 mg Oral BID    metoprolol succinate  25 mg Oral Daily    aspirin  81 mg Oral Daily    atorvastatin  40 mg Oral Nightly    lisinopril  2.5 mg Oral Daily    busPIRone  10 mg Oral TID    traZODone  50 mg Oral Nightly    potassium chloride  10 mEq Oral BID    sodium chloride flush  5-40 mL IntraVENous 2 times per day    insulin lispro  0-12 Units SubCUTAneous TID WC    insulin lispro  0-6 Units SubCUTAneous Nightly      amiodarone 0.5 mg/min (09/06/21 0048)    dextrose      sodium chloride       LORazepam, glucose, dextrose, glucagon (rDNA), dextrose, sodium chloride flush, ondansetron **OR** ondansetron, polyethylene glycol, acetaminophen **OR** acetaminophen, sodium chloride    Lab Data:  CBC:   Recent Labs     09/03/21 1841 09/05/21  1002   WBC 13.5* 9.6   HGB 12.0* 11.4*   HCT 35.3* 34.6*   MCV 92.2 94.0    217     BMP:   Recent Labs     09/03/21  1841 09/05/21  1002    139   K 3.4* 4.1   CL 99 102   CO2 27 27   BUN 19 23   CREATININE 1.3* 1.5*     PT/INR: No results for input(s): PROTIME, INR in the last 72 hours.   BNP:  No results for input(s): PROBNP in the last 72 hours. TROPONIN:   Recent Labs     09/03/21  1841   TROPONINT <0.010        Impression:  Active Problems:    S/P ablation of atrial fibrillation    History of CVA (cerebrovascular accident) without residual deficits    Morbid obesity (HCC)    DM (diabetes mellitus), type 2, uncontrolled ([facility]    CAD (coronary artery disease)    History of MI (myocardial infarction)    Hypertension    TIA (transient ischemic attack)    Hyperlipidemia    Atrial thrombus without antecedent myocardial infarction    Acute cystitis    Paroxysmal atrial fibrillation (HCC)    HFrEF (heart failure with reduced ejection fraction) ([facility]  Resolved Problems:    * No resolved hospital problems. *       All labs, medications and tests reviewed by myself, continue all other medications of all above medical condition listed as is except for changes mentioned above. Thank you   Please call with questions. Electronically signed by RENE Daniel - CNP on 9/6/2021 at 10:34 AM           CARDIOLOGY ATTENDING ADDENDUM    I have seen, spoken to and examined this patient personally, independently of the nurse practitioner. I have reviewed the hospital care given to date and reviewed all pertinent labs and imaging. The plan was developed mutually at the time of the visit with the patient,  NP   and myself. I have spoken with patient, nursing staff and provided written and verbal instructions . The above note has been reviewed and I agree with the assessment, diagnosis, and treatment plan with changes made by me as follows       HPI:  I have reviewed the above HPI  And agree with above   Please review addendum/changes made to note above     Interval history:            Physical Exam:  General:  Awake, alert, NAD  Head:normal  Eye:normal  Neck:  No JVD   Chest:  Clear to auscultation, respiration easy  Cardiovascular:  s1s2  Abdomen:   nontender  Extremities:  0 edema  Pulses; palpable  Neuro: grossly normal      MEDICAL DECISION MAKING;    I agree with the above plan, which was planned by myself and discussed with NP.     Dr. Fanta Drake MD

## 2021-09-06 NOTE — PLAN OF CARE
Nutrition Problem #1: Inadequate oral intake  Intervention: Food and/or Nutrient Delivery: Continue Current Diet, Start Oral Nutrition Supplement  Nutritional Goals: Pt will consume greater than 50% of meals and supplements

## 2021-09-06 NOTE — PROGRESS NOTES
Comprehensive Nutrition Assessment    Type and Reason for Visit:  Positive Nutrition Screen    Nutrition Recommendations/Plan:   Add diabetic oral nutrition supplement TID  Encourage po intake as able  Please document all po intake  Will monitor po intake, weight trends, poc    Nutrition Assessment:  Pt admitted for hypoglycemia, generalized weakness, acute cystitis, PMH: DM, HTN, CAD, CHF, Afib, positive nutrition screen for weight loss and decreased appetite/intake, pt currently on 3 carb diet with no po intake documented since admission, weight loss noted per chart review, pt is at high nutrition risk    Malnutrition Assessment:  Malnutrition Status: At risk for malnutrition (Comment)    Context:  Acute Illness       Estimated Daily Nutrient Needs:  Energy (kcal):  8605-9330; Weight Used for Energy Requirements:  Current     Protein (g):  51-61; Weight Used for Protein Requirements:  Ideal        Fluid (ml/day):  1500; Method Used for Fluid Requirements:  1 ml/kcal      Nutrition Related Findings:  Cr 1.5, Glucose 291, A1c (7/17) 10.9      Wounds:  None       Current Nutrition Therapies:    ADULT DIET; Regular; 3 carb choices (45 gm/meal)    Anthropometric Measures:  · Height: 5' 0.98\" (154.9 cm)  · Current Body Weight: 160 lb 7.9 oz (72.8 kg)   · Usual Body Weight: 178 lb 12.7 oz (81.1 kg) ((6/11/21) per chart review)     · Ideal Body Weight: 105 lbs; % Ideal Body Weight 152.9 %   · BMI: 30.3  · BMI Categories: Obese Class 1 (BMI 30.0-34. 9)       Nutrition Diagnosis:   · Inadequate oral intake related to lack of appetite as evidenced by weight loss, poor intake prior to admission    Nutrition Interventions:   Food and/or Nutrient Delivery:  Continue Current Diet, Start Oral Nutrition Supplement  Nutrition Education/Counseling:  Education needed   Coordination of Nutrition Care:  Continue to monitor while inpatient    Goals:  Pt will consume greater than 50% of meals and supplements       Nutrition Monitoring and Evaluation:   Behavioral-Environmental Outcomes:  None Identified   Food/Nutrient Intake Outcomes:  Supplement Intake, Food and Nutrient Intake  Physical Signs/Symptoms Outcomes:  Biochemical Data, Weight, GI Status, Hemodynamic Status, Fluid Status or Edema     Discharge Planning:     Too soon to determine     Electronically signed by Ileana Padilla MS, RD, LD on 9/6/21 at 3:24 PM EDT    Contact: [phone]

## 2021-09-06 NOTE — PROGRESS NOTES
Admit Date: 9/3/2021      Subjective:     Patient has no new complaints. .   Medication side effects: none    Scheduled Meds:   amiodarone  400 mg Oral BID    [START ON 9/13/2021] amiodarone  200 mg Oral BID    apixaban  5 mg Oral BID    metoprolol succinate  25 mg Oral Daily    aspirin  81 mg Oral Daily    atorvastatin  40 mg Oral Nightly    lisinopril  2.5 mg Oral Daily    busPIRone  10 mg Oral TID    traZODone  50 mg Oral Nightly    potassium chloride  10 mEq Oral BID    sodium chloride flush  5-40 mL IntraVENous 2 times per day    insulin lispro  0-12 Units SubCUTAneous TID WC    insulin lispro  0-6 Units SubCUTAneous Nightly     Continuous Infusions:   dextrose      sodium chloride       PRN Meds:LORazepam, glucose, dextrose, glucagon (rDNA), dextrose, sodium chloride flush, ondansetron **OR** ondansetron, polyethylene glycol, acetaminophen **OR** acetaminophen, sodium chloride    Review of Systems  Pertinent items are noted in HPI. Objective:     Patient Vitals for the past 8 hrs:   BP Pulse   09/06/21 0820 (!) 161/76 86     I/O last 3 completed shifts: In: 190 [I.V.:190]  Out: -   No intake/output data recorded.     BP (!) 161/76   Pulse 86   Temp 98.1 °F (36.7 °C) (Oral)   Resp 10   Ht 5' 1\" (1.549 m)   Wt 160 lb 8 oz (72.8 kg)   SpO2 98%   BMI 30.33 kg/m²     General Appearance:    Alert, cooperative, no distress, appears stated age   Head:    Normocephalic, without obvious abnormality, atraumatic   Eyes:    PERRL, conjunctiva/corneas clear, EOM's intact, fundi     benign, both eyes   Ears:    Normal TM's and external ear canals, both ears   Nose:   Nares normal, septum midline, mucosa normal, no drainage    or sinus tenderness   Throat:   Lips, mucosa, and tongue normal; teeth and gums normal   Neck:   Supple, symmetrical, trachea midline, no adenopathy;     thyroid:  no enlargement/tenderness/nodules; no carotid    bruit or JVD   Back:     Symmetric, no curvature, ROM normal, no CVA tenderness   Lungs:     Clear to auscultation bilaterally, respirations unlabored   Chest Wall:    No tenderness or deformity    Heart:    irregular rate and rhythm, S1 and S2 normal, no murmur, rub   or gallop       Abdomen:     Soft, non-tender, bowel sounds active all four quadrants,     no masses, no organomegaly   Genitalia:    Normal female without lesion, discharge or tenderness   Rectal:    Normal tone ;guaiac negative stool   Extremities:   Extremities normal, atraumatic, no cyanosis or edema   Pulses:   2+ and symmetric all extremities   Skin:   Skin color, texture, turgor normal, no rashes or lesions   Lymph nodes:   Cervical, supraclavicular, and axillary nodes normal   Neurologic:   CNII-XII intact, normal strength, sensation and reflexes     throughout           Data Review  CBC:   Lab Results   Component Value Date    WBC 9.6 09/05/2021    RBC 3.68 09/05/2021     BMP:   Lab Results   Component Value Date    GLUCOSE 291 09/05/2021    CO2 27 09/05/2021    BUN 23 09/05/2021    CREATININE 1.5 09/05/2021    CALCIUM 8.7 09/05/2021     ABG:   Lab Results   Component Value Date    HCI9YIF 24.4 07/16/2021    BEART 2.1 11/01/2011    TUI7XCB 32.0 07/16/2021    PO2ART 80 07/16/2021       Assessment:     Active Problems:    S/P ablation of atrial fibrillation    History of CVA (cerebrovascular accident) without residual deficits    Morbid obesity (HCC)    DM (diabetes mellitus), type 2, uncontrolled (HCC)    CAD (coronary artery disease)    History of MI (myocardial infarction)    Hypertension    TIA (transient ischemic attack)    Hyperlipidemia    Atrial thrombus without antecedent myocardial infarction    Acute cystitis    Paroxysmal atrial fibrillation (HCC)    HFrEF (heart failure with reduced ejection fraction) ([facility]  Resolved Problems:    * No resolved hospital problems.  *      Plan:     Currently in sinus rhythm on tele with rate controlled, management per cardiology  Toprol XL 25 mg and Lisinopril 2.5 mg. Home metoprolol stopped  Eliquis 5 mg BID  Plan for [facility] on Tuesday  EP consult  Urine culture positive for E. Coli Sensitive to Cephalosporins.   Continue IV Rocephin for now    Lakisha Gonzalez MD FACP  9/6/2021

## 2021-09-07 NOTE — CARE COORDINATION
Chart reviewed. Noted that pt and daughter have requested [facility] or [facility]. CM met with pt at this time to verify. Pt is agreeable to either facility. Referral made to [facility] Admissions via confidential VM. Pt has [employer] and will not require a pre-cert, d/t all managed Medicaid plans are waiving pre-cert's at this time. PT/OT was ordered. Requested PT/OT to see pt today. Will await a return call from [facility] on decision to accept pt or not.   TE

## 2021-09-07 NOTE — CONSULTS
Electrophysiology Consult Note      Reason for consultation:  Atrial fibrillation    Chief complaint :  Weakness    Referring physician:       Primary care physician: Melba Gerber PA-C      History of Present Illness:     Patient is a 67-year-old female with history of PAF s/p ablation in 2017, carotid artery disease, carotid aneurysm, diabetes mellitus type 2, hypertension, hyperlipidemia cardiomyopathy presented to the hospital with weakness and fatigue. Patient states that she was recently discharged from rehabilitation facility where she had been since August 11, 2021. Patient reports that she was only home for a few days when she states that she had 2 falls. She states that the falls were mechanical and due to weakness. She denies lightheadedness or dizziness prior to the falls. When she arrived to the emergency room she was found to be in atrial fibrillation. Patient had an A. fib ablation in 2017. She tells me she drinks caffeine now. Denies alcohol    During last hospital admission patient had bradycardia. At the time AV node blocking agents and amiodarone were held. At discharge she was started on metoprolol 50 mg twice daily and Eliquis 5 mg twice daily was continued. This admission she was started on amiodarone drip and converted to sinus rhythm. She is on oral amiodarone at this time. She had an echo on July 16, 2021 with an EF of 15 to 20% which was down from the previous June 12, 2020 when EF was 30%. On admission patient was hypokalemic. Patient also had hypomagnesemia    Patient denies any chest pain, palpitations, lightheadedness, edema or syncope.   She feels better when she is in rhythm      Pastmedical history:   Past Medical History:   Diagnosis Date    Abnormal PFTs (pulmonary function tests) 04/2014 4/14    Atrial fibrillation (HCC)     CAD (coronary artery disease)     Carotid aneurysm, right ([facility] [address] 11/09/2020    s/p fall. referred [facility] by Dr Yuliana Starr office    CHF [facility]congestive heart failure) ([facility] [address]     Diabetes mellitus ([facility] [address]     Dizziness - light-headed     Family history of coronary artery disease     H/O 24 hour EKG monitoring 02/09/2012,12/10/2010    02/09/2012 predominant rhythm is sinus with short runs of SVT no episode of atrial fib. pt has left bundle branch morphology, max heart is 132, min is 48 with an average of 74 ,infrequent PVCs are seen    H/O cardiovascular stress test 4/1/2013, 1/31/2012 4/1/2013-    H/O cardiovascular stress test 4/16/14,02/09/2012 4/16/14 EF 52% No ischemia, normal study. 02/09/2012 EF 58% [employer] LVEF is normal globally    H/O echocardiogram 03/27/2013 4/14 EF 50-55% mild MR, TR 3/13EF 35-45% decreasedr L ventrical systolic function.  H/O echocardiogram 04/2014 4/14-EF50-55% Mild MR/TR. 10/11 EF > 55% LVS function is normal, calcified mitral apparatus, impaired LV relaxation, no PE    Heart imaging 02/09/2012 02/09/2012 MUGA rest EF 58%LVEF is normal globally    Heartburn     History of cardiac cath 05/08/2009, 06/14/2006 05/08/2009,moderate stenosis  small diagonal    History of cardiac monitoring 02/09/2015    Event - sinus rhythm    History of cardioversion 03/14/2014    History of MI (myocardial infarction)     History of nuclear MUGA 12/03/2020    EF 41%    History of nuclear Muga stress test 07/14/2020    EF 41%    History of PTCA 10/06/2010    10/06/2010 stent 3.0x24 taxus stent left main 90% stenosis mid seg reduced to 0% second area of [id number] to 40% not flow limiting EF 40%    Hx of cardiovascular stress test 08/21/2015    lexiscan-normal,EF70%    Hx of transesophageal echocardiography ([facility]KE) for monitoring 08/07/2017    Noted to have MARY GRACE Blood Clot.  Hyperlipidemia     Hypertension     Insomnia     SOB (shortness of breath)     Tachycardia 01/30/2012    report in epic notes-Dr Nunn hosp. consult    TIA (transient ischemic attack)        Surgical history :   Past Surgical History:   Procedure Laterality Date    ABLATION OF DYSRHYTHMIC FOCUS       SECTION      CHOLECYSTECTOMY      DIAGNOSTIC CARDIAC CATH LAB PROCEDURE  2006, 2009 moderate stenosis small diagonal    IR NONTUNNELED VASCULAR CATHETER  2021    IR NONTUNNELED VASCULAR CATHETER 2021 [facility] SPECIAL PROCEDURES    OTHER SURGICAL HISTORY  2017    afib ablation LUKE    PTCA  10/06/2010    10/06/2010 stent 3.0x24 taxus stent 90% stenosis mid segment reduced to 0% second area of stenosis 30-40% not flow limiting EF 40%    TUBAL LIGATION         Family history:   Family History   Problem Relation Age of Onset    Diabetes Father     Heart Disease Father        Social history :  reports that she quit smoking about 14 years ago. Her smoking use included cigarettes. She has a 60.00 pack-year smoking history. She has never used smokeless tobacco. She reports that she does not drink alcohol and does not use drugs. Allergies   Allergen Reactions    Amiodarone Other (See Comments)     dizziness    Iv Dye [Iodides] Nausea And Vomiting    Vicodin [Hydrocodone-Acetaminophen] Nausea And Vomiting       No current facility-administered medications on file prior to encounter. Current Outpatient Medications on File Prior to Encounter   Medication Sig Dispense Refill    chlorthalidone (HYGROTON) 25 MG tablet Take 0.5 tablets by mouth daily 30 tablet 3    busPIRone (BUSPAR) 10 MG tablet Take 1 tablet by mouth 3 times daily 90 tablet 0    LORazepam (ATIVAN) 0.5 MG tablet Take 1 tablet by mouth every 4 hours as needed for Anxiety for up to 30 days. 12 tablet 0    DULoxetine (CYMBALTA) 60 MG extended release capsule Take 1 capsule by mouth daily 30 capsule 3    insulin glargine (LANTUS) 100 UNIT/ML injection vial Inject 10 Units into the skin nightly Hold Lantus Insulin if H. S.blood glucose < 150,  and call  Jessie Botello for a lower dose 1 vial 3    losartan (COZAAR) 100 MG tablet Take 1 tablet by mouth daily 30 tablet 3    metoprolol tartrate (LOPRESSOR) 50 MG tablet Take 1 tablet by mouth 2 times daily 60 tablet 3    folic acid-pyridoxine-cyancobalamin (FOLTX) 1.13-25-2 MG TABS Take 1 tablet by mouth daily 30 tablet 0    SITagliptin (JANUVIA) 25 MG tablet Take 1 tablet by mouth daily 30 tablet 3    traZODone (DESYREL) 50 MG tablet Take 50 mg by mouth nightly      nitroGLYCERIN (NITROSTAT) 0.4 MG SL tablet up to max of 3 total doses. If no relief after 1 dose, call 911. 25 tablet 3    albuterol sulfate HFA (VENTOLIN HFA) 108 (90 Base) MCG/ACT inhaler Inhale 2 puffs into the lungs 4 times daily as needed for Wheezing 1 Inhaler 5    atorvastatin (LIPITOR) 40 MG tablet Take 40 mg by mouth nightly      pantoprazole (PROTONIX) 40 MG tablet Take 40 mg by mouth daily      levothyroxine (SYNTHROID) 50 MCG tablet Take 50 mcg by mouth Daily      apixaban (ELIQUIS) 5 MG TABS tablet Take 1 tablet by mouth 2 times daily 60 tablet 2    aspirin 81 MG EC tablet Take 81 mg by mouth daily      hydrOXYzine (VISTARIL) 25 MG capsule Take 1 capsule by mouth every 8 hours as needed for Itching 90 capsule 0    insulin lispro (HUMALOG) 100 UNIT/ML injection vial Inject 10 Units into the skin 3 times daily (with meals) Please give Humalog Insulin soon after the meal. HOLD Humalog If Pt. Eats < 50% of the meal. OR Pre meal blood glucose < 120. 1 vial 3       Review of Systems:   Review of Systems   Constitutional: Positive for fatigue. Negative for activity change, chills and fever. HENT: Negative for congestion, ear pain and tinnitus. Eyes: Negative for photophobia, pain and visual disturbance. Respiratory: Negative for cough, chest tightness, shortness of breath (with exertion) and wheezing. Cardiovascular: Negative for chest pain, palpitations and leg swelling.    Gastrointestinal: Negative for abdominal pain, blood in stool, constipation, diarrhea, nausea and vomiting. Endocrine: Negative for cold intolerance and heat intolerance. Genitourinary: Negative for dysuria, flank pain and hematuria. Musculoskeletal: Positive for arthralgias. Negative for back pain, myalgias and neck stiffness. Skin: Negative for color change and rash. Allergic/Immunologic: Negative for food allergies. Neurological: Negative for dizziness, light-headedness, numbness and headaches. Hematological: Does not bruise/bleed easily. Psychiatric/Behavioral: Negative for agitation, behavioral problems and confusion. Examination:      Vitals:    09/07/21 0500 09/07/21 0815 09/07/21 0820 09/07/21 1230   BP: (!) 173/67 (!) 161/92  (!) 159/75   Pulse: 81 97 97 87   Resp: 21 22 21   Temp: 98.3 °F (36.8 °C) 98.3 °F (36.8 °C)  98.4 °F (36.9 °C)   TempSrc: Oral Oral  Oral   SpO2: 93% 97%  97%   Weight:       Height:            Body mass index is 30.34 kg/m². Physical Exam  Constitutional:       General: She is not in acute distress. Appearance: She is not ill-appearing or diaphoretic. HENT:      Head: Normocephalic and atraumatic. Right Ear: External ear normal.      Left Ear: External ear normal.      Nose: No congestion. Mouth/Throat:      Mouth: Mucous membranes are moist.   Eyes:      Extraocular Movements: Extraocular movements intact. Conjunctiva/sclera: Conjunctivae normal.      Pupils: Pupils are equal, round, and reactive to light. Cardiovascular:      Rate and Rhythm: Normal rate and regular rhythm. Heart sounds: Murmur (grade 2/6 systolic murmur) heard. Pulmonary:      Effort: Pulmonary effort is normal. No respiratory distress. Breath sounds: Normal breath sounds. No wheezing or rhonchi. Abdominal:      General: Abdomen is flat. Bowel sounds are normal. There is no distension. Palpations: Abdomen is soft. Tenderness: There is no abdominal tenderness.    Musculoskeletal: General: No tenderness. Cervical back: Normal range of motion. No tenderness. Right lower leg: No edema. Left lower leg: No edema. Skin:     General: Skin is warm. Neurological:      General: No focal deficit present. Mental Status: She is alert and oriented to person, place, and time. Cranial Nerves: No cranial nerve deficit. Psychiatric:         Mood and Affect: Mood normal.             CBC:   Lab Results   Component Value Date    WBC 12.3 09/07/2021    HGB 11.2 09/07/2021    HCT 33.7 09/07/2021     09/07/2021     Lipids:  Lab Results   Component Value Date    CHOL 153 01/30/2012    TRIG 135 01/30/2012    HDL 59 (L) 01/30/2012    LDLDIRECT 88 01/30/2012     PT/INR:   Lab Results   Component Value Date    INR 1.17 07/15/2021        BMP:    Lab Results   Component Value Date     09/07/2021    K 4.1 09/07/2021     09/07/2021    CO2 26 09/07/2021    BUN 20 09/07/2021     CMP:   Lab Results   Component Value Date    AST 15 09/07/2021    PROT 5.9 (L) 09/07/2021    BILITOT 0.5 09/07/2021    ALKPHOS 122 09/07/2021     TSH:  No results found for: TSH    EKGINTERPRETATION - EKG Interpretation:        IMPRESSION / RECOMMENDATIONS:     1. Atrial fibrillation with RVR  paroxysmal  2. Severe left ventricular systolic dysfunction  3. LBBB  4. htn  5. DM-2  6. UTI     Patient has severe left ventricular systolic dysfunction. Patient is not on any ARB or ACE at this point. Blood pressures are elevated recommend to start her on afterload reduction agents with like Entresto if not contraindicated. Uptitrate metoprolol XL as tolerated. Patient needs to be on goal-directed medical therapy for at least 3 months before considering BiV ICD. Patient has left bundle branch block at baseline. Patient has a history of atrial fibrillation ablation in 2017 and patient had maintained sinus rhythm until recently.   Patient also has been drinking caffeine which we discussed with the patient avoid caffeine. Also with LVEF being reduced that could be another reason why she is having atrial fibrillation too. Infection could have triggered atrial fib too. Recommend to continue amiodarone 200 mg twice daily for 14 days and then 200 mg daily after that    We will continue to follow along      Thanks again for allowing me to participate in care of this patient. Please call me if you have any questions. With best regards. Ovidio Koenig MD, 9/7/2021 1:57 PM     Please note this report has been partially produced using speech recognition software and may contain errors related to that system including errors in grammar, punctuation, and spelling, as well as words and phrases that may be inappropriate. If there are any questions or concerns please feel free to contact the dictating provider for clarification.

## 2021-09-07 NOTE — PROGRESS NOTES
Cardiology Progress Note     Today's Plan: CPM    Admit Date:  9/3/2021    Consult reason/ Seen today for: A. fib    Subjective and  Overnight Events: Patient denies any chest pain, shortness of breath, dizziness, or palpitations. Telemetry-NSR  Oxygen-RA    Assessment / Plan / Recommendation:     1. A. fib RVR: H/O ablation of atrial fibrillation in 2017. Continue with amiodarone oral 400 mg BID x 7 days then 200 mg BID. ? Elevated QTc EKG obtained and QTc recalculation is 456. Continue with Toprol XL 25 mg. Replace magnesium and TSH level normal.   2. History of stroke with [id number] over 5, continue with Eliquis 5 mg BID. 3. History of multifocal atrial tachycardia  4. Left bundle branch block  5. History of moderate mitral regurgitation: Diuresis as needed  6. History of coronary disease s/p PCI -left heart cath in November 2020 showed multivessel CAD with no high-grade stenosis requiring PCI. Continue with statin, aspirin, Toprol  7. History of cardiomyopathy ? Ischemic versus tachycardia induced. Last LVEF is around 20%. Patient is tolerating Toprol and lisinopril. Does not appear to be in overload. 8. Essential hypertension: Toprol-XL and lisinopril   9. Hyperlipidemia: continue with Lipitor 40 mg daily  10. Acute cystitis: On antibiotics per primary team         History of Presenting Illness:    Chief complain on admission : 60 y. o.year old who is admitted for  Chief Complaint   Patient presents with    Fatigue     recently released from nursing home, unable to take care of self at home w/ family.         Past medical history:    has a past medical history of Abnormal PFTs (pulmonary function tests), Atrial fibrillation ([address] CAD (coronary artery disease), Carotid aneurysm, right ([address] CHF (congestive heart failure) ([address] Diabetes mellitus ([address] Dizziness - light-headed, Family history of coronary artery disease, H/O 24 hour EKG monitoring, H/O cardiovascular stress test, H/O cardiovascular stress test, H/O echocardiogram, H/O echocardiogram, Heart imaging, Heartburn, History of cardiac cath, History of cardiac monitoring, History of cardioversion, History of MI (myocardial infarction), History of nuclear MUGA, History of nuclear Muga stress test, History of PTCA, Hx of cardiovascular stress test, Hx of transesophageal echocardiography ([facility]) for monitoring, Hyperlipidemia, Hypertension, Insomnia, SOB (shortness of breath), Tachycardia, and TIA (transient ischemic attack). Past surgical history:   has a past surgical history that includes  section; Tubal ligation; Cholecystectomy; Diagnostic Cardiac Cath Lab Procedure (2006, 2009); Percutaneous Transluminal Coronary Angio (10/06/2010); other surgical history (2017); ablation of dysrhythmic focus; and IR NONTUNNELED VASCULAR CATHETER > 5 YEARS (2021). Social History:   reports that she quit smoking about 14 years ago. Her smoking use included cigarettes. She has a 60.00 pack-year smoking history. She has never used smokeless tobacco. She reports that she does not drink alcohol and does not use drugs. Family history:  family history includes Diabetes in her father; Heart Disease in her father. Allergies   Allergen Reactions    Amiodarone Other (See Comments)     dizziness    Iv Dye [Iodides] Nausea And Vomiting    Vicodin [Hydrocodone-Acetaminophen] Nausea And Vomiting       Review of Systems:  Review of Systems   Respiratory: Negative for shortness of breath. Cardiovascular: Negative for chest pain, palpitations and leg swelling. Musculoskeletal: Negative. Skin: Negative. Neurological: Negative for dizziness and weakness. All other systems reviewed and are negative.        BP (!) 161/92   Pulse 97   Temp 98.3 °F (36.8 °C) (Oral)   Resp 22   Ht 5' 0.98\" (1.549 m)   Wt 160 lb 8 oz (72.8 kg)   SpO2 97%   BMI 30.34 kg/m² No intake or output data in the 24 hours ending 09/07/21 1205    Physical Exam:  Physical Exam  Constitutional:       Appearance: She is well-developed. Cardiovascular:      Rate and Rhythm: Normal rate and regular rhythm. Pulses: Intact distal pulses. Dorsalis pedis pulses are 2+ on the right side and 2+ on the left side. Posterior tibial pulses are 2+ on the right side and 2+ on the left side. Heart sounds: Normal heart sounds, S1 normal and S2 normal.   Pulmonary:      Effort: Pulmonary effort is normal.      Breath sounds: Normal breath sounds. Musculoskeletal:         General: Normal range of motion. Skin:     General: Skin is warm and dry. Neurological:      Mental Status: She is alert and oriented to person, place, and time.           Medications:    insulin glargine  10 Units SubCUTAneous Nightly    amiodarone  400 mg Oral BID    [START ON 9/13/2021] amiodarone  200 mg Oral BID    apixaban  5 mg Oral BID    metoprolol succinate  25 mg Oral Daily    aspirin  81 mg Oral Daily    atorvastatin  40 mg Oral Nightly    lisinopril  2.5 mg Oral Daily    busPIRone  10 mg Oral TID    traZODone  50 mg Oral Nightly    potassium chloride  10 mEq Oral BID    sodium chloride flush  5-40 mL IntraVENous 2 times per day    insulin lispro  0-12 Units SubCUTAneous TID WC    insulin lispro  0-6 Units SubCUTAneous Nightly      dextrose      sodium chloride       LORazepam, glucose, dextrose, glucagon (rDNA), dextrose, sodium chloride flush, ondansetron **OR** ondansetron, polyethylene glycol, acetaminophen **OR** acetaminophen, sodium chloride    Lab Data:  CBC:   Recent Labs     09/05/21  1002 09/06/21  1657 09/07/21  0609   WBC 9.6 9.6 12.3*   HGB 11.4* 10.7* 11.2*   HCT 34.6* 32.3* 33.7*   MCV 94.0 94.4 93.4    228 228     BMP:   Recent Labs     09/05/21  1002 09/06/21  1657 09/07/21  0609    135 137   K 4.1 4.1 4.1    98* 100   CO2 27 28 26   BUN 23 22 20 CREATININE 1.5* 1.4* 1.2*     PT/INR: No results for input(s): PROTIME, INR in the last 72 hours. BNP:  No results for input(s): PROBNP in the last 72 hours. TROPONIN:   No results for input(s): TROPONINT in the last 72 hours. Impression:  Active Problems:    S/P ablation of atrial fibrillation    History of CVA (cerebrovascular accident) without residual deficits    Morbid obesity (HCC)    DM (diabetes mellitus), type 2, uncontrolled (HCC)    CAD (coronary artery disease)    History of MI (myocardial infarction)    Hypertension    TIA (transient ischemic attack)    Hyperlipidemia    Atrial thrombus without antecedent myocardial infarction    Acute cystitis    Paroxysmal atrial fibrillation (HCC)    HFrEF (heart failure with reduced ejection fraction) ([facility] [address]  Resolved Problems:    * No resolved hospital problems. *       All labs, medications and tests reviewed by myself, continue all other medications of all above medical condition listed as is except for changes mentioned above. Thank you   Please call with questions. Electronically signed by Fareed Odom. RENE Spain CNP on 9/7/2021 at 12:05 PM         [facility]    I have seen, spoken to and examined this patient personally, independently of the nurse practitioner. I have reviewed the hospital care given to date and reviewed all pertinent labs and imaging. The plan was developed mutually at the time of the visit with the patient,  NP   and myself. I have spoken with patient, nursing staff and provided written and verbal instructions . The above note has been reviewed and I agree with the assessment, diagnosis, and treatment plan with changes made by me as follows       HPI:  I have reviewed the above HPI  And agree with above   Please review addendum/changes made to note above     Interval history:            Physical Exam:  General:  Awake, alert, NAD  Head:normal  Eye:normal  Neck:  No JVD   Chest:  Clear to auscultation, respiration easy  Cardiovascular:  s1s2  Abdomen:   nontender  Extremities:  + edema  Pulses; palpable  Neuro: grossly normal      MEDICAL DECISION MAKING;    I agree with the above plan, which was planned by myself and discussed with NP. Appreciate EP consultation  Continue with beta-blocker.   Patient started on Entresto  EP to follow, please call us with any questions      Dr. Raza Cage MD

## 2021-09-07 NOTE — FLOWSHEET NOTE
09/07/21 1239   Encounter Summary   Services provided to: Patient not available   Referral/Consult From: [address] Visiting Yes  (for grief support)   Complexity of Encounter Low   Length of Encounter 15 minutes   Spiritual Assessment Completed Yes   Routine   Type Initial   Assessment Grieving  (recent lost of  and gandson)   Intervention Active listening;Explored coping resources; Explored feelings, thoughts, concerns;[place];Sustaining presence/ Ministry of presence;Empowerment   Outcome Grieving;Encouraged   Spiritual/Jehovah's witness   Type Spiritual support

## 2021-09-07 NOTE — PROGRESS NOTES
Occupational [address] [place] [address] ACUTE CARE OCCUPATIONAL THERAPY EVALUATION    James Ospina, 1955, 3101/3101-A, 9/7/2021    Discharge Recommendation: James [facility] (Pt is most appropriate for transition to assisted living after rehab stay)      History:  [place]:  The primary encounter diagnosis was Acute cystitis without hematuria. Diagnoses of Generalized weakness, Leukocytosis, unspecified type, and Hypoglycemia were also pertinent to this visit. Subjective:  Patient states: \"I'm ready to get out of here. I'm so tired of the hospital!\"  Pain: Pt denied pain this date  Communication with other providers: PT Isidoro Cockayne  Restrictions: General Precautions, Fall Risk, Telemetry, Pulse Ox, BP cuff, Bed/chair alarm    Home Setup/Prior level of function:  Social/Functional History  Lives With: Alone (daughter checks on pt frequently, daughter works)  Type of Home: Apartment (Pt reports daughter is arranging for one-level APT for pt)  Home Layout: One level  Home Access: Level entry  Bathroom Shower/Tub:  (Pt reports bathroom is handicapped)  Home Equipment: [address] [facility] [employer], Alert [employer] Help From: Family  ADL Assistance: Independent (performs showering with Supervision, able to dress/toilet independently)  Homemaking Assistance: Needs assistance  Homemaking Responsibilities: No  Ambulation Assistance: Independent (mod I with RW)  Transfer Assistance: Independent  Active : No  Occupation: Retired   Additional Comments: Pt has recently been at [facility] for rehab after a lengthy hospital admission. Pt was only home for a few days prior to current admission. Pt reports multiple falls in the few days she was home. Examination:  · Observation: Supine in bed upon arrival. Pleasant and agreeable to evaluation.   · Vision: WFL (Glasses)  · Hearing: [facility]  · Vitals: Stable vitals throughout session    Body Systems and functions:  · ROM: WFL all joints in BL UEs  · Strength: 4/5 MMT all major muscle groups BL UEs  · Sensation: WFL (denies numbness/tingling)  · Tone: Normal  · Coordination: WFL for ADLs  · Perception: WNL    Activities of Daily Living (ADLs):  · Feeding: Independent   · Grooming: CGA (able to complete in standing at sink)  · UB bathing: SBA   · LB bathing: CGA (light dynamic standing balance with reaching bottom)  · UB dressing: SBA (donning clean robe seated EOB)  · LB dressing: CGA (light dynamic standing balance with mgmt of clothing to hips, able to don BL socks seated EOB SBA by crossing legs into \"figure 4 position\")  · Toileting: CGA    Cognitive and Psychosocial Functioning:  · Overall cognitive status: WFL (grossly; some decreased overall insight/safety awareness)  · Affect: Normal     Balance:   · Sitting: SBA in unsupported sitting EOB  · Standing: CGA with RW    Functional Mobility:  · Bed Mobility: SBA supine to sitting EOB (HOB elevated to 30', increased time, utilized bed features)  · Transfers: CGA to/from bed and recliner (min cues for safe hand placement each direction)  · Ambulation: CGA with RW ~200 ft; decreased speed, short/shuffling steps at times (See PT note for full gait assessment)      AM-PAC 6 click short form for inpatient daily activity:   How much help from another person does the patient currently need. .. Unable  Dep A Lot  Max A A Lot   Mod A A Little  Min A A Little   CGA  SBA None   Mod I  Indep  Sup   1. Putting on and taking off regular lower body clothing? [] 1    [] 2   [] 2   [] 3   [x] 3   [] 4      2. Bathing (including washing, rinsing, drying)? [] 1   [] 2   [] 2 [] 3 [x] 3 [] 4   3. Toileting, which includes using toilet, bedpan, or urinal? [] 1    [] 2   [] 2   [] 3   [x] 3   [] 4     4. Putting on and taking off regular upper body clothing? [] 1   [] 2   [] 2   [] 3   [x] 3    [] 4      5. Taking care of personal grooming such as brushing teeth? [] 1   [] 2    [] 2 [] 3    [x] 3   [] 4      6.  Eating meals?   [] 1   [] 2   [] 2   [] 3   [] 3   [x] 4      Raw Score:  19     [24=0% impaired(CH), 23=1-19%(CI), 20-22=20-39%(CJ), 15-19=40-59%(CK), 10-14=60-79%(CL), 7-9=80-99%(CM), 6=100%(CN)]     Treatment:  Therapeutic Activity Training:   Therapeutic activity training was instructed today. Cues were given for safety, sequence, UE/LE placement, awareness, and balance. Activities performed today included bed mobility training, UB/LB dressing tasks, safe transfer training, household mobility with RW, education on role of OT, POC, importance of OOB activity, d/c planning and recommendations      Safety Measures: Gait belt used, Left in Chair, Alarm in place    Assessment:  Pt is a 60year old female with a past medical history of Abnormal PFTs (pulmonary function tests), Atrial fibrillation ([address] CAD (coronary artery disease), Carotid aneurysm, right ([address] CHF (congestive heart failure) ([address] Diabetes mellitus ([address] Dizziness - light-headed, Family history of coronary artery disease, H/O 24 hour EKG monitoring, H/O cardiovascular stress test, H/O cardiovascular stress test, H/O echocardiogram, H/O echocardiogram, Heart imaging, Heartburn, History of cardiac cath, History of cardiac monitoring, History of cardioversion, History of MI (myocardial infarction), History of nuclear MUGA, History of nuclear Muga stress test, History of PTCA, Hx of cardiovascular stress test, Hx of transesophageal echocardiography (LUKE) for monitoring, Hyperlipidemia, Hypertension, Insomnia, SOB (shortness of breath), Tachycardia, and TIA (transient ischemic attack). Pt admitted with generalized weakness, multiple falls, and acute cystitis. Pt has recently been in [facility] for rehab after a lengthy hospital admission with DKA. Pt was at home for a few days prior to current admission. Pt's baseline level of functioning prior to recent hospital admission is as above.  Pt currently presents with the above impairments, and would benefit from continued OT services in SNF at discharge. Pt is most appropriate for transition to assisted living after rehab stay. Complexity: Moderate  Prognosis: Good  Plan: 2+x/week      Goals:  1. Pt will complete all aspects of bed mobility for EOB/OOB ADLs mod I with HOB flat  2. Pt will complete UB/LB bathing with supervision  3. Pt will complete all aspects of LB dressing with supervision  4. Pt will complete all functional transfers to and from bed, chair, toilet, shower chair with supervision/good safety awareness  5. Pt will ambulate HH distance to bathroom for toileting with supervision using RW  6. Pt will complete all aspects of toileting task with supervision  7. Pt will complete oral hygiene/grooming routine in standing at sink with supervision  8.  Pt will complete ther ex/ther act with focus on UE strengthening, functional standing tolerance >10 minutes, dynamic standing balance for ADL/IADL tasks      Time:   Time in: 1018  Time out: 1038  Timed treatment minutes: 10  Total time: 20      Electronically signed by:    DELIO Lowe/L, [place], [id number]

## 2021-09-07 NOTE — PROGRESS NOTES
glycol, acetaminophen **OR** acetaminophen, sodium chloride    Assessment/Plan:   Patient Active Hospital Problem List:  Patient Active Problem List   Diagnosis    History of CVA (cerebrovascular accident) without residual deficits    Morbid obesity ([facility]    DM (diabetes mellitus), type 2, uncontrolled ([facility]    Persistent atrial fibrillation ([facility]    CAD (coronary artery disease)    CHF (congestive heart failure) ([facility]    Light headed    History of MI (myocardial infarction)    Diabetes mellitus ([facility]    Hypertension    TIA (transient ischemic attack)    Hyperlipidemia    SOB (shortness of breath)    H/O cardiovascular stress test    Family history of coronary artery disease    PAF (paroxysmal atrial fibrillation) ([facility]    Chest pain    Atrial thrombus without antecedent myocardial infarction    S/P ablation of atrial fibrillation    Gastroenteritis    YESIKA (acute kidney injury) ([facility]    Acute cystitis    Elevated liver enzymes    CHF (congestive heart failure), NYHA class I, acute on chronic, combined ([facility]    Pneumonia    Multifocal pneumonia    Atrial fibrillation with RVR ([facility])    Shoulder pain    Paroxysmal atrial fibrillation ([facility]    Diabetic hyperosmolar coma ([facility]    Difficult intravenous access    HFrEF (heart failure with reduced ejection fraction) ([facility])    Delirium    Bradycardia     Assessment:  A. fib RVR: Rate controlled, on amiodarone, Toprol, Eliquis. Plan for LUKE this a.m.  EP has been consulted. UTI: (+) E. coli growth. Continue IV Rocephin. HFrEF: EF 20% in the past, repeat LUKE today. DM2: SSI, POC glucose, hypoglycemia protocol. HTN/HLD: cpm   History of metabolic encephalopathy: oriented right now. Generalized weakness: may need PT/OT after procedure. Plan:  Plan for LUKE this AM.  Continue amiodarone, toprol, eliquis. EP has been consulted. Continue IV ceftriaxone. PT/OT consults. Monitor labs and patient condition.      Electronically signed by Nisa Goff PA-C on 9/7/2021 at 7:45 AM   I have independently evaluated and examined this patient today. I have reviewed radiologic and biochemical tests on this patient. Management Plan is developed mutually with APRIL Candelaria. I have reviewed above note and agree with assessment and plan. Discussed with patient in detail. Monitor condition. Reviewed consultant notes.

## 2021-09-07 NOTE — DISCHARGE INSTR - COC
disease) I25.10    CHF (congestive heart failure) ([facility] I50.9    Light headed R44    History of MI (myocardial infarction) I25.2    Diabetes mellitus ([facility] E11.9    Hypertension I10    TIA (transient ischemic attack) G45.9    Hyperlipidemia E78.5    SOB (shortness of breath) R06.02    H/O cardiovascular stress test Z92.89    Family history of coronary artery disease Z82.49    PAF (paroxysmal atrial fibrillation) ([facility]) I48.0    Chest pain R07.9    Atrial thrombus without antecedent myocardial infarction I51.3    S/P ablation of atrial fibrillation Z98.890, Z86.79    Gastroenteritis K52.9    YESIKA (acute kidney injury) ([facility] N17.9    Acute cystitis N30.00    Elevated liver enzymes R74.8    CHF (congestive heart failure), NYHA class I, acute on chronic, combined ([facility]) I50.43    Pneumonia J18.9    Multifocal pneumonia J18.9    Atrial fibrillation with RVR ([facility]) I48.91    Shoulder pain M25.519    Paroxysmal atrial fibrillation ([facility]) I48.0    Diabetic hyperosmolar coma ([facility]) E11.01    Difficult intravenous access Z78.9    HFrEF (heart failure with reduced ejection fraction) ([facility]) I50.20    Delirium R41.0    Bradycardia R00.1       Isolation/Infection:   Isolation          No Isolation        Patient Infection Status     Infection Onset Added Last Indicated Last Indicated By Review Planned Expiration Resolved Resolved By    None active    Resolved    COVID-19 Rule Out 12/11/20 12/11/20 12/11/20 COVID-19 (Ordered)   12/11/20 Rule-Out Test Resulted    COVID-19 Rule Out 11/27/20 11/27/20 11/27/20 COVID-19 (Ordered)   11/27/20 Rule-Out Test Resulted    ESBL (Extended Spectrum Beta Lactamase) 05/23/19 05/26/19 05/23/19 Urine Culture   06/12/20 Marco Antonio Spivey RN          Nurse Assessment:  Last Vital Signs: BP (!) 168/91   Pulse 81   Temp 97.6 °F (36.4 °C) (Oral)   Resp 12   Ht 5' 0.98\" (1.549 m)   Wt 159 lb 6.4 oz (72.3 kg)   SpO2 98%   BMI 30.13 kg/m²     Last documented pain score (0-10 Equipment (for information only, NOT a DME order):  {EQUIPMENT:[id number]}  Other Treatments: ***      Prognosis: Good    Condition at Discharge: Stable    Rehab Potential (if transferring to Rehab): Good    Recommended Labs or Other Treatments After Discharge: Follow up with cardiologist in couple weeks  Follow up with Dr. Olive Wilson in two weeks  Cbc, cmp once a week  Magnesium check once a week. Blood sugar check four times a day (Before each meal and bed time)    Physician Certification: I certify the above information and transfer of Sherryle Meeter  is necessary for the continuing treatment of the diagnosis listed and that she requires [facility] for greater 30 days.      Update Admission H&P: No change in H&P    PHYSICIAN SIGNATURE:  Electronically signed by Monique Valdez MD on 9/9/21 at 9:17 AM EDT

## 2021-09-07 NOTE — CONSULTS
[address] [facility], 1955, 3101/3101-A, 2021    History  [place]:  The primary encounter diagnosis was Acute cystitis without hematuria. Diagnoses of Generalized weakness, Leukocytosis, unspecified type, and Hypoglycemia were also pertinent to this visit. Patient  has a past medical history of Abnormal PFTs (pulmonary function tests), Atrial fibrillation ([address] CAD (coronary artery disease), Carotid aneurysm, right ([address] CHF (congestive heart failure) ([address] Diabetes mellitus ([address] Dizziness - light-headed, Family history of coronary artery disease, H/O 24 hour EKG monitoring, H/O cardiovascular stress test, H/O cardiovascular stress test, H/O echocardiogram, H/O echocardiogram, Heart imaging, Heartburn, History of cardiac cath, History of cardiac monitoring, History of cardioversion, History of MI (myocardial infarction), History of nuclear MUGA, History of nuclear Muga stress test, History of PTCA, Hx of cardiovascular stress test, Hx of transesophageal echocardiography (LUKE) for monitoring, Hyperlipidemia, Hypertension, Insomnia, SOB (shortness of breath), Tachycardia, and TIA (transient ischemic attack). Patient  has a past surgical history that includes  section; Tubal ligation; Cholecystectomy; Diagnostic Cardiac Cath Lab Procedure (2006, 2009); Percutaneous Transluminal Coronary Angio (10/06/2010); other surgical history (2017); ablation of dysrhythmic focus; and IR NONTUNNELED VASCULAR CATHETER > 5 YEARS (2021). Subjective:  Patient states:  \"I'm tired of being in the hospital\", \"My daughter is getting me an apartment on the 1st floor\". Pain:  No c/o.     Communication with other providers:  Handoff to RN, co-eval with Renny LEMUS  Restrictions: Fall risk, general precautions, Tele    Home Setup/Prior level of function  Social/Functional History  Lives With: Alone (daughter checks on pt frequently, daughter works.)  Type of Home: Apartment (Pt reports daughter is arranging for one-level APT for pt.)  Home Layout: One level  Home Access: Level entry  Bathroom Shower/Tub:  (Pt reports bathroom is handicapped)  Home Equipment: [employer] Help From: Family  ADL Assistance:  (performs showering with Supervision)  Homemaking Assistance: Needs assistance  Homemaking Responsibilities: No  Ambulation Assistance: Independent  Transfer Assistance: Independent  Active : No    Examination of body systems (includes body structures/functions, activity/participation limitations):  · Observation:  Pt is awake in supine upon arrival  · Vision:  Glasses for reading   · Hearing:  [place]  · Cardiopulmonary:  No supplemental 02 needs, h/o AFIB  · Cognition: A&O x3, decreased insight, increased recall time, h/o encephalpathy, see OT/SLP note for further evaluation. Musculoskeletal  · ROM R/L:  WFL BLE. · Strength R/L:  Generally 4/5 except Brennan hip flxors 3-/5, decreased in function and endurance. · Neuro:  H/o TIA   · Gait pattern: step-through BRENNAN with shortened step length, intermittent decreased stance time RLE, overall decreased saige with intermittent shuffling/decreased foot clearance. Mobility:  · Supine to sit:  SBA  · Transfers: CGA  · Sitting balance:  SBA. · Standing balance:  CGA-SBA. · Gait: CGA    AMPA 6 Clicks Inpatient Mobility:  AM-PAC Inpatient Mobility Raw Score : 18    Treatment:  Sup>sit: Pt is able to perform this transfer with min increased time and effort with use of bed rail for support, SBA. Pt able to perform scooting hips to EOB x2 trials with SBA . Sitting balance: Pt demonstrates decreased sitting balance during dynamic activity, LE AROM assessment pt with increased retro trunk lean requiring CGA and v/c for upright which pt able to implement, but requires frequent v/c for correction. Pt able to [facility] and perform donning of sock in figure-4 position with SBA. STS: Pt x1 STS from EOB to RW with pt tendency to place BUE on RW to pull which PT provides edu for. Pt with adequate strength and ROM to perform this transfer without assist. Return to sitting PT v/c's for reaching back and v/c for safe alignment with seat, fair control of descent. Standing balance: PT assesses standing unsupported balance: x15 seconds e/o, x10 sec e/c with close SBA, min sway no LOB. Gait: Pt AMB x120 ft with RW, close CGA and pt demonstratingstep-through KIARA with shortened step length, intermittent decreased stance time RLE, overall decreased saige with intermittent shuffling/decreased foot clearance. PT provides frequent v/c for increased step length and increased gait speed, pt able to make minor improvements. Safety: patient left in recliner with chair alarm and all needs, call light within reach, RN notified, gait belt used. Assessment:  Pt is a 61 y/o female presenting to ED 9/3 s/p falls at home and inability to care for self. This PT familiar with this pt d/t recent previous admission for hyperglycemia and encephalopathy. Per chart: \"Patient reports that she was discharged from [place] 1 week ago and since going home she has had difficulty getting around due to her generalized weakness. Patient reports that she has had 3 falls today because her legs get weak causing her to fall despite her using her walker with walking\" . Pt currently lives at home with daughter, daughter does work during the day. Pt demonstrates the following deficits: decreased proximal strength, decreased LE strength, decreased balance and safety awareness, h/o multiple falls, decreased endurance, gait impairments. At this time pt would benefit from continued skilled PT services to address deficits and promote safety. PT to recommend d/c to SNF for skilled rehab with transition to senior care d/t pt needs for supervision and support.      Complexity: Moderate  Prognosis: Good, no significant barriers to participation at this time. Plan Times per week: 3+/week, 1 week,   Discharge Recommendations: [address], 24 hour supervision or assist  Equipment: Defer    Goals:  Short term goals  Time Frame for Short term goals: 1 week  Short term goal 1: Pt will AMB x50 ft with RW, SBA, demonstrating improved step length and saige. Short term goal 2: Pt will perform STS transfers to/from commode with SBA for safety. Short term goal 3: Pt will participate in LE TherEx for strengthening x15 each.        Treatment plan:  Bed mobility, transfers, balance, gait, TA, TX,     Recommendations for NURSING mobility: AMB with RW to/from BR with CGA    Time:   Time in: 10:18  Time out: 10:41  Timed treatment minutes: 16  Total time: 23    Electronically signed by:    Bonita Wen PT  9/7/2021, 12:11 PM

## 2021-09-08 NOTE — PROGRESS NOTES
Patient ate lunch, lying in bed watching TV. She has stated several times that she is ready to go home. She is confused as to where she is at. 1400 vitals taken. Patient is resting in bed watching television.    [address]

## 2021-09-08 NOTE — CARE COORDINATION
Called [facility] intake, no answer. Referral made to [facility]. Informed him that a referral was made yesterday via confidential VM to central intake and no one ever returned call. Clinicals faxed to [facility]. He will review clinicals and will notify CM of decision to accept or not. CM informed him that pt is medically ready for d/c. Will not require a pre-cert.   TE

## 2021-09-08 NOTE — PROGRESS NOTES
INTERNAL MEDICINE PROGRESS NOTE        [facility]   1955   Primary Care Physician:  Destini Gonzales PA-C  Admit Date: 9/3/2021     Subjective:   Patient awake, doing okay. Blood sugars are high, no new issues otherwise. No chest pain or palpitations. Objective:   BP (!) 159/75   Pulse 74   Temp 97.7 °F (36.5 °C) (Oral)   Resp 19   Ht 5' 0.98\" (1.549 m)   Wt 159 lb 6.4 oz (72.3 kg)   SpO2 96%   BMI 30.13 kg/m²    General appearance: alert, appears stated age and cooperative  Head: Normocephalic, without obvious abnormality, atraumatic  Neck: no adenopathy and supple, symmetrical, trachea midline  Lungs: clear to auscultation anteriorly  Heart:  S1, S2  Abdomen: soft, non-tender; bowel sounds armida  Extremities: no clubbing, cyanosis with trace edema  Neurologic: No new focal deficits. Oriented x4.     Data Review  Lab Results   Component Value Date     09/08/2021    K 3.9 09/08/2021     09/08/2021    CO2 27 09/08/2021    CREATININE 1.1 09/08/2021    BUN 18 09/08/2021    CALCIUM 9.5 09/08/2021     Lab Results   Component Value Date    WBC 13.2 (H) 09/08/2021    HGB 11.7 (L) 09/08/2021    HCT 34.4 (L) 09/08/2021    MCV 92.0 09/08/2021     09/08/2021     INR/Prothrombin Time      Meds:    insulin glargine  30 Units SubCUTAneous Nightly    [START ON 9/9/2021] metoprolol succinate  50 mg Oral Daily    magnesium oxide  400 mg Oral Daily    sacubitril-valsartan  1 tablet Oral BID    amiodarone  400 mg Oral BID    [START ON 9/13/2021] amiodarone  200 mg Oral BID    apixaban  5 mg Oral BID    aspirin  81 mg Oral Daily    atorvastatin  40 mg Oral Nightly    busPIRone  10 mg Oral TID    traZODone  50 mg Oral Nightly    potassium chloride  10 mEq Oral BID    sodium chloride flush  5-40 mL IntraVENous 2 times per day    insulin lispro  0-12 Units SubCUTAneous TID WC    insulin lispro  0-6 Units SubCUTAneous Nightly     PRN Meds: LORazepam, glucose, dextrose, glucagon

## 2021-09-08 NOTE — PROGRESS NOTES
Admit Date:  9/3/2021    Admission diagnosis / Complaint: atrial fibrillation       Subjective:  Ms. Chace Huntley is resting in bed. Denies cardiac complaints. Patient remains in sinus rhythm    Objective:   BP (!) 141/96   Pulse 115   Temp 98.4 °F (36.9 °C) (Oral)   Resp 22   Ht 5' 0.98\" (1.549 m)   Wt 159 lb 6.4 oz (72.3 kg)   SpO2 92%   BMI 30.13 kg/m²   No intake or output data in the 24 hours ending 21 8718    TELEMETRY: Sinus    has a past medical history of Abnormal PFTs (pulmonary function tests), Atrial fibrillation (HCC), CAD (coronary artery disease), Carotid aneurysm, right ([address] CHF (congestive heart failure) ([facility] [address] Diabetes mellitus ([facility] [address] Dizziness - light-headed, Family history of coronary artery disease, H/O 24 hour EKG monitoring, H/O cardiovascular stress test, H/O cardiovascular stress test, H/O echocardiogram, H/O echocardiogram, Heart imaging, Heartburn, History of cardiac cath, History of cardiac monitoring, History of cardioversion, History of MI (myocardial infarction), History of nuclear MUGA, History of nuclear Muga stress test, History of PTCA, Hx of cardiovascular stress test, Hx of transesophageal echocardiography (LUKE) for monitoring, Hyperlipidemia, Hypertension, Insomnia, SOB (shortness of breath), Tachycardia, and TIA (transient ischemic attack). has a past surgical history that includes  section; Tubal ligation; Cholecystectomy; Diagnostic Cardiac Cath Lab Procedure (2006, 2009); Percutaneous Transluminal Coronary Angio (10/06/2010); other surgical history (2017); ablation of dysrhythmic focus; and IR NONTUNNELED VASCULAR CATHETER > 5 YEARS (2021).   Physical Exam:  General:  Awake, alert, NAD  Skin:  Warm and dry  Neck:  JVD not appreciated  Chest:  Clear to auscultation, respiration easy  Cardiovascular:  RRR S1S2, grade 2/6 systolic murmur  Abdomen:  Soft nontender  Extremities:  no edema    Medications:    insulin glargine  10 Units SubCUTAneous Nightly    magnesium oxide  400 mg Oral Daily    sacubitril-valsartan  1 tablet Oral BID    amiodarone  400 mg Oral BID    [START ON 9/13/2021] amiodarone  200 mg Oral BID    apixaban  5 mg Oral BID    metoprolol succinate  25 mg Oral Daily    aspirin  81 mg Oral Daily    atorvastatin  40 mg Oral Nightly    busPIRone  10 mg Oral TID    traZODone  50 mg Oral Nightly    potassium chloride  10 mEq Oral BID    sodium chloride flush  5-40 mL IntraVENous 2 times per day    insulin lispro  0-12 Units SubCUTAneous TID WC    insulin lispro  0-6 Units SubCUTAneous Nightly      dextrose      sodium chloride       LORazepam, glucose, dextrose, glucagon (rDNA), dextrose, sodium chloride flush, ondansetron **OR** ondansetron, polyethylene glycol, acetaminophen **OR** acetaminophen, sodium chloride    Lab Data:  CBC:   Recent Labs     09/06/21 1657 09/07/21  0609 09/08/21  0233   WBC 9.6 12.3* 13.2*   HGB 10.7* 11.2* 11.7*   HCT 32.3* 33.7* 34.4*   MCV 94.4 93.4 92.0    228 243     BMP:   Recent Labs     09/06/21 1657 09/07/21  0609 09/08/21  0233    137 138   K 4.1 4.1 3.9   CL 98* 100 100   CO2 28 26 27   BUN 22 20 18   CREATININE 1.4* 1.2* 1.1     LIVER PROFILE:   Recent Labs     09/06/21 1657 09/07/21  0609 09/08/21  0233   AST 16 15 16   ALT 11 12 12   BILITOT 0.4 0.5 0.6   ALKPHOS 111 122 126         Assessment and Plan:    Paroxysmal Atrial Fibrillation with RVR  Severe left ventricular systolic dysfunction  LBBB  HTN  DM type 2  UTI    Heart rate and rhythm remains controlled  Continue amiodarone 200 mg BID for 14 days, then 200 mg daily  Continue anticoagulation if no contraindication    Patient started on toprol xl and entresto. HR and BP not at goal. Recommend to uptitrate as HR and BP allow. Patient will need to be on goal directed medical therapy for at least 3 months before considering BIVICD. Patient does have left bundle branch block at baseline.      Reinforced to stop caffeine as drinking caffeine can contribute to reconnection of short circuits  Also patient having reduced LVEF could be another reason why patient is having atrial fibrillation also  Patient voided understanding    Recommend to keep K 4.0-4.5 and Mag 2.0-2.2      RENE Hamilton - CNP, 9/8/2021 9:52 AM     Please note this report has been partially produced using speech recognition software and may contain errors related to that system including errors in grammar, punctuation, and spelling, as well as words and phrases that may be inappropriate. If there are any questions or concerns please feel free to contact the dictating provider for clarification.

## 2021-09-08 NOTE — PROGRESS NOTES
Cardiology Progress Note     Today's Plan: Will sign off     Admit Date:  9/3/2021    Consult reason/ Seen today for: A. fib    Subjective and  Overnight Events: Patient denies any chest pain, shortness of breath, dizziness, or palpitations. Telemetry-NSR  Oxygen-RA    Assessment / Plan / Recommendation:     1. A. fib RVR: H/O ablation of atrial fibrillation in 2017. Continue with amiodarone oral 400 mg BID x 7 days then 200 mg BID. ? Elevated QTc EKG obtained and QTc recalculation is 456. Continue with Toprol XL 25 mg. Replace magnesium and TSH level normal.   2. History of stroke with [id number] over 5, continue with Eliquis 5 mg BID. 3. History of multifocal atrial tachycardia  4. Left bundle branch block  5. History of moderate mitral regurgitation: Diuresis as needed  6. History of coronary disease s/p PCI -left heart cath in November 2020 showed multivessel CAD with no high-grade stenosis requiring PCI. Continue with statin, aspirin, Toprol  7. History of cardiomyopathy ? Ischemic versus tachycardia induced. Last LVEF is around 20%. Patient is tolerating Toprol and lisinopril changed to [facility] per EP. Does not appear to be in overload. Follow up with EP as outpatient. 8. Essential hypertension: Toprol-XL and Entresto  9. Hyperlipidemia: continue with Lipitor 40 mg daily  10. Acute cystitis: On antibiotics per primary team  11. We will sign off, please reconsult for any new cardiac complaints concerns.         History of Presenting Illness:    Chief complain on admission : 60 y. o.year old who is admitted for  Chief Complaint   Patient presents with    Fatigue     recently released from nursing home, unable to take care of self at home w/ family.         Past medical history:    has a past medical history of Abnormal PFTs (pulmonary function tests), Atrial fibrillation ([address] CAD (coronary artery disease), Carotid aneurysm, right ([facility] [address] CHF (congestive heart failure) ([facility] [address] Diabetes mellitus ([facility] [address] Dizziness - light-headed, Family history of coronary artery disease, H/O 24 hour EKG monitoring, H/O cardiovascular stress test, H/O cardiovascular stress test, H/O echocardiogram, H/O echocardiogram, Heart imaging, Heartburn, History of cardiac cath, History of cardiac monitoring, History of cardioversion, History of MI (myocardial infarction), History of nuclear MUGA, History of nuclear Muga stress test, History of PTCA, Hx of cardiovascular stress test, Hx of transesophageal echocardiography (LUKE) for monitoring, Hyperlipidemia, Hypertension, Insomnia, SOB (shortness of breath), Tachycardia, and TIA (transient ischemic attack). Past surgical history:   has a past surgical history that includes  section; Tubal ligation; Cholecystectomy; Diagnostic Cardiac Cath Lab Procedure (2006, 2009); Percutaneous Transluminal Coronary Angio (10/06/2010); other surgical history (2017); ablation of dysrhythmic focus; and IR NONTUNNELED VASCULAR CATHETER > 5 YEARS (2021). Social History:   reports that she quit smoking about 14 years ago. Her smoking use included cigarettes. She has a 60.00 pack-year smoking history. She has never used smokeless tobacco. She reports that she does not drink alcohol and does not use drugs. Family history:  family history includes Diabetes in her father; Heart Disease in her father. Allergies   Allergen Reactions    Amiodarone Other (See Comments)     dizziness    Iv Dye [Iodides] Nausea And Vomiting    Vicodin [Hydrocodone-Acetaminophen] Nausea And Vomiting       Review of Systems:  Review of Systems   Respiratory: Negative for shortness of breath. Cardiovascular: Negative for chest pain, palpitations and leg swelling. Musculoskeletal: Negative. Skin: Negative. Neurological: Negative for dizziness and weakness. All other systems reviewed and are negative. BP (!) 141/96   Pulse 115   Temp 98.4 °F (36.9 °C) (Oral)   Resp 22   Ht 5' 0.98\" (1.549 m)   Wt 159 lb 6.4 oz (72.3 kg)   SpO2 92%   BMI 30.13 kg/m²     No intake or output data in the 24 hours ending 09/08/21 1148    Physical Exam:  Physical Exam  Constitutional:       Appearance: She is well-developed. Cardiovascular:      Rate and Rhythm: Normal rate and regular rhythm. Pulses: Intact distal pulses. Dorsalis pedis pulses are 2+ on the right side and 2+ on the left side. Posterior tibial pulses are 2+ on the right side and 2+ on the left side. Heart sounds: Normal heart sounds, S1 normal and S2 normal.   Pulmonary:      Effort: Pulmonary effort is normal.      Breath sounds: Normal breath sounds. Musculoskeletal:         General: Normal range of motion. Skin:     General: Skin is warm and dry. Neurological:      Mental Status: She is alert and oriented to person, place, and time.           Medications:    insulin glargine  10 Units SubCUTAneous Nightly    magnesium oxide  400 mg Oral Daily    sacubitril-valsartan  1 tablet Oral BID    amiodarone  400 mg Oral BID    [START ON 9/13/2021] amiodarone  200 mg Oral BID    apixaban  5 mg Oral BID    metoprolol succinate  25 mg Oral Daily    aspirin  81 mg Oral Daily    atorvastatin  40 mg Oral Nightly    busPIRone  10 mg Oral TID    traZODone  50 mg Oral Nightly    potassium chloride  10 mEq Oral BID    sodium chloride flush  5-40 mL IntraVENous 2 times per day    insulin lispro  0-12 Units SubCUTAneous TID WC    insulin lispro  0-6 Units SubCUTAneous Nightly      dextrose      sodium chloride       LORazepam, glucose, dextrose, glucagon (rDNA), dextrose, sodium chloride flush, ondansetron **OR** ondansetron, polyethylene glycol, acetaminophen **OR** acetaminophen, sodium chloride    Lab Data:  CBC:   Recent Labs     09/06/21  1657 09/07/21  0609 09/08/21  0233   WBC 9.6 12.3* 13.2*   HGB 10.7* 11.2* 11.7* HCT 32.3* 33.7* 34.4*   MCV 94.4 93.4 92.0    228 243     BMP:   Recent Labs     09/06/21  1657 09/07/21  0609 09/08/21  0233    137 138   K 4.1 4.1 3.9   CL 98* 100 100   CO2 28 26 27   BUN 22 20 18   CREATININE 1.4* 1.2* 1.1     PT/INR: No results for input(s): PROTIME, INR in the last 72 hours. BNP:  No results for input(s): PROBNP in the last 72 hours. TROPONIN:   No results for input(s): TROPONINT in the last 72 hours. Impression:  Active Problems:    S/P ablation of atrial fibrillation    History of CVA (cerebrovascular accident) without residual deficits    Morbid obesity (HCC)    DM (diabetes mellitus), type 2, uncontrolled (HCC)    CAD (coronary artery disease)    History of MI (myocardial infarction)    Hypertension    TIA (transient ischemic attack)    Hyperlipidemia    Atrial thrombus without antecedent myocardial infarction    Acute cystitis    Paroxysmal atrial fibrillation (HCC)    HFrEF (heart failure with reduced ejection fraction) ([facility] [address]  Resolved Problems:    * No resolved hospital problems. *       All labs, medications and tests reviewed by myself, continue all other medications of all above medical condition listed as is except for changes mentioned above. Thank you   Please call with questions. Electronically signed by Ursula Severs. RENE Herrera CNP on 9/8/2021 at 11:48 AM           CARDIOLOGY ATTENDING ADDENDUM    I have seen, spoken to and examined this patient personally, independently of the nurse practitioner. I have reviewed the hospital care given to date and reviewed all pertinent labs and imaging. The plan was developed mutually at the time of the visit with the patient,  NP   and myself. I have spoken with patient, nursing staff and provided written and verbal instructions . The above note has been reviewed and I agree with the assessment, diagnosis, and treatment plan with changes made by me as follows       HPI:  I have reviewed the above HPI  And agree with above   Please review addendum/changes made to note above     Interval history:            Physical Exam:  General:  Awake, alert, NAD  Head:normal  Eye:normal  Neck:  No JVD   Chest:  Clear to auscultation, respiration easy  Cardiovascular:  s1s2  Abdomen:   nontender  Extremities:  0 edema  Pulses; palpable  Neuro: grossly normal      MEDICAL DECISION MAKING;    I agree with the above plan, which was planned by myself and discussed with NP. Patient remains in sinus rhythm  Continue with current medications, amiodarone, Eliquis.   Cardiology will sign off please call us with questions    Dr. Chapincito Humphries MD

## 2021-09-08 NOTE — PROGRESS NOTES
Patient has been up and moving around this morning. She ate breakfast. Her blood sugar was 377, gave her 10 units of insulin. Patient took her morning medications, and is now lying down trying to rest. She states she did not get much sleep last night.   Denise [facility], [place]

## 2021-09-08 NOTE — CARE COORDINATION
Waiting for approval for [employer]. Spoke with [facility] and he states he is waiting for verification of insurance. He will notify CM when verified. The Electronic PAS/RR has been completed and placed in packet. D/c instructions are on front of packet located with the soft chart.   Rapid Covid needed on day of d/c.  TE

## 2021-09-09 NOTE — CARE COORDINATION
Pt has been approved to go to [facility] per Nesha/Tonya. Notified Dr Bryan Sands. Rapid Covid ordered. Notified nurse.   TE

## 2021-09-09 NOTE — CARE COORDINATION
W/C transport arranged with [facility]. ETA is 1100. Notified pt, pt's daughter/Johanna, pt's nurse, and Nesha/Tonya. AVS faxed and placed in packet. Rapid Covid neg.   TE

## 2021-09-09 NOTE — PROGRESS NOTES
Physical Therapy  Attempted to see pt, pt is getting ready to d/c to SNF and wishes to do therapy over there. Sally Ennis.  William Hernandez PTA

## 2021-09-15 NOTE — DISCHARGE SUMMARY
Le Peguero MD, Internal Medicine    [address] [facility]   [phone]   Patient ID  Mekhi Spann   1955  [id number]          Admit date: 9/3/2021   Discharge date: 9/09/2021      Admitting Physician: Hart Landau, MD   Discharge Physician: Robert Rai MD MD    Discharge Diagnoses:   A. fib RVR: Rate controlled, on amiodarone, Toprol, Eliquis. UTI: (+)  HFrEF: EF 20% in the past, repeat LUKE today.   DM2:   HTN/  HLD: cpm   History of metabolic encephalopathy:   Generalized weakness:     Patient Active Problem List   Diagnosis    History of CVA (cerebrovascular accident) without residual deficits    Morbid obesity ([address]    DM (diabetes mellitus), type 2, uncontrolled ([address]    Persistent atrial fibrillation ([address]    CAD (coronary artery disease)    CHF (congestive heart failure) ([address]    Light headed    History of MI (myocardial infarction)    Diabetes mellitus ([address]    Hypertension    TIA (transient ischemic attack)    Hyperlipidemia    SOB (shortness of breath)    H/O cardiovascular stress test    Family history of coronary artery disease    PAF (paroxysmal atrial fibrillation) (HCC)    Chest pain    Atrial thrombus without antecedent myocardial infarction    S/P ablation of atrial fibrillation    Gastroenteritis    YESIKA (acute kidney injury) ([address]    Acute cystitis    Elevated liver enzymes    CHF (congestive heart failure), NYHA class I, acute on chronic, combined ([address]    Pneumonia    Multifocal pneumonia    Atrial fibrillation with RVR (HCC)    Shoulder pain    Paroxysmal atrial fibrillation ([address]    Diabetic hyperosmolar coma (HCC)    Difficult intravenous access    HFrEF (heart failure with reduced ejection fraction) (HCC)    Delirium    Bradycardia       Discharged Condition: fair    Hospital Course: Pt is 60year old female, with history of recent historhy of anoxic encephalopathy, congestive heart failure was admitted after episode of a. Fib with RVR. Patient was started oamiodarone and her rate improved. She was also seen by PT/OT. She was significantly weak and was advised that she would benefit from pt/ot. She was found to have uti, which was tretaed with antibiotics. Her congestive heart failure was in chronic stable state. She was continue on home medications. Her blood pressure was monitored and her CHF medications were otimized. She was seen by cardiologist as well as electrophysiologist. She was discharged to [facility], once she was approved and medical stable. [employer]    Disposition: home    Patient Instructions:    Vinnie Martinez   Home Medication Instructions RJE:[id number]    Printed on:09/14/21 7270   Medication Information                      amiodarone (CORDARONE) 200 MG tablet  Take 1 tablet by mouth 2 times daily             amiodarone (PACERONE) 400 MG tablet  Take 1 tablet by mouth 2 times daily for 9 doses             apixaban (ELIQUIS) 5 MG TABS tablet  Take 1 tablet by mouth 2 times daily             aspirin 81 MG EC tablet  Take 81 mg by mouth daily             atorvastatin (LIPITOR) 40 MG tablet  Take 40 mg by mouth nightly             busPIRone (BUSPAR) 10 MG tablet  Take 1 tablet by mouth 3 times daily             DULoxetine (CYMBALTA) 60 MG extended release capsule  Take 1 capsule by mouth daily             folic acid-pyridoxine-cyancobalamin (FOLTX) 1.13-25-2 MG TABS  Take 1 tablet by mouth daily             insulin glargine (LANTUS) 100 UNIT/ML injection vial  Inject 20 Units into the skin nightly Hold Lantus Insulin if H. S.blood glucose < 150,  and call Dr. Marysol Nails for a lower dose             insulin lispro (HUMALOG) 100 UNIT/ML injection vial  Inject 10 Units into the skin 3 times daily (with meals) Please give Humalog Insulin soon after the meal. HOLD Humalog If Pt. Eats < 50% of the meal. OR Pre meal blood glucose < 120.              levothyroxine (SYNTHROID) 50 MCG tablet  Take 50 mcg by mouth Daily             LORazepam (ATIVAN) 0.5 MG tablet  Take 1 tablet by mouth every 4 hours as needed for Anxiety for up to 30 days. losartan (COZAAR) 100 MG tablet  Take 1 tablet by mouth daily             magnesium oxide (MAG-OX) 400 MG tablet  Take 1 tablet by mouth daily             metoprolol succinate (TOPROL XL) 100 MG extended release tablet  Take 1 tablet by mouth daily             nitroGLYCERIN (NITROSTAT) 0.4 MG SL tablet  up to max of 3 total doses. If no relief after 1 dose, call 911.              pantoprazole (PROTONIX) 40 MG tablet  Take 40 mg by mouth daily             sacubitril-valsartan (ENTRESTO) 24-26 MG per tablet  Take 1 tablet by mouth 2 times daily             SITagliptin (JANUVIA) 25 MG tablet  Take 1 tablet by mouth daily             traZODone (DESYREL) 50 MG tablet  Take 50 mg by mouth nightly                    Activity: activity as tolerated  Diet: cardiac diet    Follow-up with Lemont Goodpasture, MD in 7 days    Signed: Lemont Goodpasture, MD    Time spent on discharge 40 minutes

## 2021-10-06 PROBLEM — E16.2 HYPOGLYCEMIA: Status: ACTIVE | Noted: 2021-01-01

## 2021-10-06 NOTE — ED NOTES
Second amp Dextrose 50% give.  Dr. Rodriguez Ramos at bedside       Radene Schlossman, [place]  10/05/21 0859

## 2021-10-06 NOTE — PROGRESS NOTES
Patient's daughter called-reported [facility] Since Monday patient has not been feeling well, had loss of appetite, had difficulty ambulating. Had 3 falls yesterday. Patient was also complaining of shortness of breath. Patient has seen a primary care physician and was prescribed a Z-Bong. Patient could take only 2 days of the medication due to not feeling well and feeling dizzy. As per the daughter patient had her insulin yesterday evening. Patient went to bed around 8 PM.  Patient's daughter went to check on her around 10:30 PM and was found on the floor, unresponsive, diaphoretic. She called EMS. Discussed patient's lab work, imaging, plan with the daughter. Discussed patient's medications. Was not discharged on Lasix. Is both on losartan, Entresto. Also discussed CODE STATUS-daughter reported that patient is DNR/DNI. RN documented patient's rectal temperature 95.9. Stat blood cultures, rapid Covid, CMP, CBC ordered.

## 2021-10-06 NOTE — PROGRESS NOTES
Pt admitted to 4106, skin assessment complete with Dmitriy Evans RN- redness under breasts, multiple generalized bruises. PT AO, bearhugger in place. Oriented to room and call light. Call light in reach.  Bed alarm on for safety

## 2021-10-06 NOTE — CARE COORDINATION
Met c pt to initiate discharge planning. Pt lives at home, states she is moving into a new [facility] high rise apartment tomorrow (family moving her belongings). Pt states she was only home from [facility] a couple weeks before returning to [facility]. Pt did not have [facility] at home. Discussed possibility of SNF vs HHC, pt states her goal is to return home and open to [facility]. Advised CM will cont to follow to assist with discharge planning- anticipate need for PT/OT to ensure safe and appropriate discharge plan.

## 2021-10-06 NOTE — PROGRESS NOTES
Patient seen and examined  With severe cardiomyopathy and high proBNP level  Stop IV fluid  Received about 1.5 L of crystalloid solution so far  Full consult to come  UA did not have any active sediment

## 2021-10-06 NOTE — CONSULTS
Patient:   Ridge Rosa    Date:  10/06/21  :  1955, 60 y.o. Nephrologist: Kannan Croft MD  Provider: Riasa Peña PA-C    Reason for Consult: Acute kidney injury    Consult requested by: Lisa Harris MD    Chief Complaint:   Hypoglycemia    HISTORY OF PRESENT ILLNESS:   41-year-old female who was brought to the emergency room via EMT after being found unresponsive and hypoglycemic. The emergency department initially her blood pressure was little bit on the lower side but did came up likely from 1 L of crystalloid solution administration. On presentation in the emergency room her sugar was only 25 mg/dL-she underwent several diagnostic tests which include imaging and biochemical.  Imaging study which include ultrasound of the abdomen, chest x-ray, and CT of the head without contrast was unrevealing-she has placed with D5 with normal saline but also Lasix IV was initiated because of cardiomyopathy and subsequently admitted to medical floor for further evaluation. She had several dextrose infusion along with D5 normal saline to raise the sugar. Biochemical testing showed increased creatinine to 1.7-which is higher than her recent creatinine 1.1    I am familiar with her. My last encounter with her was in 2021. In general she has fluctuation of creatinine due to underlying cardiorenal syndrome      I have reviewed her data, looks like her creatinine was about 0.8 back  in , but since , it has been increasing and fluctuating between  1.3-1.9 range, probably coincide with her ischemic cardiomyopathy, she  has very low EF and intermittent pulmonary edema and she is on  diuretics that probably caused \"sawtooth pattern\" creatinine level.        When I saw her in the morning-she was alert awake and at least partially oriented-she is able to eat and drink and sugar was running good-she did not have any respiratory distress at that time      PAST MEDICAL HISTORY:  1.  CKD stage IIIA/B A3.  2.  Diabetes mellitus with significant proteinuria. 3.  Cardiomyopathy  Ischemic-her most recent left ventricular ejection fraction about 20%    4. Chronic atrial fibrillation. 5.  Hyperlipidemia. 6.  Hypertension. 7.  Insomnia. 8.  Mood disorder. 9.  Had an episode of CVA in the distant past.  10.  Hypothyroidism.     PAST SURGICAL HISTORY:  1. Ablation for dysrhythmia. 2.  . 3.  Cholecystectomy. 4.  Looks like she had a cath in  and , I need to go back and  see what kind of anatomy and what kind of intervention if any she had. 5.  Tubal ligation.     FAMILY MEDICAL HISTORY:  Diabetes and heart disease runs in the family.     SOCIAL HISTORY:  I think the patient lives alone, but she has several  family tragedies apparently several family members  and she was not  mentally feeling very well according to Dr. Wyatt Eaton.     Habits    She quit smoking long time ago  But has at least 60-pack-year history  Currently does not consume alcohol or take any illicit drugs        REVIEW OF SYSTEMS:     All pertinent ROS neg except   Hyperglycemia  Patient does not recall any of the symptoms  But make sense because she is unresponsive  Currently she is alert awake and oriented    Current Facility-Administered Medications   Medication Dose Route Frequency Provider Last Rate Last Admin    sodium chloride flush 0.9 % injection 5-40 mL  5-40 mL IntraVENous 2 times per day Swati Segovia MD   10 mL at 10/06/21 0807    sodium chloride flush 0.9 % injection 5-40 mL  5-40 mL IntraVENous PRN Swati Sgeovia MD        0.9 % sodium chloride infusion  25 mL IntraVENous PRN Swati Segovia MD        ondansetron (ZOFRAN-ODT) disintegrating tablet 4 mg  4 mg Oral Q8H PRN Swati Segovia MD        Or    ondansetron (ZOFRAN) injection 4 mg  4 mg IntraVENous Q6H PRN Swati Segovia MD        polyethylene glycol (GLYCOLAX) packet 17 g  17 g Oral Daily PRN Zbigniew Galeano MD        acetaminophen (TYLENOL) tablet 650 mg  650 mg Oral Q6H PRN Zbigniew Galeano MD        Or   [employer] acetaminophen (TYLENOL) suppository 650 mg  650 mg Rectal Q6H PRN Zbigniew Galeano MD        furosemide (LASIX) injection 40 mg  40 mg IntraVENous BID Zbigniew Galeano MD   40 mg at 10/06/21 0806    apixaban (ELIQUIS) tablet 5 mg  5 mg Oral BID Zbigniew Galeano MD   5 mg at 10/06/21 0807    aspirin EC tablet 81 mg  81 mg Oral Daily Zbigniew Galeano MD   81 mg at 10/06/21 0806    busPIRone (BUSPAR) tablet 10 mg  10 mg Oral TID Zbigniew Galeano MD   10 mg at 10/06/21 1320    DULoxetine (CYMBALTA) extended release capsule 60 mg  60 mg Oral Daily Zbigniew Galeano MD   60 mg at 63/41/52 6485    folic acid-pyridoxine-cyancobalamin (FOLTX) 1.13-25-2 MG TABS 1 tablet  1 tablet Oral Daily Zbigniew Galeano MD   1 tablet at 10/06/21 0807    levothyroxine (SYNTHROID) tablet 50 mcg  50 mcg Oral Daily Zbigniew Galeano MD   50 mcg at 10/06/21 0806    magnesium oxide (MAG-OX) tablet 400 mg  400 mg Oral Daily Zbigniew Galeano MD   400 mg at 10/06/21 0806    metoprolol succinate (TOPROL XL) extended release tablet 100 mg  100 mg Oral Daily Zbigniew Galeano MD   100 mg at 10/06/21 0806    pantoprazole (PROTONIX) tablet 40 mg  40 mg Oral Daily Zbigniew Galeano MD   40 mg at 10/06/21 0806    glucose (GLUTOSE) 40 % oral gel 15 g  15 g Oral PRN Zbigniew Galeano MD        dextrose 50 % IV solution  12.5 g IntraVENous PRN Zbigniew Galeano MD        glucagon (rDNA) injection 1 mg  1 mg IntraMUSCular PRN Zbigniew Galeano MD        dextrose 5 % solution  100 mL/hr IntraVENous PRN Padilla Randall MD           /63   Pulse 97   Temp 98.8 °F (37.1 °C) (Oral)   Resp 18   Ht 5' (1.524 m)   Wt 187 lb 2.7 oz (84.9 kg)   SpO2 91%   BMI 36.55 kg/m²     PHYSICAL EXAM:  General appearance: alert, awake and oriented -no acute distress head: Normocephalic, without obvious abnormality, atraumatic, 1+ conjunctival pallor  Neck:  supple  Heart: Irregularly irregular rhythm  LUNGS: No gross crackles-but has coarse breath sound  Abdomen: soft, non-tender; bowel sounds normal  Extremities: e no overt lower extremity edema    LABS:  CBC:   Lab Results   Component Value Date    WBC 10.7 10/06/2021    WBC 11.8 10/05/2021    WBC 9.7 09/09/2021    HGB 10.6 10/06/2021    HGB 8.9 10/05/2021    HGB 11.8 09/09/2021     10/06/2021     10/05/2021     09/09/2021     Renal Panel:   Lab Results   Component Value Date     10/06/2021     10/05/2021     09/09/2021    K 4.5 10/06/2021    K 4.2 10/05/2021    K 4.4 09/09/2021     10/06/2021    CL 98 10/05/2021     09/09/2021    CO2 20 10/06/2021    CO2 19 10/05/2021    CO2 26 09/09/2021    BUN 55 10/06/2021    BUN 50 10/05/2021    BUN 20 09/09/2021    CREATININE 1.7 10/06/2021    CREATININE 1.7 10/05/2021    CREATININE 1.1 09/09/2021    GFRAA 37 10/06/2021    GFRAA 37 10/05/2021    GFRAA >60 09/09/2021    LABGLOM 30 10/06/2021    LABGLOM 30 10/05/2021    LABGLOM 50 09/09/2021    LABALBU 3.9 10/06/2021    LABALBU 3.2 10/05/2021    LABALBU 3.5 09/09/2021         Calcium:    Lab Results   Component Value Date    CALCIUM 8.3 10/06/2021     Phosphorus:    Lab Results   Component Value Date    PHOS 3.7 08/08/2021       U/A:    Lab Results   Component Value Date    PROTEINU 30 10/06/2021    NITRU NEGATIVE 10/06/2021    COLORU YELLOW 10/06/2021    WBCUA 3 10/06/2021    RBCUA <1 10/06/2021    MUCUS RARE 10/06/2021    TRICHOMONAS NONE SEEN 10/06/2021    YEAST RARE 09/01/2020    BACTERIA NEGATIVE 10/06/2021    CLARITYU CLEAR 10/06/2021    SPECGRAV 1.018 10/06/2021    UROBILINOGEN 2.0 10/06/2021    BILIRUBINUR NEGATIVE 10/06/2021    BLOODU NEGATIVE 10/06/2021    KETUA NEGATIVE 10/06/2021           IMPRESSION:  1.   Acute kidney injury on top of CKD stage III-mainly from cardiorenal syndrome type II-we will make sure she does not have any additional intrinsic disease as well as obstruction  2. Hypoglycemia-likely from antidiabetic therapy  3. Atherosclerotic cardiovascular disease as well as dysrhythmia and severe cardiomyopathy  4.   Multiple other comorbid disease      PLAN:    Start with UA and urinary indicis  Stop IV fluid  Her blood sugar is okay now-if it drops down at D5 water rather than crystalloid solution  She is on diuretics I will watch for adequate \"capillary plasma refill\"  Also rule out any occult infection  Bladder scan  Follow clinically and biochemically

## 2021-10-06 NOTE — ED PROVIDER NOTES
This patient was seen by Dr. Jameel Aly. I signed up for this patient inadvertently. I did not see or evaluate this patient.       Olesya Rivas MD  10/05/21 [id number]

## 2021-10-06 NOTE — CONSULTS
artery disease), Carotid aneurysm, right ([facility] [address] CHF (congestive heart failure) ([facility] [address] Diabetes mellitus ([facility] [address] Dizziness - light-headed, Family history of coronary artery disease, H/O 24 hour EKG monitoring, H/O cardiovascular stress test, H/O cardiovascular stress test, H/O echocardiogram, H/O echocardiogram, Heart imaging, Heartburn, History of cardiac cath, History of cardiac monitoring, History of cardioversion, History of MI (myocardial infarction), History of nuclear MUGA, History of nuclear Muga stress test, History of PTCA, Hx of cardiovascular stress test, Hx of transesophageal echocardiography ([facility]) for monitoring, Hyperlipidemia, Hypertension, Insomnia, SOB (shortness of breath), Tachycardia, and TIA (transient ischemic attack). Past surgical history:   has a past surgical history that includes  section; Tubal ligation; Cholecystectomy; Diagnostic Cardiac Cath Lab Procedure (2006, 2009); Percutaneous Transluminal Coronary Angio (10/06/2010); other surgical history (2017); ablation of dysrhythmic focus; and IR NONTUNNELED VASCULAR CATHETER > 5 YEARS (2021). Social History:   reports that she quit smoking about 14 years ago. Her smoking use included cigarettes. She has a 60.00 pack-year smoking history. She has never used smokeless tobacco. She reports that she does not drink alcohol and does not use drugs.   Family history:   no family history of CAD, STROKE of DM    Allergies   Allergen Reactions    Amiodarone Other (See Comments)     dizziness    Iv Dye [Iodides] Nausea And Vomiting    Vicodin [Hydrocodone-Acetaminophen] Nausea And Vomiting       sodium chloride flush 0.9 % injection 5-40 mL, 2 times per day  sodium chloride flush 0.9 % injection 5-40 mL, PRN  0.9 % sodium chloride infusion, PRN  ondansetron (ZOFRAN-ODT) disintegrating tablet 4 mg, Q8H PRN   Or  ondansetron (ZOFRAN) injection 4 mg, Q6H PRN  polyethylene glycol (GLYCOLAX) packet 17 g, Daily PRN  acetaminophen (TYLENOL) tablet 650 mg, Q6H PRN   Or  acetaminophen (TYLENOL) suppository 650 mg, Q6H PRN  furosemide (LASIX) injection 40 mg, BID  apixaban (ELIQUIS) tablet 5 mg, BID  aspirin EC tablet 81 mg, Daily  busPIRone (BUSPAR) tablet 10 mg, TID  DULoxetine (CYMBALTA) extended release capsule 60 mg, Daily  folic acid-pyridoxine-cyancobalamin (FOLTX) 1.13-25-2 MG TABS 1 tablet, Daily  levothyroxine (SYNTHROID) tablet 50 mcg, Daily  magnesium oxide (MAG-OX) tablet 400 mg, Daily  metoprolol succinate (TOPROL XL) extended release tablet 100 mg, Daily  pantoprazole (PROTONIX) tablet 40 mg, Daily  glucose (GLUTOSE) 40 % oral gel 15 g, PRN  dextrose 50 % IV solution, PRN  glucagon (rDNA) injection 1 mg, PRN  dextrose 5 % solution, PRN      Current Facility-Administered Medications   Medication Dose Route Frequency Provider Last Rate Last Admin    sodium chloride flush 0.9 % injection 5-40 mL  5-40 mL IntraVENous 2 times per day Peyton Guaman MD   10 mL at 10/06/21 0807    sodium chloride flush 0.9 % injection 5-40 mL  5-40 mL IntraVENous PRN Peyton Guaman MD        0.9 % sodium chloride infusion  25 mL IntraVENous PRN Peyton Guaman MD        ondansetron (ZOFRAN-ODT) disintegrating tablet 4 mg  4 mg Oral Q8H PRN Peyton Guaman MD        Or    ondansetron (ZOFRAN) injection 4 mg  4 mg IntraVENous Q6H PRN Peyton Guaman MD        polyethylene glycol (GLYCOLAX) packet 17 g  17 g Oral Daily PRN Peyton Guaman MD        acetaminophen (TYLENOL) tablet 650 mg  650 mg Oral Q6H PRN Peyton Guaman MD        Or    acetaminophen (TYLENOL) suppository 650 mg  650 mg Rectal Q6H PRN Peyton Guaman MD        furosemide (LASIX) injection 40 mg  40 mg IntraVENous BID Peyton Guaman MD   40 mg at 10/06/21 0806    apixaban (ELIQUIS) tablet 5 mg  5 mg Oral BID Peyton Guaman MD   5 mg at 10/06/21 0807    aspirin EC tablet 81 mg  81 mg Oral Daily Suchitha MD Tonia   81 mg at 10/06/21 0806    busPIRone (BUSPAR) tablet 10 mg  10 mg Oral TID Joanna Masters MD   10 mg at 10/06/21 9323    DULoxetine (CYMBALTA) extended release capsule 60 mg  60 mg Oral Daily Joanna Masters MD   60 mg at 56/87/54 1571    folic acid-pyridoxine-cyancobalamin (FOLTX) 1.13-25-2 MG TABS 1 tablet  1 tablet Oral Daily Joanna Masters MD   1 tablet at 10/06/21 6966    levothyroxine (SYNTHROID) tablet 50 mcg  50 mcg Oral Daily Joanna Masters MD   50 mcg at 10/06/21 0806    magnesium oxide (MAG-OX) tablet 400 mg  400 mg Oral Daily Joanna Masters MD   400 mg at 10/06/21 2490    metoprolol succinate (TOPROL XL) extended release tablet 100 mg  100 mg Oral Daily Joanna Masters MD   100 mg at 10/06/21 0806    pantoprazole (PROTONIX) tablet 40 mg  40 mg Oral Daily Joanna Masters MD   40 mg at 10/06/21 0806    glucose (GLUTOSE) 40 % oral gel 15 g  15 g Oral PRN Joanna Masters MD        dextrose 50 % IV solution  12.5 g IntraVENous PRN Joanna Masters MD        glucagon (rDNA) injection 1 mg  1 mg IntraMUSCular PRN Joanna Masters MD        dextrose 5 % solution  100 mL/hr IntraVENous PRN Joanna Masters MD             Review of Systems:     · Constitutional: No Fever or Weight Loss   · Eyes: No Decreased Vision  · ENT: No Headaches, Hearing Loss or Vertigo  · Cardiovascular: No chest pain, dyspnea on exertion, palpitations or loss of consciousness  · Respiratory: No cough or wheezing    · Gastrointestinal: No abdominal pain, appetite loss, blood in stools, constipation, diarrhea or heartburn  · Genitourinary: No dysuria, trouble voiding, or hematuria  · Musculoskeletal:  No gait disturbance, weakness or joint complaints  · Integumentary: No rash or pruritis  · Neurological: No TIA or stroke symptoms  · Psychiatric: No anxiety or depression  · Endocrine: No malaise, fatigue or temperature intolerance  · Hematologic/Lymphatic: No bleeding problems, blood clots or swollen lymph nodes  · Allergic/Immunologic: No nasal congestion or hives    All other systems were reviewed and were negative otherwise. Physical Examination:      Vitals:    10/06/21 0941   BP:    Pulse:    Resp:    Temp:    SpO2: 100%      Wt Readings from Last 3 Encounters:   10/06/21 187 lb 2.7 oz (84.9 kg)   09/07/21 159 lb 6.4 oz (72.3 kg)   08/11/21 162 lb (73.5 kg)     Body mass index is 36.55 kg/m². General Appearance:  No distress, conversant  Constitutional:  Well developed, Well nourished  HEENT:  Normocephalic, Atraumatic, Oropharynx moist, No oral exudates,   Nose normal. Neck Supple Carotid: no carotid bruit  Eyes:  Conjunctiva normal, No discharge. Respiratory:    Normal breath sounds, No respiratory distress, No wheezing, no use of accessory muscles, diaphragm movement is normal  No chest Tenderness  Cardiovascular: S1-S2 No murmurs auscultated. No rubs, thrills or gallops. Normal  rhythm. Pedal pulses are normal. No pedal edema  GI:  Soft Non tender, non distended. :  No CVA tenderness. Musculoskeletal:   No tenderness, No cyanosis, No clubbing. Integument:  Warm, Dry, No erythema, No rash. Lymphatic:  No lymphadenopathy noted. Neurologic:  Alert & oriented x 3  No focal deficits noted. Psychiatric:  Affect normal, Judgment normal, Mood normal.       Lab Review     Recent Labs     10/06/21  0829   WBC 10.7*   HGB 10.6*   HCT 34.1*         Recent Labs     10/06/21  0829      K 4.5      CO2 20*   BUN 55*   CREATININE 1.7*     Recent Labs     10/05/21  2331 10/05/21  2331 10/06/21  0829   *   < > 459*   *   < > 365*   BILIDIR 1.0*  --   --    BILITOT 1.4*   < > 1.3*   ALKPHOS 177*   < > 228*    < > = values in this interval not displayed. No results for input(s): TROPONINI in the last 72 hours.   Lab Results   Component Value Date     (H) 04/17/2013    BNP 61 05/07/2012    BNP 86 01/30/2012     Lab Results   Component Value Date    INR 1.17 07/15/2021    PROTIME 15.1 (H) 07/15/2021         All labs, images, EKGs were personally reviewed      Assessment: 60 y. o.year old with PMH of  has a past medical history of Abnormal PFTs (pulmonary function tests), Atrial fibrillation ([facility] [address] CAD (coronary artery disease), Carotid aneurysm, right ([facility] [address] CHF (congestive heart failure) ([facility] [address] Diabetes mellitus ([facility] [address] Dizziness - light-headed, Family history of coronary artery disease, H/O 24 hour EKG monitoring, H/O cardiovascular stress test, H/O cardiovascular stress test, H/O echocardiogram, H/O echocardiogram, Heart imaging, Heartburn, History of cardiac cath, History of cardiac monitoring, History of cardioversion, History of MI (myocardial infarction), History of nuclear MUGA, History of nuclear Muga stress test, History of PTCA, Hx of cardiovascular stress test, Hx of transesophageal echocardiography ([facility]) for monitoring, Hyperlipidemia, Hypertension, Insomnia, SOB (shortness of breath), Tachycardia, and TIA (transient ischemic attack). Recommendations:      1. Acute on Ch.systolic heart Failure: Last Echo shows low LVEF. Cont IV  Lasix, Strick I/O Monitoring, Daily weights and Low salt diet. 2. History of severe cardiomyopathy with LVEF around 10 to 15%. ?  Ischemic versus tachycardia induced. Continue with Toprol-XL. Holding off Justyn Bearded given renal failure. Patient was evaluated by EP a month ago. Patient needs to be on guideline directed medical therapy for at least 3 months before she can be considered for BiV ICD. 3. Coronary disease: Left heart images from 2020 reviewed. Moderate stenosis noted in LAD and RCA. 99% stenosis in diagonal 1 however a small caliber vessel to be managed medically. Continue aspirin beta-blocker  4. Mildly elevated troponin in the setting of renal failure and CHF: In absence of chest pain will not further pursue ischemic work-up at this point.   5. Atrial fibrillation with left bundle branch block on EKG: Rate controlled. S/p ablation in 2017. Continue with medication -  Continue with Eliquis and Toprol-XL.   Recommend to resume amiodarone    Thank you for the consult    Dr. Bonita Carranza  10/6/2021 10:27 AM

## 2021-10-06 NOTE — ED NOTES
Sent a perfect serve to [facility] about pts rectal temperature being 95.9. Verbal order to place pt on bear jocelyn.       Bg Cast RN  10/06/21 7938

## 2021-10-06 NOTE — H&P
HISTORY AND PHYSICAL  (Hospitalist, Internal Medicine)  IDENTIFYING INFORMATION   PATIENT:  Claudell Echevaria  MRN:  [id number]  ADMIT DATE: 10/5/2021      CHIEF COMPLAINT   Unresponsive    HISTORY OF PRESENT ILLNESS   Claudell Echevaria is a 60 y.o. female with coronary artery disease (PCI and stent to LAD, severe systolic congestive heart failure, chronic atrial fibrillation (cardioversion 2017), history of MARY GRACE clot ([facility] 8/2017), s/p ablation (9/2017), history of NSVT (12/2020), hypothyroidism, hypertension, diabetes mellitus type 2, on insulin, hyperlipidemia, obstructive sleep apnea, COPD, obesity, cerebral aneurysm, recent admission 9/3-9/9/2021 for A. fib with RVR, repeated falls, discharged to [facility] was brought to ED by EMS after the patient was found unresponsive. Patient is currently lethargic, hard to keep her awake, not able to provide much information. Called patient's daughter (Elizabeth Daniel -not accepting calls at this time), [phone]). Reviewed EMS document-patient fell out of bed and was unresponsive. Family member reported that patient was diaphoretic, snoring. Patient's last blood sugar was 336 and was given 12 units of insulin around 7:30 PM.  EMS checked her blood glucose and it was 21. Patient was given glucagon and repeat blood glucose was still low. Not much change in patient's mentation while enroute to the hospital.  Currently patient is arousable, knew her name, knew that she was in the hospital, knew the year was 2021, denied any chest pain, shortness of breath, denied any abdominal pain, did not answer any further questions. At presentation patient was noted to have /84, HR 82, RR 12, temp-not documented,, saturating  % on 3 L of oxygen through nasal cannula. Blood glucose was 25 on arrival.  Patient received 2 Amps of D50.   Lab work significant for sodium 129, CO2 19, BUN 50, creatinine 1.7, lactic acid 1.3, proBNP 30,805, troponin<0.010, , , , bilirubin 1.4, WBC 11.8, hemoglobin 8.9, platelets 040. VBG-pH 7.30, PCO2 45, PO2 163, HCO3 22.1. CT head-no acute intracranial process. Chest x-ray-no acute cardiopulmonary process. Patient was given 100 cc NS bolus, started on D5 NS at 100 cc/h in ED. PAST MEDICAL HISTORY PAST SURGICAL HISTORY   coronary artery disease (PCI and stent to LAD, severe systolic congestive heart failure, chronic atrial fibrillation (cardioversion ), history of MARY GRACE clot ([facility] 2017), s/p ablation (2017), history of NSVT (2020), hypothyroidism, hypertension, diabetes mellitus type 2, on insulin, hyperlipidemia, obstructive sleep apnea, COPD, obesity, cerebral aneurysm, recent admission 9/3-2021 for A. fib with RVR, repeated falls, [facility]  Cholecystectomy, , carpal tunnel release   FAMILY HISTORY SOCIAL HISTORY    Reviewed and noncontributory  Remote history of smoking, denied any alcohol or illicit drug use   MEDICATIONS ALLERGIES    Reviewed medications-amiodarone 200 mg twice daily, atorvastatin 40 mg daily, apixaban 5 mg twice daily, metoprolol succinate 100 mg daily, losartan 100 mg daily, 4 tabs daily, levothyroxine 50 MCG daily, Ativan 0.5 mg every 4 hours as needed, Entresto twice daily, Januvia 25 mg daily, trazodone 50 mg nightly, magnesium oxide daily, Lantus 20 units nightly, duloxetine 60 mg daily   IV dye, Vicodin, as per EMR amiodarone (dizziness)       PAST MEDICAL, SURGICAL, FAMILY, and SOCIAL HISTORY         Past Medical History:   Diagnosis Date    Abnormal PFTs (pulmonary function tests) 2014    Atrial fibrillation ([address]     CAD (coronary artery disease)     Carotid aneurysm, right ([address] 2020    s/p fall.  referred to [facility] by Dr Aleena Harris office    CHF (congestive heart failure) ([address]     Diabetes mellitus ([address]     Dizziness - light-headed     Family history of coronary artery disease     H/O 24 hour EKG monitoring 02/09/2012,12/10/2010    02/09/2012 predominant rhythm is sinus with short runs of SVT no episode of atrial fib. pt has left bundle branch morphology, max heart is 132, min is 48 with an average of 74 ,infrequent PVCs are seen    H/O cardiovascular stress test 4/1/2013, 1/31/2012 4/1/2013-    H/O cardiovascular stress test 4/16/14,02/09/2012 4/16/14 EF 52% No ischemia, normal study. 02/09/2012 EF 58% tehn[employer] LVEF is normal globally    H/O echocardiogram 03/27/2013 4/14 EF 50-55% mild MR, TR 3/13EF 35-45% decreasedr L ventrical systolic function.  H/O echocardiogram 04/2014 4/14-EF50-55% Mild MR/TR. 10/11 EF > 55% LVS function is normal, calcified mitral apparatus, impaired LV relaxation, no PE    Heart imaging 02/09/2012 02/09/2012 MUGA rest EF 58%LVEF is normal globally    Heartburn     History of cardiac cath 05/08/2009, 06/14/2006 05/08/2009,moderate stenosis  small diagonal    History of cardiac monitoring 02/09/2015    Event - sinus rhythm    History of cardioversion 03/14/2014    History of MI (myocardial infarction)     History of nuclear MUGA 12/03/2020    EF 41%    History of nuclear Muga stress test 07/14/2020    EF 41%    History of PTCA 10/06/2010    10/06/2010 stent 3.0x24 taxus stent left main 90% stenosis mid seg reduced to 0% second area of [id number] to 40% not flow limiting EF 40%    Hx of cardiovascular stress test 08/21/2015    lexiscan-normal,EF70%    Hx of transesophageal echocardiography ([facility]) for monitoring 08/07/2017    Noted to have MARY GRACE Blood Clot.  Hyperlipidemia     Hypertension     Insomnia     SOB (shortness of breath)     Tachycardia 01/30/2012    report in epic notes-Dr Nunn hosp. consult    TIA (transient ischemic attack)      Past Surgical History:   Procedure Laterality Date    ABLATION OF DYSRHYTHMIC FOCUS   SECTION      CHOLECYSTECTOMY      DIAGNOSTIC CARDIAC CATH LAB PROCEDURE  2006, 2009 moderate stenosis small diagonal    IR NONTUNNELED VASCULAR CATHETER  2021    IR NONTUNNELED VASCULAR CATHETER 2021 [facility] SPECIAL PROCEDURES    OTHER SURGICAL HISTORY  2017    afib ablation [facility]    PTCA  10/06/2010    10/06/2010 stent 3.0x24 taxus stent 90% stenosis mid segment reduced to 0% second area of stenosis 30-40% not flow limiting EF 40%    TUBAL LIGATION       Family History   Problem Relation Age of Onset    Diabetes Father     Heart Disease Father      Family Hx of HTN  Family Hx as reviewed above, otherwise non-contributory  Social History     Socioeconomic History    Marital status:      Spouse name: None    Number of children: None    Years of education: None    Highest education level: None   Occupational History    None   Tobacco Use    Smoking status: Former Smoker     Packs/day: 3.00     Years: 20.00     Pack years: 60.00     Types: Cigarettes     Quit date: 2007     Years since quittin.6    Smokeless tobacco: Never Used    Tobacco comment: reviewed 8/12/15   Vaping Use    Vaping Use: Never used   Substance and Sexual Activity    Alcohol use: No     Comment: occas. caffeine 3 mtn. dew a day    Drug use: No    Sexual activity: Not Currently   Other Topics Concern    None   Social History Narrative    None     Social Determinants of Health     Financial Resource Strain:     Difficulty of Paying Living Expenses:    Food Insecurity:     Worried About Running Out of Food in the Last Year:     Ran Out of Food in the Last Year:    Transportation Needs:     Lack of Transportation (Medical):      Lack of Transportation (Non-Medical):    Physical Activity:     Days of Exercise per Week:     Minutes of Exercise per Session:    Stress:     Feeling of Stress :    Social Connections:     Frequency of Communication with Friends and Family:     Frequency of Social Gatherings with Friends and Family:     Attends Tenriism Services:     Active Member of Clubs or Organizations:     Attends Club or Organization Meetings:     Marital Status:    Intimate Partner Violence:     Fear of Current or Ex-Partner:     Emotionally Abused:     Physically Abused:     Sexually Abused:        MEDICATIONS   Medications Prior to Admission  Not in a hospital admission. Current Medications  Current Facility-Administered Medications   Medication Dose Route Frequency Provider Last Rate Last Admin    dextrose 5 % and 0.9 % sodium chloride infusion   IntraVENous Continuous Katy Collado  mL/hr at 10/06/21 0252 Rate Change at 10/06/21 0252    glucose (GLUTOSE) 40 % oral gel 15 g  15 g Oral PRN Katy Collado MD        dextrose 50 % IV solution  12.5 g IntraVENous PRN Katy Collado MD        glucagon (rDNA) injection 1 mg  1 mg IntraMUSCular PRN Katy Collado MD        dextrose 5 % solution  100 mL/hr IntraVENous PRN Katy Collado MD         Current Outpatient Medications   Medication Sig Dispense Refill    magnesium oxide (MAG-OX) 400 MG tablet Take 1 tablet by mouth daily 30 tablet 1    LORazepam (ATIVAN) 0.5 MG tablet Take 1 tablet by mouth every 4 hours as needed for Anxiety for up to 30 days. 12 tablet 0    amiodarone (PACERONE) 400 MG tablet Take 1 tablet by mouth 2 times daily for 9 doses 9 tablet 0    amiodarone (CORDARONE) 200 MG tablet Take 1 tablet by mouth 2 times daily 60 tablet 1    insulin glargine (LANTUS) 100 UNIT/ML injection vial Inject 20 Units into the skin nightly Hold Lantus Insulin if H. S.blood glucose < 150,  and call Dr. Eagle Vaz for a lower dose 15 mL 0    metoprolol succinate (TOPROL XL) 100 MG extended release tablet Take 1 tablet by mouth daily 90 tablet 0    sacubitril-valsartan (ENTRESTO) 24-26 MG per tablet Take 1 tablet by mouth 2 times daily 60 tablet 1    busPIRone (BUSPAR) 10 MG tablet Take 1 tablet by mouth 3 times daily 90 tablet 0    DULoxetine (CYMBALTA) 60 MG extended release capsule Take 1 capsule by mouth daily 30 capsule 3    insulin lispro (HUMALOG) 100 UNIT/ML injection vial Inject 10 Units into the skin 3 times daily (with meals) Please give Humalog Insulin soon after the meal. HOLD Humalog If Pt. Eats < 50% of the meal. OR Pre meal blood glucose < 120. 1 vial 3    losartan (COZAAR) 100 MG tablet Take 1 tablet by mouth daily 30 tablet 3    folic acid-pyridoxine-cyancobalamin (FOLTX) 1.13-25-2 MG TABS Take 1 tablet by mouth daily 30 tablet 0    SITagliptin (JANUVIA) 25 MG tablet Take 1 tablet by mouth daily 30 tablet 3    traZODone (DESYREL) 50 MG tablet Take 50 mg by mouth nightly      nitroGLYCERIN (NITROSTAT) 0.4 MG SL tablet up to max of 3 total doses. If no relief after 1 dose, call 911. 25 tablet 3    atorvastatin (LIPITOR) 40 MG tablet Take 40 mg by mouth nightly      pantoprazole (PROTONIX) 40 MG tablet Take 40 mg by mouth daily      levothyroxine (SYNTHROID) 50 MCG tablet Take 50 mcg by mouth Daily      apixaban (ELIQUIS) 5 MG TABS tablet Take 1 tablet by mouth 2 times daily 60 tablet 2    aspirin 81 MG EC tablet Take 81 mg by mouth daily           Allergies  Allergies   Allergen Reactions    Amiodarone Other (See Comments)     dizziness    Iv Dye [Iodides] Nausea And Vomiting    Vicodin [Hydrocodone-Acetaminophen] Nausea And Vomiting       REVIEW OF SYSTEMS   10 point review of systems conducted and pertinent positives and negatives as per HPI. Patient lethargic, hard to be awakened multiple times to answer questions. PHYSICAL EXAM     Wt Readings from Last 3 Encounters:   10/05/21 164 lb (74.4 kg)   09/07/21 159 lb 6.4 oz (72.3 kg)   08/11/21 162 lb (73.5 kg)       Blood pressure (!) 142/78, pulse 66, resp. rate 17, height 5' (1.524 m), weight 164 lb (74.4 kg), SpO2 100 %, not currently breastfeeding.     GEN  -lethargic, easily arousable, falling back asleep  EYES -PERRL. HENT  -MM are moist.   RESP  -LS CTA equal bilat, no wheezes, rales or rhonchi. Symmetric chest movement. No respiratory distress noted. C/V  -S1/S2 auscultated. RRR without appreciable M/R/G. No JVD or carotid bruits. Peripheral pulses equal bilaterally and palpable. No peripheral edema. No reproducible chest wall tenderness. GI  -Abdomen is soft, non-distended, no significant tenderness. No masses or guarding. + BS in all quadrants. Rectal exam deferred.   -No CVA tenderness. Chen catheter is not present. MS  -B/L extremities strong muscles strength. Full movements. No gross joint deformities. No swelling, intact sensation symmetrical.   SKIN  -Normal coloration, warm, dry. NEURO  -lethargic, oriented x1-2-knew her name, knew that she was in the hospital, knew the year was 2021, no focal deficits noted. LABS AND IMAGING     Results for Rj Araujo (MRN [id number]) as of 10/6/2021 05:02   Ref.  Range 10/5/2021 23:31   Sodium Latest Ref Range: 135 - 145 MMOL/L 129 (L)   Potassium Latest Ref Range: 3.5 - 5.1 MMOL/L 4.2   Chloride Latest Ref Range: 99 - 110 mMol/L 98 (L)   CO2 Latest Ref Range: 21 - 32 MMOL/L 19 (L)   BUN Latest Ref Range: 6 - 23 MG/DL 50 (H)   Creatinine Latest Ref Range: 0.6 - 1.1 MG/DL 1.7 (H)   Anion Gap Latest Ref Range: 4 - 16  12   GFR Non- Latest Ref Range: >60 mL/min/1.73m2 30 (L)   GFR  Latest Ref Range: >60 mL/min/1.73m2 37 (L)   Lactate, ser/plas Latest Ref Range: 0.4 - 2.0 mMOL/L 1.3   Glucose Latest Ref Range: 70 - 99 MG/ (H)   Calcium Latest Ref Range: 8.3 - 10.6 MG/DL 7.5 (L)   Total Protein Latest Ref Range: 6.4 - 8.2 GM/DL 5.4 (L)   Pro-BNP Latest Ref Range: <300 PG/ML 30,805 (H)   Troponin T Latest Ref Range: <0.01 NG/ML 0.010 (H)   Albumin Latest Ref Range: 3.4 - 5.0 GM/DL 3.2 (L)   Alk Phos Latest Ref Range: 40 - 129 IU/L 177 (H)   ALT Latest Ref Range: 10 - 40 U/L 283 (H)   AST Latest Ref Range: 15 - 37 IU/L 484 (H)   Bilirubin Latest Ref Range: 0.0 - 1.0 MG/DL 1.4 (H)   Bilirubin, Direct Latest Ref Range: 0.0 - 0.3 MG/DL 1.0 (H)   Bilirubin, Indirect Latest Ref Range: 0 - 0.7 MG/DL 0.4   Lipase Latest Ref Range: 13 - 60 IU/L 11 (L)   WBC Latest Ref Range: 4.0 - 10.5 K/CU MM 11.8 (H)   RBC Latest Ref Range: 4.2 - 5.4 M/CU MM 2.83 (L)   Hemoglobin Quant Latest Ref Range: 12.5 - 16.0 GM/DL 8.9 (L)   Hematocrit Latest Ref Range: 37 - 47 % 28.1 (L)   MCV Latest Ref Range: 78 - 100 FL 99.3   MCH Latest Ref Range: 27 - 31 PG 31.4 (H)   MCHC Latest Ref Range: 32.0 - 36.0 % 31.7 (L)   MPV Latest Ref Range: 7.5 - 11.1 FL 10.4   RDW Latest Ref Range: 11.7 - 14.9 % 14.0   Platelet Count Latest Ref Range: 140 - 440 K/CU    Lymphocyte % Latest Ref Range: 24 - 44 % 6.4 (L)   Monocytes % Latest Ref Range: 0 - 4 % 12.1 (H)   Eosinophils % Latest Ref Range: 0 - 3 % 0.0   Basophils % Latest Ref Range: 0 - 1 % 0.0   Lymphocytes Absolute Latest Units: K/CU MM 0.8   Monocytes Absolute Latest Units: K/CU MM 1.4   Eosinophils Absolute Latest Units: K/CU MM 0.0   Basophils Absolute Latest Units: K/CU MM 0.0   Differential Type Unknown AUTOMATED DIFFERENTIAL   Segs Relative Latest Ref Range: 36 - 66 % 80.9 (H)   Segs Absolute Latest Units: K/CU MM 9.5   Nucleated RBC % Latest Units: % 0.0   Immature Neutrophil % Latest Ref Range: 0 - 0.43 % 0.6 (H)   Total Immature Neutrophil Latest Units: K/CU MM 0.07   Total Nucleated RBC Latest Units: K/CU MM 0.0     Recent Imaging    CT Head WO Contrast [[id number]] Collected: 10/06/21 0057      Order Status: Completed Updated: 10/06/21 0102     Narrative:       EXAMINATION:   CT OF THE HEAD WITHOUT CONTRAST  10/5/2021 6:55 pm     TECHNIQUE:   CT of the head was performed without the administration of intravenous   contrast. Dose modulation, iterative reconstruction, and/or weight based   adjustment of the mA/kV was utilized to reduce the radiation dose to as low   as reasonably achievable. COMPARISON:   September 3, 2021     HISTORY:   ORDERING SYSTEM PROVIDED HISTORY: Transient alteration in Mentation   TECHNOLOGIST PROVIDED HISTORY:   Reason for exam:->Transient alteration in Mentation   Has a \"code stroke\" or \"stroke alert\" been called? ->No   Decision Support Exception - unselect if not a suspected or confirmed   emergency medical condition->Emergency Medical Condition (MA)   Reason for Exam: Transient alteration in Mentation     FINDINGS:   BRAIN/VENTRICLES: There is no acute intracranial hemorrhage, mass effect or   midline shift.  No abnormal extra-axial fluid collection.  The gray-white   differentiation is maintained without evidence of an acute infarct.  There is   no evidence of hydrocephalus. Generalized atrophy and scattered senescent   white matter changes are again demonstrated. ORBITS: The visualized portion of the orbits demonstrate no acute abnormality. SINUSES: Left mastoid effusion is present. SOFT TISSUES/SKULL:  No acute abnormality of the visualized skull or soft   tissues.      Impression:       1. No acute intracranial hemorrhage, intra-axial mass, or acute territorial   infarct   2. Generalized atrophy and scattered senescent white matter changes again   demonstrated   3.  Left mastoid effusion, unchanged      XR CHEST PORTABLE [[id number]] Collected: 10/06/21 0016     Order Status: Completed Updated: 10/06/21 0026     Narrative:       EXAMINATION:   ONE XRAY VIEW OF THE CHEST     10/5/2021 11:37 pm     COMPARISON:   08/01/2021     HISTORY:   ORDERING SYSTEM PROVIDED HISTORY: altered level of conciousness   TECHNOLOGIST PROVIDED HISTORY:   Reason for exam:->altered level of conciousness   Reason for Exam: altered level of conciousness   Acuity: Acute   Type of Exam: Initial     FINDINGS:   The cardiomediastinal silhouette is mildly enlarged in size and contour.  The   lungs are clear.  No pleural effusion or pneumothorax is present.      Impression:       No acute cardiopulmonary process          Relevant labs and imaging reviewed    ASSESSMENT AND PLAN     #. Hypoglycemia:  -Patient's blood sugar was 21 has per EMS, 25 in ED  -Patient received D50 x2, started on D5 NS drip  -Repeat blood sugar-[phone].  -Hypoglycemia protocol ordered. #.  Unresponsive: Could be secondary to hypoglycemia  -Patient is currently lethargic, arousable on calling her name, hard to keep her awake. -CT head-no acute process. #.  Chronic systolic and diastolic congestive heart failure:  -Echo-7/2021-EF 15-20%, global hypokinesis, grade 3 diastolic dysfunction  -Patient appears compensated, but lab work-elevated proBNP, elevated LFTs, hyponatremia, YESIKA  -Chest x-ray-no acute process.  -Patient was not hypoxic, no bilateral lower extremity edema, lungs clear on auscultation.  -Ordered IV Lasix 40 mg twice daily  -Strict input/output.  -Consult cardiology. #.  YESIKA on CKD  -Creatinine 1.1-9/9/2021, 1.7 today  -Patient currently getting D5 NS  -Monitor with repeat CMP  -UA ordered.  -On review of EMR, recent discharge summary patient is both on losartan, Entresto-hold.  -Consult nephrology    #. Non-anion gap metabolic acidosis. #.  Hyponatremia:  -Continue fluids and repeat BMP    #. Abnormal LFTs  -LFTs were within normal range-9/9/2021, , , total bilirubin 1.4 today  -Lipase 11.  -Ultrasound abdomen ordered. #.  Drop in hemoglobin:  -Hemoglobin was 11.8-9/9/2021, 8.9 today  -No signs and symptoms of bleeding  -Monitor with repeat H&H. #. coronary artery disease (PCI and stent to LAD)  -Continue aspirin, metoprolol succinate, hold atorvastatin due to elevated LFTs, hold ACEI/ARB due to YESIKA.     #. severe systolic and diastolic congestive heart failure    #. chronic atrial fibrillation (cardioversion 2017), history of MARY GRACE clot ([facility] 8/2017), s/p ablation (9/2017)  -On amiodarone, metoprolol succinate, Eliquis  -Hold amiodarone    #. history of NSVT (12/2020)-continue metoprolol    #. Hypothyroidism-continue levothyroxine    #. Hypertension-continue metoprolol,     #. diabetes mellitus type 2, on insulin  -Patient is on Lantus 20 units nightly, Januvia  -Resume when appropriate    #. Hyperlipidemia-hold atorvastatin due to elevated LFTs. #. obstructive sleep apnea    #. COPD-does not appear to be in exacerbation    #. obesity with BMI 32.03    #. cerebral aneurysm    #. Depression/anxiety  -Duloxetine, BuSpar, lorazepam 0.5 mg every 4 hours as needed. #. recent admission 9/3-9/9/2021 for A. fib with RVR, repeated falls, [facility]     DVT Prophylaxis: On Eliquis  GI Prophylaxis: Not indicated  Code Status: FULL. APRIL Mcdonough is listed as patient's PCP. ED physician contacted Dr. Lluvia Aguillon who stated that patient is currently at [facility] and he does not admit the patient. Patient was admitted to hospitalist service. Later came to know that patient is currently at home. Will contact Dr. Kesha Beachly in the morning to see if he would like to resume patient's care or would like hospitalist service to continue taking care of the patient.       Case d/w ED physician    Zbigniew Galeano MD  Hospitalist, Internal Medicine  10/6/2021 at 3:43 AM

## 2021-10-06 NOTE — ED PROVIDER NOTES
Emergency Department Encounter    Patient: Alisa Perdomo  MRN: [id number]  : 1955  Date of Evaluation: 10/6/2021  ED Provider:  Cynthia Wiley MD    Triage Chief Complaint:   Hypoglycemia    [place]:  Alisa Perdomo is a 60 y.o. female that presents via EMS with hypoglycemia. Patient reportedly found down by family member shortly after getting insulin.  (daughter gave insulin). EMS reports patient was minimally responsive on arrival; patient was brought in from home. ROS - see HPI, below listed is current ROS at time of my eval:  Unable to be obtained secondary to patient's condition    Glucose 25 on arrival    Past Medical History:   Diagnosis Date    Abnormal PFTs (pulmonary function tests) 2014    Atrial fibrillation ([address]     CAD (coronary artery disease)     Carotid aneurysm, right ([address] 2020    s/p fall. referred to [facility] Neurosurgery by Dr Venice Guerrero office    [facility]     Diabetes mellitus [facility]     Dizziness - light-headed     Family history of coronary artery disease     H/O 24 hour EKG monitoring 2012,12/10/2010    2012 predominant rhythm is sinus with short runs of SVT no episode of atrial fib. pt has left bundle branch morphology, max heart is 132, min is 48 with an average of 74 ,infrequent PVCs are seen    H/O cardiovascular stress test 2013, 2012-    H/O cardiovascular stress test 14,2012 EF 52% No ischemia, normal study. 2012 EF 58% tehnique-technetium LVEF is normal globally    H/O echocardiogram 2013 EF 50-55% mild MR, TR 3/13EF 35-45% decreasedr L ventrical systolic function.     H/O echocardiogram 2014-EF50-55% Mild MR/TR. 10/11 EF > 55% LVS function is normal, calcified mitral apparatus, impaired LV relaxation, no PE    Heart imaging 2012 MUGA rest EF 58%LVEF is normal globally    Heartburn     History of cardiac cath 2009, 2006,moderate stenosis  small diagonal    History of cardiac monitoring 2015    Event - sinus rhythm    History of cardioversion 2014    History of MI (myocardial infarction)     History of nuclear MUGA 2020    EF 41%    History of nuclear Muga stress test 2020    EF 41%    History of PTCA 10/06/2010    10/06/2010 stent 3.0x24 taxus stent left main 90% stenosis mid seg reduced to 0% second area of [id number] to 40% not flow limiting EF 40%    Hx of cardiovascular stress test 2015    lexiscan-normal,EF70%    Hx of transesophageal echocardiography (LUKE) for monitoring 2017    Noted to have MARY GRACE Blood Clot.  Hyperlipidemia     Hypertension     Insomnia     SOB (shortness of breath)     Tachycardia 2012    report in epic notes-Dr Nunn hosp. consult    TIA (transient ischemic attack)      Past Surgical History:   Procedure Laterality Date    ABLATION OF DYSRHYTHMIC FOCUS       SECTION      CHOLECYSTECTOMY      DIAGNOSTIC CARDIAC CATH LAB PROCEDURE  2006, 2009 moderate stenosis small diagonal    IR NONTUNNELED VASCULAR CATHETER  2021    IR NONTUNNELED VASCULAR CATHETER 2021 [facility] SPECIAL PROCEDURES    OTHER SURGICAL HISTORY  2017    afib ablation LUKE    PTCA  10/06/2010    10/06/2010 stent 3.0x24 taxus stent 90% stenosis mid segment reduced to 0% second area of stenosis 30-40% not flow limiting EF 40%    TUBAL LIGATION       Family History   Problem Relation Age of Onset    Diabetes Father     Heart Disease Father      Social History     Socioeconomic History    Marital status:      Spouse name: Not on file    Number of children: Not on file    Years of education: Not on file    Highest education level: Not on file   Occupational History    Not on file   Tobacco Use    Smoking status: Former Smoker     Packs/day: 3.00     Years: 20.00     Pack years: 60.00  magnesium oxide (MAG-OX) 400 MG tablet Take 1 tablet by mouth daily 30 tablet 1    LORazepam (ATIVAN) 0.5 MG tablet Take 1 tablet by mouth every 4 hours as needed for Anxiety for up to 30 days. 12 tablet 0    amiodarone (PACERONE) 400 MG tablet Take 1 tablet by mouth 2 times daily for 9 doses 9 tablet 0    amiodarone (CORDARONE) 200 MG tablet Take 1 tablet by mouth 2 times daily 60 tablet 1    insulin glargine (LANTUS) 100 UNIT/ML injection vial Inject 20 Units into the skin nightly Hold Lantus Insulin if H. S.blood glucose < 150,  and call Dr. Staley Seek for a lower dose 15 mL 0    metoprolol succinate (TOPROL XL) 100 MG extended release tablet Take 1 tablet by mouth daily 90 tablet 0    sacubitril-valsartan (ENTRESTO) 24-26 MG per tablet Take 1 tablet by mouth 2 times daily 60 tablet 1    busPIRone (BUSPAR) 10 MG tablet Take 1 tablet by mouth 3 times daily 90 tablet 0    DULoxetine (CYMBALTA) 60 MG extended release capsule Take 1 capsule by mouth daily 30 capsule 3    insulin lispro (HUMALOG) 100 UNIT/ML injection vial Inject 10 Units into the skin 3 times daily (with meals) Please give Humalog Insulin soon after the meal. HOLD Humalog If Pt. Eats < 50% of the meal. OR Pre meal blood glucose < 120. 1 vial 3    losartan (COZAAR) 100 MG tablet Take 1 tablet by mouth daily 30 tablet 3    folic acid-pyridoxine-cyancobalamin (FOLTX) 1.13-25-2 MG TABS Take 1 tablet by mouth daily 30 tablet 0    SITagliptin (JANUVIA) 25 MG tablet Take 1 tablet by mouth daily 30 tablet 3    traZODone (DESYREL) 50 MG tablet Take 50 mg by mouth nightly      nitroGLYCERIN (NITROSTAT) 0.4 MG SL tablet up to max of 3 total doses.  If no relief after 1 dose, call 911. 25 tablet 3    atorvastatin (LIPITOR) 40 MG tablet Take 40 mg by mouth nightly      pantoprazole (PROTONIX) 40 MG tablet Take 40 mg by mouth daily      levothyroxine (SYNTHROID) 50 MCG tablet Take 50 mcg by mouth Daily      apixaban (ELIQUIS) 5 MG TABS tablet Take 1 tablet by mouth 2 times daily 60 tablet 2    aspirin 81 MG EC tablet Take 81 mg by mouth daily       Allergies   Allergen Reactions    Amiodarone Other (See Comments)     dizziness    Iv Dye [Iodides] Nausea And Vomiting    Vicodin [Hydrocodone-Acetaminophen] Nausea And Vomiting       Nursing Notes Reviewed    Physical Exam:  Triage VS:    ED Triage Vitals [10/05/21 2321]   Enc Vitals Group      /84      Pulse 82      Resp 12      Temp       Temp Source Oral      SpO2 99 %      Weight 164 lb (74.4 kg)      Height 5' (1.524 m)      Head Circumference       Peak Flow       Pain Score       Pain Loc       Pain Edu? Excl. in [address]? My pulse ox interpretation is - normal    General appearance: Minimally responsive  Skin:  Warm. Dry. Eye: Opens eyes spontaneously, pupils equal round reactive to light looking around room. Ears, nose, mouth and throat:  Oral mucosa moist   Neck:  Trachea midline. Extremity:  No swelling. Normal ROM     Heart:  Regular rate, normal S1 & S2, no extra heart sounds. Perfusion:  intact  Respiratory:  Lungs clear to auscultation bilaterally; diminished at bases. Respirations nonlabored. Abdominal:  Normal bowel sounds. Soft. Nontender. Non distended. Back:  No CVA tenderness to palpation     Neurological:  Alert and oriented times 3. No focal neuro deficits.              Psychiatric:  Appropriate    I have reviewed and interpreted all of the currently available lab results from this visit (if applicable):  Results for orders placed or performed during the hospital encounter of 10/05/21   CBC Auto Differential   Result Value Ref Range    WBC 11.8 (H) 4.0 - 10.5 K/CU MM    RBC 2.83 (L) 4.2 - 5.4 M/CU MM    Hemoglobin 8.9 (L) 12.5 - 16.0 GM/DL    Hematocrit 28.1 (L) 37 - 47 %    MCV 99.3 78 - 100 FL    MCH 31.4 (H) 27 - 31 PG    MCHC 31.7 (L) 32.0 - 36.0 %    RDW 14.0 11.7 - 14.9 %    Platelets 992 975 - 671 K/CU MM    MPV 10.4 7.5 - 11.1 FL Differential Type AUTOMATED DIFFERENTIAL     Segs Relative 80.9 (H) 36 - 66 %    Lymphocytes % 6.4 (L) 24 - 44 %    Monocytes % 12.1 (H) 0 - 4 %    Eosinophils % 0.0 0 - 3 %    Basophils % 0.0 0 - 1 %    Segs Absolute 9.5 K/CU MM    Lymphocytes Absolute 0.8 K/CU MM    Monocytes Absolute 1.4 K/CU MM    Eosinophils Absolute 0.0 K/CU MM    Basophils Absolute 0.0 K/CU MM    Nucleated RBC % 0.0 %    Total Nucleated RBC 0.0 K/CU MM    Total Immature Neutrophil 0.07 K/CU MM    Immature Neutrophil % 0.6 (H) 0 - 0.43 %   Basic Metabolic Panel w/ Reflex to MG   Result Value Ref Range    Sodium 129 (L) 135 - 145 MMOL/L    Potassium 4.2 3.5 - 5.1 MMOL/L    Chloride 98 (L) 99 - 110 mMol/L    CO2 19 (L) 21 - 32 MMOL/L    Anion Gap 12 4 - 16    BUN 50 (H) 6 - 23 MG/DL    CREATININE 1.7 (H) 0.6 - 1.1 MG/DL    Glucose 318 (H) 70 - 99 MG/DL    Calcium 7.5 (L) 8.3 - 10.6 MG/DL    GFR Non-African American 30 (L) >60 mL/min/1.73m2    GFR  37 (L) >60 mL/min/1.73m2   Hepatic Function Panel   Result Value Ref Range    Albumin 3.2 (L) 3.4 - 5.0 GM/DL    Total Bilirubin 1.4 (H) 0.0 - 1.0 MG/DL    Bilirubin, Direct 1.0 (H) 0.0 - 0.3 MG/DL    Bilirubin, Indirect 0.4 0 - 0.7 MG/DL    Alkaline Phosphatase 177 (H) 40 - 129 IU/L     (H) 15 - 37 IU/L     (H) 10 - 40 U/L    Total Protein 5.4 (L) 6.4 - 8.2 GM/DL   Lipase   Result Value Ref Range    Lipase 11 (L) 13 - 60 IU/L   Brain Natriuretic Peptide   Result Value Ref Range    Pro-BNP 30,805 (H) <300 PG/ML   Blood Gas, Venous   Result Value Ref Range    pH, James 7.30 (L) 7.32 - 7.42    pCO2, James 45 38 - 52 mmHG    pO2, James 163 (H) 28 - 48 mmHG    Base Excess 4 (H) 0 - 2.4    HCO3, Venous 22.1 19 - 25 MMOL/L    O2 Sat, James 94.3 (H) 50 - 70 %    Comment VBG    Lactic Acid, Plasma   Result Value Ref Range    Lactate 1.3 0.4 - 2.0 mMOL/L   Troponin   Result Value Ref Range    Troponin T 0.010 (H) <0.01 NG/ML   POCT Glucose   Result Value Ref Range    POC Glucose 382 (H) 70 - 99 MG/DL   POCT Glucose   Result Value Ref Range    POC Glucose 250 (H) 70 - 99 MG/DL   POCT Glucose   Result Value Ref Range    POC Glucose 25 (L) 70 - 99 MG/DL   POCT Glucose   Result Value Ref Range    POC Glucose 188 (H) 70 - 99 MG/DL   POCT Glucose   Result Value Ref Range    POC Glucose 151 (H) 70 - 99 MG/DL   POCT Glucose   Result Value Ref Range    POC Glucose 139 (H) 70 - 99 MG/DL   EKG 12 Lead   Result Value Ref Range    Ventricular Rate 64 BPM    Atrial Rate 241 BPM    QRS Duration 186 ms    Q-T Interval 542 ms    QTc Calculation (Bazett) 559 ms    R Axis -23 degrees    T Axis 135 degrees    Diagnosis       Atrial flutter with variable AV block  Left bundle branch block  Abnormal ECG  When compared with ECG of 07-SEP-2021 12:23,  Atrial flutter has replaced Sinus rhythm  QRS duration has increased  QT has lengthened        Radiographs (if obtained):  Radiologist's Report Reviewed:  XR CHEST PORTABLE    Result Date: 10/6/2021  EXAMINATION: ONE XRAY VIEW OF THE CHEST 10/5/2021 11:37 pm COMPARISON: 08/01/2021 HISTORY: ORDERING SYSTEM PROVIDED HISTORY: altered level of conciousness TECHNOLOGIST PROVIDED HISTORY: Reason for exam:->altered level of conciousness Reason for Exam: altered level of conciousness Acuity: Acute Type of Exam: Initial FINDINGS: The cardiomediastinal silhouette is mildly enlarged in size and contour. The lungs are clear. No pleural effusion or pneumothorax is present. No acute cardiopulmonary process       EKG (if obtained): (All EKG's are interpreted by myself in the absence of a cardiologist)  EKG showed A flutter with a left bundle branch block    MDM:  31-year-old female with history of atrial fibrillation/flutter, coronary disease, congestive heart failure, diabetes, hypertension, hyperlipidemia, hypothyroidism who presents via EMS with altered level of mentation secondary to hypoglycemia.   On arrival patient was given 1 amp of dextrose 50% x 2 with immediate improvement in mentation. Laboratory analysis did reveal mild hyponatremia sodium of 129, mild leukocytosis WBC 11.9, mild anemia 8.9, acute kidney injury BUN 50 creatinine 1.7, transaminitis; ,  BNP of 30,000, troponin 0 0.010. VBG showed a pH of 7.3 and a PCO2 of 45 along with a bicarb of 22.1, chest x-ray showed no acute cardiopulmonary process. During ED evaluation patient's blood glucose continued to decrease. She was started on dextrose 5% at 50 mL/ hr, she was also given a 500 mL bolus of normal saline. CT head showed no acute intracranial process. Discussed patient's past history, presentation work-up with hospitalist who was agreeable to admission. Clinical Impression:  1. Transaminitis    2. Acute kidney injury ([address]    3. Hypoglycemia    4. Anemia, unspecified type    5. Hyponatremia      Disposition referral (if applicable):  No follow-up provider specified. Disposition medications (if applicable):  New Prescriptions    No medications on file     ED Provider Disposition Time  DISPOSITION Decision To Admit 10/06/2021 12:58:00 AM      Comment: Please note this report has been produced using speech recognition software and may contain errors related to that system including errors in grammar, punctuation, and spelling, as well as words and phrases that may be inappropriate. Efforts were made to edit the dictations.         Andriy Henderson MD  10/06/21 [id number]

## 2021-10-06 NOTE — CONSULTS
11/09/2020    s/p fall. referred to [facility] Neurosurgery by Dr Jenna Subramanian office    [facility]     Diabetes mellitus ([facility]     Dizziness - light-headed     Family history of coronary artery disease     H/O 24 hour EKG monitoring 02/09/2012,12/10/2010    02/09/2012 predominant rhythm is sinus with short runs of SVT no episode of atrial fib. pt has left bundle branch morphology, max heart is 132, min is 48 with an average of 74 ,infrequent PVCs are seen    H/O cardiovascular stress test 4/1/2013, 1/31/2012 4/1/2013-    H/O cardiovascular stress test 4/16/14,02/09/2012 4/16/14 EF 52% No ischemia, normal study. 02/09/2012 EF 58% tehnique-technetium LVEF is normal globally    H/O echocardiogram 03/27/2013 4/14 EF 50-55% mild MR, TR 3/13EF 35-45% decreasedr L ventrical systolic function.  H/O echocardiogram 04/2014 4/14-EF50-55% Mild MR/TR. 10/11 EF > 55% LVS function is normal, calcified mitral apparatus, impaired LV relaxation, no PE    Heart imaging 02/09/2012 02/09/2012 MUGA rest EF 58%LVEF is normal globally    Heartburn     History of cardiac cath 05/08/2009, 06/14/2006 05/08/2009,moderate stenosis  small diagonal    History of cardiac monitoring 02/09/2015    Event - sinus rhythm    History of cardioversion 03/14/2014    History of MI (myocardial infarction)     History of nuclear MUGA 12/03/2020    EF 41%    History of nuclear Muga stress test 07/14/2020    EF 41%    History of PTCA 10/06/2010    10/06/2010 stent 3.0x24 taxus stent left main 90% stenosis mid seg reduced to 0% second area of [id number] to 40% not flow limiting EF 40%    Hx of cardiovascular stress test 08/21/2015    lexiscan-normal,EF70%    Hx of transesophageal echocardiography ([facility]) for monitoring 08/07/2017    Noted to have MARY GRACE Blood Clot.     Hyperlipidemia     Hypertension     Insomnia     SOB (shortness of breath)     Tachycardia 01/30/2012    report in epic notes-Dr Sherrie Lo 1.13-25-2 MG TABS, Take 1 tablet by mouth daily  SITagliptin (JANUVIA) 25 MG tablet, Take 1 tablet by mouth daily  traZODone (DESYREL) 50 MG tablet, Take 50 mg by mouth nightly  nitroGLYCERIN (NITROSTAT) 0.4 MG SL tablet, up to max of 3 total doses. If no relief after 1 dose, call 911.  atorvastatin (LIPITOR) 40 MG tablet, Take 40 mg by mouth nightly  pantoprazole (PROTONIX) 40 MG tablet, Take 40 mg by mouth daily  levothyroxine (SYNTHROID) 50 MCG tablet, Take 50 mcg by mouth Daily  apixaban (ELIQUIS) 5 MG TABS tablet, Take 1 tablet by mouth 2 times daily  aspirin 81 MG EC tablet, Take 81 mg by mouth daily    Allergies:  Amiodarone, Iv dye [iodides], and Vicodin [hydrocodone-acetaminophen]    Social History:   TOBACCO:   reports that she quit smoking about 14 years ago. Her smoking use included cigarettes. She has a 60.00 pack-year smoking history. She has never used smokeless tobacco.  ETOH:   reports no history of alcohol use. Family History:       Problem Relation Age of Onset    Diabetes Father     Heart Disease Father        REVIEW OF SYSTEMS:  CONSTITUTIONAL:  fatigue, denies fever, chills   EYES:  Neg acute change in vision  EARS:  Neg   hearing loss  NOSE:  Neg  rhinorrhea,sneezing,itching,allergy or epistaxis  MOUTH/THROAT:  Neg  sore throat. RESPIRATORY:   Neg SOB,wheeze,cough  CARDIOVASCULAR   Neg : chest pain,palpitations  GASTROINTESTINAL:  Neg nausea,vomiting diarrhea, or abdominal pain. GENITOURINARY:  Neg  Urinary complaints   HEMATOLOGIC/LYMPHATIC:  Neg  Anemia,bleeding tendency  MUSCULOSKELETAL:  Neg   New myalgias,bone pain  NEUROLOGICAL:  Loss of Consciousness, confusion, fatigue  SKIN :  Neg  rashes,sores. PSYCHIATRIC:  Neg depression,personality changes,anxiety.       PHYSICAL EXAM:  BP (!) 140/88   Pulse 79   Temp 97.6 °F (36.4 °C) (Oral)   Resp 18   Ht 5' (1.524 m)   Wt 187 lb 2.7 oz (84.9 kg)   SpO2 100%   BMI 36.55 kg/m²   General appearance: alert, appears stated age, results found for: PTINR    ASSESSMENT / PLAN:     Assessment:  Altered mental status: Improved. Likely 2/2 hypoglycemia. CT head nonacute. Hypoglycemia: Improved on D5. Monitor POC glucose. YESIKA on CKD: Given IVF. Hold nephrotoxic medications nephrology consulted. Hyponatremia: Start on IV fluids. Nephrology consulted. Transaminitis: RUQ ultrasound nonconcerning. Hold statin and amiodarone. Monitor CMP. HFrEF: proBNP elevated 30k. CXR nonacute. Cardiology consulted. Chronic atrial fibrillation: CPM.  Anemia: Monitor Hgb closely. DM2, uncontrolled: POC glucose, hypoglycemia protocol. Start insulin when appropriate. Consult Dr. Paul Michael. Check A1c.  HTN/HLD/CAD: CPM.  Hypothyroidism: CPM  Depression: CPM.  Generalized weakness: PT/OT consult eventually. Plan:  Hypoglycemia improved on D5, monitor POC glucose. Consult endocrine. Nephrology consulted, hold nephrotoxic medications. Labs and imaging studies reviewed. Cardiology consulted. Continue home medications. PT/OT consult eventually. Monitor labs and patient condition. Electronically signed by Keisha Holliday PA-C on 10/6/2021 at 8:15 AM   I have independently evaluated and examined this patient today. I have reviewed radiologic and biochemical tests on this patient. Management Plan is developed mutually with APRIL Mendoza. I have reviewed above note and agree with assessment and plan. Monitor condition closely at this time. Patient is lot more alert, when I examined her: mentally almost at baseline.  Discussed that she should consider environment where she can get 24 hour supervision

## 2021-10-06 NOTE — PLAN OF CARE
Problem:  Activity:  Goal: Ability to tolerate increased activity will improve  Description: Ability to tolerate increased activity will improve  Outcome: Ongoing     Problem: Falls - Risk of:  Goal: Will remain free from falls  Description: Will remain free from falls  Outcome: Ongoing  Goal: Absence of physical injury  Description: Absence of physical injury  Outcome: Ongoing

## 2021-10-07 NOTE — CONSULTS
Endocrinology   Consult Note      Dear Doctor  [facility]    Thank You for the Consult     Pt. Was Admitted for :Hypoglycemia now has hyperglycemia    Reason for Consult: Better control of blood glucose      History Obtained From:  Patient/ EMR       HISTORY OF PRESENT ILLNESS:                The patient is a 60 y.o. female with significant past medical history of atrial fibrillation, had cardioversion done CAD, carotid artery aneurysm, congestive heart failure, diabetes mellitus, pretension, hyperlipidemia, TIAs, was admitted to hospital with. As patient was unresponsive and was severely hypoglycemic. Patient was recently discharged from hospital to nursing home strips were brought back as patient was unresponsive. I was  consulted for better control of blood glucose. ROS:   Pt's ROS done in detail. Abnormal ROS are noted in Medical and Surgical History Section below: Other Medical History:        Diagnosis Date    Abnormal PFTs (pulmonary function tests) 04/2014 4/14    Atrial fibrillation (HCC)     CAD (coronary artery disease)     Carotid aneurysm, right ([address] 11/09/2020    s/p fall. referred to [facility] by Dr Farrukh Elias office    [facility]estive [facility]) [facility]     Diabetes mellitus ([facility]     Dizziness - light-headed     Family history of coronary artery disease     H/O 24 hour EKG monitoring 02/09/2012,12/10/2010    02/09/2012 predominant rhythm is sinus with short runs of SVT no episode of atrial fib. pt has left bundle branch morphology, max heart is 132, min is 48 with an average of 74 ,infrequent PVCs are seen    H/O cardiovascular stress test 4/1/2013, 1/31/2012 4/1/2013-    H/O cardiovascular stress test 4/16/14,02/09/2012 4/16/14 EF 52% No ischemia, normal study. 02/09/2012 EF 58% [employer] LVEF is normal globally    H/O echocardiogram 03/27/2013 4/14 EF 50-55% mild MR, TR 3/13EF 35-45% decreasedr L ventrical systolic function.     H/O echocardiogram SYSTEMS:  Review of System Done as noted above     PHYSICAL EXAM:      Vitals:    BP (!) 147/77   Pulse 97   Temp 98.6 °F (37 °C) (Oral)   Resp 18   Ht 5' (1.524 m)   Wt 187 lb 2.7 oz (84.9 kg)   SpO2 99%   BMI 36.55 kg/m²     CONSTITUTIONAL:  awake, alert, cooperative, appears stated age   EYES:  vision intact Fundoscopic Exam not performed   ENT:Normal  NECK:  Supple, No JVD. Thyroid Exam:Normal   LUNGS:  Has Vesicular Breath Sounds,   CARDIOVASCULAR:  Normal apical impulse, regular rate and rhythm, normal S1 and S2, no S3 or S4, and has no  murmur   ABDOMEN:  No scars, normal bowel sounds, soft, non-distended, non-tender, no masses palpated, no hepatolienomegaly  Musculoskeletal: Normal  Extremities: Normal, peripheral pulses normal, , has no edema   NEUROLOGIC:  Awake, alert, oriented to name, place and time. Cranial nerves II-XII are grossly intact. Motor weakness.   Sensory neuropathy t. ,  and gait is abnormal.  Unstable    DATA:    CBC:   Recent Labs     10/05/21  2331 10/06/21  0829   WBC 11.8* 10.7*   HGB 8.9* 10.6*    291    CMP:  Recent Labs     10/05/21  2331 10/06/21  0829   * 136   K 4.2 4.5   CL 98* 101   CO2 19* 20*   BUN 50* 55*   CREATININE 1.7* 1.7*   CALCIUM 7.5* 8.3   PROT 5.4* 6.3*   LABALBU 3.2* 3.9   BILITOT 1.4* 1.3*   ALKPHOS 177* 228*   * 459*   * 365*     Lipids:   Lab Results   Component Value Date    CHOL 153 01/30/2012    HDL 59 01/30/2012    TRIG 135 01/30/2012     Glucose:   Recent Labs     10/06/21  1157 10/06/21  1553 10/06/21  1727   POCGLU 236* 293* 293*     Hemoglobin A1C:   Lab Results   Component Value Date    LABA1C 10.9 07/17/2021     Free T4:   Lab Results   Component Value Date    T4FREE 1.28 06/12/2021     Free T3: No results found for: FT3  TSH High Sensitivity:   Lab Results   Component Value Date    TSHHS 0.812 09/06/2021       CT Head WO Contrast    Result Date: 10/6/2021  EXAMINATION: CT OF THE HEAD WITHOUT CONTRAST  10/5/2021 6:55 pm     1. No acute intracranial hemorrhage, intra-axial mass, or acute territorial infarct 2. Generalized atrophy and scattered senescent white matter changes again demonstrated 3. Left mastoid effusion, unchanged     XR CHEST PORTABLE    Result Date: 10/6/2021  EXAMINATION: ONE XRAY VIEW OF THE CHEST 10/5/2021 11:37 pm COMPARISON: 08/01/2021 HISTORY: ORDERING SYSTEM PROVIDED HISTORY: altered level of conciousness TECHNOLOGIST PROVIDED HISTORY: Reason for exam:->altered level of conciousness Reason for Exam: altered level of conciousness Acuity: Acute Type of Exam: Initial FINDINGS: The cardiomediastinal silhouette is mildly enlarged in size and contour. The lungs are clear. No pleural effusion or pneumothorax is present. No acute cardiopulmonary process     US ABDOMEN LIMITED Specify organ? GALLBLADDER, LIVER    Result Date: 10/6/2021  EXAMINATION: RIGHT UPPER QUADRANT ULTRASOUND 10/6/2021 5:57 am COMPARISON: CT dated July 15, 2021. HISTORY: ORDERING SYSTEM PROVIDED HISTORY: abnormal LFT's     Decreased penetration of the liver likely represents mild steatosis. No discrete mass lesions. Cholecystectomy.        Scheduled Medicines   Medications:    sodium chloride flush  5-40 mL IntraVENous 2 times per day    furosemide  40 mg IntraVENous BID    apixaban  5 mg Oral BID    aspirin  81 mg Oral Daily    busPIRone  10 mg Oral TID    DULoxetine  60 mg Oral Daily    folic acid-pyridoxine-cyancobalamin  1 tablet Oral Daily    levothyroxine  50 mcg Oral Daily    magnesium oxide  400 mg Oral Daily    metoprolol succinate  100 mg Oral Daily    pantoprazole  40 mg Oral Daily    [START ON 10/7/2021] insulin lispro  10 Units SubCUTAneous TID     insulin glargine  20 Units SubCUTAneous Nightly    [START ON 10/7/2021] insulin lispro  0-12 Units SubCUTAneous TID     insulin lispro  0-12 Units SubCUTAneous 2 times per day      Infusions:    sodium chloride      dextrose           IMPRESSION    Patient Active Problem List   Diagnosis    History of CVA (cerebrovascular accident) without residual deficits    Morbid [facility] [address]    DM [facility] mellitus[facility], [facility]    Persistent atrial fibrillation [facility]    CAD (coronary artery disease)    CHF (congestive heart failure) ([facility]    Light headed    History of MI (myocardial infarction)    Diabetes mellitus ([facility]    Hypertension    TIA (transient ischemic attack)    Hyperlipidemia    SOB (shortness of breath)    H/O cardiovascular stress test    Family history of coronary artery disease    PAF (paroxysmal atrial fibrillation) (HCC)    Chest pain    Atrial thrombus without antecedent myocardial infarction    S/P ablation of atrial fibrillation    Gastroenteritis    YESIKA (acute kidney injury) ([facility]    Acute cystitis    Elevated liver enzymes    CHF (congestive heart failure), NYHA class I, acute on chronic, combined ([facility]    Pneumonia    Multifocal pneumonia    Atrial fibrillation with RVR (HCC)    Shoulder pain    Paroxysmal atrial fibrillation ([facility]    Diabetic hyperosmolar coma (HCC)    Difficult intravenous access    HFrEF (heart failure with reduced ejection fraction) (HCC)    Delirium    Bradycardia    Hypoglycemia         RECOMMENDATIONS:      1. Reviewed POC blood glucose . Labs and X ray results   2. Reviewed Home and Current Medicines   3. Will Start On meal/ Correction bolus Humalog/ Lantus Insulin regime  4. Monitor Blood glucose frequently   5. Modify  the dose of Insulin  as needed        Will follow with you  Again thank you for sharing pt's care with me.      Truly yours,       Bobby Falcon MD

## 2021-10-07 NOTE — PROGRESS NOTES
Cardiology Progress Note     Today's Plan: [facility]     Admit Date:  10/5/2021    Consult reason/ Seen today for: CHF    Subjective and  Overnight Events:  Patient reports improvement of shortness of breath while ambulating. Assessment / Plan / Recommendation:     1. Acute on Ch.systolic heart Failure: Last Echo shows low LVEF. Cont IV  Lasix, Strick I/O Monitoring, Daily weights and Low salt diet. 2. History of severe cardiomyopathy with LVEF around 10 to 15%. ?  Ischemic versus tachycardia induced. Continue with Toprol-XL. Holding off [place] given renal failure. Patient was evaluated by EP a month ago. Patient needs to be on guideline directed medical therapy for at least 3 months before she can be considered for BiV ICD. 3. Coronary disease: Left heart images from 2020 reviewed. Moderate stenosis noted in LAD and RCA. 99% stenosis in diagonal 1 however a small caliber vessel to be managed medically. Continue aspirin beta-blocker  4. Mildly elevated troponin in the setting of renal failure and CHF: In absence of chest pain will not further pursue ischemic work-up at this point. 5. Atrial fibrillation with left bundle branch block on EKG: Rate controlled. S/p ablation in 2017. Continue with medication -  Continue with Eliquis and Toprol-XL. Recommend to resume amiodarone when LFT's improve. History of Presenting Illness:    Chief complain on admission : 60 y. o.year old who is admitted for  Chief Complaint   Patient presents with    Hypoglycemia        Past medical history:    has a past medical history of Abnormal PFTs (pulmonary function tests), Atrial fibrillation ([address] CAD (coronary artery disease), Carotid aneurysm, right ([address] CHF (congestive heart failure) ([address] Diabetes mellitus ([address] Dizziness - light-headed, Family history of coronary artery disease, H/O 24 hour EKG monitoring, H/O cardiovascular stress test, H/O cardiovascular stress test, H/O echocardiogram, H/O echocardiogram, Heart imaging, Heartburn, History of cardiac cath, History of cardiac monitoring, History of cardioversion, History of MI (myocardial infarction), History of nuclear MUGA, History of nuclear Muga stress test, History of PTCA, Hx of cardiovascular stress test, Hx of transesophageal echocardiography (LUKE) for monitoring, Hyperlipidemia, Hypertension, Insomnia, SOB (shortness of breath), Tachycardia, and TIA (transient ischemic attack). Past surgical history:   has a past surgical history that includes  section; Tubal ligation; Cholecystectomy; Diagnostic Cardiac Cath Lab Procedure (2006, 2009); Percutaneous Transluminal Coronary Angio (10/06/2010); other surgical history (2017); ablation of dysrhythmic focus; and IR NONTUNNELED VASCULAR CATHETER > 5 YEARS (2021). Social History:   reports that she quit smoking about 14 years ago. Her smoking use included cigarettes. She has a 60.00 pack-year smoking history. She has never used smokeless tobacco. She reports that she does not drink alcohol and does not use drugs. Family history:  family history includes Diabetes in her father; Heart Disease in her father. Allergies   Allergen Reactions    Amiodarone Other (See Comments)     dizziness    Iv Dye [Iodides] Nausea And Vomiting    Vicodin [Hydrocodone-Acetaminophen] Nausea And Vomiting       Review of Systems:  Review of Systems   Respiratory: Positive for shortness of breath. Cardiovascular: Negative for chest pain, palpitations and leg swelling. Musculoskeletal: Negative. Skin: Negative. Neurological: Negative for dizziness and weakness. All other systems reviewed and are negative.        BP (!) 144/72   Pulse 96   Temp 98.9 °F (37.2 °C) (Oral)   Resp 18   Ht 5' (1.524 m)   Wt 187 lb 2.7 oz (84.9 kg)   SpO2 93%   BMI 36.55 kg/m²       Intake/Output Summary (Last 24 hours) at 10/7/2021 1148  Last data filed at 10/7/2021 0919  Gross per 24 hour   Intake 1090 ml   Output --   Net 1090 ml       Physical Exam:  Physical Exam  Constitutional:       Appearance: She is well-developed. Cardiovascular:      Rate and Rhythm: Normal rate and regular rhythm. Pulses: Intact distal pulses. Dorsalis pedis pulses are 2+ on the right side and 2+ on the left side. Posterior tibial pulses are 2+ on the right side and 2+ on the left side. Heart sounds: Normal heart sounds, S1 normal and S2 normal.   Pulmonary:      Effort: Pulmonary effort is normal.      Breath sounds: Examination of the right-lower field reveals decreased breath sounds. Examination of the left-lower field reveals decreased breath sounds. Decreased breath sounds present. Musculoskeletal:         General: Normal range of motion. Skin:     General: Skin is warm and dry. Neurological:      Mental Status: She is alert and oriented to person, place, and time.           Medications:    sodium chloride flush  5-40 mL IntraVENous 2 times per day    furosemide  40 mg IntraVENous BID    apixaban  5 mg Oral BID    aspirin  81 mg Oral Daily    busPIRone  10 mg Oral TID    DULoxetine  60 mg Oral Daily    folic acid-pyridoxine-cyancobalamin  1 tablet Oral Daily    levothyroxine  50 mcg Oral Daily    magnesium oxide  400 mg Oral Daily    metoprolol succinate  100 mg Oral Daily    pantoprazole  40 mg Oral Daily    insulin lispro  10 Units SubCUTAneous TID WC    insulin glargine  20 Units SubCUTAneous Nightly    insulin lispro  0-12 Units SubCUTAneous TID WC    insulin lispro  0-12 Units SubCUTAneous 2 times per day    traZODone  50 mg Oral Nightly    influenza virus vaccine  0.5 mL IntraMUSCular Prior to discharge      sodium chloride      dextrose       sodium chloride flush, sodium chloride, ondansetron **OR** ondansetron, polyethylene glycol, acetaminophen **OR** acetaminophen, glucose, dextrose, glucagon (rDNA), dextrose    Lab Data:  CBC:   Recent Labs     10/05/21  2331 10/06/21  0829 10/07/21  0751   WBC 11.8* 10.7* 10.1   HGB 8.9* 10.6* 9.8*   HCT 28.1* 34.1* 30.7*   MCV 99.3 101.5* 97.2    291 294     BMP:   Recent Labs     10/05/21  2331 10/06/21  0829 10/07/21  0751   * 136 138   K 4.2 4.5 3.5   CL 98* 101 102   CO2 19* 20* 23   PHOS  --   --  4.2   BUN 50* 55* 52*   CREATININE 1.7* 1.7* 1.9*     PT/INR: No results for input(s): PROTIME, INR in the last 72 hours. BNP:    Recent Labs     10/05/21  2331   PROBNP 30,805*     TROPONIN:   Recent Labs     10/05/21  2331 10/06/21  0829   TROPONINT 0.010* 0.014*        Impression:  Active Problems:    Hypoglycemia  Resolved Problems:    * No resolved hospital problems. *       All labs, medications and tests reviewed by myself, continue all other medications of all above medical condition listed as is except for changes mentioned above. Thank you   Please call with questions. Electronically signed by RENE Garcia CNP on 10/7/2021 at 11:48 AM           CARDIOLOGY ATTENDING ADDENDUM    I have seen, spoken to and examined this patient personally, independently of the nurse practitioner. I have reviewed the hospital care given to date and reviewed all pertinent labs and imaging. The plan was developed mutually at the time of the visit with the patient,  NP   and myself. I have spoken with patient, nursing staff and provided written and verbal instructions . The above note has been reviewed and I agree with the assessment, diagnosis, and treatment plan with changes made by me as follows       HPI:  I have reviewed the above HPI  And agree with above   Please review addendum/changes made to note above     Interval history:         Physical Exam:  General:  Awake, alert, NAD  Head:normal  Eye:normal  Neck:  No JVD   Chest:  Clear to auscultation, respiration easy  Cardiovascular:  s1s2  Abdomen:   nontender  Extremities:  +1 edema  Pulses; palpable  Neuro: grossly normal      MEDICAL DECISION MAKING;    I agree with the above plan, which was planned by myself and discussed with NP. Creatinine has started to creep up. Likely secondary to cardiorenal syndrome. Continue with Lasix for now. Nephrology also following. Patient is very close to euvolemia.   Ronni Fuentes is currently on hold  Continue with Toprol-XL      Dr. Lili Neely MD

## 2021-10-07 NOTE — PLAN OF CARE
Problem: Activity:  Goal: Ability to tolerate increased activity will improve  Description: Ability to tolerate increased activity will improve  Outcome: Ongoing     Problem: Falls - Risk of:  Goal: Will remain free from falls  Description: Will remain free from falls  Outcome: Ongoing  Goal: Absence of physical injury  Description: Absence of physical injury  Outcome: Ongoing     Problem: Discharge Planning:  Goal: Discharged to appropriate level of care  Description: Discharged to appropriate level of care  Outcome: Ongoing  Goal: Participates in care planning  Description: Participates in care planning  Outcome: Ongoing     Problem: Serum Glucose Level - Abnormal:  Goal: Ability to maintain appropriate glucose levels will improve  Description: Ability to maintain appropriate glucose levels will improve  Outcome: Ongoing  Goal: Ability to maintain appropriate glucose levels will improve to within specified parameters  Description: Ability to maintain appropriate glucose levels will improve to within specified parameters  Outcome: Ongoing     Problem: Sensory Perception - Impaired:  Goal: Ability to maintain a stable neurologic state will improve  Description: Ability to maintain a stable neurologic state will improve  Outcome: Ongoing     Problem: Cardiac Output - Decreased:  Goal: Hemodynamic stability will improve  Description: Hemodynamic stability will improve  Outcome: Ongoing     Problem: Activity Intolerance:  Goal: Ability to tolerate increased activity will improve  Description: Ability to tolerate increased activity will improve  Outcome: Ongoing     Problem: Anxiety/Stress:  Goal: Level of anxiety will decrease  Description: Level of anxiety will decrease  Outcome: Ongoing     Problem:  Bowel Function - Altered:  Goal: Bowel elimination is within specified parameters  Description: Bowel elimination is within specified parameters  Outcome: Ongoing     Problem: Fluid Volume - Deficit:  Goal: Absence of fluid volume deficit signs and symptoms  Description: Absence of fluid volume deficit signs and symptoms  Outcome: Ongoing  Goal: Electrolytes within specified parameters  Description: Electrolytes within specified parameters  Outcome: Ongoing     Problem: Mental Status - Impaired:  Goal: Absence of physical injury  Description: Absence of physical injury  Outcome: Ongoing  Goal: Absence of continued neurological deterioration signs and symptoms  Description: Absence of continued neurological deterioration signs and symptoms  Outcome: Ongoing  Goal: Mental status will be restored to baseline  Description: Mental status will be restored to baseline  Outcome: Ongoing     Problem: Mobility - Impaired:  Goal: Mobility will improve to maximum level  Description: Mobility will improve to maximum level  Outcome: Ongoing     Problem: Nutrition Deficit:  Goal: Ability to achieve adequate nutritional intake will improve  Description: Ability to achieve adequate nutritional intake will improve  Outcome: Ongoing     Problem: Pain:  Goal: Pain level will decrease  Description: Pain level will decrease  Outcome: Ongoing  Goal: Ability to notify healthcare provider of pain before it becomes unmanageable or unbearable will improve  Description: Ability to notify healthcare provider of pain before it becomes unmanageable or unbearable will improve  Outcome: Ongoing  Goal: Control of acute pain  Description: Control of acute pain  Outcome: Ongoing  Goal: Control of chronic pain  Description: Control of chronic pain  Outcome: Ongoing     Problem: Skin Integrity - Impaired:  Goal: Will show no infection signs and symptoms  Description: Will show no infection signs and symptoms  Outcome: Ongoing  Goal: Absence of new skin breakdown  Description: Absence of new skin breakdown  Outcome: Ongoing     Problem: Sleep Pattern Disturbance:  Goal: Appears well-rested  Description: Appears well-rested  Outcome: Ongoing

## 2021-10-07 NOTE — PROGRESS NOTES
Progress Note( Dr. Roselyn Montenegro)  10/7/2021  Subjective:   Admit Date: 10/5/2021  PCP: Danny Fagan PA-C    Admitted For :Hypoglycemia now has hyperglycemia    Consulted For: Better control of blood glucose    Interval History: Feels somewhat better more alert and awake    Denies any chest pains,   Mild SOB . Denies nausea or vomiting. No new bowel or bladder symptoms. Intake/Output Summary (Last 24 hours) at 10/7/2021 0704  Last data filed at 10/6/2021 1300  Gross per 24 hour   Intake 350 ml   Output --   Net 350 ml       DATA    CBC:   Recent Labs     10/05/21  2331 10/06/21  0829   WBC 11.8* 10.7*   HGB 8.9* 10.6*    291    CMP:  Recent Labs     10/05/21  2331 10/06/21  0829   * 136   K 4.2 4.5   CL 98* 101   CO2 19* 20*   BUN 50* 55*   CREATININE 1.7* 1.7*   CALCIUM 7.5* 8.3   PROT 5.4* 6.3*   LABALBU 3.2* 3.9   BILITOT 1.4* 1.3*   ALKPHOS 177* 228*   * 459*   * 365*     Lipids:   Lab Results   Component Value Date    CHOL 153 01/30/2012    HDL 59 01/30/2012    TRIG 135 01/30/2012     Glucose:  Recent Labs     10/06/21  1727 10/06/21  2030 10/07/21  0230   POCGLU 293* 275* 115*     HqbvmkgiodY2Y:  Lab Results   Component Value Date    LABA1C 10.9 07/17/2021     High Sensitivity TSH:   Lab Results   Component Value Date    TSHHS 0.812 09/06/2021     Free T3: No results found for: FT3  Free T4:  Lab Results   Component Value Date    T4FREE 1.28 06/12/2021       CT Head WO Contrast    Result Date: 10/6/2021  EXAMINATION: CT OF THE HEAD WITHOUT CONTRAST  10/5/2021 6:55 pm     1. No acute intracranial hemorrhage, intra-axial mass, or acute territorial infarct 2. Generalized atrophy and scattered senescent white matter changes again demonstrated 3. Left mastoid effusion, unchanged     XR CHEST PORTABLE    Result Date: 10/6/2021  EXAMINATION: ONE XRAY VIEW OF THE CHEST 10/5/2021 11:37 pm   No acute cardiopulmonary process     US ABDOMEN LIMITED Specify organ?  GALLBLADDER, LIVER    Result Date: 10/7/2021  EXAMINATION: RIGHT UPPER QUADRANT ULTRASOUND 10/6/2021 5:57 am    Decreased penetration of the liver likely represents mild steatosis. No discrete mass lesions. Cholecystectomy. Scheduled Medicines   Medications:    sodium chloride flush  5-40 mL IntraVENous 2 times per day    furosemide  40 mg IntraVENous BID    apixaban  5 mg Oral BID    aspirin  81 mg Oral Daily    busPIRone  10 mg Oral TID    DULoxetine  60 mg Oral Daily    folic acid-pyridoxine-cyancobalamin  1 tablet Oral Daily    levothyroxine  50 mcg Oral Daily    magnesium oxide  400 mg Oral Daily    metoprolol succinate  100 mg Oral Daily    pantoprazole  40 mg Oral Daily    insulin lispro  10 Units SubCUTAneous TID WC    insulin glargine  20 Units SubCUTAneous Nightly    insulin lispro  0-12 Units SubCUTAneous TID WC    insulin lispro  0-12 Units SubCUTAneous 2 times per day    traZODone  50 mg Oral Nightly    influenza virus vaccine  0.5 mL IntraMUSCular Prior to discharge      Infusions:    sodium chloride      dextrose           Objective:   Vitals: /63   Pulse 77   Temp 98.4 °F (36.9 °C) (Oral)   Resp 18   Ht 5' (1.524 m)   Wt 187 lb 2.7 oz (84.9 kg)   SpO2 91%   BMI 36.55 kg/m²   General appearance: alert and cooperative with exam  Neck: no JVD or bruit  Thyroid : Normal lobes   Lungs: Has Vesicular Breath sounds   Heart:  regular rate and rhythm  Abdomen: soft, non-tender; bowel sounds normal; no masses,  no organomegaly  Musculoskeletal: Normal  Extremities: extremities normal, , no edema  Neurologic:  Awake, alert, oriented to name, place and time. Cranial nerves II-XII are grossly intact. Motor is  intact.   Sensory neuropathy,  and gait is abnormal.  And unstable    Assessment:     Patient Active Problem List:     History of CVA (cerebrovascular accident) without residual deficits     Morbid obesity ([address]     DM (diabetes mellitus), type 2, uncontrolled ([address]     Persistent atrial fibrillation (HCC)     CAD (coronary artery disease)     CHF (congestive heart failure) (HCC)     Light headed     History of MI (myocardial infarction)     Diabetes mellitus ([facility]     Hypertension     TIA (transient ischemic attack)     Hyperlipidemia     SOB (shortness of breath)     H/O cardiovascular stress test     Family history of coronary artery disease     PAF (paroxysmal atrial fibrillation) (HCC)     Chest pain     Atrial thrombus without antecedent myocardial infarction     S/P ablation of atrial fibrillation     Gastroenteritis     YESIKA (acute kidney injury) ([facility]     Acute cystitis     Elevated liver enzymes     CHF (congestive heart failure), NYHA class I, acute on chronic, combined ([facility]     Pneumonia     Multifocal pneumonia     Atrial fibrillation with RVR (HCC)     Shoulder pain     Paroxysmal atrial fibrillation (HCC)     Diabetic hyperosmolar coma (HCC)     Difficult intravenous access     HFrEF (heart failure with reduced ejection fraction) [facility]     Delirium     Bradycardia     Hypoglycemia      Plan:     1. Reviewed POC blood glucose . Labs and X ray results   2. Reviewed Current Medicines   3. On meal/ Correction bolus Humalog/ Basal Lantus Insulin regime   4. Monitor Blood glucose frequently   5. Modified  the dose of Insulin/ other medicines as needed   6. Will follow     .      Kelly Knapp MD, MD

## 2021-10-07 NOTE — PROGRESS NOTES
Normal  · Coordination: WFL for ADLs  · Perception: WNL    Activities of Daily Living (ADLs):  · Feeding: Independent   · Grooming: SBA (able to complete in standing at sink)  · UB bathing: SBA   · LB bathing: CGA (light dynamic standing balance for safety with reaching bottom)  · UB dressing: SBA (donning clean robe seated EOB)  · LB dressing: CGA (light dynamic standing balance for safety with mgmt of clothing to hips, able to don BL socks seated EOB SBA by crossing legs into \"figure 4 position\")  · Toileting: CGA    Cognitive and Psychosocial Functioning:  · Overall cognitive status: WFL (grossly; some decreased overall insight/safety awareness)  · Affect: Normal     Balance:   · Sitting: SBA in unsupported sitting EOB  · Standing: SBA with RW    Functional Mobility:  · Bed Mobility: SBA supine to sitting EOB, SBA sitting EOB to supine (HOB elevated to 10', utilized bed rail each direction)  · Transfers: CGA for safety to/from bed (min cues for reaching back with arms during controlled descent)  · Ambulation: CGA with  ft in hallway; steady throughout, no LOB, good overall tolerance      AM-PAC 6 click short form for inpatient daily activity:   How much help from another person does the patient currently need. .. Unable  Dep A Lot  Max A A Lot   Mod A A Little  Min A A Little   CGA  SBA None   Mod I  Indep  Sup   1. Putting on and taking off regular lower body clothing? [] 1    [] 2   [] 2   [] 3   [x] 3   [] 4      2. Bathing (including washing, rinsing, drying)? [] 1   [] 2   [] 2 [] 3 [x] 3 [] 4   3. Toileting, which includes using toilet, bedpan, or urinal? [] 1    [] 2   [] 2   [] 3   [x] 3   [] 4     4. Putting on and taking off regular upper body clothing? [] 1   [] 2   [] 2   [] 3   [] 3    [x] 4      5. Taking care of personal grooming such as brushing teeth? [] 1   [] 2    [] 2 [] 3    [] 3   [x] 4      6. Eating meals?    [] 1   [] 2   [] 2   [] 3   [] 3   [x] 4      Raw Score:  21    [24=0% impaired(CH), 23=1-19%(CI), 20-22=20-39%(CJ), 15-19=40-59%(CK), 10-14=60-79%(CL), 7-9=80-99%(CM), 6=100%(CN)]     Treatment:  Therapeutic Activity Training:   Therapeutic activity training was instructed today. Cues were given for safety, sequence, UE/LE placement, awareness, and balance. Activities performed today included bed mobility training, UB/LB dressing tasks, safe transfer training, household mobility with RW, education on role of OT, POC, importance of EOB/OOB activity, checking blood sugar, d/c planning and recommendations       Safety Measures: Gait belt used, Left in Bed, Alarm in place    Assessment:  Pt is a 60year old female with a past medical history of Abnormal PFTs (pulmonary function tests), Atrial fibrillation ([facility] CAD (coronary artery disease), Carotid aneurysm, right ([address] CHF (congestive heart failure) ([facility] Diabetes mellitus ([facility] Dizziness - light-headed, Family history of coronary artery disease, H/O 24 hour EKG monitoring, H/O cardiovascular stress test, H/O cardiovascular stress test, H/O echocardiogram, H/O echocardiogram, Heart imaging, Heartburn, History of cardiac cath, History of cardiac monitoring, History of cardioversion, History of MI (myocardial infarction), History of nuclear MUGA, History of nuclear Muga stress test, History of PTCA, Hx of cardiovascular stress test, Hx of transesophageal echocardiography ([facility]) for monitoring, Hyperlipidemia, Hypertension, Insomnia, SOB (shortness of breath), Tachycardia, and TIA (transient ischemic attack). Pt admitted after being found on the floor and diagnosed with hypoglycemia and YESIKA. Pt lives at home alone and prior to admission, was independent with ADLs and ambulating mod I with a RW. Pt performed well this date; recommend discharge to home with initial 24 hour supervision and [facility] OT services (S Level 3). Complexity: Moderate  Prognosis: Good  Plan: 2+x/week      Goals:  1.  Pt will complete all aspects of bed mobility for EOB/OOB ADLs mod I with HOB flat  2. Pt will complete UB/LB bathing with supervision  3. Pt will complete all aspects of LB dressing with supervision  4. Pt will complete all functional transfers to and from bed, chair, toilet, shower chair mod I with use of grab bars PRN  5. Pt will ambulate HH distance to bathroom for toileting mod I with RW  6. Pt will complete all aspects of toileting task with supervision  7.  Pt will complete oral hygiene/grooming routine in standing at sink with supervision      Time:   Time in: 946  Time out: 1006  Timed treatment minutes: 10  Total time: 20      Electronically signed by:    DELIO Stewart/L, [address] [place] [address], .[id number]

## 2021-10-07 NOTE — PROGRESS NOTES
INTERNAL MEDICINE PROGRESS NOTE        Grayson Deanna Buckleyycjacoby   1955   Primary Care Physician:  Sincere Ellis PA-C  Admit Date: 10/5/2021     Subjective:   Patient awake, feeling a little better today. She is alert and oriented. Blood glucose is under better control. IV fluids have been stopped. She voices no acute concerns.       Objective:   /63   Pulse 77   Temp 98.4 °F (36.9 °C) (Oral)   Resp 18   Ht 5' (1.524 m)   Wt 187 lb 2.7 oz (84.9 kg)   SpO2 91%   BMI 36.55 kg/m²    General appearance: alert, appears stated age and cooperative  Head: Normocephalic, without obvious abnormality, atraumatic  Neck: no adenopathy and supple, symmetrical, trachea midline  Lungs: clear to auscultation bilaterally  Heart: irregularly, irregular  Abdomen: soft, non-tender; bowel sounds normal  Extremities: no clubbing, cyanosis with trace edema  Neurologic: No focal deficits    Data Review  Lab Results   Component Value Date     10/06/2021    K 4.5 10/06/2021     10/06/2021    CO2 20 (L) 10/06/2021    CREATININE 1.7 (H) 10/06/2021    BUN 55 (H) 10/06/2021    CALCIUM 8.3 10/06/2021     Lab Results   Component Value Date    WBC 10.7 (H) 10/06/2021    HGB 10.6 (L) 10/06/2021    HCT 34.1 (L) 10/06/2021    .5 (H) 10/06/2021     10/06/2021     INR/Prothrombin Time      Meds:    sodium chloride flush  5-40 mL IntraVENous 2 times per day    furosemide  40 mg IntraVENous BID    apixaban  5 mg Oral BID    aspirin  81 mg Oral Daily    busPIRone  10 mg Oral TID    DULoxetine  60 mg Oral Daily    folic acid-pyridoxine-cyancobalamin  1 tablet Oral Daily    levothyroxine  50 mcg Oral Daily    magnesium oxide  400 mg Oral Daily    metoprolol succinate  100 mg Oral Daily    pantoprazole  40 mg Oral Daily    insulin lispro  10 Units SubCUTAneous TID WC    insulin glargine  20 Units SubCUTAneous Nightly    insulin lispro  0-12 Units SubCUTAneous TID WC    insulin lispro  0-12 Units SubCUTAneous 2 times per day    traZODone  50 mg Oral Nightly    influenza virus vaccine  0.5 mL IntraMUSCular Prior to discharge     PRN Meds: sodium chloride flush, sodium chloride, ondansetron **OR** ondansetron, polyethylene glycol, acetaminophen **OR** acetaminophen, glucose, dextrose, glucagon (rDNA), dextrose    Assessment/Plan:   Patient Active Hospital Problem List:  Patient Active Problem List   Diagnosis    History of CVA (cerebrovascular accident) without residual deficits    [facility]bid [facility]    DM (diabetes mellitus), type 2, uncontrolled ([facility]    Persistent atrial fibrillation ([facility]    CAD (coronary artery disease)    CHF (congestive heart failure) ([facility]    Light headed    History of MI (myocardial infarction)    Diabetes mellitus ([facility]    Hypertension    TIA (transient ischemic attack)    Hyperlipidemia    SOB (shortness of breath)    H/O cardiovascular stress test    Family history of coronary artery disease    PA[facility]oxysmal [facility] fibrillation) [facility]    Chest pain    Atrial thrombus without antecedent myocardial infarction    S/P ablation of atrial fibrillation    Gastroenteritis    [facility]    Acute cystitis    Elevated liver enzymes    CHF (congestive heart failure), NYHA class I, acute on chronic, combined ([facility]    Pneumonia    Multifocal pneumonia    Atrial fibrillation with RVR ([facility])    Shoulder pain    [facility]mal [facility] fibrillation [facility]    Diabetic hyperosmolar coma ([facility]    Difficult intravenous access    HFrEF (heart failure with reduced ejection fraction) ([facility]    Delirium    Bradycardia    Hypoglycemia     Assessment:  Altered mental status: Improved. Likely 2/2 hypoglycemia. CT head nonacute. Hypoglycemia: Improved on D5. Off D5. Monitor POC glucose. YESIKA on CKD:  Fluids held. On diuresis per nephrology. Hyponatremia:  Off IV fluids. Nephrology consulted. Transaminitis: RUQ ultrasound nonconcerning.   Hold statin and amiodarone. Monitor CMP. HFrEF: proBNP elevated 30k. CXR nonacute. Diuresis per nephrology. Cardiology following. Chronic atrial fibrillation: CPM.  Anemia: Monitor Hgb closely. DM2, uncontrolled: POC glucose, hypoglycemia protocol. Endocrinology following. Insulin has been restarted. HTN/HLD/CAD: CPM.  Hypothyroidism: CPM  Depression: CPM.  Generalized weakness: PT/OT consults. CM for discharge planning.     Plan:  Pending a.m. blood work. Hypoglycemia improved. Insulin has been restarted per endocrine. Off IV fluids. Diuresis per nephrology. Cardiology note reviewed. PT/OT consults. CM for assistance with discharge planning. Monitor labs and patient condition. Electronically signed by Jose L Simmons PA-C on 10/7/2021 at 8:29 AM   I have independently evaluated and examined this patient today. I have reviewed radiologic and biochemical tests on this patient. Management Plan is developed mutually with APRIL Adams. I have reviewed above note and agree with assessment and plan. Monitor for now.

## 2021-10-07 NOTE — PROGRESS NOTES
Nephrology Progress Note  10/7/2021 3:12 PM        Subjective:   Admit Date: 10/5/2021  PCP: Karissa Oliveira PA-C    Interval History: Patient seen in early morning, this is a late entry    Diet: She says she is eating some    ROS: No overt shortness of breath  Unknown urine output      Data:     Current meds:    sodium chloride flush  5-40 mL IntraVENous 2 times per day    furosemide  40 mg IntraVENous BID    apixaban  5 mg Oral BID    aspirin  81 mg Oral Daily    busPIRone  10 mg Oral TID    DULoxetine  60 mg Oral Daily    folic acid-pyridoxine-cyancobalamin  1 tablet Oral Daily    levothyroxine  50 mcg Oral Daily    magnesium oxide  400 mg Oral Daily    metoprolol succinate  100 mg Oral Daily    pantoprazole  40 mg Oral Daily    insulin lispro  10 Units SubCUTAneous TID WC    insulin glargine  20 Units SubCUTAneous Nightly    insulin lispro  0-12 Units SubCUTAneous TID WC    insulin lispro  0-12 Units SubCUTAneous 2 times per day    traZODone  50 mg Oral Nightly    influenza virus vaccine  0.5 mL IntraMUSCular Prior to discharge      sodium chloride      dextrose           I/O last 3 completed shifts: In: 740 [P.O.:730;  I.V.:10]  Out: -     CBC:   Recent Labs     10/05/21  2331 10/06/21  0829 10/07/21  0751   WBC 11.8* 10.7* 10.1   HGB 8.9* 10.6* 9.8*    291 294          Recent Labs     10/05/21  2331 10/06/21  0829 10/07/21  0751   * 136 138   K 4.2 4.5 3.5   CL 98* 101 102   CO2 19* 20* 23   BUN 50* 55* 52*   CREATININE 1.7* 1.7* 1.9*   GLUCOSE 318* 158* 103*       Lab Results   Component Value Date    CALCIUM 7.8 (L) 10/07/2021    PHOS 4.2 10/07/2021       Objective:     Vitals: /61   Pulse 81   Temp 98.4 °F (36.9 °C)   Resp 17   Ht 5' (1.524 m)   Wt 187 lb 2.7 oz (84.9 kg)   SpO2 96%   BMI 36.55 kg/m²     General appearance: He is alert awake and seems at least partially oriented-no respiratory distress  HEENT: No gross conjunctival pallor  Neck: Supple  Lungs: He has some adventitious breath sound  Heart: Irregularly irregular rhythm  Abdomen: Soft, nontender on superficial palpation  Extremities: No overt lower extremity edema      Problem List :         Impression :     1. Acute kidney injury with underlying CKD stage III-unknown urine output-her urine did not have any active sediment-I think all is from cardiorenal syndrome type I-unless she has infection  2. Ischemic cardiomyopathy without overt pulmonary edema-  3. Chronic atrial fibrillation, atherosclerotic cardiovascular disease and hypertension  4. Hyperglycemia-which of course led her to the admission-acceptable now    Recommendation/Plan  :     1. We will redo a proBNP level tomorrow  2. Also repeat chest x-ray today  3. Is difficult to assess intravascular status-she might be having delayed with\" capillary plasma refill\"  4. We will redo the renal function tomorrow morning  5. Daily weight  6.  Measuring urine output would have been helpful      Mariela Matthews MD MD

## 2021-10-07 NOTE — CONSULTS
[address] [facility], 1955, 4106/4106-A, 10/7/2021    History  [place]:  The primary encounter diagnosis was Transaminitis. Diagnoses of Acute kidney injury ([address] Hypoglycemia, Anemia, unspecified type, and Hyponatremia were also pertinent to this visit. Patient  has a past medical history of Abnormal PFTs (pulmonary function tests), Atrial fibrillation ([address] CAD (coronary artery disease), Carotid aneurysm, right ([address] CHF (congestive heart failure) ([address] Diabetes mellitus ([address] Dizziness - light-headed, Family history of coronary artery disease, H/O 24 hour EKG monitoring, H/O cardiovascular stress test, H/O cardiovascular stress test, H/O echocardiogram, H/O echocardiogram, Heart imaging, Heartburn, History of cardiac cath, History of cardiac monitoring, History of cardioversion, History of MI (myocardial infarction), History of nuclear MUGA, History of nuclear Muga stress test, History of PTCA, Hx of cardiovascular stress test, Hx of transesophageal echocardiography ([facility]) for monitoring, Hyperlipidemia, Hypertension, Insomnia, SOB (shortness of breath), Tachycardia, and TIA (transient ischemic attack). Patient  has a past surgical history that includes  section; Tubal ligation; Cholecystectomy; Diagnostic Cardiac Cath Lab Procedure (2006, 2009); Percutaneous Transluminal Coronary Angio (10/06/2010); other surgical history (2017); ablation of dysrhythmic focus; and IR NONTUNNELED VASCULAR CATHETER > 5 YEARS (2021). Subjective:  Patient states:  \"I have to use the restroom. \"    Pain:  Denies pain.     Communication with other providers:  Handoff to RN  Restrictions: general precautions, fall risk    Home Setup/Prior level of function  Social/Functional History  Lives With: Alone  Type of Home: Apartment (3rd floor apartment with elevator)  Home Layout: One level  Home Access: Ramped entrance, Elevator  Bathroom Shower/Tub: Walk-in shower  Bathroom Toilet: Handicap height  Bathroom Equipment: Grab bars in shower, Grab bars around toilet  ADL Assistance: [address]: Needs assistance (daughter primarily manages)  Homemaking Responsibilities: No  Ambulation Assistance: Independent (mod I with RW)  Transfer Assistance: Independent  Active : No  Occupation: Retired  Additional Comments: Above information is for pt's new apartment where she will be moving into at discharge. Apartment more accessible than previous living situation. Examination of body systems (includes body structures/functions, activity/participation limitations):  · Observation:  Pt up in chair with call light on upon arrival and agreeable to therapy  · Vision:  [employer]  · Hearing:  [employer]  · Cardiopulmonary:  No O2 needs  · Cognition: WFL, see OT/SLP note for further evaluation. Musculoskeletal  · ROM R/L:  WFL. · Strength R/L:  4+/5, minimal impairment in function and endurance. · Neuro:  [employer]      Mobility:  · Rolling L/R:  NT, pt up in chair at beginning and end of session  · Supine to sit:  NT, pt up in chair at beginning and end of session  · Transfers: Pt completed STS to/from bed and commode SBA with safe sequencing  · Sitting balance:  good. · Standing balance:  Fair+. · Gait: Pt ambulated 15' x2 with RW SBA with decreased saige but no other gait abnormalities. Further gait deferred due to lunch arriving. [facility] 6 Clicks Inpatient Mobility:  AM-PAC Inpatient Mobility Raw Score : 18    Safety: patient left in chair with alarm on, call light within reach, RN notified, gait belt used. Assessment:  Pt is a 60 y.o. female admitted to the hospital for hypoglycemia. Pt is typically mod I with all transfers and ambulation with RW. Pt is currently performing transfers SBA and ambulating SBA with RW.  Pt is functioning close to baseline and would benefit from continued acute care PT [facility] to continue to address impairments. Complexity: moderate    Prognosis: Good, no significant barriers to participation at this time.      Plan Times per week: 2+/week     Equipment: none, pt owns all necessary equipment    Goals:  Short term goals  Time Frame for Short term goals: 1 week  Short term goal 1: Pt to complete all bed mobility mod I  Short term goal 2: Pt to complete all transfers to/from bed, commode, and chair mod I  Short term goal 3: Pt to ambulate 48' with LRAD mod I       Treatment plan:  Bed mobility, transfers, balance, gait, TA, TX    Recommendations for NURSING mobility: amb with gait belt and RW    Time:   Time in: 1230  Time out: 1240  Timed treatment minutes: 0  Total time: 10    Electronically signed by:    Yoana Vernon PT  10/7/2021, 1:07 PM

## 2021-10-07 NOTE — PROGRESS NOTES
Physician Progress Note      Dee Hewitt  CSN #:                  [id number]  :                       1955  ADMIT DATE:       10/5/2021 11:20 PM  DISCH DATE:  RESPONDING  PROVIDER #:        SOCO ALLAN PA-C          QUERY TEXT:    Pt admitted with hypoglycemia. Pt noted to have lethargy. If possible, please   document in the progress notes and discharge summary if you are evaluating and   / or treating any of the following: The medical record reflects the following:  Risk Factors: hypoglycemia, hypothermia, non-anion gap metabolic acidosis  Clinical Indicators: H&P reflects \"Unresponsive: Could be secondary to   hypoglycemia  -Patient is currently lethargic, arousable on calling her name,   hard to keep her awake. \" T 97.6 - 95.9, glucose 25 - 382. Treatment: Head CT, VBG, 2 amps of D50 then dextrose gtt, serial glucose POC   and serum. Thank you,  EMILIA ShenN, RN, CDS  [phone]  Options provided:  -- Metabolic encephalopathy  -- Other - I will add my own diagnosis  -- Disagree - Not applicable / Not valid  -- Disagree - Clinically unable to determine / Unknown  -- Refer to Clinical Documentation Reviewer    PROVIDER RESPONSE TEXT:    This patient has metabolic encephalopathy.     Query created by: Gabrielle Cooper on 10/6/2021 9:23 AM      Electronically signed by:  Caitlin Maciel PA-C 10/7/2021 10:45 AM

## 2021-10-08 NOTE — PROGRESS NOTES
Nephrology Progress Note  10/8/2021 4:00 PM        Subjective:   Admit Date: 10/5/2021  PCP: Suzanna Mathur PA-C    Interval History: Patient seen in early morning, this is a late entry    Diet: Some per patient    ROS: No shortness of breath  Unknown urine output      Data:     Current meds:    sodium chloride flush  5-40 mL IntraVENous 2 times per day    furosemide  40 mg IntraVENous BID    apixaban  5 mg Oral BID    aspirin  81 mg Oral Daily    busPIRone  10 mg Oral TID    DULoxetine  60 mg Oral Daily    folic acid-pyridoxine-cyancobalamin  1 tablet Oral Daily    levothyroxine  50 mcg Oral Daily    magnesium oxide  400 mg Oral Daily    metoprolol succinate  100 mg Oral Daily    pantoprazole  40 mg Oral Daily    insulin lispro  10 Units SubCUTAneous TID WC    insulin glargine  20 Units SubCUTAneous Nightly    insulin lispro  0-12 Units SubCUTAneous TID WC    insulin lispro  0-12 Units SubCUTAneous 2 times per day    traZODone  50 mg Oral Nightly    influenza virus vaccine  0.5 mL IntraMUSCular Prior to discharge      sodium chloride      dextrose           No intake/output data recorded.     CBC:   Recent Labs     10/06/21  0829 10/07/21  0751 10/08/21  0427   WBC 10.7* 10.1 9.2   HGB 10.6* 9.8* 10.0*    294 296          Recent Labs     10/06/21  0829 10/07/21  0751 10/08/21  0427    138 140   K 4.5 3.5 3.2*    102 101   CO2 20* 23 29   BUN 55* 52* 41*   CREATININE 1.7* 1.9* 2.0*   GLUCOSE 158* 103* 118*       Lab Results   Component Value Date    CALCIUM 7.8 (L) 10/08/2021    PHOS 3.4 10/08/2021       Objective:     Vitals: BP (!) 144/95   Pulse 101   Temp 98.2 °F (36.8 °C) (Oral)   Resp 16   Ht 5' (1.524 m)   Wt 187 lb 2.7 oz (84.9 kg)   SpO2 95%   BMI 36.55 kg/m²     General appearance: She is alert awake and oriented without any  HEENT: No gross conjunctival pallor  Neck: Supple  Lungs: Somewhat diminished breath sounds  Heart: Irregularly irregular rhythm  Abdomen: Soft nontender  Extremities: No overt lower extremity edema      Problem List :         Impression :     1. Acute kidney injury with underlying CKD stage III-creatinine keeps going up-we will hold the diuretics  2. Kidney cardiomyopathy but no overt pulmonary edema  3. Chronic atrial fibrillation-telemetry  4. Sugar is acceptable    Recommendation/Plan  :     1. Hold diuretics  2. I suspect she has dilated ventricle causing chronically elevated proBNP level  3. Replete potassium and magnesium  4. She does not have any pulmonary edema and repeat chest x-ray  5.  Follow clinically      Aidan Gibbons MD MD

## 2021-10-08 NOTE — PROGRESS NOTES
Cardiology Progress Note     Today's Plan: sign off     Admit Date:  10/5/2021    Consult reason/ Seen today for: CHF    Subjective and  Overnight Events:  Patient reports improvement of shortness of breath while ambulating. Assessment / Plan / Recommendation:     1. Acute on Ch.systolic heart Failure: Last Echo shows low LVEF. Nephrology managing diuretics. Appears euvolemic, CXR does not show pulmonary edema. Elevated BNP does not corollate with CXR. Strick I/O Monitoring, Daily weights and Low salt diet. 2. History of severe cardiomyopathy with LVEF around 10 to 15%. ?  Ischemic versus tachycardia induced. Continue with Toprol-XL. Holding off [place] [place] given renal failure. Patient was evaluated by EP a month ago. Patient needs to be on guideline directed medical therapy for at least 3 months before she can be considered for BiV ICD. 3. Coronary disease: Left heart images from 2020 reviewed. Moderate stenosis noted in LAD and RCA. 99% stenosis in diagonal 1 however a small caliber vessel to be managed medically. Continue aspirin beta-blocker  4. Mildly elevated troponin in the setting of renal failure and CHF: In absence of chest pain will not further pursue ischemic work-up at this point. 5. Atrial fibrillation with left bundle branch block on EKG: Rate controlled. S/p ablation in 2017. Continue with medication -  Continue with Eliquis and Toprol-XL. Recommend to resume amiodarone when LFT's improve. 6. YESIKA: nephrology following   7. Will sign off, please re-consult for any new cardiac complaints or concerns. History of Presenting Illness:    Chief complain on admission : 60 y. o.year old who is admitted for  Chief Complaint   Patient presents with    Hypoglycemia        Past medical history:    has a past medical history of Abnormal PFTs (pulmonary function tests), Atrial fibrillation ([address] CAD systems reviewed and are negative. BP (!) 144/95   Pulse 101   Temp 98.2 °F (36.8 °C) (Oral)   Resp 16   Ht 5' (1.524 m)   Wt 187 lb 2.7 oz (84.9 kg)   SpO2 95%   BMI 36.55 kg/m²     No intake or output data in the 24 hours ending 10/08/21 1100    Physical Exam:  Physical Exam  Constitutional:       Appearance: She is well-developed. Cardiovascular:      Rate and Rhythm: Normal rate and regular rhythm. Pulses: Intact distal pulses. Dorsalis pedis pulses are 2+ on the right side and 2+ on the left side. Posterior tibial pulses are 2+ on the right side and 2+ on the left side. Heart sounds: Normal heart sounds, S1 normal and S2 normal.   Pulmonary:      Effort: Pulmonary effort is normal.      Breath sounds: Examination of the right-lower field reveals decreased breath sounds. Examination of the left-lower field reveals decreased breath sounds. Decreased breath sounds present. Musculoskeletal:         General: Normal range of motion. Skin:     General: Skin is warm and dry. Neurological:      Mental Status: She is alert and oriented to person, place, and time.           Medications:    sodium chloride flush  5-40 mL IntraVENous 2 times per day    furosemide  40 mg IntraVENous BID    apixaban  5 mg Oral BID    aspirin  81 mg Oral Daily    busPIRone  10 mg Oral TID    DULoxetine  60 mg Oral Daily    folic acid-pyridoxine-cyancobalamin  1 tablet Oral Daily    levothyroxine  50 mcg Oral Daily    magnesium oxide  400 mg Oral Daily    metoprolol succinate  100 mg Oral Daily    pantoprazole  40 mg Oral Daily    insulin lispro  10 Units SubCUTAneous TID WC    insulin glargine  20 Units SubCUTAneous Nightly    insulin lispro  0-12 Units SubCUTAneous TID WC    insulin lispro  0-12 Units SubCUTAneous 2 times per day    traZODone  50 mg Oral Nightly    influenza virus vaccine  0.5 mL IntraMUSCular Prior to discharge      sodium chloride      dextrose       sodium chloride flush, sodium chloride, ondansetron **OR** ondansetron, polyethylene glycol, acetaminophen **OR** acetaminophen, glucose, dextrose, glucagon (rDNA), dextrose    Lab Data:  CBC:   Recent Labs     10/06/21  0829 10/07/21  0751 10/08/21  0427   WBC 10.7* 10.1 9.2   HGB 10.6* 9.8* 10.0*   HCT 34.1* 30.7* 31.2*   .5* 97.2 97.5    294 296     BMP:   Recent Labs     10/06/21  0829 10/07/21  0751 10/08/21  0427    138 140   K 4.5 3.5 3.2*    102 101   CO2 20* 23 29   PHOS  --  4.2 3.4   BUN 55* 52* 41*   CREATININE 1.7* 1.9* 2.0*     PT/INR: No results for input(s): PROTIME, INR in the last 72 hours. BNP:    Recent Labs     10/05/21  2331 10/08/21  0427   PROBNP 30,805* 32,883*     TROPONIN:   Recent Labs     10/05/21  2331 10/06/21  0829   TROPONINT 0.010* 0.014*        Impression:  Active Problems:    Hypoglycemia    Elevated troponin  Resolved Problems:    * No resolved hospital problems. *       All labs, medications and tests reviewed by myself, continue all other medications of all above medical condition listed as is except for changes mentioned above. Thank you   Please call with questions. Electronically signed by Breck Sing. Erling Phoenix, APRN - CNP on 10/8/2021 at 11:00 AM         CARDIOLOGY ATTENDING ADDENDUM    I have seen, spoken to and examined this patient personally, independently of the nurse practitioner. I have reviewed the hospital care given to date and reviewed all pertinent labs and imaging. The plan was developed mutually at the time of the visit with the patient,  NP   and myself. I have spoken with patient, nursing staff and provided written and verbal instructions . The above note has been reviewed and I agree with the assessment, diagnosis, and treatment plan with changes made by me as follows       HPI:  I have reviewed the above HPI  And agree with above   Please review addendum/changes made to note above     Interval history:            Physical Exam:  General: Awake, alert, NAD  Head:normal  Eye:normal  Neck:  No JVD   Chest:  Clear to auscultation, respiration easy  Cardiovascular:  s1s2  Abdomen:   nontender  Extremities:  + edema  Pulses; palpable  Neuro: grossly normal      MEDICAL DECISION MAKING;    I agree with the above plan, which was planned by myself and discussed with NP.     Dr. Leonie Capps MD

## 2021-10-08 NOTE — PROGRESS NOTES
INTERNAL MEDICINE PROGRESS NOTE        Eloise Buckleyagnieszka   1955   Primary Care Physician:  Gena Xiao PA-C  Admit Date: 10/5/2021     Subjective:   Patient awake, doing well today. She is alert and oriented. PT/OT recommend [facility]. She voices no acute concerns. Blood glucose under better control.     Objective:   BP (!) 144/95   Pulse 101   Temp 98.2 °F (36.8 °C) (Oral)   Resp 16   Ht 5' (1.524 m)   Wt 187 lb 2.7 oz (84.9 kg)   SpO2 95%   BMI 36.55 kg/m²    General appearance: alert, appears stated age and cooperative  Head: Normocephalic, without obvious abnormality, atraumatic  Neck: no adenopathy and supple, symmetrical, trachea midline  Lungs: clear to auscultation bilaterally  Heart: irregularly, irregular  Abdomen: soft, non-tender; bowel sounds normal  Extremities: no clubbing, cyanosis with trace edema  Neurologic: No focal deficits    Data Review  Lab Results   Component Value Date     10/08/2021    K 3.2 (L) 10/08/2021     10/08/2021    CO2 29 10/08/2021    CREATININE 2.0 (H) 10/08/2021    BUN 41 (H) 10/08/2021    CALCIUM 7.8 (L) 10/08/2021     Lab Results   Component Value Date    WBC 9.2 10/08/2021    HGB 10.0 (L) 10/08/2021    HCT 31.2 (L) 10/08/2021    MCV 97.5 10/08/2021     10/08/2021     INR/Prothrombin Time      Meds:    sodium chloride flush  5-40 mL IntraVENous 2 times per day    furosemide  40 mg IntraVENous BID    apixaban  5 mg Oral BID    aspirin  81 mg Oral Daily    busPIRone  10 mg Oral TID    DULoxetine  60 mg Oral Daily    folic acid-pyridoxine-cyancobalamin  1 tablet Oral Daily    levothyroxine  50 mcg Oral Daily    magnesium oxide  400 mg Oral Daily    metoprolol succinate  100 mg Oral Daily    pantoprazole  40 mg Oral Daily    insulin lispro  10 Units SubCUTAneous TID WC    insulin glargine  20 Units SubCUTAneous Nightly    insulin lispro  0-12 Units SubCUTAneous TID WC    insulin lispro  0-12 Units SubCUTAneous 2 times per day    traZODone  50 mg Oral Nightly    influenza virus vaccine  0.5 mL IntraMUSCular Prior to discharge     PRN Meds: sodium chloride flush, sodium chloride, ondansetron **OR** ondansetron, polyethylene glycol, acetaminophen **OR** acetaminophen, glucose, dextrose, glucagon (rDNA), dextrose    Assessment/Plan:   Patient Active Hospital Problem List:  Patient Active Problem List   Diagnosis    History of CVA (cerebrovascular accident) without residual deficits    [facility]    DM [facility]diabetes mellitus), type 2, uncontrolled [facility]    Persistent atrial fibrillation [facility]    CAD (coronary artery disease)    CHF (congestive heart failure) ([facility]    Light headed    History of MI (myocardial infarction)    Diabetes mellitus ([facility]    Hypertension    TIA (transient ischemic attack)    Hyperlipidemia    SOB (shortness of breath)    H/O cardiovascular stress test    Family history of coronary artery disease    [facility]oxysmal [facility] fibrillation) [facility]    Chest pain    Atrial thrombus without antecedent myocardial infarction    S/P ablation of atrial fibrillation    Gastroenteritis    YESIKA (acute kidney injury) ([facility]    Acute cystitis    Elevated liver enzymes    CHF (congestive heart failure), NYHA class I, acute on chronic, combined ([facility]    Pneumonia    Multifocal pneumonia    Atrial fibrillation with RVR ([facility])    Shoulder pain    Paroxysmal atrial fibrillation ([facility]    Diabetic hyperosmolar coma ([facility]    Difficult intravenous access    HFrEF (heart failure with reduced ejection fraction) ([facility])    Delirium    Bradycardia    Hypoglycemia    Elevated troponin     Assessment:  Altered mental status: Improved. Likely 2/2 hypoglycemia. CT head nonacute. Hypoglycemia: Improved on D5. Off D5. Monitor POC glucose. YESIKA on CKD:  Fluids held. On diuresis per nephrology. Hyponatremia:  Off IV fluids. Nephrology consulted. Transaminitis: Improving. RUQ ultrasound nonconcerning.   Hold statin and amiodarone. Monitor CMP. HFrEF: proBNP elevated 30k. CXR nonacute. Diuresis per nephrology. Cardiology following. Chronic atrial fibrillation: CPM.  Anemia: Monitor Hgb closely. DM2, uncontrolled: POC glucose, hypoglycemia protocol. Endocrinology following. Insulin has been restarted. HTN/HLD/CAD: CPM.  Hypothyroidism: CPM  Depression: CPM.  Generalized weakness:  PT OT recommends [facility].     Plan:  Overall patient improving. Continue diuresis per nephrology  Consultants notes reviewed  PT/OT recommend Yakelin Odell on discharge. Discussed plan of care with daughter. Monitor labs and patient condition. Electronically signed by Tony Raygoza PA-C on 10/8/2021 at 8:29 AM   I have independently evaluated and examined this patient today. I have reviewed radiologic and biochemical tests on this patient. Management Plan is developed mutually with APRIL Ervin. I have reviewed above note and agree with assessment and plan. We will hopefully be able to discharge in few days.

## 2021-10-09 NOTE — PROGRESS NOTES
Nephrology Progress Note  10/9/2021 4:22 PM        Subjective:   Admit Date: 10/5/2021  PCP: Jael Howell PA-C    Interval History: Patient seen earlier today, this is a late entry    Diet: Reasonable per patient    ROS: No shortness of breath, no PND or orthopnea  Unknown urine output      Data:     Current meds:    insulin glargine  10 Units SubCUTAneous Nightly    insulin lispro  0-6 Units SubCUTAneous 2 times per day    potassium chloride  40 mEq Oral BID WC    sodium chloride flush  5-40 mL IntraVENous 2 times per day    apixaban  5 mg Oral BID    aspirin  81 mg Oral Daily    busPIRone  10 mg Oral TID    DULoxetine  60 mg Oral Daily    folic acid-pyridoxine-cyancobalamin  1 tablet Oral Daily    levothyroxine  50 mcg Oral Daily    magnesium oxide  400 mg Oral Daily    metoprolol succinate  100 mg Oral Daily    pantoprazole  40 mg Oral Daily    insulin lispro  10 Units SubCUTAneous TID WC    insulin lispro  0-12 Units SubCUTAneous TID WC    traZODone  50 mg Oral Nightly    influenza virus vaccine  0.5 mL IntraMUSCular Prior to discharge      sodium chloride 25 mL (10/08/21 1722)    dextrose           I/O last 3 completed shifts:   In: 120 [P.O.:120]  Out: -     CBC:   Recent Labs     10/07/21  0751 10/08/21  0427   WBC 10.1 9.2   HGB 9.8* 10.0*    296          Recent Labs     10/07/21  0751 10/08/21  0427 10/09/21  0457    140 142   K 3.5 3.2* 3.5    101 103   CO2 23 29 34*   BUN 52* 41* 32*   CREATININE 1.9* 2.0* 1.7*   GLUCOSE 103* 118* 75       Lab Results   Component Value Date    CALCIUM 7.7 (L) 10/09/2021    PHOS 3.4 10/08/2021       Objective:     Vitals: /80   Pulse 111   Temp 97.5 °F (36.4 °C) (Oral)   Resp 18   Ht 5' (1.524 m)   Wt 187 lb 2.7 oz (84.9 kg)   SpO2 93%   BMI 36.55 kg/m²     General appearance: Alert awake and oriented without any acute distress  HEENT: She does have at least 1+ contact  Neck: Supple  Lungs: She has crackles on

## 2021-10-09 NOTE — PROGRESS NOTES
Pr  Subjective:   Admit Date: 10/5/2021  PCP: Deborah Prater PA-C    Admitted For :Hypoglycemia now has hyperglycemia     Consulted For: Better control of blood glucose     Interval History: Feels somewhat better more alert and awake    Denies any chest pains,   Mild SOB . Denies nausea or vomiting. No new bowel or bladder symptoms. Intake/Output Summary (Last 24 hours) at 10/9/2021 0944  Last data filed at 10/9/2021 0913  Gross per 24 hour   Intake 120 ml   Output --   Net 120 ml       DATA    CBC:   Recent Labs     10/07/21  0751 10/08/21  0427   WBC 10.1 9.2   HGB 9.8* 10.0*    296    CMP:  Recent Labs     10/07/21  0751 10/08/21  0427 10/09/21  0457    140 142   K 3.5 3.2* 3.5    101 103   CO2 23 29 34*   BUN 52* 41* 32*   CREATININE 1.9* 2.0* 1.7*   CALCIUM 7.8* 7.8* 7.7*   PROT 5.3* 5.3* 4.8*   LABALBU 3.4 3.1* 2.8*   BILITOT 0.7 0.6 0.5   ALKPHOS 173* 152* 122   * 111* 57*   * 216* 142*     Lipids:   Lab Results   Component Value Date    CHOL 153 01/30/2012    HDL 59 01/30/2012    TRIG 135 01/30/2012     Glucose:  Recent Labs     10/08/21  2023 10/09/21  0259 10/09/21  0743   POCGLU 102* 155* 65*     [id number]:  Lab Results   Component Value Date    LABA1C 7.5 10/07/2021     High Sensitivity TSH:   Lab Results   Component Value Date    TSHHS 0.812 09/06/2021     Free T3: No results found for: FT3  Free T4:  Lab Results   Component Value Date    T4FREE 1.28 06/12/2021       CT Head WO Contrast    Result Date: 10/6/2021  EXAMINATION: CT OF THE HEAD WITHOUT CONTRAST  10/5/2021 6:55 pm   isualized skull or soft tissues. 1. No acute intracranial hemorrhage, intra-axial mass, or acute territorial infarct 2. Generalized atrophy and scattered senescent white matter changes again demonstrated 3.  Left mastoid effusion, unchanged     XR CHEST PORTABLE    Result Date: 10/6/2021  EXAMINATION: ONE XRAY VIEW OF THE CHEST 10/5/2021 11:37 pm COMPARISON: 08/01/2021 HISTORY: ORDERING SYSTEM PROVIDED HISTORY: altered level of conciousness TECHNOLOGIST PROVIDED HISTORY: Reason for exam:->altered level of conciousness Reason for Exam: altered level of conciousness Acuity: Acute Type of Exam: Initial FINDINGS: The cardiomediastinal silhouette is mildly enlarged in size and contour. The lungs are clear. No pleural effusion or pneumothorax is present. No acute cardiopulmonary process     US ABDOMEN LIMITED Specify organ? GALLBLADDER, LIVER    Result Date: 10/7/2021  EXAMINATION: RIGHT UPPER QUADRANT ULTRASOUND 10/6/2021 5:57 am COMPARISON: CT dated July 15, 2021. HISTORY: ORDERING SYSTEM PROVIDED HISTORY: abnormal LFT's     Decreased penetration of the liver likely represents mild steatosis. No discrete mass lesions. Cholecystectomy.        Scheduled Medicines   Medications:    potassium chloride  40 mEq Oral BID WC    sodium chloride flush  5-40 mL IntraVENous 2 times per day    apixaban  5 mg Oral BID    aspirin  81 mg Oral Daily    busPIRone  10 mg Oral TID    DULoxetine  60 mg Oral Daily    folic acid-pyridoxine-cyancobalamin  1 tablet Oral Daily    levothyroxine  50 mcg Oral Daily    magnesium oxide  400 mg Oral Daily    metoprolol succinate  100 mg Oral Daily    pantoprazole  40 mg Oral Daily    insulin lispro  10 Units SubCUTAneous TID WC    insulin glargine  20 Units SubCUTAneous Nightly    insulin lispro  0-12 Units SubCUTAneous TID WC    insulin lispro  0-12 Units SubCUTAneous 2 times per day    traZODone  50 mg Oral Nightly    influenza virus vaccine  0.5 mL IntraMUSCular Prior to discharge      Infusions:    sodium chloride 25 mL (10/08/21 1722)    dextrose           Objective:   Vitals: /68   Pulse 98   Temp 97.2 °F (36.2 °C) (Oral)   Resp 18   Ht 5' (1.524 m)   Wt 187 lb 2.7 oz (84.9 kg)   SpO2 90%   BMI 36.55 kg/m²   General appearance: alert and cooperative with exam  Neck: no JVD or bruit  Thyroid : Normal lobes   Lungs: Has Vesicular

## 2021-10-09 NOTE — PROGRESS NOTES
INTERNAL MEDICINE PROGRESS NOTE        [facility]   1955   Primary Care Physician:  Richard Simon PA-C  Admit Date: 10/5/2021     Subjective:   Patient awake, doing well today. She is alert and oriented. PT/OT recommend [facility]. She voices no acute concerns. Blood glucose under better control.     Objective:   /68   Pulse 98   Temp 97.2 °F (36.2 °C) (Oral)   Resp 18   Ht 5' (1.524 m)   Wt 187 lb 2.7 oz (84.9 kg)   SpO2 90%   BMI 36.55 kg/m²    General appearance: alert, appears stated age and cooperative  Head: Normocephalic, without obvious abnormality, atraumatic  Neck: no adenopathy and supple, symmetrical, trachea midline  Lungs: clear to auscultation bilaterally  Heart: irregularly, irregular  Abdomen: soft, non-tender; bowel sounds normal  Extremities: no clubbing, cyanosis with trace edema  Neurologic: No focal deficits    Data Review  Lab Results   Component Value Date     10/09/2021    K 3.5 10/09/2021     10/09/2021    CO2 34 (H) 10/09/2021    CREATININE 1.7 (H) 10/09/2021    BUN 32 (H) 10/09/2021    CALCIUM 7.7 (L) 10/09/2021     Lab Results   Component Value Date    WBC 9.2 10/08/2021    HGB 10.0 (L) 10/08/2021    HCT 31.2 (L) 10/08/2021    MCV 97.5 10/08/2021     10/08/2021     INR/Prothrombin Time      Meds:    insulin glargine  10 Units SubCUTAneous Nightly    insulin lispro  0-6 Units SubCUTAneous 2 times per day    potassium chloride  40 mEq Oral BID WC    sodium chloride flush  5-40 mL IntraVENous 2 times per day    apixaban  5 mg Oral BID    aspirin  81 mg Oral Daily    busPIRone  10 mg Oral TID    DULoxetine  60 mg Oral Daily    folic acid-pyridoxine-cyancobalamin  1 tablet Oral Daily    levothyroxine  50 mcg Oral Daily    magnesium oxide  400 mg Oral Daily    metoprolol succinate  100 mg Oral Daily    pantoprazole  40 mg Oral Daily    insulin lispro  10 Units SubCUTAneous TID WC    insulin lispro  0-12 Units SubCUTAneous TID WC    traZODone  50 mg Oral Nightly    influenza virus vaccine  0.5 mL IntraMUSCular Prior to discharge     PRN Meds: sodium chloride flush, sodium chloride, ondansetron **OR** ondansetron, polyethylene glycol, acetaminophen **OR** acetaminophen, glucose, dextrose, glucagon (rDNA), dextrose    Assessment/Plan:   Patient Active Hospital Problem List:  Patient Active Problem List   Diagnosis    History of CVA (cerebrovascular accident) without residual deficits    [facility] obesity ([facility]    DM (diabetes mellitus), type 2, uncontrolled ([facility]    [facility]    CAD (coronary artery disease)    CHF (congestive heart failure) ([facility]    Light headed    History of MI (myocardial infarction)    Diabetes mellitus ([facility]    Hypertension    TIA (transient ischemic attack)    Hyperlipidemia    SOB (shortness of breath)    H/O cardiovascular stress test    Family history of coronary artery disease    [facility]ox[facility]) [facility]    Chest pain    Atrial thrombus without antecedent myocardial infarction    S/P ablation of atrial fibrillation    Gastroenteritis    YESIKA (acute kidney injury) ([facility]    Acute cystitis    Elevated liver enzymes    CHF (congestive heart failure), NYHA class I, acute on chronic, combined ([facility]    Pneumonia    Multifocal pneumonia    Atrial fibrillation with RVR ([facility])    Shoulder pain    [facility]ox[facility]mal [facility] fibrillation [facility]    Diabetic hyperosmolar coma ([facility]    Difficult intravenous access    HFrEF (heart failure with reduced ejection fraction) ([facility])    Delirium    Bradycardia    Hypoglycemia    Elevated troponin     Assessment:  Altered mental status: Improved. Hypoglycemia: Improved on D5. Off D5. Monitor POC glucose. YESIKA on CKD:  Fluids held. On diuresis per nephrology. Hyponatremia:  Off IV fluids. Na normal today  Transaminitis: Improving. RUQ ultrasound nonconcerning. Hold statin and amiodarone. Monitor CMP.   Chronic atrial fibrillation: CPM.  Anemia: Monitor Hgb closely. Hgb is 10.0  DM2, uncontrolled: POC glucose, hypoglycemia protocol. Per endocrinology  HTN/HLD/CAD: CPM.  Hypothyroidism: CPM  Depression: CPM.  Generalized weakness:  PT OT recommends HHC.     Plan:  Overall patient improving. Diuretics held per nephrology  Consultants notes reviewed  PT/OT recommend Yakelin Odell on discharge. Monitor labs and patient condition. Electrolytes repletion as noted by nephrology.     Siddharth Nielsen MD  10/9/2021

## 2021-10-10 NOTE — PROGRESS NOTES
Nephrology Progress Note  10/10/2021 3:09 PM        Subjective:   Admit Date: 10/5/2021  PCP: Kristyn Zamarripa PA-C    Interval History: Seen earlier today, no acute distress    Diet: Some    ROS: No overt shortness of breath, PND or orthopnea-she is lying in bed with head of the bed elevated only 15 to 20 degrees      Data:     Current meds:    insulin glargine  10 Units SubCUTAneous Nightly    insulin lispro  0-6 Units SubCUTAneous 2 times per day    sodium chloride flush  5-40 mL IntraVENous 2 times per day    apixaban  5 mg Oral BID    aspirin  81 mg Oral Daily    busPIRone  10 mg Oral TID    DULoxetine  60 mg Oral Daily    folic acid-pyridoxine-cyancobalamin  1 tablet Oral Daily    levothyroxine  50 mcg Oral Daily    magnesium oxide  400 mg Oral Daily    metoprolol succinate  100 mg Oral Daily    pantoprazole  40 mg Oral Daily    insulin lispro  10 Units SubCUTAneous TID WC    insulin lispro  0-12 Units SubCUTAneous TID WC    traZODone  50 mg Oral Nightly    influenza virus vaccine  0.5 mL IntraMUSCular Prior to discharge      sodium chloride 25 mL (10/08/21 1722)    dextrose           No intake/output data recorded.     CBC:   Recent Labs     10/08/21  0427   WBC 9.2   HGB 10.0*             Recent Labs     10/08/21  0427 10/09/21  0457 10/10/21  0531    142 140   K 3.2* 3.5 4.3    103 102   CO2 29 34* 31   BUN 41* 32* 24*   CREATININE 2.0* 1.7* 1.4*   GLUCOSE 118* 75 121*       Lab Results   Component Value Date    CALCIUM 7.7 (L) 10/10/2021    PHOS 3.4 10/08/2021       Objective:     Vitals: /73   Pulse 92   Temp 97.9 °F (36.6 °C) (Oral)   Resp 16   Ht 5' (1.524 m)   Wt 187 lb 2.7 oz (84.9 kg)   SpO2 91%   BMI 36.55 kg/m²     General appearance: Alert awake and oriented without any distress  HEENT: 1+ conjunctival pallor  Neck: Supple  Lungs: Coarse breath sound today without any crackles  Heart: Irregular rhythm  Abdomen: Soft nontender  Extremities: No overt

## 2021-10-10 NOTE — PROGRESS NOTES
Pr  Subjective:   Admit Date: 10/5/2021  PCP: Bay Crabtree PA-C    Admitted For :Hypoglycemia now has hyperglycemia     Consulted For: Better control of blood glucose     Interval History: Feels somewhat better more alert and awake  Patient had an episode of lower a.m. blood glucose this morning    Denies any chest pains,   Mild SOB . Denies nausea or vomiting. No new bowel or bladder symptoms. Intake/Output Summary (Last 24 hours) at 10/9/2021 2001  Last data filed at 10/9/2021 0913  Gross per 24 hour   Intake 120 ml   Output --   Net 120 ml       DATA    CBC:   Recent Labs     10/07/21  0751 10/08/21  0427   WBC 10.1 9.2   HGB 9.8* 10.0*    296    CMP:  Recent Labs     10/07/21  0751 10/08/21  0427 10/09/21  0457    140 142   K 3.5 3.2* 3.5    101 103   CO2 23 29 34*   BUN 52* 41* 32*   CREATININE 1.9* 2.0* 1.7*   CALCIUM 7.8* 7.8* 7.7*   PROT 5.3* 5.3* 4.8*   LABALBU 3.4 3.1* 2.8*   BILITOT 0.7 0.6 0.5   ALKPHOS 173* 152* 122   * 111* 57*   * 216* 142*     Lipids:   Lab Results   Component Value Date    CHOL 153 01/30/2012    HDL 59 01/30/2012    TRIG 135 01/30/2012     Glucose:  Recent Labs     10/09/21  0743 10/09/21  1139 10/09/21  1708   POCGLU 65* 313* 97     [id number]:  Lab Results   Component Value Date    LABA1C 7.5 10/07/2021     High Sensitivity TSH:   Lab Results   Component Value Date    TSHHS 0.812 09/06/2021     Free T3: No results found for: FT3  Free T4:  Lab Results   Component Value Date    T4FREE 1.28 06/12/2021       CT Head WO Contrast    Result Date: 10/6/2021  EXAMINATION: CT OF THE HEAD WITHOUT CONTRAST  10/5/2021 6:55 pm   isualized skull or soft tissues. 1. No acute intracranial hemorrhage, intra-axial mass, or acute territorial infarct 2. Generalized atrophy and scattered senescent white matter changes again demonstrated 3.  Left mastoid effusion, unchanged     XR CHEST PORTABLE    Result Date: 10/6/2021  EXAMINATION: ONE XRAY VIEW OF THE CHEST 10/5/2021 11:37 pm COMPARISON: 08/01/2021 HISTORY: ORDERING SYSTEM PROVIDED HISTORY: altered level of conciousness TECHNOLOGIST PROVIDED HISTORY: Reason for exam:->altered level of conciousness Reason for Exam: altered level of conciousness Acuity: Acute Type of Exam: Initial FINDINGS: The cardiomediastinal silhouette is mildly enlarged in size and contour. The lungs are clear. No pleural effusion or pneumothorax is present. No acute cardiopulmonary process     US ABDOMEN LIMITED Specify organ? GALLBLADDER, LIVER    Result Date: 10/7/2021  EXAMINATION: RIGHT UPPER QUADRANT ULTRASOUND 10/6/2021 5:57 am COMPARISON: CT dated July 15, 2021. HISTORY: ORDERING SYSTEM PROVIDED HISTORY: abnormal LFT's     Decreased penetration of the liver likely represents mild steatosis. No discrete mass lesions. Cholecystectomy.        Scheduled Medicines   Medications:    insulin glargine  10 Units SubCUTAneous Nightly    insulin lispro  0-6 Units SubCUTAneous 2 times per day    sodium chloride flush  5-40 mL IntraVENous 2 times per day    apixaban  5 mg Oral BID    aspirin  81 mg Oral Daily    busPIRone  10 mg Oral TID    DULoxetine  60 mg Oral Daily    folic acid-pyridoxine-cyancobalamin  1 tablet Oral Daily    levothyroxine  50 mcg Oral Daily    magnesium oxide  400 mg Oral Daily    metoprolol succinate  100 mg Oral Daily    pantoprazole  40 mg Oral Daily    insulin lispro  10 Units SubCUTAneous TID WC    insulin lispro  0-12 Units SubCUTAneous TID WC    traZODone  50 mg Oral Nightly    influenza virus vaccine  0.5 mL IntraMUSCular Prior to discharge      Infusions:    sodium chloride 25 mL (10/08/21 1722)    dextrose           Objective:   Vitals: /78   Pulse 105   Temp 97.9 °F (36.6 °C) (Oral)   Resp 16   Ht 5' (1.524 m)   Wt 187 lb 2.7 oz (84.9 kg)   SpO2 93%   BMI 36.55 kg/m²   General appearance: alert and cooperative with exam  Neck: no JVD or bruit  Thyroid : Normal lobes   Lungs: Has Vesicular Breath sounds   Heart:  regular rate and rhythm  Abdomen: soft, non-tender; bowel sounds normal; no masses,  no organomegaly  Musculoskeletal: Normal  Extremities: extremities normal, , no edema  Neurologic:  Awake, alert, oriented to name, place and time. Cranial nerves II-XII are grossly intact. Motor is  intact. Sensory neuropathy and gait is abnormal.  And unstable    Assessment:     Patient Active Problem List:     History of CVA (cerebrovascular accident) without residual deficits     Morbid obesity ([address]     DM (diabetes mellitus), type 2, uncontrolled ([address]     Persistent atrial fibrillation (HCC)     CAD (coronary artery disease)     CHF (congestive heart failure) ([address]     Light headed     History of MI (myocardial infarction)     Diabetes mellitus ([address]     Hypertension     TIA (transient ischemic attack)     Hyperlipidemia     SOB (shortness of breath)     H/O cardiovascular stress test     Family history of coronary artery disease     PAF (paroxysmal atrial fibrillation) (HCC)     Chest pain     Atrial thrombus without antecedent myocardial infarction     S/P ablation of atrial fibrillation     Gastroenteritis     YESIKA ([facility]) [facility]     Acute cystitis     Elevated liver enzymes     CHF (congestive heart failure), NYHA class I, acute on chronic, combined ([address]     Pneumonia     Multifocal pneumonia     Atrial fibrillation with RVR (HCC)     Shoulder pain     Paroxysmal atrial fibrillation (HCC)     Diabetic hyperosmolar coma (HCC)     Difficult intravenous access     HFrEF (heart failure with reduced ejection fraction) ([address]     Delirium     Bradycardia     Hypoglycemia      Plan:     1. Reviewed POC blood glucose . Labs and X ray results   2. Reviewed Current Medicines   3. On meal/ Correction bolus Humalog/ Basal Lantus Insulin regime /  4. Monitor Blood glucose frequently   5. Modified  the dose of Insulin/ other medicines as needed   6. Will follow     .      Alessandra Najera Landon Jean MD, MD

## 2021-10-10 NOTE — PROGRESS NOTES
INTERNAL MEDICINE PROGRESS NOTE        Eloise Buckleyycjacoby   1955   Primary Care Physician:  Gena Xiao PA-C  Admit Date: 10/5/2021     Subjective:   Patient awake, doing well today. She is alert and oriented. PT/OT recommend [facility]. She voices no acute concerns. Blood glucose under better control.     Objective:   /73   Pulse 92   Temp 97.9 °F (36.6 °C) (Oral)   Resp 16   Ht 5' (1.524 m)   Wt 187 lb 2.7 oz (84.9 kg)   SpO2 91%   BMI 36.55 kg/m²    General appearance: alert, appears stated age and cooperative  Head: Normocephalic, without obvious abnormality, atraumatic  Neck: no adenopathy and supple, symmetrical, trachea midline  Lungs: clear to auscultation bilaterally  Heart: irregularly, irregular  Abdomen: soft, non-tender; bowel sounds normal  Extremities: no clubbing, cyanosis with trace edema  Neurologic: No focal deficits    Data Review  Lab Results   Component Value Date     10/10/2021    K 4.3 10/10/2021     10/10/2021    CO2 31 10/10/2021    CREATININE 1.4 (H) 10/10/2021    BUN 24 (H) 10/10/2021    CALCIUM 7.7 (L) 10/10/2021     Lab Results   Component Value Date    WBC 9.2 10/08/2021    HGB 10.0 (L) 10/08/2021    HCT 31.2 (L) 10/08/2021    MCV 97.5 10/08/2021     10/08/2021     INR/Prothrombin Time      Meds:    insulin glargine  10 Units SubCUTAneous Nightly    insulin lispro  0-6 Units SubCUTAneous 2 times per day    sodium chloride flush  5-40 mL IntraVENous 2 times per day    apixaban  5 mg Oral BID    aspirin  81 mg Oral Daily    busPIRone  10 mg Oral TID    DULoxetine  60 mg Oral Daily    folic acid-pyridoxine-cyancobalamin  1 tablet Oral Daily    levothyroxine  50 mcg Oral Daily    magnesium oxide  400 mg Oral Daily    metoprolol succinate  100 mg Oral Daily    pantoprazole  40 mg Oral Daily    insulin lispro  10 Units SubCUTAneous TID WC    insulin lispro  0-12 Units SubCUTAneous TID WC    traZODone  50 mg Oral Nightly    influenza virus vaccine  0.5 mL IntraMUSCular Prior to discharge     PRN Meds: sodium chloride flush, sodium chloride, ondansetron **OR** ondansetron, polyethylene glycol, acetaminophen **OR** acetaminophen, glucose, dextrose, glucagon (rDNA), dextrose    Assessment/Plan:   Patient Active Hospital Problem List:  Patient Active Problem List   Diagnosis    History of CVA (cerebrovascular accident) without residual deficits    [facility]    DM (diabetes mellitus), type 2, uncontrolled ([facility]    Persistent [facility] fibrillation ([facility]    CAD (coronary artery disease)    CHF (congestive heart failure) ([facility]    Light headed    History of MI (myocardial infarction)    Diabetes mellitus ([facility]    Hypertension    TIA (transient ischemic attack)    Hyperlipidemia    SOB (shortness of breath)    H/O cardiovascular stress test    Family history of coronary artery disease    [facility]    Chest pain    Atrial thrombus without antecedent myocardial infarction    S/P ablation of atrial fibrillation    Gastroenteritis    YESIKA (acute kidney injury) ([facility]    Acute cystitis    Elevated liver enzymes    CHF (congestive heart failure), NYHA class I, acute on chronic, combined ([facility]    Pneumonia    Multifocal pneumonia    Atrial fibrillation with RVR ([facility]    Shoulder pain    Paroxysmal atrial fibrillation ([facility]    Diabetic hyperosmolar coma ([facility]    Difficult intravenous access    HFrEF (heart failure with reduced ejection fraction) ([facility]    Delirium    Bradycardia    Hypoglycemia    Elevated troponin     Assessment:  Altered mental status: Improved. Hypoglycemia: Improved on D5. Off D5. Monitor POC glucose. YESIKA on CKD:  Fluids held. On diuresis per nephrology. Hyponatremia:  Off IV fluids. Na normal today  Transaminitis: Improving. RUQ ultrasound nonconcerning. Hold statin and amiodarone. Monitor CMP. Chronic atrial fibrillation: CPM.  Anemia: Monitor Hgb closely.  Hgb is 10.0  DM2, uncontrolled: POC glucose, hypoglycemia protocol. Per endocrinology  HTN/HLD/CAD: CPM.  Hypothyroidism: CPM  Depression: CPM.  Generalized weakness:  PT OT recommends HHC.     Plan:  Overall patient improving. Diuretics held per nephrology  Consultants notes reviewed  PT/OT recommend Yakelin Odell on discharge. Monitor labs and patient condition. Electrolytes repletion as noted by nephrology.     Willy Clifford MD  10/10/2021

## 2021-10-10 NOTE — PROGRESS NOTES
Pr  Subjective:   Admit Date: 10/5/2021  PCP: Maik Mckeon PA-C    Admitted For :Hypoglycemia now has hyperglycemia     Consulted For: Better control of blood glucose     Interval History: Feels somewhat better more alert and awake    Denies any chest pains,   Mild SOB . Denies nausea or vomiting. No new bowel or bladder symptoms. No intake or output data in the 24 hours ending 10/10/21 1505    DATA    CBC:   Recent Labs     10/08/21  0427   WBC 9.2   HGB 10.0*       CMP:  Recent Labs     10/08/21  0427 10/09/21  0457 10/10/21  0531    142 140   K 3.2* 3.5 4.3    103 102   CO2 29 34* 31   BUN 41* 32* 24*   CREATININE 2.0* 1.7* 1.4*   CALCIUM 7.8* 7.7* 7.7*   PROT 5.3* 4.8* 5.0*   LABALBU 3.1* 2.8* 3.0*   BILITOT 0.6 0.5 0.5   ALKPHOS 152* 122 119   * 57* 34   * 142* 113*     Lipids:   Lab Results   Component Value Date    CHOL 153 01/30/2012    HDL 59 01/30/2012    TRIG 135 01/30/2012     Glucose:  Recent Labs     10/10/21  0254 10/10/21  0720 10/10/21  1159   POCGLU 238* 98 197*     [id number]:  Lab Results   Component Value Date    LABA1C 7.5 10/07/2021     High Sensitivity TSH:   Lab Results   Component Value Date    TSHHS 0.812 09/06/2021     Free T3: No results found for: FT3  Free T4:  Lab Results   Component Value Date    T4FREE 1.28 06/12/2021       CT Head WO Contrast    Result Date: 10/6/2021  EXAMINATION: CT OF THE HEAD WITHOUT CONTRAST  10/5/2021 6:55 pm   isualized skull or soft tissues. 1. No acute intracranial hemorrhage, intra-axial mass, or acute territorial infarct 2. Generalized atrophy and scattered senescent white matter changes again demonstrated 3.  Left mastoid effusion, unchanged     XR CHEST PORTABLE    Result Date: 10/6/2021  EXAMINATION: ONE XRAY VIEW OF THE CHEST 10/5/2021 11:37 pm COMPARISON: 08/01/2021 HISTORY: ORDERING SYSTEM PROVIDED HISTORY: altered level of conciousness TECHNOLOGIST PROVIDED HISTORY: Reason for exam:->altered level

## 2021-10-11 NOTE — PROGRESS NOTES
[id number] Liaison spoke with Isma Starr and is aware of discharge & will initiate Yakelin Odell.

## 2021-10-11 NOTE — PROGRESS NOTES
Nephrology Progress Note  10/11/2021 7:12 AM        Subjective:   Admit Date: 10/5/2021  PCP: Rubens Camarillo PA-C    Interval History: Did not sleep well last night    Diet: Reasonable    ROS: No shortness of breath, orthopnea or PND    Data:     Current meds:    insulin glargine  10 Units SubCUTAneous Nightly    insulin lispro  0-6 Units SubCUTAneous 2 times per day    sodium chloride flush  5-40 mL IntraVENous 2 times per day    apixaban  5 mg Oral BID    aspirin  81 mg Oral Daily    busPIRone  10 mg Oral TID    DULoxetine  60 mg Oral Daily    folic acid-pyridoxine-cyancobalamin  1 tablet Oral Daily    levothyroxine  50 mcg Oral Daily    magnesium oxide  400 mg Oral Daily    metoprolol succinate  100 mg Oral Daily    pantoprazole  40 mg Oral Daily    insulin lispro  10 Units SubCUTAneous TID WC    insulin lispro  0-12 Units SubCUTAneous TID WC    traZODone  50 mg Oral Nightly    influenza virus vaccine  0.5 mL IntraMUSCular Prior to discharge      sodium chloride 25 mL (10/08/21 1722)    dextrose           I/O last 3 completed shifts: In: 180 [P.O.:180]  Out: -     CBC: No results for input(s): WBC, HGB, PLT in the last 72 hours. Recent Labs     10/09/21  0457 10/10/21  0531    140   K 3.5 4.3    102   CO2 34* 31   BUN 32* 24*   CREATININE 1.7* 1.4*   GLUCOSE 75 121*       Lab Results   Component Value Date    CALCIUM 7.7 (L) 10/10/2021    PHOS 3.4 10/08/2021       Objective:     Vitals: BP (!) 146/93   Pulse 88   Temp 97.7 °F (36.5 °C) (Oral)   Resp 15   Ht 5' (1.524 m)   Wt 187 lb 2.7 oz (84.9 kg)   SpO2 92%   BMI 36.55 kg/m²     General appearance: Alert awake and oriented no acute distress  HEENT: 1+ conjunctival pallor  Neck: Supple  Lungs: Positive adventitious breath sound  Heart: Irregular irregular rhythm  Abdomen: Soft nontender  Extremities: No overt edema      Problem List :         Impression :     1.  Acute kidney injury-on top of CKD stage

## 2021-10-11 NOTE — PROGRESS NOTES
Went over AVS with Annia Naidu. Questions answered. IV removed. Assisted in putting pt belongings in plastic bag. Annia Naidu is waiting for her Daughter to arrive and take her home. Annia Naidu verbalized understanding to notify staff when her Daughter arrives so she can be taken to front door in wheel chair.

## 2021-10-11 NOTE — DISCHARGE SUMMARY
Nadeem Spencer MD, Internal Medicine    [address] [facility]   [phone]   Patient ID  Gloria Andersen   1955  [id number]          Admit date: 10/5/2021   Discharge date: 10/11/2021      Admitting Physician: Dago Beaver MD   Discharge Physician: Daniella Canseco PA-C    Discharge Diagnoses: Altered mental status: Improved. Likely 2/2 hypoglycemia. Hypoglycemia: Improved on D5. Off D5. Monitor POC glucose. YESIKA on CKD:  Improved. Nephrology consulted. Continue to hold Entresto and loop. Dr. Hossein Burns will restart these and may start SGLT2 inhibitor as outpatient. Hyponatremia: Improved, given IV fluids. Monitor. Transaminitis: Resolved. RUQ ultrasound nonconcerning. May resume statin and amiodarone. Monitor CMP as outpatient. Chronic atrial fibrillation: CPM.  Anemia: Monitor Hgb stable. DM2, uncontrolled: Endocrinology consulted. CPM.  HTN/HLD/CAD: CPM.  Hypothyroidism: CPM  Depression: CPM.  Generalized weakness:  PT/OT HHC.     Patient Active Problem List   Diagnosis    History of CVA (cerebrovascular accident) without residual deficits    Morbid obesity ([address]    DM (diabetes mellitus), type 2, uncontrolled ([address]    Persistent atrial fibrillation (HCC)    CAD (coronary artery disease)    CHF (congestive heart failure) ([address]    Light headed    History of MI (myocardial infarction)    Diabetes mellitus ([address]    Hypertension    TIA (transient ischemic attack)    Hyperlipidemia    SOB (shortness of breath)    H/O cardiovascular stress test    Family history of coronary artery disease    PAF (paroxysmal atrial fibrillation) (HCC)    Chest pain    Atrial thrombus without antecedent myocardial infarction    S/P ablation of atrial fibrillation    Gastroenteritis    YESIKA (acute kidney injury) ([address]    Acute cystitis    Elevated liver enzymes    CHF (congestive heart failure), NYHA class I, acute on chronic, combined ([address]    Pneumonia    Multifocal pneumonia Atrial fibrillation with RVR (HCC)    Shoulder pain    Paroxysmal atrial fibrillation (HCC)    Diabetic hyperosmolar coma (HCC)    Difficult intravenous access    HFrEF (heart failure with reduced ejection fraction) (HCC)    Delirium    Bradycardia    Hypoglycemia    Elevated troponin       Discharged Condition: good    Hospital Course: The patient is a 71-year-old female who presented to the ER with hypoglycemia. She has history of HFrEF, atrial fibrillation, DM2, depression, HTN, HLD, CAD, CKD, hypothyroidism. Patient was discharged from [facility] few weeks ago. Patient was reportedly found by daughter almost unresponsive after daughter administered insulin to the patient. Reportedly EMS checked blood glucose and it was 21, blood focus was checked in the ER and it was 25. She was given glucagon and repeat blood glucose remained low. She was given D50 and started on D5 and her blood glucose and her mentation improved significantly. In the ER, CT head is nonacute, she did have acute kidney injury and hyponatremia. She also had transaminitis with an elevated proBNP. CXR showed no acute process. RUQ ultrasound showed no acute issues. Covid is negative. The patient was admitted. Endocrinology, cardiology, and nephrology were consulted. She was taken off D5 and started back on her insulin regimen. PT/OT evaluated the patient and she performed adequately and she will go home with PT/OT [facility]. Nephrology diuresed the patient and she is near euvolemic now. Renal function has improved. Overall, the patient has improved clinically. The patient is to follow-up with PCP, cardiologist, nephrologist, endocrinologist as outpatient. Relevant Investigations  CXR, 10/6/2021  No acute cardiopulmonary process      CT head without contrast, 10/6/2021  1. No acute intracranial hemorrhage, intra-axial mass, or acute territorial   infarct   2.  Generalized atrophy and scattered senescent white matter changes again demonstrated   3. Left mastoid effusion, unchanged      RUQ ultrasound, 10/6/2021  Decreased penetration of the liver likely represents mild steatosis. No   discrete mass lesions. Cholecystectomy. Disposition: [facility]    Patient Instructions:    Nicolasa Li   Home Medication Instructions [facility]:[id number]    Printed on:10/11/21 1035     Medication Information                        amiodarone (CORDARONE) 200 MG tablet  Take 1 tablet by mouth 2 times daily             apixaban (ELIQUIS) 5 MG TABS tablet  Take 1 tablet by mouth 2 times daily             aspirin 81 MG EC tablet  Take 81 mg by mouth daily             atorvastatin (LIPITOR) 40 MG tablet  Take 40 mg by mouth nightly             busPIRone (BUSPAR) 10 MG tablet  Take 1 tablet by mouth 3 times daily             DULoxetine (CYMBALTA) 60 MG extended release capsule  Take 1 capsule by mouth daily             folic acid-pyridoxine-cyancobalamin (FOLTX) 1.13-25-2 MG TABS  Take 1 tablet by mouth daily             insulin glargine (LANTUS) 100 UNIT/ML injection vial  Inject 10 Units into the skin nightly Hold Lantus Insulin if H. S.blood glucose < 150,  and call Dr. Kathie Lutz for a lower dose             insulin lispro (HUMALOG) 100 UNIT/ML injection vial  Inject 10 Units into the skin 3 times daily (with meals) Please give Humalog Insulin soon after the meal. HOLD Humalog If Pt. Eats < 50% of the meal. OR Pre meal blood glucose < 120. levothyroxine (SYNTHROID) 50 MCG tablet  Take 50 mcg by mouth Daily             metoprolol succinate (TOPROL XL) 100 MG extended release tablet  Take 1 tablet by mouth daily             nitroGLYCERIN (NITROSTAT) 0.4 MG SL tablet  up to max of 3 total doses. If no relief after 1 dose, call 911.              pantoprazole (PROTONIX) 40 MG tablet  Take 40 mg by mouth daily             traZODone (DESYREL) 50 MG tablet  Take 50 mg by mouth nightly                    Activity: activity as tolerated  Diet: cardiac diet and clear liquids    Follow-up with Aidan Barnes PA-C in 5 days    Signed: Electronically signed by Aidan Barnes PA-C on 10/11/2021 at 7:51 AM  I have independently evaluated and examined this patient today. I have reviewed radiologic and biochemical tests on this patient. Management Plan is developed mutually with APRIL Arnold. I have reviewed above note and agree with assessment and plan.  Monitor condition closely  Time spent on discharge 35 minutes

## 2021-10-11 NOTE — CARE COORDINATION
Reviewed chart, pt on discharge, Sent PS to Doroteo CHEN requesting [facility] order. [facility] order in place, updated Debra with [facility] of pt's discharge to home today.

## 2021-10-13 NOTE — PROGRESS NOTES
Pr  Subjective:   Admit Date: 10/5/2021  PCP: Maik Mckeon PA-C    Admitted For :Hypoglycemia now has hyperglycemia     Consulted For: Better control of blood glucose     Interval History: Feels somewhat better more alert and awake    Denies any chest pains,   Mild SOB . Denies nausea or vomiting. No new bowel or bladder symptoms. No intake or output data in the 24 hours ending 10/12/21 2245    DATA    CBC:   No results for input(s): WBC, HGB, PLT in the last 72 hours. CMP:  Recent Labs     10/10/21  0531 10/11/21  0831    139   K 4.3 4.7    99   CO2 31 29   BUN 24* 23   CREATININE 1.4* 1.4*   CALCIUM 7.7* 8.6   PROT 5.0* 6.3*   LABALBU 3.0* 3.8   BILITOT 0.5 0.9   ALKPHOS 119 147*   AST 34 32   * 103*     Lipids:   Lab Results   Component Value Date    CHOL 153 01/30/2012    HDL 59 01/30/2012    TRIG 135 01/30/2012     Glucose:  Recent Labs     10/10/21  2013 10/11/21  0301 10/11/21  0827   POCGLU 143* 100* 126*     [id number]:  Lab Results   Component Value Date    LABA1C 7.5 10/07/2021     High Sensitivity TSH:   Lab Results   Component Value Date    TSHHS 0.812 09/06/2021     Free T3: No results found for: FT3  Free T4:  Lab Results   Component Value Date    T4FREE 1.28 06/12/2021       CT Head WO Contrast    Result Date: 10/6/2021  EXAMINATION: CT OF THE HEAD WITHOUT CONTRAST  10/5/2021 6:55 pm   isualized skull or soft tissues. 1. No acute intracranial hemorrhage, intra-axial mass, or acute territorial infarct 2. Generalized atrophy and scattered senescent white matter changes again demonstrated 3.  Left mastoid effusion, unchanged     XR CHEST PORTABLE    Result Date: 10/6/2021  EXAMINATION: ONE XRAY VIEW OF THE CHEST 10/5/2021 11:37 pm COMPARISON: 08/01/2021 HISTORY: ORDERING SYSTEM PROVIDED HISTORY: altered level of conciousness TECHNOLOGIST PROVIDED HISTORY: Reason for exam:->altered level of conciousness Reason for Exam: altered level of conciousness Acuity: Acute Type of Exam: Initial FINDINGS: The cardiomediastinal silhouette is mildly enlarged in size and contour. The lungs are clear. No pleural effusion or pneumothorax is present. No acute cardiopulmonary process     US ABDOMEN LIMITED Specify organ? GALLBLADDER, LIVER    Result Date: 10/7/2021  EXAMINATION: RIGHT UPPER QUADRANT ULTRASOUND 10/6/2021 5:57 am COMPARISON: CT dated July 15, 2021. HISTORY: ORDERING SYSTEM PROVIDED HISTORY: abnormal LFT's     Decreased penetration of the liver likely represents mild steatosis. No discrete mass lesions. Cholecystectomy. Scheduled Medicines   Medications:      Infusions:         Objective:   Vitals: BP (!) 149/82   Pulse 89   Temp 98.1 °F (36.7 °C) (Oral)   Resp 16   Ht 5' (1.524 m)   Wt 187 lb 2.7 oz (84.9 kg)   SpO2 94%   BMI 36.55 kg/m²   General appearance: alert and cooperative with exam  Neck: no JVD or bruit  Thyroid : Normal lobes   Lungs: Has Vesicular Breath sounds   Heart:  regular rate and rhythm  Abdomen: soft, non-tender; bowel sounds normal; no masses,  no organomegaly  Musculoskeletal: Normal  Extremities: extremities normal, , no edema  Neurologic:  Awake, alert, oriented to name, place and time. Cranial nerves II-XII are grossly intact. Motor is  intact.   Sensory neuropathy and gait is abnormal.  And unstable    Assessment:     Patient Active Problem List:     History of CVA (cerebrovascular accident) without residual deficits     Morbid obesity ([address]     DM (diabetes mellitus), type 2, uncontrolled ([address]     Persistent atrial fibrillation (HCC)     CAD (coronary artery disease)     CHF (congestive heart failure) ([address]     Light headed     History of MI (myocardial infarction)     Diabetes mellitus ([address]     Hypertension     TIA (transient ischemic attack)     Hyperlipidemia     SOB (shortness of breath)     H/O cardiovascular stress test     Family history of coronary artery disease     PAF (paroxysmal atrial fibrillation) ([address]     Chest pain     Atrial thrombus without antecedent myocardial infarction     S/P ablation of atrial fibrillation     Gastroenteritis     YESIKA (acute kidney injury) [facility]     Acute cystitis     Elevated liver enzymes     CHF (congestive heart failure), NYHA class I, acute on chronic, combined (HCC)     Pneumonia     Multifocal pneumonia     Atrial fibrillation with RVR (HCC)     Shoulder pain     Paroxysmal atrial fibrillation (HCC)     Diabetic hyperosmolar coma (HCC)     Difficult intravenous access     HFrEF (heart failure with reduced ejection fraction) ([facility])     Delirium     Bradycardia     Hypoglycemia      Plan:     1. Reviewed POC blood glucose . Labs and X ray results   2. Reviewed Current Medicines   3. On meal/ Correction bolus Humalog/ Basal Lantus Insulin regime /  4. Monitor Blood glucose frequently   5. Modified  the dose of Insulin/ other medicines as needed   6. Will follow   7.  Possible discharge later in the day        Neymar Napier MD, MD

## 2021-10-26 NOTE — TELEPHONE ENCOUNTER
Daughter Amanda Shaw called Mom was placed on Entresto and doesn't have medication coverage   This medication is to expensive , she is asking for something else [facility] patient

## 2021-11-22 NOTE — CARE COORDINATION
Patient's daughter returned call.   Spoke to Dr Hilario Castro re: family decision. Family will decide if plan will be SNF w/PEG or Home w/ Hospice. CM will follow. Anabelle Darnell RN     [id number] Patient unable to participate. She remains restrained for safety. No family present.  Anabelle Darnell RN

## 2021-12-11 PROBLEM — J96.00 ACUTE RESPIRATORY FAILURE (HCC): Status: ACTIVE | Noted: 2021-01-01

## 2021-12-11 PROBLEM — J96.01 ACUTE RESPIRATORY FAILURE WITH HYPOXIA (HCC): Status: ACTIVE | Noted: 2021-01-01

## 2021-12-11 PROBLEM — J93.9 PNEUMOTHORAX: Status: ACTIVE | Noted: 2021-01-01

## 2021-12-11 PROBLEM — I46.9 CARDIAC ARREST (HCC): Status: ACTIVE | Noted: 2021-01-01

## 2021-12-11 NOTE — ED PROVIDER NOTES
Please refer to the documentation completed by Dr. Margo Cervantes for full details of the encounter. Critical Care: There is a high probability of clinically significant and life or limb altering change in the patient's condition that requires my immediate attention and intervention. Total critical care time not including separately reportable procedures is at least 30 minutes. Emergency department course:  Patient was initially seen by Dr. Grant Loving. Initial report is provided at shift change at approximately 1500. Report indicates patient has been admitted to the intensive care unit, but they are still preparing a bed. Patient did slip into ventricular tachycardia briefly but was resuscitated with return of spontaneous circulation. They have been titrating combination of norepinephrine and dopamine to maintain blood pressure and heart rate. As of approximately 1530, CODE MICHELLE was called as patient lost pulse. She had been on the ventilator throughout without spontaneous respirations. Patient was given multiple rounds of epinephrine, sodium bicarbonate, glucose, and magnesium. Hospitalist was at bedside as well. They requested additional doses of amiodarone. Please refer to resuscitation documentation for exact timing and sequence of medications. At approximately [phone], hospital service was able to confirm DNR comfort care status. Daughter at the bedside indicates that her mother Luiz Alpers never want this. \"  Compressions were stopped at approximately 1550, but patient had actually regained spontaneous pulse, though at a significant bradycardia. Patient is still receiving bag ventilation as she is without spontaneous respirations. Daughter has requested that these minimal efforts be continued until her son is able to present to the bedside. As of approximately 1610, the patient's extended family is now at the bedside.   They have requested that we continue with comfort care status only and would like the patient extubated. We have discussed that she will likely pass very shortly after ventilatory support is removed. They would like to proceed. With nurse and respiratory therapist at the bedside, patient is extubated at [phone]. No spontaneous respirations are noted. Patient did maintain a very thready, bradycardic pulse until 1621, when the patient became pulseless. Auscultation revealed no audible heart or breath sounds. Patient was pronounced dead by me at [phone]. [facility]  will be contacted per protocol. Clinical Impression:  1. Acute respiratory failure, unspecified whether with hypoxia or hypercapnia ([address]    2. Dyspnea, unspecified type    3. Hypoxia    4. Peripheral edema    5. Respiratory distress    6. Cardiac arrest ([address]    7. V-tach ([address]    8. Lactic acidemia    9. Hyperkalemia    10. Serum creatinine raised    11. Troponin level elevated    12. Elevated brain natriuretic peptide (BNP) level    13. Congestive heart failure, unspecified HF chronicity, unspecified heart failure type ([address]    14. SIRS (systemic inflammatory response syndrome) (HCC)    15. Pneumothorax, unspecified type      Disposition referral (if applicable):  No follow-up provider specified.   Disposition medications (if applicable):  New Prescriptions    No medications on file     ED Provider Disposition Time  DISPOSITION  2021 04:26:26 PM        Ruben Monsivais MD  [phone]

## 2021-12-11 NOTE — ED NOTES
Pt sister at bedside, updated on plan of care, given emotional support     Mari Perez, RN  12/11/21 [id number]

## 2021-12-11 NOTE — PROGRESS NOTES
Noted events in ER. Patient was DNR CC per her daughter and efforts to resuscitate was stopped and patient  before I could see her.     Siddharth Nielsen MD  2021

## 2021-12-11 NOTE — PROGRESS NOTES
[address] [facility]  consulted by Dr. Heladio Bundy for monitoring and adjustment. Indication for treatment: R/o PNA  Goal trough: 15-20 mcg/mL    Pertinent Laboratory Values:   Temp Readings from Last 3 Encounters:   10/11/21 98.1 °F (36.7 °C) (Oral)   09/09/21 98.2 °F (36.8 °C) (Oral)   08/12/21 97.8 °F (36.6 °C) (Oral)     Recent Labs     12/11/21  0923   LACTATE 8.8*     Recent Labs     12/10/21  0704 12/11/21  0923   BUN 38* 41*   CREATININE 2.0* 2.3*     Estimated Creatinine Clearance: 24 mL/min (A) (based on SCr of 2.3 mg/dL (H)). No intake or output data in the 24 hours ending 12/11/21 1355    Pertinent Cultures:  Date    Source    Results  12/11                       mrsa nasal screen                       pending    Vancomycin level:   TROUGH:  No results for input(s): VANCOTROUGH in the last 72 hours. RANDOM:  No results for input(s): VANCORANDOM in the last 72 hours. Assessment:  WBC and temperature:  SCr, BUN, and urine output: scr=2.3, crcl= 24; I/O = pending  Day(s) of therapy: 1  Vancomycin concentration: pending    Plan:  1 x vancomycin 1750mg iv ordered this am - if not given - give asap. No other doses of Vancomycin - at the moment. Pulse dosing- due to renal status. Pharmacy will continue to monitor patient and adjust therapy as indicated    [address] 3 12/12 06am    Thank you for the consult.   Alex Irvin, [address] [place] [address]   12/11/2021 1:55 PM

## 2021-12-11 NOTE — ED NOTES
Atropine 1mg given per verbal order Dr Detra Soulier for HR 36-43.      Mikey Otto, RN  12/11/21 Miguel A Turk, RN  12/11/21 8113

## 2021-12-11 NOTE — PROGRESS NOTES
12/11/21 1503   Vent Information   Suction Catheter Diameter 14   Equipment ID [email or web address]   Vent Type Other(comment)   Vent Mode AC/VC   Vt Ordered 450 mL   Rate Set 14 bmp   FiO2  55 %   SpO2 99 %   SpO2/FiO2 ratio 180   PEEP/CPAP 5   Vent Patient Data   Peak Inspiratory Pressure 32 cmH2O   Mean Airway Pressure 9.9 cmH20   Rate Measured 16 br/min   Minute Volume 7.2 Liters   I:E Ratio 1:3.0   Spontaneous Breathing Trial (SBT) RT Doc   Pulse 58   Additional Respiratory  Assessments   Resp 16   Position Semi-Snell's   Alarm Settings   High Pressure Alarm 35 cmH2O   Delay Alarm 20 sec(s)   High Respiratory Rate 40 br/min   Patient Observation   Observations 7.5ett 27CM @LIP

## 2021-12-11 NOTE — ED NOTES
Veronica Lopez from the 's office called this RN back and said that it will not be a coroners case.       Mumtaz Griggs RN  12/11/21 1800

## 2021-12-11 NOTE — Clinical Note
Patient Class: Inpatient [101]   REQUIRED: Diagnosis: Acute respiratory failure ([facility] [518.81. ICD-9-CM]   Estimated Length of Stay: Estimated stay of more than 2 midnights

## 2021-12-11 NOTE — CODE DOCUMENTATION
Pt son Chris Mercedes arrived to bedside. Dr. Sam Mendoza and RT Dell Rosas at bedside with this RN. Family provided emotional support, they still wish to extubate patient and withdraw care at this time.

## 2021-12-11 NOTE — ED NOTES
This RN received a call from the donor referral center saying that they can not use anything and she can be released to the  home pending coroners decision.       Pearl Stevenson RN  [phone]

## 2021-12-11 NOTE — ED PROVIDER NOTES
[facility]      TRIAGE CHIEF COMPLAINT:   Shortness of Breath      [place]:  Cisco Muonz is a 73 y.o. female that presents from nursing home via EMS with respiratory distress. Patient arrives by EMS again on oxygen reportedly oxygen was low on arrival she is tachypneic and tachycardic. Oxygen is doing well but she is pale and does have rhonchi and rales on arrival.  History of CHF EF of 15 to 20%. Supposedly has had a Covid vaccine denies any other fevers or chest pain just short of breath. Sister at bedside, sister works here no further HPI available. Patient able to give me further history as she is ill. Work-up initiated. REVIEW OF SYSTEMS:      Review of Systems   Unable to perform ROS: Severe respiratory distress   Constitutional: Positive for activity change and fatigue. Respiratory: Positive for shortness of breath. Cardiovascular: Positive for leg swelling. Past Medical History:   Diagnosis Date    Abnormal PFTs (pulmonary function tests) 04/2014 4/14    Atrial fibrillation (HCC)     CAD (coronary artery disease)     Carotid aneurysm, right ([facility] [address] 11/09/2020    s/p fall. [facility] to [facility] Neurosurgery by Dr Dontae Oglesby office    CHF (congestive heart failure) ([facility] [address]     Diabetes mellitus ([facility] [address]     Dizziness - light-headed     Family history of coronary artery disease     H/O 24 hour EKG monitoring 02/09/2012,12/10/2010    02/09/2012 predominant rhythm is sinus with short runs of SVT no episode of atrial fib. pt has left bundle branch morphology, max heart is 132, min is 48 with an average of 74 ,infrequent PVCs are seen    H/O cardiovascular stress test 4/1/2013, 1/31/2012 4/1/2013-    H/O cardiovascular stress test 4/16/14,02/09/2012 4/16/14 EF 52% No ischemia, normal study. 02/09/2012 EF 58% tehnique-technetium LVEF is normal globally    H/O echocardiogram 03/27/2013 4/14 EF 50-55% mild MR, TR 3/13EF 35-45% decreasedr L ventrical systolic function.  H/O echocardiogram 2014-EF50-55% Mild MR/TR. 10/11 EF > 55% LVS function is normal, calcified mitral apparatus, impaired LV relaxation, no PE    Heart imaging 2012 MUGA rest EF 58%LVEF is normal globally    Heartburn     History of cardiac cath 2009, 2006,moderate stenosis  small diagonal    History of cardiac monitoring 2015    Event - sinus rhythm    History of cardioversion 2014    History of MI (myocardial infarction)     History of nuclear MUGA 2020    EF 41%    History of nuclear Muga stress test 2020    EF 41%    History of PTCA 10/06/2010    10/06/2010 stent 3.0x24 taxus stent left main 90% stenosis mid seg reduced to 0% second area of [id number] to 40% not flow limiting EF 40%    Hx of cardiovascular stress test 2015    lexiscan-normal,EF70%    Hx of transesophageal echocardiography (LUKE) for monitoring 2017    Noted to have MARY GRACE Blood Clot.  Hyperlipidemia     Hypertension     Insomnia     SOB (shortness of breath)     Tachycardia 2012    report in epic notes-Dr Nunn hosp. consult    TIA (transient ischemic attack)      Past Surgical History:   Procedure Laterality Date    ABLATION OF DYSRHYTHMIC FOCUS       SECTION      CHOLECYSTECTOMY      DIAGNOSTIC CARDIAC CATH LAB PROCEDURE  2006, 2009 moderate stenosis small diagonal    IR NONTUNNELED VASCULAR CATHETER  2021    IR NONTUNNELED VASCULAR CATHETER 2021 [facility] SPECIAL PROCEDURES    OTHER SURGICAL HISTORY  2017    afib ablation LUKE    PTCA  10/06/2010    10/06/2010 stent 3.0x24 taxus stent 90% stenosis mid segment reduced to 0% second area of stenosis 30-40% not flow limiting EF 40%    TUBAL LIGATION       Family History   Problem Relation Age of Onset    Diabetes Father     Heart Disease Father      Social History     Socioeconomic History    Marital status:  Spouse name: Not on file    Number of children: Not on file    Years of education: Not on file    Highest education level: Not on file   Occupational History    Not on file   Tobacco Use    Smoking status: Former Smoker     Packs/day: 3.00     Years: 20.00     Pack years: 60.00     Types: Cigarettes     Quit date: 2007     Years since quittin.8    Smokeless tobacco: Never Used    Tobacco comment: reviewed 8/12/15   Vaping Use    Vaping Use: Never used   Substance and Sexual Activity    Alcohol use: No     Comment: occas. caffeine 3 mtn. dew a day    Drug use: No    Sexual activity: Not Currently   Other Topics Concern    Not on file   Social History Narrative    Not on file     Social Determinants of Health     Financial Resource Strain:     Difficulty of Paying Living Expenses: Not on file   Food Insecurity:     Worried About Running Out of Food in the Last Year: Not on file    Brandyn of Food in the Last Year: Not on file   Transportation Needs:     Lack of Transportation (Medical): Not on file    Lack of Transportation (Non-Medical):  Not on file   Physical Activity:     Days of Exercise per Week: Not on file    Minutes of Exercise per Session: Not on file   Stress:     Feeling of Stress : Not on file   Social Connections:     Frequency of Communication with Friends and Family: Not on file    Frequency of Social Gatherings with Friends and Family: Not on file    Attends Jewish Services: Not on file    Active Member of Clubs or Organizations: Not on file    Attends Club or Organization Meetings: Not on file    Marital Status: Not on file   Intimate Partner Violence:     Fear of Current or Ex-Partner: Not on file    Emotionally Abused: Not on file    Physically Abused: Not on file    Sexually Abused: Not on file   Housing Stability:     Unable to Pay for Housing in the Last Year: Not on file    Number of [place] in the Last Year: Not on file    Unstable Housing in the Last Year: Not on file     No current facility-administered medications for this encounter. Current Outpatient Medications   Medication Sig Dispense Refill    sacubitril-valsartan (ENTRESTO) 24-26 MG per tablet Take 1 tablet by mouth 2 times daily 28 tablet 0    insulin glargine (LANTUS) 100 UNIT/ML injection vial Inject 10 Units into the skin nightly Hold Lantus Insulin if H. S.blood glucose < 150,  and call Dr. Adams July for a lower dose 10 mL 3    amiodarone (CORDARONE) 200 MG tablet Take 1 tablet by mouth 2 times daily 60 tablet 1    metoprolol succinate (TOPROL XL) 100 MG extended release tablet Take 1 tablet by mouth daily 90 tablet 0    busPIRone (BUSPAR) 10 MG tablet Take 1 tablet by mouth 3 times daily 90 tablet 0    DULoxetine (CYMBALTA) 60 MG extended release capsule Take 1 capsule by mouth daily 30 capsule 3    insulin lispro (HUMALOG) 100 UNIT/ML injection vial Inject 10 Units into the skin 3 times daily (with meals) Please give Humalog Insulin soon after the meal. HOLD Humalog If Pt. Eats < 50% of the meal. OR Pre meal blood glucose < 120. 1 vial 3    folic acid-pyridoxine-cyancobalamin (FOLTX) 1.13-25-2 MG TABS Take 1 tablet by mouth daily 30 tablet 0    traZODone (DESYREL) 50 MG tablet Take 50 mg by mouth nightly      nitroGLYCERIN (NITROSTAT) 0.4 MG SL tablet up to max of 3 total doses.  If no relief after 1 dose, call 911. 25 tablet 3    atorvastatin (LIPITOR) 40 MG tablet Take 40 mg by mouth nightly      pantoprazole (PROTONIX) 40 MG tablet Take 40 mg by mouth daily      levothyroxine (SYNTHROID) 50 MCG tablet Take 50 mcg by mouth Daily      apixaban (ELIQUIS) 5 MG TABS tablet Take 1 tablet by mouth 2 times daily 60 tablet 2    aspirin 81 MG EC tablet Take 81 mg by mouth daily        Allergies   Allergen Reactions    Amiodarone Other (See Comments)     dizziness    Iv Dye [Iodides] Nausea And Vomiting    Vicodin [Hydrocodone-Acetaminophen] Nausea And Vomiting     No current facility-administered medications for this encounter. Current Outpatient Medications   Medication Sig Dispense Refill    sacubitril-valsartan (ENTRESTO) 24-26 MG per tablet Take 1 tablet by mouth 2 times daily 28 tablet 0    insulin glargine (LANTUS) 100 UNIT/ML injection vial Inject 10 Units into the skin nightly Hold Lantus Insulin if H. S.blood glucose < 150,  and call Dr. Buck Mccoy for a lower dose 10 mL 3    amiodarone (CORDARONE) 200 MG tablet Take 1 tablet by mouth 2 times daily 60 tablet 1    metoprolol succinate (TOPROL XL) 100 MG extended release tablet Take 1 tablet by mouth daily 90 tablet 0    busPIRone (BUSPAR) 10 MG tablet Take 1 tablet by mouth 3 times daily 90 tablet 0    DULoxetine (CYMBALTA) 60 MG extended release capsule Take 1 capsule by mouth daily 30 capsule 3    insulin lispro (HUMALOG) 100 UNIT/ML injection vial Inject 10 Units into the skin 3 times daily (with meals) Please give Humalog Insulin soon after the meal. HOLD Humalog If Pt. Eats < 50% of the meal. OR Pre meal blood glucose < 120. 1 vial 3    folic acid-pyridoxine-cyancobalamin (FOLTX) 1.13-25-2 MG TABS Take 1 tablet by mouth daily 30 tablet 0    traZODone (DESYREL) 50 MG tablet Take 50 mg by mouth nightly      nitroGLYCERIN (NITROSTAT) 0.4 MG SL tablet up to max of 3 total doses.  If no relief after 1 dose, call 911. 25 tablet 3    atorvastatin (LIPITOR) 40 MG tablet Take 40 mg by mouth nightly      pantoprazole (PROTONIX) 40 MG tablet Take 40 mg by mouth daily      levothyroxine (SYNTHROID) 50 MCG tablet Take 50 mcg by mouth Daily      apixaban (ELIQUIS) 5 MG TABS tablet Take 1 tablet by mouth 2 times daily 60 tablet 2    aspirin 81 MG EC tablet Take 81 mg by mouth daily         Nursing Notes Reviewed    VITAL SIGNS:  ED Triage Vitals   Enc Vitals Group      BP       Pulse       Resp       Temp       Temp src       SpO2       Weight       Height       Head Circumference       Peak Flow       Pain Score Pain Loc       Pain Edu? Excl. in [address]? PHYSICAL EXAM:  Physical Exam  Vitals and nursing note reviewed. Constitutional:       General: She is in acute distress. Appearance: She is well-developed and well-groomed. She is obese. She is ill-appearing. She is not toxic-appearing or diaphoretic. Interventions: Nasal cannula and face mask in place. HENT:      Head: Normocephalic and atraumatic. Right Ear: External ear normal.      Left Ear: External ear normal.      Nose: No congestion or rhinorrhea. Eyes:      General: No scleral icterus. Right eye: No discharge. Left eye: No discharge. Extraocular Movements: Extraocular movements intact. Conjunctiva/sclera: Conjunctivae normal.   Neck:      Vascular: No JVD. Cardiovascular:      Rate and Rhythm: Regular rhythm. Tachycardia present. Pulses: Normal pulses. Heart sounds: Normal heart sounds. No murmur heard. No friction rub. No gallop. Pulmonary:      Effort: Tachypnea, accessory muscle usage and respiratory distress present. Breath sounds: No stridor. Wheezing, rhonchi and rales present. Abdominal:      General: Bowel sounds are normal. There is no distension. Palpations: Abdomen is soft. There is no mass. Tenderness: There is no abdominal tenderness. There is no guarding or rebound. Hernia: No hernia is present. Musculoskeletal:         General: Swelling present. No tenderness, deformity or signs of injury. Cervical back: Full passive range of motion without pain and normal range of motion. No edema, erythema, signs of trauma, rigidity, torticollis or crepitus. No pain with movement. Normal range of motion. Right lower leg: Edema present. Left lower leg: Edema present. Skin:     General: Skin is warm. Coloration: Skin is pale. Skin is not jaundiced. Findings: No bruising, erythema or lesion.    Neurological:      General: No focal deficit present. Mental Status: She is alert and oriented to person, place, and time. GCS: GCS eye subscore is 4. GCS verbal subscore is 5. GCS motor subscore is 6. Cranial Nerves: Cranial nerves are intact. No cranial nerve deficit, dysarthria or facial asymmetry. Sensory: Sensation is intact. No sensory deficit. Motor: Motor function is intact. No weakness, tremor, atrophy, abnormal muscle tone or seizure activity. Psychiatric:         Behavior: Behavior is cooperative.            I have reviewed andinterpreted all of the currently available lab results from this visit (if applicable):    Results for orders placed or performed during the hospital encounter of 12/11/21   COVID-19, Rapid    Specimen: Nasopharyngeal   Result Value Ref Range    Source THROAT     SARS-CoV-2, NAAT NOT DETECTED NOT DETECTED   Rapid Flu Swab    Specimen: Nasopharyngeal   Result Value Ref Range    Rapid Influenza A Ag NEGATIVE NEGATIVE    Rapid Influenza B Ag NEGATIVE NEGATIVE   CMP   Result Value Ref Range    Sodium 127 (L) 135 - 145 MMOL/L    Potassium 5.7 (HH) 3.5 - 5.1 MMOL/L    Chloride 88 (L) 99 - 110 mMol/L    CO2 19 (L) 21 - 32 MMOL/L    BUN 41 (H) 6 - 23 MG/DL    CREATININE 2.3 (H) 0.6 - 1.1 MG/DL    Glucose 173 (H) 70 - 99 MG/DL    Calcium 8.5 8.3 - 10.6 MG/DL    Albumin 2.5 (L) 3.4 - 5.0 GM/DL    Total Protein 6.5 6.4 - 8.2 GM/DL    Total Bilirubin 2.3 (H) 0.0 - 1.0 MG/DL    ALT 46 (H) 10 - 40 U/L     (H) 15 - 37 IU/L    Alkaline Phosphatase 215 (H) 40 - 129 IU/L    GFR Non- 21 (L) >60 mL/min/1.73m2    GFR  26 (L) >60 mL/min/1.73m2    Anion Gap 20 (H) 4 - 16   Troponin   Result Value Ref Range    Troponin T 0.011 (H) <0.01 NG/ML   Brain Natriuretic Peptide   Result Value Ref Range    Pro-BNP 29,686 (H) <300 PG/ML   CK   Result Value Ref Range    Total CK 30 26 - 140 IU/L   Lactic Acid, Plasma   Result Value Ref Range    Lactate 8.8 (HH) 0.4 - 2.0 mMOL/L   Blood Gas, Venous Result Value Ref Range    pH, James 7.27 (L) 7.32 - 7.42    pCO2, James 43 38 - 52 mmHG    pO2, James 38 28 - 48 mmHG    Base Excess 7 (H) 0 - 2.4    HCO3, Venous 19.7 19 - 25 MMOL/L    O2 Sat, James 61.7 50 - 70 %    Comment VBG    Magnesium   Result Value Ref Range    Magnesium 2.4 1.8 - 2.4 mg/dl   Protime/INR & PTT   Result Value Ref Range    Protime 25.1 (H) 11.7 - 14.5 SECONDS    INR 1.93 INDEX    aPTT 46.3 (H) 25.1 - 37.1 SECONDS   CBC Auto Differential   Result Value Ref Range    WBC 47.6 (HH) 4.0 - 10.5 K/CU MM    RBC 2.39 (L) 4.2 - 5.4 M/CU MM    Hemoglobin 7.0 (L) 12.5 - 16.0 GM/DL    Hematocrit 24.9 (L) 37 - 47 %    .2 (H) 78 - 100 FL    MCH 29.3 27 - 31 PG    MCHC 28.1 (L) 32.0 - 36.0 %    RDW 19.7 (H) 11.7 - 14.9 %    Platelets 794 764 - 596 K/CU MM    MPV 9.2 7.5 - 11.1 FL    Metamyelocytes Relative 2 (H) 0.0 %    Bands Relative 7 5 - 11 %    Segs Relative 85.0 (H) 36 - 66 %    Lymphocytes % 5.0 (L) 24 - 44 %    Monocytes % 1.0 0 - 4 %    Metamyelocytes Absolute 0.95 K/CU MM    Bands Absolute 3.33 K/CU MM    Segs Absolute 40.4 K/CU MM    Lymphocytes Absolute 2.4 K/CU MM    Monocytes Absolute 0.5 K/CU MM    Differential Type MANUAL DIFFERENTIAL     Anisocytosis 1+     Polychromasia 1+     Strawn Cells 3+     WBC Morphology FEW VACUOLATED NEUTROPHILS     PLT Morphology RARE LARGE PLATELETS    ABG Blood Gas, Arterial   Result Value Ref Range    pH, Bld 7.16 (L) 7.34 - 7.45    pCO2, Arterial 38.0 32 - 45 MMHG    pO2, Arterial 95 75 - 100 MMHG    Base Excess 14 (H) 0 - 2.4    HCO3, Arterial 13.5 (L) 18 - 23 MMOL/L    CO2 Content 14.7 (L) 19 - 24 MMOL/L    O2 Sat 94.6 (L) 96 - 97 %    Carbon Monoxide, Blood 2.5 0 - 5 %    Methemoglobin, Arterial 1.0 <1.5 %    Comment AC 16 450 80% +5    Sodium, urine, random   Result Value Ref Range    Sodium, Ur 15 (L) 35 - 167 MMOL/L   Protein / creatinine ratio, urine   Result Value Ref Range    Urine Total Protein 87.4 (H) <12 MG/DL    Creatinine, Ur 136.6 28 - 217 MG/DL Prot/Creat Ratio, Ur 0.6 (H) <0.2   CMP   Result Value Ref Range    Sodium 129 (L) 135 - 145 MMOL/L    Potassium 5.9 (HH) 3.5 - 5.1 MMOL/L    Chloride 87 (L) 99 - 110 mMol/L    CO2 11 (L) 21 - 32 MMOL/L    BUN 42 (H) 6 - 23 MG/DL    CREATININE 2.8 (H) 0.6 - 1.1 MG/DL    Glucose 63 (L) 70 - 99 MG/DL    Calcium 8.6 8.3 - 10.6 MG/DL    Albumin 2.2 (L) 3.4 - 5.0 GM/DL    Total Protein 5.6 (L) 6.4 - 8.2 GM/DL    Total Bilirubin 2.5 (H) 0.0 - 1.0 MG/DL     (H) 10 - 40 U/L    AST 2,752 (H) 15 - 37 IU/L    Alkaline Phosphatase 243 (H) 40 - 129 IU/L    GFR Non- 17 (L) >60 mL/min/1.73m2    GFR  21 (L) >60 mL/min/1.73m2    Anion Gap 31 (H) 4 - 16   POCT glucose   Result Value Ref Range    Glucose 94 mg/dL   POCT Glucose   Result Value Ref Range    POC Glucose 94 70 - 99 MG/DL   POCT Glucose   Result Value Ref Range    POC Glucose 68 (L) 70 - 99 MG/DL   EKG 12 Lead   Result Value Ref Range    Ventricular Rate 65 BPM    Atrial Rate 69 BPM    QRS Duration 166 ms    Q-T Interval 462 ms    QTc Calculation (Bazett) 480 ms    R Axis -20 degrees    T Axis 131 degrees    Diagnosis       Atrial fibrillation with premature ventricular or aberrantly conducted complexes  Left bundle branch block  Abnormal ECG  When compared with ECG of 11-DEC-2021 09:08,  Previous ECG has undetermined rhythm, needs review     EKG 12 Lead   Result Value Ref Range    Ventricular Rate 46 BPM    Atrial Rate 178 BPM    QRS Duration 178 ms    Q-T Interval 490 ms    QTc Calculation (Bazett) 428 ms    R Axis 0 degrees    T Axis 144 degrees    Diagnosis       Atrial fibrillation with slow ventricular response  Left bundle branch block  Abnormal ECG  When compared with ECG of 11-DEC-2021 10:26,  QT has shortened     TYPE AND SCREEN   Result Value Ref Range    ABO/Rh A POSITIVE     Antibody Screen NEGATIVE         Radiographs (if obtained):  [] The following radiograph was interpreted by myself in the absence of a radiologist:  [x] Radiologist's Report Reviewed:    CXR    EKG (if obtained): (All EKG's are interpreted by myself in the absence of a cardiologist)    12 lead EKG per my interpretation #1:  Sinus Tachycardia 137 LBBB  Axis is   Left axis deviation  QTc is  453  There is no specific T wave changes appreciated. There is no specific ST wave changes appreciated. Prior EKG to compare with was available and similar to previous    12 lead EKG per my interpretation #2:  Atrial Fibrillation 65 LBBB  [place] is   Left axis deviation  QTc is  480  There is no specific T wave changes appreciated. There is no specific ST wave changes appreciated. Prior EKG to compare with was not available     12 lead EKG per my interpretation:  Atrial Fibrillation 46 LBBB  Axis is   left  QTc is  428  There is no specific T wave changes appreciated. There is no specific ST wave changes appreciated. Prior EKG to compare with was not available       Procedure Note - Intubation:  Patient emergently intubated for airway protection, respiratory distress pre-oxygenation was administered and the appropriate equipment and staff were made available at the bedside. Tiffanie Hanna was sedated/paralyzed; please see the chart for the drugs and dosages administered. A Verenice 3 laryngoscope blade was used. Video laryngoscopy WAS/WAS NOT: was used. A 7.5mm endotracheal tube was viewed to pass through the cords on the first attempt. The tube was secured at 27cm to the lip. Tracheal position was confirmed using a colorimetric end-tidal CO2 detector, tube condensation and chest auscultation/visualization. Respiratory therapy is at the bedside and is assisting with ventilatory management. Post procedure chest xray was ordered. Procedure Note - Central Line:  Patient had emergent central line placed due to need for access, cardiac arrest, respiratory failure.  Tiffanie Hanna was prepped and draped in standard bedside fashion in the right femoral area. Physical landmarks with ultrasound guidance was used to locate the central vein. Local anesthesia with 0ml of None was injected. Seldinger technique was used for placement of the CVC in a non-pulsatile vasculature. All ports john and flushed. The patient tolerated the procedure well and no acute complications occurred. SIRS CRITERIA: (2 required)  Temperature <36 or >38  No  Heart rate >90    Yes  RR >20 or PCO2 <32   Yes  WBC >12 or <4 or >10% bands Yes    SEPSIS CRITERIA: (Both required)  Two or more of the above  Yes  Suspected source(s) of infection Lung/abdomen/urine    ANTIBIOTIC SELECTION RATIONAL  Vancomycin/Cefepime    ANTIBIOTIC SELECTION LIMITATIONS  None    LACTIC ACID    Initial     See documentation (8)  Follow - up if initial abnormal  See documentation    SEPTIC SHOCK CRITERIA:  SBP <90 or 40 reduction from baseline refractory to fluid resuscitation:  Yes  Serum Lactic Acid >4:   Yes    FLUIDS  Saline 30 ml/kg given (over 30-60 min) No  (Septic SHOCK or lactic > 4)    FLUID ADMINISTRATION BARRIERS  Poor IV access and Significant active pulmonary edema or CHF     OTHER TREATMENT BARRIERS  None    CENTRAL LINE INSERTED? yes    [place], DO      Total critical care time today provided was at least 60 minutes. This excludes seperately billable procedure. Critical care time provided for reviewing labs, images giving oxygen, breathing treatments, steroids, Lasix, BiPAP, amiodarone, CPR, consulting cardiology, giving sedation, consulting cardiothoracic surgery, giving dopamine, antibiotics, consulting medicine that required close evaluation and/or intervention with concern for patient decompensation. MDM:    Patient arrives via EMS from nursing home with a complaint of respiratory distress, short of breath. Again history of CHF EF of 15 to 20%. Reportedly her oxygen was low she does not wear oxygen at the nursing home.   On arrival she does have rhonchi wheezing and rales arrival she is on nasal cannula, facemask oxygen is doing well but she is tachypneic, tachycardic. She does appear pale does appear to be fluid overload. History of chronic kidney disease. I did order labs imaging BiPAP initially. Patient was taken from the hallway to room 31. After I did order labs and imaging and treatment including Lasix and breathing treatments and steroids also, nursing staff due to her having increasing drowsiness, short of breath. I rechecked her her blood gas shows metabolic acidosis she is difficult to arouse able to protect airway. At this time sister who was at bedside initially is now gone at this time decision was made to emergently intubate her for critical illness her blood pressure was low in the 80s to 60s, tachycardic and respiratory distress, altered mental status. I did emergently intubate her with rocuronium and etomidate. After giving her RSI medication she did go into V. tach, pulseless. This time CPR was started immediately. After giving 1 mg of epinephrine and bicarb and 300 mg amiodarone and CPR I did get pulses back. At this time resuscitation started I consulted cardiology will need to start amiodarone drip but currently her heart rate is in the 50s and blood pressure was soft will order Levophed and sedation. Patient intubated will get labs and imaging insert NG tube, Chen. Patient critically ill likely CHF will check flu, Covid. Patient will need admitted but she is critically critically ill. Again after patient was intubated I did put a central line in her right femoral vein as she did need pressors and antibiotics and sedation. X-ray postintubation does show a small left apical pneumothorax. Again she is on a ventilator I did talk to cardiothoracic surgery cardiology and nephrology.   Cardiothoracic surgery recommended observing and repeating chest x-ray in an hour to at this time holding off on chest tube given that the apical pneumothorax is post CPR and small nature. Repeat chest x-ray done twice and shows stable appearance of left apical pneumothorax. No tension pneumothorax. My shift is over at 2 PM cardiology did mention they would follow along and put a chest tube and if needed and also will call IR for chest tube also if needed. Nephrology consult patient in ER cardiology following. Patient did get bradycardic slow A. fib I did start her on dopamine at in addition to Levophed. Patient again critically ill likely combination of CHF as her EF is 15% on previous admissions and multifocal pneumonia. Family all at bedside I did update them about patient's critical nature family does state that she is a DNR but they are waiting for the brother to get here. They are okay with plan and chest tube if needed to do that. Patient again critically ill with all consultants following along. I did admit patient to family doctor who is covering Dr. Jillian Munoz who put in ICU orders. Currently at the end of my shift at 2 PM she is waiting for a bed upstairs orders have been placed. I did let my colleagues in the ER know about her critical nature and again all consultants are following. Patient likely has a poor outcome but she currently is getting again pressors dopamine for heart rate, oxygen, ventilator support, antibiotics. Again left apical pneumothorax is known about but currently per cardiothoracic surgery recommendation no chest tube needed has not tension in nature. Patient critically ill.     CLINICAL IMPRESSION:  Final diagnoses:   Dyspnea, unspecified type   Hypoxia   Peripheral edema   Respiratory distress   Acute respiratory failure, unspecified whether with hypoxia or hypercapnia (HCC)   Cardiac arrest (HCC)   V-tach (HCC)   Lactic acidemia   Hyperkalemia   Serum creatinine raised   Troponin level elevated   Elevated brain natriuretic peptide (BNP) level   Congestive heart failure, unspecified HF chronicity, unspecified heart failure type ([facility] [address] SIRS (systemic inflammatory response syndrome) (HCC)   Pneumothorax, unspecified type       (Please note that portions of this note may have been completed with a voice recognition program. Efforts were made to edit the dictations but occasionally words aremis-transcribed.)    DISPOSITION REFERRAL (if applicable):  No follow-up provider specified.     DISPOSITION MEDICATIONS (if applicable):  Discharge Medication List as of 12/11/2021  6:15 PM             [email or web address], DO           [email or web address], DO  12/12/21 1690

## 2021-12-11 NOTE — CODE DOCUMENTATION
Pt family provided emotional support. Declined spiritual care at this time. Pt daughter speaking with Dr. Hernan Kohler and NP Jolene Steven, would like to withdraw care but would like to wait until her son Dolores Valdes gets here.

## 2021-12-11 NOTE — PROGRESS NOTES
I received a call from the emergency department about this very ill patient and a small apical pneumothorax. I discussed the case with the ER physician a few times. There is been three chest x-rays that all look stable. The biggest concern is that she is on the ventilator.     Repeat chest x-ray in the a.m. or sooner if the patient has worsening oxygenation issues

## 2021-12-11 NOTE — ED NOTES
Pt daughter at bedside, given emotional support at this time.       William Sewell RN  12/11/21 9258

## 2021-12-11 NOTE — ED TRIAGE NOTES
Pt brought into ED by EMS presenting with SOB and heavy wheezing. Pt sent in by facility due to shortness of breath.

## 2021-12-11 NOTE — CODE DOCUMENTATION
ELIZABETH Molina at bedside with pt daughter. Pt is a [facility]-  Daughter requesting CPR to be stopped.

## 2021-12-11 NOTE — ED NOTES
PER PT CALLED, WOULD LIKE SOMEONE TO RETURN CALL WITH MRI RESULTS. PLEASE CALL PT BACK TO [facility].   XR called      Kathy Vinson RN  12/11/21 7310

## 2021-12-11 NOTE — SIGNIFICANT EVENT
Code blue called overhead and I responded. When I arrived chest compressions had already started. First rhythm check was vfib so patient was shocked and second was [place]. Appropriate doses of Amiodarone were given alternating with Epinephrine per ACLS protocol. The family soon arrived and stated that the patient was a DNR CC which I was not aware of when I responded to the code blue. All efforts were ceased and I offered my condolences to the family.

## 2021-12-11 NOTE — ED NOTES
Family at bedside, emotional support given. Pt daughter and sisters at bedside.  Waiting on her pt son Jamaica Mendoza to arrive to the ED     Kathy Vinson RN  12/11/21 1744

## 2021-12-11 NOTE — ED NOTES
Called 's office at this time and left a message with a call back number.       Myranda Simmons RN  12/11/21 2023

## 2021-12-11 NOTE — CONSULTS
Patient:   Radha Alonso    Date:  21  :  1955, 73 y.o. Nephrologist: Benny Mcclain MD  Provider: Elliot Allen PA-C    Reason for Consult: Acute kidney injury with underlying CKD stage IV with hyperkalemia    Consult requested by : Dr Maria Isabel Woodruff DO    Chief Complaint:   Shortness of breath and hypoxemia    HISTORY OF PRESENT ILLNESS:   73-year-old female was brought to the emergency room with hypoxia and shortness of breath. Apparently her O2 saturation is low in the nursing home, prompted the ER visit-on arrival in the emergency department she was rather hypotensive-and had a brief period of cardiopulmonary arrest, necessitating intubation-after resuscitation she does have spontaneous pulse back. A Chen was also inserted  Additionally she had biochemical testing-imaging studies has been ordered but not been done yet    Biochemical testing showed high potassium of 5.7, increased creatinine of 2.3-her most recent creatinine was 2.0. Other abnormal lab include significantly high proBNP level-almost 30,000-venous blood gas showed pH of 7.27 and serum bicarbonate of 19, low albumin of 2.5 g/dL and increase hepatic enzyme, also her lactate level was rather high 8.8 mmol/L. During her cardiac resuscitation-she received amiodarone, epinephrine and 1 amp of bicarbonate    When I saw her in the emergency room she is intubated-and has a Chen catheter with minimal urine output  Had significant 3+ lower extremity edema-as well as adventitious breath sound-along with striking conjunctival pallor         I am familiar with her. My last encounter with her was in 2021. She sustained an acute kidney injury at that time from cardiorenal syndrome but was recovering with a creatinine of 1.4 at the time of discharge. Although the plan was to follow-up with me for several reason that could not happen.   Meanwhile her creatinine increased and fluctuating between 1.9-2.3 range In general she has fluctuation of creatinine due to underlying cardiorenal syndrome        I have reviewed her data, looks like her creatinine was about 0.8 back  in , but since 2012, it has been increasing and fluctuating between  1.3-2.3 range, probably coincide with her ischemic cardiomyopathy, she  has very low EF and intermittent pulmonary edema and she is on  diuretics that probably caused \"sawtooth pattern\" creatinine level.         When I saw her in the morning-she was alert awake and at least partially oriented-she is able to eat and drink and sugar was running good-she did not have any respiratory distress at that time        PAST MEDICAL HISTORY:  1.  CKD stage 4 A3. 2.  Diabetes mellitus with significant proteinuria. 3.  Cardiomyopathy  Ischemic-her most recent left ventricular ejection fraction about 20%     4.  Chronic atrial fibrillation. 5.  Hyperlipidemia. 6.  Hypertension. 7.  Insomnia. 8.  Mood disorder. 9.  Had an episode of CVA in the distant past.  10.  Hypothyroidism.     PAST SURGICAL HISTORY:  1.  Ablation for dysrhythmia. 2.  . 3.  Cholecystectomy. 4.  Looks like she had a cath in  and , I need to go back and  see what kind of anatomy and what kind of intervention if any she had.   5.  Tubal ligation.     FAMILY MEDICAL HISTORY:  Diabetes and heart disease runs in the family.     SOCIAL HISTORY:  I think the patient lives alone, but she has several  family tragedies apparently several family members  and she was not  mentally feeling very well according to Dr. Vicki Thompson  She quit smoking long time ago  But has at least 60-pack-year history  Currently does not consume alcohol or take any illicit drugs                   REVIEW OF SYSTEMS:     All pertinent ROS neg except   Hypoxemia, leg edema now cardiopulmonary arrest    Current Facility-Administered Medications   Medication Dose Route Frequency Provider Last Rate Last Admin    ipratropium (ATROVENT HFA) 17 MCG/ACT inhaler 2 puff  2 puff Inhalation 4x daily Jeanne Espinosa DO   2 puff at 12/11/21 0910    furosemide (LASIX) injection 40 mg  40 mg IntraVENous Once Jeanne Espinosa DO        amiodarone (CORDARONE) 450 mg in dextrose 5 % 250 mL infusion  1 mg/min IntraVENous Continuous Jeanne Espinosa DO        Followed by   [facility] amiodarone (CORDARONE) 450 mg in dextrose 5 % 250 mL infusion  0.5 mg/min IntraVENous Continuous Jeanne Espinosa DO        midazolam (VERSED) 100 mg in dextrose 5 % 100 mL infusion  1-10 mg/hr IntraVENous Once Jeanne Espinosa DO        norepinephrine (LEVOPHED) 16 mg in sodium chloride 0.9 % 250 mL infusion  2-100 mcg/min IntraVENous Continuous Gonzalez Vincent DO 23.4 mL/hr at 12/11/21 1041 25 mcg/min at 12/11/21 1041    vancomycin (VANCOCIN) 1750 mg in 350 mL IVPB  20 mg/kg IntraVENous Once Jeanne Espinosa DO        cefepime (MAXIPIME) 2000 mg IVPB minibag  2,000 mg IntraVENous Once Jeanne Espinosa DO         Current Outpatient Medications   Medication Sig Dispense Refill    sacubitril-valsartan (ENTRESTO) 24-26 MG per tablet Take 1 tablet by mouth 2 times daily 28 tablet 0    insulin glargine (LANTUS) 100 UNIT/ML injection vial Inject 10 Units into the skin nightly Hold Lantus Insulin if H. S.blood glucose < 150,  and call Dr. Buck Mccoy for a lower dose 10 mL 3    amiodarone (CORDARONE) 200 MG tablet Take 1 tablet by mouth 2 times daily 60 tablet 1    metoprolol succinate (TOPROL XL) 100 MG extended release tablet Take 1 tablet by mouth daily 90 tablet 0    busPIRone (BUSPAR) 10 MG tablet Take 1 tablet by mouth 3 times daily 90 tablet 0    DULoxetine (CYMBALTA) 60 MG extended release capsule Take 1 capsule by mouth daily 30 capsule 3    insulin lispro (HUMALOG) 100 UNIT/ML injection vial Inject 10 Units into the skin 3 times daily (with meals) Please give Humalog Insulin soon after the meal. HOLD Humalog If Pt.  Eats < 50% of the meal. OR Pre meal blood glucose < LABALBU 2.4 12/02/2021         Calcium:    Lab Results   Component Value Date    CALCIUM 8.5 12/11/2021     Phosphorus:    Lab Results   Component Value Date    PHOS 3.4 10/08/2021       U/A:    Lab Results   Component Value Date    PROTEINU NEGATIVE 11/06/2021    NITRU NEGATIVE 11/06/2021    COLORU YELLOW 11/06/2021    WBCUA 490 11/06/2021    RBCUA 17 11/06/2021    MUCUS RARE 10/06/2021    TRICHOMONAS NONE SEEN 11/06/2021    YEAST RARE 09/01/2020    BACTERIA MANY 11/06/2021    CLARITYU SLIGHTLY CLOUDY 11/06/2021    SPECGRAV 1.011 11/06/2021    UROBILINOGEN NEGATIVE 11/06/2021    BILIRUBINUR NEGATIVE 11/06/2021    BLOODU LARGE 11/06/2021    KETUA NEGATIVE 11/06/2021           IMPRESSION:  1. Acute kidney injury with underlying CKD stage IV-likely from cardiorenal syndrome type I-we will need to make sure there is no acute infection-also brief episode of cardiopulmonary arrest to complicate her kidney function-likely accentuated by her recent Entresto therapy . hyperkalemia likely from acute kidney injury and perhaps from Mike Landsberg  2. Probable fluid overload-with respiratory failure-high lactate probably from tissue-reduced perfusion-also has acute hepatic injury  3.   Underlying severe cardiomyopathy    PLAN:    Start with UA and urinary indicis  Lasix 80 IV 3 times daily  Of course hold Entresto  P.o. potassium binder  Rule out any occult infection  Supportive therapy  Follow clinically and biochemically  She is an empirical antimicrobial agent-adjust as more data becomes available

## 2021-12-11 NOTE — ED NOTES
Daughter, Laurel Valencia updated at this time on patient condition.       Stephenie Shay, JONI  12/11/21 1467

## 2021-12-11 NOTE — ED NOTES
THIS RN NOTIFIED FARHAD KAPOOR AT [facility] THAT PT HAS PASSED AWAY. THE NURSE KANDACE FERNANDES WAS UNABLE TO COME TO THE PHONE BUT STATED SHE WILL NOTIFY HER.      Kathy Vinson RN  12/11/21 3275

## 2021-12-13 LAB
EKG ATRIAL RATE: 131 BPM
EKG ATRIAL RATE: 178 BPM
EKG ATRIAL RATE: 69 BPM
EKG DIAGNOSIS: NORMAL
EKG P AXIS: 39 DEGREES
EKG P-R INTERVAL: 164 MS
EKG Q-T INTERVAL: 300 MS
EKG Q-T INTERVAL: 462 MS
EKG Q-T INTERVAL: 490 MS
EKG QRS DURATION: 148 MS
EKG QRS DURATION: 166 MS
EKG QRS DURATION: 178 MS
EKG QTC CALCULATION (BAZETT): 428 MS
EKG QTC CALCULATION (BAZETT): 453 MS
EKG QTC CALCULATION (BAZETT): 480 MS
EKG R AXIS: -20 DEGREES
EKG R AXIS: -44 DEGREES
EKG R AXIS: 0 DEGREES
EKG T AXIS: 118 DEGREES
EKG T AXIS: 131 DEGREES
EKG T AXIS: 144 DEGREES
EKG VENTRICULAR RATE: 137 BPM
EKG VENTRICULAR RATE: 46 BPM
EKG VENTRICULAR RATE: 65 BPM

## 2021-12-22 NOTE — DISCHARGE SUMMARY
Physician Discharge Summary     Patient ID:  Alejandro Osorio  [id number]  73 y.o.  1955    Admit date: 2021    Discharge date and time: 2021  6:15 PM     Admitting Physician: Rowan Anders MD     Discharge Physician: Karlee Rooney MD      Admission Diagnoses: Acute respiratory failure ([facility] [address] [J96.00]    Discharge Diagnoses: Acute respiratory failure  Dyspnea, unspecified type   Hypoxia   Peripheral edema   Respiratory distress   Acute respiratory failure, unspecified whether with hypoxia or hypercapnia (HCC)   Cardiac arrest ([facility] [address]   V-tach (HCC)   Lactic acidemia   Hyperkalemia   Serum creatinine raised   Troponin level elevated   Elevated brain natriuretic peptide (BNP) level   Congestive heart failure, unspecified HF chronicity, unspecified heart failure type (HCC)   SIRS (systemic inflammatory response syndrome) (HCC)   Pneumothorax, unspecified type          Hospital Course: Patient was brought from nursing home via EMS with respiratory distress. Patient arrives by EMS to ER and per ER physician she  Was on oxygen due to low oxygen levels and  on arrival she was tachypneic and tachycardic. Oxygen is doing well but she was  pale and had rhonchi and rales on arrival.  History of CHF EF of 15 to 20%. Supposedly has had a Covid vaccine denied any other fevers or chest pain but was short of breath. Revieweing her chart she had HR of 36-43 at 1325 and was given Atropine. At some point she was intubated and placed on vent. A code blue was called and hospitalist saw her and when he arrived chest compressions had already started. She was in V. Fib and V. Tach. As the ACLS protocol was put in place, family arrived and wanted the patient to be a DNR CC and all efforts to revive patient was stopped and patient was extubated and pronounced dead in ER.  Time of death as noted in ER notes      Discharged Condition:   Consults: pulmonary/intensive care and nephrology, Cardiothoracic

## 2021-12-22 NOTE — H&P
Did not see or examine patient as she  in the ER before I could get to see her.   Sherrell Fernandez MD  2021

## 2022-11-23 NOTE — PROGRESS NOTES
Pt placed on room air. SpO2 stayed below 87% for approx 2min. Pt then placed back on NC Pt required 2L oxygen to remain at 92% or higher. Complex Repair And Melolabial Flap Text: The defect edges were debeveled with a #15 scalpel blade.  The primary defect was closed partially with a complex linear closure.  Given the location of the remaining defect, shape of the defect and the proximity to free margins a melolabial flap was deemed most appropriate for complete closure of the defect.  Using a sterile surgical marker, an appropriate advancement flap was drawn incorporating the defect and placing the expected incisions within the relaxed skin tension lines where possible.    The area thus outlined was incised deep to adipose tissue with a #15 scalpel blade.  The skin margins were undermined to an appropriate distance in all directions utilizing iris scissors.